# Patient Record
Sex: MALE | Race: WHITE | NOT HISPANIC OR LATINO | Employment: OTHER | URBAN - METROPOLITAN AREA
[De-identification: names, ages, dates, MRNs, and addresses within clinical notes are randomized per-mention and may not be internally consistent; named-entity substitution may affect disease eponyms.]

---

## 2017-02-07 ENCOUNTER — ALLSCRIPTS OFFICE VISIT (OUTPATIENT)
Dept: OTHER | Facility: OTHER | Age: 62
End: 2017-02-07

## 2017-02-07 ENCOUNTER — GENERIC CONVERSION - ENCOUNTER (OUTPATIENT)
Dept: OTHER | Facility: OTHER | Age: 62
End: 2017-02-07

## 2017-02-07 DIAGNOSIS — F17.200 NICOTINE DEPENDENCE, UNCOMPLICATED: ICD-10-CM

## 2017-02-07 DIAGNOSIS — F41.8 OTHER SPECIFIED ANXIETY DISORDERS: ICD-10-CM

## 2017-02-07 DIAGNOSIS — Z12.5 ENCOUNTER FOR SCREENING FOR MALIGNANT NEOPLASM OF PROSTATE: ICD-10-CM

## 2017-02-07 DIAGNOSIS — I10 ESSENTIAL (PRIMARY) HYPERTENSION: ICD-10-CM

## 2017-02-07 DIAGNOSIS — M54.2 CERVICALGIA: ICD-10-CM

## 2017-02-28 ENCOUNTER — LAB CONVERSION - ENCOUNTER (OUTPATIENT)
Dept: OTHER | Facility: OTHER | Age: 62
End: 2017-02-28

## 2017-02-28 LAB
25(OH)D3 SERPL-MCNC: 11 NG/ML (ref 30–100)
A/G RATIO (HISTORICAL): 1.5 (CALC) (ref 1–2.5)
ALBUMIN SERPL BCP-MCNC: 4.3 G/DL (ref 3.6–5.1)
ALP SERPL-CCNC: 74 U/L (ref 40–115)
ALT SERPL W P-5'-P-CCNC: 30 U/L (ref 9–46)
AST SERPL W P-5'-P-CCNC: 28 U/L (ref 10–35)
BASOPHILS # BLD AUTO: 0.2 %
BASOPHILS # BLD AUTO: 18 CELLS/UL (ref 0–200)
BILIRUB SERPL-MCNC: 0.4 MG/DL (ref 0.2–1.2)
BUN SERPL-MCNC: 14 MG/DL (ref 7–25)
BUN/CREA RATIO (HISTORICAL): ABNORMAL (CALC) (ref 6–22)
CALCIUM SERPL-MCNC: 9.3 MG/DL (ref 8.6–10.3)
CHLORIDE SERPL-SCNC: 100 MMOL/L (ref 98–110)
CHOLEST SERPL-MCNC: 307 MG/DL (ref 125–200)
CHOLEST/HDLC SERPL: 11 (CALC)
CO2 SERPL-SCNC: 27 MMOL/L (ref 20–31)
CREAT SERPL-MCNC: 1.01 MG/DL (ref 0.7–1.25)
DEPRECATED RDW RBC AUTO: 12.5 % (ref 11–15)
EGFR AFRICAN AMERICAN (HISTORICAL): 93 ML/MIN/1.73M2
EGFR-AMERICAN CALC (HISTORICAL): 80 ML/MIN/1.73M2
EOSINOPHIL # BLD AUTO: 1.2 %
EOSINOPHIL # BLD AUTO: 109 CELLS/UL (ref 15–500)
GAMMA GLOBULIN (HISTORICAL): 2.8 G/DL (CALC) (ref 1.9–3.7)
GLUCOSE (HISTORICAL): 155 MG/DL (ref 65–99)
HBA1C MFR BLD HPLC: 8.6 % OF TOTAL HGB
HCT VFR BLD AUTO: 44.8 % (ref 38.5–50)
HDLC SERPL-MCNC: 28 MG/DL
HGB BLD-MCNC: 15.1 G/DL (ref 13.2–17.1)
LDL CHOLESTEROL (HISTORICAL): ABNORMAL MG/DL (CALC)
LYMPHOCYTES # BLD AUTO: 2366 CELLS/UL (ref 850–3900)
LYMPHOCYTES # BLD AUTO: 26 %
MCH RBC QN AUTO: 32.4 PG (ref 27–33)
MCHC RBC AUTO-ENTMCNC: 33.8 G/DL (ref 32–36)
MCV RBC AUTO: 95.8 FL (ref 80–100)
MONOCYTES # BLD AUTO: 546 CELLS/UL (ref 200–950)
MONOCYTES (HISTORICAL): 6 %
NEUTROPHILS # BLD AUTO: 6061 CELLS/UL (ref 1500–7800)
NEUTROPHILS # BLD AUTO: 66.6 %
NON-HDL-CHOL (CHOL-HDL) (HISTORICAL): 279 MG/DL (CALC)
PLATELET # BLD AUTO: 291 THOUSAND/UL (ref 140–400)
PMV BLD AUTO: 8.6 FL (ref 7.5–12.5)
POTASSIUM SERPL-SCNC: 4.5 MMOL/L (ref 3.5–5.3)
PROSTATE SPECIFIC ANTIGEN TOTAL (HISTORICAL): 0.4 NG/ML
RBC # BLD AUTO: 4.68 MILLION/UL (ref 4.2–5.8)
SODIUM SERPL-SCNC: 137 MMOL/L (ref 135–146)
T4 FREE SERPL-MCNC: 0.9 NG/DL (ref 0.8–1.8)
TOTAL PROTEIN (HISTORICAL): 7.1 G/DL (ref 6.1–8.1)
TRIGL SERPL-MCNC: 564 MG/DL
TSH SERPL DL<=0.05 MIU/L-ACNC: 1.57 MIU/L (ref 0.4–4.5)
WBC # BLD AUTO: 9.1 THOUSAND/UL (ref 3.8–10.8)

## 2017-03-01 ENCOUNTER — GENERIC CONVERSION - ENCOUNTER (OUTPATIENT)
Dept: OTHER | Facility: OTHER | Age: 62
End: 2017-03-01

## 2017-03-07 ENCOUNTER — ALLSCRIPTS OFFICE VISIT (OUTPATIENT)
Dept: OTHER | Facility: OTHER | Age: 62
End: 2017-03-07

## 2017-05-31 DIAGNOSIS — I10 ESSENTIAL (PRIMARY) HYPERTENSION: ICD-10-CM

## 2017-05-31 DIAGNOSIS — E11.9 TYPE 2 DIABETES MELLITUS WITHOUT COMPLICATIONS (HCC): ICD-10-CM

## 2017-05-31 DIAGNOSIS — E78.5 HYPERLIPIDEMIA: ICD-10-CM

## 2017-06-01 ENCOUNTER — LAB CONVERSION - ENCOUNTER (OUTPATIENT)
Dept: OTHER | Facility: OTHER | Age: 62
End: 2017-06-01

## 2017-06-01 LAB
CREATININE, RANDOM URINE (HISTORICAL): 54 MG/DL (ref 20–370)
HBA1C MFR BLD HPLC: 7.9 % OF TOTAL HGB
MAGNESIUM, UR (HISTORICAL): 0.2 MG/DL
MICROALBUMIN/CREATININE RATIO (HISTORICAL): 4 MCG/MG CREAT

## 2017-06-13 ENCOUNTER — ALLSCRIPTS OFFICE VISIT (OUTPATIENT)
Dept: OTHER | Facility: OTHER | Age: 62
End: 2017-06-13

## 2017-08-04 ENCOUNTER — GENERIC CONVERSION - ENCOUNTER (OUTPATIENT)
Dept: OTHER | Facility: OTHER | Age: 62
End: 2017-08-04

## 2017-08-31 ENCOUNTER — ALLSCRIPTS OFFICE VISIT (OUTPATIENT)
Dept: OTHER | Facility: OTHER | Age: 62
End: 2017-08-31

## 2017-08-31 DIAGNOSIS — I73.9 PERIPHERAL VASCULAR DISEASE (HCC): ICD-10-CM

## 2017-08-31 DIAGNOSIS — F17.200 NICOTINE DEPENDENCE, UNCOMPLICATED: ICD-10-CM

## 2017-09-01 ENCOUNTER — HOSPITAL ENCOUNTER (OUTPATIENT)
Dept: NON INVASIVE DIAGNOSTICS | Facility: HOSPITAL | Age: 62
Discharge: HOME | End: 2017-09-01
Payer: COMMERCIAL

## 2017-09-01 DIAGNOSIS — I73.9 PERIPHERAL VASCULAR DISEASE (HCC): ICD-10-CM

## 2017-09-01 PROCEDURE — 93923 UPR/LXTR ART STDY 3+ LVLS: CPT

## 2017-09-01 PROCEDURE — 93925 LOWER EXTREMITY STUDY: CPT

## 2017-09-05 ENCOUNTER — GENERIC CONVERSION - ENCOUNTER (OUTPATIENT)
Dept: OTHER | Facility: OTHER | Age: 62
End: 2017-09-05

## 2017-09-25 ENCOUNTER — ALLSCRIPTS OFFICE VISIT (OUTPATIENT)
Dept: OTHER | Facility: OTHER | Age: 62
End: 2017-09-25

## 2017-10-02 DIAGNOSIS — I70.219 ATHEROSCLEROSIS OF NATIVE ARTERIES OF EXTREMITY WITH INTERMITTENT CLAUDICATION (HCC): ICD-10-CM

## 2017-10-09 ENCOUNTER — APPOINTMENT (OUTPATIENT)
Dept: LAB | Facility: HOSPITAL | Age: 62
End: 2017-10-09
Attending: SURGERY
Payer: COMMERCIAL

## 2017-10-09 DIAGNOSIS — I70.219 ATHEROSCLEROSIS OF NATIVE ARTERIES OF EXTREMITY WITH INTERMITTENT CLAUDICATION (HCC): ICD-10-CM

## 2017-10-09 DIAGNOSIS — F17.200 NICOTINE DEPENDENCE, UNCOMPLICATED: ICD-10-CM

## 2017-10-09 LAB
ANION GAP SERPL CALCULATED.3IONS-SCNC: 6 MMOL/L (ref 4–13)
BUN SERPL-MCNC: 16 MG/DL (ref 5–25)
CALCIUM SERPL-MCNC: 9.7 MG/DL (ref 8.3–10.1)
CHLORIDE SERPL-SCNC: 103 MMOL/L (ref 100–108)
CO2 SERPL-SCNC: 30 MMOL/L (ref 21–32)
CREAT SERPL-MCNC: 0.92 MG/DL (ref 0.6–1.3)
ERYTHROCYTE [DISTWIDTH] IN BLOOD BY AUTOMATED COUNT: 12.7 % (ref 11.6–15.1)
GFR SERPL CREATININE-BSD FRML MDRD: 89 ML/MIN/1.73SQ M
GLUCOSE SERPL-MCNC: 134 MG/DL (ref 65–140)
HCT VFR BLD AUTO: 42.8 % (ref 36.5–49.3)
HGB BLD-MCNC: 14.2 G/DL (ref 12–17)
INR PPP: 0.79 (ref 0.86–1.16)
MCH RBC QN AUTO: 32.2 PG (ref 26.8–34.3)
MCHC RBC AUTO-ENTMCNC: 33.2 G/DL (ref 31.4–37.4)
MCV RBC AUTO: 97 FL (ref 82–98)
PLATELET # BLD AUTO: 290 THOUSANDS/UL (ref 149–390)
PMV BLD AUTO: 10.3 FL (ref 8.9–12.7)
POTASSIUM SERPL-SCNC: 5.2 MMOL/L (ref 3.5–5.3)
PROTHROMBIN TIME: 11 SECONDS (ref 12.1–14.4)
RBC # BLD AUTO: 4.41 MILLION/UL (ref 3.88–5.62)
SODIUM SERPL-SCNC: 139 MMOL/L (ref 136–145)
WBC # BLD AUTO: 7.68 THOUSAND/UL (ref 4.31–10.16)

## 2017-10-09 PROCEDURE — 36415 COLL VENOUS BLD VENIPUNCTURE: CPT

## 2017-10-09 PROCEDURE — 80048 BASIC METABOLIC PNL TOTAL CA: CPT

## 2017-10-09 PROCEDURE — 85027 COMPLETE CBC AUTOMATED: CPT

## 2017-10-09 PROCEDURE — 85610 PROTHROMBIN TIME: CPT

## 2017-10-13 ENCOUNTER — TELEPHONE (OUTPATIENT)
Dept: RADIOLOGY | Facility: HOSPITAL | Age: 62
End: 2017-10-13

## 2017-10-13 RX ORDER — SODIUM CHLORIDE 9 MG/ML
75 INJECTION, SOLUTION INTRAVENOUS CONTINUOUS
Status: CANCELLED | OUTPATIENT
Start: 2017-10-13

## 2017-10-16 ENCOUNTER — TELEPHONE (OUTPATIENT)
Dept: INPATIENT UNIT | Facility: HOSPITAL | Age: 62
End: 2017-10-16

## 2017-10-17 ENCOUNTER — HOSPITAL ENCOUNTER (OUTPATIENT)
Dept: RADIOLOGY | Facility: HOSPITAL | Age: 62
Discharge: HOME | End: 2017-10-17
Attending: SURGERY | Admitting: SURGERY
Payer: COMMERCIAL

## 2017-10-17 VITALS
TEMPERATURE: 97.7 F | SYSTOLIC BLOOD PRESSURE: 117 MMHG | HEART RATE: 60 BPM | OXYGEN SATURATION: 99 % | HEIGHT: 69 IN | RESPIRATION RATE: 18 BRPM | BODY MASS INDEX: 27.99 KG/M2 | DIASTOLIC BLOOD PRESSURE: 62 MMHG | WEIGHT: 189 LBS

## 2017-10-17 DIAGNOSIS — I70.219 ATHEROSCLEROSIS OF LOWER EXTREMITY WITH CLAUDICATION (HCC): ICD-10-CM

## 2017-10-17 PROCEDURE — C1725 CATH, TRANSLUMIN NON-LASER: HCPCS

## 2017-10-17 PROCEDURE — C1769 GUIDE WIRE: HCPCS

## 2017-10-17 PROCEDURE — C2623 CATH, TRANSLUMIN, DRUG-COAT: HCPCS

## 2017-10-17 PROCEDURE — 75710 ARTERY X-RAYS ARM/LEG: CPT

## 2017-10-17 PROCEDURE — C1894 INTRO/SHEATH, NON-LASER: HCPCS

## 2017-10-17 PROCEDURE — 75625 CONTRAST EXAM ABDOMINL AORTA: CPT

## 2017-10-17 PROCEDURE — 37224 HB FEM/POPL REVAS W/TLA: CPT

## 2017-10-17 RX ORDER — DIAZEPAM 2 MG/1
TABLET ORAL CODE/TRAUMA/SEDATION MEDICATION
Status: COMPLETED | OUTPATIENT
Start: 2017-10-17 | End: 2017-10-17

## 2017-10-17 RX ORDER — CLOPIDOGREL BISULFATE 75 MG/1
75 TABLET ORAL ONCE
Status: COMPLETED | OUTPATIENT
Start: 2017-10-17 | End: 2017-10-17

## 2017-10-17 RX ORDER — MIRTAZAPINE 30 MG/1
30 TABLET, FILM COATED ORAL
COMMUNITY

## 2017-10-17 RX ORDER — DIAZEPAM 5 MG/ML
INJECTION, SOLUTION INTRAMUSCULAR; INTRAVENOUS CODE/TRAUMA/SEDATION MEDICATION
Status: COMPLETED | OUTPATIENT
Start: 2017-10-17 | End: 2017-10-17

## 2017-10-17 RX ORDER — MIDAZOLAM HYDROCHLORIDE 1 MG/ML
INJECTION INTRAMUSCULAR; INTRAVENOUS CODE/TRAUMA/SEDATION MEDICATION
Status: COMPLETED | OUTPATIENT
Start: 2017-10-17 | End: 2017-10-17

## 2017-10-17 RX ORDER — FENTANYL CITRATE 50 UG/ML
INJECTION, SOLUTION INTRAMUSCULAR; INTRAVENOUS CODE/TRAUMA/SEDATION MEDICATION
Status: COMPLETED | OUTPATIENT
Start: 2017-10-17 | End: 2017-10-17

## 2017-10-17 RX ORDER — PAROXETINE HYDROCHLORIDE 40 MG/1
40 TABLET, FILM COATED ORAL EVERY MORNING
COMMUNITY

## 2017-10-17 RX ORDER — HEPARIN SODIUM 1000 [USP'U]/ML
INJECTION, SOLUTION INTRAVENOUS; SUBCUTANEOUS CODE/TRAUMA/SEDATION MEDICATION
Status: COMPLETED | OUTPATIENT
Start: 2017-10-17 | End: 2017-10-17

## 2017-10-17 RX ORDER — DIPHENHYDRAMINE HYDROCHLORIDE 50 MG/ML
INJECTION INTRAMUSCULAR; INTRAVENOUS CODE/TRAUMA/SEDATION MEDICATION
Status: COMPLETED | OUTPATIENT
Start: 2017-10-17 | End: 2017-10-17

## 2017-10-17 RX ORDER — SODIUM CHLORIDE 9 MG/ML
75 INJECTION, SOLUTION INTRAVENOUS CONTINUOUS
Status: DISCONTINUED | OUTPATIENT
Start: 2017-10-17 | End: 2017-10-17 | Stop reason: HOSPADM

## 2017-10-17 RX ORDER — ALPRAZOLAM 0.5 MG/1
TABLET ORAL
COMMUNITY
End: 2018-04-11 | Stop reason: CLARIF

## 2017-10-17 RX ORDER — ATORVASTATIN CALCIUM 40 MG/1
40 TABLET, FILM COATED ORAL DAILY
COMMUNITY
End: 2018-03-23 | Stop reason: SDUPTHER

## 2017-10-17 RX ADMIN — SODIUM CHLORIDE 75 ML/HR: 0.9 INJECTION, SOLUTION INTRAVENOUS at 07:25

## 2017-10-17 RX ADMIN — CLOPIDOGREL BISULFATE 75 MG: 75 TABLET ORAL at 11:33

## 2017-10-17 RX ADMIN — FENTANYL CITRATE 50 MCG: 50 INJECTION, SOLUTION INTRAMUSCULAR; INTRAVENOUS at 08:22

## 2017-10-17 RX ADMIN — MIDAZOLAM 1 MG: 1 INJECTION INTRAMUSCULAR; INTRAVENOUS at 08:27

## 2017-10-17 RX ADMIN — MIDAZOLAM 1 MG: 1 INJECTION INTRAMUSCULAR; INTRAVENOUS at 08:21

## 2017-10-17 RX ADMIN — DIAZEPAM 5 MG: 5 INJECTION, SOLUTION INTRAMUSCULAR; INTRAVENOUS at 08:31

## 2017-10-17 RX ADMIN — MIDAZOLAM 1 MG: 1 INJECTION INTRAMUSCULAR; INTRAVENOUS at 08:17

## 2017-10-17 RX ADMIN — IODIXANOL 120 ML: 320 INJECTION, SOLUTION INTRAVASCULAR at 09:54

## 2017-10-17 RX ADMIN — DIPHENHYDRAMINE HYDROCHLORIDE 25 MG: 50 INJECTION, SOLUTION INTRAMUSCULAR; INTRAVENOUS at 08:48

## 2017-10-17 RX ADMIN — FENTANYL CITRATE 50 MCG: 50 INJECTION, SOLUTION INTRAMUSCULAR; INTRAVENOUS at 08:17

## 2017-10-17 RX ADMIN — FENTANYL CITRATE 50 MCG: 50 INJECTION, SOLUTION INTRAMUSCULAR; INTRAVENOUS at 09:09

## 2017-10-17 RX ADMIN — MIDAZOLAM 1 MG: 1 INJECTION INTRAMUSCULAR; INTRAVENOUS at 08:51

## 2017-10-17 RX ADMIN — MIDAZOLAM 1 MG: 1 INJECTION INTRAMUSCULAR; INTRAVENOUS at 09:00

## 2017-10-17 RX ADMIN — MIDAZOLAM 1 MG: 1 INJECTION INTRAMUSCULAR; INTRAVENOUS at 08:41

## 2017-10-17 RX ADMIN — HEPARIN SODIUM 5000 UNITS: 1000 INJECTION INTRAVENOUS; SUBCUTANEOUS at 08:41

## 2017-10-17 NOTE — DISCHARGE INSTRUCTIONS
ARTERIOGRAM    WHAT YOU SHOULD KNOW:   An angiogram is a procedure to look at arteries in your body  Arteries are the blood vessels that carry blood from your heart to your body  AFTER YOU LEAVE:     Self-care:   · Limit activity: Rest for the remainder of the day of your procedure  Have some one with you until the next morning  Keep your arm or leg straight as much as possible  Rest as much as possible, sitting lying or reclining  Walk only to go to the bathroom, to bed or to eat  If the angiogram catheter was put in your leg, use the stairs as little as possible  No driving  · Keep your wound clean and dry  You may shower 24 hours after your procedure  The bandage you have on should fall off in 2-3 days  If there is any drainage from the puncture site, you should put on a clean bandage  · Watch for bleeding and bruising: It is normal to have a bruise and soreness where the angiogram catheter went in  · Diet:   · You may resume your regular diet  · Drink more liquids than usual for the next 24 hours      · IMMEDIATELY Contact Interventional Radiology at [phone] [place] PATIENTS: Contact Interventional Radiology at [phone]) [place] PATIENTS: Contact Interventional Radiology at [phone]) if any of the following occur:  · If your bruise gets larger or if you notice any active bleeding  APPLY DIRECT PRESSURE TO THE BLEEDING SITE  · If you notice increased swelling or have increased pain at the puncture site   · If you have any numbness or pain in the extremity of the puncture site   · If that extremity seems cold or pale      · You have fever greater than 101    Follow up with your primary healthcare provider  as directed: Write down your questions so you remember to ask them during your visits

## 2017-10-17 NOTE — BRIEF OP NOTE (RAD/CATH)
IR ABDOMINAL ANGIOGRAPHY / INTERVENTION  Procedure Note    PATIENT NAME: Benjamin Caro  : 1955  MRN: [id number]     Pre-op Diagnosis:   1  Atherosclerosis of lower extremity with claudication (HCC)      Post-op Diagnosis:   1   Atherosclerosis of lower extremity with claudication ([address] )      Procedure: Abdominal aortogram with RLE runoff  R SFA drug coated balloon angioplasty  Surgeon:   Darline Leone DO  Assistants: none    Sedation Time: 60min  Contrast: 120 cc visipaque 320  Fluoro time: 12 7min        Estimated Blood Loss: <20ml  Findings: high grade R SFA stenosis successfully treated with balloon angioplasty    Specimens: N/A    Complications:  None immediately apparent  Anesthesia: Conscious sedation and Local    Mahendra Wheatley DO     Date: 10/17/2017  Time: 9:31 AM

## 2017-11-02 ENCOUNTER — ALLSCRIPTS OFFICE VISIT (OUTPATIENT)
Dept: OTHER | Facility: OTHER | Age: 62
End: 2017-11-02

## 2017-11-03 NOTE — PROGRESS NOTES
Assessment  1  Atherosclerotic PVD with intermittent claudication (440 21) (I70 219)   2  Nicotine dependence (305 1) (F17 200)    Plan  Atherosclerotic PVD with intermittent claudication    · VAS LOWER LIMB ARTERIAL DUPLEX, COMPLETE BILATERAL/GRAFTS; SIDE:Bilateral;  Status:Hold For - Scheduling; Requested for:32Ubm1430; Perform:[facility]; IFW:28TNV0712;GEEFPOV; For:Atherosclerotic PVD with intermittent claudication; Ordered By:Mahendra Cuenca; Discussion/Summary  Discussion Summary:   Anais Young is status post abdominal aortogram with right lower extremity runoff, SFA drug coated balloon angioplasty for focal high-grade/occluded segment  He reports dramatic improvement with regards to his lower extremity calf symptoms  He continues to smoke however is attempting to quit  Will enter into our surveillance lower extremity arterial duplex program with 3 month LEAD  Continue aspirin and atorvastatin as well as Plavix  Chief Complaint  Chief Complaint Free Text Note Form: I am here for follow-up procedure  History of Present Illness  HPI: Patient is s/p A-gram on the right LE on 10/17/17  He feels since the procedure his symptoms have resolved  He denies calf cramping  He is able to walk without pain  Patient takes Plavix and ASA daily  Incision is dry and intact  Patient admits he continues to try and d/c tobacco use  Review of Systems  Complete Male - Vasc:   Constitutional: No fever or chills, feels well, no tiredness, no recent weight gain or weight loss  Eyes: dryness of the eyes,-- does not wears glasses,-- no sudden vision loss,-- no blurred vision-- and-- no double vision, but-- no eye pain,-- no eyesight problems,-- eyes not red,-- no purulent discharge from the eyes-- and-- no itching of the eyes  ENT: no loss of hearing, no nosebleeds, no hoarseness  Cardiovascular: no chest pain, regular heart rate     Respiratory: No sob, no wheezing, no cough, no sob with exertion, no orthopnea  Gastrointestinal: No nausea, No vomiting, no diarrhea, no blood in stool  Genitourinary: no dysuria, no hematuria, No urinary incontinence, no erectile dysfunction  Musculoskeletal: no limb pain, no limb swelling  Integumentary: no rash, no lesions, no wounds, no ulcer  Neurological: no dementia, no headache, no numbness, no limb weakness, no dizziness, no difficulty walking  Psychiatric: no depression, no mood disorders, no anxiety  Hematologic/Lymphatic: no bleeding disorder, no easy bruising  ROS Reviewed:   ROS reviewed  Active Problems  1  Atherosclerotic PVD with intermittent claudication (440 21) (I70 219)   2  Depression with anxiety (300 4) (F41 8)   3  Diabetes mellitus type 2, noninsulin dependent (250 00) (E11 9)   4  Hyperlipidemia (272 4) (E78 5)   5  Hypertension (401 9) (I10)   6  Neck pain (723 1) (M54 2)   7  Need for prophylactic vaccination and inoculation against influenza (V04 81) (Z23)   8  Nicotine dependence (305 1) (F17 200)   9  Peripheral vascular disease (443 9) (I73 9)   10  Prostate cancer screening (V76 44) (Z12 5)   11  Rhinophyma (695 3) (L71 1)   12  Rosacea (695 3) (L71 9)   13  Screening for colon cancer (V76 51) (Z12 11)    Past Medical History  1  History of colonic polyps (V12 72) (Z86 010)  Active Problems And Past Medical History Reviewed: The active problems and past medical history were reviewed and updated today  Surgical History  Surgical History Reviewed: The surgical history was reviewed and updated today  Family History  Mother    1  Family history of congestive heart failure (V17 49) (Z82 49)  Sibling    2  Family history of hypertension (V17 49) (Z82 49)  Family History Reviewed: The family history was reviewed and updated today  Social History   · Alcohol use (V49 89) (Z78 9)   · Current every day smoker (305 1) (F17 200)  Social History Reviewed: The social history was reviewed and updated today  Current Meds   1  ALPRAZolam 0 25 MG Oral Tablet; TAKE 1 TABLET 3 TIMES DAILY AS NEEDED; Therapy: (Recorded:11Yad5857) to Recorded   2  Aspirin 81 MG Oral Tablet Delayed Release; take 1 tablet by mouth every day; Therapy: 50GZS4517 to (Evaluate:12Mar2018)  Requested for: 31FMX9077; Last   Rx:13Oct2017 Ordered   3  Atorvastatin Calcium 40 MG Oral Tablet; TAKE 1 TABLET DAILY; Therapy: 66LPM6960 to (Janet Napier)  Requested for: 57QAF0402; Last   Rx:04Dgq8760 Ordered   4  Blood Glucose Monitor System w/Device Kit; use to check blood glucose 1-2 times a day; Therapy: 59API3803 to (Last Rx:07Mar2017)  Requested for: 81CJK1913 Ordered   5  Blood Glucose Test In Vitro Strip; TEST TWICE DAILY; Therapy: 04UGS4483 to (Evaluate:15Jun2017)  Requested for: 93KAA2477; Last   Rx:07Mar2017 Ordered   6  Glucerna Hunger Smart Shake Oral Liquid; 1 bottle daily; Therapy: 22NSA3014 to (Last Rx:13Jun2017) Ordered   7  Lisinopril 20 MG Oral Tablet; TAKE 1 TABLET BY MOUTH ONCE DAILY  Requested for:   13Jun2017; Last Rx:13Jun2017 Ordered   8  MetFORMIN HCl - 1000 MG Oral Tablet; TAKE 1 TABLET TWICE DAILY; Therapy: 57POG5270 to (Evaluate:10Dec2017)  Requested for: 73UNM4275; Last   Rx:13Jun2017 Ordered   9  MetroNIDAZOLE 1 % External Gel; APPLY SPARINGLY TO AFFECTED AREA(S) ONCE   DAILY; Therapy: 50QKB4900 to (Last Rx:90Nta8211)  Requested for: 71Wxz5150 Ordered   10  Nicotine Step 1 21 MG/24HR Transdermal Patch 24 Hour; APPLY 1 PATCH DAILY AS    DIRECTED; Therapy: 57Hkz4714 to (Evaluate:09Sep2017)  Requested for: 42Woe3505; Last    Rx:59Xfh6562 Ordered   11  Nicotine Step 2 14 MG/24HR Transdermal Patch 24 Hour; APPLY 1 PATCH DAILY for 6    weeks after finishing 2 weeks of 21 mg;    Therapy: 29Yoi5787 to (Last Rx:12Mmk5872)  Requested for: 66Uia7643 Ordered   12  Paxil 40 MG Oral Tablet; Take 1 tablet daily as directed; Therapy: (Recorded:45Twq0530) to Recorded   13  Plavix TABS;     Therapy: (Recorded:02Nov2017) to Recorded   14  Remeron 30 MG Oral Tablet; TAKE 1 TABLET AT BEDTIME; Therapy: (Recorded:41Nmo0640) to Recorded  Medication List Reviewed: The medication list was reviewed and updated today  Allergies  1  No Known Drug Allergies    Vitals  Vital Signs    Recorded: 24ERR1206 01:41PM   Temperature 97 4 F, Tympanic   Heart Rate 108, L Radial   Pulse Quality Normal, L Radial   Respiration Quality Normal   Respiration 20   Systolic 694, LUE, Sitting   Diastolic 70, LUE, Sitting   Height 5 ft 9 in   Weight 184 lb    BMI Calculated 27 17   BSA Calculated 1 99     Physical Exam    Posterior tibialis: right 2+-- and-- left 1+  Dorsalis pedis: left 2+  Distal Pulse Exam: This patient had dopplerable pulses in these locations: right Monophasic a T Doppler signal, multiphasic right peroneal Doppler signal  Normal Capillary Refill  Extremities: No upper or lower extremity edema  Results/Data  Diagnostic Studies Reviewed Vasc: I personally reviewed the films/images/results in the office today  My interpretation follows  Vascular Study Review abdominal aortogram with right lower extremity runoff, right SFA intervention -images reviewed with patient today in the office        Signatures   Electronically signed by : Regis Martinez DO; Nov 2 2017  2:38PM EST                       (Author)

## 2017-12-04 ENCOUNTER — GENERIC CONVERSION - ENCOUNTER (OUTPATIENT)
Dept: OTHER | Facility: OTHER | Age: 62
End: 2017-12-04

## 2018-01-05 ENCOUNTER — ALLSCRIPTS OFFICE VISIT (OUTPATIENT)
Dept: OTHER | Facility: OTHER | Age: 63
End: 2018-01-05

## 2018-01-05 LAB — HBA1C MFR BLD HPLC: 6 %

## 2018-01-10 NOTE — RESULT NOTES
Message   Recorded as Task   Date: 09/05/2017 08:29 AM, Created By: Yoon Belle   Task Name: Follow Up   Assigned To: Pb Ndiaye   Regarding Patient: Mary Spivey, Status: In Progress   Comment:    Omayra Reyna - 05 Sep 2017 8:29 AM     TASK CREATED  Arterial duplex showed significant vascular disease on both sides  On the right there was also a stenosis which probably could explain why he gets pain whenever he walks  I can put in a vascular consult for Pedro Gutiérrez, if he desires to see someone else (a friend of his bother), I can do that physicians  Obviously it may be easier to have everything in the same network since there is better communication and access to records    Let me know which physician he chooses to see   Villatoro Nephew - 05 Sep 2017 2:32 PM     TASK EDITED  I left a message for Anais Amaya to call back  Villatoro Nephew - 05 Sep 2017 2:32 PM     TASK IN PROGRESS   Villatoro Nephew - 05 Sep 2017 2:59 PM     TASK EDITED  I spoke with Anais Amaya and he is aware of his results  He said it would be ok if you can set him up for a Vascular consult with [address]  Verified Results  VAS LOWER LIMB ARTERIAL DUPLEX, COMPLETE BILATERAL/GRAFTS 01Sep2017 08:30AM Yoon Belle   TW Order Number: [id number]    - Patient Instructions: To schedule this appointment, please contact Central Scheduling at [phone]  Test Name Result Flag Reference   VAS LOWER LIMB ARTERIAL DUPLEX, COMPLETE BILATERAL/GRAFTS (Report)     [facility] REPORT   CLINICAL:   Indications: PVD, Unspecified [I73 9]  Patient presents with right calf claudication of approximately 30 yards  Risk Factors: The patient has history of Hypertension, Hyperlipidemia, current   smoking, and Diabetes  Right Brachial Pressure: 154/ mm Hg, Left Brachial Pressure: 153/ mm Hg           FINDINGS:      Segment        Right         Left                        Impression PSV EDV PSV EDV    Common Femoral Artery       122  0 133  15 Prox Profunda           125  7 125  6    Prox SFA              93  0 149  16    Mid SFA               83  8 162  12    Dist SFA        >75%    457  13 116  0    Proximal Pop            44  7 156  16    Distal Pop             64  12  86  21    Dist Post Tibial          51  9  66  0    Dist  Ant  Tibial          22  0  74  0             CONCLUSION:   Impression:   Right Lower Limb   Ankle Pressure: 97 mm Hg , GINI: 0 63, severe claudication range      Right Metatarsal Pressure: 100 mm Hg  Right Great Toe Pressure 66 mm Hg , above healing threshold for a diabetic  Conclusion: A greater than 75% stenosis is noted in the distal superficial   femoral artery with diffuse atherosclerotic disease noted throughout the   remaining portions of the femoropopliteal arteries  Left Lower Limb   Ankle Pressure 150 mm Hg , GINI: 0 97, normal range  Left Metatarsal Pressure: 143 mm Hg  Left Great Toe Pressure 107 mm Hg , above healing threshold for a diabetic  Conclusion: Diffuse atherosclerotic disease noted throughout the   femoropopliteal arteries        SIGNATURE:   Electronically Signed by: Jenifer Ruelas MD on 2017-09-01 04:46:55 PM

## 2018-01-10 NOTE — RESULT NOTES
Message   Recorded as Task   Date: 02/28/2017 08:38 AM, Created By: Aruna Gray   Task Name: Follow Up   Assigned To: Yvette Kimble   Regarding Patient: aJson Sparks, Status: Active   CommentDelona Duel - 28 Feb 2017 8:38 AM     TASK CREATED  Blood work showed that he is diabetic  I will discuss with him more when I see him and start medications  PS: let me know if he doesn't come of reschedules  Nohemy Field - 01 Mar 2017 1:45 PM     TASK EDITED  Spoke to the patient gave him the results, told him that the provider will discuss more and starting medication in his next OV

## 2018-01-12 VITALS
TEMPERATURE: 97.6 F | SYSTOLIC BLOOD PRESSURE: 125 MMHG | BODY MASS INDEX: 28.01 KG/M2 | WEIGHT: 189.13 LBS | HEART RATE: 106 BPM | DIASTOLIC BLOOD PRESSURE: 76 MMHG | HEIGHT: 69 IN | RESPIRATION RATE: 16 BRPM

## 2018-01-12 VITALS
BODY MASS INDEX: 28.81 KG/M2 | RESPIRATION RATE: 14 BRPM | TEMPERATURE: 96.2 F | WEIGHT: 194.5 LBS | SYSTOLIC BLOOD PRESSURE: 140 MMHG | HEIGHT: 69 IN | HEART RATE: 76 BPM | DIASTOLIC BLOOD PRESSURE: 70 MMHG

## 2018-01-13 VITALS
HEART RATE: 108 BPM | RESPIRATION RATE: 20 BRPM | WEIGHT: 184 LBS | SYSTOLIC BLOOD PRESSURE: 138 MMHG | DIASTOLIC BLOOD PRESSURE: 70 MMHG | BODY MASS INDEX: 27.25 KG/M2 | HEIGHT: 69 IN | TEMPERATURE: 97.4 F

## 2018-01-13 VITALS
RESPIRATION RATE: 21 BRPM | BODY MASS INDEX: 27.55 KG/M2 | HEIGHT: 69 IN | DIASTOLIC BLOOD PRESSURE: 80 MMHG | HEART RATE: 108 BPM | SYSTOLIC BLOOD PRESSURE: 140 MMHG | WEIGHT: 186 LBS

## 2018-01-13 VITALS
DIASTOLIC BLOOD PRESSURE: 76 MMHG | SYSTOLIC BLOOD PRESSURE: 140 MMHG | HEART RATE: 80 BPM | BODY MASS INDEX: 29.36 KG/M2 | TEMPERATURE: 97.1 F | RESPIRATION RATE: 16 BRPM | WEIGHT: 198.25 LBS | HEIGHT: 69 IN

## 2018-01-14 VITALS
RESPIRATION RATE: 16 BRPM | TEMPERATURE: 97.4 F | BODY MASS INDEX: 29.07 KG/M2 | WEIGHT: 196.25 LBS | HEIGHT: 69 IN | SYSTOLIC BLOOD PRESSURE: 142 MMHG | DIASTOLIC BLOOD PRESSURE: 78 MMHG | HEART RATE: 84 BPM

## 2018-01-17 NOTE — MISCELLANEOUS
Message   Date: 04 Aug 2017 11:42 AM EST, Recorded By: Randi Phillips  Calling For: Elvicésar Mark: Tequila Carty, Self  Phone: [phone] (Home)  Reason: Other  patient request refill on Metronidazole gel 1% for rosacea on face, originally prescribed by Dr Styles Spar surgeon  Dr Kate Price agreed to refill and send to pharmacy  Patient was made aware and will  at pharmacy later today  Active Problems    1  Depression with anxiety (300 4) (F41 8)   2  Diabetes mellitus type 2, noninsulin dependent (250 00) (E11 9)   3  Hyperlipidemia (272 4) (E78 5)   4  Hypertension (401 9) (I10)   5  Neck pain (723 1) (M54 2)   6  Need for prophylactic vaccination and inoculation against influenza (V04 81) (Z23)   7  Nicotine dependence (305 1) (F17 200)   8  Prostate cancer screening (V76 44) (Z12 5)   9  Rhinophyma (695 3) (L71 1)   10  Screening for colon cancer (V76 51) (Z12 11)    Current Meds   1  ALPRAZolam 0 25 MG Oral Tablet; TAKE 1 TABLET 3 TIMES DAILY AS NEEDED; Therapy: (Recorded:75Lnm2435) to Recorded   2  Aspirin 81 MG Oral Tablet Delayed Release; take 1 tablet by mouth every day; Therapy: 41KFW4736 to ([place])  Requested for: 11ONW1909; Last   Rx:99Xoa5640 Ordered   3  Atorvastatin Calcium 40 MG Oral Tablet; TAKE 1 TABLET DAILY; Therapy: 54OVA9977 to ([place])  Requested for: 22SPC7908; Last   Rx:00Set9045 Ordered   4  Blood Glucose Monitor System w/Device Kit; use to check blood glucose 1-2 times a day; Therapy: 98CRT3162 to (Last Rx:07Mar2017)  Requested for: 86TQV2148 Ordered   5  Blood Glucose Test In Vitro Strip; TEST TWICE DAILY; Therapy: 69BAG4162 to (Evaluate:15Jun2017)  Requested for: 55YXH9145; Last   Rx:07Mar2017 Ordered   6  Glucerna Hunger Smart Shake Oral Liquid; 1 bottle daily; Therapy: 91DNL4192 to (Last Rx:13Jun2017) Ordered   7   Lisinopril 20 MG Oral Tablet; TAKE 1 TABLET BY MOUTH ONCE DAILY  Requested for:   13Jun2017; Last

## 2018-01-23 VITALS
WEIGHT: 189 LBS | SYSTOLIC BLOOD PRESSURE: 128 MMHG | DIASTOLIC BLOOD PRESSURE: 68 MMHG | TEMPERATURE: 96.5 F | RESPIRATION RATE: 15 BRPM | HEIGHT: 69 IN | BODY MASS INDEX: 27.99 KG/M2 | HEART RATE: 80 BPM

## 2018-01-23 NOTE — RESULT NOTES
Message   Recorded as Task   Date: 12/04/2017 09:42 AM, Created By: Roney Aguilar   Task Name: Follow Up   Assigned To: Rosario Grant   Regarding Patient: Dylan Ulloa, Status: Active   CommentLavertnicole Beck - 04 Dec 2017 9:42 AM     TASK CREATED  his last appt in sept was cancelled  he should come in for follow up to monitor his bp, Daniella Álvarez - 04 Dec 2017 10:53 AM     TASK EDITED  I spoke with Ajay Álvarez, he cancelled due to insurance issues  He is starting new insurance in January  I was able to schedule him for 1/5/2018          Plan  Diabetes mellitus type 2, noninsulin dependent    · MetFORMIN HCl - 1000 MG Oral Tablet; TAKE 1 TABLET TWICE DAILY

## 2018-02-02 DIAGNOSIS — I70.219 ATHEROSCLEROSIS OF NATIVE ARTERIES OF EXTREMITY WITH INTERMITTENT CLAUDICATION (HCC): ICD-10-CM

## 2018-02-09 LAB
LEFT EYE DIABETIC RETINOPATHY: NORMAL
RIGHT EYE DIABETIC RETINOPATHY: NORMAL

## 2018-03-05 ENCOUNTER — OFFICE VISIT (OUTPATIENT)
Dept: GASTROENTEROLOGY | Facility: MEDICAL CENTER | Age: 63
End: 2018-03-05
Payer: COMMERCIAL

## 2018-03-05 VITALS
WEIGHT: 188.93 LBS | BODY MASS INDEX: 27.98 KG/M2 | TEMPERATURE: 98.3 F | SYSTOLIC BLOOD PRESSURE: 134 MMHG | HEIGHT: 69 IN | DIASTOLIC BLOOD PRESSURE: 66 MMHG | HEART RATE: 97 BPM

## 2018-03-05 DIAGNOSIS — I10 ESSENTIAL HYPERTENSION: Primary | ICD-10-CM

## 2018-03-05 DIAGNOSIS — K63.5 POLYP OF COLON, UNSPECIFIED PART OF COLON, UNSPECIFIED TYPE: Primary | ICD-10-CM

## 2018-03-05 DIAGNOSIS — Z11.59 NEED FOR HEPATITIS C SCREENING TEST: ICD-10-CM

## 2018-03-05 PROCEDURE — 99202 OFFICE O/P NEW SF 15 MIN: CPT | Performed by: INTERNAL MEDICINE

## 2018-03-05 RX ORDER — PEG-3350, SODIUM SULFATE, SODIUM CHLORIDE, POTASSIUM CHLORIDE, SODIUM ASCORBATE AND ASCORBIC ACID 7.5-2.691G
4000 KIT ORAL ONCE
Qty: 1 EACH | Refills: 0 | Status: SHIPPED | OUTPATIENT
Start: 2018-03-05 | End: 2018-03-08

## 2018-03-05 RX ORDER — LISINOPRIL 20 MG/1
20 TABLET ORAL DAILY
Qty: 90 TABLET | Refills: 1 | Status: SHIPPED | OUTPATIENT
Start: 2018-03-05 | End: 2018-08-30 | Stop reason: SDUPTHER

## 2018-03-05 NOTE — LETTER
March 5, 2018     Ruperto Taylor, [address] [facility]    Patient: Emiliano Jimenez   YOB: 1955   Date of Visit: 3/5/2018       Dear Dr Urrutia Median:    Thank you for referring Emiliano Jimenez to me for evaluation  Below are my notes for this consultation  If you have questions, please do not hesitate to call me  I look forward to following your patient along with you           Sincerely,        Anshu Irby,         CC: No Recipients

## 2018-03-05 NOTE — PROGRESS NOTES
[facility] - Outpatient Consultation  Liana Degroot 58 y o  male MRN: [id number]  Encounter: [id number]          ASSESSMENT AND PLAN:      1  Colon polyps  -will repeat colonoscopy now as it has been over 5 years since his last colonoscopy  - will plan in am given his DM    2  Need for Hep C screening based on age  - will check Hep C ab    Follow up as needed   ______________________________________________________________    HPI:  58year old male referred by his PCP for colon cancer screening  Says he has had polyps in the past   Last colonoscopy was over 5 years ago  He reports a prior colon resection about 15 years ago for what sounds like a large polyp that couldn't be removed endoscopically  Denies any GI symptoms  Denies weight loss  He is on metformin for DM  His dad is a retried physician  His nephew is in anesthesia residency at [employer]  REVIEW OF SYSTEMS:    CONSTITUTIONAL: Denies any fever, chills, rigors, and weight loss  HEENT: No earache or tinnitus  Denies hearing loss or visual disturbances  CARDIOVASCULAR: No chest pain or palpitations  RESPIRATORY: Denies any cough, hemoptysis, shortness of breath or dyspnea on exertion  GASTROINTESTINAL: As noted in the History of Present Illness  GENITOURINARY: No problems with urination  Denies any hematuria or dysuria  NEUROLOGIC: No dizziness or vertigo, denies headaches  MUSCULOSKELETAL: Denies any muscle or joint pain  SKIN: Denies skin rashes or itching  ENDOCRINE: Denies excessive thirst  Denies intolerance to heat or cold  PSYCHOSOCIAL: Denies depression or anxiety  Denies any recent memory loss         Historical Information   Past Medical History:   Diagnosis Date    Colon polyp      Past Surgical History:   Procedure Laterality Date    COLON SURGERY      found growth, bengin    COLONOSCOPY       Social History   History   Alcohol Use    Yes     Comment: now and then     History   Drug Use No     History   Smoking Status    Former Smoker   Smokeless Tobacco    Former User     History reviewed  No pertinent family history  Meds/Allergies       Current Outpatient Prescriptions:     ALPRAZolam (XANAX) 0 5 mg tablet    aspirin 81 MG tablet    atorvastatin (LIPITOR) 40 mg tablet    LISINOPRIL PO    metFORMIN (GLUCOPHAGE) 1000 MG tablet    mirtazapine (REMERON) 30 mg tablet    PARoxetine (PAXIL) 40 MG tablet    No Known Allergies        Objective     Blood pressure 134/66, pulse 97, temperature 98 3 °F (36 8 °C), temperature source Oral, height 5' 9 02" (1 753 m), weight 85 7 kg (188 lb 15 oz)  PHYSICAL EXAM:      General Appearance:   Alert, cooperative, no distress   HEENT:   Normocephalic, atraumatic, anicteric      Neck:  Supple, symmetrical, trachea midline   Lungs:   Clear to auscultation bilaterally; no rales, rhonchi or wheezing; respirations unlabored    Heart[de-identified]   Regular rate and rhythm; no murmur, rub, or gallop  Abdomen:   Soft, non-tender, non-distended; normal bowel sounds; no masses, no organomegaly, midline surgical scar well healed   Genitalia:   Deferred    Rectal:   Deferred    Extremities:  No cyanosis, clubbing or edema    Pulses:  2+ and symmetric    Skin:  No jaundice, rashes, or lesions    Lymph nodes:  No palpable cervical lymphadenopathy        Lab Results:   No visits with results within 1 Day(s) from this visit  Latest known visit with results is:   Debbie Office Visit on 01/05/2018   Component Date Value    Hemoglobin A1C 01/05/2018 6 0          Radiology Results:   No results found

## 2018-03-08 ENCOUNTER — TELEPHONE (OUTPATIENT)
Dept: GASTROENTEROLOGY | Facility: MEDICAL CENTER | Age: 63
End: 2018-03-08

## 2018-03-08 DIAGNOSIS — Z12.11 COLON CANCER SCREENING: Primary | ICD-10-CM

## 2018-03-12 ENCOUNTER — ANESTHESIA EVENT (OUTPATIENT)
Dept: GASTROENTEROLOGY | Facility: MEDICAL CENTER | Age: 63
End: 2018-03-12
Payer: COMMERCIAL

## 2018-03-13 ENCOUNTER — ANESTHESIA (OUTPATIENT)
Dept: GASTROENTEROLOGY | Facility: MEDICAL CENTER | Age: 63
End: 2018-03-13
Payer: COMMERCIAL

## 2018-03-13 ENCOUNTER — HOSPITAL ENCOUNTER (OUTPATIENT)
Facility: MEDICAL CENTER | Age: 63
Setting detail: OUTPATIENT SURGERY
Discharge: HOME | End: 2018-03-13
Attending: INTERNAL MEDICINE | Admitting: INTERNAL MEDICINE
Payer: COMMERCIAL

## 2018-03-13 VITALS
HEART RATE: 77 BPM | RESPIRATION RATE: 14 BRPM | BODY MASS INDEX: 28.73 KG/M2 | TEMPERATURE: 97.9 F | OXYGEN SATURATION: 99 % | DIASTOLIC BLOOD PRESSURE: 58 MMHG | SYSTOLIC BLOOD PRESSURE: 118 MMHG | HEIGHT: 69 IN | WEIGHT: 194 LBS

## 2018-03-13 PROCEDURE — G0121 COLON CA SCRN NOT HI RSK IND: HCPCS | Performed by: INTERNAL MEDICINE

## 2018-03-13 PROCEDURE — 45378 DIAGNOSTIC COLONOSCOPY: CPT | Performed by: INTERNAL MEDICINE

## 2018-03-13 RX ORDER — SODIUM CHLORIDE 9 MG/ML
125 INJECTION, SOLUTION INTRAVENOUS CONTINUOUS
Status: DISCONTINUED | OUTPATIENT
Start: 2018-03-13 | End: 2018-03-13 | Stop reason: HOSPADM

## 2018-03-13 RX ORDER — PROPOFOL 10 MG/ML
INJECTION, EMULSION INTRAVENOUS AS NEEDED
Status: DISCONTINUED | OUTPATIENT
Start: 2018-03-13 | End: 2018-03-13 | Stop reason: SURG

## 2018-03-13 RX ADMIN — SODIUM CHLORIDE 125 ML/HR: 0.9 INJECTION, SOLUTION INTRAVENOUS at 07:33

## 2018-03-13 RX ADMIN — PROPOFOL 50 MG: 10 INJECTION, EMULSION INTRAVENOUS at 08:08

## 2018-03-13 RX ADMIN — SODIUM CHLORIDE: 0.9 INJECTION, SOLUTION INTRAVENOUS at 08:04

## 2018-03-13 RX ADMIN — PROPOFOL 50 MG: 10 INJECTION, EMULSION INTRAVENOUS at 08:16

## 2018-03-13 RX ADMIN — PROPOFOL 100 MG: 10 INJECTION, EMULSION INTRAVENOUS at 08:07

## 2018-03-13 RX ADMIN — PROPOFOL 50 MG: 10 INJECTION, EMULSION INTRAVENOUS at 08:13

## 2018-03-13 NOTE — OP NOTE
**** GI/ENDOSCOPY REPORT ****     PATIENT NAME: Lanny Rivers ------ VISIT ID:  Patient ID: [id number]   YOB: 1955     INTRODUCTION: Colonoscopy - A 58 male patient presents for an outpatient   Colonoscopy at [address] [place] [address]  PREVIOUS COLONOSCOPY: Patient's last colonoscopy was 5 years ago  INDICATIONS: Colon polyps  CONSENT:  The benefits, risks, and alternatives to the procedure were   discussed and informed consent was obtained from the patient  PREPARATION: EKG, pulse, pulse oximetry and blood pressure were monitored   throughout the procedure  The patient was identified by myself both   verbally and by visual inspection of ID band  Airway Assessment   Classification: Airway class 2 - Visualization of the soft palate, fauces   and uvula  ASA Classification: See anesthesia record  MEDICATIONS: Anesthesia-check records     PROCEDURE:  The endoscope was passed without difficulty through the anus   under direct visualization and advanced to the cecum, confirmed by   appendiceal orifice and ileocecal valve  The scope was withdrawn and the   mucosa was carefully examined  The quality of the preparation was good  Cecal Intubation Time: 5 minutes(s) Scope Withdrawal Time: 7 minutes(s)     RECTAL EXAM: Normal rectal exam      FINDINGS:  There was evidence of diverticulosis in the cecum and   descending colon  The terminal ileum appeared to be normal      COMPLICATIONS: There were no complications  IMPRESSIONS: Diverticulosis found in the cecum and descending colon  Normal terminal ileum  RECOMMENDATIONS: Repeat colonoscopy in 5 years due to prior polyps  High   fiber diet       ESTIMATED BLOOD LOSS:     PATHOLOGY SPECIMENS:     PROCEDURE CODES:     ICD-9 Codes: 562 10 Diverticulosis of colon (without mention of hemorrhage)     ICD-10 Codes: K57 Diverticular disease of intestine     PERFORMED BY: KILEY Goodwin  on 03/13/2018  Version 1, electronically signed by KILEY Sevilla  on   03/13/2018 at 08:24

## 2018-03-13 NOTE — H&P (VIEW-ONLY)
[facility] - Outpatient Consultation  Beverli Primrose 58 y o  male MRN: [id number]  Encounter: [id number]          ASSESSMENT AND PLAN:      1  Colon polyps  -will repeat colonoscopy now as it has been over 5 years since his last colonoscopy  - will plan in am given his DM    2  Need for Hep C screening based on age  - will check Hep C ab    Follow up as needed   ______________________________________________________________    HPI:  58year old male referred by his PCP for colon cancer screening  Says he has had polyps in the past   Last colonoscopy was over 5 years ago  He reports a prior colon resection about 15 years ago for what sounds like a large polyp that couldn't be removed endoscopically  Denies any GI symptoms  Denies weight loss  He is on metformin for DM  His dad is a retried physician  His nephew is in anesthesia residency at [employer]  REVIEW OF SYSTEMS:    CONSTITUTIONAL: Denies any fever, chills, rigors, and weight loss  HEENT: No earache or tinnitus  Denies hearing loss or visual disturbances  CARDIOVASCULAR: No chest pain or palpitations  RESPIRATORY: Denies any cough, hemoptysis, shortness of breath or dyspnea on exertion  GASTROINTESTINAL: As noted in the History of Present Illness  GENITOURINARY: No problems with urination  Denies any hematuria or dysuria  NEUROLOGIC: No dizziness or vertigo, denies headaches  MUSCULOSKELETAL: Denies any muscle or joint pain  SKIN: Denies skin rashes or itching  ENDOCRINE: Denies excessive thirst  Denies intolerance to heat or cold  PSYCHOSOCIAL: Denies depression or anxiety  Denies any recent memory loss         Historical Information   Past Medical History:   Diagnosis Date    Colon polyp      Past Surgical History:   Procedure Laterality Date    COLON SURGERY      found growth, bengin    COLONOSCOPY       Social History   History   Alcohol Use    Yes     Comment: now and then     History   Drug Use No     History   Smoking Status    Former Smoker   Smokeless Tobacco    Former User     History reviewed  No pertinent family history  Meds/Allergies       Current Outpatient Prescriptions:     ALPRAZolam (XANAX) 0 5 mg tablet    aspirin 81 MG tablet    atorvastatin (LIPITOR) 40 mg tablet    LISINOPRIL PO    metFORMIN (GLUCOPHAGE) 1000 MG tablet    mirtazapine (REMERON) 30 mg tablet    PARoxetine (PAXIL) 40 MG tablet    No Known Allergies        Objective     Blood pressure 134/66, pulse 97, temperature 98 3 °F (36 8 °C), temperature source Oral, height 5' 9 02" (1 753 m), weight 85 7 kg (188 lb 15 oz)  PHYSICAL EXAM:      General Appearance:   Alert, cooperative, no distress   HEENT:   Normocephalic, atraumatic, anicteric      Neck:  Supple, symmetrical, trachea midline   Lungs:   Clear to auscultation bilaterally; no rales, rhonchi or wheezing; respirations unlabored    Heart[de-identified]   Regular rate and rhythm; no murmur, rub, or gallop  Abdomen:   Soft, non-tender, non-distended; normal bowel sounds; no masses, no organomegaly, midline surgical scar well healed   Genitalia:   Deferred    Rectal:   Deferred    Extremities:  No cyanosis, clubbing or edema    Pulses:  2+ and symmetric    Skin:  No jaundice, rashes, or lesions    Lymph nodes:  No palpable cervical lymphadenopathy        Lab Results:   No visits with results within 1 Day(s) from this visit  Latest known visit with results is:   [facility] Office Visit on 01/05/2018   Component Date Value    Hemoglobin A1C 01/05/2018 6 0          Radiology Results:   No results found

## 2018-03-13 NOTE — OP NOTE
**** GI/ENDOSCOPY REPORT ****     PATIENT NAME: Robbin Mccann ------ VISIT ID:  Patient ID: [id number]   YOB: 1955     INTRODUCTION: Colonoscopy - A 58 male patient presents for an outpatient   Colonoscopy at [address] [place] [address]  PREVIOUS COLONOSCOPY: Patient's last colonoscopy was 5 years ago  INDICATIONS: Colon polyps  CONSENT:  The benefits, risks, and alternatives to the procedure were   discussed and informed consent was obtained from the patient  PREPARATION: EKG, pulse, pulse oximetry and blood pressure were monitored   throughout the procedure  The patient was identified by myself both   verbally and by visual inspection of ID band  Airway Assessment   Classification: Airway class 2 - Visualization of the soft palate, fauces   and uvula  ASA Classification: See anesthesia record  MEDICATIONS: Anesthesia-check records     PROCEDURE:  The endoscope was passed without difficulty through the anus   under direct visualization and advanced to the cecum, confirmed by   appendiceal orifice and ileocecal valve  The scope was withdrawn and the   mucosa was carefully examined  The quality of the preparation was good  Cecal Intubation Time: 5 minutes(s) Scope Withdrawal Time: 7 minutes(s)     RECTAL EXAM: Normal rectal exam      FINDINGS:  There was evidence of diverticulosis in the cecum and   descending colon  The terminal ileum appeared to be normal      COMPLICATIONS: There were no complications  IMPRESSIONS: Diverticulosis found in the cecum and descending colon  Normal terminal ileum  RECOMMENDATIONS: Repeat colonoscopy in 5 years due to prior polyps  High   fiber diet       ESTIMATED BLOOD LOSS:     PATHOLOGY SPECIMENS:     PROCEDURE CODES:     ICD-9 Codes: 562 10 Diverticulosis of colon (without mention of hemorrhage)     ICD-10 Codes: K57 Diverticular disease of intestine     PERFORMED BY: KILYE Melgar  on 03/13/2018  Version 1, electronically signed by KILEY Liao  on   03/13/2018 at 08:24

## 2018-03-13 NOTE — ANESTHESIA PREPROCEDURE EVALUATION
Review of Systems/Medical History          Cardiovascular  Hyperlipidemia, Hypertension ,    Pulmonary  Smoker ex-smoker  ,        GI/Hepatic  Negative GI/hepatic ROS          Negative  ROS        Endo/Other  Diabetes ,      GYN       Hematology  Negative hematology ROS      Musculoskeletal  Negative musculoskeletal ROS        Neurology  Negative neurology ROS      Psychology   Negative psychology ROS              Physical Exam    Airway    Mallampati score: II  TM Distance: >3 FB  Neck ROM: full     Dental   No notable dental hx     Cardiovascular  Rhythm: regular, Rate: normal,     Pulmonary  Breath sounds clear to auscultation,     Other Findings        Anesthesia Plan  ASA Score- 2     Anesthesia Type- IV sedation with anesthesia with ASA Monitors  Additional Monitors:   Airway Plan:         Plan Factors-    Induction- intravenous      Postoperative Plan-     Informed Consent-

## 2018-03-23 DIAGNOSIS — E78.49 OTHER HYPERLIPIDEMIA: Primary | ICD-10-CM

## 2018-03-23 RX ORDER — ATORVASTATIN CALCIUM 40 MG/1
40 TABLET, FILM COATED ORAL DAILY
Qty: 90 TABLET | Refills: 0 | Status: SHIPPED | OUTPATIENT
Start: 2018-03-23 | End: 2018-05-09 | Stop reason: SDUPTHER

## 2018-03-30 DIAGNOSIS — L71.9 ROSACEA: ICD-10-CM

## 2018-03-30 DIAGNOSIS — F41.8 OTHER SPECIFIED ANXIETY DISORDERS: ICD-10-CM

## 2018-03-30 DIAGNOSIS — I73.9 PERIPHERAL VASCULAR DISEASE (HCC): ICD-10-CM

## 2018-03-30 DIAGNOSIS — I10 ESSENTIAL (PRIMARY) HYPERTENSION: ICD-10-CM

## 2018-03-30 DIAGNOSIS — Z12.5 ENCOUNTER FOR SCREENING FOR MALIGNANT NEOPLASM OF PROSTATE: ICD-10-CM

## 2018-03-30 DIAGNOSIS — E78.5 HYPERLIPIDEMIA: ICD-10-CM

## 2018-03-30 DIAGNOSIS — E11.9 TYPE 2 DIABETES MELLITUS WITHOUT COMPLICATIONS (HCC): ICD-10-CM

## 2018-04-06 LAB
ABSOL LYMPHOCYTES (HISTORICAL): 1.8 K/UL (ref 0.5–4)
ALBUMIN SERPL BCP-MCNC: 4.6 G/DL (ref 3–5.2)
ALP SERPL-CCNC: 73 U/L (ref 43–122)
ALT SERPL W P-5'-P-CCNC: 52 U/L (ref 9–52)
ANION GAP SERPL CALCULATED.3IONS-SCNC: 10 MMOL/L (ref 5–14)
AST SERPL W P-5'-P-CCNC: 48 U/L (ref 17–59)
BASOPHILS # BLD AUTO: 0.1 K/UL (ref 0–0.1)
BASOPHILS # BLD AUTO: 1 % (ref 0–1)
BILIRUB SERPL-MCNC: 0.3 MG/DL
BUN SERPL-MCNC: 14 MG/DL (ref 5–25)
CALCIUM SERPL-MCNC: 9.6 MG/DL (ref 8.4–10.2)
CHLORIDE SERPL-SCNC: 103 MEQ/L (ref 97–108)
CHOLEST SERPL-MCNC: 167 MG/DL
CHOLEST/HDLC SERPL: 3.9 {RATIO}
CO2 SERPL-SCNC: 28 MMOL/L (ref 22–30)
CREATINE, SERUM (HISTORICAL): 0.78 MG/DL (ref 0.7–1.5)
CREATININE, RANDOM URINE (HISTORICAL): 18.6 MG/DL (ref 50–200)
DEPRECATED RDW RBC AUTO: 13.5 %
EGFR (HISTORICAL): >60 ML/MIN/1.73 M2
EOSINOPHIL # BLD AUTO: 0.1 K/UL (ref 0–0.4)
EOSINOPHIL NFR BLD AUTO: 2 % (ref 0–6)
EST. AVERAGE GLUCOSE BLD GHB EST-MCNC: 134 MG/DL
GLUCOSE FASTING (HISTORICAL): 116 MG/DL (ref 70–99)
HBA1C MFR BLD HPLC: 6.3 %
HCT VFR BLD AUTO: 40.3 % (ref 41–53)
HDLC SERPL-MCNC: 43 MG/DL
HEPATITIS C ANTIBODY (HISTORICAL): NORMAL
HGB BLD-MCNC: 13.4 G/DL (ref 13.5–17.5)
LDL/HDL RATIO (HISTORICAL): 1.5
LDLC SERPL CALC-MCNC: 65 MG/DL
LYMPHOCYTES NFR BLD AUTO: 29 % (ref 25–45)
MCH RBC QN AUTO: 31.7 PG (ref 26–34)
MCHC RBC AUTO-ENTMCNC: 33.2 % (ref 31–36)
MCV RBC AUTO: 96 FL (ref 80–100)
MICROALBUM.,U,RANDOM (HISTORICAL): <0.6 MG/DL
MICROALBUMIN/CREATININE RATIO (HISTORICAL): ABNORMAL
MONOCYTES # BLD AUTO: 0.6 K/UL (ref 0.2–0.9)
MONOCYTES NFR BLD AUTO: 10 % (ref 1–10)
NEUTROPHILS ABS COUNT (HISTORICAL): 3.7 K/UL (ref 1.8–7.8)
NEUTS SEG NFR BLD AUTO: 58 % (ref 45–65)
PLATELET # BLD AUTO: 331 K/MCL (ref 150–450)
POTASSIUM SERPL-SCNC: 4.9 MEQ/L (ref 3.6–5)
PSA (HISTORICAL): 0.64 NG/ML
RBC # BLD AUTO: 4.23 M/MCL (ref 4.5–5.9)
SODIUM SERPL-SCNC: 140 MEQ/L (ref 137–147)
TOTAL PROTEIN (HISTORICAL): 7.2 G/DL (ref 5.9–8.4)
TRIGL SERPL-MCNC: 295 MG/DL
TSH SERPL DL<=0.05 MIU/L-ACNC: 1.65 UIU/ML (ref 0.47–4.68)
VLDLC SERPL CALC-MCNC: 59 MG/DL (ref 0–40)
WBC # BLD AUTO: 6.3 K/MCL (ref 4.5–11)

## 2018-04-11 ENCOUNTER — OFFICE VISIT (OUTPATIENT)
Dept: INTERNAL MEDICINE CLINIC | Facility: CLINIC | Age: 63
End: 2018-04-11
Payer: COMMERCIAL

## 2018-04-11 VITALS
RESPIRATION RATE: 14 BRPM | HEIGHT: 69 IN | WEIGHT: 189.6 LBS | DIASTOLIC BLOOD PRESSURE: 70 MMHG | TEMPERATURE: 96.5 F | SYSTOLIC BLOOD PRESSURE: 138 MMHG | HEART RATE: 105 BPM | BODY MASS INDEX: 28.08 KG/M2

## 2018-04-11 DIAGNOSIS — I10 ESSENTIAL HYPERTENSION: ICD-10-CM

## 2018-04-11 DIAGNOSIS — E78.5 HYPERLIPIDEMIA, UNSPECIFIED HYPERLIPIDEMIA TYPE: ICD-10-CM

## 2018-04-11 DIAGNOSIS — I70.219 ATHEROSCLEROTIC PVD WITH INTERMITTENT CLAUDICATION (HCC): ICD-10-CM

## 2018-04-11 DIAGNOSIS — E11.9 DIABETES MELLITUS TYPE 2, NONINSULIN DEPENDENT (HCC): Primary | ICD-10-CM

## 2018-04-11 DIAGNOSIS — F41.8 DEPRESSION WITH ANXIETY: ICD-10-CM

## 2018-04-11 PROCEDURE — 3725F SCREEN DEPRESSION PERFORMED: CPT | Performed by: INTERNAL MEDICINE

## 2018-04-11 PROCEDURE — 99214 OFFICE O/P EST MOD 30 MIN: CPT | Performed by: INTERNAL MEDICINE

## 2018-04-11 RX ORDER — ALPRAZOLAM 0.25 MG/1
1 TABLET ORAL 2 TIMES DAILY
COMMUNITY
Start: 2018-03-23

## 2018-04-11 RX ORDER — POLYETHYLENE GLYCOL-3350, SODIUM CHLORIDE, POTASSIUM CHLORIDE AND SODIUM BICARBONATE 420; 11.2; 5.72; 1.48 G/438.4G; G/438.4G; G/438.4G; G/438.4G
POWDER, FOR SOLUTION ORAL
COMMUNITY
Start: 2018-03-09 | End: 2018-09-06 | Stop reason: ALTCHOICE

## 2018-04-11 NOTE — ASSESSMENT & PLAN NOTE
Stable with A1c of 6 3  Foot exam was done today  Notes that had an ophthalmology check recently  Continue current dose of metformin  No significant microalbumin

## 2018-04-11 NOTE — PROGRESS NOTES
Assessment/Plan:    Diabetes mellitus type 2, noninsulin dependent (HCC)  Stable with A1c of 6 3  Foot exam was done today  Notes that had an ophthalmology check recently  Continue current dose of metformin  No significant microalbumin  Hypertension  Well controlled, continue current regimen  Atherosclerotic PVD with intermittent claudication (HCC)  Continue atorvastatin  LDL of 65  Continue aspirin  No current symptoms suggestive of recurrent disease    Depression with anxiety  Continue Remeron and Paxil  Xanax as needed    Up-to-date on Pneumovax, went for colonoscopy 3/18 showed diverticulosis but no significant polyps  PSA within normal limits 4/18       Diagnoses and all orders for this visit:    Diabetes mellitus type 2, noninsulin dependent ([facility] )    Essential hypertension    Hyperlipidemia, unspecified hyperlipidemia type    Depression with anxiety    Atherosclerotic PVD with intermittent claudication ([facility] )    Other orders  -     ALPRAZolam (XANAX) 0 25 mg tablet; Take 1 tablet by mouth 2 (two) times a day  -     GAVILYTE-N WITH FLAVOR PACK 420 g solution;           Subjective:   Chief Complaint   Patient presents with    Follow-up     3 months        Patient ID: Wisam Goff is a 58 y o  male  He comes in for follow-up of hypertension, diabetes, hyperlipidemia, peripheral vascular disease, depression anxiety  Blood sugars and blood pressures have been stable  No recurrent calf pain  Occasional tingling and numbness on the right foot but not very bothersome  He has been increasingly stressed recently due to his father's illness  Sleeping okay, follows up with a psychiatrist     He has been using waiting instead of smoking a cigarette  Trying to get off of that as well  He has not tried the nicotine patch again yet          The following portions of the patient's history were reviewed and updated as appropriate: current medications, past medical history, past social history and past surgical history  PHQ-9 Depression Screening    PHQ-9:    Frequency of the following problems over the past two weeks:       Little interest or pleasure in doing things:  0 - not at all  Feeling down, depressed, or hopeless:  0 - not at all  PHQ-2 Score:  0           Current Outpatient Prescriptions:     ALPRAZolam (XANAX) 0 25 mg tablet, Take 1 tablet by mouth 2 (two) times a day, Disp: , Rfl:     aspirin 81 MG tablet, Take 81 mg by mouth daily, Disp: , Rfl:     atorvastatin (LIPITOR) 40 mg tablet, Take 1 tablet (40 mg total) by mouth daily, Disp: 90 tablet, Rfl: 0    GAVILYTE-N WITH FLAVOR PACK 420 g solution, , Disp: , Rfl:     lisinopril (ZESTRIL) 20 mg tablet, Take 1 tablet (20 mg total) by mouth daily, Disp: 90 tablet, Rfl: 1    metFORMIN (GLUCOPHAGE) 1000 MG tablet, Take 1,000 mg by mouth 2 (two) times a day with meals, Disp: , Rfl:     mirtazapine (REMERON) 30 mg tablet, Take 30 mg by mouth daily at bedtime, Disp: , Rfl:     PARoxetine (PAXIL) 40 MG tablet, Take 40 mg by mouth every morning, Disp: , Rfl:     polyethylene glycol (GOLYTELY) 4000 mL solution, Take 4,000 mL by mouth once for 1 dose, Disp: 4000 mL, Rfl: 0    Review of Systems   Constitutional: Negative for fatigue, fever and unexpected weight change  HENT: Negative for ear pain, hearing loss and sore throat  Eyes: Negative for pain and discharge  Respiratory: Negative for cough, chest tightness and shortness of breath  Cardiovascular: Negative for chest pain and palpitations  Gastrointestinal: Negative for abdominal pain, blood in stool, constipation, diarrhea and nausea  Genitourinary: Negative for dysuria, frequency and hematuria  Musculoskeletal: Negative for arthralgias and joint swelling  Skin: Negative for rash  Allergic/Immunologic: Negative for immunocompromised state  Neurological: Negative for dizziness and headaches  Hematological: Negative for adenopathy     Psychiatric/Behavioral: Negative for confusion and sleep disturbance  The patient is nervous/anxious  Objective:  /70 (BP Location: Left arm, Patient Position: Sitting, Cuff Size: Standard)   Pulse 105   Temp (!) 96 5 °F (35 8 °C)   Resp 14   Ht 5' 9" (1 753 m)   Wt 86 kg (189 lb 9 6 oz)   BMI 28 00 kg/m²      Physical Exam   Constitutional: He appears well-developed and well-nourished  HENT:   Head: Normocephalic and atraumatic  Nose: Nose normal    Mouth/Throat: Oropharynx is clear and moist    Eyes: Conjunctivae are normal  Pupils are equal, round, and reactive to light  Right eye exhibits no discharge  Neck: Normal range of motion  Neck supple  No thyromegaly present  Cardiovascular: Normal rate, regular rhythm, S1 normal, S2 normal and normal pulses  PMI is not displaced  Pulses are no weak pulses  No murmur heard  Pulses:       Dorsalis pedis pulses are 2+ on the right side, and 2+ on the left side  Posterior tibial pulses are 2+ on the right side, and 2+ on the left side  Pulmonary/Chest: Breath sounds normal  No accessory muscle usage  No apnea  No respiratory distress  He has no rhonchi  He has no rales  Abdominal: Soft  Normal appearance and bowel sounds are normal  He exhibits no shifting dullness  There is no hepatosplenomegaly  There is no tenderness  There is no rebound and no CVA tenderness  Musculoskeletal: Normal range of motion  He exhibits no edema or tenderness  Feet:   Right Foot:   Skin Integrity: Negative for ulcer, skin breakdown, erythema, warmth, callus or dry skin  Left Foot:   Skin Integrity: Negative for ulcer, skin breakdown, erythema, warmth, callus or dry skin  Lymphadenopathy:     He has no cervical adenopathy  Neurological: He is alert  Skin: Skin is warm and intact  No rash noted  Psychiatric: He has a normal mood and affect  His speech is normal    Nursing note and vitals reviewed  Patient's shoes and socks removed  Right Foot/Ankle   Right Foot Inspection  Skin Exam: skin normal and skin intact no dry skin, no warmth, no callus, no erythema, no maceration, no abnormal color, no pre-ulcer, no ulcer and no callus                          Toe Exam: ROM and strength within normal limitsno swelling, erythema and  no right toe deformity  Sensory       Monofilament testing: intact  Vascular    The right DP pulse is 2+  The right PT pulse is 2+  Left Foot/Ankle  Left Foot Inspection  Skin Exam: skin normal and skin intactno dry skin, no warmth, no erythema, no maceration, normal color, no pre-ulcer, no ulcer and no callus                         Toe Exam: ROM and strength within normal limitsno swelling, no erythema and no left toe deformity                   Sensory       Monofilament: intact  Vascular    The left DP pulse is 2+  The left PT pulse is 2+  Assign Risk Category:  No deformity present; No loss of protective sensation;  No weak pulses       Risk: 0

## 2018-04-11 NOTE — ASSESSMENT & PLAN NOTE
Continue atorvastatin  LDL of 65  Continue aspirin    No current symptoms suggestive of recurrent disease

## 2018-04-18 DIAGNOSIS — I73.9 PERIPHERAL VASCULAR DISEASE (HCC): Primary | ICD-10-CM

## 2018-04-18 RX ORDER — CLOPIDOGREL BISULFATE 75 MG/1
TABLET ORAL
Qty: 100 TABLET | Refills: 0 | Status: SHIPPED | OUTPATIENT
Start: 2018-04-18 | End: 2018-07-30 | Stop reason: SDUPTHER

## 2018-05-09 DIAGNOSIS — E78.49 OTHER HYPERLIPIDEMIA: ICD-10-CM

## 2018-05-09 RX ORDER — ATORVASTATIN CALCIUM 40 MG/1
40 TABLET, FILM COATED ORAL DAILY
Qty: 90 TABLET | Refills: 0 | Status: SHIPPED | OUTPATIENT
Start: 2018-05-09 | End: 2018-09-25 | Stop reason: SDUPTHER

## 2018-07-17 DIAGNOSIS — E11.51 TYPE 2 DIABETES MELLITUS WITH DIABETIC PERIPHERAL ANGIOPATHY WITHOUT GANGRENE, WITHOUT LONG-TERM CURRENT USE OF INSULIN (HCC): Primary | ICD-10-CM

## 2018-07-30 DIAGNOSIS — I73.9 PERIPHERAL VASCULAR DISEASE (HCC): ICD-10-CM

## 2018-07-31 RX ORDER — CLOPIDOGREL BISULFATE 75 MG/1
75 TABLET ORAL DAILY
Qty: 100 TABLET | Refills: 0 | Status: SHIPPED | OUTPATIENT
Start: 2018-07-31 | End: 2018-11-05 | Stop reason: SDUPTHER

## 2018-08-09 DIAGNOSIS — I10 ESSENTIAL HYPERTENSION: Primary | ICD-10-CM

## 2018-08-30 DIAGNOSIS — I10 ESSENTIAL HYPERTENSION: ICD-10-CM

## 2018-08-30 PROCEDURE — 4010F ACE/ARB THERAPY RXD/TAKEN: CPT | Performed by: INTERNAL MEDICINE

## 2018-08-30 RX ORDER — LISINOPRIL 20 MG/1
20 TABLET ORAL DAILY
Qty: 90 TABLET | Refills: 1 | Status: SHIPPED | OUTPATIENT
Start: 2018-08-30 | End: 2019-02-07 | Stop reason: SINTOL

## 2018-09-06 ENCOUNTER — OFFICE VISIT (OUTPATIENT)
Dept: INTERNAL MEDICINE CLINIC | Facility: CLINIC | Age: 63
End: 2018-09-06
Payer: COMMERCIAL

## 2018-09-06 VITALS
SYSTOLIC BLOOD PRESSURE: 130 MMHG | HEART RATE: 104 BPM | RESPIRATION RATE: 15 BRPM | TEMPERATURE: 96.2 F | BODY MASS INDEX: 27.55 KG/M2 | WEIGHT: 186 LBS | DIASTOLIC BLOOD PRESSURE: 72 MMHG | HEIGHT: 69 IN

## 2018-09-06 DIAGNOSIS — E11.9 DIABETES MELLITUS TYPE 2, NONINSULIN DEPENDENT (HCC): ICD-10-CM

## 2018-09-06 DIAGNOSIS — F17.200 CURRENT SMOKER: Primary | ICD-10-CM

## 2018-09-06 DIAGNOSIS — I10 ESSENTIAL HYPERTENSION: ICD-10-CM

## 2018-09-06 DIAGNOSIS — I70.219 ATHEROSCLEROTIC PVD WITH INTERMITTENT CLAUDICATION (HCC): ICD-10-CM

## 2018-09-06 PROCEDURE — 3008F BODY MASS INDEX DOCD: CPT | Performed by: INTERNAL MEDICINE

## 2018-09-06 PROCEDURE — 99214 OFFICE O/P EST MOD 30 MIN: CPT | Performed by: INTERNAL MEDICINE

## 2018-09-06 RX ORDER — BUPROPION HYDROCHLORIDE 75 MG/1
75 TABLET ORAL
Refills: 2 | COMMUNITY
Start: 2018-08-15 | End: 2019-09-17

## 2018-09-06 NOTE — ASSESSMENT & PLAN NOTE
Lab Results   Component Value Date    HGBA1C 6 3 (H) 04/06/2018       No results for input(s): POCGLU in the last 72 hours  Blood Sugar Average: Last 72 hrs:   continue current regimen

## 2018-09-06 NOTE — PROGRESS NOTES
Assessment/Plan:    Diabetes mellitus type 2, noninsulin dependent ([facility] [address] )  Lab Results   Component Value Date    HGBA1C 6 3 (H) 04/06/2018       No results for input(s): POCGLU in the last 72 hours  Blood Sugar Average: Last 72 hrs:   continue current regimen  Atherosclerotic PVD with intermittent claudication (HCC)  Asymptomatic at this time continue aspirin, Plavix, statin  Continue to work on smoking cessation  Hypertension  Well controlled  We had a lengthy discussion for about 30 minutes, understanding the struggles, his triggers of smoking in the her does that he has encounter during his smoking cessation  Emotional support was provided, I ensured that he will I will continue to work with him as he seems quite motivated  At this time he will restart the Wellbutrin, he was started on 75 mg 3 times a day by his psychiatrist, may increase the dose to 150 mg twice a day if well tolerated  He wishes to switch to e cigarettes, select the quit date for the E cigarettes once the Wellbutrin becomes effective  I will see him back in a month to continue to motivate and help him get through this  Total time of the visit today was 45 minutes with greater than 50% in to face-to-face counseling    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 [address] )    Atherosclerotic PVD with intermittent claudication ([facility] [address] )    Essential hypertension    Other orders  -     buPROPion (WELLBUTRIN) 75 mg tablet; Take 75 mg by mouth 3 (three) times a day with meals          Subjective:   Chief Complaint   Patient presents with    Follow-up     5 months, continues to smoke, Dr Stephanie Martinez prescribed wellbutrin to help stop smoking    scratchy throat, nasal congestion, cough     will receive flu vaccine at work        Patient ID: Dianne Sibley is a 58 y o  male      His blood pressure and diabetes are well controlled  Has not had any pain in his legs  No chest pain or shortness of breath  He tells me that his father is admitted in the hospital due to complication softener cold with a pre-existing lung condition  He has had scratchy throat and a dry cough for couple of days but no shortness of breath or chest pain or fevers  He was seen by his psychiatrist and started on Wellbutrin to help his mood as well as smoking cessation  He took it for 3 weeks without any significant benefit and stopped it  He is frustrated with the fact that he could still continues to smoke  He does note that he does continue to have daily struggles with the help of his father and thinks that is 1 of the triggers for which he smokes  He was at his brother's house over the weekend and did not have the urge to smoke too much  He was almost ashamed of himself and hence did not come in for his follow-up appointment        The following portions of the patient's history were reviewed and updated as appropriate: current medications, past medical history, past social history and past surgical history      PHQ-9 Depression Screening    PHQ-9:    Frequency of the following problems over the past two weeks:                Current Outpatient Prescriptions:     ALPRAZolam (XANAX) 0 25 mg tablet, Take 1 tablet by mouth 2 (two) times a day, Disp: , Rfl:     aspirin 81 MG tablet, Take 1 tablet (81 mg total) by mouth daily, Disp: 90 tablet, Rfl: 1    atorvastatin (LIPITOR) 40 mg tablet, Take 1 tablet (40 mg total) by mouth daily, Disp: 90 tablet, Rfl: 0    clopidogrel (PLAVIX) 75 mg tablet, Take 1 tablet (75 mg total) by mouth daily, Disp: 100 tablet, Rfl: 0    lisinopril (ZESTRIL) 20 mg tablet, Take 1 tablet (20 mg total) by mouth daily, Disp: 90 tablet, Rfl: 1    metFORMIN (GLUCOPHAGE) 1000 MG tablet, Take 1 tablet (1,000 mg total) by mouth 2 (two) times a day with meals, Disp: 180 tablet, Rfl: 1    mirtazapine (REMERON) 30 mg tablet, Take 30 mg by mouth daily at bedtime, Disp: , Rfl:     PARoxetine (PAXIL) 40 MG tablet, Take 40 mg by mouth every morning, Disp: , Rfl:     buPROPion (WELLBUTRIN) 75 mg tablet, Take 75 mg by mouth 3 (three) times a day with meals, Disp: , Rfl: 2    Review of Systems   Constitutional: Negative for fatigue, fever and unexpected weight change  HENT: Positive for postnasal drip  Negative for ear pain, hearing loss and sore throat  Eyes: Negative for pain and discharge  Respiratory: Positive for cough  Negative for chest tightness and shortness of breath  Cardiovascular: Negative for chest pain and palpitations  Gastrointestinal: Negative for abdominal pain, blood in stool, constipation, diarrhea and nausea  Genitourinary: Negative for dysuria, frequency and hematuria  Musculoskeletal: Negative for arthralgias and joint swelling  Skin: Negative for rash  Allergic/Immunologic: Negative for immunocompromised state  Neurological: Negative for dizziness and headaches  Hematological: Negative for adenopathy  Psychiatric/Behavioral: Positive for dysphoric mood  Negative for confusion and sleep disturbance  Objective:  /72 (BP Location: Left arm, Patient Position: Sitting, Cuff Size: Standard)   Pulse 104   Temp (!) 96 2 °F (35 7 °C) (Tympanic)   Resp 15   Ht 5' 9" (1 753 m)   Wt 84 4 kg (186 lb)   BMI 27 47 kg/m²      Physical Exam   Constitutional: He appears well-developed and well-nourished  HENT:   Head: Normocephalic and atraumatic  Right Ear: Tympanic membrane normal    Left Ear: Tympanic membrane normal    Nose: Nose normal    Mouth/Throat: Oropharynx is clear and moist  No posterior oropharyngeal edema or posterior oropharyngeal erythema  Eyes: Conjunctivae are normal  Pupils are equal, round, and reactive to light  Right eye exhibits no discharge  Neck: Normal range of motion  Neck supple  No thyromegaly present     Cardiovascular: Normal rate, regular rhythm, S1 normal, S2 normal and normal heart sounds  PMI is not displaced  No murmur heard  Pulmonary/Chest: Effort normal and breath sounds normal  No accessory muscle usage  No apnea  No respiratory distress  He has no rhonchi  He has no rales  Abdominal: Soft  Normal appearance and bowel sounds are normal  He exhibits no shifting dullness  There is no hepatosplenomegaly  There is no tenderness  There is no rebound and no CVA tenderness  Musculoskeletal: Normal range of motion  He exhibits no edema or tenderness  Lymphadenopathy:     He has no cervical adenopathy  Neurological: He is alert  Skin: Skin is warm and intact  No rash noted  Psychiatric: He has a normal mood and affect  His speech is normal    Nursing note and vitals reviewed  No results found for this or any previous visit (from the past 1008 hour(s))  ]    No results found

## 2018-09-25 DIAGNOSIS — E78.49 OTHER HYPERLIPIDEMIA: ICD-10-CM

## 2018-09-25 RX ORDER — ATORVASTATIN CALCIUM 40 MG/1
40 TABLET, FILM COATED ORAL DAILY
Qty: 90 TABLET | Refills: 1 | Status: SHIPPED | OUTPATIENT
Start: 2018-09-25 | End: 2019-03-14 | Stop reason: SDUPTHER

## 2018-10-04 ENCOUNTER — OFFICE VISIT (OUTPATIENT)
Dept: INTERNAL MEDICINE CLINIC | Facility: CLINIC | Age: 63
End: 2018-10-04
Payer: COMMERCIAL

## 2018-10-04 VITALS
WEIGHT: 189.4 LBS | HEART RATE: 60 BPM | DIASTOLIC BLOOD PRESSURE: 70 MMHG | SYSTOLIC BLOOD PRESSURE: 124 MMHG | TEMPERATURE: 98 F | BODY MASS INDEX: 28.05 KG/M2 | RESPIRATION RATE: 15 BRPM | HEIGHT: 69 IN

## 2018-10-04 DIAGNOSIS — E11.59 TYPE 2 DIABETES MELLITUS WITH OTHER CIRCULATORY COMPLICATION, WITH LONG-TERM CURRENT USE OF INSULIN (HCC): ICD-10-CM

## 2018-10-04 DIAGNOSIS — I10 ESSENTIAL HYPERTENSION: ICD-10-CM

## 2018-10-04 DIAGNOSIS — Z79.4 TYPE 2 DIABETES MELLITUS WITH OTHER CIRCULATORY COMPLICATION, WITH LONG-TERM CURRENT USE OF INSULIN (HCC): ICD-10-CM

## 2018-10-04 DIAGNOSIS — F17.219 CIGARETTE NICOTINE DEPENDENCE WITH NICOTINE-INDUCED DISORDER: Primary | ICD-10-CM

## 2018-10-04 DIAGNOSIS — F41.8 DEPRESSION WITH ANXIETY: ICD-10-CM

## 2018-10-04 DIAGNOSIS — Z23 NEED FOR TDAP VACCINATION: ICD-10-CM

## 2018-10-04 PROCEDURE — 90715 TDAP VACCINE 7 YRS/> IM: CPT | Performed by: INTERNAL MEDICINE

## 2018-10-04 PROCEDURE — 99213 OFFICE O/P EST LOW 20 MIN: CPT | Performed by: INTERNAL MEDICINE

## 2018-10-04 PROCEDURE — 1036F TOBACCO NON-USER: CPT | Performed by: INTERNAL MEDICINE

## 2018-10-04 PROCEDURE — 90471 IMMUNIZATION ADMIN: CPT | Performed by: INTERNAL MEDICINE

## 2018-10-04 PROCEDURE — 3074F SYST BP LT 130 MM HG: CPT | Performed by: INTERNAL MEDICINE

## 2018-10-04 PROCEDURE — 3078F DIAST BP <80 MM HG: CPT | Performed by: INTERNAL MEDICINE

## 2018-10-04 NOTE — PROGRESS NOTES
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  Advised him to get flu block if possible   Diagnoses and all orders for this visit:    Cigarette nicotine dependence with nicotine-induced disorder  -     CBC and differential; Future  -     Comprehensive metabolic panel; Future    Depression with anxiety  -     CBC and differential; Future  -     Comprehensive metabolic panel; Future  -     Lipid Panel with Direct LDL reflex; Future    Essential hypertension  -     CBC and differential; Future  -     Comprehensive metabolic panel; Future  -     Lipid Panel with Direct LDL reflex; Future    Need for Tdap vaccination  -     TDAP VACCINE GREATER THAN OR EQUAL TO 8YO IM    Type 2 diabetes mellitus with other circulatory complication, with long-term current use of insulin (HCC)  -     CBC and differential; Future  -     Comprehensive metabolic panel; Future  -     Hemoglobin A1C; Future  -     Lipid Panel with Direct LDL reflex; Future          Subjective:   Chief Complaint   Patient presents with    Follow-up     1 month        Patient ID: Lottie Mcallister is a 58 y o  male  He comes in for follow-up of nicotine dependence, diabetes, hypertension  He notes that today' is day 11 of not having any cigarettes  He does use some E cigarettes which have some nicotine content  He plans to completely stop using any nicotine  He is taking Wellbutrin 75 milligrams 3 times a day  Overall feels confident that he will be able to completely abstain from nicotine and any kind of cigarettes  Mood has been stable  Still struggles with helping out his father with his medical issues    Blood pressure and diabetes are well controlled        The following portions of the patient's history were reviewed and updated as appropriate: current medications, past medical history, past social history and past surgical history  PHQ-9 Depression Screening    PHQ-9:    Frequency of the following problems over the past two weeks:                Current Outpatient Prescriptions:     ALPRAZolam (XANAX) 0 25 mg tablet, Take 1 tablet by mouth 2 (two) times a day, Disp: , Rfl:     aspirin 81 MG tablet, Take 1 tablet (81 mg total) by mouth daily, Disp: 90 tablet, Rfl: 1    atorvastatin (LIPITOR) 40 mg tablet, Take 1 tablet (40 mg total) by mouth daily, Disp: 90 tablet, Rfl: 1    buPROPion (WELLBUTRIN) 75 mg tablet, Take 75 mg by mouth 3 (three) times a day with meals, Disp: , Rfl: 2    clopidogrel (PLAVIX) 75 mg tablet, Take 1 tablet (75 mg total) by mouth daily, Disp: 100 tablet, Rfl: 0    lisinopril (ZESTRIL) 20 mg tablet, Take 1 tablet (20 mg total) by mouth daily, Disp: 90 tablet, Rfl: 1    metFORMIN (GLUCOPHAGE) 1000 MG tablet, Take 1 tablet (1,000 mg total) by mouth 2 (two) times a day with meals, Disp: 180 tablet, Rfl: 1    mirtazapine (REMERON) 30 mg tablet, Take 30 mg by mouth daily at bedtime, Disp: , Rfl:     PARoxetine (PAXIL) 40 MG tablet, Take 40 mg by mouth every morning, Disp: , Rfl:     Review of Systems   Constitutional: Negative for fatigue, fever and unexpected weight change  HENT: Negative for ear pain, hearing loss and sore throat  Eyes: Negative for pain and discharge  Respiratory: Negative for cough, chest tightness and shortness of breath  Cardiovascular: Negative for chest pain and palpitations  Gastrointestinal: Negative for abdominal pain, blood in stool, constipation, diarrhea and nausea  Genitourinary: Negative for dysuria, frequency and hematuria  Musculoskeletal: Negative for arthralgias and joint swelling  Skin: Negative for rash  Allergic/Immunologic: Negative for immunocompromised state  Neurological: Negative for dizziness and headaches  Hematological: Negative for adenopathy  Psychiatric/Behavioral: Negative for confusion and sleep disturbance  Objective:  /70 (BP Location: Left arm, Patient Position: Sitting, Cuff Size: Standard)   Pulse 60   Temp 98 °F (36 7 °C)   Resp 15   Ht 5' 9" (1 753 m)   Wt 85 9 kg (189 lb 6 4 oz)   BMI 27 97 kg/m²      Physical Exam   Constitutional: He appears well-developed and well-nourished  HENT:   Head: Normocephalic and atraumatic  Right Ear: Tympanic membrane normal    Left Ear: Tympanic membrane normal    Nose: Nose normal    Mouth/Throat: Oropharynx is clear and moist  No posterior oropharyngeal edema or posterior oropharyngeal erythema  Eyes: Pupils are equal, round, and reactive to light  Conjunctivae are normal  Right eye exhibits no discharge  Neck: Normal range of motion  Neck supple  No thyromegaly present  Cardiovascular: Normal rate, regular rhythm, S1 normal, S2 normal and normal heart sounds  PMI is not displaced  No murmur heard  Pulmonary/Chest: Effort normal and breath sounds normal  No accessory muscle usage  No apnea  No respiratory distress  He has no rhonchi  He has no rales  Abdominal: Soft  Normal appearance and bowel sounds are normal  He exhibits no shifting dullness  There is no hepatosplenomegaly  There is no tenderness  There is no rebound and no CVA tenderness  Musculoskeletal: Normal range of motion  He exhibits no edema or tenderness  Lymphadenopathy:     He has no cervical adenopathy  Neurological: He is alert  Skin: Skin is warm and intact  No rash noted  Psychiatric: He has a normal mood and affect  His speech is normal    Nursing note and vitals reviewed  No results found for this or any previous visit (from the past 1008 hour(s))  ]    No results found

## 2018-10-04 NOTE — ASSESSMENT & PLAN NOTE
Mood has been stable with current dose of Paxil, Remeron and Wellbutrin  We discussed about increasing dose of Wellbutrin if he feels the urge of smoking again

## 2018-11-05 DIAGNOSIS — I73.9 PERIPHERAL VASCULAR DISEASE (HCC): ICD-10-CM

## 2018-11-05 RX ORDER — CLOPIDOGREL BISULFATE 75 MG/1
75 TABLET ORAL DAILY
Qty: 100 TABLET | Refills: 1 | Status: SHIPPED | OUTPATIENT
Start: 2018-11-05 | End: 2019-09-17

## 2019-01-10 ENCOUNTER — OFFICE VISIT (OUTPATIENT)
Dept: INTERNAL MEDICINE CLINIC | Facility: CLINIC | Age: 64
End: 2019-01-10
Payer: COMMERCIAL

## 2019-01-10 VITALS
HEIGHT: 69 IN | BODY MASS INDEX: 27.31 KG/M2 | SYSTOLIC BLOOD PRESSURE: 142 MMHG | DIASTOLIC BLOOD PRESSURE: 70 MMHG | OXYGEN SATURATION: 99 % | HEART RATE: 98 BPM | WEIGHT: 184.4 LBS | TEMPERATURE: 97.9 F

## 2019-01-10 DIAGNOSIS — I70.219 ATHEROSCLEROTIC PVD WITH INTERMITTENT CLAUDICATION (HCC): ICD-10-CM

## 2019-01-10 DIAGNOSIS — E11.9 DIABETES MELLITUS TYPE 2, NONINSULIN DEPENDENT (HCC): Primary | ICD-10-CM

## 2019-01-10 DIAGNOSIS — F41.8 DEPRESSION WITH ANXIETY: ICD-10-CM

## 2019-01-10 DIAGNOSIS — F43.21 GRIEF: ICD-10-CM

## 2019-01-10 DIAGNOSIS — I10 ESSENTIAL HYPERTENSION: ICD-10-CM

## 2019-01-10 PROCEDURE — 99214 OFFICE O/P EST MOD 30 MIN: CPT | Performed by: INTERNAL MEDICINE

## 2019-01-10 RX ORDER — AMMONIUM LACTATE 12 G/100G
CREAM TOPICAL AS NEEDED
Qty: 385 G | Refills: 0 | Status: SHIPPED | OUTPATIENT
Start: 2019-01-10 | End: 2019-09-17

## 2019-01-11 NOTE — ASSESSMENT & PLAN NOTE
Acute worsening due to death of father  Monitored by Dr Annalise Choi who happens to be a friend of late father as well  No change in meds yet  Emotional support was provided today  Will monitor at our office too

## 2019-01-11 NOTE — PROGRESS NOTES
Assessment/Plan:    Depression with anxiety  Acute worsening due to death of father  Monitored by Dr Dmitri Dentno who happens to be a friend of late father as well  No change in meds yet  Emotional support was provided today  Will monitor at our office too  Atherosclerotic PVD with intermittent claudication (HCC)  No current symptoms  Diabetes mellitus type 2, noninsulin dependent ([facility] [address] )  Lab Results   Component Value Date    HGBA1C 6 3 (H) 04/06/2018       No results for input(s): POCGLU in the last 72 hours  Blood Sugar Average: Last 72 hrs:  Check A1c, doesn't monitor  Hypertension  Well controlled    amlactin for toes, no signs of infection  Monitor     Diagnoses and all orders for this visit:    Diabetes mellitus type 2, noninsulin dependent ([facility] )  -     CBC and differential  -     Comprehensive metabolic panel  -     Hemoglobin A1C  -     Lipid Panel with Direct LDL reflex  -     Microalbumin / creatinine urine ratio  -     TSH, 3rd generation with Free T4 reflex  -     ammonium lactate (LAC-HYDRIN) 12 % cream; Apply topically as needed for dry skin    Atherosclerotic PVD with intermittent claudication (HCC)  -     CBC and differential  -     Comprehensive metabolic panel  -     Hemoglobin A1C  -     Lipid Panel with Direct LDL reflex  -     TSH, 3rd generation with Free T4 reflex    Essential hypertension  -     CBC and differential  -     Comprehensive metabolic panel  -     Hemoglobin A1C  -     Microalbumin / creatinine urine ratio    Depression with anxiety    Grief          Subjective:   Chief Complaint   Patient presents with    Medicare Wellness Visit        Patient ID: Rg Carlton is a 61 y o  male  He comes in for follow up of HTN, DM,  PVD, depression    Unfortunately his father passed away before Christmas  He has been quite upset  Struggles with his loss, also the fact that he ended up requesting for resuscitation despite the fact that his father didn't want cardiac resuscitation   He panicked in an emergency and was scared of losing him  He has started smoking again due to eth stress  Has felt soreness on right great toe, no injuries        The following portions of the patient's history were reviewed and updated as appropriate: current medications, past medical history, past social history and past surgical history  PHQ-9 Depression Screening    PHQ-9:    Frequency of the following problems over the past two weeks:                Current Outpatient Prescriptions:     ALPRAZolam (XANAX) 0 25 mg tablet, Take 1 tablet by mouth 2 (two) times a day, Disp: , Rfl:     aspirin 81 MG tablet, Take 1 tablet (81 mg total) by mouth daily, Disp: 90 tablet, Rfl: 1    atorvastatin (LIPITOR) 40 mg tablet, Take 1 tablet (40 mg total) by mouth daily, Disp: 90 tablet, Rfl: 1    buPROPion (WELLBUTRIN) 75 mg tablet, Take 75 mg by mouth 3 (three) times a day with meals, Disp: , Rfl: 2    clopidogrel (PLAVIX) 75 mg tablet, Take 1 tablet (75 mg total) by mouth daily, Disp: 100 tablet, Rfl: 1    lisinopril (ZESTRIL) 20 mg tablet, Take 1 tablet (20 mg total) by mouth daily, Disp: 90 tablet, Rfl: 1    metFORMIN (GLUCOPHAGE) 1000 MG tablet, Take 1 tablet (1,000 mg total) by mouth 2 (two) times a day with meals, Disp: 180 tablet, Rfl: 1    mirtazapine (REMERON) 30 mg tablet, Take 30 mg by mouth daily at bedtime, Disp: , Rfl:     PARoxetine (PAXIL) 40 MG tablet, Take 40 mg by mouth every morning, Disp: , Rfl:     ammonium lactate (LAC-HYDRIN) 12 % cream, Apply topically as needed for dry skin, Disp: 385 g, Rfl: 0    Review of Systems   Constitutional: Negative for fatigue, fever and unexpected weight change  HENT: Negative for ear pain, hearing loss and sore throat  Eyes: Negative for pain and discharge  Respiratory: Negative for cough, chest tightness and shortness of breath  Cardiovascular: Negative for chest pain and palpitations     Gastrointestinal: Negative for abdominal pain, blood in stool, constipation, diarrhea and nausea  Genitourinary: Negative for dysuria, frequency and hematuria  Musculoskeletal: Positive for arthralgias  Negative for joint swelling  Skin: Negative for rash  Allergic/Immunologic: Negative for immunocompromised state  Neurological: Negative for dizziness and headaches  Hematological: Negative for adenopathy  Psychiatric/Behavioral: Positive for dysphoric mood and sleep disturbance  Negative for confusion  Objective:  /70 (BP Location: Left arm, Patient Position: Sitting, Cuff Size: Standard)   Pulse 98   Temp 97 9 °F (36 6 °C) (Tympanic)   Ht 5' 9" (1 753 m)   Wt 83 6 kg (184 lb 6 4 oz)   SpO2 99%   BMI 27 23 kg/m²      Physical Exam   Constitutional: He appears well-developed and well-nourished  HENT:   Head: Normocephalic and atraumatic  Right Ear: Tympanic membrane normal    Left Ear: Tympanic membrane normal    Nose: Nose normal    Mouth/Throat: Oropharynx is clear and moist  No posterior oropharyngeal edema or posterior oropharyngeal erythema  Eyes: Pupils are equal, round, and reactive to light  Conjunctivae are normal  Right eye exhibits no discharge  Neck: Normal range of motion  Neck supple  No thyromegaly present  Cardiovascular: Normal rate, regular rhythm, S1 normal, S2 normal and normal heart sounds  PMI is not displaced  Pulses are no weak pulses  No murmur heard  Pulses:       Dorsalis pedis pulses are 2+ on the right side, and 2+ on the left side  Posterior tibial pulses are 2+ on the right side, and 2+ on the left side  Pulmonary/Chest: Effort normal and breath sounds normal  No accessory muscle usage  No apnea  No respiratory distress  He has no rhonchi  He has no rales  Abdominal: Soft  Normal appearance and bowel sounds are normal  He exhibits no shifting dullness  There is no hepatosplenomegaly  There is no tenderness  There is no rebound and no CVA tenderness     Musculoskeletal: Normal range of motion  He exhibits no edema or tenderness  Feet:    Feet:   Right Foot:   Skin Integrity: Negative for ulcer, skin breakdown, erythema, warmth, callus or dry skin  Left Foot:   Skin Integrity: Negative for ulcer, skin breakdown, erythema, warmth, callus or dry skin  Lymphadenopathy:     He has no cervical adenopathy  Neurological: He is alert  Skin: Skin is warm and intact  No rash noted  Psychiatric: He has a normal mood and affect  His speech is normal    Nursing note and vitals reviewed  Patient's shoes and socks removed  Right Foot/Ankle   Right Foot Inspection  Skin Exam: skin normal no dry skin, no warmth, no callus, no erythema, no maceration, no abnormal color, no pre-ulcer, no ulcer and no callus                          Toe Exam: ROM and strength within normal limitsno swelling, erythema and  no right toe deformity  Sensory       Monofilament testing: intact  Vascular    The right DP pulse is 2+  The right PT pulse is 2+  Left Foot/Ankle  Left Foot Inspection  Skin Exam: skin normal and skin intactno dry skin, no warmth, no erythema, no maceration, normal color, no pre-ulcer, no ulcer and no callus                         Toe Exam: ROM and strength within normal limitsno swelling, no erythema and no left toe deformity                   Sensory       Monofilament: intact  Vascular    The left DP pulse is 2+  The left PT pulse is 2+  Assign Risk Category:  No deformity present; No loss of protective sensation; No weak pulses       Risk: 0      No results found for this or any previous visit (from the past 1008 hour(s))  ]    No results found

## 2019-01-11 NOTE — ASSESSMENT & PLAN NOTE
Lab Results   Component Value Date    HGBA1C 6 3 (H) 04/06/2018       No results for input(s): POCGLU in the last 72 hours  Blood Sugar Average: Last 72 hrs:  Check A1c, doesn't monitor

## 2019-01-15 DIAGNOSIS — E11.51 TYPE 2 DIABETES MELLITUS WITH DIABETIC PERIPHERAL ANGIOPATHY WITHOUT GANGRENE, WITHOUT LONG-TERM CURRENT USE OF INSULIN (HCC): ICD-10-CM

## 2019-01-29 ENCOUNTER — TRANSITIONAL CARE MANAGEMENT (OUTPATIENT)
Dept: INTERNAL MEDICINE CLINIC | Facility: CLINIC | Age: 64
End: 2019-01-29

## 2019-02-07 ENCOUNTER — OFFICE VISIT (OUTPATIENT)
Dept: INTERNAL MEDICINE CLINIC | Facility: CLINIC | Age: 64
End: 2019-02-07
Payer: COMMERCIAL

## 2019-02-07 VITALS
BODY MASS INDEX: 27.11 KG/M2 | SYSTOLIC BLOOD PRESSURE: 144 MMHG | WEIGHT: 183 LBS | RESPIRATION RATE: 15 BRPM | DIASTOLIC BLOOD PRESSURE: 70 MMHG | TEMPERATURE: 98.8 F | HEART RATE: 96 BPM | HEIGHT: 69 IN

## 2019-02-07 DIAGNOSIS — I10 ESSENTIAL HYPERTENSION: ICD-10-CM

## 2019-02-07 DIAGNOSIS — E11.9 DIABETES MELLITUS TYPE 2, NONINSULIN DEPENDENT (HCC): ICD-10-CM

## 2019-02-07 DIAGNOSIS — T78.3XXD ANGIOEDEMA, SUBSEQUENT ENCOUNTER: Primary | ICD-10-CM

## 2019-02-07 PROCEDURE — 99496 TRANSJ CARE MGMT HIGH F2F 7D: CPT | Performed by: INTERNAL MEDICINE

## 2019-02-07 RX ORDER — LORATADINE 10 MG/1
10 TABLET ORAL DAILY
Qty: 10 TABLET | Refills: 0 | Status: SHIPPED | OUTPATIENT
Start: 2019-02-07 | End: 2019-02-15 | Stop reason: SDUPTHER

## 2019-02-07 RX ORDER — PREDNISONE 20 MG/1
40 TABLET ORAL DAILY
Qty: 10 TABLET | Refills: 0 | Status: SHIPPED | OUTPATIENT
Start: 2019-02-07 | End: 2019-02-15 | Stop reason: SDUPTHER

## 2019-02-07 RX ORDER — AMLODIPINE BESYLATE 5 MG/1
1 TABLET ORAL DAILY
COMMUNITY
Start: 2019-01-29 | End: 2019-03-14 | Stop reason: SDUPTHER

## 2019-02-07 RX ORDER — RANITIDINE 150 MG/1
150 CAPSULE ORAL 2 TIMES DAILY
Qty: 20 CAPSULE | Refills: 0 | Status: SHIPPED | OUTPATIENT
Start: 2019-02-07 | End: 2019-09-17

## 2019-02-08 NOTE — PROGRESS NOTES
Assessment/Plan:  Possible recurrence of angioedema  Will treat with steroid, H2, H1 blocker, Close monitoring, advised to not apply any topical agents  If swelling returns, stop aspirin and call the office  Advised to go to the ER if symptoms worsen over the weekend  Blood pressure slightly elevated but did not take the Norvasc yet, given acute symptoms would not increase the dose at this point  Advised to monitor blood sugars closely while on the prednisone  Diagnoses and all orders for this visit:    Angioedema, subsequent encounter  -     predniSONE 20 mg tablet; Take 2 tablets (40 mg total) by mouth daily  -     ranitidine (ZANTAC) 150 MG capsule; Take 1 capsule (150 mg total) by mouth 2 (two) times a day  -     loratadine (CLARITIN) 10 mg tablet; Take 1 tablet (10 mg total) by mouth daily    Essential hypertension    Diabetes mellitus type 2, noninsulin dependent ([facility] [address] )    Other orders  -     amLODIPine (NORVASC) 5 mg tablet; Take 1 tablet by mouth daily          Subjective:   Chief Complaint   Patient presents with    Transition of Care Management     d/c [facility] 1/28, angioedema fro Lisinopril   today angioedema reoccured, took 40mg prednisone and did not take BP meds today         Patient ID: Liana Degroot is a 61 y o  male  TCM Call (since 1/8/2019)     Date and time call was made  1/29/2019 10:44 AM    Hospital care reviewed  Records reviewed    Patient was hospitialized at  [facility]    Date of Admission  01/28/19    Date of discharge  01/29/19    Diagnosis  Adverse effect of lisinopril, initial encounter     Disposition  Home    Were the patients medications reviewed and updated  Yes    Current Symptoms  None      TCM Call (since 1/8/2019)     Should patient be enrolled in anticoag monitoring? No    Scheduled for follow up?   Yes    Did you obtain your prescribed medications  Yes    Do you need help managing your prescriptions or medications  No    Is transportation to your appointment needed  No    I have advised the patient to call PCP with any new or worsening symptoms  Lisa Hui MA          He comes in for follow-up after hospitalization  He was admitted for angioedema likely secondary to an ACE-inhibitor  He was treated with steroids and discharged in a stable state  He never developed any respiratory symptoms  He notes that he was doing well since discharge in completed all the prescribed medications  Since yesterday he noticed some mild redness around the face  He put a topical moisturizing agent and this morning he woke up with more swelling and redness  He denies any sore throat or feeling of congestion in the throat  No respiratory symptoms  No headaches or dizziness  He does not remember taking any new over-the-counter medications except for the topical moisturizer  The following portions of the patient's history were reviewed and updated as appropriate: current medications, past medical history, past social history and past surgical history      PHQ-9 Depression Screening    PHQ-9:    Frequency of the following problems over the past two weeks:                Current Outpatient Prescriptions:     ALPRAZolam (XANAX) 0 25 mg tablet, Take 1 tablet by mouth 2 (two) times a day, Disp: , Rfl:     amLODIPine (NORVASC) 5 mg tablet, Take 1 tablet by mouth daily, Disp: , Rfl:     aspirin 81 MG tablet, Take 1 tablet (81 mg total) by mouth daily, Disp: 90 tablet, Rfl: 1    atorvastatin (LIPITOR) 40 mg tablet, Take 1 tablet (40 mg total) by mouth daily, Disp: 90 tablet, Rfl: 1    clopidogrel (PLAVIX) 75 mg tablet, Take 1 tablet (75 mg total) by mouth daily, Disp: 100 tablet, Rfl: 1    metFORMIN (GLUCOPHAGE) 1000 MG tablet, Take 1 tablet (1,000 mg total) by mouth 2 (two) times a day with meals, Disp: 180 tablet, Rfl: 1    mirtazapine (REMERON) 30 mg tablet, Take 30 mg by mouth daily at bedtime, Disp: , Rfl:     PARoxetine (PAXIL) 40 MG tablet, Take 40 mg by mouth every morning, Disp: , Rfl:     ammonium lactate (LAC-HYDRIN) 12 % cream, Apply topically as needed for dry skin (Patient not taking: Reported on 2/7/2019 ), Disp: 385 g, Rfl: 0    buPROPion (WELLBUTRIN) 75 mg tablet, Take 75 mg by mouth 3 (three) times a day with meals, Disp: , Rfl: 2    loratadine (CLARITIN) 10 mg tablet, Take 1 tablet (10 mg total) by mouth daily, Disp: 10 tablet, Rfl: 0    predniSONE 20 mg tablet, Take 2 tablets (40 mg total) by mouth daily, Disp: 10 tablet, Rfl: 0    ranitidine (ZANTAC) 150 MG capsule, Take 1 capsule (150 mg total) by mouth 2 (two) times a day, Disp: 20 capsule, Rfl: 0    Review of Systems   Constitutional: Negative for fatigue, fever and unexpected weight change  HENT: Negative for ear pain, hearing loss and sore throat  Eyes: Negative for pain and discharge  Respiratory: Negative for cough, chest tightness and shortness of breath  Cardiovascular: Negative for chest pain and palpitations  Gastrointestinal: Negative for abdominal pain, blood in stool, constipation, diarrhea and nausea  Genitourinary: Negative for dysuria, frequency and hematuria  Musculoskeletal: Negative for arthralgias and joint swelling  Skin: Positive for rash  Allergic/Immunologic: Negative for immunocompromised state  Neurological: Negative for dizziness and headaches  Hematological: Negative for adenopathy  Psychiatric/Behavioral: Negative for confusion and sleep disturbance  The patient is nervous/anxious  Objective:  /70 (BP Location: Left arm, Patient Position: Sitting, Cuff Size: Standard)   Pulse 96   Temp 98 8 °F (37 1 °C) (Tympanic)   Resp 15   Ht 5' 9" (1 753 m)   Wt 83 kg (183 lb)   BMI 27 02 kg/m²      Physical Exam   Constitutional: He appears well-developed and well-nourished  HENT:   Head: Normocephalic and atraumatic     Right Ear: Tympanic membrane normal    Left Ear: Tympanic membrane normal    Nose: Nose normal    Mouth/Throat: Oropharynx is clear and moist  No posterior oropharyngeal edema or posterior oropharyngeal erythema  Eyes: Pupils are equal, round, and reactive to light  Conjunctivae are normal  Right eye exhibits no discharge  Neck: Normal range of motion  Neck supple  No thyromegaly present  Cardiovascular: Normal rate, regular rhythm, S1 normal, S2 normal and normal heart sounds  PMI is not displaced  No murmur heard  Pulmonary/Chest: Effort normal and breath sounds normal  No accessory muscle usage  No apnea  No respiratory distress  He has no rhonchi  He has no rales  Abdominal: Soft  Normal appearance and bowel sounds are normal  He exhibits no shifting dullness  There is no hepatosplenomegaly  There is no tenderness  There is no rebound and no CVA tenderness  Musculoskeletal: Normal range of motion  He exhibits no edema or tenderness  Lymphadenopathy:     He has no cervical adenopathy  Neurological: He is alert  Skin: Skin is warm and intact  Rash noted  Psychiatric: He has a normal mood and affect  His speech is normal    Nursing note and vitals reviewed  No results found for this or any previous visit (from the past 1008 hour(s))  ]    No results found

## 2019-02-15 ENCOUNTER — TELEPHONE (OUTPATIENT)
Dept: INTERNAL MEDICINE CLINIC | Facility: CLINIC | Age: 64
End: 2019-02-15

## 2019-02-15 DIAGNOSIS — T78.3XXD ANGIOEDEMA, SUBSEQUENT ENCOUNTER: ICD-10-CM

## 2019-02-15 NOTE — TELEPHONE ENCOUNTER
Phone call from Colletta Morgans:  He woke up today to red, puffy eyes  He was at [facility] with a reaction to Lisinopril on 1 28 19  He was seen by Dr Tita Ponce 2 7 19 and was given amlodipine, claritin and prednisone  He took the last dose of prednisone and claritin today  He has no other complaints except for the red eyes  He was instructed to go to the ER if the problem persists

## 2019-02-19 RX ORDER — PREDNISONE 20 MG/1
40 TABLET ORAL DAILY
Qty: 10 TABLET | Refills: 0 | OUTPATIENT
Start: 2019-02-19 | End: 2019-09-17

## 2019-02-19 RX ORDER — LORATADINE 10 MG/1
10 TABLET ORAL DAILY
Qty: 10 TABLET | Refills: 0 | OUTPATIENT
Start: 2019-02-19 | End: 2019-09-17

## 2019-03-14 DIAGNOSIS — E11.51 TYPE 2 DIABETES MELLITUS WITH DIABETIC PERIPHERAL ANGIOPATHY WITHOUT GANGRENE, WITHOUT LONG-TERM CURRENT USE OF INSULIN (HCC): ICD-10-CM

## 2019-03-14 DIAGNOSIS — E78.49 OTHER HYPERLIPIDEMIA: ICD-10-CM

## 2019-03-14 DIAGNOSIS — I10 ESSENTIAL HYPERTENSION: Primary | ICD-10-CM

## 2019-03-14 RX ORDER — AMLODIPINE BESYLATE 5 MG/1
5 TABLET ORAL DAILY
Qty: 90 TABLET | Refills: 0 | Status: SHIPPED | OUTPATIENT
Start: 2019-03-14 | End: 2019-05-15 | Stop reason: SDUPTHER

## 2019-03-14 RX ORDER — ATORVASTATIN CALCIUM 40 MG/1
40 TABLET, FILM COATED ORAL DAILY
Qty: 90 TABLET | Refills: 0 | Status: SHIPPED | OUTPATIENT
Start: 2019-03-14 | End: 2019-04-05

## 2019-04-03 DIAGNOSIS — E78.49 OTHER HYPERLIPIDEMIA: ICD-10-CM

## 2019-04-05 RX ORDER — ATORVASTATIN CALCIUM 40 MG/1
TABLET, FILM COATED ORAL
Qty: 90 TABLET | Refills: 1 | Status: SHIPPED | OUTPATIENT
Start: 2019-04-05 | End: 2019-05-15 | Stop reason: SDUPTHER

## 2019-05-15 DIAGNOSIS — I10 ESSENTIAL HYPERTENSION: ICD-10-CM

## 2019-05-15 DIAGNOSIS — E78.49 OTHER HYPERLIPIDEMIA: ICD-10-CM

## 2019-05-16 RX ORDER — AMLODIPINE BESYLATE 5 MG/1
5 TABLET ORAL DAILY
Qty: 90 TABLET | Refills: 0 | Status: SHIPPED | OUTPATIENT
Start: 2019-05-16 | End: 2019-09-20 | Stop reason: SDUPTHER

## 2019-05-16 RX ORDER — ATORVASTATIN CALCIUM 40 MG/1
TABLET, FILM COATED ORAL
Qty: 90 TABLET | Refills: 0 | Status: SHIPPED | OUTPATIENT
Start: 2019-05-16 | End: 2019-08-20 | Stop reason: SDUPTHER

## 2019-06-17 DIAGNOSIS — E11.51 TYPE 2 DIABETES MELLITUS WITH DIABETIC PERIPHERAL ANGIOPATHY WITHOUT GANGRENE, WITHOUT LONG-TERM CURRENT USE OF INSULIN (HCC): ICD-10-CM

## 2019-08-20 DIAGNOSIS — E78.49 OTHER HYPERLIPIDEMIA: ICD-10-CM

## 2019-08-20 RX ORDER — ATORVASTATIN CALCIUM 40 MG/1
TABLET, FILM COATED ORAL
Qty: 30 TABLET | Refills: 0 | Status: SHIPPED | OUTPATIENT
Start: 2019-08-20 | End: 2019-09-13 | Stop reason: SDUPTHER

## 2019-08-21 NOTE — TELEPHONE ENCOUNTER
I spoke with PT to schedule a f/u appointment because he has not been here since 2/2019  He stated that he will be calling back to schedule in late September or October

## 2019-08-26 DIAGNOSIS — E78.49 OTHER HYPERLIPIDEMIA: ICD-10-CM

## 2019-08-26 RX ORDER — ATORVASTATIN CALCIUM 40 MG/1
TABLET, FILM COATED ORAL
Qty: 90 TABLET | Refills: 0 | OUTPATIENT
Start: 2019-08-26

## 2019-09-13 DIAGNOSIS — E78.49 OTHER HYPERLIPIDEMIA: ICD-10-CM

## 2019-09-16 RX ORDER — ATORVASTATIN CALCIUM 40 MG/1
TABLET, FILM COATED ORAL
Qty: 30 TABLET | Refills: 0 | Status: SHIPPED | OUTPATIENT
Start: 2019-09-16 | End: 2019-10-10 | Stop reason: SDUPTHER

## 2019-09-17 ENCOUNTER — OFFICE VISIT (OUTPATIENT)
Dept: INTERNAL MEDICINE CLINIC | Facility: CLINIC | Age: 64
End: 2019-09-17
Payer: COMMERCIAL

## 2019-09-17 VITALS
BODY MASS INDEX: 28.97 KG/M2 | RESPIRATION RATE: 14 BRPM | DIASTOLIC BLOOD PRESSURE: 70 MMHG | TEMPERATURE: 98.2 F | SYSTOLIC BLOOD PRESSURE: 126 MMHG | HEIGHT: 69 IN | HEART RATE: 88 BPM | WEIGHT: 195.6 LBS

## 2019-09-17 DIAGNOSIS — F41.8 DEPRESSION WITH ANXIETY: ICD-10-CM

## 2019-09-17 DIAGNOSIS — Z23 ENCOUNTER FOR IMMUNIZATION: ICD-10-CM

## 2019-09-17 DIAGNOSIS — E78.5 HYPERLIPIDEMIA, UNSPECIFIED HYPERLIPIDEMIA TYPE: ICD-10-CM

## 2019-09-17 DIAGNOSIS — Z12.5 SCREENING FOR PROSTATE CANCER: ICD-10-CM

## 2019-09-17 DIAGNOSIS — E11.9 DIABETES MELLITUS TYPE 2, NONINSULIN DEPENDENT (HCC): Primary | ICD-10-CM

## 2019-09-17 DIAGNOSIS — I10 ESSENTIAL HYPERTENSION: ICD-10-CM

## 2019-09-17 PROCEDURE — G0438 PPPS, INITIAL VISIT: HCPCS | Performed by: INTERNAL MEDICINE

## 2019-09-17 PROCEDURE — 3008F BODY MASS INDEX DOCD: CPT | Performed by: INTERNAL MEDICINE

## 2019-09-17 PROCEDURE — 90471 IMMUNIZATION ADMIN: CPT | Performed by: INTERNAL MEDICINE

## 2019-09-17 PROCEDURE — 90682 RIV4 VACC RECOMBINANT DNA IM: CPT | Performed by: INTERNAL MEDICINE

## 2019-09-17 PROCEDURE — 99214 OFFICE O/P EST MOD 30 MIN: CPT | Performed by: INTERNAL MEDICINE

## 2019-09-17 PROCEDURE — 3078F DIAST BP <80 MM HG: CPT | Performed by: INTERNAL MEDICINE

## 2019-09-17 PROCEDURE — 3074F SYST BP LT 130 MM HG: CPT | Performed by: INTERNAL MEDICINE

## 2019-09-17 NOTE — PROGRESS NOTES
Assessment/Plan:  Blood pressure improved on recheck, check A1c  He had a nurse come to his house in June and his A1c was less than 6 5  I he advised to resume his aspirin  Since he has a history of peripheral vascular disease  Continue current dose of metformin and statin  Check blood work including PSA for prostate cancer screening  He is up-to-date on colon cancer screening  Influenza vaccination was given today  He would like to find out about the coverage of shingrix and think about it a little bit more before accepting that vaccination  He will try to get back on his biking and walking to lose the weight he gained while he was increasingly depressed  Diagnoses and all orders for this visit:    Diabetes mellitus type 2, noninsulin dependent ([facility] [address] )  -     CBC and differential  -     Comprehensive metabolic panel  -     Hemoglobin A1C  -     TSH, 3rd generation with Free T4 reflex    Essential hypertension  -     aspirin 81 MG tablet; Take 1 tablet (81 mg total) by mouth daily  -     CBC and differential  -     Comprehensive metabolic panel  -     Microalbumin / creatinine urine ratio  -     TSH, 3rd generation with Free T4 reflex    Hyperlipidemia, unspecified hyperlipidemia type  -     Lipid Panel with Direct LDL reflex  -     TSH, 3rd generation with Free T4 reflex    Depression with anxiety    Screening for prostate cancer  -     PSA, Total Screen; Future    Encounter for immunization  -     influenza vaccine, [id number], quadrivalent, recombinant, PF, 0 5 mL, for patients 18 yr+ ([facility])          Subjective:   Chief Complaint   Patient presents with    Medicare Wellness Visit        Patient ID: Greyson Perez is a 61 y o  male  He comes in for follow up of HTN, DM,  PVD, depression    He was last seen in the office in February after angioedema  Lisinopril was discontinued and started on amlodipine  After that he had an ER visit because of driving under the influence    He notes that he has not had an alcoholic drink in the last few months now  Emotional he is feeling a little better  He had " a rough patch" after the death of his father last winter but with the help of family support his symptoms are a bit better  He has been following up with Dr Rory Griffiths regularly  He has been smoking again  He does not remember why he stopped his aspirin but has not taken it for several months  No leg pain or chest pain or shortness of breath  He has been taking his blood pressure medications and cholesterol and diabetes medicine  The following portions of the patient's history were reviewed and updated as appropriate: current medications, past medical history, past social history and past surgical history  PHQ-9 Depression Screening    PHQ-9:    Frequency of the following problems over the past two weeks:       Little interest or pleasure in doing things:  1 - several days  Feeling down, depressed, or hopeless:  1 - several days  PHQ-2 Score:  2           Current Outpatient Medications:     ALPRAZolam (XANAX) 0 25 mg tablet, Take 1 tablet by mouth 2 (two) times a day, Disp: , Rfl:     amLODIPine (NORVASC) 5 mg tablet, TAKE 1 TABLET (5 MG TOTAL) BY MOUTH DAILY, Disp: 90 tablet, Rfl: 0    atorvastatin (LIPITOR) 40 mg tablet, TAKE 1 TABLET EVERY DAY, Disp: 30 tablet, Rfl: 0    metFORMIN (GLUCOPHAGE) 1000 MG tablet, TAKE 1 TABLET (1,000 MG TOTAL) BY MOUTH 2 (TWO) TIMES A DAY WITH MEALS, Disp: 180 tablet, Rfl: 0    mirtazapine (REMERON) 30 mg tablet, Take 30 mg by mouth daily at bedtime, Disp: , Rfl:     PARoxetine (PAXIL) 40 MG tablet, Take 40 mg by mouth every morning, Disp: , Rfl:     aspirin 81 MG tablet, Take 1 tablet (81 mg total) by mouth daily, Disp: 90 tablet, Rfl: 1    Review of Systems   Constitutional: Negative for fatigue, fever and unexpected weight change  HENT: Negative for ear pain, hearing loss and sore throat  Eyes: Negative for pain and discharge     Respiratory: Negative for cough, chest tightness and shortness of breath  Cardiovascular: Negative for chest pain and palpitations  Gastrointestinal: Negative for abdominal pain, blood in stool, constipation, diarrhea and nausea  Genitourinary: Negative for dysuria, frequency and hematuria  Musculoskeletal: Negative for arthralgias and joint swelling  Skin: Negative for rash  Allergic/Immunologic: Negative for immunocompromised state  Neurological: Negative for dizziness and headaches  Hematological: Negative for adenopathy  Psychiatric/Behavioral: Positive for dysphoric mood  Negative for confusion and sleep disturbance  Objective:  /70   Pulse 88   Temp 98 2 °F (36 8 °C)   Resp 14   Ht 5' 9" (1 753 m)   Wt 88 7 kg (195 lb 9 6 oz)   BMI 28 89 kg/m²      Physical Exam   Constitutional: He appears well-developed and well-nourished  HENT:   Head: Normocephalic and atraumatic  Right Ear: Tympanic membrane normal    Left Ear: Tympanic membrane normal    Nose: Nose normal    Mouth/Throat: Oropharynx is clear and moist  No posterior oropharyngeal edema or posterior oropharyngeal erythema  Eyes: Pupils are equal, round, and reactive to light  Conjunctivae are normal  Right eye exhibits no discharge  Left eye exhibits no discharge  Neck: Normal range of motion  Neck supple  No thyromegaly present  Cardiovascular: Normal rate, regular rhythm, S1 normal, S2 normal and normal heart sounds  PMI is not displaced  No murmur heard  Pulmonary/Chest: Effort normal and breath sounds normal  No accessory muscle usage  No apnea  No respiratory distress  He has no rhonchi  He has no rales  Abdominal: Soft  Normal appearance and bowel sounds are normal  He exhibits no shifting dullness  There is no hepatosplenomegaly  There is no tenderness  There is no rebound and no CVA tenderness  Musculoskeletal: Normal range of motion  He exhibits no edema or tenderness     Lymphadenopathy:     He has no cervical adenopathy  Neurological: He is alert  Skin: Skin is warm and intact  No rash noted  Psychiatric: He has a normal mood and affect  His speech is normal    Nursing note and vitals reviewed  No results found for this or any previous visit (from the past 1008 hour(s))  ]    No results found  BMI Counseling: Body mass index is 28 89 kg/m²  The BMI is above normal  Exercise recommendations include exercising 3-5 times per week

## 2019-09-17 NOTE — PROGRESS NOTES
Assessment and Plan:     Problem List Items Addressed This Visit     None        BMI Counseling: Body mass index is 28 89 kg/m²  The BMI is above normal  Nutrition recommendations include encouraging healthy choices of fruits and vegetables  Exercise recommendations include moderate physical activity 150 minutes/week  No pharmacotherapy was ordered  Depression Screening and Follow-up Plan: Patient's depression screening was positive with a PHQ-2 score of 2  Continue regular follow-up with their mental health provider who is managing their mental health condition(s)  Preventive health issues were discussed with patient, and age appropriate screening tests were ordered as noted in patient's After Visit Summary  Personalized health advice and appropriate referrals for health education or preventive services given if needed, as noted in patient's After Visit Summary  History of Present Illness:     Patient presents for Medicare Annual Wellness visit    Patient Care Team:  Roni Mcallister MD as PCP - General  Roni Mcallister MD  [address] [place] [address]  Carmen Scott DO as Endoscopist     Problem List:     Patient Active Problem List   Diagnosis    Polyp of colon    Atherosclerotic PVD with intermittent claudication ([facility] [address] )    Depression with anxiety    Diabetes mellitus type 2, noninsulin dependent ([facility] )    Hypertension    Hyperlipidemia      Past Medical and Surgical History:     Past Medical History:   Diagnosis Date    Colon polyp     [facility]     Hyperlipidemia     Hypertension      Past Surgical History:   Procedure Laterality Date    COLON SURGERY      found growth, bengin    COLONOSCOPY      COLONOSCOPY N/A 3/13/2018    Procedure: COLONOSCOPY;  Surgeon: Carmen Scott DO;  Location: [facility] GI [facility];   Service: Gastroenterology      Family History:     Family History   Problem Relation Age of Onset    Heart failure Mother     Hypertension Other Social History:     Social History     Socioeconomic History    Marital status: Single     Spouse name: Not on file    Number of children: Not on file    Years of education: Not on file    Highest education level: Not on file   Occupational History    Not on file   Social Needs    Financial resource strain: Not on file    Food insecurity:     Worry: Not on file     Inability: Not on file    Transportation needs:     Medical: Not on file     Non-medical: Not on file   Tobacco Use    Smoking status: Former Smoker    Smokeless tobacco: Former User    Tobacco comment: uses vape/current every day smoker (as per [employer])   Substance and Sexual Activity    Alcohol use: Yes     Comment: now and then   [employer] Drug use: No    Sexual activity: Not on file   Lifestyle    Physical activity:     Days per week: Not on file     Minutes per session: Not on file    Stress: Not on file   Relationships    Social connections:     Talks on phone: Not on file     Gets together: Not on file     Attends Denominational service: Not on file     Active member of club or organization: Not on file     Attends meetings of clubs or organizations: Not on file     Relationship status: Not on file    Intimate partner violence:     Fear of current or ex partner: Not on file     Emotionally abused: Not on file     Physically abused: Not on file     Forced sexual activity: Not on file   Other Topics Concern    Not on file   Social History Narrative    Not on file       Medications and Allergies:     Current Outpatient Medications   Medication Sig Dispense Refill    ALPRAZolam (XANAX) 0 25 mg tablet Take 1 tablet by mouth 2 (two) times a day      amLODIPine (NORVASC) 5 mg tablet TAKE 1 TABLET (5 MG TOTAL) BY MOUTH DAILY 90 tablet 0    ammonium lactate (LAC-HYDRIN) 12 % cream Apply topically as needed for dry skin (Patient not taking: Reported on 2/7/2019 ) 385 g 0    aspirin 81 MG tablet Take 1 tablet (81 mg total) by mouth daily 90 tablet 1    atorvastatin (LIPITOR) 40 mg tablet TAKE 1 TABLET EVERY DAY 30 tablet 0    buPROPion (WELLBUTRIN) 75 mg tablet Take 75 mg by mouth 3 (three) times a day with meals  2    clopidogrel (PLAVIX) 75 mg tablet Take 1 tablet (75 mg total) by mouth daily 100 tablet 1    loratadine (CLARITIN) 10 mg tablet Take 1 tablet (10 mg total) by mouth daily 10 tablet 0    metFORMIN (GLUCOPHAGE) 1000 MG tablet TAKE 1 TABLET (1,000 MG TOTAL) BY MOUTH 2 (TWO) TIMES A DAY WITH MEALS 180 tablet 0    mirtazapine (REMERON) 30 mg tablet Take 30 mg by mouth daily at bedtime      PARoxetine (PAXIL) 40 MG tablet Take 40 mg by mouth every morning      predniSONE 20 mg tablet Take 2 tablets (40 mg total) by mouth daily 10 tablet 0    ranitidine (ZANTAC) 150 MG capsule Take 1 capsule (150 mg total) by mouth 2 (two) times a day 20 capsule 0     No current facility-administered medications for this visit  Allergies   Allergen Reactions    Lisinopril Angioedema      Immunizations:     Immunization History   Administered Date(s) Administered    INFLUENZA 11/11/2003, 02/10/2011    Influenza TIV (IM) 10/06/2016, 09/10/2017    Influenza, high dose seasonal 0 5 mL 10/10/2018    Pneumococcal Polysaccharide PPV23 03/07/2017    Tdap 10/04/2018      Health Maintenance:         Topic Date Due    CRC Screening: Colonoscopy  03/13/2028    Hepatitis C Screening  Completed         Topic Date Due    HEPATITIS B VACCINES (1 of 3 - Risk 3-dose series) 12/02/1974    INFLUENZA VACCINE  07/01/2019      Medicare Health Risk Assessment:     There were no vitals taken for this visit  Anais Amaya is here for his Initial Wellness visit  Health Risk Assessment:   Patient rates overall health as good  Patient feels that their physical health rating is same  Eyesight was rated as same  Hearing was rated as same  Patient feels that their emotional and mental health rating is slightly worse  Pain experienced in the last 7 days has been none  Depression Screening:   PHQ-2 Score: 2      Fall Risk Screening: In the past year, patient has experienced: no history of falling in past year      Home Safety:  Patient does not have trouble with stairs inside or outside of their home  Patient has working smoke alarms and has no working carbon monoxide detector  Home safety hazards include: none  Nutrition:   Current diet is Regular, Diabetic, No Added Salt and Limited junk food  Medications:   Patient is currently taking over-the-counter supplements  OTC medications include: see medication list  Patient is able to manage medications  Activities of Daily Living (ADLs)/Instrumental Activities of Daily Living (IADLs):   Walk and transfer into and out of bed and chair?: Yes  Dress and groom yourself?: Yes    Bathe or shower yourself?: Yes    Feed yourself?  Yes  Do your laundry/housekeeping?: Yes  Manage your money, pay your bills and track your expenses?: Yes  Make your own meals?: Yes    Do your own shopping?: Yes    Previous Hospitalizations:   Any hospitalizations or ED visits within the last 12 months?: No      Advance Care Planning:   Living will: No    Durable POA for healthcare: No    Advanced directive: No    Advanced directive counseling given: No    Five wishes given: No    End of Life Decisions reviewed with patient: No    Provider agrees with end of life decisions: No      PREVENTIVE SCREENINGS      Cardiovascular Screening:    General: Screening Not Indicated and History Lipid Disorder      Diabetes Screening:     General: Screening Not Indicated and History Diabetes      Colorectal Cancer Screening:     General: Screening Current      Prostate Cancer Screening:    General: Risks and Benefits Discussed    Due for: PSA      Osteoporosis Screening:    General: Screening Not Indicated      Abdominal Aortic Aneurysm (AAA) Screening:    Risk factors include: tobacco use        Lung Cancer Screening:     General: Screening Not Indicated Hepatitis C Screening:    General: Screening Current      Prasanna Lama MD

## 2019-09-17 NOTE — PATIENT INSTRUCTIONS

## 2019-09-20 DIAGNOSIS — I10 ESSENTIAL HYPERTENSION: ICD-10-CM

## 2019-09-20 RX ORDER — AMLODIPINE BESYLATE 5 MG/1
5 TABLET ORAL DAILY
Qty: 90 TABLET | Refills: 0 | Status: SHIPPED | OUTPATIENT
Start: 2019-09-20 | End: 2019-11-25 | Stop reason: SDUPTHER

## 2019-09-23 DIAGNOSIS — E11.51 TYPE 2 DIABETES MELLITUS WITH DIABETIC PERIPHERAL ANGIOPATHY WITHOUT GANGRENE, WITHOUT LONG-TERM CURRENT USE OF INSULIN (HCC): ICD-10-CM

## 2019-10-03 LAB — EXT MICROALBUMIN URINE RANDOM: 0.5

## 2019-10-10 DIAGNOSIS — E78.49 OTHER HYPERLIPIDEMIA: ICD-10-CM

## 2019-10-11 RX ORDER — ATORVASTATIN CALCIUM 40 MG/1
TABLET, FILM COATED ORAL
Qty: 90 TABLET | Refills: 0 | Status: SHIPPED | OUTPATIENT
Start: 2019-10-11 | End: 2019-12-17 | Stop reason: SDUPTHER

## 2019-10-28 DIAGNOSIS — L71.9 ROSACEA: Primary | ICD-10-CM

## 2019-10-28 RX ORDER — METRONIDAZOLE 10 MG/G
GEL TOPICAL DAILY
Qty: 60 G | Refills: 2 | Status: SHIPPED | OUTPATIENT
Start: 2019-10-28 | End: 2020-01-10 | Stop reason: SDUPTHER

## 2019-11-25 DIAGNOSIS — I10 ESSENTIAL HYPERTENSION: ICD-10-CM

## 2019-11-26 RX ORDER — AMLODIPINE BESYLATE 5 MG/1
TABLET ORAL
Qty: 90 TABLET | Refills: 0 | Status: SHIPPED | OUTPATIENT
Start: 2019-11-26 | End: 2020-01-28 | Stop reason: SDUPTHER

## 2019-11-27 DIAGNOSIS — E11.51 TYPE 2 DIABETES MELLITUS WITH DIABETIC PERIPHERAL ANGIOPATHY WITHOUT GANGRENE, WITHOUT LONG-TERM CURRENT USE OF INSULIN (HCC): ICD-10-CM

## 2019-12-11 ENCOUNTER — APPOINTMENT (OUTPATIENT)
Dept: LAB | Facility: CLINIC | Age: 64
End: 2019-12-11
Payer: COMMERCIAL

## 2019-12-11 DIAGNOSIS — Z12.5 SCREENING FOR PROSTATE CANCER: ICD-10-CM

## 2019-12-11 LAB
ALBUMIN SERPL BCP-MCNC: 4.4 G/DL (ref 3.5–5)
ALP SERPL-CCNC: 114 U/L (ref 46–116)
ALT SERPL W P-5'-P-CCNC: 43 U/L (ref 12–78)
ANION GAP SERPL CALCULATED.3IONS-SCNC: 4 MMOL/L (ref 4–13)
AST SERPL W P-5'-P-CCNC: 26 U/L (ref 5–45)
BASOPHILS # BLD AUTO: 0.11 THOUSANDS/ΜL (ref 0–0.1)
BASOPHILS NFR BLD AUTO: 1 % (ref 0–1)
BILIRUB SERPL-MCNC: 0.45 MG/DL (ref 0.2–1)
BUN SERPL-MCNC: 16 MG/DL (ref 5–25)
CALCIUM SERPL-MCNC: 9.1 MG/DL (ref 8.3–10.1)
CHLORIDE SERPL-SCNC: 102 MMOL/L (ref 100–108)
CHOLEST SERPL-MCNC: 216 MG/DL (ref 50–200)
CO2 SERPL-SCNC: 30 MMOL/L (ref 21–32)
CREAT SERPL-MCNC: 0.93 MG/DL (ref 0.6–1.3)
CREAT UR-MCNC: 221 MG/DL
EOSINOPHIL # BLD AUTO: 0.22 THOUSAND/ΜL (ref 0–0.61)
EOSINOPHIL NFR BLD AUTO: 3 % (ref 0–6)
ERYTHROCYTE [DISTWIDTH] IN BLOOD BY AUTOMATED COUNT: 11.8 % (ref 11.6–15.1)
EST. AVERAGE GLUCOSE BLD GHB EST-MCNC: 217 MG/DL
GFR SERPL CREATININE-BSD FRML MDRD: 86 ML/MIN/1.73SQ M
GLUCOSE P FAST SERPL-MCNC: 249 MG/DL (ref 65–99)
HBA1C MFR BLD: 9.2 % (ref 4.2–6.3)
HCT VFR BLD AUTO: 43.6 % (ref 36.5–49.3)
HDLC SERPL-MCNC: 31 MG/DL
HGB BLD-MCNC: 14.2 G/DL (ref 12–17)
IMM GRANULOCYTES # BLD AUTO: 0.03 THOUSAND/UL (ref 0–0.2)
IMM GRANULOCYTES NFR BLD AUTO: 0 % (ref 0–2)
LDLC SERPL DIRECT ASSAY-MCNC: 85 MG/DL (ref 0–100)
LYMPHOCYTES # BLD AUTO: 2.34 THOUSANDS/ΜL (ref 0.6–4.47)
LYMPHOCYTES NFR BLD AUTO: 27 % (ref 14–44)
MCH RBC QN AUTO: 31.3 PG (ref 26.8–34.3)
MCHC RBC AUTO-ENTMCNC: 32.6 G/DL (ref 31.4–37.4)
MCV RBC AUTO: 96 FL (ref 82–98)
MICROALBUMIN UR-MCNC: 36.7 MG/L (ref 0–20)
MICROALBUMIN/CREAT 24H UR: 17 MG/G CREATININE (ref 0–30)
MONOCYTES # BLD AUTO: 0.69 THOUSAND/ΜL (ref 0.17–1.22)
MONOCYTES NFR BLD AUTO: 8 % (ref 4–12)
NEUTROPHILS # BLD AUTO: 5.3 THOUSANDS/ΜL (ref 1.85–7.62)
NEUTS SEG NFR BLD AUTO: 61 % (ref 43–75)
NRBC BLD AUTO-RTO: 0 /100 WBCS
PLATELET # BLD AUTO: 266 THOUSANDS/UL (ref 149–390)
PMV BLD AUTO: 9.6 FL (ref 8.9–12.7)
POTASSIUM SERPL-SCNC: 4.6 MMOL/L (ref 3.5–5.3)
PROT SERPL-MCNC: 7.4 G/DL (ref 6.4–8.2)
PSA SERPL-MCNC: 0.3 NG/ML (ref 0–4)
RBC # BLD AUTO: 4.53 MILLION/UL (ref 3.88–5.62)
SODIUM SERPL-SCNC: 136 MMOL/L (ref 136–145)
TRIGL SERPL-MCNC: 670 MG/DL
TSH SERPL DL<=0.05 MIU/L-ACNC: 2.09 UIU/ML (ref 0.36–3.74)
WBC # BLD AUTO: 8.69 THOUSAND/UL (ref 4.31–10.16)

## 2019-12-11 PROCEDURE — 80061 LIPID PANEL: CPT | Performed by: INTERNAL MEDICINE

## 2019-12-11 PROCEDURE — G0103 PSA SCREENING: HCPCS

## 2019-12-11 PROCEDURE — 83036 HEMOGLOBIN GLYCOSYLATED A1C: CPT | Performed by: INTERNAL MEDICINE

## 2019-12-11 PROCEDURE — 84443 ASSAY THYROID STIM HORMONE: CPT | Performed by: INTERNAL MEDICINE

## 2019-12-11 PROCEDURE — 36415 COLL VENOUS BLD VENIPUNCTURE: CPT

## 2019-12-11 PROCEDURE — 82570 ASSAY OF URINE CREATININE: CPT | Performed by: INTERNAL MEDICINE

## 2019-12-11 PROCEDURE — 85025 COMPLETE CBC W/AUTO DIFF WBC: CPT | Performed by: INTERNAL MEDICINE

## 2019-12-11 PROCEDURE — 82043 UR ALBUMIN QUANTITATIVE: CPT | Performed by: INTERNAL MEDICINE

## 2019-12-11 PROCEDURE — 3046F HEMOGLOBIN A1C LEVEL >9.0%: CPT | Performed by: INTERNAL MEDICINE

## 2019-12-11 PROCEDURE — 3061F NEG MICROALBUMINURIA REV: CPT | Performed by: INTERNAL MEDICINE

## 2019-12-11 PROCEDURE — 80053 COMPREHEN METABOLIC PANEL: CPT | Performed by: INTERNAL MEDICINE

## 2019-12-11 PROCEDURE — 83721 ASSAY OF BLOOD LIPOPROTEIN: CPT | Performed by: INTERNAL MEDICINE

## 2019-12-17 DIAGNOSIS — E78.49 OTHER HYPERLIPIDEMIA: ICD-10-CM

## 2019-12-17 RX ORDER — ATORVASTATIN CALCIUM 40 MG/1
TABLET, FILM COATED ORAL
Qty: 90 TABLET | Refills: 0 | Status: SHIPPED | OUTPATIENT
Start: 2019-12-17 | End: 2020-02-19

## 2019-12-18 ENCOUNTER — OFFICE VISIT (OUTPATIENT)
Dept: INTERNAL MEDICINE CLINIC | Facility: CLINIC | Age: 64
End: 2019-12-18
Payer: COMMERCIAL

## 2019-12-18 VITALS
TEMPERATURE: 98.7 F | RESPIRATION RATE: 16 BRPM | DIASTOLIC BLOOD PRESSURE: 80 MMHG | WEIGHT: 200.6 LBS | SYSTOLIC BLOOD PRESSURE: 146 MMHG | HEART RATE: 91 BPM | HEIGHT: 69 IN | BODY MASS INDEX: 29.71 KG/M2

## 2019-12-18 DIAGNOSIS — E78.5 HYPERLIPIDEMIA, UNSPECIFIED HYPERLIPIDEMIA TYPE: ICD-10-CM

## 2019-12-18 DIAGNOSIS — I70.219 ATHEROSCLEROTIC PVD WITH INTERMITTENT CLAUDICATION (HCC): ICD-10-CM

## 2019-12-18 DIAGNOSIS — I10 ESSENTIAL HYPERTENSION: ICD-10-CM

## 2019-12-18 DIAGNOSIS — E11.65 UNCONTROLLED TYPE 2 DIABETES MELLITUS WITH HYPERGLYCEMIA (HCC): Primary | ICD-10-CM

## 2019-12-18 PROCEDURE — 1036F TOBACCO NON-USER: CPT | Performed by: INTERNAL MEDICINE

## 2019-12-18 PROCEDURE — 99214 OFFICE O/P EST MOD 30 MIN: CPT | Performed by: INTERNAL MEDICINE

## 2019-12-18 PROCEDURE — 3008F BODY MASS INDEX DOCD: CPT | Performed by: INTERNAL MEDICINE

## 2019-12-18 NOTE — ASSESSMENT & PLAN NOTE
Lab Results   Component Value Date    HGBA1C 9 2 (H) 12/11/2019    Went up significantly  He admits to eating sweets, physically inactive  He got quite upset looking at his blood work results  Advised him to continue the metformin, he needs to dramatically reduce intake of sweets, avoid carbonated drinks without facial sweeteners or fruit juices, okay to eat an apple or 2 every day, needs to start regular exercise  I advised him to start monitoring his blood sugars as well  Asked him to call the office again after he goes home to tell me the name of the glucometer since he does not remember that  If he cannot find his glucometer a new 1 will be sent to his pharmacy  At this point will try to improve this with just diet modifications and current dose of metformin, follow-up back in a month, if hyperglycemia continues will add a 2nd agent

## 2019-12-18 NOTE — ASSESSMENT & PLAN NOTE
Significantly high triglycerides with a low HDL, again uncontrolled blood sugars are correlating, cut back intake of alcohol

## 2019-12-18 NOTE — ASSESSMENT & PLAN NOTE
Slightly elevated, quite upset about his weight gain and his blood work results  Monitor while on the current dose of amlodipine, if still high at next visit will increase amlodipine dose    Advised to monitor salt intake as well

## 2019-12-18 NOTE — PROGRESS NOTES
Assessment/Plan:  1  Uncontrolled type 2 diabetes mellitus with hyperglycemia [facility])  Assessment & Plan:    Lab Results   Component Value Date    HGBA1C 9 2 (H) 12/11/2019    Went up significantly  He admits to eating sweets, physically inactive  He got quite upset looking at his blood work results  Advised him to continue the metformin, he needs to dramatically reduce intake of sweets, avoid carbonated drinks without facial sweeteners or fruit juices, okay to eat an apple or 2 every day, needs to start regular exercise  I advised him to start monitoring his blood sugars as well  Asked him to call the office again after he goes home to tell me the name of the glucometer since he does not remember that  If he cannot find his glucometer a new 1 will be sent to his pharmacy  At this point will try to improve this with just diet modifications and current dose of metformin, follow-up back in a month, if hyperglycemia continues will add a 2nd agent  2  Essential hypertension  Assessment & Plan:  Slightly elevated, quite upset about his weight gain and his blood work results  Monitor while on the current dose of amlodipine, if still high at next visit will increase amlodipine dose  Advised to monitor salt intake as well      3  Hyperlipidemia, unspecified hyperlipidemia type  Assessment & Plan:  Significantly high triglycerides with a low HDL, again uncontrolled blood sugars are correlating, cut back intake of alcohol  4  Atherosclerotic PVD with intermittent claudication ([facility] [address] )  Assessment & Plan:  Continue statin, aspirin, smoking cessation          Subjective:   Chief Complaint   Patient presents with    Follow-up     3 month        Patient ID: Jean-Claude Saldana is a 59 y o  male      Comes in for follow-up of diabetes, hypertension, hyperlipidemia, peripheral vascular disease    He got a DUI several months ago and has not been driving, he has not been going for his bike rides since the weather has changed, as does eat quite a bit mostly at nighttime who put a movie and eats cookies and cakes an ice cream   Mood has been okay  The following portions of the patient's history were reviewed and updated as appropriate: current medications, past medical history, past social history and past surgical history  PHQ-9 Depression Screening    PHQ-9:    Frequency of the following problems over the past two weeks:                Current Outpatient Medications:     ALPRAZolam (XANAX) 0 25 mg tablet, Take 1 tablet by mouth 2 (two) times a day, Disp: , Rfl:     amLODIPine (NORVASC) 5 mg tablet, TAKE 1 TABLET EVERY DAY, Disp: 90 tablet, Rfl: 0    aspirin 81 MG tablet, Take 1 tablet (81 mg total) by mouth daily, Disp: 90 tablet, Rfl: 1    atorvastatin (LIPITOR) 40 mg tablet, TAKE 1 TABLET EVERY DAY, Disp: 90 tablet, Rfl: 0    metFORMIN (GLUCOPHAGE) 1000 MG tablet, TAKE 1 TABLET TWICE DAILY WITH MEALS, Disp: 180 tablet, Rfl: 0    metroNIDAZOLE (METROGEL) 1 % gel, Apply topically daily, Disp: 60 g, Rfl: 2    mirtazapine (REMERON) 30 mg tablet, Take 30 mg by mouth daily at bedtime, Disp: , Rfl:     PARoxetine (PAXIL) 40 MG tablet, Take 40 mg by mouth every morning, Disp: , Rfl:     Review of Systems   Constitutional: Negative for fatigue, fever and unexpected weight change  HENT: Negative for ear pain, hearing loss and sore throat  Eyes: Negative for pain and discharge  Respiratory: Negative for cough, chest tightness and shortness of breath  Cardiovascular: Negative for chest pain and palpitations  Gastrointestinal: Negative for abdominal pain, blood in stool, constipation, diarrhea and nausea  Genitourinary: Negative for dysuria, frequency and hematuria  Musculoskeletal: Negative for arthralgias and joint swelling  Skin: Negative for rash  Allergic/Immunologic: Negative for immunocompromised state  Neurological: Negative for dizziness and headaches  Hematological: Negative for adenopathy  Psychiatric/Behavioral: Positive for dysphoric mood  Negative for confusion and sleep disturbance  Objective:  /80   Pulse 91   Temp 98 7 °F (37 1 °C)   Resp 16   Ht 5' 9" (1 753 m)   Wt 91 kg (200 lb 9 6 oz)   BMI 29 62 kg/m²      Physical Exam   Constitutional: He appears well-developed and well-nourished  HENT:   Head: Normocephalic and atraumatic  Right Ear: Tympanic membrane normal    Left Ear: Tympanic membrane normal    Nose: Nose normal    Mouth/Throat: Oropharynx is clear and moist  No posterior oropharyngeal edema or posterior oropharyngeal erythema  Eyes: Pupils are equal, round, and reactive to light  Conjunctivae are normal  Right eye exhibits no discharge  Left eye exhibits no discharge  Neck: Normal range of motion  Neck supple  No thyromegaly present  Cardiovascular: Normal rate, regular rhythm, S1 normal, S2 normal and normal heart sounds  PMI is not displaced  No murmur heard  Pulmonary/Chest: Effort normal and breath sounds normal  No accessory muscle usage  No apnea  No respiratory distress  He has no rhonchi  He has no rales  Abdominal: Soft  Normal appearance and bowel sounds are normal  He exhibits no shifting dullness  There is no hepatosplenomegaly  There is no tenderness  There is no rebound and no CVA tenderness  Musculoskeletal: Normal range of motion  He exhibits no edema or tenderness  Lymphadenopathy:     He has no cervical adenopathy  Neurological: He is alert  Skin: Skin is warm and intact  No rash noted  Psychiatric: His speech is normal  He exhibits a depressed mood  Nursing note and vitals reviewed          Recent Results (from the past 1008 hour(s))   CBC and differential    Collection Time: 12/11/19  9:36 AM   Result Value Ref Range    WBC 8 69 4 31 - 10 16 Thousand/uL    RBC 4 53 3 88 - 5 62 Million/uL    Hemoglobin 14 2 12 0 - 17 0 g/dL    Hematocrit 43 6 36 5 - 49 3 %    MCV 96 82 - 98 fL    MCH 31 3 26 8 - 34 3 pg    MCHC 32 6 31 4 - 37 4 g/dL    RDW 11 8 11 6 - 15 1 %    MPV 9 6 8 9 - 12 7 fL    Platelets 103 104 - 603 Thousands/uL    nRBC 0 /100 WBCs    Neutrophils Relative 61 43 - 75 %    Immat GRANS % 0 0 - 2 %    Lymphocytes Relative 27 14 - 44 %    Monocytes Relative 8 4 - 12 %    Eosinophils Relative 3 0 - 6 %    Basophils Relative 1 0 - 1 %    Neutrophils Absolute 5 30 1 85 - 7 62 Thousands/µL    Immature Grans Absolute 0 03 0 00 - 0 20 Thousand/uL    Lymphocytes Absolute 2 34 0 60 - 4 47 Thousands/µL    Monocytes Absolute 0 69 0 17 - 1 22 Thousand/µL    Eosinophils Absolute 0 22 0 00 - 0 61 Thousand/µL    Basophils Absolute 0 11 (H) 0 00 - 0 10 Thousands/µL   Comprehensive metabolic panel    Collection Time: 12/11/19  9:36 AM   Result Value Ref Range    Sodium 136 136 - 145 mmol/L    Potassium 4 6 3 5 - 5 3 mmol/L    Chloride 102 100 - 108 mmol/L    CO2 30 21 - 32 mmol/L    ANION GAP 4 4 - 13 mmol/L    BUN 16 5 - 25 mg/dL    Creatinine 0 93 0 60 - 1 30 mg/dL    Glucose, Fasting 249 (H) 65 - 99 mg/dL    Calcium 9 1 8 3 - 10 1 mg/dL    AST 26 5 - 45 U/L    ALT 43 12 - 78 U/L    Alkaline Phosphatase 114 46 - 116 U/L    Total Protein 7 4 6 4 - 8 2 g/dL    Albumin 4 4 3 5 - 5 0 g/dL    Total Bilirubin 0 45 0 20 - 1 00 mg/dL    eGFR 86 ml/min/1 73sq m   Hemoglobin A1C    Collection Time: 12/11/19  9:36 AM   Result Value Ref Range    Hemoglobin A1C 9 2 (H) 4 2 - 6 3 %     mg/dl   Lipid Panel with Direct LDL reflex    Collection Time: 12/11/19  9:36 AM   Result Value Ref Range    Cholesterol 216 (H) 50 - 200 mg/dL    Triglycerides 670 (H) <=150 mg/dL    HDL, Direct 31 (L) >=40 mg/dL    LDL Calculated     Microalbumin / creatinine urine ratio    Collection Time: 12/11/19  9:36 AM   Result Value Ref Range    Creatinine, Ur 221 0 mg/dL    Microalbum  ,U,Random 36 7 (H) 0 0 - 20 0 mg/L    Microalb Creat Ratio 17 0 - 30 mg/g creatinine   TSH, 3rd generation with Free T4 reflex    Collection Time: 12/11/19  9:36 AM   Result Value Ref Range    TSH 3RD GENERATON 2 090 0 358 - 3 740 uIU/mL   PSA, Total Screen    Collection Time: 12/11/19  9:36 AM   Result Value Ref Range    PSA 0 3 0 0 - 4 0 ng/mL   LDL cholesterol, direct    Collection Time: 12/11/19  9:36 AM   Result Value Ref Range    LDL Direct 85 0 - 100 mg/dl   ]    No results found

## 2020-01-06 ENCOUNTER — OFFICE VISIT (OUTPATIENT)
Dept: INTERNAL MEDICINE CLINIC | Facility: CLINIC | Age: 65
End: 2020-01-06
Payer: COMMERCIAL

## 2020-01-06 VITALS
BODY MASS INDEX: 29.18 KG/M2 | HEIGHT: 69 IN | WEIGHT: 197 LBS | DIASTOLIC BLOOD PRESSURE: 80 MMHG | RESPIRATION RATE: 15 BRPM | HEART RATE: 98 BPM | TEMPERATURE: 97 F | SYSTOLIC BLOOD PRESSURE: 142 MMHG

## 2020-01-06 DIAGNOSIS — L71.1 RHINOPHYMA: Primary | ICD-10-CM

## 2020-01-06 DIAGNOSIS — I10 ESSENTIAL HYPERTENSION: ICD-10-CM

## 2020-01-06 DIAGNOSIS — E11.9 DIABETES MELLITUS TYPE 2, NONINSULIN DEPENDENT (HCC): ICD-10-CM

## 2020-01-06 DIAGNOSIS — E11.65 UNCONTROLLED TYPE 2 DIABETES MELLITUS WITH HYPERGLYCEMIA (HCC): ICD-10-CM

## 2020-01-06 PROCEDURE — 99214 OFFICE O/P EST MOD 30 MIN: CPT | Performed by: INTERNAL MEDICINE

## 2020-01-07 NOTE — PROGRESS NOTES
Assessment/Plan:  1  Rhinophyma  Comments:  May be related to rosacea, refer to see ENT if he needs any surgical treatment  Orders:  -     Ambulatory Referral to Otolaryngology; Future    2  Diabetes mellitus type 2, noninsulin dependent ([facility] [address] )    3  Uncontrolled type 2 diabetes mellitus with hyperglycemia [facility])  Assessment & Plan:    Lab Results   Component Value Date    HGBA1C 9 2 (H) 12/11/2019   Has a started monitoring his blood sugars he ate, improved lifestyle  Advised him to get a glucometer and start monitoring  Follow-up in a couple weeks  4  Essential hypertension  Assessment & Plan:  Borderline high  Continue amlodipine for now          Subjective:   Chief Complaint   Patient presents with    Sore     Nose        Patient ID: Hieu Liu is a 59 y o  male  He comes in for an acute appointment  For the last 2 months he has been feeling sore on his nose  He gets swollen with thick skin and it hurts, he has been using warm water for several hours  Going for walks recently regularly  Did not get the glucometer yet  He notes that he was told that if he causes insurance, they will send him 1 for free  The following portions of the patient's history were reviewed and updated as appropriate: current medications, past medical history, past social history and past surgical history      PHQ-9 Depression Screening    PHQ-9:    Frequency of the following problems over the past two weeks:                Current Outpatient Medications:     ALPRAZolam (XANAX) 0 25 mg tablet, Take 1 tablet by mouth 2 (two) times a day, Disp: , Rfl:     amLODIPine (NORVASC) 5 mg tablet, TAKE 1 TABLET EVERY DAY, Disp: 90 tablet, Rfl: 0    aspirin 81 MG tablet, Take 1 tablet (81 mg total) by mouth daily, Disp: 90 tablet, Rfl: 1    atorvastatin (LIPITOR) 40 mg tablet, TAKE 1 TABLET EVERY DAY, Disp: 90 tablet, Rfl: 0    metFORMIN (GLUCOPHAGE) 1000 MG tablet, TAKE 1 TABLET TWICE DAILY WITH MEALS, Disp: 180 tablet, Rfl: 0    metroNIDAZOLE (METROGEL) 1 % gel, Apply topically daily, Disp: 60 g, Rfl: 2    mirtazapine (REMERON) 30 mg tablet, Take 30 mg by mouth daily at bedtime, Disp: , Rfl:     PARoxetine (PAXIL) 40 MG tablet, Take 40 mg by mouth every morning, Disp: , Rfl:     Review of Systems   Constitutional: Negative for fatigue, fever and unexpected weight change  HENT: Negative for ear pain, hearing loss and sore throat  Eyes: Negative for pain and discharge  Respiratory: Negative for cough, chest tightness and shortness of breath  Cardiovascular: Negative for chest pain and palpitations  Gastrointestinal: Negative for abdominal pain, blood in stool, constipation, diarrhea and nausea  Genitourinary: Negative for dysuria, frequency and hematuria  Musculoskeletal: Negative for arthralgias and joint swelling  Skin: Negative for rash  Allergic/Immunologic: Negative for immunocompromised state  Neurological: Negative for dizziness and headaches  Hematological: Negative for adenopathy  Psychiatric/Behavioral: Negative for confusion and sleep disturbance  The patient is nervous/anxious  Objective:  /80   Pulse 98   Temp (!) 97 °F (36 1 °C)   Resp 15   Ht 5' 9" (1 753 m)   Wt 89 4 kg (197 lb)   BMI 29 09 kg/m²      Physical Exam   Constitutional: He appears well-developed and well-nourished  HENT:   Head: Normocephalic and atraumatic  Right Ear: Tympanic membrane normal    Left Ear: Tympanic membrane normal    Nose: Nose normal        Mouth/Throat: Oropharynx is clear and moist  No posterior oropharyngeal edema or posterior oropharyngeal erythema  Eyes: Pupils are equal, round, and reactive to light  Conjunctivae are normal  Right eye exhibits no discharge  Left eye exhibits no discharge  Neck: Normal range of motion  Neck supple  No thyromegaly present  Cardiovascular: Normal rate, regular rhythm, S1 normal, S2 normal and normal heart sounds  PMI is not displaced     No murmur heard  Pulmonary/Chest: Effort normal and breath sounds normal  No accessory muscle usage  No apnea  No respiratory distress  He has no rhonchi  He has no rales  Abdominal: Soft  Normal appearance and bowel sounds are normal  He exhibits no shifting dullness  There is no hepatosplenomegaly  There is no tenderness  There is no rebound and no CVA tenderness  Musculoskeletal: Normal range of motion  He exhibits no edema or tenderness  Lymphadenopathy:     He has no cervical adenopathy  Neurological: He is alert  Skin: Skin is warm and intact  No rash noted  Psychiatric: He has a normal mood and affect  His speech is normal    Nursing note and vitals reviewed          Recent Results (from the past 1008 hour(s))   CBC and differential    Collection Time: 12/11/19  9:36 AM   Result Value Ref Range    WBC 8 69 4 31 - 10 16 Thousand/uL    RBC 4 53 3 88 - 5 62 Million/uL    Hemoglobin 14 2 12 0 - 17 0 g/dL    Hematocrit 43 6 36 5 - 49 3 %    MCV 96 82 - 98 fL    MCH 31 3 26 8 - 34 3 pg    MCHC 32 6 31 4 - 37 4 g/dL    RDW 11 8 11 6 - 15 1 %    MPV 9 6 8 9 - 12 7 fL    Platelets 206 954 - 946 Thousands/uL    nRBC 0 /100 WBCs    Neutrophils Relative 61 43 - 75 %    Immat GRANS % 0 0 - 2 %    Lymphocytes Relative 27 14 - 44 %    Monocytes Relative 8 4 - 12 %    Eosinophils Relative 3 0 - 6 %    Basophils Relative 1 0 - 1 %    Neutrophils Absolute 5 30 1 85 - 7 62 Thousands/µL    Immature Grans Absolute 0 03 0 00 - 0 20 Thousand/uL    Lymphocytes Absolute 2 34 0 60 - 4 47 Thousands/µL    Monocytes Absolute 0 69 0 17 - 1 22 Thousand/µL    Eosinophils Absolute 0 22 0 00 - 0 61 Thousand/µL    Basophils Absolute 0 11 (H) 0 00 - 0 10 Thousands/µL   Comprehensive metabolic panel    Collection Time: 12/11/19  9:36 AM   Result Value Ref Range    Sodium 136 136 - 145 mmol/L    Potassium 4 6 3 5 - 5 3 mmol/L    Chloride 102 100 - 108 mmol/L    CO2 30 21 - 32 mmol/L    ANION GAP 4 4 - 13 mmol/L    BUN 16 5 - 25 mg/dL Creatinine 0 93 0 60 - 1 30 mg/dL    Glucose, Fasting 249 (H) 65 - 99 mg/dL    Calcium 9 1 8 3 - 10 1 mg/dL    AST 26 5 - 45 U/L    ALT 43 12 - 78 U/L    Alkaline Phosphatase 114 46 - 116 U/L    Total Protein 7 4 6 4 - 8 2 g/dL    Albumin 4 4 3 5 - 5 0 g/dL    Total Bilirubin 0 45 0 20 - 1 00 mg/dL    eGFR 86 ml/min/1 73sq m   Hemoglobin A1C    Collection Time: 12/11/19  9:36 AM   Result Value Ref Range    Hemoglobin A1C 9 2 (H) 4 2 - 6 3 %     mg/dl   Lipid Panel with Direct LDL reflex    Collection Time: 12/11/19  9:36 AM   Result Value Ref Range    Cholesterol 216 (H) 50 - 200 mg/dL    Triglycerides 670 (H) <=150 mg/dL    HDL, Direct 31 (L) >=40 mg/dL    LDL Calculated     Microalbumin / creatinine urine ratio    Collection Time: 12/11/19  9:36 AM   Result Value Ref Range    Creatinine, Ur 221 0 mg/dL    Microalbum  ,U,Random 36 7 (H) 0 0 - 20 0 mg/L    Microalb Creat Ratio 17 0 - 30 mg/g creatinine   TSH, 3rd generation with Free T4 reflex    Collection Time: 12/11/19  9:36 AM   Result Value Ref Range    TSH 3RD GENERATON 2 090 0 358 - 3 740 uIU/mL   PSA, Total Screen    Collection Time: 12/11/19  9:36 AM   Result Value Ref Range    PSA 0 3 0 0 - 4 0 ng/mL   LDL cholesterol, direct    Collection Time: 12/11/19  9:36 AM   Result Value Ref Range    LDL Direct 85 0 - 100 mg/dl   ]    No results found

## 2020-01-07 NOTE — ASSESSMENT & PLAN NOTE
Lab Results   Component Value Date    HGBA1C 9 2 (H) 12/11/2019   Has a started monitoring his blood sugars he ate, improved lifestyle  Advised him to get a glucometer and start monitoring  Follow-up in a couple weeks

## 2020-01-28 ENCOUNTER — OFFICE VISIT (OUTPATIENT)
Dept: INTERNAL MEDICINE CLINIC | Facility: CLINIC | Age: 65
End: 2020-01-28
Payer: COMMERCIAL

## 2020-01-28 VITALS
WEIGHT: 192.8 LBS | HEART RATE: 93 BPM | HEIGHT: 69 IN | DIASTOLIC BLOOD PRESSURE: 91 MMHG | SYSTOLIC BLOOD PRESSURE: 154 MMHG | TEMPERATURE: 98.5 F | RESPIRATION RATE: 15 BRPM | BODY MASS INDEX: 28.56 KG/M2

## 2020-01-28 DIAGNOSIS — F10.920 ALCOHOLIC INTOXICATION WITHOUT COMPLICATION (HCC): ICD-10-CM

## 2020-01-28 DIAGNOSIS — F17.200 CURRENT EVERY DAY SMOKER: ICD-10-CM

## 2020-01-28 DIAGNOSIS — I70.219 ATHEROSCLEROTIC PVD WITH INTERMITTENT CLAUDICATION (HCC): ICD-10-CM

## 2020-01-28 DIAGNOSIS — F41.8 DEPRESSION WITH ANXIETY: ICD-10-CM

## 2020-01-28 DIAGNOSIS — E11.9 DIABETES MELLITUS TYPE 2, NONINSULIN DEPENDENT (HCC): ICD-10-CM

## 2020-01-28 DIAGNOSIS — I10 ESSENTIAL HYPERTENSION: Primary | ICD-10-CM

## 2020-01-28 DIAGNOSIS — Z11.4 SCREENING FOR HIV (HUMAN IMMUNODEFICIENCY VIRUS): ICD-10-CM

## 2020-01-28 PROCEDURE — 3008F BODY MASS INDEX DOCD: CPT | Performed by: INTERNAL MEDICINE

## 2020-01-28 PROCEDURE — 99214 OFFICE O/P EST MOD 30 MIN: CPT | Performed by: INTERNAL MEDICINE

## 2020-01-28 PROCEDURE — 3077F SYST BP >= 140 MM HG: CPT | Performed by: INTERNAL MEDICINE

## 2020-01-28 PROCEDURE — 3080F DIAST BP >= 90 MM HG: CPT | Performed by: INTERNAL MEDICINE

## 2020-01-28 RX ORDER — AMLODIPINE BESYLATE 10 MG/1
10 TABLET ORAL DAILY
Qty: 90 TABLET | Refills: 1 | Status: SHIPPED | OUTPATIENT
Start: 2020-01-28 | End: 2020-07-17

## 2020-01-28 NOTE — ASSESSMENT & PLAN NOTE
Lab Results   Component Value Date    HGBA1C 9 2 (H) 12/11/2019    Advised to start monitoring, did lose some weight and has improved lifestyle  Recheck A1c in a couple of months

## 2020-01-28 NOTE — PROGRESS NOTES
Assessment/Plan:  1  Essential hypertension  Assessment & Plan:  Not well control, increase amlodipine dose    Orders:  -     amLODIPine (NORVASC) 10 mg tablet; Take 1 tablet (10 mg total) by mouth daily  -     Comprehensive metabolic panel    2  Diabetes mellitus type 2, noninsulin dependent ([facility] [address] )  Assessment & Plan:    Lab Results   Component Value Date    HGBA1C 9 2 (H) 12/11/2019    Advised to start monitoring, did lose some weight and has improved lifestyle  Recheck A1c in a couple of months  Orders:  -     Comprehensive metabolic panel  -     Hemoglobin A1C    3  Depression with anxiety  Assessment & Plan:  Stable      4  Current every day smoker  Assessment & Plan:  Not ready to quit yet  Smoking about 15 cigarettes every day      5  Alcoholic intoxication without complication [facility])  Assessment & Plan:  Advised to refrain completely from alcohol intake      6  Atherosclerotic PVD with intermittent claudication (HCC)  -     Lipid Panel with Direct LDL reflex    7  Screening for HIV (human immunodeficiency virus)  -     [facility] HIV 1/2 AG-AB combo; Future        Subjective:   Chief Complaint   Patient presents with    Follow-up     1 month        Patient ID: Jose Ferreira is a 59 y o  male  Comes in for follow-up of diabetes, hypertension, hyperlipidemia, peripheral vascular disease    Trying to eat better, taking all medications  No chest pain or shortness of breath, no lower leg pain when he is walking  Saw the ENT surgeon in started the gel  The following portions of the patient's history were reviewed and updated as appropriate: current medications, past medical history, past social history and past surgical history      PHQ-9 Depression Screening    PHQ-9:    Frequency of the following problems over the past two weeks:                Current Outpatient Medications:     ALPRAZolam (XANAX) 0 25 mg tablet, Take 1 tablet by mouth 2 (two) times a day, Disp: , Rfl:     amLODIPine (NORVASC) 10 mg tablet, Take 1 tablet (10 mg total) by mouth daily, Disp: 90 tablet, Rfl: 1    aspirin 81 MG tablet, Take 1 tablet (81 mg total) by mouth daily, Disp: 90 tablet, Rfl: 1    atorvastatin (LIPITOR) 40 mg tablet, TAKE 1 TABLET EVERY DAY, Disp: 90 tablet, Rfl: 0    metFORMIN (GLUCOPHAGE) 1000 MG tablet, TAKE 1 TABLET TWICE DAILY WITH MEALS, Disp: 180 tablet, Rfl: 0    metroNIDAZOLE (METROGEL) 1 % gel, Apply topically 2 (two) times a day, Disp: 60 g, Rfl: 2    mirtazapine (REMERON) 30 mg tablet, Take 30 mg by mouth daily at bedtime, Disp: , Rfl:     PARoxetine (PAXIL) 40 MG tablet, Take 40 mg by mouth every morning, Disp: , Rfl:     Review of Systems   Constitutional: Negative for fatigue, fever and unexpected weight change  HENT: Negative for ear pain, hearing loss and sore throat  Eyes: Negative for pain and discharge  Respiratory: Negative for cough, chest tightness and shortness of breath  Cardiovascular: Negative for chest pain and palpitations  Gastrointestinal: Negative for abdominal pain, blood in stool, constipation, diarrhea and nausea  Genitourinary: Negative for dysuria, frequency and hematuria  Musculoskeletal: Negative for arthralgias and joint swelling  Skin: Negative for rash  Allergic/Immunologic: Negative for immunocompromised state  Neurological: Negative for dizziness and headaches  Hematological: Negative for adenopathy  Psychiatric/Behavioral: Negative for confusion and sleep disturbance  Objective:  /91 (BP Location: Left arm, Patient Position: Sitting, Cuff Size: Standard)   Pulse 93   Temp 98 5 °F (36 9 °C)   Resp 15   Ht 5' 9" (1 753 m)   Wt 87 5 kg (192 lb 12 8 oz)   BMI 28 47 kg/m²      Physical Exam   Constitutional: He appears well-developed and well-nourished  HENT:   Head: Normocephalic and atraumatic     Right Ear: Tympanic membrane normal    Left Ear: Tympanic membrane normal    Nose: Nose normal    Mouth/Throat: Oropharynx is clear and moist  No posterior oropharyngeal edema or posterior oropharyngeal erythema  Eyes: Pupils are equal, round, and reactive to light  Conjunctivae are normal  Right eye exhibits no discharge  Left eye exhibits no discharge  Neck: Normal range of motion  Neck supple  No thyromegaly present  Cardiovascular: Normal rate, regular rhythm, S1 normal, S2 normal and normal heart sounds  PMI is not displaced  Pulses are no weak pulses  No murmur heard  Pulses:       Dorsalis pedis pulses are 1+ on the right side, and 2+ on the left side  Posterior tibial pulses are 1+ on the right side, and 2+ on the left side  Pulmonary/Chest: Effort normal and breath sounds normal  No accessory muscle usage  No apnea  No respiratory distress  He has no rhonchi  He has no rales  Abdominal: Soft  Normal appearance and bowel sounds are normal  He exhibits no shifting dullness  There is no hepatosplenomegaly  There is no tenderness  There is no rebound and no CVA tenderness  Musculoskeletal: Normal range of motion  He exhibits no edema or tenderness  Feet:   Right Foot:   Skin Integrity: Negative for ulcer, skin breakdown, erythema, warmth, callus or dry skin  Left Foot:   Skin Integrity: Negative for ulcer, skin breakdown, erythema, warmth, callus or dry skin  Lymphadenopathy:     He has no cervical adenopathy  Neurological: He is alert  Skin: Skin is warm and intact  No rash noted  Psychiatric: He has a normal mood and affect  His speech is normal    Nursing note and vitals reviewed  Patient's shoes and socks removed  Right Foot/Ankle   Right Foot Inspection  Skin Exam: skin normal and skin intact no dry skin, no warmth, no callus, no erythema, no maceration, no abnormal color, no pre-ulcer, no ulcer and no callus                          Toe Exam: ROM and strength within normal limitsno swelling, erythema and  no right toe deformity  Sensory       Monofilament testing: intact  Vascular    The right DP pulse is 1+  The right PT pulse is 1+  Left Foot/Ankle  Left Foot Inspection  Skin Exam: skin normal and skin intactno dry skin, no warmth, no erythema, no maceration, normal color, no pre-ulcer, no ulcer and no callus                         Toe Exam: ROM and strength within normal limitsno swelling, no erythema and no left toe deformity                   Sensory       Monofilament: intact  Vascular    The left DP pulse is 2+  The left PT pulse is 2+  Assign Risk Category:  No deformity present; No loss of protective sensation; No weak pulses       Risk: 0      No results found for this or any previous visit (from the past 1008 hour(s))  ]    No results found

## 2020-02-05 RX ORDER — MIRTAZAPINE 30 MG/1
TABLET, FILM COATED ORAL
Qty: 90 TABLET | Refills: 0 | OUTPATIENT
Start: 2020-02-05

## 2020-02-18 RX ORDER — PAROXETINE HYDROCHLORIDE 40 MG/1
TABLET, FILM COATED ORAL
Qty: 90 TABLET | OUTPATIENT
Start: 2020-02-18

## 2020-02-19 DIAGNOSIS — E78.49 OTHER HYPERLIPIDEMIA: ICD-10-CM

## 2020-02-19 RX ORDER — ATORVASTATIN CALCIUM 40 MG/1
TABLET, FILM COATED ORAL
Qty: 90 TABLET | Refills: 0 | Status: SHIPPED | OUTPATIENT
Start: 2020-02-19 | End: 2020-10-08

## 2020-03-09 RX ORDER — MIRTAZAPINE 30 MG/1
TABLET, FILM COATED ORAL
Qty: 90 TABLET | OUTPATIENT
Start: 2020-03-09

## 2020-04-14 DIAGNOSIS — E11.51 TYPE 2 DIABETES MELLITUS WITH DIABETIC PERIPHERAL ANGIOPATHY WITHOUT GANGRENE, WITHOUT LONG-TERM CURRENT USE OF INSULIN (HCC): ICD-10-CM

## 2020-04-28 ENCOUNTER — TELEMEDICINE (OUTPATIENT)
Dept: INTERNAL MEDICINE CLINIC | Facility: CLINIC | Age: 65
End: 2020-04-28
Payer: COMMERCIAL

## 2020-04-28 DIAGNOSIS — F41.8 DEPRESSION WITH ANXIETY: ICD-10-CM

## 2020-04-28 DIAGNOSIS — I10 ESSENTIAL HYPERTENSION: ICD-10-CM

## 2020-04-28 DIAGNOSIS — E11.9 DIABETES MELLITUS TYPE 2, NONINSULIN DEPENDENT (HCC): Primary | ICD-10-CM

## 2020-04-28 DIAGNOSIS — E78.5 HYPERLIPIDEMIA, UNSPECIFIED HYPERLIPIDEMIA TYPE: ICD-10-CM

## 2020-04-28 DIAGNOSIS — F17.200 CURRENT EVERY DAY SMOKER: ICD-10-CM

## 2020-04-28 PROCEDURE — G2012 BRIEF CHECK IN BY MD/QHP: HCPCS | Performed by: INTERNAL MEDICINE

## 2020-06-02 PROBLEM — L71.9 ROSACEA: Status: ACTIVE | Noted: 2020-06-02

## 2020-06-02 PROBLEM — L71.1 RHINOPHYMA: Status: ACTIVE | Noted: 2020-06-02

## 2020-06-06 DIAGNOSIS — E11.51 TYPE 2 DIABETES MELLITUS WITH DIABETIC PERIPHERAL ANGIOPATHY WITHOUT GANGRENE, WITHOUT LONG-TERM CURRENT USE OF INSULIN (HCC): ICD-10-CM

## 2020-06-09 RX ORDER — MIRTAZAPINE 30 MG/1
TABLET, FILM COATED ORAL
Qty: 90 TABLET | OUTPATIENT
Start: 2020-06-09

## 2020-07-16 DIAGNOSIS — I10 ESSENTIAL HYPERTENSION: ICD-10-CM

## 2020-07-17 RX ORDER — AMLODIPINE BESYLATE 10 MG/1
TABLET ORAL
Qty: 90 TABLET | Refills: 1 | Status: SHIPPED | OUTPATIENT
Start: 2020-07-17 | End: 2020-11-25

## 2020-07-26 ENCOUNTER — TELEPHONE (OUTPATIENT)
Dept: OTHER | Facility: OTHER | Age: 65
End: 2020-07-26

## 2020-10-08 DIAGNOSIS — E78.49 OTHER HYPERLIPIDEMIA: ICD-10-CM

## 2020-10-08 RX ORDER — ATORVASTATIN CALCIUM 40 MG/1
TABLET, FILM COATED ORAL
Qty: 30 TABLET | Refills: 0 | Status: SHIPPED | OUTPATIENT
Start: 2020-10-08 | End: 2020-11-02

## 2020-10-11 ENCOUNTER — TELEPHONE (OUTPATIENT)
Dept: OTHER | Facility: OTHER | Age: 65
End: 2020-10-11

## 2020-10-16 DIAGNOSIS — E11.51 TYPE 2 DIABETES MELLITUS WITH DIABETIC PERIPHERAL ANGIOPATHY WITHOUT GANGRENE, WITHOUT LONG-TERM CURRENT USE OF INSULIN (HCC): ICD-10-CM

## 2020-11-01 DIAGNOSIS — E78.49 OTHER HYPERLIPIDEMIA: ICD-10-CM

## 2020-11-02 RX ORDER — ATORVASTATIN CALCIUM 40 MG/1
TABLET, FILM COATED ORAL
Qty: 30 TABLET | Refills: 0 | Status: SHIPPED | OUTPATIENT
Start: 2020-11-02 | End: 2020-11-06 | Stop reason: SDUPTHER

## 2020-11-05 RX ORDER — MIRTAZAPINE 30 MG/1
TABLET, FILM COATED ORAL
Qty: 90 TABLET | OUTPATIENT
Start: 2020-11-05

## 2020-11-06 ENCOUNTER — OFFICE VISIT (OUTPATIENT)
Dept: INTERNAL MEDICINE CLINIC | Facility: CLINIC | Age: 65
End: 2020-11-06
Payer: COMMERCIAL

## 2020-11-06 VITALS
DIASTOLIC BLOOD PRESSURE: 70 MMHG | WEIGHT: 166.6 LBS | RESPIRATION RATE: 12 BRPM | BODY MASS INDEX: 24.68 KG/M2 | HEIGHT: 69 IN | TEMPERATURE: 97.8 F | SYSTOLIC BLOOD PRESSURE: 140 MMHG | HEART RATE: 91 BPM

## 2020-11-06 DIAGNOSIS — F17.200 CURRENT EVERY DAY SMOKER: ICD-10-CM

## 2020-11-06 DIAGNOSIS — E11.9 DIABETES MELLITUS TYPE 2, NONINSULIN DEPENDENT (HCC): Primary | ICD-10-CM

## 2020-11-06 DIAGNOSIS — E78.5 HYPERLIPIDEMIA, UNSPECIFIED HYPERLIPIDEMIA TYPE: ICD-10-CM

## 2020-11-06 DIAGNOSIS — E78.49 OTHER HYPERLIPIDEMIA: ICD-10-CM

## 2020-11-06 DIAGNOSIS — Z72.89 ALCOHOL USE: ICD-10-CM

## 2020-11-06 DIAGNOSIS — I10 ESSENTIAL HYPERTENSION: ICD-10-CM

## 2020-11-06 DIAGNOSIS — Z11.4 SCREENING FOR HIV (HUMAN IMMUNODEFICIENCY VIRUS): ICD-10-CM

## 2020-11-06 PROBLEM — F10.920 ALCOHOLIC INTOXICATION WITHOUT COMPLICATION (HCC): Status: RESOLVED | Noted: 2020-01-28 | Resolved: 2020-11-06

## 2020-11-06 PROBLEM — Z78.9 ALCOHOL USE: Status: ACTIVE | Noted: 2020-11-06

## 2020-11-06 PROBLEM — F10.90 ALCOHOL USE: Status: ACTIVE | Noted: 2020-11-06

## 2020-11-06 LAB — SL AMB POCT HEMOGLOBIN AIC: 5.5 (ref ?–6.5)

## 2020-11-06 PROCEDURE — 99214 OFFICE O/P EST MOD 30 MIN: CPT | Performed by: INTERNAL MEDICINE

## 2020-11-06 PROCEDURE — 3078F DIAST BP <80 MM HG: CPT | Performed by: INTERNAL MEDICINE

## 2020-11-06 PROCEDURE — 83036 HEMOGLOBIN GLYCOSYLATED A1C: CPT | Performed by: INTERNAL MEDICINE

## 2020-11-06 PROCEDURE — 3077F SYST BP >= 140 MM HG: CPT | Performed by: INTERNAL MEDICINE

## 2020-11-06 PROCEDURE — 3008F BODY MASS INDEX DOCD: CPT | Performed by: INTERNAL MEDICINE

## 2020-11-06 PROCEDURE — 3044F HG A1C LEVEL LT 7.0%: CPT | Performed by: INTERNAL MEDICINE

## 2020-11-06 RX ORDER — ATORVASTATIN CALCIUM 40 MG/1
40 TABLET, FILM COATED ORAL DAILY
Qty: 90 TABLET | Refills: 1 | Status: SHIPPED | OUTPATIENT
Start: 2020-11-06 | End: 2020-11-09 | Stop reason: SDUPTHER

## 2020-11-09 DIAGNOSIS — E78.49 OTHER HYPERLIPIDEMIA: ICD-10-CM

## 2020-11-09 RX ORDER — ATORVASTATIN CALCIUM 40 MG/1
40 TABLET, FILM COATED ORAL DAILY
Qty: 90 TABLET | Refills: 1 | Status: SHIPPED | OUTPATIENT
Start: 2020-11-09 | End: 2021-04-15 | Stop reason: SDUPTHER

## 2020-11-10 ENCOUNTER — VBI (OUTPATIENT)
Dept: ADMINISTRATIVE | Facility: OTHER | Age: 65
End: 2020-11-10

## 2020-11-11 LAB
LEFT EYE DIABETIC RETINOPATHY: NORMAL
RIGHT EYE DIABETIC RETINOPATHY: NORMAL

## 2020-11-20 DIAGNOSIS — I10 ESSENTIAL HYPERTENSION: ICD-10-CM

## 2020-11-25 RX ORDER — AMLODIPINE BESYLATE 10 MG/1
TABLET ORAL
Qty: 90 TABLET | Refills: 1 | Status: SHIPPED | OUTPATIENT
Start: 2020-11-25 | End: 2021-04-26 | Stop reason: SDUPTHER

## 2020-12-14 DIAGNOSIS — E11.51 TYPE 2 DIABETES MELLITUS WITH DIABETIC PERIPHERAL ANGIOPATHY WITHOUT GANGRENE, WITHOUT LONG-TERM CURRENT USE OF INSULIN (HCC): ICD-10-CM

## 2021-04-13 ENCOUNTER — APPOINTMENT (EMERGENCY)
Dept: RADIOLOGY | Facility: HOSPITAL | Age: 66
End: 2021-04-13
Payer: COMMERCIAL

## 2021-04-13 ENCOUNTER — HOSPITAL ENCOUNTER (EMERGENCY)
Facility: HOSPITAL | Age: 66
Discharge: HOME | End: 2021-04-13
Attending: EMERGENCY MEDICINE | Admitting: EMERGENCY MEDICINE
Payer: COMMERCIAL

## 2021-04-13 VITALS
TEMPERATURE: 98.2 F | SYSTOLIC BLOOD PRESSURE: 143 MMHG | OXYGEN SATURATION: 100 % | RESPIRATION RATE: 18 BRPM | HEIGHT: 69 IN | WEIGHT: 170 LBS | HEART RATE: 90 BPM | DIASTOLIC BLOOD PRESSURE: 81 MMHG | BODY MASS INDEX: 25.18 KG/M2
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
## 2021-04-13 DIAGNOSIS — F10.929 ALCOHOL INTOXICATION (HCC): ICD-10-CM

## 2021-04-13 DIAGNOSIS — S09.90XA INJURY OF HEAD, INITIAL ENCOUNTER: ICD-10-CM

## 2021-04-13 LAB — GLUCOSE SERPL-MCNC: 76 MG/DL (ref 65–140)

## 2021-04-13 PROCEDURE — G1004 CDSM NDSC: HCPCS

## 2021-04-13 PROCEDURE — 99284 EMERGENCY DEPT VISIT MOD MDM: CPT

## 2021-04-13 PROCEDURE — 72125 CT NECK SPINE W/O DYE: CPT

## 2021-04-13 PROCEDURE — 93005 ELECTROCARDIOGRAM TRACING: CPT

## 2021-04-13 PROCEDURE — 82948 REAGENT STRIP/BLOOD GLUCOSE: CPT

## 2021-04-13 PROCEDURE — 99284 EMERGENCY DEPT VISIT MOD MDM: CPT | Performed by: EMERGENCY MEDICINE

## 2021-04-13 PROCEDURE — 70450 CT HEAD/BRAIN W/O DYE: CPT

## 2021-04-13 NOTE — ED PROVIDER NOTES
History  Chief Complaint   Patient presents with    Fall     Patient reports that he drank a bunch of beers today and fell while outside; positive head strike; takes ASA     71-year-old male presenting as level C trauma due to intoxicated fall on aspirin  Denies any other blood thinning medication  Patient states he drank multiple beverages and was intoxicated today and slipped when he was outside in hit the back side of his head  Denies any loss of consciousness, headache, neck pain, chest pain nausea or vomiting, weakness numbness or tingling extremities  Prior to Admission Medications   Prescriptions Last Dose Informant Patient Reported? Taking? ALPRAZolam (XANAX) 0 25 mg tablet  Self Yes Yes   Sig: Take 1 tablet by mouth 2 (two) times a day   PARoxetine (PAXIL) 40 MG tablet  Self Yes Yes   Sig: Take 40 mg by mouth every morning   amLODIPine (NORVASC) 10 mg tablet   No Yes   Sig: TAKE 1 TABLET EVERY DAY   aspirin 81 MG tablet   No Yes   Sig: Take 1 tablet (81 mg total) by mouth daily   atorvastatin (LIPITOR) 40 mg tablet   No Yes   Sig: Take 1 tablet (40 mg total) by mouth daily   metFORMIN (GLUCOPHAGE) 1000 MG tablet   No Yes   Sig: TAKE 1 TABLET TWICE DAILY WITH A MEAL   metroNIDAZOLE (METROGEL) 1 % gel   No Yes   Sig: Apply topically 2 (two) times a day   mirtazapine (REMERON) 30 mg tablet  Self Yes Yes   Sig: Take 30 mg by mouth daily at bedtime      Facility-Administered Medications: None       Past Medical History:   Diagnosis Date    Colon polyp     Diabetes mellitus (HCC)     Hyperlipidemia     Hypertension        Past Surgical History:   Procedure Laterality Date    COLON SURGERY      found growth, bengin    COLONOSCOPY      COLONOSCOPY N/A 3/13/2018    Procedure: COLONOSCOPY;  Surgeon: Richard Lewis DO;  Location: [facility] GI LAB;   Service: Gastroenterology       Family History   Problem Relation Age of Onset    Heart failure Mother     Hypertension Other      I have reviewed and agree with the history as documented  E-Cigarette/Vaping    E-Cigarette Use Never User      E-Cigarette/Vaping Substances     Social History     Tobacco Use    Smoking status: Current Every Day Smoker    Smokeless tobacco: Former User    Tobacco comment: uses vape/current every day smoker (as per [employer])   Substance Use Topics    Alcohol use: Yes     Comment: mostly on weekends    Drug use: No        Review of Systems   Constitutional: Negative for chills and fever  HENT: Negative for ear pain and sore throat  Eyes: Negative for visual disturbance  Respiratory: Negative for cough and shortness of breath  Cardiovascular: Negative for chest pain and palpitations  Gastrointestinal: Negative for abdominal pain and vomiting  Genitourinary: Negative for dysuria and flank pain  Musculoskeletal: Negative for arthralgias and back pain  Skin: Positive for wound  Negative for color change and rash  Neurological: Negative for syncope  All other systems reviewed and are negative  Physical Exam  ED Triage Vitals   Temperature Pulse Respirations Blood Pressure SpO2   04/13/21 1836 04/13/21 1830 04/13/21 1830 04/13/21 1830 04/13/21 1830   98 2 °F (36 8 °C) 96 18 152/71 100 %      Temp Source Heart Rate Source Patient Position - Orthostatic VS BP Location FiO2 (%)   04/13/21 1836 04/13/21 1830 04/13/21 1830 -- --   Oral Monitor Lying        Pain Score       04/13/21 1836       No Pain             Orthostatic Vital Signs  Vitals:    04/13/21 1830 04/13/21 1845 04/13/21 1900 04/13/21 1915   BP: 152/71 150/70 146/66 143/81   Pulse: 96 86 96 90   Patient Position - Orthostatic VS: Lying Lying  Lying       Physical Exam  Vitals signs and nursing note reviewed  Constitutional:       Appearance: He is well-developed  HENT:      Head: Normocephalic  Comments: Abrasion to posterior occiput  Eyes:      Conjunctiva/sclera: Conjunctivae normal    Neck:      Musculoskeletal: Neck supple  Cardiovascular:      Rate and Rhythm: Normal rate and regular rhythm  Heart sounds: No murmur  Pulmonary:      Effort: Pulmonary effort is normal  No respiratory distress  Breath sounds: Normal breath sounds  Abdominal:      Palpations: Abdomen is soft  Tenderness: There is no abdominal tenderness  Musculoskeletal: Normal range of motion  Skin:     General: Skin is warm and dry  Neurological:      Mental Status: He is alert and oriented to person, place, and time  Psychiatric:         Mood and Affect: Mood normal          Thought Content: Thought content normal          ED Medications  Medications - No data to display    Diagnostic Studies  Results Reviewed     Procedure Component Value Units Date/Time    Fingerstick Glucose (POCT) [[id number]]  (Normal) Collected: 04/13/21 1849    Lab Status: Final result Updated: 04/13/21 1850     POC Glucose 76 mg/dl                  CT head without contrast   Final Result by Yeni De La Cruz MD (04/13 1901)      No acute intracranial abnormality  The study was marked in [facility] for immediate notification  Workstation performed: [id number]         CT spine cervical without contrast   Final Result by Yeni De La Cruz MD (04/13 1904)      No cervical spine fracture or traumatic malalignment  The study was marked in [facility] for immediate notification  Workstation performed: [id number]               Procedures  Procedures      ED Course               Identification of Seniors at Risk      Most Recent Value   (ISAR) Identification of Seniors at Risk   Before the illness or injury that brought you to the Emergency, did you need someone to help you on a regular basis? 0 Filed at: 04/13/2021 1837   In the last 24 hours, have you needed more help than usual?  0 Filed at: 04/13/2021 1837   Have you been hospitalized for one or more nights during the past 6 months?   0 Filed at: 04/13/2021 1837   In general, do you see well?  0 Filed at: 04/13/2021 1837   In general, do you have serious problems with your memory? 0 Filed at: 04/13/2021 1837   Do you take more than three different medications every day? 1 Filed at: 04/13/2021 1837   ISAR Score  1 Filed at: 04/13/2021 1837                    SBIRT 22yo+      Most Recent Value   SBIRT (23 yo +)   In order to provide better care to our patients, we are screening all of our patients for alcohol and drug use  Would it be okay to ask you these screening questions? No Filed at: 04/13/2021 1846                MDM  Number of Diagnoses or Management Options  Alcohol intoxication ([facility] [address] ):   Fall, initial encounter:   Injury of head, initial encounter:   Diagnosis management comments: Head and neck CT ordered due to unwitnessed fall while intoxicated on aspirin  Will scans negative  Patient re-evaluated with no focal deficits and transportation will be provided for patient to return home  Return precautions advised      Disposition  Final diagnoses:   Fall, initial encounter   Injury of head, initial encounter   Alcohol intoxication ([facility] [address] )     Time reflects when diagnosis was documented in both MDM as applicable and the Disposition within this note     Time User Action Codes Description Comment    4/13/2021  7:19 PM Berta Course Add Clímaco Gambles  KMUJ] Fall, initial encounter     4/13/2021  7:19 PM Berta Course Add [S09 90XA] Injury of head, initial encounter     4/13/2021  7:20 PM Paster, [address] [facility] [F10 929] Alcohol intoxication [facility])       ED Disposition     ED Disposition Condition Date/Time Comment    Discharge Stable Tue Apr 13, 2021  7:20 PM Andra Santos discharge to home/self care              Follow-up Information     Follow up With Specialties Details Why Contact Info    Judy Baron MD Internal Medicine   [address]  [phone]            Discharge Medication List as of 4/13/2021  7:20 PM      CONTINUE these medications which have NOT CHANGED    Details ALPRAZolam (XANAX) 0 25 mg tablet Take 1 tablet by mouth 2 (two) times a day, Starting Fri 3/23/2018, Historical Med      amLODIPine (NORVASC) 10 mg tablet TAKE 1 TABLET EVERY DAY, Normal      aspirin 81 MG tablet Take 1 tablet (81 mg total) by mouth daily, Starting Tue 9/17/2019, Normal      atorvastatin (LIPITOR) 40 mg tablet Take 1 tablet (40 mg total) by mouth daily, Starting Mon 11/9/2020, Normal      metFORMIN (GLUCOPHAGE) 1000 MG tablet TAKE 1 TABLET TWICE DAILY WITH A MEAL, Normal      metroNIDAZOLE (METROGEL) 1 % gel Apply topically 2 (two) times a day, Starting Tue 3/16/2021, Until Thu 4/15/2021, Normal      mirtazapine (REMERON) 30 mg tablet Take 30 mg by mouth daily at bedtime, Historical Med      PARoxetine (PAXIL) 40 MG tablet Take 40 mg by mouth every morning, Historical Med           No discharge procedures on file  PDMP Review       Value Time User    PDMP Reviewed  Yes 4/28/2020 10:08 AM Alex Prince MD           ED Provider  Attending physically available and evaluated Rober Monk I managed the patient along with the ED Attending      Electronically Signed by         Aron Venegas DO  04/14/21 7107

## 2021-04-13 NOTE — ED ATTENDING ATTESTATION
4/13/2021  IAnne-Marie MD, saw and evaluated the patient  I have discussed the patient with the resident/non-physician practitioner and agree with the resident's/non-physician practitioner's findings, Plan of Care, and MDM as documented in the resident's/non-physician practitioner's note, except where noted  All available labs and Radiology studies were reviewed  I was present for key portions of any procedure(s) performed by the resident/non-physician practitioner and I was immediately available to provide assistance  At this point I agree with the current assessment done in the Emergency Department  I have conducted an independent evaluation of this patient a history and physical is as follows:    ED Course     42-year-old male, takes aspirin, presenting to the emergency department for evaluation after fall on aspirin  Patient states that he was cutting his toenails on his porch, admits to drinking too much alcohol today, lost his balance, fell striking his head as well as his knees  Patient denies loss of consciousness  Patient denies pain anywhere  Patient denies neck pain, back pain, chest pain, abdominal pain  Ten systems reviewed negative except as noted in the history of present illness  On examination the patient has an abrasion to the top of his head  Pupils are 3 millimeters bilaterally reactive to light  Extraocular movements are intact  Patient was placed in a cervical collar  Neck was nontender  Chest is clear to auscultation bilaterally with no wheezes rales or rhonchi  Chest wall is nontender to palpation  Heart is regular rate rhythm with no murmurs rubs or gallops  Abdomen is nondistended, soft and nontender  Patient has abrasions on his right knee  Full painless range of motion  Nontender to palpation  Left knee and hip were ranged without pain with range of motion  Evaluation for trauma      CT head without contrast   Final Result      No acute intracranial abnormality  The study was marked in [facility] for immediate notification  Workstation performed: [id number]         CT spine cervical without contrast   Final Result      No cervical spine fracture or traumatic malalignment  The study was marked in [facility] for immediate notification               Workstation performed: [id number]             Labs Reviewed   POCT GLUCOSE - Normal       Result Value Ref Range Status    POC Glucose 76  65 - 140 mg/dl Final           Critical Care Time  Procedures

## 2021-04-14 LAB
ATRIAL RATE: 91 BPM
P AXIS: 70 DEGREES
PR INTERVAL: 164 MS
QRS AXIS: 79 DEGREES
QRSD INTERVAL: 116 MS
QT INTERVAL: 364 MS
QTC INTERVAL: 447 MS
T WAVE AXIS: 10 DEGREES
VENTRICULAR RATE: 91 BPM

## 2021-04-14 PROCEDURE — 93010 ELECTROCARDIOGRAM REPORT: CPT | Performed by: INTERNAL MEDICINE

## 2021-04-15 ENCOUNTER — TRANSITIONAL CARE MANAGEMENT (OUTPATIENT)
Dept: INTERNAL MEDICINE CLINIC | Facility: CLINIC | Age: 66
End: 2021-04-15

## 2021-04-15 DIAGNOSIS — E78.49 OTHER HYPERLIPIDEMIA: ICD-10-CM

## 2021-04-15 RX ORDER — ATORVASTATIN CALCIUM 40 MG/1
40 TABLET, FILM COATED ORAL DAILY
Qty: 30 TABLET | Refills: 0 | Status: SHIPPED | OUTPATIENT
Start: 2021-04-15 | End: 2021-04-26 | Stop reason: SDUPTHER

## 2021-04-19 ENCOUNTER — RA CDI HCC (OUTPATIENT)
Dept: OTHER | Facility: HOSPITAL | Age: 66
End: 2021-04-19

## 2021-04-19 NOTE — PROGRESS NOTES
Based on clinical documentation indicated in your record, it appears that the patient may have the following conditions:    I70 [id number] Atherosclerotic PVD with intermittent claudication      If this is correct, please document and assess at your next visit April 26th  [facility] [address]  coding opportunities             Chart reviewed, (number of) suggestions sent to provider: 1           Patients insurance company: [employer] (Medicare Advantage and Commercial)           Used   [employer]  coding opportunities             Chart reviewed, (number of) suggestions sent to provider: 1           Patients insurance company: [employer] (Medicare Advantage and [employer])        Provider never responded to [employer]  coding request

## 2021-04-22 ENCOUNTER — APPOINTMENT (OUTPATIENT)
Dept: LAB | Facility: CLINIC | Age: 66
End: 2021-04-22
Payer: COMMERCIAL

## 2021-04-22 DIAGNOSIS — Z11.4 SCREENING FOR HIV (HUMAN IMMUNODEFICIENCY VIRUS): ICD-10-CM

## 2021-04-22 LAB
ALBUMIN SERPL BCP-MCNC: 4 G/DL (ref 3.5–5)
ALP SERPL-CCNC: 69 U/L (ref 46–116)
ALT SERPL W P-5'-P-CCNC: 27 U/L (ref 12–78)
ANION GAP SERPL CALCULATED.3IONS-SCNC: 6 MMOL/L (ref 4–13)
AST SERPL W P-5'-P-CCNC: 25 U/L (ref 5–45)
BASOPHILS # BLD AUTO: 0.08 THOUSANDS/ΜL (ref 0–0.1)
BASOPHILS NFR BLD AUTO: 1 % (ref 0–1)
BILIRUB SERPL-MCNC: 0.33 MG/DL (ref 0.2–1)
BUN SERPL-MCNC: 13 MG/DL (ref 5–25)
CALCIUM SERPL-MCNC: 9.5 MG/DL (ref 8.3–10.1)
CHLORIDE SERPL-SCNC: 107 MMOL/L (ref 100–108)
CHOLEST SERPL-MCNC: 163 MG/DL (ref 50–200)
CO2 SERPL-SCNC: 26 MMOL/L (ref 21–32)
CREAT SERPL-MCNC: 0.7 MG/DL (ref 0.6–1.3)
CREAT UR-MCNC: <13 MG/DL
EOSINOPHIL # BLD AUTO: 0.11 THOUSAND/ΜL (ref 0–0.61)
EOSINOPHIL NFR BLD AUTO: 2 % (ref 0–6)
ERYTHROCYTE [DISTWIDTH] IN BLOOD BY AUTOMATED COUNT: 12.6 % (ref 11.6–15.1)
EST. AVERAGE GLUCOSE BLD GHB EST-MCNC: 114 MG/DL
GFR SERPL CREATININE-BSD FRML MDRD: 99 ML/MIN/1.73SQ M
GLUCOSE P FAST SERPL-MCNC: 106 MG/DL (ref 65–99)
HBA1C MFR BLD: 5.6 %
HCT VFR BLD AUTO: 44.6 % (ref 36.5–49.3)
HDLC SERPL-MCNC: 47 MG/DL
HGB BLD-MCNC: 14.5 G/DL (ref 12–17)
IMM GRANULOCYTES # BLD AUTO: 0.03 THOUSAND/UL (ref 0–0.2)
IMM GRANULOCYTES NFR BLD AUTO: 0 % (ref 0–2)
LDLC SERPL CALC-MCNC: 92 MG/DL (ref 0–100)
LYMPHOCYTES # BLD AUTO: 2.12 THOUSANDS/ΜL (ref 0.6–4.47)
LYMPHOCYTES NFR BLD AUTO: 30 % (ref 14–44)
MCH RBC QN AUTO: 33.4 PG (ref 26.8–34.3)
MCHC RBC AUTO-ENTMCNC: 32.5 G/DL (ref 31.4–37.4)
MCV RBC AUTO: 103 FL (ref 82–98)
MICROALBUMIN UR-MCNC: <5 MG/L (ref 0–20)
MONOCYTES # BLD AUTO: 0.65 THOUSAND/ΜL (ref 0.17–1.22)
MONOCYTES NFR BLD AUTO: 9 % (ref 4–12)
NEUTROPHILS # BLD AUTO: 4.14 THOUSANDS/ΜL (ref 1.85–7.62)
NEUTS SEG NFR BLD AUTO: 58 % (ref 43–75)
NRBC BLD AUTO-RTO: 0 /100 WBCS
PLATELET # BLD AUTO: 339 THOUSANDS/UL (ref 149–390)
PMV BLD AUTO: 9.1 FL (ref 8.9–12.7)
POTASSIUM SERPL-SCNC: 4.2 MMOL/L (ref 3.5–5.3)
PROT SERPL-MCNC: 7.5 G/DL (ref 6.4–8.2)
RBC # BLD AUTO: 4.34 MILLION/UL (ref 3.88–5.62)
SODIUM SERPL-SCNC: 139 MMOL/L (ref 136–145)
TRIGL SERPL-MCNC: 118 MG/DL
WBC # BLD AUTO: 7.13 THOUSAND/UL (ref 4.31–10.16)

## 2021-04-22 PROCEDURE — 83036 HEMOGLOBIN GLYCOSYLATED A1C: CPT | Performed by: INTERNAL MEDICINE

## 2021-04-22 PROCEDURE — 82043 UR ALBUMIN QUANTITATIVE: CPT | Performed by: INTERNAL MEDICINE

## 2021-04-22 PROCEDURE — 80053 COMPREHEN METABOLIC PANEL: CPT | Performed by: INTERNAL MEDICINE

## 2021-04-22 PROCEDURE — 80061 LIPID PANEL: CPT | Performed by: INTERNAL MEDICINE

## 2021-04-22 PROCEDURE — 87389 HIV-1 AG W/HIV-1&-2 AB AG IA: CPT

## 2021-04-22 PROCEDURE — 36415 COLL VENOUS BLD VENIPUNCTURE: CPT | Performed by: INTERNAL MEDICINE

## 2021-04-22 PROCEDURE — 85025 COMPLETE CBC W/AUTO DIFF WBC: CPT | Performed by: INTERNAL MEDICINE

## 2021-04-22 PROCEDURE — 82570 ASSAY OF URINE CREATININE: CPT | Performed by: INTERNAL MEDICINE

## 2021-04-22 PROCEDURE — 3044F HG A1C LEVEL LT 7.0%: CPT | Performed by: INTERNAL MEDICINE

## 2021-04-23 LAB — HIV 1+2 AB+HIV1 P24 AG SERPL QL IA: NORMAL

## 2021-04-26 ENCOUNTER — OFFICE VISIT (OUTPATIENT)
Dept: INTERNAL MEDICINE CLINIC | Facility: CLINIC | Age: 66
End: 2021-04-26
Payer: COMMERCIAL

## 2021-04-26 VITALS
DIASTOLIC BLOOD PRESSURE: 76 MMHG | SYSTOLIC BLOOD PRESSURE: 150 MMHG | RESPIRATION RATE: 18 BRPM | TEMPERATURE: 97.3 F | HEIGHT: 69 IN | OXYGEN SATURATION: 99 % | BODY MASS INDEX: 24.73 KG/M2 | HEART RATE: 96 BPM | WEIGHT: 167 LBS

## 2021-04-26 DIAGNOSIS — F17.200 CURRENT EVERY DAY SMOKER: ICD-10-CM

## 2021-04-26 DIAGNOSIS — E78.49 OTHER HYPERLIPIDEMIA: ICD-10-CM

## 2021-04-26 DIAGNOSIS — I70.219 ATHEROSCLEROTIC PVD WITH INTERMITTENT CLAUDICATION (HCC): ICD-10-CM

## 2021-04-26 DIAGNOSIS — E78.5 HYPERLIPIDEMIA, UNSPECIFIED HYPERLIPIDEMIA TYPE: ICD-10-CM

## 2021-04-26 DIAGNOSIS — I10 ESSENTIAL HYPERTENSION: ICD-10-CM

## 2021-04-26 DIAGNOSIS — E11.51 TYPE 2 DIABETES MELLITUS WITH DIABETIC PERIPHERAL ANGIOPATHY WITHOUT GANGRENE, WITHOUT LONG-TERM CURRENT USE OF INSULIN (HCC): Primary | ICD-10-CM

## 2021-04-26 PROCEDURE — 3078F DIAST BP <80 MM HG: CPT | Performed by: INTERNAL MEDICINE

## 2021-04-26 PROCEDURE — 1160F RVW MEDS BY RX/DR IN RCRD: CPT | Performed by: INTERNAL MEDICINE

## 2021-04-26 PROCEDURE — 1101F PT FALLS ASSESS-DOCD LE1/YR: CPT | Performed by: INTERNAL MEDICINE

## 2021-04-26 PROCEDURE — 99214 OFFICE O/P EST MOD 30 MIN: CPT | Performed by: INTERNAL MEDICINE

## 2021-04-26 PROCEDURE — 3288F FALL RISK ASSESSMENT DOCD: CPT | Performed by: INTERNAL MEDICINE

## 2021-04-26 PROCEDURE — 3077F SYST BP >= 140 MM HG: CPT | Performed by: INTERNAL MEDICINE

## 2021-04-26 PROCEDURE — 3008F BODY MASS INDEX DOCD: CPT | Performed by: INTERNAL MEDICINE

## 2021-04-26 RX ORDER — AMLODIPINE BESYLATE 10 MG/1
10 TABLET ORAL DAILY
Qty: 90 TABLET | Refills: 1 | Status: SHIPPED | OUTPATIENT
Start: 2021-04-26 | End: 2021-10-01

## 2021-04-26 RX ORDER — ATORVASTATIN CALCIUM 40 MG/1
40 TABLET, FILM COATED ORAL DAILY
Qty: 90 TABLET | Refills: 1 | Status: SHIPPED | OUTPATIENT
Start: 2021-04-26 | End: 2021-10-01

## 2021-04-26 RX ORDER — HYDROCHLOROTHIAZIDE 12.5 MG/1
12.5 TABLET ORAL DAILY
Qty: 90 TABLET | Refills: 1 | Status: SHIPPED | OUTPATIENT
Start: 2021-04-26 | End: 2021-10-01

## 2021-04-26 NOTE — PROGRESS NOTES
Assessment/Plan:  1  Type 2 diabetes mellitus with diabetic peripheral angiopathy without gangrene, without long-term current use of insulin (HCC)  -     hydrochlorothiazide (HYDRODIURIL) 12 5 mg tablet; Take 1 tablet (12 5 mg total) by mouth daily  -     metFORMIN (GLUCOPHAGE) 1000 MG tablet; Take 1 tablet (1,000 mg total) by mouth 2 (two) times a day with meals    2  Essential hypertension  -     hydrochlorothiazide (HYDRODIURIL) 12 5 mg tablet; Take 1 tablet (12 5 mg total) by mouth daily  -     amLODIPine (NORVASC) 10 mg tablet; Take 1 tablet (10 mg total) by mouth daily    3  Current every day smoker  -     hydrochlorothiazide (HYDRODIURIL) 12 5 mg tablet; Take 1 tablet (12 5 mg total) by mouth daily    4  Hyperlipidemia, unspecified hyperlipidemia type    5  Other hyperlipidemia  -     atorvastatin (LIPITOR) 40 mg tablet; Take 1 tablet (40 mg total) by mouth daily    6  Atherosclerotic PVD with intermittent claudication (HCC)  -     VAS carotid complete study; Future; Expected date: 04/26/2021      Hospital visit for fall during alcohol intoxication  He has been attending [place] [id number] meetings and has been abstinent since that visit  Plans to discuss with his psychiatrist today on the appointment as well  Blood pressure much completely well control, will add hydrochlorothiazide  Angioedema to ACE inhibitor in the past   Continue current dose of amlodipine     LDL is at goal   A1c is well controlled as well  Discussed the importance of smoking cessation  Not ready yet  CT of the cervical spine showed some atherosclerosis  Will arrange for carotid duplex  Will arrange for CT lung cancer screening at next office visit  Subjective:   Chief Complaint   Patient presents with    Transition of Care Management        Patient ID: Martita Munoz is a 72 y o  male      TCM Call (since 3/26/2021)     Date and time call was made  4/15/2021  4:38 PM    Patient was hospitialized at  [address]        Date of Admission  04/13/21    Date of discharge  04/14/21    Diagnosis  Fall, initial encounter +2 more    Disposition  Home    Were the patients medications reviewed and updated  No    Current Symptoms  None      TCM Call (since 3/26/2021)     Post hospital issues  None    Should patient be enrolled in anticoag monitoring? No    Scheduled for follow up? Yes    Did you obtain your prescribed medications  Yes    Do you need help managing your prescriptions or medications  No    Is transportation to your appointment needed  No    I have advised the patient to call PCP with any new or worsening symptoms  DEBBY BROWN(MA)          Comes in for follow-up of diabetes, hypertension, hyperlipidemia, peripheral vascular disease, alcohol abuse    Denies any acute concerns  Tells me his depression is stable  He has been compliant with his medications      The following portions of the patient's history were reviewed and updated as appropriate: current medications, past medical history, past social history and past surgical history      PHQ-9 Depression Screening    PHQ-9:   Frequency of the following problems over the past two weeks:               Current Outpatient Medications:     ALPRAZolam (XANAX) 0 25 mg tablet, Take 1 tablet by mouth 2 (two) times a day, Disp: , Rfl:     amLODIPine (NORVASC) 10 mg tablet, Take 1 tablet (10 mg total) by mouth daily, Disp: 90 tablet, Rfl: 1    aspirin 81 MG tablet, Take 1 tablet (81 mg total) by mouth daily, Disp: 90 tablet, Rfl: 1    atorvastatin (LIPITOR) 40 mg tablet, Take 1 tablet (40 mg total) by mouth daily, Disp: 90 tablet, Rfl: 1    metFORMIN (GLUCOPHAGE) 1000 MG tablet, Take 1 tablet (1,000 mg total) by mouth 2 (two) times a day with meals, Disp: 180 tablet, Rfl: 1    mirtazapine (REMERON) 30 mg tablet, Take 30 mg by mouth daily at bedtime, Disp: , Rfl:     PARoxetine (PAXIL) 40 MG tablet, Take 40 mg by mouth every morning, Disp: , Rfl:     hydrochlorothiazide (HYDRODIURIL) 12 5 mg tablet, Take 1 tablet (12 5 mg total) by mouth daily, Disp: 90 tablet, Rfl: 1    metroNIDAZOLE (METROGEL) 1 % gel, Apply topically 2 (two) times a day, Disp: 60 g, Rfl: 2    Review of Systems   Constitutional: Negative for fatigue, fever and unexpected weight change  HENT: Negative for ear pain, hearing loss and sore throat  Eyes: Negative for pain and discharge  Respiratory: Negative for cough, chest tightness and shortness of breath  Cardiovascular: Negative for chest pain and palpitations  Gastrointestinal: Negative for abdominal pain, blood in stool, constipation, diarrhea and nausea  Genitourinary: Negative for dysuria, frequency and hematuria  Musculoskeletal: Negative for arthralgias and joint swelling  Skin: Negative for rash  Allergic/Immunologic: Negative for immunocompromised state  Neurological: Negative for dizziness and headaches  Hematological: Negative for adenopathy  Psychiatric/Behavioral: Negative for confusion and sleep disturbance  Objective:  /76 (BP Location: Right arm, Patient Position: Sitting, Cuff Size: Standard)   Pulse 96   Temp (!) 97 3 °F (36 3 °C)   Resp 18   Ht 5' 9" (1 753 m)   Wt 75 8 kg (167 lb)   SpO2 99%   BMI 24 66 kg/m²      Physical Exam  Vitals signs and nursing note reviewed  Constitutional:       Appearance: Normal appearance  He is well-developed  HENT:      Head: Normocephalic and atraumatic  Right Ear: Tympanic membrane normal       Left Ear: Tympanic membrane normal       Nose: Nose normal    Eyes:      General:         Right eye: No discharge  Left eye: No discharge  Conjunctiva/sclera: Conjunctivae normal       Pupils: Pupils are equal, round, and reactive to light  Neck:      Musculoskeletal: Normal range of motion and neck supple  Thyroid: No thyromegaly  Cardiovascular:      Rate and Rhythm: Normal rate and regular rhythm  Chest Wall: PMI is not displaced  Heart sounds: Normal heart sounds, S1 normal and S2 normal  No murmur  Pulmonary:      Effort: Pulmonary effort is normal  No accessory muscle usage or respiratory distress  Breath sounds: Normal breath sounds  No rhonchi or rales  Abdominal:      General: Bowel sounds are normal       Palpations: Abdomen is soft  There is no shifting dullness  Tenderness: There is no abdominal tenderness  There is no rebound  Musculoskeletal: Normal range of motion  General: No tenderness  Lymphadenopathy:      Cervical: No cervical adenopathy  Skin:     General: Skin is warm  Findings: No rash  Neurological:      Mental Status: He is alert     Psychiatric:         Speech: Speech normal            Recent Results (from the past 1008 hour(s))   ECG 12 lead    Collection Time: 04/13/21  6:40 PM   Result Value Ref Range    Ventricular Rate 91 BPM    Atrial Rate 91 BPM    NY Interval 164 ms    QRSD Interval 116 ms    QT Interval 364 ms    QTC Interval 447 ms    P Axis 70 degrees    QRS Axis 79 degrees    T Wave Axis 10 degrees   Fingerstick Glucose (POCT)    Collection Time: 04/13/21  6:49 PM   Result Value Ref Range    POC Glucose 76 65 - 140 mg/dl   CBC and differential    Collection Time: 04/22/21 10:23 AM   Result Value Ref Range    WBC 7 13 4 31 - 10 16 Thousand/uL    RBC 4 34 3 88 - 5 62 Million/uL    Hemoglobin 14 5 12 0 - 17 0 g/dL    Hematocrit 44 6 36 5 - 49 3 %     (H) 82 - 98 fL    MCH 33 4 26 8 - 34 3 pg    MCHC 32 5 31 4 - 37 4 g/dL    RDW 12 6 11 6 - 15 1 %    MPV 9 1 8 9 - 12 7 fL    Platelets 449 220 - 296 Thousands/uL    nRBC 0 /100 WBCs    Neutrophils Relative 58 43 - 75 %    Immat GRANS % 0 0 - 2 %    Lymphocytes Relative 30 14 - 44 %    Monocytes Relative 9 4 - 12 %    Eosinophils Relative 2 0 - 6 %    Basophils Relative 1 0 - 1 %    Neutrophils Absolute 4 14 1 85 - 7 62 Thousands/µL    Immature Grans Absolute 0 03 0 00 - 0 20 Thousand/uL    Lymphocytes Absolute 2 12 0 60 - 4 47 Thousands/µL    Monocytes Absolute 0 65 0 17 - 1 22 Thousand/µL    Eosinophils Absolute 0 11 0 00 - 0 61 Thousand/µL    Basophils Absolute 0 08 0 00 - 0 10 Thousands/µL   Comprehensive metabolic panel    Collection Time: 04/22/21 10:23 AM   Result Value Ref Range    Sodium 139 136 - 145 mmol/L    Potassium 4 2 3 5 - 5 3 mmol/L    Chloride 107 100 - 108 mmol/L    CO2 26 21 - 32 mmol/L    ANION GAP 6 4 - 13 mmol/L    BUN 13 5 - 25 mg/dL    Creatinine 0 70 0 60 - 1 30 mg/dL    Glucose, Fasting 106 (H) 65 - 99 mg/dL    Calcium 9 5 8 3 - 10 1 mg/dL    AST 25 5 - 45 U/L    ALT 27 12 - 78 U/L    Alkaline Phosphatase 69 46 - 116 U/L    Total Protein 7 5 6 4 - 8 2 g/dL    Albumin 4 0 3 5 - 5 0 g/dL    Total Bilirubin 0 33 0 20 - 1 00 mg/dL    eGFR 99 ml/min/1 73sq m   Hemoglobin A1C    Collection Time: 04/22/21 10:23 AM   Result Value Ref Range    Hemoglobin A1C 5 6 Normal 3 8-5 6%; PreDiabetic 5 7-6 4%; Diabetic >=6 5%; Glycemic control for adults with diabetes <7 0% %     mg/dl   Lipid Panel with Direct LDL reflex    Collection Time: 04/22/21 10:23 AM   Result Value Ref Range    Cholesterol 163 50 - 200 mg/dL    Triglycerides 118 <=150 mg/dL    HDL, Direct 47 >=40 mg/dL    LDL Calculated 92 0 - 100 mg/dL   Microalbumin / creatinine urine ratio    Collection Time: 04/22/21 10:23 AM   Result Value Ref Range    Creatinine, Ur <13 0 mg/dL    Microalbum  ,U,Random <5 0 0 0 - 20 0 mg/L    Microalb Creat Ratio     HIV 1/2 Antigen/Antibody (4th Generation) w Reflex [facility]    Collection Time: 04/22/21 10:23 AM   Result Value Ref Range    HIV-1/HIV-2 Ab Non-Reactive Non-Reactive   ]    Procedure: Ct Head Without Contrast    Result Date: 4/13/2021  Narrative: CT BRAIN - WITHOUT CONTRAST INDICATION:   Head trauma, minor (Age => 65y) headstrike on aspirin  "58-year-old male, takes aspirin, presenting to the emergency department for evaluation after fall on aspirin  ""On examination the patient has an abrasion to the top of his head   " COMPARISON:  None  TECHNIQUE:  CT examination of the brain was performed  In addition to axial images, sagittal and coronal 2D reformatted images were created and submitted for interpretation  Radiation dose length product (DLP) for this visit:  974 52 mGy-cm   This examination, like all CT scans performed in the [facility], was performed utilizing techniques to minimize radiation dose exposure, including the use of iterative  reconstruction and automated exposure control  IMAGE QUALITY:  Diagnostic  FINDINGS: PARENCHYMA:  No intracranial mass, mass effect or midline shift  No CT signs of acute infarction  No acute parenchymal hemorrhage  VENTRICLES AND EXTRA-AXIAL SPACES:  Normal for the patient's age  VISUALIZED ORBITS AND PARANASAL SINUSES:  Unremarkable  CALVARIUM AND EXTRACRANIAL SOFT TISSUES:  Moderate left parietal and mild right upper parietal scalp swelling  Impression: No acute intracranial abnormality  The study was marked in [facility] for immediate notification  Workstation performed: [id number]     Procedure: Ct Spine Cervical Without Contrast    Result Date: 4/13/2021  Narrative: CT CERVICAL SPINE - WITHOUT CONTRAST INDICATION:   Neck trauma (Age => 65y) fall, headstrike on aspirin  COMPARISON:  None  TECHNIQUE:  CT examination of the cervical spine was performed without intravenous contrast   Contiguous axial images were obtained  Sagittal and coronal reconstructions were performed  Radiation dose length product (DLP) for this visit:  367 56 mGy-cm   This examination, like all CT scans performed in the [facility], was performed utilizing techniques to minimize radiation dose exposure, including the use of iterative  reconstruction and automated exposure control  IMAGE QUALITY:  Diagnostic  FINDINGS: ALIGNMENT:  Normal alignment of the cervical spine  No subluxation  VERTEBRAL BODIES:  No fracture   DEGENERATIVE CHANGES:  Mild multilevel cervical degenerative changes are noted without critical central canal stenosis  PREVERTEBRAL AND PARASPINAL SOFT TISSUES:  More advanced atherosclerotic vascular calcification than would be expected for the patient's age is noted at the carotid bifurcations bilaterally  THORACIC INLET:  Normal      Impression: No cervical spine fracture or traumatic malalignment  The study was marked in [facility] for immediate notification    Workstation performed: [id number]

## 2021-04-28 ENCOUNTER — HOSPITAL ENCOUNTER (OUTPATIENT)
Dept: NON INVASIVE DIAGNOSTICS | Facility: CLINIC | Age: 66
Discharge: HOME | End: 2021-04-28
Payer: COMMERCIAL

## 2021-04-28 DIAGNOSIS — I70.219 ATHEROSCLEROTIC PVD WITH INTERMITTENT CLAUDICATION (HCC): ICD-10-CM

## 2021-04-28 PROCEDURE — 93880 EXTRACRANIAL BILAT STUDY: CPT

## 2021-04-28 PROCEDURE — 93880 EXTRACRANIAL BILAT STUDY: CPT | Performed by: SURGERY

## 2021-04-29 ENCOUNTER — TELEPHONE (OUTPATIENT)
Dept: INTERNAL MEDICINE CLINIC | Facility: CLINIC | Age: 66
End: 2021-04-29

## 2021-04-29 NOTE — TELEPHONE ENCOUNTER
----- Message from Anali Wong MD sent at 4/29/2021 11:02 AM EDT -----  Please call the patient regarding his abnormal result  There is some plaque and stenosis on both side, worse on the right  Continue current regimen, as we discussed BP needs better control  He will need a f/up US in 6 mo to follow up on progression of plaque build up   He should get the follow up through Dr Adrienne Ontiveros once he gets established

## 2021-07-14 ENCOUNTER — HOSPITAL ENCOUNTER (EMERGENCY)
Facility: HOSPITAL | Age: 66
Discharge: HOME | End: 2021-07-14
Attending: EMERGENCY MEDICINE | Admitting: EMERGENCY MEDICINE
Payer: COMMERCIAL

## 2021-07-14 ENCOUNTER — APPOINTMENT (EMERGENCY)
Dept: RADIOLOGY | Facility: HOSPITAL | Age: 66
End: 2021-07-14
Payer: COMMERCIAL

## 2021-07-14 VITALS
OXYGEN SATURATION: 99 % | RESPIRATION RATE: 20 BRPM | HEART RATE: 98 BPM | TEMPERATURE: 98.2 F | SYSTOLIC BLOOD PRESSURE: 141 MMHG | DIASTOLIC BLOOD PRESSURE: 64 MMHG

## 2021-07-14 DIAGNOSIS — S00.01XA ABRASION OF SCALP, INITIAL ENCOUNTER: Primary | ICD-10-CM

## 2021-07-14 DIAGNOSIS — F10.929 ALCOHOL INTOXICATION (HCC): ICD-10-CM

## 2021-07-14 LAB — GLUCOSE SERPL-MCNC: 84 MG/DL (ref 65–140)

## 2021-07-14 PROCEDURE — 72125 CT NECK SPINE W/O DYE: CPT

## 2021-07-14 PROCEDURE — 99285 EMERGENCY DEPT VISIT HI MDM: CPT

## 2021-07-14 PROCEDURE — 99282 EMERGENCY DEPT VISIT SF MDM: CPT | Performed by: EMERGENCY MEDICINE

## 2021-07-14 PROCEDURE — 82948 REAGENT STRIP/BLOOD GLUCOSE: CPT

## 2021-07-14 PROCEDURE — 90715 TDAP VACCINE 7 YRS/> IM: CPT | Performed by: EMERGENCY MEDICINE

## 2021-07-14 PROCEDURE — 70450 CT HEAD/BRAIN W/O DYE: CPT

## 2021-07-14 PROCEDURE — 93005 ELECTROCARDIOGRAM TRACING: CPT

## 2021-07-14 PROCEDURE — 90471 IMMUNIZATION ADMIN: CPT

## 2021-07-14 RX ADMIN — TETANUS TOXOID, REDUCED DIPHTHERIA TOXOID AND ACELLULAR PERTUSSIS VACCINE, ADSORBED 0.5 ML: 5; 2.5; 8; 8; 2.5 SUSPENSION INTRAMUSCULAR at 20:14

## 2021-07-14 NOTE — ED PROVIDER NOTES
Emergency Department Trauma Note  Sarkis Sweeney 72 y o  male MRN: [id number]  Unit/Bed#: CRB/CRB Encounter: [id number]      Trauma Alert: Trauma Acuity: C  Model of Arrival: Mode of Arrival: ALS via    Trauma Team: Current Providers  Attending Provider: Kenrick Leggett MD  Registered Nurse: Sylvia Donohue RN  ED Technician: Brandi Morales Ent  Resident: Danay Lloyd MD  Resident: Radha Evans MD  Registered Nurse: Saran Mills RN  Registered Nurse: Leander Fay  ED Technician: Shirley Arias  Consultants: None      History of Present Illness     Chief Complaint:   Chief Complaint   Patient presents with   Macel Kj Fall     pt fell three times, striking the back of his head  on aspirin     HPI:    Mechanism: fall    49-year-old male presented to the emergency department as a level see trauma after a fall on aspirin  Per EMS the patient was intoxicated at home and fell striking the back of his head  He was noted to have an abrasion to the back of his head by EMS  He was placed in a C-collar and transported to the emergency department  On arrival the patient was awake and alert but appeared intoxicated  Patient had no complaints  He denied neck pain/stiffness, chest pain, back pain and abdominal pain  Review of Systems   Constitutional: Negative for chills and fever  HENT: Negative for ear pain and sore throat  Eyes: Negative for pain and visual disturbance  Respiratory: Negative for cough and shortness of breath  Cardiovascular: Negative for chest pain and palpitations  Gastrointestinal: Negative for abdominal pain and vomiting  Genitourinary: Negative for dysuria and hematuria  Musculoskeletal: Negative for arthralgias and back pain  Skin: Positive for wound  Negative for color change and rash  Neurological: Negative for seizures and syncope  All other systems reviewed and are negative        Historical Information     Immunizations:   Immunization History   Administered Date(s) Administered    INFLUENZA 11/11/2003, 02/10/2011, 10/10/2018, 10/22/2020    Influenza, high dose seasonal 0 7 mL 10/10/2018    Influenza, recombinant, quadrivalent,injectable, preservative free 09/17/2019, 10/22/2020    Influenza, seasonal, injectable 10/06/2016, 09/10/2017    Pneumococcal Polysaccharide PPV23 03/07/2017    Tdap 10/04/2018, 07/14/2021       Past Medical History:   Diagnosis Date    Colon polyp     Diabetes mellitus ([address] )     Hyperlipidemia     Hypertension        Family History   Problem Relation Age of Onset    Heart failure Mother     Hypertension Other      Past Surgical History:   Procedure Laterality Date    COLON SURGERY      found growth, bengin    COLONOSCOPY      COLONOSCOPY N/A 3/13/2018    Procedure: COLONOSCOPY;  Surgeon: Lena Martines DO;  Location: [facility] GI LAB; Service: Gastroenterology     Social History     Tobacco Use    Smoking status: Current Every Day Smoker    Smokeless tobacco: Former User    Tobacco comment: uses vape/current every day smoker (as per [employer])   Vaping Use    Vaping Use: Never used   Substance Use Topics    Alcohol use: Yes     Comment: mostly on weekends    Drug use: No     E-Cigarette/Vaping    E-Cigarette Use Never User      E-Cigarette/Vaping Substances       Family History: non-contributory    Meds/Allergies   Prior to Admission Medications   Prescriptions Last Dose Informant Patient Reported? Taking?    ALPRAZolam (XANAX) 0 25 mg tablet  Self Yes No   Sig: Take 1 tablet by mouth 2 (two) times a day   PARoxetine (PAXIL) 40 MG tablet  Self Yes No   Sig: Take 40 mg by mouth every morning   amLODIPine (NORVASC) 10 mg tablet   No No   Sig: Take 1 tablet (10 mg total) by mouth daily   aspirin 81 MG tablet   No No   Sig: Take 1 tablet (81 mg total) by mouth daily   atorvastatin (LIPITOR) 40 mg tablet   No No   Sig: Take 1 tablet (40 mg total) by mouth daily   hydrochlorothiazide (HYDRODIURIL) 12 5 mg tablet   No No   Sig: Take 1 tablet (12 5 mg total) by mouth daily   metFORMIN (GLUCOPHAGE) 1000 MG tablet   No No   Sig: Take 1 tablet (1,000 mg total) by mouth 2 (two) times a day with meals   metroNIDAZOLE (METROGEL) 1 % gel   No No   Sig: Apply topically 2 (two) times a day   mirtazapine (REMERON) 30 mg tablet  Self Yes No   Sig: Take 30 mg by mouth daily at bedtime      Facility-Administered Medications: None       Allergies   Allergen Reactions    Lisinopril Angioedema       PHYSICAL EXAM    PE limited by: Intoxication    Objective   Vitals:   First set: Temperature: 98 2 °F (36 8 °C) (07/14/21 1730)  Pulse: (!) 118 (07/14/21 1730)  Respirations: 20 (07/14/21 1730)  Blood Pressure: 135/61 (07/14/21 1730)  SpO2: 91 % (07/14/21 1730)    Primary Survey:   (A) Airway: Intact  (B) Breathing: Bilateral breath sounds  (C) Circulation: Pulses:   normal  (D) Disabliity:  GCS Total:  15  (E) Expose:  Completed    Secondary Survey: (Click on Physical Exam tab above)  Physical Exam  Vitals and nursing note reviewed  Constitutional:       Appearance: He is well-developed  Interventions: Cervical collar in place  HENT:      Head: Normocephalic and atraumatic  No raccoon eyes or Wasserman's sign  Nose:      Right Nostril: No septal hematoma  Left Nostril: No septal hematoma  Eyes:      Conjunctiva/sclera: Conjunctivae normal    Cardiovascular:      Rate and Rhythm: Normal rate and regular rhythm  Heart sounds: No murmur heard  Pulmonary:      Effort: Pulmonary effort is normal  No respiratory distress  Breath sounds: Normal breath sounds  Abdominal:      Palpations: Abdomen is soft  Tenderness: There is no abdominal tenderness  Musculoskeletal:      Cervical back: Neck supple  Thoracic back: Normal       Lumbar back: Normal    Skin:     General: Skin is warm and dry  Neurological:      Mental Status: He is alert  Cervical spine cleared by clinical criteria?  No (imaging required) Invasive Devices     None                 Lab Results:   Results Reviewed     None                 Imaging Studies:   Direct to CT: No  CT head without contrast   Final Result by Karthik Santos MD (07/14 1835)      No acute intracranial abnormality  Workstation performed: [id number]         CT cervical spine without contrast   Final Result by Karthik Santos MD (07/14 1839)      No cervical spine fracture or traumatic malalignment  Workstation performed: [id number]               Procedures  Procedures         ED Course           MDM  Number of Diagnoses or Management Options  Abrasion of scalp, initial encounter  Alcohol intoxication [facility])  Diagnosis management comments: 27-year-old male presented to the emergency department for evaluation after a fall  On arrival the patient was intoxicated but was awake, alert and oriented  Initial vital signs were stable  On exam the patient was noted to have an abrasion to the back of his scalp  C-collar was in place  Imaging done in the emergency department with no acute traumatic injuries  Patient was observed in the emergency department until clinically sober and then was discharged  Return precautions were discussed  Patient agrees with the plan for discharge and feels comfortable to go home with proper f/u  Advised to return for worsening or additional problems  Diagnostic tests were reviewed and questions answered  Diagnosis, care plan and treatment options were discussed  The patient understands instructions and will follow up as directed              Disposition  [facility] Transfer: No  Final diagnoses:   Abrasion of scalp, initial encounter   Alcohol intoxication ([facility] [address] )     Time reflects when diagnosis was documented in both MDM as applicable and the Disposition within this note     Time User Action Codes Description Comment    7/14/2021  8:49 PM Sheri Neri Add [S00 01XA] Abrasion of scalp, initial encounter 7/14/2021  8:49 PM Cristino Nguyễnan Riley [F10 279] Alcohol intoxication [facility])       ED Disposition     ED Disposition Condition Date/Time Comment    Discharge Stable Wed Jul 14, 2021  8:49 PM Christos Burkitt discharge to home/self care              Follow-up Information     Follow up With Specialties Details Why Contact Info Additional Information    Manuel Zee MD Internal Medicine Schedule an appointment as soon as possible for a visit   [address] [place]  [address] [facility] Emergency Department Emergency Medicine Go to  If symptoms worsen [address]  [address] [facility] 64 [facility] Emergency Department, [address], [place] [address]        Discharge Medication List as of 7/14/2021  8:58 PM      CONTINUE these medications which have NOT CHANGED    Details   ALPRAZolam (XANAX) 0 25 mg tablet Take 1 tablet by mouth 2 (two) times a day, Starting Fri 3/23/2018, Historical Med      amLODIPine (NORVASC) 10 mg tablet Take 1 tablet (10 mg total) by mouth daily, Starting Mon 4/26/2021, Normal      aspirin 81 MG tablet Take 1 tablet (81 mg total) by mouth daily, Starting Tue 9/17/2019, Normal      atorvastatin (LIPITOR) 40 mg tablet Take 1 tablet (40 mg total) by mouth daily, Starting Mon 4/26/2021, Normal      hydrochlorothiazide (HYDRODIURIL) 12 5 mg tablet Take 1 tablet (12 5 mg total) by mouth daily, Starting Mon 4/26/2021, Normal      metFORMIN (GLUCOPHAGE) 1000 MG tablet Take 1 tablet (1,000 mg total) by mouth 2 (two) times a day with meals, Starting Mon 4/26/2021, Normal      metroNIDAZOLE (METROGEL) 1 % gel Apply topically 2 (two) times a day, Starting Tue 3/16/2021, Until Thu 4/15/2021, Normal      mirtazapine (REMERON) 30 mg tablet Take 30 mg by mouth daily at bedtime, Historical Med      PARoxetine (PAXIL) 40 MG tablet Take 40 mg by mouth every morning, Historical Med           No discharge procedures on file      PDMP Review       Value Time User    PDMP Reviewed  Yes 4/28/2020 10:08 AM José Miguel Madden MD          ED Provider  Electronically Signed by         Kelli Crowell MD  07/14/21 1496

## 2021-07-14 NOTE — ED ATTENDING ATTESTATION
Final Diagnosis:  1  Abrasion of scalp, initial encounter    2  Alcohol intoxication ([facility] [address] )           Key Barnett MD, saw and evaluated the patient  All available labs and X-rays were ordered by me or the resident and have been reviewed by myself  I discussed the patient with the resident / non-physician and agree with the resident's / non-physician practitioner's findings and plan as documented in the resident's / non-physician practicitioner's note, except where noted  At this point, I agree with the current assessment done in the ED  I was present during key portions of all procedures performed unless otherwise stated  Trauma Alert: Trauma Acuity: C  Model of Arrival: Mode of Arrival: ALS via    Trauma Team: Current Providers  Attending Provider: Lang Mortimer, MD  Registered Nurse: Naomi Mota RN  ED Technician: Salomón Carpenter Ent  Resident: Harrison Tamayo MD  Resident: Serafin Mosley MD  Registered Nurse: Suzanne Prince RN  Registered Nurse: Lisy Salazar  ED Technician: John Garcia  Code Status: No Order  Advance Directive and Living Will:      Power of :    POLST:    Mechanism:  Details of Incident: pt tripped and fell three times, striking head  - LOC, + ASA             Chief Complaint   Patient presents with    Fall     pt fell three times, striking the back of his head  on aspirin     This is a 72 y o  male presenting for evaluation of fall  +ETOH  He fell x3  +occipital trauma  +ASA81  No LOC  Remembers events  No f/ch/n/v/cp/sob  PMH:   has a past medical history of Colon polyp, Diabetes mellitus ([facility] [address] ), Hyperlipidemia, and Hypertension  PSH:   has a past surgical history that includes Colonoscopy; Colon surgery; and Colonoscopy (N/A, 3/13/2018)      Social:  Social History     Substance and Sexual Activity   Alcohol Use Yes    Comment: mostly on weekends     Social History     Tobacco Use   Smoking Status Current Every Day Smoker   Smokeless Tobacco Former User   Tobacco Comment    uses vape/current every day smoker (as per [employer])     Social History     Substance and Sexual Activity   Drug Use No     PE:  Temperature: 98 2 °F (36 8 °C) (07/14/21 1730)  Pulse: (!) 118 (07/14/21 1730)  Respirations: 20 (07/14/21 1730)  Blood Pressure: 135/61 (07/14/21 1730)  SpO2: 91 % (07/14/21 1730)    Primary Survey:   (A) Airway: none  (B) Breathing: 15, CTAB  (C) Circulation: Pulses:   normal  (D) Disabliity:  15  (E) Expose:  Completed    Secondary Survey:  General: VSS, NAD, awake, alert  Well-nourished, well-developed  Appears stated age  Speaking normally in full sentences  Head: Normocephalic, traumatic, nontender  Abrasion on occiput  Eyes: PERRL, EOM-I  No diplopia  No hyphema  No subconjunctival hemorrhages  Symmetrical lids  ENT: Atraumatic external nose and ears  MMM  No malocclusion  No stridor  Normal phonation  No drooling  Normal swallowing  Neck: Symmetric, trachea midline  No JVD  CV: RRR  +S1/S2  No murmurs or gallops  Peripheral pulses +2 throughout  No chest wall tenderness  Lungs:   Unlabored No retractions  CTAB, lungs sounds equal bilateral    No tachypnea  Abd: +BS, soft, NT/ND    MSK:   FROM   Back:   No CTL-spine tenderness  No rashes  Skin: Dry, intact  Neuro: AAOx3, GCS 15, CN II-XII grossly intact  Motor grossly intact  Psychiatric/Behavioral: Appropriate mood and affect   Exam: deferred    A:  - Fall  P:  - CTH: intoxicated w/ signs of trauma  - CTC: can't apply NEXUS  - Tetanus  - supportive measures    - 13 point ROS was performed and all are normal unless stated in the history above  - Nursing note reviewed  Vitals reviewed  - Orders placed by myself and/or advanced practitioner / resident     - Previous chart was reviewed  - No language barrier    - History obtained from patient  - There are no limitations to the history obtained  - Critical care time: Not applicable for this patient       Medications tetanus-diphtheria-acellular pertussis (BOOSTRIX) IM injection 0 5 mL (0 5 mL Intramuscular Given 7/14/21 2014)     CT head without contrast   Final Result      No acute intracranial abnormality  Workstation performed: [id number]         CT cervical spine without contrast   Final Result      No cervical spine fracture or traumatic malalignment  Workstation performed: [id number]           Orders Placed This Encounter   Procedures    CT head without contrast    CT cervical spine without contrast    EKG RESULTS    ECG 12 lead     Labs Reviewed - No data to display  Time reflects when diagnosis was documented in both MDM as applicable and the Disposition within this note       Time User Action Codes Description Comment    7/14/2021  8:49 PM Floria Winfred Add [S00 01XA] Abrasion of scalp, initial encounter     7/14/2021  8:49 PM Floria Bronson Add [F10 929] Alcohol intoxication [facility])           ED Disposition       ED Disposition Condition Date/Time Comment    Discharge Stable Wed Jul 14, 2021  8:49 PM Estefany Escort discharge to home/self care                  Follow-up Information       Follow up With Specialties Details Why Contact Info Additional Information    Suzanne Abebe MD Internal Medicine Schedule an appointment as soon as possible for a visit   [place] [address] Emergency Department Emergency Medicine Go to  If symptoms worsen [address]  958 [facility] 64 [facility] Emergency Department, [address]          Discharge Medication List as of 7/14/2021  8:58 PM        CONTINUE these medications which have NOT CHANGED    Details   ALPRAZolam (XANAX) 0 25 mg tablet Take 1 tablet by mouth 2 (two) times a day, Starting Fri 3/23/2018, Historical Med      amLODIPine (NORVASC) 10 mg tablet Take 1 tablet (10 mg total) by mouth daily, Starting Mon 4/26/2021, Normal      aspirin 81 MG tablet Take 1 tablet (81 mg total) by mouth daily, Starting Tue 9/17/2019, Normal      atorvastatin (LIPITOR) 40 mg tablet Take 1 tablet (40 mg total) by mouth daily, Starting Mon 4/26/2021, Normal      hydrochlorothiazide (HYDRODIURIL) 12 5 mg tablet Take 1 tablet (12 5 mg total) by mouth daily, Starting Mon 4/26/2021, Normal      metFORMIN (GLUCOPHAGE) 1000 MG tablet Take 1 tablet (1,000 mg total) by mouth 2 (two) times a day with meals, Starting Mon 4/26/2021, Normal      metroNIDAZOLE (METROGEL) 1 % gel Apply topically 2 (two) times a day, Starting Tue 3/16/2021, Until Thu 4/15/2021, Normal      mirtazapine (REMERON) 30 mg tablet Take 30 mg by mouth daily at bedtime, Historical Med      PARoxetine (PAXIL) 40 MG tablet Take 40 mg by mouth every morning, Historical Med           No discharge procedures on file  Prior to Admission Medications   Prescriptions Last Dose Informant Patient Reported? Taking?    ALPRAZolam (XANAX) 0 25 mg tablet  Self Yes No   Sig: Take 1 tablet by mouth 2 (two) times a day   PARoxetine (PAXIL) 40 MG tablet  Self Yes No   Sig: Take 40 mg by mouth every morning   amLODIPine (NORVASC) 10 mg tablet   No No   Sig: Take 1 tablet (10 mg total) by mouth daily   aspirin 81 MG tablet   No No   Sig: Take 1 tablet (81 mg total) by mouth daily   atorvastatin (LIPITOR) 40 mg tablet   No No   Sig: Take 1 tablet (40 mg total) by mouth daily   hydrochlorothiazide (HYDRODIURIL) 12 5 mg tablet   No No   Sig: Take 1 tablet (12 5 mg total) by mouth daily   metFORMIN (GLUCOPHAGE) 1000 MG tablet   No No   Sig: Take 1 tablet (1,000 mg total) by mouth 2 (two) times a day with meals   metroNIDAZOLE (METROGEL) 1 % gel   No No   Sig: Apply topically 2 (two) times a day   mirtazapine (REMERON) 30 mg tablet  Self Yes No   Sig: Take 30 mg by mouth daily at bedtime      Facility-Administered Medications: None       Portions of the record may have been created with voice recognition software  Occasional wrong word or "sound a like" substitutions may have occurred due to the inherent limitations of voice recognition software  Read the chart carefully and recognize, using context, where substitutions have occurred      Electronically signed by:  Lynnette Suggs

## 2021-07-15 LAB
ATRIAL RATE: 117 BPM
P AXIS: 79 DEGREES
PR INTERVAL: 154 MS
QRS AXIS: 85 DEGREES
QRSD INTERVAL: 102 MS
QT INTERVAL: 332 MS
QTC INTERVAL: 463 MS
T WAVE AXIS: -60 DEGREES
VENTRICULAR RATE: 117 BPM

## 2021-07-15 PROCEDURE — 93010 ELECTROCARDIOGRAM REPORT: CPT | Performed by: INTERNAL MEDICINE

## 2021-07-15 NOTE — TRAUMA DOCUMENTATION
Pt removed c-collar at this time, refusing to reapply  Ambulating in room and attempting to leave  Dr Stephan Cardenas aware

## 2021-09-17 ENCOUNTER — TELEPHONE (OUTPATIENT)
Dept: INTERNAL MEDICINE CLINIC | Facility: CLINIC | Age: 66
End: 2021-09-17

## 2021-09-30 DIAGNOSIS — E11.51 TYPE 2 DIABETES MELLITUS WITH DIABETIC PERIPHERAL ANGIOPATHY WITHOUT GANGRENE, WITHOUT LONG-TERM CURRENT USE OF INSULIN (HCC): ICD-10-CM

## 2021-09-30 DIAGNOSIS — I10 ESSENTIAL HYPERTENSION: ICD-10-CM

## 2021-09-30 DIAGNOSIS — E78.49 OTHER HYPERLIPIDEMIA: ICD-10-CM

## 2021-09-30 DIAGNOSIS — F17.200 CURRENT EVERY DAY SMOKER: ICD-10-CM

## 2021-10-01 RX ORDER — AMLODIPINE BESYLATE 10 MG/1
TABLET ORAL
Qty: 90 TABLET | Refills: 3 | Status: SHIPPED | OUTPATIENT
Start: 2021-10-01

## 2021-10-01 RX ORDER — HYDROCHLOROTHIAZIDE 12.5 MG/1
TABLET ORAL
Qty: 90 TABLET | Refills: 3 | Status: SHIPPED | OUTPATIENT
Start: 2021-10-01

## 2021-10-01 RX ORDER — ATORVASTATIN CALCIUM 40 MG/1
TABLET, FILM COATED ORAL
Qty: 90 TABLET | Refills: 3 | Status: SHIPPED | OUTPATIENT
Start: 2021-10-01

## 2021-10-14 ENCOUNTER — OFFICE VISIT (OUTPATIENT)
Dept: INTERNAL MEDICINE CLINIC | Facility: CLINIC | Age: 66
End: 2021-10-14
Payer: COMMERCIAL

## 2021-10-14 VITALS
SYSTOLIC BLOOD PRESSURE: 122 MMHG | DIASTOLIC BLOOD PRESSURE: 60 MMHG | OXYGEN SATURATION: 99 % | WEIGHT: 162 LBS | HEIGHT: 69 IN | HEART RATE: 110 BPM | BODY MASS INDEX: 23.99 KG/M2 | TEMPERATURE: 97.5 F

## 2021-10-14 DIAGNOSIS — F17.200 CURRENT EVERY DAY SMOKER: ICD-10-CM

## 2021-10-14 DIAGNOSIS — Z23 INFLUENZA VACCINE NEEDED: ICD-10-CM

## 2021-10-14 DIAGNOSIS — I70.219 ATHEROSCLEROTIC PVD WITH INTERMITTENT CLAUDICATION (HCC): Primary | ICD-10-CM

## 2021-10-14 DIAGNOSIS — I10 PRIMARY HYPERTENSION: ICD-10-CM

## 2021-10-14 DIAGNOSIS — F41.8 DEPRESSION WITH ANXIETY: ICD-10-CM

## 2021-10-14 DIAGNOSIS — I65.23 BILATERAL CAROTID ARTERY STENOSIS: ICD-10-CM

## 2021-10-14 DIAGNOSIS — Z12.5 SCREENING FOR PROSTATE CANCER: ICD-10-CM

## 2021-10-14 DIAGNOSIS — Z72.89 ALCOHOL USE: ICD-10-CM

## 2021-10-14 DIAGNOSIS — E11.51 TYPE 2 DIABETES MELLITUS WITH DIABETIC PERIPHERAL ANGIOPATHY WITHOUT GANGRENE, WITHOUT LONG-TERM CURRENT USE OF INSULIN (HCC): ICD-10-CM

## 2021-10-14 DIAGNOSIS — E78.5 HYPERLIPIDEMIA, UNSPECIFIED HYPERLIPIDEMIA TYPE: ICD-10-CM

## 2021-10-14 DIAGNOSIS — Z12.11 SCREENING FOR COLON CANCER: ICD-10-CM

## 2021-10-14 PROBLEM — E11.9 DIABETES MELLITUS TYPE 2, NONINSULIN DEPENDENT (HCC): Status: RESOLVED | Noted: 2017-03-07 | Resolved: 2021-10-14

## 2021-10-14 PROBLEM — K63.5 POLYP OF COLON: Status: RESOLVED | Noted: 2018-03-05 | Resolved: 2021-10-14

## 2021-10-14 PROCEDURE — 90662 IIV NO PRSV INCREASED AG IM: CPT | Performed by: INTERNAL MEDICINE

## 2021-10-14 PROCEDURE — 99214 OFFICE O/P EST MOD 30 MIN: CPT | Performed by: INTERNAL MEDICINE

## 2021-10-14 PROCEDURE — 3078F DIAST BP <80 MM HG: CPT | Performed by: INTERNAL MEDICINE

## 2021-10-14 PROCEDURE — 1160F RVW MEDS BY RX/DR IN RCRD: CPT | Performed by: INTERNAL MEDICINE

## 2021-10-14 PROCEDURE — G0008 ADMIN INFLUENZA VIRUS VAC: HCPCS | Performed by: INTERNAL MEDICINE

## 2021-10-14 PROCEDURE — 3074F SYST BP LT 130 MM HG: CPT | Performed by: INTERNAL MEDICINE

## 2021-10-14 PROCEDURE — 3008F BODY MASS INDEX DOCD: CPT | Performed by: INTERNAL MEDICINE

## 2021-10-14 RX ORDER — VARENICLINE TARTRATE 25 MG
KIT ORAL
Qty: 53 TABLET | Refills: 0 | Status: SHIPPED | OUTPATIENT
Start: 2021-10-14 | End: 2022-08-08

## 2021-10-25 ENCOUNTER — TELEPHONE (OUTPATIENT)
Dept: ADMINISTRATIVE | Facility: OTHER | Age: 66
End: 2021-10-25

## 2021-11-09 ENCOUNTER — APPOINTMENT (OUTPATIENT)
Dept: LAB | Facility: CLINIC | Age: 66
End: 2021-11-09
Payer: COMMERCIAL

## 2021-11-10 ENCOUNTER — RA CDI HCC (OUTPATIENT)
Dept: OTHER | Facility: HOSPITAL | Age: 66
End: 2021-11-10

## 2021-11-16 ENCOUNTER — VBI (OUTPATIENT)
Dept: ADMINISTRATIVE | Facility: OTHER | Age: 66
End: 2021-11-16

## 2021-11-17 ENCOUNTER — OFFICE VISIT (OUTPATIENT)
Dept: INTERNAL MEDICINE CLINIC | Facility: CLINIC | Age: 66
End: 2021-11-17
Payer: COMMERCIAL

## 2021-11-17 VITALS
RESPIRATION RATE: 16 BRPM | DIASTOLIC BLOOD PRESSURE: 70 MMHG | SYSTOLIC BLOOD PRESSURE: 140 MMHG | OXYGEN SATURATION: 99 % | WEIGHT: 165 LBS | TEMPERATURE: 97 F | BODY MASS INDEX: 24.44 KG/M2 | HEART RATE: 102 BPM | HEIGHT: 69 IN

## 2021-11-17 DIAGNOSIS — E11.51 TYPE 2 DIABETES MELLITUS WITH DIABETIC PERIPHERAL ANGIOPATHY WITHOUT GANGRENE, WITHOUT LONG-TERM CURRENT USE OF INSULIN (HCC): ICD-10-CM

## 2021-11-17 DIAGNOSIS — I10 PRIMARY HYPERTENSION: ICD-10-CM

## 2021-11-17 DIAGNOSIS — I65.23 BILATERAL CAROTID ARTERY STENOSIS: ICD-10-CM

## 2021-11-17 DIAGNOSIS — Z00.00 MEDICARE ANNUAL WELLNESS VISIT, INITIAL: Primary | ICD-10-CM

## 2021-11-17 DIAGNOSIS — F41.8 DEPRESSION WITH ANXIETY: ICD-10-CM

## 2021-11-17 DIAGNOSIS — E78.5 HYPERLIPIDEMIA, UNSPECIFIED HYPERLIPIDEMIA TYPE: ICD-10-CM

## 2021-11-17 DIAGNOSIS — Z72.89 ALCOHOL USE: ICD-10-CM

## 2021-11-17 DIAGNOSIS — F17.200 CURRENT EVERY DAY SMOKER: ICD-10-CM

## 2021-11-17 PROCEDURE — 99214 OFFICE O/P EST MOD 30 MIN: CPT | Performed by: INTERNAL MEDICINE

## 2021-11-17 PROCEDURE — 3725F SCREEN DEPRESSION PERFORMED: CPT | Performed by: INTERNAL MEDICINE

## 2021-11-17 PROCEDURE — 1170F FXNL STATUS ASSESSED: CPT | Performed by: INTERNAL MEDICINE

## 2021-11-17 PROCEDURE — 1160F RVW MEDS BY RX/DR IN RCRD: CPT | Performed by: INTERNAL MEDICINE

## 2021-11-17 PROCEDURE — 3077F SYST BP >= 140 MM HG: CPT | Performed by: INTERNAL MEDICINE

## 2021-11-17 PROCEDURE — 1125F AMNT PAIN NOTED PAIN PRSNT: CPT | Performed by: INTERNAL MEDICINE

## 2021-11-17 PROCEDURE — 3288F FALL RISK ASSESSMENT DOCD: CPT | Performed by: INTERNAL MEDICINE

## 2021-11-17 PROCEDURE — 3078F DIAST BP <80 MM HG: CPT | Performed by: INTERNAL MEDICINE

## 2021-11-17 PROCEDURE — 3008F BODY MASS INDEX DOCD: CPT | Performed by: INTERNAL MEDICINE

## 2021-11-17 PROCEDURE — G0438 PPPS, INITIAL VISIT: HCPCS | Performed by: INTERNAL MEDICINE

## 2021-12-21 PROBLEM — Z72.0 TOBACCO ABUSE: Status: ACTIVE | Noted: 2021-12-21

## 2022-01-19 ENCOUNTER — TELEPHONE (OUTPATIENT)
Dept: ADMINISTRATIVE | Facility: OTHER | Age: 67
End: 2022-01-19

## 2022-01-27 ENCOUNTER — TELEPHONE (OUTPATIENT)
Dept: ADMINISTRATIVE | Facility: OTHER | Age: 67
End: 2022-01-27

## 2022-02-07 ENCOUNTER — RA CDI HCC (OUTPATIENT)
Dept: OTHER | Facility: HOSPITAL | Age: 67
End: 2022-02-07

## 2022-02-07 NOTE — PROGRESS NOTES
[facility] 75  coding opportunities             Chart Reviewed * (Number of) Inbasket suggestions sent to Provider: 2                  Patients insurance company: [employer] (Medicare Advantage and Commercial)           F10 20, F35 9

## 2022-04-05 ENCOUNTER — RA CDI HCC (OUTPATIENT)
Dept: OTHER | Facility: HOSPITAL | Age: 67
End: 2022-04-05

## 2022-04-05 NOTE — PROGRESS NOTES
F33 9, F10 20  [facility] 75  coding opportunities          Chart Reviewed number of suggestions sent to Provider: 2     Patients Insurance     Medicare Insurance: [employer] Advantage

## 2022-04-20 ENCOUNTER — APPOINTMENT (OUTPATIENT)
Dept: LAB | Facility: CLINIC | Age: 67
End: 2022-04-20
Payer: COMMERCIAL

## 2022-04-20 LAB
ANION GAP SERPL CALCULATED.3IONS-SCNC: 3 MMOL/L (ref 4–13)
BASOPHILS # BLD AUTO: 0.09 THOUSANDS/ΜL (ref 0–0.1)
BASOPHILS NFR BLD AUTO: 1 % (ref 0–1)
BUN SERPL-MCNC: 11 MG/DL (ref 5–25)
CALCIUM SERPL-MCNC: 10.6 MG/DL (ref 8.3–10.1)
CHLORIDE SERPL-SCNC: 104 MMOL/L (ref 100–108)
CHOLEST SERPL-MCNC: 169 MG/DL
CO2 SERPL-SCNC: 30 MMOL/L (ref 21–32)
CREAT SERPL-MCNC: 0.76 MG/DL (ref 0.6–1.3)
EOSINOPHIL # BLD AUTO: 0.21 THOUSAND/ΜL (ref 0–0.61)
EOSINOPHIL NFR BLD AUTO: 3 % (ref 0–6)
ERYTHROCYTE [DISTWIDTH] IN BLOOD BY AUTOMATED COUNT: 12.7 % (ref 11.6–15.1)
EST. AVERAGE GLUCOSE BLD GHB EST-MCNC: 120 MG/DL
GFR SERPL CREATININE-BSD FRML MDRD: 95 ML/MIN/1.73SQ M
GLUCOSE P FAST SERPL-MCNC: 115 MG/DL (ref 65–99)
HBA1C MFR BLD: 5.8 %
HCT VFR BLD AUTO: 45 % (ref 36.5–49.3)
HDLC SERPL-MCNC: 59 MG/DL
HGB BLD-MCNC: 15.3 G/DL (ref 12–17)
IMM GRANULOCYTES # BLD AUTO: 0.01 THOUSAND/UL (ref 0–0.2)
IMM GRANULOCYTES NFR BLD AUTO: 0 % (ref 0–2)
LDLC SERPL CALC-MCNC: 76 MG/DL (ref 0–100)
LYMPHOCYTES # BLD AUTO: 2.3 THOUSANDS/ΜL (ref 0.6–4.47)
LYMPHOCYTES NFR BLD AUTO: 29 % (ref 14–44)
MCH RBC QN AUTO: 33 PG (ref 26.8–34.3)
MCHC RBC AUTO-ENTMCNC: 34 G/DL (ref 31.4–37.4)
MCV RBC AUTO: 97 FL (ref 82–98)
MONOCYTES # BLD AUTO: 0.58 THOUSAND/ΜL (ref 0.17–1.22)
MONOCYTES NFR BLD AUTO: 7 % (ref 4–12)
NEUTROPHILS # BLD AUTO: 4.66 THOUSANDS/ΜL (ref 1.85–7.62)
NEUTS SEG NFR BLD AUTO: 60 % (ref 43–75)
NONHDLC SERPL-MCNC: 110 MG/DL
NRBC BLD AUTO-RTO: 0 /100 WBCS
PLATELET # BLD AUTO: 330 THOUSANDS/UL (ref 149–390)
PMV BLD AUTO: 9.3 FL (ref 8.9–12.7)
POTASSIUM SERPL-SCNC: 4 MMOL/L (ref 3.5–5.3)
RBC # BLD AUTO: 4.63 MILLION/UL (ref 3.88–5.62)
SODIUM SERPL-SCNC: 137 MMOL/L (ref 136–145)
TRIGL SERPL-MCNC: 169 MG/DL
WBC # BLD AUTO: 7.85 THOUSAND/UL (ref 4.31–10.16)

## 2022-04-20 PROCEDURE — 83036 HEMOGLOBIN GLYCOSYLATED A1C: CPT | Performed by: INTERNAL MEDICINE

## 2022-04-20 PROCEDURE — 3044F HG A1C LEVEL LT 7.0%: CPT | Performed by: INTERNAL MEDICINE

## 2022-04-20 PROCEDURE — 36415 COLL VENOUS BLD VENIPUNCTURE: CPT | Performed by: INTERNAL MEDICINE

## 2022-04-20 PROCEDURE — 80048 BASIC METABOLIC PNL TOTAL CA: CPT | Performed by: INTERNAL MEDICINE

## 2022-04-20 PROCEDURE — 85025 COMPLETE CBC W/AUTO DIFF WBC: CPT | Performed by: INTERNAL MEDICINE

## 2022-04-20 PROCEDURE — 80061 LIPID PANEL: CPT | Performed by: INTERNAL MEDICINE

## 2022-04-26 ENCOUNTER — OFFICE VISIT (OUTPATIENT)
Dept: INTERNAL MEDICINE CLINIC | Facility: CLINIC | Age: 67
End: 2022-04-26
Payer: COMMERCIAL

## 2022-04-26 VITALS
WEIGHT: 167 LBS | BODY MASS INDEX: 24.73 KG/M2 | SYSTOLIC BLOOD PRESSURE: 138 MMHG | DIASTOLIC BLOOD PRESSURE: 80 MMHG | TEMPERATURE: 97.6 F | OXYGEN SATURATION: 98 % | HEIGHT: 69 IN | RESPIRATION RATE: 18 BRPM | HEART RATE: 102 BPM

## 2022-04-26 DIAGNOSIS — I70.219 ATHEROSCLEROTIC PVD WITH INTERMITTENT CLAUDICATION (HCC): ICD-10-CM

## 2022-04-26 DIAGNOSIS — I10 PRIMARY HYPERTENSION: ICD-10-CM

## 2022-04-26 DIAGNOSIS — J42 CHRONIC BRONCHITIS, UNSPECIFIED CHRONIC BRONCHITIS TYPE (HCC): ICD-10-CM

## 2022-04-26 DIAGNOSIS — I65.23 BILATERAL CAROTID ARTERY STENOSIS: ICD-10-CM

## 2022-04-26 DIAGNOSIS — E78.5 HYPERLIPIDEMIA, UNSPECIFIED HYPERLIPIDEMIA TYPE: ICD-10-CM

## 2022-04-26 DIAGNOSIS — E11.51 TYPE 2 DIABETES MELLITUS WITH DIABETIC PERIPHERAL ANGIOPATHY WITHOUT GANGRENE, WITHOUT LONG-TERM CURRENT USE OF INSULIN (HCC): Primary | ICD-10-CM

## 2022-04-26 PROBLEM — R06.2 WHEEZING: Status: ACTIVE | Noted: 2022-04-26

## 2022-04-26 PROCEDURE — 3008F BODY MASS INDEX DOCD: CPT | Performed by: INTERNAL MEDICINE

## 2022-04-26 PROCEDURE — 99214 OFFICE O/P EST MOD 30 MIN: CPT | Performed by: INTERNAL MEDICINE

## 2022-04-26 PROCEDURE — 3075F SYST BP GE 130 - 139MM HG: CPT | Performed by: INTERNAL MEDICINE

## 2022-04-26 PROCEDURE — 1160F RVW MEDS BY RX/DR IN RCRD: CPT | Performed by: INTERNAL MEDICINE

## 2022-04-26 PROCEDURE — 3079F DIAST BP 80-89 MM HG: CPT | Performed by: INTERNAL MEDICINE

## 2022-04-26 PROCEDURE — 4004F PT TOBACCO SCREEN RCVD TLK: CPT | Performed by: INTERNAL MEDICINE

## 2022-04-26 NOTE — ASSESSMENT & PLAN NOTE
Lab Results   Component Value Date    HGBA1C 5 8 (H) 04/20/2022    HGBA1C 5 4 11/09/2021   Patient has continues to have excellent HBA1c control despite decreasing his metformin to 1000mg once a day  Plan  Continue Once a day metformin  Advised diet rich in vegetables lower red meat

## 2022-04-26 NOTE — ASSESSMENT & PLAN NOTE
Patient is still smoking 1 pack of cigarettes a day    Unable to cut down for now  Advised Lung cancer CT screening

## 2022-04-26 NOTE — PROGRESS NOTES
Assessment/Plan:    1  Type 2 diabetes mellitus with diabetic peripheral angiopathy without gangrene, without long-term current use of insulin ([facility])  Hemoglobin A1C    Microalbumin / creatinine urine ratio    Basic metabolic panel    metFORMIN (GLUCOPHAGE) 1000 MG tablet   2  Primary hypertension  Basic metabolic panel   3  Hyperlipidemia, unspecified hyperlipidemia type  Lipid panel   4  Atherosclerotic PVD with intermittent claudication ([facility] [address] )     5  Bilateral carotid artery stenosis     6  Chronic bronchitis, unspecified chronic [facility] type ([facility]  Complete PFT with post bronchodilator        Type 2 diabetes mellitus with diabetic peripheral angiopathy without gangrene, without long-term current use of insulin ([facility])    Lab Results   Component Value Date    HGBA1C 5 8 (H) 04/20/2022    HGBA1C 5 4 11/09/2021   Patient has continues to have excellent HBA1c control despite decreasing his metformin to 1000mg once a day  Plan  Continue Once a day metformin  Advised diet rich in vegetables lower red meat  Hyperlipidemia  Most recent blood work was well controlled  Continue taking atorvastatin once a day    Current every day smoker  Patient is still smoking 1 pack of cigarettes a day  Unable to cut down for now  Advised Lung cancer CT screening    Atherosclerotic PVD with intermittent claudication [facility])  Patient has not been active lately  Smokes one pack a day  Will screen for peripheral artery disease with lower extremity vascular ultrasound, and AAA ultrasound screening    Alcohol use  Patient is still drinking the same amount of alcohol  Was advised to cut down alcohol intake    Hypertension  BP today 138/80  Continue amlodipine, HCTZ    Wheezing  Patient noted to have wheezing over bilateral lung fields  He does have a long history of smoking and is still smoking about 1 pack of cigarettes a day  Does not endorse any shortness of breath, or coughing       Plan  Pulmonary function testing  CT scan chest screening   Nutrition Assessment and Intervention:     Reviewed food recall journal      Physical Activity Assessment and Intervention:    Activity journal reviewed      Emotional and Mental Well-being, Sleep, Connectedness Assessment and Intervention:    Sleep/stress assessment performed      Tobacco and Toxic Substance Assessment and Intervention:     Tobacco use screening performed         Subjective:      Patient ID: Monster Avilez is a 77 y o  male  Coming in for followup  He is generally well and has no subjective complaints  He denies any chest pains, shortness of breath, coughing  He is actively seeing his psychiatrist  His diet seems well rounded  He notes to eat a lot of vegetables and fish  He does not eat much red meat  He has well controlled diabetes and hyperlipidemia based on his recent laboratories  As for exercise, the patient admits that he was not active during the winter months, however plans to start walking again now that its warmer  Unfortunately patient continues to smoke and drink and was unable to cut back on that  No other subjective complaints noted        Past Medical History:   Diagnosis Date    Colon polyp     Diabetes mellitus ([facility] [address] )     Hyperlipidemia     Hypertension         Family History   Problem Relation Age of Onset    Heart failure Mother     Hypertension Other         Social History     Socioeconomic History    Marital status: Single     Spouse name: Not on file    Number of children: Not on file    Years of education: Not on file    Highest education level: Not on file   Occupational History    Not on file   Tobacco Use    Smoking status: Current Every Day Smoker    Smokeless tobacco: Former User    Tobacco comment: uses vape/current every day smoker (as per [employer])   Vaping Use    Vaping Use: Never used   Substance and Sexual Activity    Alcohol use: Yes     Comment: mostly on weekends    Drug use: No    Sexual activity: Not on file   Other Topics Concern  Not on file   Social History Narrative    Not on file     Social Determinants of Health     Financial Resource Strain: Not on file   Food Insecurity: Not on file   Transportation Needs: Not on file   Physical Activity: Not on file   Stress: Not on file   Social Connections: Not on file   Intimate Partner Violence: Not on file   Housing Stability: Not on file        Allergies   Allergen Reactions    Lisinopril Angioedema        Current Outpatient Medications   Medication Sig Dispense Refill    ALPRAZolam (XANAX) 0 25 mg tablet Take 1 tablet by mouth 2 (two) times a day      amLODIPine (NORVASC) 10 mg tablet TAKE 1 TABLET DAILY 90 tablet 3    aspirin 81 MG tablet Take 1 tablet (81 mg total) by mouth daily 90 tablet 1    atorvastatin (LIPITOR) 40 mg tablet TAKE 1 TABLET DAILY 90 tablet 3    hydrochlorothiazide (HYDRODIURIL) 12 5 mg tablet TAKE 1 TABLET DAILY 90 tablet 3    metFORMIN (GLUCOPHAGE) 1000 MG tablet Take 1 tablet (1,000 mg total) by mouth daily with breakfast 180 tablet 3    metroNIDAZOLE (METROGEL) 1 % gel APPLY TOPICALLY TO AFFECTED AREA ONCE DAILY 60 g 0    mirtazapine (REMERON) 30 mg tablet Take 30 mg by mouth daily at bedtime      PARoxetine (PAXIL) 40 MG tablet Take 40 mg by mouth every morning      varenicline (CHANTIX CLAU) 0 5 MG X 11 & 1 MG X 42 tablet Take one 0 5 mg tablet by mouth once daily for 3 days, then one 0 5 mg tablet by mouth twice daily for 4 days, then one 1 mg tablet by mouth twice daily  53 tablet 0     No current facility-administered medications for this visit  Review of Systems   Constitutional: Negative for chills and fever  HENT: Negative for ear pain and sore throat  Eyes: Negative for pain and visual disturbance  Respiratory: Negative for cough and shortness of breath  Cardiovascular: Negative for chest pain and palpitations  Gastrointestinal: Negative for abdominal pain and vomiting  Genitourinary: Negative for dysuria and hematuria  Musculoskeletal: Negative for arthralgias and back pain  Skin: Negative for color change and rash  Neurological: Negative for seizures and syncope  All other systems reviewed and are negative  Objective:      /80 (BP Location: Left arm, Patient Position: Sitting, Cuff Size: Adult)   Pulse 102   Temp 97 6 °F (36 4 °C) (Tympanic)   Resp 18   Ht 5' 9" (1 753 m)   Wt 75 8 kg (167 lb)   SpO2 98%   BMI 24 66 kg/m²      Wt Readings from Last 3 Encounters:   04/26/22 75 8 kg (167 lb)   12/21/21 74 8 kg (165 lb)   11/17/21 74 8 kg (165 lb)     Temp Readings from Last 3 Encounters:   04/26/22 97 6 °F (36 4 °C) (Tympanic)   12/21/21 97 6 °F (36 4 °C) (Temporal)   11/17/21 (!) 97 °F (36 1 °C) (Skin)     BP Readings from Last 3 Encounters:   04/26/22 138/80   11/17/21 140/70   10/14/21 122/60     Pulse Readings from Last 3 Encounters:   04/26/22 102   11/17/21 102   10/14/21 (!) 110         Physical Exam  Vitals and nursing note reviewed  Constitutional:       Appearance: He is well-developed and normal weight  HENT:      Head: Normocephalic and atraumatic  Nose: Nose normal       Mouth/Throat:      Mouth: Mucous membranes are moist    Eyes:      Conjunctiva/sclera: Conjunctivae normal    Cardiovascular:      Rate and Rhythm: Normal rate and regular rhythm  Heart sounds: Normal heart sounds  No murmur heard  Pulmonary:      Effort: Pulmonary effort is normal  No respiratory distress  Breath sounds: Normal breath sounds  Abdominal:      General: Abdomen is flat  Palpations: Abdomen is soft  Tenderness: There is no abdominal tenderness  Musculoskeletal:         General: No swelling  Cervical back: Neck supple  Right lower leg: No edema  Left lower leg: No edema  Skin:     General: Skin is warm and dry  Capillary Refill: Capillary refill takes less than 2 seconds  Neurological:      General: No focal deficit present        Mental Status: He is alert and oriented to person, place, and time  Mental status is at baseline     Psychiatric:         Mood and Affect: Mood normal           I have reviewed pertinent labs:

## 2022-04-26 NOTE — ASSESSMENT & PLAN NOTE
Patient has not been active lately   Smokes one pack a day  Will screen for peripheral artery disease with lower extremity vascular ultrasound, and AAA ultrasound screening

## 2022-04-26 NOTE — ASSESSMENT & PLAN NOTE
Patient noted to have wheezing over bilateral lung fields  He does have a long history of smoking and is still smoking about 1 pack of cigarettes a day  Does not endorse any shortness of breath, or coughing       Plan  Pulmonary function testing  CT scan chest screening

## 2022-05-27 DIAGNOSIS — E11.51 TYPE 2 DIABETES MELLITUS WITH DIABETIC PERIPHERAL ANGIOPATHY WITHOUT GANGRENE, WITHOUT LONG-TERM CURRENT USE OF INSULIN (HCC): ICD-10-CM

## 2022-07-21 ENCOUNTER — APPOINTMENT (OUTPATIENT)
Dept: LAB | Facility: CLINIC | Age: 67
End: 2022-07-21
Payer: COMMERCIAL

## 2022-07-21 LAB
ANION GAP SERPL CALCULATED.3IONS-SCNC: 5 MMOL/L (ref 4–13)
BUN SERPL-MCNC: 10 MG/DL (ref 5–25)
CALCIUM SERPL-MCNC: 9.2 MG/DL (ref 8.3–10.1)
CHLORIDE SERPL-SCNC: 105 MMOL/L (ref 96–108)
CHOLEST SERPL-MCNC: 160 MG/DL
CO2 SERPL-SCNC: 28 MMOL/L (ref 21–32)
CREAT SERPL-MCNC: 0.78 MG/DL (ref 0.6–1.3)
CREAT UR-MCNC: 15.9 MG/DL
EST. AVERAGE GLUCOSE BLD GHB EST-MCNC: 117 MG/DL
GFR SERPL CREATININE-BSD FRML MDRD: 94 ML/MIN/1.73SQ M
GLUCOSE P FAST SERPL-MCNC: 123 MG/DL (ref 65–99)
HBA1C MFR BLD: 5.7 %
HDLC SERPL-MCNC: 62 MG/DL
LDLC SERPL CALC-MCNC: 77 MG/DL (ref 0–100)
MICROALBUMIN UR-MCNC: <5 MG/L (ref 0–20)
MICROALBUMIN/CREAT 24H UR: <31 MG/G CREATININE (ref 0–30)
NONHDLC SERPL-MCNC: 98 MG/DL
POTASSIUM SERPL-SCNC: 3.8 MMOL/L (ref 3.5–5.3)
SODIUM SERPL-SCNC: 138 MMOL/L (ref 135–147)
TRIGL SERPL-MCNC: 103 MG/DL

## 2022-07-21 PROCEDURE — 83036 HEMOGLOBIN GLYCOSYLATED A1C: CPT | Performed by: INTERNAL MEDICINE

## 2022-07-21 PROCEDURE — 82570 ASSAY OF URINE CREATININE: CPT | Performed by: INTERNAL MEDICINE

## 2022-07-21 PROCEDURE — 80061 LIPID PANEL: CPT | Performed by: INTERNAL MEDICINE

## 2022-07-21 PROCEDURE — 80048 BASIC METABOLIC PNL TOTAL CA: CPT | Performed by: INTERNAL MEDICINE

## 2022-07-21 PROCEDURE — 36415 COLL VENOUS BLD VENIPUNCTURE: CPT | Performed by: INTERNAL MEDICINE

## 2022-07-21 PROCEDURE — 82043 UR ALBUMIN QUANTITATIVE: CPT | Performed by: INTERNAL MEDICINE

## 2022-07-26 ENCOUNTER — RA CDI HCC (OUTPATIENT)
Dept: OTHER | Facility: HOSPITAL | Age: 67
End: 2022-07-26

## 2022-07-26 NOTE — PROGRESS NOTES
[facility]  coding opportunities       Chart reviewed, no opportunity found:   [address]        Patients Insurance     Medicare Insurance: [employer]

## 2022-08-08 ENCOUNTER — OFFICE VISIT (OUTPATIENT)
Dept: INTERNAL MEDICINE CLINIC | Facility: CLINIC | Age: 67
End: 2022-08-08
Payer: COMMERCIAL

## 2022-08-08 VITALS
DIASTOLIC BLOOD PRESSURE: 70 MMHG | BODY MASS INDEX: 24.88 KG/M2 | RESPIRATION RATE: 16 BRPM | SYSTOLIC BLOOD PRESSURE: 140 MMHG | HEIGHT: 69 IN | TEMPERATURE: 98 F | HEART RATE: 109 BPM | WEIGHT: 168 LBS | OXYGEN SATURATION: 99 %

## 2022-08-08 DIAGNOSIS — F17.200 SMOKING: ICD-10-CM

## 2022-08-08 DIAGNOSIS — F41.8 DEPRESSION WITH ANXIETY: ICD-10-CM

## 2022-08-08 DIAGNOSIS — E11.51 TYPE 2 DIABETES MELLITUS WITH DIABETIC PERIPHERAL ANGIOPATHY WITHOUT GANGRENE, WITHOUT LONG-TERM CURRENT USE OF INSULIN (HCC): Primary | ICD-10-CM

## 2022-08-08 DIAGNOSIS — Z12.5 SCREENING FOR PROSTATE CANCER: ICD-10-CM

## 2022-08-08 DIAGNOSIS — I10 PRIMARY HYPERTENSION: ICD-10-CM

## 2022-08-08 DIAGNOSIS — I65.23 BILATERAL CAROTID ARTERY STENOSIS: ICD-10-CM

## 2022-08-08 DIAGNOSIS — I70.219 ATHEROSCLEROTIC PVD WITH INTERMITTENT CLAUDICATION (HCC): ICD-10-CM

## 2022-08-08 DIAGNOSIS — J41.0 SIMPLE CHRONIC BRONCHITIS (HCC): ICD-10-CM

## 2022-08-08 DIAGNOSIS — E78.5 HYPERLIPIDEMIA, UNSPECIFIED HYPERLIPIDEMIA TYPE: ICD-10-CM

## 2022-08-08 PROBLEM — J42 CHRONIC BRONCHITIS (HCC): Status: ACTIVE | Noted: 2022-08-08

## 2022-08-08 PROBLEM — F10.90 ALCOHOL USE: Status: RESOLVED | Noted: 2020-11-06 | Resolved: 2022-08-08

## 2022-08-08 PROBLEM — Z72.89 ALCOHOL USE: Status: RESOLVED | Noted: 2020-11-06 | Resolved: 2022-08-08

## 2022-08-08 PROBLEM — IMO0001 SMOKING: Status: ACTIVE | Noted: 2020-01-28

## 2022-08-08 PROBLEM — R06.2 WHEEZING: Status: RESOLVED | Noted: 2022-04-26 | Resolved: 2022-08-08

## 2022-08-08 PROBLEM — Z72.0 TOBACCO ABUSE: Status: RESOLVED | Noted: 2021-12-21 | Resolved: 2022-08-08

## 2022-08-08 PROBLEM — Z78.9 ALCOHOL USE: Status: RESOLVED | Noted: 2020-11-06 | Resolved: 2022-08-08

## 2022-08-08 PROCEDURE — 3078F DIAST BP <80 MM HG: CPT | Performed by: INTERNAL MEDICINE

## 2022-08-08 PROCEDURE — 1160F RVW MEDS BY RX/DR IN RCRD: CPT | Performed by: INTERNAL MEDICINE

## 2022-08-08 PROCEDURE — 3077F SYST BP >= 140 MM HG: CPT | Performed by: INTERNAL MEDICINE

## 2022-08-08 PROCEDURE — 99214 OFFICE O/P EST MOD 30 MIN: CPT | Performed by: INTERNAL MEDICINE

## 2022-08-08 NOTE — PROGRESS NOTES
INTERNAL MEDICINE OFFICE VISIT       NAME: Beverli Primrose  AGE: 77 y o  SEX: male       : 1955        MRN: [id number]    DATE: 2022  TIME: 8:02 AM    Assessment and Plan   1  Type 2 diabetes mellitus with diabetic peripheral angiopathy without gangrene, without long-term current use of insulin (HCC)  -     Hemoglobin A1C    2  Primary hypertension  -     Comprehensive metabolic panel  -     Urinalysis with microscopic    3  Hyperlipidemia, unspecified hyperlipidemia type  -     Lipid panel; Future    4  Bilateral carotid artery stenosis  -     VAS carotid complete study; Future; Expected date: 2022    5  Atherosclerotic PVD with intermittent claudication ([facility] [address] )    6  Simple chronic bronchitis (HCC)  -     CBC and differential    7  Smoking    8  Depression with anxiety    9  Screening for prostate cancer  -     PSA, Total Screen         Follow-up 3 months  Labs ordered today are to be done 1 week prior  Lisy Reyes agrees to carotid, abdominal aortic and lower extremity arterial Doppler studies  He has trouble with tasks so he wants to wait for CT lung screen scheduling after the vascular studies, and then wants to wait until after this study is done before he has pulmonary function test       When he is notified about the results of his vascular studies, we will then schedule the CT scan  He was strongly encouraged to quit smoking and stop drinking  Help was offered but he declines intervention  There is significant short-term memory decline and we will need to address this next visit  This will involve diagnostic testing and specially follow-up so this will proceed slowly as per patient's limitations he is placing on scheduling  Most likely etiology ease of his memory decline are alcohol and vascular dementia  Chief Complaint   Follow up    History of Present Illness   Beverli Primrose is a 77y o -year-old male who is here today for a follow-up visit  Juanaudrey Amy was last seen   At that time, vascular ultrasound, lung CT and pulmonary function tests were ordered and were not done  We discussed the importance of these is agreeable to allowing us to schedule these 1 at a time  July 21st labs reviewed with hemoglobin A1c of 5 7, negative microalbumin, total cholesterol 160 and blood sugar 123 and GFR 94  Anatoly De Paz continues to drink a six-pack 5/7 days per week  I estimate he has a plan to drink last   He does not have a specific plan but is interested in volunteering at a local hospital and states that he drinks less when he volunteers  Anatoly De Paz continues to smoke pack cigarettes a day and does not wish to quit  Screening for depression is negative  Blood pressure is slightly above optimal however there seems to be a role with alcohol  Will consider additional medicine next visit depending on blood pressure  Reports no recent acute illnesses  Review of Systems   Review of Systems   HENT: Negative  He has poor dentition and does not wish to go to the dentist    Respiratory: Positive for cough  Negative for chest tightness, shortness of breath and wheezing  Cardiovascular: Negative  Also, no TIA symptoms, ischemic rest pain or claudication  Genitourinary: Negative  Musculoskeletal: Negative  Neurological: Negative  Psychiatric/Behavioral: Negative          Active Problem List     Patient Active Problem List   Diagnosis    Atherosclerotic PVD with intermittent claudication (HCC)    Depression with anxiety    Hypertension    Hyperlipidemia    Type 2 diabetes mellitus with diabetic peripheral angiopathy without gangrene, without long-term current use of insulin (HCC)    Smoking    Rosacea    Rhinophyma    Bilateral carotid artery stenosis    Chronic bronchitis ([facility] [address] )       The following portions of the patient's history were reviewed and updated as appropriate: allergies, current medications, past family history, past medical history, past social history, past surgical history, and problem list     Objective     Vitals:    08/08/22 0741   BP: 140/70   Pulse: (!) 109   Resp: 16   Temp: 98 °F (36 7 °C)   SpO2: 99%     Wt Readings from Last 3 Encounters:   08/08/22 76 2 kg (168 lb)   04/26/22 75 8 kg (167 lb)   12/21/21 74 8 kg (165 lb)       Physical Exam     Alert, oriented to person and place, oriented to time, with some trouble with short-term memory, preserved long-term memory and executive functions  Affect is normal   There is no ataxia of gait  There are no focal neurologic deficits  Skin warm and dry without pallor or icterus  Oral cavity and throat notable for somewhat poor dentition, possible gingivitis  Vision well preserved  No JVD  No carotid bruits in carotid pulses normal   Lungs:  Faint rhonchi, no wheezes or rales, good air exchange overall, expiratory phase is slightly prolonged  AP diameter of chest is slightly increased  Cardiac: Regular rate and rhythm, normal S1-S2, no murmur, no S4 or S3  Abdomen:  Normal bowel sounds, nondistended and nontender without mass or bruit  Extremities:  Upper extremity pulses are 2/2  Popliteal pulses are 1/2 as our posterior tibial and dorsalis pedis pulses  Skin integrity is well preserved  No phlebitic findings or calf tenderness      Pertinent Laboratory/Diagnostic Studies:        Orders Placed This Encounter   Procedures    CBC and differential    Comprehensive metabolic panel    Lipid panel    PSA, Total Screen    Urinalysis with microscopic    Hemoglobin A1C       ALLERGIES:  Allergies   Allergen Reactions    Lisinopril Angioedema       Current Medications     Current Outpatient Medications   Medication Sig Dispense Refill    ALPRAZolam (XANAX) 0 25 mg tablet Take 1 tablet by mouth 2 (two) times a day      amLODIPine (NORVASC) 10 mg tablet TAKE 1 TABLET DAILY 90 tablet 3    aspirin 81 MG tablet Take 1 tablet (81 mg total) by mouth daily 90 tablet 1    atorvastatin (LIPITOR) 40 mg tablet TAKE 1 TABLET DAILY 90 tablet 3    hydrochlorothiazide (HYDRODIURIL) 12 5 mg tablet TAKE 1 TABLET DAILY 90 tablet 3    metFORMIN (GLUCOPHAGE) 1000 MG tablet Take 1 tablet (1,000 mg total) by mouth daily with breakfast 180 tablet 3    metroNIDAZOLE (METROGEL) 1 % gel APPLY TOPICALLY TO AFFECTED AREA ONCE DAILY 60 g 0    mirtazapine (REMERON) 30 mg tablet Take 30 mg by mouth daily at bedtime      PARoxetine (PAXIL) 40 MG tablet Take 40 mg by mouth every morning      varenicline (CHANTIX CLAU) 0 5 MG X 11 & 1 MG X 42 tablet Take one 0 5 mg tablet by mouth once daily for 3 days, then one 0 5 mg tablet by mouth twice daily for 4 days, then one 1 mg tablet by mouth twice daily  53 tablet 0     No current facility-administered medications for this visit           Cherylene Rondo, MD

## 2022-08-30 ENCOUNTER — HOSPITAL ENCOUNTER (OUTPATIENT)
Dept: NON INVASIVE DIAGNOSTICS | Facility: CLINIC | Age: 67
Discharge: HOME | End: 2022-08-30
Payer: COMMERCIAL

## 2022-08-30 DIAGNOSIS — I65.23 BILATERAL CAROTID ARTERY STENOSIS: ICD-10-CM

## 2022-08-30 PROCEDURE — 93880 EXTRACRANIAL BILAT STUDY: CPT | Performed by: SURGERY

## 2022-08-30 PROCEDURE — 93880 EXTRACRANIAL BILAT STUDY: CPT

## 2022-08-30 NOTE — RESULT ENCOUNTER NOTE
Casey Moore,  Your carotid ultrasound study is stable, with moderate narrowing of the right internal carotid artery  The radiology recommendation is to repeat this test in 6 months  We'll schedule this at your next check up     Dr Lillie Roberson

## 2022-09-21 ENCOUNTER — APPOINTMENT (EMERGENCY)
Dept: RADIOLOGY | Facility: HOSPITAL | Age: 67
DRG: 897 | End: 2022-09-21
Payer: COMMERCIAL

## 2022-09-21 ENCOUNTER — HOSPITAL ENCOUNTER (INPATIENT)
Facility: HOSPITAL | Age: 67
LOS: 4 days | Discharge: HOME | DRG: 897 | End: 2022-09-25
Attending: EMERGENCY MEDICINE | Admitting: INTERNAL MEDICINE
Payer: COMMERCIAL

## 2022-09-21 ENCOUNTER — APPOINTMENT (OUTPATIENT)
Dept: RADIOLOGY | Facility: HOSPITAL | Age: 67
DRG: 897 | End: 2022-09-21
Payer: COMMERCIAL

## 2022-09-21 DIAGNOSIS — F10.939 ALCOHOL WITHDRAWAL SEIZURE (HCC): ICD-10-CM

## 2022-09-21 DIAGNOSIS — R56.9 ALCOHOL WITHDRAWAL SEIZURE (HCC): ICD-10-CM

## 2022-09-21 DIAGNOSIS — F10.939 ALCOHOL WITHDRAWAL (HCC): Primary | ICD-10-CM

## 2022-09-21 DIAGNOSIS — F10.931 ALCOHOL WITHDRAWAL SYNDROME, WITH DELIRIUM (HCC): ICD-10-CM

## 2022-09-21 PROBLEM — F10.231 ALCOHOL WITHDRAWAL SYNDROME, WITH DELIRIUM (HCC): Status: ACTIVE | Noted: 2022-09-21

## 2022-09-21 LAB
ALBUMIN SERPL BCP-MCNC: 3.6 G/DL (ref 3.5–5)
ALP SERPL-CCNC: 92 U/L (ref 46–116)
ALT SERPL W P-5'-P-CCNC: 119 U/L (ref 12–78)
ANION GAP SERPL CALCULATED.3IONS-SCNC: 30 MMOL/L (ref 4–13)
ANION GAP SERPL CALCULATED.3IONS-SCNC: 8 MMOL/L (ref 4–13)
APAP SERPL-MCNC: <2 UG/ML (ref 10–20)
AST SERPL W P-5'-P-CCNC: 197 U/L (ref 5–45)
BACTERIA UR QL AUTO: ABNORMAL /HPF
BASE EX.OXY STD BLDV CALC-SCNC: 95.3 % (ref 60–80)
BASE EX.OXY STD BLDV CALC-SCNC: 95.7 % (ref 60–80)
BASE EXCESS BLDV CALC-SCNC: -20.8 MMOL/L
BASE EXCESS BLDV CALC-SCNC: 1.4 MMOL/L
BILIRUB SERPL-MCNC: 0.37 MG/DL (ref 0.2–1)
BILIRUB UR QL STRIP: NEGATIVE
BUN SERPL-MCNC: 8 MG/DL (ref 5–25)
BUN SERPL-MCNC: 9 MG/DL (ref 5–25)
CALCIUM SERPL-MCNC: 8.4 MG/DL (ref 8.3–10.1)
CALCIUM SERPL-MCNC: 9.1 MG/DL (ref 8.3–10.1)
CHLORIDE SERPL-SCNC: 101 MMOL/L (ref 96–108)
CHLORIDE SERPL-SCNC: 103 MMOL/L (ref 96–108)
CLARITY UR: CLEAR
CO2 SERPL-SCNC: 26 MMOL/L (ref 21–32)
CO2 SERPL-SCNC: 9 MMOL/L (ref 21–32)
COLOR UR: ABNORMAL
CREAT SERPL-MCNC: 0.73 MG/DL (ref 0.6–1.3)
CREAT SERPL-MCNC: 1.18 MG/DL (ref 0.6–1.3)
ERYTHROCYTE [DISTWIDTH] IN BLOOD BY AUTOMATED COUNT: 14.3 % (ref 11.6–15.1)
ETHANOL SERPL-MCNC: 91 MG/DL (ref 0–3)
GFR SERPL CREATININE-BSD FRML MDRD: 63 ML/MIN/1.73SQ M
GFR SERPL CREATININE-BSD FRML MDRD: 96 ML/MIN/1.73SQ M
GLUCOSE SERPL-MCNC: 123 MG/DL (ref 65–140)
GLUCOSE SERPL-MCNC: 128 MG/DL (ref 65–140)
GLUCOSE SERPL-MCNC: 137 MG/DL (ref 65–140)
GLUCOSE UR STRIP-MCNC: NEGATIVE MG/DL
HCO3 BLDV-SCNC: 24.3 MMOL/L (ref 24–30)
HCO3 BLDV-SCNC: 8.6 MMOL/L (ref 24–30)
HCT VFR BLD AUTO: 42.7 % (ref 36.5–49.3)
HGB BLD-MCNC: 13.8 G/DL (ref 12–17)
HGB UR QL STRIP.AUTO: ABNORMAL
HYALINE CASTS #/AREA URNS LPF: ABNORMAL /LPF
KETONES UR STRIP-MCNC: ABNORMAL MG/DL
LACTATE SERPL-SCNC: 1.3 MMOL/L (ref 0.5–2)
LEUKOCYTE ESTERASE UR QL STRIP: NEGATIVE
MAGNESIUM SERPL-MCNC: 2.2 MG/DL (ref 1.6–2.6)
MCH RBC QN AUTO: 33.6 PG (ref 26.8–34.3)
MCHC RBC AUTO-ENTMCNC: 32.3 G/DL (ref 31.4–37.4)
MCV RBC AUTO: 104 FL (ref 82–98)
NITRITE UR QL STRIP: NEGATIVE
NON-SQ EPI CELLS URNS QL MICRO: ABNORMAL /HPF
O2 CT BLDV-SCNC: 18.1 ML/DL
O2 CT BLDV-SCNC: 20.1 ML/DL
PCO2 BLDV: 31.9 MM HG (ref 42–50)
PCO2 BLDV: 33.1 MM HG (ref 42–50)
PH BLDV: 7.05 [PH] (ref 7.3–7.4)
PH BLDV: 7.48 [PH] (ref 7.3–7.4)
PH UR STRIP.AUTO: 5.5 [PH]
PLATELET # BLD AUTO: 208 THOUSANDS/UL (ref 149–390)
PMV BLD AUTO: 9.5 FL (ref 8.9–12.7)
PO2 BLDV: 171.4 MM HG (ref 35–45)
PO2 BLDV: 91.3 MM HG (ref 35–45)
POTASSIUM SERPL-SCNC: 3.2 MMOL/L (ref 3.5–5.3)
POTASSIUM SERPL-SCNC: 3.4 MMOL/L (ref 3.5–5.3)
PROLACTIN SERPL-MCNC: 7.8 NG/ML (ref 2.5–17.4)
PROT SERPL-MCNC: 7.3 G/DL (ref 6.4–8.4)
PROT UR STRIP-MCNC: ABNORMAL MG/DL
RBC # BLD AUTO: 4.11 MILLION/UL (ref 3.88–5.62)
RBC #/AREA URNS AUTO: ABNORMAL /HPF
SALICYLATES SERPL-MCNC: 4 MG/DL (ref 3–20)
SODIUM SERPL-SCNC: 137 MMOL/L (ref 135–147)
SODIUM SERPL-SCNC: 140 MMOL/L (ref 135–147)
SP GR UR STRIP.AUTO: 1.01 (ref 1–1.03)
UROBILINOGEN UR STRIP-ACNC: <2 MG/DL
WBC # BLD AUTO: 13.18 THOUSAND/UL (ref 4.31–10.16)
WBC #/AREA URNS AUTO: ABNORMAL /HPF

## 2022-09-21 PROCEDURE — 96367 TX/PROPH/DG ADDL SEQ IV INF: CPT

## 2022-09-21 PROCEDURE — 82948 REAGENT STRIP/BLOOD GLUCOSE: CPT

## 2022-09-21 PROCEDURE — 99285 EMERGENCY DEPT VISIT HI MDM: CPT

## 2022-09-21 PROCEDURE — 70450 CT HEAD/BRAIN W/O DYE: CPT

## 2022-09-21 PROCEDURE — 96374 THER/PROPH/DIAG INJ IV PUSH: CPT

## 2022-09-21 PROCEDURE — G1004 CDSM NDSC: HCPCS

## 2022-09-21 PROCEDURE — 83605 ASSAY OF LACTIC ACID: CPT | Performed by: STUDENT IN AN ORGANIZED HEALTH CARE EDUCATION/TRAINING PROGRAM

## 2022-09-21 PROCEDURE — 96375 TX/PRO/DX INJ NEW DRUG ADDON: CPT

## 2022-09-21 PROCEDURE — 84146 ASSAY OF PROLACTIN: CPT | Performed by: STUDENT IN AN ORGANIZED HEALTH CARE EDUCATION/TRAINING PROGRAM

## 2022-09-21 PROCEDURE — 96365 THER/PROPH/DIAG IV INF INIT: CPT

## 2022-09-21 PROCEDURE — 71045 X-RAY EXAM CHEST 1 VIEW: CPT

## 2022-09-21 PROCEDURE — 81001 URINALYSIS AUTO W/SCOPE: CPT | Performed by: STUDENT IN AN ORGANIZED HEALTH CARE EDUCATION/TRAINING PROGRAM

## 2022-09-21 PROCEDURE — 93005 ELECTROCARDIOGRAM TRACING: CPT

## 2022-09-21 PROCEDURE — 80179 DRUG ASSAY SALICYLATE: CPT | Performed by: EMERGENCY MEDICINE

## 2022-09-21 PROCEDURE — 83735 ASSAY OF MAGNESIUM: CPT | Performed by: STUDENT IN AN ORGANIZED HEALTH CARE EDUCATION/TRAINING PROGRAM

## 2022-09-21 PROCEDURE — 80053 COMPREHEN METABOLIC PANEL: CPT | Performed by: EMERGENCY MEDICINE

## 2022-09-21 PROCEDURE — 99448 NTRPROF PH1/NTRNET/EHR 21-30: CPT | Performed by: EMERGENCY MEDICINE

## 2022-09-21 PROCEDURE — 36415 COLL VENOUS BLD VENIPUNCTURE: CPT | Performed by: EMERGENCY MEDICINE

## 2022-09-21 PROCEDURE — 82805 BLOOD GASES W/O2 SATURATION: CPT | Performed by: STUDENT IN AN ORGANIZED HEALTH CARE EDUCATION/TRAINING PROGRAM

## 2022-09-21 PROCEDURE — 99291 CRITICAL CARE FIRST HOUR: CPT | Performed by: INTERNAL MEDICINE

## 2022-09-21 PROCEDURE — 82805 BLOOD GASES W/O2 SATURATION: CPT | Performed by: EMERGENCY MEDICINE

## 2022-09-21 PROCEDURE — 80143 DRUG ASSAY ACETAMINOPHEN: CPT | Performed by: EMERGENCY MEDICINE

## 2022-09-21 PROCEDURE — 80048 BASIC METABOLIC PNL TOTAL CA: CPT | Performed by: STUDENT IN AN ORGANIZED HEALTH CARE EDUCATION/TRAINING PROGRAM

## 2022-09-21 PROCEDURE — 85025 COMPLETE CBC W/AUTO DIFF WBC: CPT | Performed by: EMERGENCY MEDICINE

## 2022-09-21 PROCEDURE — 82077 ASSAY SPEC XCP UR&BREATH IA: CPT | Performed by: EMERGENCY MEDICINE

## 2022-09-21 PROCEDURE — 99291 CRITICAL CARE FIRST HOUR: CPT | Performed by: EMERGENCY MEDICINE

## 2022-09-21 RX ORDER — PAROXETINE HYDROCHLORIDE 20 MG/1
40 TABLET, FILM COATED ORAL EVERY MORNING
Status: DISCONTINUED | OUTPATIENT
Start: 2022-09-22 | End: 2022-09-21

## 2022-09-21 RX ORDER — CHLORHEXIDINE GLUCONATE 0.12 MG/ML
15 RINSE ORAL EVERY 12 HOURS SCHEDULED
Status: DISCONTINUED | OUTPATIENT
Start: 2022-09-21 | End: 2022-09-25 | Stop reason: HOSPADM

## 2022-09-21 RX ORDER — LORAZEPAM 2 MG/ML
2 INJECTION INTRAMUSCULAR ONCE
Status: COMPLETED | OUTPATIENT
Start: 2022-09-21 | End: 2022-09-21

## 2022-09-21 RX ORDER — ATORVASTATIN CALCIUM 40 MG/1
40 TABLET, FILM COATED ORAL DAILY
Status: DISCONTINUED | OUTPATIENT
Start: 2022-09-22 | End: 2022-09-25 | Stop reason: HOSPADM

## 2022-09-21 RX ORDER — HEPARIN SODIUM 5000 [USP'U]/ML
5000 INJECTION, SOLUTION INTRAVENOUS; SUBCUTANEOUS EVERY 8 HOURS SCHEDULED
Status: DISCONTINUED | OUTPATIENT
Start: 2022-09-22 | End: 2022-09-23

## 2022-09-21 RX ORDER — POTASSIUM CHLORIDE 29.8 MG/ML
40 INJECTION INTRAVENOUS ONCE
Status: DISCONTINUED | OUTPATIENT
Start: 2022-09-21 | End: 2022-09-21 | Stop reason: SDUPTHER

## 2022-09-21 RX ORDER — LABETALOL HYDROCHLORIDE 5 MG/ML
10 INJECTION, SOLUTION INTRAVENOUS ONCE AS NEEDED
Status: COMPLETED | OUTPATIENT
Start: 2022-09-21 | End: 2022-09-21

## 2022-09-21 RX ORDER — ASPIRIN 81 MG/1
81 TABLET, CHEWABLE ORAL DAILY
Status: DISCONTINUED | OUTPATIENT
Start: 2022-09-22 | End: 2022-09-25 | Stop reason: HOSPADM

## 2022-09-21 RX ORDER — CLONIDINE HYDROCHLORIDE 0.2 MG/1
0.2 TABLET ORAL 4 TIMES DAILY PRN
Status: DISCONTINUED | OUTPATIENT
Start: 2022-09-21 | End: 2022-09-25 | Stop reason: HOSPADM

## 2022-09-21 RX ORDER — SODIUM CHLORIDE, SODIUM LACTATE, POTASSIUM CHLORIDE, CALCIUM CHLORIDE 600; 310; 30; 20 MG/100ML; MG/100ML; MG/100ML; MG/100ML
125 INJECTION, SOLUTION INTRAVENOUS CONTINUOUS
Status: DISCONTINUED | OUTPATIENT
Start: 2022-09-21 | End: 2022-09-24

## 2022-09-21 RX ORDER — METOPROLOL TARTRATE 5 MG/5ML
5 INJECTION INTRAVENOUS EVERY 6 HOURS PRN
Status: DISCONTINUED | OUTPATIENT
Start: 2022-09-21 | End: 2022-09-21

## 2022-09-21 RX ORDER — MIRTAZAPINE 30 MG/1
30 TABLET, FILM COATED ORAL
Status: DISCONTINUED | OUTPATIENT
Start: 2022-09-21 | End: 2022-09-25 | Stop reason: HOSPADM

## 2022-09-21 RX ORDER — PHENOBARBITAL SODIUM 65 MG/ML
130 INJECTION INTRAMUSCULAR ONCE
Status: DISCONTINUED | OUTPATIENT
Start: 2022-09-21 | End: 2022-09-21

## 2022-09-21 RX ORDER — POTASSIUM CHLORIDE 14.9 MG/ML
20 INJECTION INTRAVENOUS
Status: COMPLETED | OUTPATIENT
Start: 2022-09-21 | End: 2022-09-22

## 2022-09-21 RX ORDER — LABETALOL HYDROCHLORIDE 5 MG/ML
10 INJECTION, SOLUTION INTRAVENOUS ONCE
Status: DISCONTINUED | OUTPATIENT
Start: 2022-09-21 | End: 2022-09-21

## 2022-09-21 RX ORDER — PHENOBARBITAL SODIUM 65 MG/ML
130 INJECTION INTRAMUSCULAR ONCE
Status: COMPLETED | OUTPATIENT
Start: 2022-09-21 | End: 2022-09-21

## 2022-09-21 RX ORDER — DEXMEDETOMIDINE HYDROCHLORIDE 4 UG/ML
.1-.7 INJECTION, SOLUTION INTRAVENOUS
Status: DISCONTINUED | OUTPATIENT
Start: 2022-09-21 | End: 2022-09-21

## 2022-09-21 RX ORDER — LABETALOL HYDROCHLORIDE 5 MG/ML
10 INJECTION, SOLUTION INTRAVENOUS AS NEEDED
Status: DISCONTINUED | OUTPATIENT
Start: 2022-09-21 | End: 2022-09-21

## 2022-09-21 RX ORDER — SODIUM CHLORIDE, SODIUM GLUCONATE, SODIUM ACETATE, POTASSIUM CHLORIDE, MAGNESIUM CHLORIDE, SODIUM PHOSPHATE, DIBASIC, AND POTASSIUM PHOSPHATE .53; .5; .37; .037; .03; .012; .00082 G/100ML; G/100ML; G/100ML; G/100ML; G/100ML; G/100ML; G/100ML
1000 INJECTION, SOLUTION INTRAVENOUS ONCE
Status: COMPLETED | OUTPATIENT
Start: 2022-09-21 | End: 2022-09-21

## 2022-09-21 RX ORDER — HEPARIN SODIUM 5000 [USP'U]/ML
5000 INJECTION, SOLUTION INTRAVENOUS; SUBCUTANEOUS EVERY 8 HOURS SCHEDULED
Status: DISCONTINUED | OUTPATIENT
Start: 2022-09-21 | End: 2022-09-21

## 2022-09-21 RX ADMIN — LABETALOL HYDROCHLORIDE 10 MG: 5 INJECTION, SOLUTION INTRAVENOUS at 22:38

## 2022-09-21 RX ADMIN — LORAZEPAM 2 MG: 2 INJECTION INTRAMUSCULAR; INTRAVENOUS at 13:17

## 2022-09-21 RX ADMIN — THIAMINE HYDROCHLORIDE 500 MG: 100 INJECTION, SOLUTION INTRAMUSCULAR; INTRAVENOUS at 21:28

## 2022-09-21 RX ADMIN — PHENOBARBITAL SODIUM 130 MG: 65 INJECTION INTRAMUSCULAR; INTRAVENOUS at 23:22

## 2022-09-21 RX ADMIN — PHENOBARBITAL SODIUM 260 MG: 65 INJECTION INTRAMUSCULAR; INTRAVENOUS at 17:38

## 2022-09-21 RX ADMIN — PHENOBARBITAL SODIUM 650 MG: 65 INJECTION INTRAMUSCULAR; INTRAVENOUS at 15:05

## 2022-09-21 RX ADMIN — THIAMINE HYDROCHLORIDE 500 MG: 100 INJECTION, SOLUTION INTRAMUSCULAR; INTRAVENOUS at 15:55

## 2022-09-21 RX ADMIN — Medication 0.1 MG/KG/HR: at 22:06

## 2022-09-21 RX ADMIN — POTASSIUM CHLORIDE 20 MEQ: 14.9 INJECTION, SOLUTION INTRAVENOUS at 20:21

## 2022-09-21 RX ADMIN — SODIUM CHLORIDE, SODIUM GLUCONATE, SODIUM ACETATE, POTASSIUM CHLORIDE, MAGNESIUM CHLORIDE, SODIUM PHOSPHATE, DIBASIC, AND POTASSIUM PHOSPHATE 1000 ML: .53; .5; .37; .037; .03; .012; .00082 INJECTION, SOLUTION INTRAVENOUS at 16:10

## 2022-09-21 RX ADMIN — SODIUM CHLORIDE, SODIUM LACTATE, POTASSIUM CHLORIDE, AND CALCIUM CHLORIDE 125 ML/HR: .6; .31; .03; .02 INJECTION, SOLUTION INTRAVENOUS at 18:18

## 2022-09-21 RX ADMIN — PHENOBARBITAL SODIUM 650 MG: 65 INJECTION INTRAMUSCULAR; INTRAVENOUS at 20:39

## 2022-09-21 RX ADMIN — CHLORHEXIDINE GLUCONATE 15 ML: 1.2 SOLUTION ORAL at 20:58

## 2022-09-21 RX ADMIN — MAGNESIUM OXIDE TAB 400 MG (241.3 MG ELEMENTAL MG) 400 MG: 400 (241.3 MG) TAB at 23:22

## 2022-09-21 RX ADMIN — LORAZEPAM 2 MG: 2 INJECTION INTRAMUSCULAR; INTRAVENOUS at 16:10

## 2022-09-21 RX ADMIN — POTASSIUM CHLORIDE 20 MEQ: 14.9 INJECTION, SOLUTION INTRAVENOUS at 22:41

## 2022-09-21 RX ADMIN — HEPARIN SODIUM 5000 UNITS: 5000 INJECTION INTRAVENOUS; SUBCUTANEOUS at 20:21

## 2022-09-21 RX ADMIN — POTASSIUM CHLORIDE 20 MEQ: 14.9 INJECTION, SOLUTION INTRAVENOUS at 18:46

## 2022-09-21 RX ADMIN — DEXMEDETOMIDINE HYDROCHLORIDE 0.1 MCG/KG/HR: 400 INJECTION INTRAVENOUS at 18:38

## 2022-09-21 NOTE — ED PROCEDURE NOTE
PROCEDURE  CriticalCare Time  Performed by: Barbara Gill DO  Authorized by:  Barbara Gill DO     Critical care provider statement:     Critical care time (minutes):  33    Critical care time was exclusive of:  Separately billable procedures and treating other patients and teaching time    Critical care was necessary to treat or prevent imminent or life-threatening deterioration of the following conditions:  Toxidrome    Critical care was time spent personally by me on the following activities:  Blood draw for specimens, obtaining history from patient or surrogate, development of treatment plan with patient or surrogate, discussions with consultants, evaluation of patient's response to treatment, examination of patient, review of old charts, re-evaluation of patient's condition, ordering and review of radiographic studies, ordering and review of laboratory studies and ordering and performing treatments and interventions    I assumed direction of critical care for this patient from another provider in my specialty: leon Gill DO  09/21/22 8484
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
## 2022-09-21 NOTE — ED NOTES
Pt actively seizing in [facility] upon arrival to ED  Temo Witt, Dr Jesenia Espana, Dr Jamaal Reyez at bedside  Pt placed on [place] oxygen  IV access established  Pt given 2mg IV ativan per Dr Mika Marie, DAYANNA  09/21/22 6700

## 2022-09-21 NOTE — CONSULTS
INTERPROFESSIONAL (PHONE) [facility] 1980 Toxicology  Renetta Marking 77 y o  male MRN: [id number]  Unit/Bed#: ED 24 Encounter: [id number]      Reason for Consult / Principal Problem: Seizure  Inpatient consult to Toxicology  Consult performed by: Rafael Thomas DO  Consult ordered by: Raleigh Valverde DO        09/21/22      ASSESSMENT:  1  Generalized seizure  2  Alcohol Withdrawal syndrome  3  Alcohol Use disorder, severe dependence  4  Chronic benzodiazepine dependence  4  Metabolic acidosis secondary to seizure  RECOMMENDATIONS:  The patient is at high risk for severe complicated withdrawal given the fact that he has evidence of severe withdrawal with an ETOH level of 91 coupled with the fact that he also is prescribed benzodiazepines (alprazolam) chronically  I recommend 650mg of phenobarbital Iv over 30 mins now  Additional 130-260mg doses can be administered every 2-4 hours to a goal of RASS -2  If the patient is approaching 2 grams of phenobarbital and still exhibiting significant withdrawal, then adjunctive treatment can be started  Options include either ketamine at 0 1mg/kg/hr titrated to a max of 1mg/kg/hr or dexmedetomidine at 0 1mcg/kg/hr titrated to a max of 1mcg/kg/hr  Dexmedetomidine should not be initiated until the patient is approaching 2 grams og phenobarbital as pharmacologically it does not address the underlying pathophysiology of ETOH withdrawal  It's use is an an adjunct only in someone who has already received adequate doses of NELIA agonist / NMDA antagonist both of which phenobarbital provide  I recommend high dose thiamine 500mg IV every 8 hours for 3 days also  Due to current nursign staffing, the detox unit is unable to accept new admissions until after 7pm  I informed the ED provider of this situation and the plan is admission to ICU there for now  ICU may reach out for possible transfer if desired           For further questions, please contact the medical  on call via [employer] or throughl the [employer] or [employer]  Please see additional teaching note below:    Hx and PE limited by the dynamics of a phone consultation  I have not personally interviewed or evaluated the patient, but only advised based on the information provided to me  Primary provider is responsible for all clinical decisions  Pertinent history, physical exam and clinical findings and course discussed: Radha Dorantes is a 77y o  year old male who presents with seizure x2  The patient has a known h/o AUD  He reportedly significantly cut back his drinking 2 days ago  EMS was activated for alcohol withdrawal complaints  He had a witnessed generalized seizure in the ambulance and another one in the ED  He was given lorazepam initially for the withdrawal and seizure  Review of systems and physical exam not performed by me  Historical Information   Past Medical History:   Diagnosis Date    Colon polyp     Diabetes mellitus ([facility] [address] )     Hyperlipidemia     Hypertension      Past Surgical History:   Procedure Laterality Date    COLON SURGERY      found growth, bengin    COLONOSCOPY      COLONOSCOPY N/A 3/13/2018    Procedure: COLONOSCOPY;  Surgeon: Lazaro Wells DO;  Location: [facility]; Service: Gastroenterology     Social History   Social History     Substance and Sexual Activity   Alcohol Use Yes    Comment: mostly on weekends     Social History     Substance and Sexual Activity   Drug Use No     Social History     Tobacco Use   Smoking Status Current Every Day Smoker   Smokeless Tobacco Former User   Tobacco Comment    uses vape/current every day smoker (as per [employer])     Family History   Problem Relation Age of Onset    Heart failure Mother     Hypertension Other         Prior to Admission medications    Medication Sig Start Date End Date Taking?  Authorizing Provider   ALPRAZolam Nicolasa Starch) 0 25 mg tablet Take 1 tablet by mouth 2 (two) times a day 3/23/18   Historical Provider, MD   amLODIPine (NORVASC) 10 mg tablet TAKE 1 TABLET DAILY 10/1/21   Max Skip Willo, DO   aspirin 81 MG tablet Take 1 tablet (81 mg total) by mouth daily 9/17/19   Ronda Hudson MD   atorvastatin (LIPITOR) 40 mg tablet TAKE 1 TABLET DAILY 10/1/21   Max Skip Zoraida, DO   hydrochlorothiazide (HYDRODIURIL) 12 5 mg tablet TAKE 1 TABLET DAILY 10/1/21   Max Skip Zoraida, DO   metFORMIN (GLUCOPHAGE) 1000 MG tablet Take 1 tablet (1,000 mg total) by mouth daily with breakfast 5/27/22   Gaudencio Gandhi MD   metroNIDAZOLE (METROGEL) 1 % gel APPLY TOPICALLY TO AFFECTED AREA ONCE DAILY 7/13/22   Simi Daugherty PA-C   mirtazapine (REMERON) 30 mg tablet Take 30 mg by mouth daily at bedtime    Historical Provider, MD   PARoxetine (PAXIL) 40 MG tablet Take 40 mg by mouth every morning    Historical Provider, MD       Current Facility-Administered Medications   Medication Dose Route Frequency    PHENobarbital 650 mg in sodium chloride 0 9 % 100 mL IVPB  650 mg Intravenous Once       Allergies   Allergen Reactions    Lisinopril Angioedema       Objective     No intake or output data in the 24 hours ending 09/21/22 1438    Invasive Devices:   Peripheral IV 09/21/22 Right Antecubital (Active)   Site Assessment WDL;Dry;Clean; Intact 09/21/22 1332   Dressing Type Transparent 09/21/22 1332   Line Status Blood return noted 09/21/22 1332   Dressing Status Clean;Dry; Intact 09/21/22 1332       Peripheral IV 09/21/22 Left Antecubital (Active)   Site Assessment WDL; Clean;Dry; Intact 09/21/22 1332   Dressing Type Transparent 09/21/22 1332   Dressing Status Clean;Dry; Intact 09/21/22 1332       Vitals   Vitals:    09/21/22 1323 09/21/22 1400   BP: 143/65 114/57   TempSrc: Rectal    Pulse: (!) 123 (!) 128   Resp: 16 20   Patient Position - Orthostatic VS: Lying    Temp: 98 2 °F (36 8 °C)          EKG, Pathology, and/or Other Studies: I have personally reviewed pertinent reports          Lab Results: I have personally reviewed pertinent reports  Labs:    Results from last 7 days   Lab Units 09/21/22  1329   WBC Thousand/uL 13 18*   HEMOGLOBIN g/dL 13 8   HEMATOCRIT % 42 7   PLATELETS Thousands/uL 208      Results from last 7 days   Lab Units 09/21/22  1329   SODIUM mmol/L 140   POTASSIUM mmol/L 3 4*   CHLORIDE mmol/L 101   CO2 mmol/L 9*   BUN mg/dL 9   CREATININE mg/dL 1 18   CALCIUM mg/dL 9 1   ALK PHOS U/L 92   ALT U/L 119*   AST U/L 197*              No results found for: TROPONINI  Results from last 7 days   Lab Units 09/21/22  1329   PH PHONG  7 051*   PCO2 PHONG mm Hg 31 9*   PO2 PHONG mm Hg 171 4*   HCO3 PHONG mmol/L 8 6*   O2 CONTENT PHONG ml/dL 20 1   O2 HGB, VENOUS % 95 3*     Results from last 7 days   Lab Units 09/21/22  1329   ACETAMINOPHEN LVL ug/mL <2*   ETHANOL LVL mg/dL 91*   SALICYLATE LVL mg/dL 4     Invalid input(s): EXTPREGUR      Imaging Studies: I have personally reviewed pertinent reports  Counseling / Coordination of Care  Total time spent today 26 minutes  This was a phone consultation

## 2022-09-21 NOTE — H&P
H&P Exam - Critical Care   Fran Gaspar 77 y o  male MRN: [id number]  Unit/Bed#: ED 24 Encounter: [id number]      -------------------------------------------------------------------------------------------------------------  Chief Complaint: Alcohol withdrawal seizures     History of Present Illness   HX and PE limited by: Mental status   Fran Gaspar is a 77 y o  male who has a past medical history of HTN, HLD, alcohol use, and tobacco use presented with multiple witnessed seizures  Per chart, EMS was called for a health and welfare check and he had a witnessed tonic clonic seizure for about 30 seconds  He told police before his seizure that he had stopped drinking about 24 hours prior and has a history of heavy alcohol use  Upon arrival to the ED, the patient began seizing in the hallway for about 1 minute  He was given 2mg of IV ativan  His ETOH on arrival was 91  He is also prescribed alprazolam chronically  In the ED, he was given 650mg of phenobarbital IV over 30 minutes with 130-260 mg every 2-4 hours with a goal of RASS -2 per toxicology  History obtained from chart review    -------------------------------------------------------------------------------------------------------------  Assessment and Plan:    Neuro:    Diagnosis: Alcohol withdrawal seizures  o Plan:  o  Completed 650mg IV phenobarbital   o Add 130-260mg every 2-4 hours for a RASS goal of -2  o Adjunctive treatments: ketamine 0 1mg/kg/hr or 0 1-1mcg/kg/hr of precedex   - Only add precedex after 2g of phenobarbital has been given   o GCS 14  o Glucose 124   o STAT CTH for GCS change >2   o Neuro checks hourly  o CIWA protocol   o Begin Folate, multivitamin, thiamine   o Prolactin ordered    Diagnosis: Depression   o Hold paroxetine due to anticholinergic side effects which can contribute to his altered mental status   o C/w home mirtazipine    Delirium precautions   o Maintain sleep-wake cycle     CV:    Diagnosis: HTN  o Plan: Hold Norvasc and HCTZ in the setting of DT's   o Restart once withdrawal is complete    Diagnosis: HLD  o Plan: Start home Lipitor   o Lipid panel  - Cholesterol 160   - Triglycerides 103   - HDL 62  - LDL 77      Pulm:   Diagnosis: Tobacco use   o Plan: Nicoderm CQ 21mg   o Encourage smoking cessation         GI:   · No acute issues  · Stress ulcer ppx: Protonix IV 40mg once daily       :   · Diagnosis: Diagnosis: DARWIN   · Creatine 1 18   o Baseline 0 76-0 9   Continue with IVF    Avoid nephrotoxic agents    Monitor I's/O's       F/E/N:   · Plan:   · Fluids:  ml/hr   · Electrolytes: Monitor and replete as needed  K+ 3 4  Repleted  · Metabolic acidosis   · VBG 7/0 51/31 9/171 4/8 6  · Repeat Lactate ordered   · Repeat VBG ordered  · Nutrition: NPO    Heme/Onc:   o DVT ppx: Heparin q8h      Endo:   o Diagnosis: No acute issues       ID:   o Diagnosis: Leukocytosis  - WBC: 13 18   - Likely in the setting of seizures   - Will continue to monitor CBC   - If febrile, consider CXR for concern for aspiration pneumonia   - F/u UA       MSK/Skin:   o PT/OT when able   o Up and out of bed as able       Disposition: Admit to Critical Care   Code Status: No Order  --------------------------------------------------------------------------------------------------------------  Review of Systems   Unable to perform ROS: Mental status change       A 12-point, complete review of systems was reviewed and negative except as stated above     Physical Exam  Constitutional:       Appearance: He is toxic-appearing  HENT:      Head: Normocephalic and atraumatic  Eyes:      General: No scleral icterus  Extraocular Movements: Extraocular movements intact  Pupils: Pupils are equal, round, and reactive to light  Cardiovascular:      Rate and Rhythm: Regular rhythm  Tachycardia present  Pulmonary:      Effort: Pulmonary effort is normal  No respiratory distress  Abdominal:      Palpations: Abdomen is soft  Tenderness: There is no abdominal tenderness  Musculoskeletal:      Cervical back: Normal range of motion  Right lower leg: No edema  Left lower leg: No edema  Lymphadenopathy:      Cervical: No cervical adenopathy  Skin:     General: Skin is warm and dry  Capillary Refill: Capillary refill takes less than 2 seconds  Neurological:      Mental Status: He is alert  Comments: Oriented to person but not time or place  Follows commands  Shaking present in BUE  Strength 5/5 BUE and BLE         --------------------------------------------------------------------------------------------------------------  Vitals:   Vitals:    09/21/22 1323 09/21/22 1400 09/21/22 1500   BP: 143/65 114/57 157/74   BP Location: Right arm     Pulse: (!) 123 (!) 128 (!) 124   Resp: 16 20 22   Temp: 98 2 °F (36 8 °C)     TempSrc: Rectal     SpO2: 95% 97% 99%     Temp  Min: 98 2 °F (36 8 °C)  Max: 98 2 °F (36 8 °C)        There is no height or weight on file to calculate BMI  N/A    Laboratory and Diagnostics:  Results from last 7 days   Lab Units 09/21/22  1329   WBC Thousand/uL 13 18*   HEMOGLOBIN g/dL 13 8   HEMATOCRIT % 42 7   PLATELETS Thousands/uL 208     Results from last 7 days   Lab Units 09/21/22  1329   SODIUM mmol/L 140   POTASSIUM mmol/L 3 4*   CHLORIDE mmol/L 101   CO2 mmol/L 9*   ANION GAP mmol/L 30*   BUN mg/dL 9   CREATININE mg/dL 1 18   CALCIUM mg/dL 9 1   GLUCOSE RANDOM mg/dL 137   ALT U/L 119*   AST U/L 197*   ALK PHOS U/L 92   ALBUMIN g/dL 3 6   TOTAL BILIRUBIN mg/dL 0 37                       ABG:    VBG:  Results from last 7 days   Lab Units 09/21/22  1329   PH PHONG  7 051*   PCO2 PHONG mm Hg 31 9*   PO2 PHONG mm Hg 171 4*   HCO3 PHONG mmol/L 8 6*   BASE EXC PHONG mmol/L -20 8           Micro:          Imaging: I have personally reviewed pertinent reports        Historical Information   Past Medical History:   Diagnosis Date    Colon polyp     Diabetes mellitus ([facility] [address]     Hyperlipidemia     Hypertension Past Surgical History:   Procedure Laterality Date    COLON SURGERY      found growth, bengin    COLONOSCOPY      COLONOSCOPY N/A 3/13/2018    Procedure: COLONOSCOPY;  Surgeon: Alon Parra DO;  Location: [facility]; Service: Gastroenterology     Social History   Social History     Substance and Sexual Activity   Alcohol Use Yes    Comment: mostly on weekends     Social History     Substance and Sexual Activity   Drug Use No     Social History     Tobacco Use   Smoking Status Current Every Day Smoker   Smokeless Tobacco Former User   Tobacco Comment    uses vape/current every day smoker (as per [employer])       Family History:   Family History   Problem Relation Age of Onset    Heart failure Mother     Hypertension Other      Family history unknown and I have reviewed this patient's family history and commented on sigificant items within the HPI      Medications:  Current Facility-Administered Medications   Medication Dose Route Frequency    thiamine (VITAMIN B1) 500 mg in sodium chloride 0 9 % 50 mL IVPB  500 mg Intravenous TID     Home medications:  Prior to Admission Medications   Prescriptions Last Dose Informant Patient Reported? Taking?    ALPRAZolam (XANAX) 0 25 mg tablet  Self Yes No   Sig: Take 1 tablet by mouth 2 (two) times a day   PARoxetine (PAXIL) 40 MG tablet  Self Yes No   Sig: Take 40 mg by mouth every morning   amLODIPine (NORVASC) 10 mg tablet   No No   Sig: TAKE 1 TABLET DAILY   aspirin 81 MG tablet   No No   Sig: Take 1 tablet (81 mg total) by mouth daily   atorvastatin (LIPITOR) 40 mg tablet   No No   Sig: TAKE 1 TABLET DAILY   hydrochlorothiazide (HYDRODIURIL) 12 5 mg tablet   No No   Sig: TAKE 1 TABLET DAILY   metFORMIN (GLUCOPHAGE) 1000 MG tablet   No No   Sig: Take 1 tablet (1,000 mg total) by mouth daily with breakfast   metroNIDAZOLE (METROGEL) 1 % gel   No No   Sig: APPLY TOPICALLY TO AFFECTED AREA ONCE DAILY   mirtazapine (REMERON) 30 mg tablet  Self Yes No   Sig: Take 30 mg by mouth daily at bedtime      Facility-Administered Medications: None     Allergies: Allergies   Allergen Reactions    Lisinopril Angioedema     ------------------------------------------------------------------------------------------------------------  Advance Directive and Living Will:      Power of :    POLST:    ------------------------------------------------------------------------------------------------------------  Anticipated Length of Stay is > 2 midnights    Care Time Delivered:   Upon my evaluation, this patient had a high probability of imminent or life-threatening deterioration due to Alcohol withdrawal seizures, which required my direct attention, intervention, and personal management  I have personally provided 45 minutes (45 to 60) of critical care time, exclusive of procedures, teaching, family meetings, and any prior time recorded by providers other than myself  Savannah Pennington MD   Emergency Medicine, PGY-1        Portions of the record may have been created with voice recognition software  Occasional wrong word or "sound a like" substitutions may have occurred due to the inherent limitations of voice recognition software    Read the chart carefully and recognize, using context, where substitutions have occurred
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
## 2022-09-22 LAB
ALBUMIN SERPL BCP-MCNC: 3.2 G/DL (ref 3.5–5)
ALP SERPL-CCNC: 77 U/L (ref 46–116)
ALT SERPL W P-5'-P-CCNC: 92 U/L (ref 12–78)
ANION GAP SERPL CALCULATED.3IONS-SCNC: 6 MMOL/L (ref 4–13)
AST SERPL W P-5'-P-CCNC: 142 U/L (ref 5–45)
ATRIAL RATE: 576 BPM
BASE EX.OXY STD BLDV CALC-SCNC: 83.3 % (ref 60–80)
BASE EXCESS BLDV CALC-SCNC: 3.5 MMOL/L
BASOPHILS # BLD AUTO: 0.02 THOUSANDS/ΜL (ref 0–0.1)
BASOPHILS NFR BLD AUTO: 0 % (ref 0–1)
BILIRUB SERPL-MCNC: 0.75 MG/DL (ref 0.2–1)
BUN SERPL-MCNC: 8 MG/DL (ref 5–25)
CALCIUM ALBUM COR SERPL-MCNC: 8.5 MG/DL (ref 8.3–10.1)
CALCIUM SERPL-MCNC: 7.9 MG/DL (ref 8.3–10.1)
CHLORIDE SERPL-SCNC: 105 MMOL/L (ref 96–108)
CO2 SERPL-SCNC: 27 MMOL/L (ref 21–32)
CREAT SERPL-MCNC: 0.72 MG/DL (ref 0.6–1.3)
EOSINOPHIL # BLD AUTO: 0.01 THOUSAND/ΜL (ref 0–0.61)
EOSINOPHIL NFR BLD AUTO: 0 % (ref 0–6)
ERYTHROCYTE [DISTWIDTH] IN BLOOD BY AUTOMATED COUNT: 14.2 % (ref 11.6–15.1)
GFR SERPL CREATININE-BSD FRML MDRD: 97 ML/MIN/1.73SQ M
GLUCOSE SERPL-MCNC: 98 MG/DL (ref 65–140)
HCO3 BLDV-SCNC: 26.8 MMOL/L (ref 24–30)
HCT VFR BLD AUTO: 38.8 % (ref 36.5–49.3)
HGB BLD-MCNC: 13.1 G/DL (ref 12–17)
IMM GRANULOCYTES # BLD AUTO: 0.05 THOUSAND/UL (ref 0–0.2)
IMM GRANULOCYTES NFR BLD AUTO: 1 % (ref 0–2)
LACTATE SERPL-SCNC: 1.2 MMOL/L (ref 0.5–2)
LACTATE SERPL-SCNC: 5.3 MMOL/L (ref 0.5–2)
LYMPHOCYTES # BLD AUTO: 0.56 THOUSANDS/ΜL (ref 0.6–4.47)
LYMPHOCYTES NFR BLD AUTO: 5 % (ref 14–44)
MAGNESIUM SERPL-MCNC: 2 MG/DL (ref 1.6–2.6)
MCH RBC QN AUTO: 33.2 PG (ref 26.8–34.3)
MCHC RBC AUTO-ENTMCNC: 33.8 G/DL (ref 31.4–37.4)
MCV RBC AUTO: 98 FL (ref 82–98)
MONOCYTES # BLD AUTO: 0.84 THOUSAND/ΜL (ref 0.17–1.22)
MONOCYTES NFR BLD AUTO: 8 % (ref 4–12)
NEUTROPHILS # BLD AUTO: 9.4 THOUSANDS/ΜL (ref 1.85–7.62)
NEUTS SEG NFR BLD AUTO: 86 % (ref 43–75)
NRBC BLD AUTO-RTO: 0 /100 WBCS
O2 CT BLDV-SCNC: 16.4 ML/DL
PCO2 BLDV: 36.6 MM HG (ref 42–50)
PH BLDV: 7.48 [PH] (ref 7.3–7.4)
PHOSPHATE SERPL-MCNC: 2.5 MG/DL (ref 2.3–4.1)
PLATELET # BLD AUTO: 171 THOUSANDS/UL (ref 149–390)
PMV BLD AUTO: 9.7 FL (ref 8.9–12.7)
PO2 BLDV: 48.2 MM HG (ref 35–45)
POTASSIUM SERPL-SCNC: 3.2 MMOL/L (ref 3.5–5.3)
PROT SERPL-MCNC: 6.6 G/DL (ref 6.4–8.4)
QRS AXIS: 90 DEGREES
QRSD INTERVAL: 114 MS
QT INTERVAL: 284 MS
QTC INTERVAL: 406 MS
RBC # BLD AUTO: 3.95 MILLION/UL (ref 3.88–5.62)
SODIUM SERPL-SCNC: 138 MMOL/L (ref 135–147)
T WAVE AXIS: -62 DEGREES
VENTRICULAR RATE: 123 BPM
WBC # BLD AUTO: 10.88 THOUSAND/UL (ref 4.31–10.16)

## 2022-09-22 PROCEDURE — 84100 ASSAY OF PHOSPHORUS: CPT | Performed by: STUDENT IN AN ORGANIZED HEALTH CARE EDUCATION/TRAINING PROGRAM

## 2022-09-22 PROCEDURE — 80053 COMPREHEN METABOLIC PANEL: CPT | Performed by: STUDENT IN AN ORGANIZED HEALTH CARE EDUCATION/TRAINING PROGRAM

## 2022-09-22 PROCEDURE — 93010 ELECTROCARDIOGRAM REPORT: CPT | Performed by: INTERNAL MEDICINE

## 2022-09-22 PROCEDURE — 85025 COMPLETE CBC W/AUTO DIFF WBC: CPT | Performed by: STUDENT IN AN ORGANIZED HEALTH CARE EDUCATION/TRAINING PROGRAM

## 2022-09-22 PROCEDURE — 83605 ASSAY OF LACTIC ACID: CPT | Performed by: STUDENT IN AN ORGANIZED HEALTH CARE EDUCATION/TRAINING PROGRAM

## 2022-09-22 PROCEDURE — 82805 BLOOD GASES W/O2 SATURATION: CPT

## 2022-09-22 PROCEDURE — 99291 CRITICAL CARE FIRST HOUR: CPT | Performed by: INTERNAL MEDICINE

## 2022-09-22 PROCEDURE — 83735 ASSAY OF MAGNESIUM: CPT | Performed by: STUDENT IN AN ORGANIZED HEALTH CARE EDUCATION/TRAINING PROGRAM

## 2022-09-22 RX ORDER — LABETALOL HYDROCHLORIDE 5 MG/ML
10 INJECTION, SOLUTION INTRAVENOUS ONCE
Status: COMPLETED | OUTPATIENT
Start: 2022-09-22 | End: 2022-09-22

## 2022-09-22 RX ORDER — PHENOBARBITAL SODIUM 65 MG/ML
130 INJECTION INTRAMUSCULAR ONCE
Status: COMPLETED | OUTPATIENT
Start: 2022-09-22 | End: 2022-09-22

## 2022-09-22 RX ORDER — DIAZEPAM 5 MG/ML
5 INJECTION, SOLUTION INTRAMUSCULAR; INTRAVENOUS ONCE
Status: COMPLETED | OUTPATIENT
Start: 2022-09-22 | End: 2022-09-22

## 2022-09-22 RX ORDER — POTASSIUM CHLORIDE 14.9 MG/ML
20 INJECTION INTRAVENOUS
Status: COMPLETED | OUTPATIENT
Start: 2022-09-22 | End: 2022-09-22

## 2022-09-22 RX ORDER — LORAZEPAM 2 MG/ML
4 INJECTION INTRAMUSCULAR ONCE
Status: COMPLETED | OUTPATIENT
Start: 2022-09-22 | End: 2022-09-22

## 2022-09-22 RX ORDER — DEXMEDETOMIDINE HYDROCHLORIDE 4 UG/ML
.1-1.5 INJECTION, SOLUTION INTRAVENOUS
Status: DISCONTINUED | OUTPATIENT
Start: 2022-09-22 | End: 2022-09-24

## 2022-09-22 RX ADMIN — SODIUM CHLORIDE, SODIUM LACTATE, POTASSIUM CHLORIDE, AND CALCIUM CHLORIDE 125 ML/HR: .6; .31; .03; .02 INJECTION, SOLUTION INTRAVENOUS at 02:21

## 2022-09-22 RX ADMIN — MIRTAZAPINE 30 MG: 30 TABLET, FILM COATED ORAL at 23:01

## 2022-09-22 RX ADMIN — POTASSIUM CHLORIDE 20 MEQ: 14.9 INJECTION, SOLUTION INTRAVENOUS at 10:48

## 2022-09-22 RX ADMIN — LABETALOL HYDROCHLORIDE 10 MG: 5 INJECTION, SOLUTION INTRAVENOUS at 05:44

## 2022-09-22 RX ADMIN — Medication 0.4 MG/KG/HR: at 08:47

## 2022-09-22 RX ADMIN — HEPARIN SODIUM 5000 UNITS: 5000 INJECTION INTRAVENOUS; SUBCUTANEOUS at 13:55

## 2022-09-22 RX ADMIN — HEPARIN SODIUM 5000 UNITS: 5000 INJECTION INTRAVENOUS; SUBCUTANEOUS at 21:28

## 2022-09-22 RX ADMIN — PHENOBARBITAL SODIUM 130 MG: 65 INJECTION INTRAMUSCULAR; INTRAVENOUS at 02:21

## 2022-09-22 RX ADMIN — MAGNESIUM OXIDE TAB 400 MG (241.3 MG ELEMENTAL MG) 400 MG: 400 (241.3 MG) TAB at 18:08

## 2022-09-22 RX ADMIN — THIAMINE HYDROCHLORIDE 500 MG: 100 INJECTION, SOLUTION INTRAMUSCULAR; INTRAVENOUS at 20:37

## 2022-09-22 RX ADMIN — FOLIC ACID 1 MG: 5 INJECTION, SOLUTION INTRAMUSCULAR; INTRAVENOUS; SUBCUTANEOUS at 10:00

## 2022-09-22 RX ADMIN — LORAZEPAM 4 MG: 2 INJECTION INTRAMUSCULAR; INTRAVENOUS at 10:13

## 2022-09-22 RX ADMIN — MAGNESIUM OXIDE TAB 400 MG (241.3 MG ELEMENTAL MG) 400 MG: 400 (241.3 MG) TAB at 08:42

## 2022-09-22 RX ADMIN — ASPIRIN 81 MG CHEWABLE TABLET 81 MG: 81 TABLET CHEWABLE at 08:37

## 2022-09-22 RX ADMIN — POTASSIUM PHOSPHATE, MONOBASIC AND POTASSIUM PHOSPHATE, DIBASIC 21 MMOL: 224; 236 INJECTION, SOLUTION, CONCENTRATE INTRAVENOUS at 08:16

## 2022-09-22 RX ADMIN — DEXMEDETOMIDINE HYDROCHLORIDE 0.2 MCG/KG/HR: 400 INJECTION INTRAVENOUS at 11:17

## 2022-09-22 RX ADMIN — THIAMINE HYDROCHLORIDE 500 MG: 100 INJECTION, SOLUTION INTRAMUSCULAR; INTRAVENOUS at 09:28

## 2022-09-22 RX ADMIN — SODIUM CHLORIDE, SODIUM LACTATE, POTASSIUM CHLORIDE, AND CALCIUM CHLORIDE 125 ML/HR: .6; .31; .03; .02 INJECTION, SOLUTION INTRAVENOUS at 18:24

## 2022-09-22 RX ADMIN — CHLORHEXIDINE GLUCONATE 15 ML: 1.2 SOLUTION ORAL at 08:39

## 2022-09-22 RX ADMIN — DIAZEPAM 5 MG: 5 INJECTION, SOLUTION INTRAMUSCULAR; INTRAVENOUS at 22:56

## 2022-09-22 RX ADMIN — THIAMINE HYDROCHLORIDE 500 MG: 100 INJECTION, SOLUTION INTRAMUSCULAR; INTRAVENOUS at 17:17

## 2022-09-22 RX ADMIN — PHENOBARBITAL SODIUM 130 MG: 65 INJECTION INTRAMUSCULAR; INTRAVENOUS at 05:38

## 2022-09-22 RX ADMIN — SODIUM CHLORIDE, SODIUM LACTATE, POTASSIUM CHLORIDE, AND CALCIUM CHLORIDE 125 ML/HR: .6; .31; .03; .02 INJECTION, SOLUTION INTRAVENOUS at 10:21

## 2022-09-22 RX ADMIN — Medication 0.4 MG/KG/HR: at 18:05

## 2022-09-22 RX ADMIN — HEPARIN SODIUM 5000 UNITS: 5000 INJECTION INTRAVENOUS; SUBCUTANEOUS at 04:23

## 2022-09-22 RX ADMIN — POTASSIUM CHLORIDE 20 MEQ: 14.9 INJECTION, SOLUTION INTRAVENOUS at 07:18

## 2022-09-22 RX ADMIN — CHLORHEXIDINE GLUCONATE 15 ML: 1.2 SOLUTION ORAL at 20:38

## 2022-09-22 NOTE — PLAN OF CARE
Problem: MOBILITY - ADULT  Goal: Maintain or return to baseline ADL function  Description: INTERVENTIONS:  -  Assess patient's ability to carry out ADLs; assess patient's baseline for ADL function and identify physical deficits which impact ability to perform ADLs (bathing, care of mouth/teeth, toileting, grooming, dressing, etc )  - Assess/evaluate cause of self-care deficits   - Assess range of motion  - Assess patient's mobility; develop plan if impaired  - Assess patient's need for assistive devices and provide as appropriate  - Encourage maximum independence but intervene and supervise when necessary  - Involve family in performance of ADLs  - Assess for home care needs following discharge   - Consider OT consult to assist with ADL evaluation and planning for discharge  - Provide patient education as appropriate  Outcome: Progressing  Goal: Maintains/Returns to pre admission functional level  Description: INTERVENTIONS:  - Perform BMAT or MOVE assessment daily    - Set and communicate daily mobility goal to care team and patient/family/caregiver  - Collaborate with rehabilitation services on mobility goals if consulted  - Perform Range of Motion 3 times a day  - Reposition patient every 2 hours    - Record patient progress and toleration of activity level   Outcome: Progressing     Problem: Potential for Falls  Goal: Patient will remain free of falls  Description: INTERVENTIONS:  - Educate patient/family on patient safety including physical limitations  - Instruct patient to call for assistance with activity   - Consult OT/PT to assist with strengthening/mobility   - Keep Call bell within reach  - Keep bed low and locked with side rails adjusted as appropriate  - Keep care items and personal belongings within reach  - Initiate and maintain comfort rounds  - Make Fall Risk Sign visible to staff  - Offer Toileting every 2 Hours, in advance of need  - Initiate/Maintain bed alarm  - Obtain necessary fall risk management equipment:   - Apply yellow socks and bracelet for high fall risk patients  - Consider moving patient to room near nurses station  Outcome: Progressing     Problem: Prexisting or High Potential for Compromised Skin Integrity  Goal: Skin integrity is maintained or improved  Description: INTERVENTIONS:  - Identify patients at risk for skin breakdown  - Assess and monitor skin integrity  - Assess and monitor nutrition and hydration status  - Monitor labs   - Assess for incontinence   - Turn and reposition patient  - Assist with mobility/ambulation  - Relieve pressure over bony prominences  - Avoid friction and shearing  - Provide appropriate hygiene as needed including keeping skin clean and dry  - Evaluate need for skin moisturizer/barrier cream  - Collaborate with interdisciplinary team   - Patient/family teaching  - Consider wound care consult   Outcome: Progressing

## 2022-09-22 NOTE — PHYSICAL THERAPY NOTE
PT orders received  Chart reviewed  Per RN patient is not medically appropriate for therapy at this time  Will hold  Will continue to follow  Narendra Akhtar, PT, DPT     09/22/22 1071   PT Last Visit   PT Visit Date 09/22/22   Note Type   Note type Evaluation; Cancelled Session   Cancel Reasons Medical status

## 2022-09-22 NOTE — OCCUPATIONAL THERAPY NOTE
OT CANCEL NOTE    OT orders received  Chart reviewed  Spoke w/ RN who reported is going through withdrawal and hallucinating, not appropriate for skilled OT services at this time  Will hold initial OT evaluation  Will continue to follow pt on caseload and see pt when medically stable and as clinically appropriate  09/22/22 1131   OT Last Visit   OT Visit Date 09/22/22   Note Type   Note type Evaluation; Cancelled Session   Cancel Reasons Medical status           Maranda Gottron MS, OTR/L

## 2022-09-22 NOTE — CASE MANAGEMENT
Case Management Discharge Planning Note    Patient name Radha Dorantes  [facility] [id number]  : 1955 Date 2022       Current Admission Date: 2022  Current Admission Diagnosis:Alcohol withdrawal syndrome, with delirium [facility])   Patient Active Problem List    Diagnosis Date Noted    Alcohol withdrawal syndrome, with delirium ([facility] ) 2022    Chronic bronchitis ([place] [address] ) 2022    Bilateral carotid artery stenosis 10/14/2021    Rosacea 2020    Rhinophyma 2020    Smoking 2020    Type 2 diabetes mellitus with diabetic peripheral angiopathy without gangrene, without long-term current use of insulin ([place] [address] ) 2020    Atherosclerotic PVD with intermittent claudication ([place] [address] ) 2017    Hyperlipidemia 2017    Depression with anxiety 2017    Hypertension 2017      LOS (days): 1  Geometric Mean LOS (GMLOS) (days): 3 40  Days to GMLOS:2 5     OBJECTIVE:  Risk of Unplanned Readmission Score: 13 01         Current admission status: Inpatient   Preferred Pharmacy:   [address] [place] [address], [address] [place] [address]  Phone: [phone] Fax: Katie Mccloud 414, [address]  [address] [place] [address]  Phone: [phone] Fax: [phone]    Primary Care Provider: Leticia Rivers MD    Primary Insurance: [address] [place] [address]  Secondary Insurance:     DISCHARGE DETAILS:                  Additional Comments: Patient unable to provide information today, not oriented  Attempted to call contact information -- [phone] the person answering the phone became irate and stated that "there is no one here by that name"  Number for Jadyn Capellan -- [phone] -- for a [facility] [employer] [facility] establishment, no message left  CM will continue to follow and attempt to speak with patient as mental status improves

## 2022-09-22 NOTE — PROGRESS NOTES
Daily Progress Note - Critical Care   Disha Belle 77 y o  male MRN: [id number]  Unit/Bed#: MICU 05 Encounter: [id number]        ----------------------------------------------------------------------------------------  HPI/24hr events: Disha Belle is a 77 y o  male who has a past medical history of HTN, HLD, alcohol use, and tobacco use presented with multiple witnessed seizures  Per chart, EMS was called for a health and welfare check and he had a witnessed tonic clonic seizure for about 30 seconds  He told police before his seizure that he had stopped drinking about 24 hours prior and has a history of heavy alcohol use  Upon arrival to the ED, the patient began seizing in the hallway for about 1 minute and was given 2mg of IV Ativan with termination of the seizure  His ETOH on arrival was 91  He is also prescribed alprazolam chronically  In the ED, he was given 650 mg of phenobarbital IV followed by another dose of 260 mg and a total of 4mg of Ativan  24h events: The patient continued to shake so he was given 650mg of phenobarbital followed by another 130 mg dose  He was also started on a 0 4 ketamine drip  He was given Labetalol for high blood pressures  ---------------------------------------------------------------------------------------  SUBJECTIVE  Patient was awake and alert but confused and not oriented to time  He denies SOB but otherwise unable to answer questions due to confusion  Review of Systems   Unable to perform ROS: Mental status change     Review of systems was unable to be performed secondary to Mental status  ---------------------------------------------------------------------------------------  Assessment and Plan:    Neuro:   · Diagnosis: Alcohol withdrawal seizures   Plan:  ? Received a total of 1950 mg IV phenobarbital since admission    ? Add 130-260mg phenobarbital every 2-4 hours with a RASS goal of -2   ?  Adjunctive treatments: ketamine 0 1mg/kg/hr or 0 1-1mcg/kg/hr of precedex   § Only add precedex after 2g of phenobarbital has been given   · Appreciate toxicology recommendations  Alcohol level 91 on admission   ? GCS 14  ? Glucose 124   ? Prolactin 7 8   ? STAT CTH for GCS change >2   ? Neuro checks hourly  ? CIWA protocol   ? Begin Folate, multivitamin, thiamine    · Diagnosis: Depression   ? Plan: Hold paroxetine due to anticholinergic side effects which can contribute to his altered mental status   ? Continue with home Mirtazipine  · Diagnosis: Anxiety   · Hold Xanax in the setting of DTs   Delirium precautions  o Plan: Maintain sleep-wake cycle       CV:    Diagnosis: HTN  o Plan: Hold Norvasc and HCTZ in the setting of DTs    Diagnosis: HLD  o Plan: Start home Lipitor   o Lipid panel:   - Cholesterol 160  - Triglycerides 103   - HDL 62  - LDL 77      Pulm:  o Diagnosis: Tobacco use   o Plan: Nicoderm CQ 21mg as needed   o Encourage smoking cessation       GI:    Diagnosis: No acute issues    Stress ulcer ppx: Protonix IV 40mg once daily       :   · Diagnosis: DARWIN   ? Creatine 0 72 from 1 18    ?  Baseline 0 76-0 9  · Continue with IVF   · Avoid nephrotoxic agents   · Monitor I's/O's    Intake/Output Summary (Last 24 hours) at 2022 2724  Last data filed at 2022 0200  Gross per 24 hour   Intake 2779 25 ml   Output 1150 ml   Net 1629 25 ml   ·       F/E/N:   · Plan:  · Fluids:   · Electrolytes: Monitor and replete as needed to maintain K >4, Phos >3, Mg >2   · Metabolic acidosis:   · VBG on admission: 7 051/31 9/171 4/8 6  · Repeat /33 1/91 3/24 3  · Lactate: 1 2 from 5  · Nutrition: NPO     Heme/Onc:   o Diagnosis: Heparin q8h       Endo:   o Diagnosis: Type 2 Diabetes Mellitus   - Plan: Last HbA1C 5 7- 22  - Hold home metformin  - Monitor POC glucose        ID:   o Diagnosis:Leukocytosis   - WBC 13 18   - Likely in the setting of seizures   - Will continue to monitor CBC   - If febrile, consider CXR for concern of aspiration pneumonia  - UA:  Ketones 20+, Nitrites and leukocytes negative       MSK/Skin:   o PT/OT when able   o Up and out of bed as able       Disposition: Continue Critical Care   Code Status: Level 1 - Full Code  ---------------------------------------------------------------------------------------  ICU CORE MEASURES    Prophylaxis   VTE Pharmacologic Prophylaxis: Heparin  VTE Mechanical Prophylaxis: sequential compression device  Stress Ulcer Prophylaxis: Pantoprazole IV     ABCDE Protocol (if indicated)  Plan to perform spontaneous awakening trial today? Not applicable  Plan to perform spontaneous breathing trial today? Not applicable  Obvious barriers to extubation? Not applicable  CAM-ICU: Positive    Invasive Devices Review  Invasive Devices  Report    Peripheral Intravenous Line  Duration           Peripheral IV 09/21/22 Left Forearm <1 day    Peripheral IV 09/21/22 Left;Ventral (anterior) Hand <1 day    Peripheral IV 09/22/22 Dorsal (posterior); Right Forearm <1 day    Peripheral IV 09/22/22 Dorsal (posterior); Right Forearm <1 day          Drain  Duration           External Urinary Catheter Medium <1 day              Can any invasive devices be discontinued today? No   ---------------------------------------------------------------------------------------  OBJECTIVE    Physical Exam  Vitals and nursing note reviewed  Constitutional:       General: He is not in acute distress  Comments: Confused   HENT:      Head: Normocephalic and atraumatic  Eyes:      General: No scleral icterus  Extraocular Movements: Extraocular movements intact  Pupils: Pupils are equal, round, and reactive to light  Cardiovascular:      Rate and Rhythm: Regular rhythm  Tachycardia present  Pulmonary:      Effort: No respiratory distress  Breath sounds: No wheezing  Abdominal:      Palpations: Abdomen is soft  Tenderness: There is no abdominal tenderness  Musculoskeletal:      Cervical back: Normal range of motion  Right lower leg: No edema  Left lower leg: No edema  Skin:     General: Skin is warm and dry  Capillary Refill: Capillary refill takes less than 2 seconds  Neurological:      Mental Status: He is alert  Comments: Oriented to person and place but not time  Follows commands  Shaking of BUE while sleeping and with movement  Vitals   Vitals:    22 0100 22 0200 22 0300 22 0400   BP: 158/87 169/80 164/85 164/100   BP Location:       Pulse: (!) 106 (!) 110 (!) 110 (!) 114   Resp: (!) 27 (!) 39 (!) 39 (!) 40   Temp:    98 5 °F (36 9 °C)   TempSrc:    Oral   SpO2: 97% 96% 97% 97%     Temp (24hrs), Av 7 °F (37 1 °C), Min:98 2 °F (36 8 °C), Max:100 °F (37 8 °C)  Current: Temperature: 98 5 °F (36 9 °C)  Temp:  [98 2 °F (36 8 °C)-100 °F (37 8 °C)] 98 5 °F (36 9 °C)  HR:  [] 100  Resp:  [16-52] 28  BP: (114-192)/() 175/93    Respiratory:  SpO2: SpO2: 97 %       Invasive/non-invasive ventilation settings   Respiratory  Report   Lab Data (Last 4 hours)    None         O2/Vent Data (Last 4 hours)    None                Height and Weights         There is no height or weight on file to calculate BMI  Weight (last 2 days)     None          Intake and Output  I/O        0701   0700  0701   0700    I V   2079 3    IV Piggyback  700    Total Intake  2779 3    Urine  1150    Stool  0    Total Output  1150    Net  +1629 3          Unmeasured Stool Occurrence  1 x            Nutrition       Diet Orders   (From admission, onward)             Start     Ordered    22 1613  Diet NPO; Sips with meds  Diet effective now        References:    Nutrtion Support Algorithm Enteral vs  Parenteral   Question Answer Comment   Diet Type NPO    NPO Except: Sips with meds    RD to adjust diet per protocol?  Yes        22 1612                Laboratory and Diagnostics:  Results from last 7 days   Lab Units 22  0545 22  1329   WBC Thousand/uL 10 88* 13 18*   HEMOGLOBIN g/dL 13 1 13 8   HEMATOCRIT % 38 8 42 7   PLATELETS Thousands/uL 171 208   NEUTROS PCT % 86*  --    MONOS PCT % 8  --      Results from last 7 days   Lab Units 09/22/22  0449 09/21/22  1750 09/21/22  1329   SODIUM mmol/L 138 137 140   POTASSIUM mmol/L 3 2* 3 2* 3 4*   CHLORIDE mmol/L 105 103 101   CO2 mmol/L 27 26 9*   ANION GAP mmol/L 6 8 30*   BUN mg/dL 8 8 9   CREATININE mg/dL 0 72 0 73 1 18   CALCIUM mg/dL 7 9* 8 4 9 1   GLUCOSE RANDOM mg/dL 98 123 137   ALT U/L 92*  --  119*   AST U/L 142*  --  197*   ALK PHOS U/L 77  --  92   ALBUMIN g/dL 3 2*  --  3 6   TOTAL BILIRUBIN mg/dL 0 75  --  0 37     Results from last 7 days   Lab Units 09/22/22  0449 09/21/22  1916   MAGNESIUM mg/dL 2 0 2 2   PHOSPHORUS mg/dL 2 5  --                Results from last 7 days   Lab Units 09/22/22  0449 09/21/22  1750   LACTIC ACID mmol/L 5 3* 1 3     ABG:    VBG:  Results from last 7 days   Lab Units 09/21/22  1750   PH PHONG  7 483*   PCO2 PHONG mm Hg 33 1*   PO2 PHONG mm Hg 91 3*   HCO3 PHONG mmol/L 24 3   BASE EXC PHONG mmol/L 1 4           Micro          Imaging:  I have personally reviewed pertinent reports        Active Medications  Scheduled Meds:  Current Facility-Administered Medications   Medication Dose Route Frequency Provider Last Rate    aspirin  81 mg Oral Daily Russell Butter, DO      atorvastatin  40 mg Oral Daily Russell Butter, DO      chlorhexidine  15 mL Mouth/Throat Q12H [address] Russell Butter, DO      cloNIDine  0 2 mg Oral 4x Daily PRN Russell Butter, DO      folic acid IVPB  1 mg Intravenous Daily Russell Butter, DO      heparin (porcine)  5,000 Units Subcutaneous [facility] Bai Alejandro, DO      ketamine  0 4 mg/kg/hr Intravenous Continuous Bai Alejandro, DO 0 4 mg/kg/hr (09/22/22 0536)    lactated ringers  125 mL/hr Intravenous Continuous Russell Butter,  mL/hr (09/22/22 0221)    magnesium oxide  400 mg Oral BID Radha Doherty MD      mirtazapine  30 mg Oral HS DO Vimal Velarde multivitamin-minerals  1 tablet Oral Daily Ronit Mello MD      thiamine  500 mg Intravenous TID Hank Gallardo DO Stopped (09/21/22 2200)     Continuous Infusions:  ketamine, 0 4 mg/kg/hr, Last Rate: 0 4 mg/kg/hr (09/22/22 0536)  lactated ringers, 125 mL/hr, Last Rate: 125 mL/hr (09/22/22 0221)      PRN Meds:   cloNIDine, 0 2 mg, 4x Daily PRN        Allergies   Allergies   Allergen Reactions    Lisinopril Angioedema       Advance Directive and Living Will:      Power of :    POLST:      Counseling / Coordination of Care  Total Critical Care time spent 45 minutes excluding procedures, teaching and family updates  Ike Vivas MD      Portions of the record may have been created with voice recognition software  Occasional wrong word or "sound a like" substitutions may have occurred due to the inherent limitations of voice recognition software    Read the chart carefully and recognize, using context, where substitutions have occurred

## 2022-09-23 LAB
ALBUMIN SERPL BCP-MCNC: 2.9 G/DL (ref 3.5–5)
ALP SERPL-CCNC: 75 U/L (ref 46–116)
ALT SERPL W P-5'-P-CCNC: 73 U/L (ref 12–78)
ANION GAP SERPL CALCULATED.3IONS-SCNC: 10 MMOL/L (ref 4–13)
AST SERPL W P-5'-P-CCNC: 114 U/L (ref 5–45)
BASOPHILS # BLD AUTO: 0.05 THOUSANDS/ΜL (ref 0–0.1)
BASOPHILS NFR BLD AUTO: 1 % (ref 0–1)
BILIRUB SERPL-MCNC: 0.61 MG/DL (ref 0.2–1)
BUN SERPL-MCNC: 11 MG/DL (ref 5–25)
CA-I BLD-SCNC: 1.06 MMOL/L (ref 1.12–1.32)
CALCIUM ALBUM COR SERPL-MCNC: 9 MG/DL (ref 8.3–10.1)
CALCIUM SERPL-MCNC: 8.1 MG/DL (ref 8.3–10.1)
CHLORIDE SERPL-SCNC: 106 MMOL/L (ref 96–108)
CO2 SERPL-SCNC: 25 MMOL/L (ref 21–32)
CREAT SERPL-MCNC: 0.6 MG/DL (ref 0.6–1.3)
EOSINOPHIL # BLD AUTO: 0.01 THOUSAND/ΜL (ref 0–0.61)
EOSINOPHIL NFR BLD AUTO: 0 % (ref 0–6)
ERYTHROCYTE [DISTWIDTH] IN BLOOD BY AUTOMATED COUNT: 13.9 % (ref 11.6–15.1)
GFR SERPL CREATININE-BSD FRML MDRD: 104 ML/MIN/1.73SQ M
GLUCOSE SERPL-MCNC: 68 MG/DL (ref 65–140)
GLUCOSE SERPL-MCNC: 91 MG/DL (ref 65–140)
HCT VFR BLD AUTO: 39.6 % (ref 36.5–49.3)
HGB BLD-MCNC: 13 G/DL (ref 12–17)
IMM GRANULOCYTES # BLD AUTO: 0.04 THOUSAND/UL (ref 0–0.2)
IMM GRANULOCYTES NFR BLD AUTO: 0 % (ref 0–2)
INR PPP: 1.35 (ref 0.84–1.19)
LYMPHOCYTES # BLD AUTO: 1.23 THOUSANDS/ΜL (ref 0.6–4.47)
LYMPHOCYTES NFR BLD AUTO: 12 % (ref 14–44)
MAGNESIUM SERPL-MCNC: 2 MG/DL (ref 1.6–2.6)
MCH RBC QN AUTO: 33.2 PG (ref 26.8–34.3)
MCHC RBC AUTO-ENTMCNC: 32.8 G/DL (ref 31.4–37.4)
MCV RBC AUTO: 101 FL (ref 82–98)
MONOCYTES # BLD AUTO: 0.93 THOUSAND/ΜL (ref 0.17–1.22)
MONOCYTES NFR BLD AUTO: 9 % (ref 4–12)
NEUTROPHILS # BLD AUTO: 7.72 THOUSANDS/ΜL (ref 1.85–7.62)
NEUTS SEG NFR BLD AUTO: 78 % (ref 43–75)
NRBC BLD AUTO-RTO: 0 /100 WBCS
PHOSPHATE SERPL-MCNC: 3.1 MG/DL (ref 2.3–4.1)
PLATELET # BLD AUTO: 157 THOUSANDS/UL (ref 149–390)
PMV BLD AUTO: 10.6 FL (ref 8.9–12.7)
POTASSIUM SERPL-SCNC: 3.2 MMOL/L (ref 3.5–5.3)
PROT SERPL-MCNC: 6.3 G/DL (ref 6.4–8.4)
PROTHROMBIN TIME: 16.9 SECONDS (ref 11.6–14.5)
RBC # BLD AUTO: 3.92 MILLION/UL (ref 3.88–5.62)
SODIUM SERPL-SCNC: 141 MMOL/L (ref 135–147)
WBC # BLD AUTO: 9.98 THOUSAND/UL (ref 4.31–10.16)

## 2022-09-23 PROCEDURE — 85025 COMPLETE CBC W/AUTO DIFF WBC: CPT | Performed by: STUDENT IN AN ORGANIZED HEALTH CARE EDUCATION/TRAINING PROGRAM

## 2022-09-23 PROCEDURE — 82948 REAGENT STRIP/BLOOD GLUCOSE: CPT

## 2022-09-23 PROCEDURE — 85610 PROTHROMBIN TIME: CPT | Performed by: STUDENT IN AN ORGANIZED HEALTH CARE EDUCATION/TRAINING PROGRAM

## 2022-09-23 PROCEDURE — 80053 COMPREHEN METABOLIC PANEL: CPT | Performed by: STUDENT IN AN ORGANIZED HEALTH CARE EDUCATION/TRAINING PROGRAM

## 2022-09-23 PROCEDURE — 82330 ASSAY OF CALCIUM: CPT

## 2022-09-23 PROCEDURE — 84100 ASSAY OF PHOSPHORUS: CPT

## 2022-09-23 PROCEDURE — 83735 ASSAY OF MAGNESIUM: CPT

## 2022-09-23 PROCEDURE — 99291 CRITICAL CARE FIRST HOUR: CPT | Performed by: INTERNAL MEDICINE

## 2022-09-23 RX ORDER — CALCIUM GLUCONATE 20 MG/ML
2 INJECTION, SOLUTION INTRAVENOUS ONCE
Status: COMPLETED | OUTPATIENT
Start: 2022-09-23 | End: 2022-09-23

## 2022-09-23 RX ORDER — POTASSIUM CHLORIDE 14.9 MG/ML
20 INJECTION INTRAVENOUS
Status: COMPLETED | OUTPATIENT
Start: 2022-09-23 | End: 2022-09-23

## 2022-09-23 RX ORDER — ENOXAPARIN SODIUM 100 MG/ML
40 INJECTION SUBCUTANEOUS
Status: DISCONTINUED | OUTPATIENT
Start: 2022-09-23 | End: 2022-09-25 | Stop reason: HOSPADM

## 2022-09-23 RX ORDER — LABETALOL HYDROCHLORIDE 5 MG/ML
10 INJECTION, SOLUTION INTRAVENOUS EVERY 4 HOURS PRN
Status: DISCONTINUED | OUTPATIENT
Start: 2022-09-23 | End: 2022-09-25 | Stop reason: HOSPADM

## 2022-09-23 RX ORDER — POTASSIUM CHLORIDE 29.8 MG/ML
40 INJECTION INTRAVENOUS 2 TIMES DAILY
Status: DISCONTINUED | OUTPATIENT
Start: 2022-09-23 | End: 2022-09-23 | Stop reason: SDUPTHER

## 2022-09-23 RX ORDER — LANOLIN ALCOHOL/MO/W.PET/CERES
250 CREAM (GRAM) TOPICAL DAILY
Status: DISCONTINUED | OUTPATIENT
Start: 2022-09-25 | End: 2022-09-25 | Stop reason: HOSPADM

## 2022-09-23 RX ADMIN — THIAMINE HYDROCHLORIDE 500 MG: 100 INJECTION, SOLUTION INTRAMUSCULAR; INTRAVENOUS at 12:09

## 2022-09-23 RX ADMIN — THIAMINE HYDROCHLORIDE 500 MG: 100 INJECTION, SOLUTION INTRAMUSCULAR; INTRAVENOUS at 21:53

## 2022-09-23 RX ADMIN — LABETALOL HYDROCHLORIDE 10 MG: 5 INJECTION, SOLUTION INTRAVENOUS at 14:38

## 2022-09-23 RX ADMIN — MIRTAZAPINE 30 MG: 30 TABLET, FILM COATED ORAL at 21:53

## 2022-09-23 RX ADMIN — CHLORHEXIDINE GLUCONATE 15 ML: 1.2 SOLUTION ORAL at 21:53

## 2022-09-23 RX ADMIN — POTASSIUM CHLORIDE 20 MEQ: 14.9 INJECTION, SOLUTION INTRAVENOUS at 17:15

## 2022-09-23 RX ADMIN — ENOXAPARIN SODIUM 40 MG: 40 INJECTION SUBCUTANEOUS at 14:44

## 2022-09-23 RX ADMIN — LABETALOL HYDROCHLORIDE 10 MG: 5 INJECTION, SOLUTION INTRAVENOUS at 10:19

## 2022-09-23 RX ADMIN — DEXMEDETOMIDINE HYDROCHLORIDE 0.3 MCG/KG/HR: 400 INJECTION INTRAVENOUS at 03:00

## 2022-09-23 RX ADMIN — THIAMINE HYDROCHLORIDE 500 MG: 100 INJECTION, SOLUTION INTRAMUSCULAR; INTRAVENOUS at 16:14

## 2022-09-23 RX ADMIN — CALCIUM GLUCONATE 2 G: 20 INJECTION, SOLUTION INTRAVENOUS at 10:12

## 2022-09-23 RX ADMIN — CHLORHEXIDINE GLUCONATE 15 ML: 1.2 SOLUTION ORAL at 10:16

## 2022-09-23 RX ADMIN — POTASSIUM CHLORIDE 20 MEQ: 14.9 INJECTION, SOLUTION INTRAVENOUS at 19:34

## 2022-09-23 RX ADMIN — SODIUM CHLORIDE, SODIUM LACTATE, POTASSIUM CHLORIDE, AND CALCIUM CHLORIDE 125 ML/HR: .6; .31; .03; .02 INJECTION, SOLUTION INTRAVENOUS at 10:10

## 2022-09-23 RX ADMIN — POTASSIUM CHLORIDE 20 MEQ: 14.9 INJECTION, SOLUTION INTRAVENOUS at 12:31

## 2022-09-23 RX ADMIN — MAGNESIUM OXIDE TAB 400 MG (241.3 MG ELEMENTAL MG) 400 MG: 400 (241.3 MG) TAB at 17:15

## 2022-09-23 RX ADMIN — FOLIC ACID 1 MG: 5 INJECTION, SOLUTION INTRAMUSCULAR; INTRAVENOUS; SUBCUTANEOUS at 11:08

## 2022-09-23 RX ADMIN — SODIUM CHLORIDE, SODIUM LACTATE, POTASSIUM CHLORIDE, AND CALCIUM CHLORIDE 125 ML/HR: .6; .31; .03; .02 INJECTION, SOLUTION INTRAVENOUS at 19:08

## 2022-09-23 RX ADMIN — SODIUM CHLORIDE, SODIUM LACTATE, POTASSIUM CHLORIDE, AND CALCIUM CHLORIDE 125 ML/HR: .6; .31; .03; .02 INJECTION, SOLUTION INTRAVENOUS at 01:37

## 2022-09-23 RX ADMIN — DEXMEDETOMIDINE HYDROCHLORIDE 0.3 MCG/KG/HR: 400 INJECTION INTRAVENOUS at 17:27

## 2022-09-23 RX ADMIN — HEPARIN SODIUM 5000 UNITS: 5000 INJECTION INTRAVENOUS; SUBCUTANEOUS at 06:30

## 2022-09-23 RX ADMIN — POTASSIUM CHLORIDE 20 MEQ: 14.9 INJECTION, SOLUTION INTRAVENOUS at 10:12

## 2022-09-23 RX ADMIN — Medication 0.4 MG/KG/HR: at 02:53

## 2022-09-23 NOTE — PROGRESS NOTES
Daily Progress Note - Critical Care   Min Koch 77 y o  male MRN: [id number]  Unit/Bed#: MICU 05 Encounter: [id number]        ----------------------------------------------------------------------------------------  HPI/24hr events: Min Koch is a 77 y o  male who has a past medical history of HTN, HLD, alcohol use, and tobacco use presented with multiple witnessed seizures  Per chart, EMS was called for a health and welfare check and he had a witnessed tonic clonic seizure for about 30 seconds  He told police before his seizure that he had stopped drinking about 24 hours prior and has a history of heavy alcohol use  Upon arrival to the ED, the patient began seizing in the hallway for about 1 minute and was given 2mg of IV Ativan with termination of the seizure  His ETOH on arrival was 91  He is also prescribed alprazolam chronically  In the ED, he was given 650 mg of phenobarbital IV followed by another dose of 260 mg and a total of 4mg of Ativan  24h events: No acute events overnight      ---------------------------------------------------------------------------------------  SUBJECTIVE    Patient states that he slept well overnight and does not feel like he is shaking anymore  Review of Systems   Constitutional: Negative for chills and fever  HENT: Negative for congestion and sore throat  Eyes: Negative for photophobia and visual disturbance  Respiratory: Negative for cough and shortness of breath  Cardiovascular: Negative for chest pain and palpitations  Gastrointestinal: Negative for abdominal pain and nausea  Genitourinary: Negative for dysuria and hematuria  Musculoskeletal: Negative for back pain and myalgias  Neurological: Negative for dizziness, tremors and weakness       Review of systems was unable to be performed secondary to Mental status  ---------------------------------------------------------------------------------------  Assessment and Plan:    Neuro:   · Diagnosis: Alcohol withdrawal seizures   Plan:  ? Received a total of 1950 mg IV phenobarbital since admission    ? Add 130-260mg phenobarbital every 2-4 hours with a RASS goal of -2   ? Adjunctive treatments: ketamine 0 1mg/kg/hr or 0 1-1mcg/kg/hr of precedex   § Only add precedex after 2g of phenobarbital has been given   · Currently on 0 3 Precedex  Ketamine has been discontinued  ? CIWA protocol   · Has not required any benzodiazepines overnight    · Trial off of Precedex today  · July's Discriminant Function score 11 7  · Appreciate toxicology recommendations  · Alcohol level 91 on admission   ? GCS 14  ? Glucose 124 on admission    ? Prolactin 7 8 on admission   ? STAT CTH for GCS change >2   ? Neuro checks q4h  ? C/w Folate, multivitamin, thiamine   · Diagnosis: Depression   ? Plan: Hold paroxetine due to anticholinergic side effects which can contribute to his altered mental status   ? Continue with home Mirtazipine  · Diagnosis: Anxiety   · Hold home Xanax in the setting of DTs   Delirium precautions  o Plan: Maintain sleep-wake cycle       CV:    Diagnosis: HTN  o Restart on Norvasc   o Hold HCTZ in setting of alcohol withdrawal    Diagnosis: HLD  o Plan: Start home Lipitor   o Lipid panel:   - Cholesterol 160  - Triglycerides 103   - HDL 62  - LDL 77      Pulm:  o Diagnosis: Tobacco use   o Plan: Nicoderm CQ 21mg as needed   o Encourage smoking cessation       GI:    Diagnosis: No acute issues         :   · Diagnosis: DARWIN, resolved  ? Creatine 0 66 stable  ? Baseline 0 76-0 9  · Avoid nephrotoxic agents   · Monitor I's/O's    Intake/Output Summary (Last 24 hours) at 9/23/2022 1332  Last data filed at 9/23/2022 1231  Gross per 24 hour   Intake 4243 58 ml   Output 3225 ml   Net 1018 58 ml         F/E/N:   · Plan:  · Fluids:  ml/hr   · Electrolytes: Monitor and replete as needed to maintain K >4, Phos >3, Mg >2   · Hypokalemia: 3 4  Replaced with 60 mEq of potassium     · Metabolic acidosis:   · VBG on admission: 7 05/31 9/171 4/8 6  · Repeat /33 1/91 3/24 3  · Lactate: 1 2 from 5  · Nutrition: Cardiovascular diet     Heme/Onc:   o DVT ppx: Lovenox 40 mg daily       Endo:   o Diagnosis: Type 2 Diabetes Mellitus   - Plan: Last HbA1C 5 7- 22  - Hold home metformin  - Monitor POC glucose  Glucose today 91   - Start SSI when able to tolerate PO       ID:   o Diagnosis:Leukocytosis   - WBC 7 12   - Likely in the setting of seizures   - Will continue to monitor CBC   - If febrile, consider CXR for concern of aspiration pneumonia  - UA:    Ketones 20+, Nitrites and leukocytes negative       MSK/Skin:   o PT/OT when able   o Up and out of bed as able       Disposition: Continue Critical Care   Code Status: Level 1 - Full Code  ---------------------------------------------------------------------------------------  ICU CORE MEASURES    Prophylaxis   VTE Pharmacologic Prophylaxis: Heparin  VTE Mechanical Prophylaxis: sequential compression device  Stress Ulcer Prophylaxis: Pantoprazole IV     ABCDE Protocol (if indicated)  Plan to perform spontaneous awakening trial today? Not applicable  Plan to perform spontaneous breathing trial today? Not applicable  Obvious barriers to extubation? Not applicable  CAM-ICU: Positive    Invasive Devices Review  Invasive Devices  Report    Peripheral Intravenous Line  Duration           Peripheral IV 22 Left;Ventral (anterior) Hand 1 day    Peripheral IV 22 Dorsal (posterior); Right Forearm 1 day    Peripheral IV 22 Left;Proximal;Ventral (anterior) Forearm <1 day          Drain  Duration           External Urinary Catheter Medium 1 day              Can any invasive devices be discontinued today? No   ---------------------------------------------------------------------------------------  OBJECTIVE    Physical Exam  Constitutional:       General: He is not in acute distress  HENT:      Head: Normocephalic and atraumatic        Mouth/Throat:      Mouth: Mucous membranes are dry  Eyes:      Extraocular Movements: Extraocular movements intact  Pupils: Pupils are equal, round, and reactive to light  Cardiovascular:      Rate and Rhythm: Normal rate and regular rhythm  Pulmonary:      Effort: Pulmonary effort is normal  No respiratory distress  Abdominal:      Palpations: Abdomen is soft  Tenderness: There is no abdominal tenderness  Musculoskeletal:      Cervical back: No rigidity  Right lower leg: No edema  Left lower leg: No edema  Lymphadenopathy:      Cervical: No cervical adenopathy  Skin:     General: Skin is warm and dry  Capillary Refill: Capillary refill takes less than 2 seconds  Neurological:      Mental Status: He is alert and oriented to person, place, and time  Sensory: No sensory deficit  Motor: No weakness  Comments: Follows commands  Mild tremor of left hand with movement  Vitals   Vitals:    22 1145 22 1200 22 1215 22 1245   BP: 152/80  157/88 129/77   BP Location:       Pulse: 86 90 86 86   Resp:   15 15   Temp:       TempSrc:       SpO2: 97%  96% 97%     Temp (24hrs), Av 3 °F (36 8 °C), Min:97 8 °F (36 6 °C), Max:98 9 °F (37 2 °C)  Current: Temperature: 97 8 °F (36 6 °C)  Temp:  [97 8 °F (36 6 °C)-98 9 °F (37 2 °C)] 97 8 °F (36 6 °C)  HR:  [] 86  Resp:  [12-37] 15  BP: (109-185)/(71-93) 129/77    Respiratory:  SpO2: SpO2: 97 %       Invasive/non-invasive ventilation settings   Respiratory  Report   Lab Data (Last 4 hours)    None         O2/Vent Data (Last 4 hours)    None                Height and Weights         There is no height or weight on file to calculate BMI    Weight (last 2 days)     None          Intake and Output  I/O        0701   0700  07 0700    I V   2079 3    IV Piggyback  700    Total Intake  2779 3    Urine  1150    Stool  0    Total Output  1150    Net  +1629 3          Unmeasured Stool Occurrence  1 x Nutrition       Diet Orders   (From admission, onward)             Start     Ordered    09/21/22 1613  Diet NPO; Sips with meds  Diet effective now        References:    Nutrtion Support Algorithm Enteral vs  Parenteral   Question Answer Comment   Diet Type NPO    NPO Except: Sips with meds    RD to adjust diet per protocol? Yes        09/21/22 1612                Laboratory and Diagnostics:  Results from last 7 days   Lab Units 09/23/22  0608 09/22/22  0545 09/21/22  1329   WBC Thousand/uL 9 98 10 88* 13 18*   HEMOGLOBIN g/dL 13 0 13 1 13 8   HEMATOCRIT % 39 6 38 8 42 7   PLATELETS Thousands/uL 157 171 208   NEUTROS PCT % 78* 86*  --    MONOS PCT % 9 8  --      Results from last 7 days   Lab Units 09/23/22  0608 09/22/22  0449 09/21/22  1750 09/21/22  1329   SODIUM mmol/L 141 138 137 140   POTASSIUM mmol/L 3 2* 3 2* 3 2* 3 4*   CHLORIDE mmol/L 106 105 103 101   CO2 mmol/L 25 27 26 9*   ANION GAP mmol/L 10 6 8 30*   BUN mg/dL 11 8 8 9   CREATININE mg/dL 0 60 0 72 0 73 1 18   CALCIUM mg/dL 8 1* 7 9* 8 4 9 1   GLUCOSE RANDOM mg/dL 68 98 123 137   ALT U/L 73 92*  --  119*   AST U/L 114* 142*  --  197*   ALK PHOS U/L 75 77  --  92   ALBUMIN g/dL 2 9* 3 2*  --  3 6   TOTAL BILIRUBIN mg/dL 0 61 0 75  --  0 37     Results from last 7 days   Lab Units 09/23/22  0608 09/22/22  0449 09/21/22  1916   MAGNESIUM mg/dL 2 0 2 0 2 2   PHOSPHORUS mg/dL 3 1 2 5  --       Results from last 7 days   Lab Units 09/23/22  0906   INR  1 35*          Results from last 7 days   Lab Units 09/22/22  0723 09/22/22  0449 09/21/22  1750   LACTIC ACID mmol/L 1 2 5 3* 1 3     ABG:    VBG:  Results from last 7 days   Lab Units 09/22/22  1318   PH PHONG  7 483*   PCO2 PHONG mm Hg 36 6*   PO2 PHONG mm Hg 48 2*   HCO3 PHONG mmol/L 26 8   BASE EXC PHONG mmol/L 3 5           Micro          Imaging:  I have personally reviewed pertinent reports        Active Medications  Scheduled Meds:  Current Facility-Administered Medications   Medication Dose Route Frequency Provider Last Rate    aspirin  81 mg Oral Daily Terence Vides DO      atorvastatin  40 mg Oral Daily Terence Vides DO      chlorhexidine  15 mL Mouth/Throat Q12H [address] Terence Vides DO      cloNIDine  0 2 mg Oral 4x Daily PRN Terence Vides DO      dexmedetomidine  0 1-1 5 mcg/kg/hr Intravenous Titrated Kelton Wilson MD 0 3 mcg/kg/hr (09/23/22 0300)    enoxaparin  40 mg Subcutaneous Q24H [address] Marquis Sim DO      folic acid IVPB  1 mg Intravenous Daily Terence Vides DO Stopped (09/23/22 1209)    labetalol  10 mg Intravenous Q4H PRN Richardine Bronson, DO      lactated ringers  125 mL/hr Intravenous Continuous Terence Vides  mL/hr (09/23/22 1010)    magnesium oxide  400 mg Oral BID Muhammet Raleighnidia Cortez MD      mirtazapine  30 mg Oral HS Terence Vides DO      multivitamin-minerals  1 tablet Oral Daily Monster Matthews MD      potassium chloride  20 mEq Intravenous Q2H Kelton MD Katie 20 mEq (09/23/22 1231)    potassium chloride  20 mEq Intravenous Q2H Kelton Wilson MD      thiamine  500 mg Intravenous TID Terence Vides DO Stopped (09/23/22 1231)   Elizabeth Guillermo [START ON 9/25/2022] thiamine  250 mg Oral Daily Kelton Wilson MD       Continuous Infusions:  dexmedetomidine, 0 1-1 5 mcg/kg/hr, Last Rate: 0 3 mcg/kg/hr (09/23/22 0300)  lactated ringers, 125 mL/hr, Last Rate: 125 mL/hr (09/23/22 1010)      PRN Meds:   cloNIDine, 0 2 mg, 4x Daily PRN  labetalol, 10 mg, Q4H PRN        Allergies   Allergies   Allergen Reactions    Lisinopril Angioedema       Advance Directive and Living Will:      Power of :    POLST:      Counseling / Coordination of Care  Total Critical Care time spent 45 minutes excluding procedures, teaching and family updates  Kelton Wilson MD      Portions of the record may have been created with voice recognition software  Occasional wrong word or "sound a like" substitutions may have occurred due to the inherent limitations of voice recognition software    Read the chart carefully and recognize, using context, where substitutions have occurred

## 2022-09-23 NOTE — QUICK NOTE
Attempted to call phone numbers listed in contact information over the past two days, however the primary number listed was the incorrect number and the second number listed was a [employer] so no message was left  Received brothers phone number through case management and updated him on [place] status       Smita Zelaya MD   PGY-1

## 2022-09-23 NOTE — PHYSICAL THERAPY NOTE
Physical Therapy Cancellation Note    PT orders received chart review completed  PT spoke to nsg and pt and not appropriate to participate in skilled PT at this time  PT will follow and eval as medically appropriate      Yulisa Dunn, PT

## 2022-09-23 NOTE — OCCUPATIONAL THERAPY NOTE
Occupational Therapy         Patient Name: Luciana Barth  Today's Date: 9/23/2022 09/23/22 1032   OT Last Visit   OT Visit Date 09/23/22   Note Type   Note type Cancelled Session   Cancel Reasons Medical status     OT orders received  Chart reviewed  Spoke to RN and pt is not medically appropriate for therapy at this time  OT will cont to follow        ALFONZO Aleman/HÉCTOR

## 2022-09-23 NOTE — CASE MANAGEMENT
Case Management Assessment & Discharge Planning Note    Patient name Matthieu Isabel  Location MICU 05/MICU 05 MRN [id number]  : 1955 Date 2022       Current Admission Date: 2022  Current Admission Diagnosis:Alcohol withdrawal syndrome, with delirium [facility])   Patient Active Problem List    Diagnosis Date Noted    Alcohol withdrawal syndrome, with delirium ([place] [address] ) 2022    Chronic bronchitis ([place] [address] ) 2022    Bilateral carotid artery stenosis 10/14/2021    Rosacea 2020    Rhinophyma 2020    Smoking 2020    Type 2 diabetes mellitus with diabetic peripheral angiopathy without gangrene, without long-term current use of insulin ([place] [address] ) 2020    Atherosclerotic PVD with intermittent claudication ([place] [address] ) 2017    Hyperlipidemia 2017    Depression with anxiety 2017    Hypertension 2017      LOS (days): 2  Geometric Mean LOS (GMLOS) (days): 3 40  Days to GMLOS:1 5     OBJECTIVE:    Risk of Unplanned Readmission Score: 15 28         Current admission status: Inpatient       Preferred Pharmacy:   [address] [place] [address], [address] [place] [address]  Phone: [phone] Fax: Katie [address], [address]  [address] [place] [address]  Phone: [phone] Fax: [phone]    Primary Care Provider: Safia Fitzpatrick MD    Primary Insurance: [address] [place] [address]  Secondary Insurance:     ASSESSMENT:  Guer[address] 26 Proxies    There are no active Health Care Proxies on file                   Readmission Root Cause  30 Day Readmission: No    Patient Information  Admitted from[de-identified] Home  Mental Status: Confused  During Assessment patient was accompanied by: Not accompanied during assessment  Assessment information provided by[de-identified] Brother  Primary Caregiver: Self  Support Systems: Family members  [place] of Residence: [address] [place] [address] do you live in?: [place]  Type of Current Residence: Apartment  Living Arrangements: Lives Alone  Is patient a ?: No    Activities of Daily Living Prior to Admission  Functional Status: Independent  Completes ADLs independently?: Yes  Ambulates independently?: Yes  Does patient use assisted devices?: No  Does patient currently own DME?: No  Does patient have a history of Outpatient Therapy (PT/OT)?: No  Does the patient have a history of Short-Term Rehab?: No  Does patient have a history of HHC?: No  [facility]         Patient Information Continued  Income Source: SSI/SSD  Does patient receive dialysis treatments?: No  Does patient have a history of substance abuse?: Yes  Historical substance use preference: Alcohol/ETOH  Is patient currently in treatment for substance abuse?:  (unknown)  Does patient have a history of Mental Health Diagnosis?: Yes (has had suicide attempts in the past, last one 10 years ago)  Is patient receiving treatment for mental health?: Yes (Dr Buddy Tejeda, psychiatrist)  Has patient received inpatient treatment related to mental health in the last 2 years?: No         Means of Transportation  Means of Transport to Appts[de-identified] Drives Self  In the past 12 months, has lack of transportation kept you from medical appointments or from getting medications?: No  In the past 12 months, has lack of transportation kept you from meetings, work, or from getting things needed for daily living?: No  Was application for public transport provided?: N/A        DISCHARGE DETAILS:    Discharge planning discussed with[de-identified] patient's brother Hossein Loza via phone  Freedom of Choice: Yes     CM contacted family/caregiver?: Yes  Were Treatment Team discharge recommendations reviewed with patient/caregiver?: Yes  Did patient/caregiver verbalize understanding of patient care needs?: Yes  Were patient/caregiver advised of the risks associated with not following Treatment Team discharge recommendations?: Yes    Contacts  Patient Contacts: Antonio Thompson, brother  Relationship to Patient[de-identified] Family  Contact Method: Phone  Phone Number: [phone]  Reason/Outcome: Emergency Contact                        Treatment Team Recommendation: Other (TBD)  Discharge Destination Plan[de-identified] Other (TBD)  Transport at Discharge : Other (Comment) (TBD)             Patient/caregiver received discharge checklist   Content reviewed  Patient/caregiver encouraged to participate in discharge plan of care prior to discharge home  CM reviewed d/c planning process including the following: identifying help at home, patient preference for d/c planning needs, Discharge Lounge, [facility] Meds to Bed program, availability of treatment team to discuss questions or concerns patient and/or family may have regarding understanding medications and recognizing signs and symptoms once discharged  CM also encouraged patient to follow up with all recommended appointments after discharge  Patient advised of importance for patient and family to participate in managing patients medical well being  Additional Comments: Patient unable to participate in assessment due to mental status  Spoke to brother Olmsted Loretofeliciano over the phone  [address] [place] [address] is oldest of 8 siblings (Aline Gallardo, Gely Rodrigues), main  would be [place], [phone]  Patient has had a long struggle with mental health, has been on permanent disability for depression  Has had multiple suicidal attempts in the past, as per Leslie Negrete the last attempt was about 10 years ago  Leslie Negrete is unaware of any treatment for substance use/abuse  Patient lives alone in an apartment, is able to ambulate independently, drives  CM will continue to follow for discharge needs and development of discharge plan

## 2022-09-24 PROBLEM — N17.9 AKI (ACUTE KIDNEY INJURY) (HCC): Status: ACTIVE | Noted: 2022-09-24

## 2022-09-24 PROBLEM — D72.829 LEUKOCYTOSIS: Status: ACTIVE | Noted: 2022-09-24

## 2022-09-24 PROBLEM — F41.9 ANXIETY: Status: ACTIVE | Noted: 2022-09-24

## 2022-09-24 LAB
ALBUMIN SERPL BCP-MCNC: 2.9 G/DL (ref 3.5–5)
ALP SERPL-CCNC: 84 U/L (ref 46–116)
ALT SERPL W P-5'-P-CCNC: 64 U/L (ref 12–78)
ANION GAP SERPL CALCULATED.3IONS-SCNC: 4 MMOL/L (ref 4–13)
AST SERPL W P-5'-P-CCNC: 84 U/L (ref 5–45)
BASOPHILS # BLD AUTO: 0.02 THOUSANDS/ΜL (ref 0–0.1)
BASOPHILS NFR BLD AUTO: 0 % (ref 0–1)
BILIRUB SERPL-MCNC: 0.66 MG/DL (ref 0.2–1)
BUN SERPL-MCNC: 11 MG/DL (ref 5–25)
CA-I BLD-SCNC: 1.13 MMOL/L (ref 1.12–1.32)
CALCIUM ALBUM COR SERPL-MCNC: 9.4 MG/DL (ref 8.3–10.1)
CALCIUM SERPL-MCNC: 8.5 MG/DL (ref 8.3–10.1)
CHLORIDE SERPL-SCNC: 106 MMOL/L (ref 96–108)
CO2 SERPL-SCNC: 28 MMOL/L (ref 21–32)
CREAT SERPL-MCNC: 0.66 MG/DL (ref 0.6–1.3)
EOSINOPHIL # BLD AUTO: 0.07 THOUSAND/ΜL (ref 0–0.61)
EOSINOPHIL NFR BLD AUTO: 1 % (ref 0–6)
ERYTHROCYTE [DISTWIDTH] IN BLOOD BY AUTOMATED COUNT: 13.8 % (ref 11.6–15.1)
GFR SERPL CREATININE-BSD FRML MDRD: 100 ML/MIN/1.73SQ M
GLUCOSE SERPL-MCNC: 81 MG/DL (ref 65–140)
HCT VFR BLD AUTO: 39.6 % (ref 36.5–49.3)
HGB BLD-MCNC: 12.9 G/DL (ref 12–17)
IMM GRANULOCYTES # BLD AUTO: 0.02 THOUSAND/UL (ref 0–0.2)
IMM GRANULOCYTES NFR BLD AUTO: 0 % (ref 0–2)
LYMPHOCYTES # BLD AUTO: 1.18 THOUSANDS/ΜL (ref 0.6–4.47)
LYMPHOCYTES NFR BLD AUTO: 17 % (ref 14–44)
MCH RBC QN AUTO: 33.2 PG (ref 26.8–34.3)
MCHC RBC AUTO-ENTMCNC: 32.6 G/DL (ref 31.4–37.4)
MCV RBC AUTO: 102 FL (ref 82–98)
MONOCYTES # BLD AUTO: 0.61 THOUSAND/ΜL (ref 0.17–1.22)
MONOCYTES NFR BLD AUTO: 9 % (ref 4–12)
NEUTROPHILS # BLD AUTO: 5.22 THOUSANDS/ΜL (ref 1.85–7.62)
NEUTS SEG NFR BLD AUTO: 73 % (ref 43–75)
NRBC BLD AUTO-RTO: 0 /100 WBCS
PLATELET # BLD AUTO: 142 THOUSANDS/UL (ref 149–390)
PMV BLD AUTO: 9.7 FL (ref 8.9–12.7)
POTASSIUM SERPL-SCNC: 3.4 MMOL/L (ref 3.5–5.3)
PROT SERPL-MCNC: 6.3 G/DL (ref 6.4–8.4)
RBC # BLD AUTO: 3.89 MILLION/UL (ref 3.88–5.62)
SODIUM SERPL-SCNC: 138 MMOL/L (ref 135–147)
WBC # BLD AUTO: 7.12 THOUSAND/UL (ref 4.31–10.16)

## 2022-09-24 PROCEDURE — NC001 PR NO CHARGE: Performed by: INTERNAL MEDICINE

## 2022-09-24 PROCEDURE — 80053 COMPREHEN METABOLIC PANEL: CPT | Performed by: STUDENT IN AN ORGANIZED HEALTH CARE EDUCATION/TRAINING PROGRAM

## 2022-09-24 PROCEDURE — 82330 ASSAY OF CALCIUM: CPT | Performed by: STUDENT IN AN ORGANIZED HEALTH CARE EDUCATION/TRAINING PROGRAM

## 2022-09-24 PROCEDURE — 99233 SBSQ HOSP IP/OBS HIGH 50: CPT | Performed by: INTERNAL MEDICINE

## 2022-09-24 PROCEDURE — 85025 COMPLETE CBC W/AUTO DIFF WBC: CPT | Performed by: STUDENT IN AN ORGANIZED HEALTH CARE EDUCATION/TRAINING PROGRAM

## 2022-09-24 RX ORDER — HYDROCHLOROTHIAZIDE 12.5 MG/1
12.5 TABLET ORAL DAILY
Status: DISCONTINUED | OUTPATIENT
Start: 2022-09-24 | End: 2022-09-25 | Stop reason: HOSPADM

## 2022-09-24 RX ORDER — POTASSIUM CHLORIDE 20 MEQ/1
40 TABLET, EXTENDED RELEASE ORAL ONCE
Status: COMPLETED | OUTPATIENT
Start: 2022-09-24 | End: 2022-09-24

## 2022-09-24 RX ORDER — POTASSIUM CHLORIDE 20 MEQ/1
20 TABLET, EXTENDED RELEASE ORAL ONCE
Status: COMPLETED | OUTPATIENT
Start: 2022-09-24 | End: 2022-09-24

## 2022-09-24 RX ORDER — AMLODIPINE BESYLATE 10 MG/1
10 TABLET ORAL DAILY
Status: DISCONTINUED | OUTPATIENT
Start: 2022-09-24 | End: 2022-09-25 | Stop reason: HOSPADM

## 2022-09-24 RX ORDER — PAROXETINE HYDROCHLORIDE 20 MG/1
40 TABLET, FILM COATED ORAL EVERY MORNING
Status: DISCONTINUED | OUTPATIENT
Start: 2022-09-24 | End: 2022-09-25 | Stop reason: HOSPADM

## 2022-09-24 RX ORDER — METHION/INOS/CHOL BT/B COM/LIV 110MG-86MG
500 CAPSULE ORAL DAILY
Status: COMPLETED | OUTPATIENT
Start: 2022-09-24 | End: 2022-09-24

## 2022-09-24 RX ORDER — FOLIC ACID 1 MG/1
1 TABLET ORAL DAILY
Status: DISCONTINUED | OUTPATIENT
Start: 2022-09-24 | End: 2022-09-25 | Stop reason: HOSPADM

## 2022-09-24 RX ORDER — LORAZEPAM 2 MG/ML
4 INJECTION INTRAMUSCULAR ONCE
Status: COMPLETED | OUTPATIENT
Start: 2022-09-24 | End: 2022-09-24

## 2022-09-24 RX ADMIN — MAGNESIUM OXIDE TAB 400 MG (241.3 MG ELEMENTAL MG) 400 MG: 400 (241.3 MG) TAB at 10:00

## 2022-09-24 RX ADMIN — MAGNESIUM OXIDE TAB 400 MG (241.3 MG ELEMENTAL MG) 400 MG: 400 (241.3 MG) TAB at 17:37

## 2022-09-24 RX ADMIN — ASPIRIN 81 MG CHEWABLE TABLET 81 MG: 81 TABLET CHEWABLE at 10:00

## 2022-09-24 RX ADMIN — POTASSIUM CHLORIDE 20 MEQ: 1500 TABLET, EXTENDED RELEASE ORAL at 10:01

## 2022-09-24 RX ADMIN — PAROXETINE HYDROCHLORIDE 40 MG: 20 TABLET, FILM COATED ORAL at 12:46

## 2022-09-24 RX ADMIN — ATORVASTATIN CALCIUM 40 MG: 40 TABLET, FILM COATED ORAL at 17:37

## 2022-09-24 RX ADMIN — POTASSIUM CHLORIDE 40 MEQ: 1500 TABLET, EXTENDED RELEASE ORAL at 10:02

## 2022-09-24 RX ADMIN — FOLIC ACID 1 MG: 1 TABLET ORAL at 10:00

## 2022-09-24 RX ADMIN — MIRTAZAPINE 30 MG: 30 TABLET, FILM COATED ORAL at 21:25

## 2022-09-24 RX ADMIN — LORAZEPAM 4 MG: 2 INJECTION INTRAMUSCULAR; INTRAVENOUS at 17:48

## 2022-09-24 RX ADMIN — HYDROCHLOROTHIAZIDE 12.5 MG: 12.5 TABLET ORAL at 12:45

## 2022-09-24 RX ADMIN — AMLODIPINE BESYLATE 10 MG: 10 TABLET ORAL at 10:00

## 2022-09-24 RX ADMIN — MULTIPLE VITAMINS W/ MINERALS TAB 1 TABLET: TAB ORAL at 10:00

## 2022-09-24 RX ADMIN — THIAMINE HCL TAB 100 MG 500 MG: 100 TAB at 14:00

## 2022-09-24 RX ADMIN — SODIUM CHLORIDE, SODIUM LACTATE, POTASSIUM CHLORIDE, AND CALCIUM CHLORIDE 125 ML/HR: .6; .31; .03; .02 INJECTION, SOLUTION INTRAVENOUS at 03:32

## 2022-09-24 RX ADMIN — ENOXAPARIN SODIUM 40 MG: 40 INJECTION SUBCUTANEOUS at 10:00

## 2022-09-24 NOTE — ASSESSMENT & PLAN NOTE
? Hold paroxetine due to anticholinergic side effects which can contribute to his altered mental status   ?  Continue with home Mirtazipine

## 2022-09-24 NOTE — ASSESSMENT & PLAN NOTE
? Start home Lipitor   ?  Lipid panel:   § Cholesterol 160  § Triglycerides 103   § HDL 62  § LDL 77

## 2022-09-24 NOTE — ASSESSMENT & PLAN NOTE
§ WBC 7  12   § Likely in the setting of seizures   § Will continue to monitor CBC   § If febrile, consider CXR for concern of aspiration pneumonia  § UA:   § Ketones 20+, Nitrites and leukocytes negative

## 2022-09-24 NOTE — PROGRESS NOTES
[address] [facility]  Transfer Note - Neema Townsend 1955, 77 y o  male MRN: [id number]  [facility]: [id number]  Primary Care Provider: Ravin Cordero MD   Date and time admitted to hospital: 9/21/2022  1:07 PM    * Alcohol withdrawal syndrome, with delirium [facility])  Assessment & Plan  ? Received a total of 1950 mg IV phenobarbital since admission    ? Add 130-260mg phenobarbital every 2-4 hours with a RASS goal of -2   ? Adjunctive treatments: ketamine 0 1mg/kg/hr or 0 1-1mcg/kg/hr of precedex   § Only add precedex after 2g of phenobarbital has been given   · Currently on 0 3 Precedex  Ketamine has been discontinued  ? CIWA protocol   · Has not required any benzodiazepines overnight    · Trial off of Precedex today  · July's Discriminant Function score 11 7  · Appreciate toxicology recommendations  · Alcohol level 91 on admission   ? GCS 14  ? Glucose 124 on admission    ? Prolactin 7 8 on admission   ? STAT CTH for GCS change >2   ? Neuro checks q4h  ? C/w Folate, multivitamin, thiamine      Anxiety  Assessment & Plan    ? Hold home Xanax in the setting of DTs      Leukocytosis  Assessment & Plan  § WBC 7  12   § Likely in the setting of seizures   § Will continue to monitor CBC   § If febrile, consider CXR for concern of aspiration pneumonia  § UA:   § Ketones 20+, Nitrites and leukocytes negative        DARWIN (acute kidney injury) ([facility] [address] )  Assessment & Plan    ? Creatine 0 66 stable  ? Baseline 0 76-0 9  · Avoid nephrotoxic agents   · Monitor I's/O's  ·    · Intake/Output Summary (Last 24 hours) at 9/23/2022 1332  · Last data filed at 9/23/2022 1231      · Gross per 24 hour   · Intake · 4243 58 ml   · Output · 3225 ml   · Net · 1018 58 ml         Smoking  Assessment & Plan  ? Nicoderm CQ 21mg as needed   ?  Encourage smoking cessation       Type 2 diabetes mellitus with diabetic peripheral angiopathy without gangrene, without long-term current use of insulin [facility])  Assessment & Plan  Lab Results   Component Value Date    HGBA1C 5 7 (H) 07/21/2022       Recent Labs     09/21/22  1311 09/23/22  2155   POCGLU 128 91       Blood Sugar Average: Last 72 hrs:  (P) 109 5     § Last HbA1C 5 7- 7/21/22  § Hold home metformin  § Monitor POC glucose  Glucose today 91  § Start SSI when able to tolerate PO       Hyperlipidemia  Assessment & Plan  ? Start home Lipitor   ? Lipid panel:   § Cholesterol 160  § Triglycerides 103   § HDL 62  § LDL 77      Hypertension  Assessment & Plan  ? Restart on Norvasc   ? Hold HCTZ in setting of alcohol withdrawal       Depression with anxiety  Assessment & Plan  ? Hold paroxetine due to anticholinergic side effects which can contribute to his altered mental status   ? Continue with home Mirtazipine        Code Status: Level 1 - Full Code  POA:    POLST:      Reason for ICU admission:   Alcohol withdrawal seizures     Active problems:   Principal Problem:    Alcohol withdrawal syndrome, with delirium ([facility] [address] )  Active Problems:    Depression with anxiety    Hypertension    Hyperlipidemia    Type 2 diabetes mellitus with diabetic peripheral angiopathy without gangrene, without long-term current use of insulin (HCC)    Smoking    DARWIN (acute kidney injury) ([facility] [address] )    Leukocytosis    Anxiety  Resolved Problems:    * No resolved hospital problems  *      Consultants:   Toxicology   Case management     History of Present Illness:   Michael Dillard a 77 y  o  male who has a past medical history of HTN, HLD, alcohol use, and tobacco use presented with multiple witnessed seizures  Per chart, EMS was called for a health and welfare check and he had a witnessed tonic clonic seizure for about 30 seconds  He told police before his seizure that he had stopped drinking about 24 hours prior and has a history of heavy alcohol use   Upon arrival to the ED, the patient began seizing in the hallway for about 1 minute and was given 2mg of IV Ativan with termination of the seizure  His ETOH on arrival was 91  He is also prescribed alprazolam chronically  In the ED, he was given 650 mg of phenobarbital IV followed by another dose of 260 mg and a total of 4mg of Ativan  Summary of clinical course: The patient was brought to the ICU in stable condition  He required additional doses of phenobarbital as well as ketamine and precedex to control his shaking  He was placed on CIWA protocol and required additional doses of benzodiazepines to control his symptoms  He did not have any additional seizures  He was able to be weaned off of the precedex and ketamine without symptoms worsening  He denied any interest in going to an alcohol rehab facility following his discharge so case management was consulted for outpatient alcohol resources  Recent or scheduled procedures:   None    Outstanding/pending diagnostics:   None    Cultures:   None       Mobilization Plan:   OOB as tolerated     Nutrition Plan:   Regular diet     Invasive Devices Review  Invasive Devices  Report    Peripheral Intravenous Line  Duration           Peripheral IV 09/21/22 Left;Ventral (anterior) Hand 2 days    Peripheral IV 09/22/22 Dorsal (posterior); Right Forearm 2 days          Drain  Duration           External Urinary Catheter Medium 2 days                Rationale for remaining devices: Urinary catheter removed  VTE Pharmacologic Prophylaxis: Enoxaparin (Lovenox)  VTE Mechanical Prophylaxis: sequential compression device    Discharge Plan:   Patient should be ready for discharge to General Medicine on 9/24/22 after 12:00pm     Initial Physical Therapy Recommendations: Patient was not medically appropriate for therapy and will continue to follow  Initial Occupational Therapy Recommendations: Patient was not medically appropriate for therapy and will continue to follow  Initial /Plan: Will continue to follow for discharge needs and development of discharge plan       Home medications that are not reordered and reason why:   Xanax- currently on CIWA protocol   Metformin- sliding scale insulin     Spoke with Dr Tasha Talbot regarding transfer  Please contact critical care via [address] [place] [address] with any questions or concerns  Portions of the record may have been created with voice recognition software  Occasional wrong word or "sound a like" substitutions may have occurred due to the inherent limitations of voice recognition software    Read the chart carefully and recognize, using context, where substitutions have occurred    Estefania Hamlin MD   Emergency Medicine PGY-1  11:51 AM

## 2022-09-24 NOTE — ASSESSMENT & PLAN NOTE
? Received a total of 1950 mg IV phenobarbital since admission    ? Add 130-260mg phenobarbital every 2-4 hours with a RASS goal of -2   ? Adjunctive treatments: ketamine 0 1mg/kg/hr or 0 1-1mcg/kg/hr of precedex   § Only add precedex after 2g of phenobarbital has been given   · Currently on 0 3 Precedex  Ketamine has been discontinued  ? CIWA protocol   · Has not required any benzodiazepines overnight    · Trial off of Precedex today  · July's Discriminant Function score 11 7  · Appreciate toxicology recommendations  · Alcohol level 91 on admission   ? GCS 14  ? Glucose 124 on admission    ? Prolactin 7 8 on admission   ? STAT CTH for GCS change >2   ? Neuro checks q4h  ?  C/w Folate, multivitamin, thiamine

## 2022-09-24 NOTE — CASE MANAGEMENT
Case Management Discharge Planning Note    Patient name Rg Carlton  Location MICU 04/MICU 04 MRN [id number]  : 1955 Date 2022       Current Admission Date: 2022  Current Admission Diagnosis:Alcohol withdrawal syndrome, with delirium [facility])   Patient Active Problem List    Diagnosis Date Noted    DARWIN (acute kidney injury) ([place] [address] ) 2022    Leukocytosis 2022    Anxiety 2022    Alcohol withdrawal syndrome, with delirium ([place] [address] ) 2022    Chronic bronchitis ([place] [address] ) 2022    Bilateral carotid artery stenosis 10/14/2021    Rosacea 2020    Rhinophyma 2020    Smoking 2020    Type 2 diabetes mellitus with diabetic peripheral angiopathy without gangrene, without long-term current use of insulin ([place] [address] ) 2020    Atherosclerotic PVD with intermittent claudication ([place] [address] ) 2017    Hyperlipidemia 2017    Depression with anxiety 2017    Hypertension 2017      LOS (days): 3  Geometric Mean LOS (GMLOS) (days): 3 40  Days to GMLOS:0 4     OBJECTIVE:  Risk of Unplanned Readmission Score: 13 19         Current admission status: Inpatient   Preferred Pharmacy:   [address] [place] [address]  Phone: [phone] Fax: Katie Mccloud [address], [address]  [address] [place] [address]  Phone: [phone] Fax: [phone]    Primary Care Provider: Charlotte Gusman MD    Primary Insurance: [facility]  Secondary Insurance:     DISCHARGE DETAILS:    Discharge planning discussed with[de-identified] Patient  Freedom of Choice: Yes  Comments - Freedom of Choice: Choice offered in the interest of dc planning  CM contacted family/caregiver?: No- see comments                            Other Referral/Resources/Interventions Provided:  Interventions:  Other (Specify) (OP Substance Abuse resources) IMM Given (Date):: 09/24/22  IMM Given to[de-identified] Patient (placed in MR bin for filing)     Additional Comments: CM provided OP D&A resources to pt at bedside  Pt agreeable to receiving resources, encouraged to reach out to CM w any questions or needs for assistance while IP

## 2022-09-24 NOTE — ASSESSMENT & PLAN NOTE
? Creatine 0 66 stable  ?  Baseline 0 76-0 9  · Avoid nephrotoxic agents   · Monitor I's/O's  ·    · Intake/Output Summary (Last 24 hours) at 9/23/2022 1332  · Last data filed at 9/23/2022 1231      · Gross per 24 hour   · Intake · 4243 58 ml   · Output · 3225 ml   · Net · 1018 58 ml

## 2022-09-24 NOTE — ASSESSMENT & PLAN NOTE
Lab Results   Component Value Date    HGBA1C 5 7 (H) 07/21/2022       Recent Labs     09/21/22  1311 09/23/22  2155   POCGLU 128 91       Blood Sugar Average: Last 72 hrs:  (P) 109 5     § Last HbA1C 5 7- 7/21/22  § Hold home metformin  § Monitor POC glucose  Glucose today 91    § Start SSI when able to tolerate PO
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
## 2022-09-25 VITALS
WEIGHT: 168.65 LBS | HEIGHT: 69 IN | SYSTOLIC BLOOD PRESSURE: 158 MMHG | BODY MASS INDEX: 24.98 KG/M2 | OXYGEN SATURATION: 98 % | HEART RATE: 106 BPM | TEMPERATURE: 98.5 F | RESPIRATION RATE: 21 BRPM | DIASTOLIC BLOOD PRESSURE: 78 MMHG

## 2022-09-25 LAB
ANION GAP SERPL CALCULATED.3IONS-SCNC: 6 MMOL/L (ref 4–13)
BASOPHILS # BLD AUTO: 0.03 THOUSANDS/ΜL (ref 0–0.1)
BASOPHILS NFR BLD AUTO: 0 % (ref 0–1)
BUN SERPL-MCNC: 14 MG/DL (ref 5–25)
CALCIUM SERPL-MCNC: 8.7 MG/DL (ref 8.3–10.1)
CHLORIDE SERPL-SCNC: 101 MMOL/L (ref 96–108)
CO2 SERPL-SCNC: 26 MMOL/L (ref 21–32)
CREAT SERPL-MCNC: 0.72 MG/DL (ref 0.6–1.3)
EOSINOPHIL # BLD AUTO: 0.12 THOUSAND/ΜL (ref 0–0.61)
EOSINOPHIL NFR BLD AUTO: 1 % (ref 0–6)
ERYTHROCYTE [DISTWIDTH] IN BLOOD BY AUTOMATED COUNT: 13.4 % (ref 11.6–15.1)
GFR SERPL CREATININE-BSD FRML MDRD: 97 ML/MIN/1.73SQ M
GLUCOSE SERPL-MCNC: 101 MG/DL (ref 65–140)
HCT VFR BLD AUTO: 38.4 % (ref 36.5–49.3)
HGB BLD-MCNC: 13 G/DL (ref 12–17)
IMM GRANULOCYTES # BLD AUTO: 0.03 THOUSAND/UL (ref 0–0.2)
IMM GRANULOCYTES NFR BLD AUTO: 0 % (ref 0–2)
LYMPHOCYTES # BLD AUTO: 1.49 THOUSANDS/ΜL (ref 0.6–4.47)
LYMPHOCYTES NFR BLD AUTO: 18 % (ref 14–44)
MCH RBC QN AUTO: 33.5 PG (ref 26.8–34.3)
MCHC RBC AUTO-ENTMCNC: 33.9 G/DL (ref 31.4–37.4)
MCV RBC AUTO: 99 FL (ref 82–98)
MONOCYTES # BLD AUTO: 1.15 THOUSAND/ΜL (ref 0.17–1.22)
MONOCYTES NFR BLD AUTO: 14 % (ref 4–12)
NEUTROPHILS # BLD AUTO: 5.63 THOUSANDS/ΜL (ref 1.85–7.62)
NEUTS SEG NFR BLD AUTO: 67 % (ref 43–75)
NRBC BLD AUTO-RTO: 0 /100 WBCS
PLATELET # BLD AUTO: 164 THOUSANDS/UL (ref 149–390)
PMV BLD AUTO: 9.9 FL (ref 8.9–12.7)
POTASSIUM SERPL-SCNC: 4.2 MMOL/L (ref 3.5–5.3)
RBC # BLD AUTO: 3.88 MILLION/UL (ref 3.88–5.62)
SODIUM SERPL-SCNC: 133 MMOL/L (ref 135–147)
WBC # BLD AUTO: 8.45 THOUSAND/UL (ref 4.31–10.16)

## 2022-09-25 PROCEDURE — 99238 HOSP IP/OBS DSCHRG MGMT 30/<: CPT | Performed by: INTERNAL MEDICINE

## 2022-09-25 PROCEDURE — 80048 BASIC METABOLIC PNL TOTAL CA: CPT

## 2022-09-25 PROCEDURE — 85025 COMPLETE CBC W/AUTO DIFF WBC: CPT

## 2022-09-25 PROCEDURE — NC001 PR NO CHARGE: Performed by: INTERNAL MEDICINE

## 2022-09-25 RX ADMIN — HYDROCHLOROTHIAZIDE 12.5 MG: 12.5 TABLET ORAL at 08:21

## 2022-09-25 RX ADMIN — THIAMINE HCL TAB 100 MG 250 MG: 100 TAB at 08:09

## 2022-09-25 RX ADMIN — ASPIRIN 81 MG CHEWABLE TABLET 81 MG: 81 TABLET CHEWABLE at 08:08

## 2022-09-25 RX ADMIN — PAROXETINE HYDROCHLORIDE 40 MG: 20 TABLET, FILM COATED ORAL at 08:21

## 2022-09-25 RX ADMIN — FOLIC ACID 1 MG: 1 TABLET ORAL at 08:08

## 2022-09-25 RX ADMIN — CHLORHEXIDINE GLUCONATE 15 ML: 1.2 SOLUTION ORAL at 08:08

## 2022-09-25 RX ADMIN — MAGNESIUM OXIDE TAB 400 MG (241.3 MG ELEMENTAL MG) 400 MG: 400 (241.3 MG) TAB at 08:08

## 2022-09-25 RX ADMIN — ATORVASTATIN CALCIUM 40 MG: 40 TABLET, FILM COATED ORAL at 16:08

## 2022-09-25 RX ADMIN — AMLODIPINE BESYLATE 10 MG: 10 TABLET ORAL at 08:08

## 2022-09-25 RX ADMIN — MULTIPLE VITAMINS W/ MINERALS TAB 1 TABLET: TAB ORAL at 08:08

## 2022-09-25 RX ADMIN — ENOXAPARIN SODIUM 40 MG: 40 INJECTION SUBCUTANEOUS at 08:08

## 2022-09-25 NOTE — PROGRESS NOTES
Daily Progress Note - Critical Care   Martita Munoz 77 y o  male MRN: [id number]  Unit/Bed#: MICU 04 Encounter: [id number]        ----------------------------------------------------------------------------------------  HPI/24hr events: Martita Munoz is a 77 y o  male who has a past medical history of HTN, HLD, alcohol use, and tobacco use presented with multiple witnessed seizures  Per chart, EMS was called for a health and welfare check and he had a witnessed tonic clonic seizure for about 30 seconds  He told police before his seizure that he had stopped drinking about 24 hours prior and has a history of heavy alcohol use  Upon arrival to the ED, the patient began seizing in the hallway for about 1 minute and was given 2mg of IV Ativan with termination of the seizure  His ETOH on arrival was 91  He is also prescribed alprazolam chronically  In the ED, he was given 650 mg of phenobarbital IV followed by another dose of 260 mg and a total of 4mg of Ativan  24h events: No acute events overnight  Pt is signed out to SLIM    ---------------------------------------------------------------------------------------  SUBJECTIVE  Pt examined at bedside this AM  Sleeping comfortably  Denies any acute complaints or concerns at this time  Review of Systems   Constitutional: Negative for chills and fever  HENT: Negative for ear pain and sore throat  Eyes: Negative for pain and visual disturbance  Respiratory: Negative for cough and shortness of breath  Cardiovascular: Negative for chest pain and palpitations  Gastrointestinal: Negative for abdominal pain and vomiting  Genitourinary: Negative for dysuria and hematuria  Musculoskeletal: Negative for arthralgias and back pain  Skin: Negative for color change and rash  Neurological: Negative for seizures and syncope  All other systems reviewed and are negative          Review of systems was unable to be performed secondary to Mental status  ---------------------------------------------------------------------------------------  Assessment and Plan:    Neuro:   · Diagnosis: Alcohol withdrawal seizures   Plan:  ? Received a total of 1950 mg IV phenobarbital since admission    ? Add 130-260mg phenobarbital every 2-4 hours with a RASS goal of -2   ? Adjunctive treatments: ketamine 0 1mg/kg/hr or 0 1-1mcg/kg/hr of precedex   § Only add precedex after 2g of phenobarbital has been given   ? [facility] protocol   · Has not required any benzodiazepines overnight    · July's Discriminant Function score 11 7  · Appreciate toxicology recommendations  · Alcohol level 91 on admission   ? GCS 14  ? Glucose 124 on admission    ? Prolactin 7 8 on admission   ? STAT CTH for GCS change >2   ? Neuro checks q4h  ? C/w Folate, multivitamin, thiamine   · Diagnosis: Depression   ? Plan: Hold paroxetine due to anticholinergic side effects which can contribute to his altered mental status   ? Continue with home Mirtazipine  · Diagnosis: Anxiety   · Hold home Xanax in the setting of DTs   Delirium precautions  o Plan: Maintain sleep-wake cycle       CV:    Diagnosis: HTN  o Restart on Norvasc,HCTZ    Diagnosis: HLD  o Plan: Start home Lipitor   o Lipid panel:   - Cholesterol 160  - Triglycerides 103   - HDL 62  - LDL 77      Pulm:  o Diagnosis: Tobacco use   o Plan: Nicoderm CQ 21mg as needed   o Encourage smoking cessation       GI:    Diagnosis: No acute issues         :   · Diagnosis: DARWIN, resolved  ? Creatine 0 66 stable  ? Baseline 0 76-0 9  · Avoid nephrotoxic agents   · Monitor I's/O's    Intake/Output Summary (Last 24 hours) at 9/25/2022 7230  Last data filed at 9/24/2022 1815  Gross per 24 hour   Intake 477 25 ml   Output 3450 ml   Net -2972 75 ml         F/E/N:   · Plan:  · Fluids: N/A  · Electrolytes: Monitor and replete as needed to maintain K >4, Phos >3, Mg >2   · Hypokalemia: 3 4  Replaced with 60 mEq of potassium     · Metabolic acidosis:   · VBG on admission: 7 05/31 9/171  6  · Repeat /33 1/91 3/24 3  · Lactate: 1 2 from 5  · Nutrition: Cardiovascular diet     Heme/Onc:   o DVT ppx: Lovenox 40 mg daily       Endo:   o Diagnosis: Type 2 Diabetes Mellitus   - Plan: Last HbA1C 5 7- 22  - Hold home metformin  - Monitor POC glucose  Glucose today 91   - Start SSI when able to tolerate PO       ID:   o Diagnosis:Leukocytosis   - WBC 7 12   - Likely in the setting of seizures   - Will continue to monitor CBC   - If febrile, consider CXR for concern of aspiration pneumonia  - UA:    Ketones 20+, Nitrites and leukocytes negative       MSK/Skin:   o PT/OT when able   o Up and out of bed as able       Disposition: Continue Critical Care   Code Status: Level 1 - Full Code  ---------------------------------------------------------------------------------------  ICU CORE MEASURES    Prophylaxis   VTE Pharmacologic Prophylaxis: Heparin  VTE Mechanical Prophylaxis: sequential compression device  Stress Ulcer Prophylaxis: Pantoprazole IV     ABCDE Protocol (if indicated)  Plan to perform spontaneous awakening trial today? Not applicable  Plan to perform spontaneous breathing trial today? Not applicable  Obvious barriers to extubation? Not applicable  CAM-ICU: Positive    Invasive Devices Review  Invasive Devices  Report    Peripheral Intravenous Line  Duration           Peripheral IV 22 Left;Ventral (anterior) Hand 3 days    Peripheral IV 22 Dorsal (posterior); Right Forearm 3 days          Drain  Duration           External Urinary Catheter Medium 3 days              Can any invasive devices be discontinued today? No   ---------------------------------------------------------------------------------------  OBJECTIVE    Physical Exam  Constitutional:       General: He is not in acute distress  HENT:      Head: Normocephalic and atraumatic  Mouth/Throat:      Mouth: Mucous membranes are dry     Eyes:      Extraocular Movements: Extraocular movements intact  Pupils: Pupils are equal, round, and reactive to light  Cardiovascular:      Rate and Rhythm: Normal rate and regular rhythm  Pulmonary:      Effort: Pulmonary effort is normal  No respiratory distress  Abdominal:      Palpations: Abdomen is soft  Tenderness: There is no abdominal tenderness  Musculoskeletal:      Cervical back: No rigidity  Right lower leg: No edema  Left lower leg: No edema  Lymphadenopathy:      Cervical: No cervical adenopathy  Skin:     General: Skin is warm and dry  Capillary Refill: Capillary refill takes less than 2 seconds  Neurological:      Mental Status: He is alert and oriented to person, place, and time  Sensory: No sensory deficit  Motor: No weakness  Comments: Follows commands  Mild tremor of left hand with movement  Vitals   Vitals:    22 0600   BP: 164/83 151/78  139/70   BP Location: Left arm      Pulse: 96 (!) 106  86   Resp: 20      Temp: 98 4 °F (36 9 °C)  99 2 °F (37 3 °C)    TempSrc: Oral  Oral    SpO2: 98% 97%  97%   Weight:       Height:         Temp (24hrs), Av 8 °F (37 1 °C), Min:98 4 °F (36 9 °C), Max:99 2 °F (37 3 °C)  Current: Temperature: 99 2 °F (37 3 °C)  Temp:  [98 4 °F (36 9 °C)-99 2 °F (37 3 °C)] 99 2 °F (37 3 °C)  HR:  [] 86  Resp:  [20-22] 20  BP: (139-168)/(70-86) 139/70    Respiratory:  SpO2: SpO2: 97 %       Invasive/non-invasive ventilation settings   Respiratory  Report   Lab Data (Last 4 hours)    None         O2/Vent Data (Last 4 hours)    None                Height and Weights   Height: 5' 9" (175 3 cm)  IBW (Ideal Body Weight): 70 7 kg  Body mass index is 24 91 kg/m²    Weight (last 2 days)     Date/Time Weight    22 0510 76 5 (168 65)          Intake and Output  I/O        0701   0700  0701   0700    I V   2079 3    IV Piggyback  700    Total Intake  2779 3    Urine  1150    Stool  0 Total Output  1150    Net  +1629 3          Unmeasured Stool Occurrence  1 x            Nutrition       Diet Orders   (From admission, onward)             Start     Ordered    09/24/22 0622  Diet Cardiovascular; Cardiac  Diet effective now        References:    Nutrtion Support Algorithm Enteral vs  Parenteral   Question Answer Comment   Diet Type Cardiovascular    Cardiac Cardiac    RD to adjust diet per protocol?  Yes        09/24/22 0622                Laboratory and Diagnostics:  Results from last 7 days   Lab Units 09/25/22  0540 09/24/22  0459 09/23/22  0608 09/22/22  0545 09/21/22  1329   WBC Thousand/uL 8 45 7 12 9 98 10 88* 13 18*   HEMOGLOBIN g/dL 13 0 12 9 13 0 13 1 13 8   HEMATOCRIT % 38 4 39 6 39 6 38 8 42 7   PLATELETS Thousands/uL 164 142* 157 171 208   NEUTROS PCT % 67 73 78* 86*  --    MONOS PCT % 14* 9 9 8  --      Results from last 7 days   Lab Units 09/25/22  0540 09/24/22  0459 09/23/22  0608 09/22/22  0449 09/21/22  1750 09/21/22  1329   SODIUM mmol/L 133* 138 141 138 137 140   POTASSIUM mmol/L 4 2 3 4* 3 2* 3 2* 3 2* 3 4*   CHLORIDE mmol/L 101 106 106 105 103 101   CO2 mmol/L 26 28 25 27 26 9*   ANION GAP mmol/L 6 4 10 6 8 30*   BUN mg/dL 14 11 11 8 8 9   CREATININE mg/dL 0 72 0 66 0 60 0 72 0 73 1 18   CALCIUM mg/dL 8 7 8 5 8 1* 7 9* 8 4 9 1   GLUCOSE RANDOM mg/dL 101 81 68 98 123 137   ALT U/L  --  64 73 92*  --  119*   AST U/L  --  84* 114* 142*  --  197*   ALK PHOS U/L  --  84 75 77  --  92   ALBUMIN g/dL  --  2 9* 2 9* 3 2*  --  3 6   TOTAL BILIRUBIN mg/dL  --  0 66 0 61 0 75  --  0 37     Results from last 7 days   Lab Units 09/23/22  0608 09/22/22  0449 09/21/22  1916   MAGNESIUM mg/dL 2 0 2 0 2 2   PHOSPHORUS mg/dL 3 1 2 5  --       Results from last 7 days   Lab Units 09/23/22  0906   INR  1 35*          Results from last 7 days   Lab Units 09/22/22  0723 09/22/22  0449 09/21/22  1750   LACTIC ACID mmol/L 1 2 5 3* 1 3     ABG:    VBG:  Results from last 7 days   Lab Units 09/22/22  1318   PH PHONG  7 483*   PCO2 PHONG mm Hg 36 6*   PO2 PHONG mm Hg 48 2*   HCO3 PHONG mmol/L 26 8   BASE EXC PHONG mmol/L 3 5           Micro          Imaging:  I have personally reviewed pertinent reports  Active Medications  Scheduled Meds:  Current Facility-Administered Medications   Medication Dose Route Frequency Provider Last Rate    amLODIPine  10 mg Oral Daily Estefania Hamlin MD      aspirin  81 mg Oral Daily Estefania Hamlin MD      atorvastatin  40 mg Oral Daily Estefania Hamlin MD      chlorhexidine  15 mL Mouth/Throat Q12H [address] Estefania Hamlin MD      cloNIDine  0 2 mg Oral 4x Daily PRN Estefania Hamlin MD      enoxaparin  40 mg Subcutaneous Q24H [address] Estefania Hamlin MD      folic acid  1 mg Oral Daily Estefania Hamlin MD      hydrochlorothiazide  12 5 mg Oral Daily Estefania Hamlin MD      labetalol  10 mg Intravenous Q4H PRN Estefania Hamlin MD      magnesium oxide  400 mg Oral BID Estefania Hamlin MD      mirtazapine  30 mg Oral HS Estefania Hamlin MD      multivitamin-minerals  1 tablet Oral Daily Estefania Hamlin MD      PARoxetine  40 mg Oral QAM Estefania Hamlin MD      thiamine  250 mg Oral Daily Estefania Hamlin MD       Continuous Infusions:     PRN Meds:   cloNIDine, 0 2 mg, 4x Daily PRN  labetalol, 10 mg, Q4H PRN        Allergies   Allergies   Allergen Reactions    Lisinopril Angioedema       Advance Directive and Living Will:      Power of :    POLST:      Counseling / Coordination of Care  Total Critical Care time spent 45 minutes excluding procedures, teaching and family updates  Donovan Daley DO      Portions of the record may have been created with voice recognition software  Occasional wrong word or "sound a like" substitutions may have occurred due to the inherent limitations of voice recognition software    Read the chart carefully and recognize, using context, where substitutions have occurred

## 2022-09-25 NOTE — ASSESSMENT & PLAN NOTE
Lab Results   Component Value Date    HGBA1C 5 7 (H) 07/21/2022       Recent Labs     09/23/22  2155   POCGLU 91       Blood Sugar Average: Last 72 hrs:  (P) 91     § Last HbA1C 5 7- 7/21/22  § Hold home metformin  § Monitor POC glucose  Glucose today 91    § Start SSI when able to tolerate PO

## 2022-09-25 NOTE — DISCHARGE SUMMARY
[address] 1955, 77 y o  male  MRN: [id number]    Unit/Bed#: [facility] 04 Encounter: [id number]  Primary Care Provider: Gaudencio Gandhi MD      Admission Date: 09/21/22  Discharge Date: 09/25/22  Length of Stay: 4 days  Diagnosis:   Principal Problem:    Alcohol withdrawal syndrome, with delirium ([facility] )  Active Problems:    Depression with anxiety    Hypertension    Hyperlipidemia    Type 2 diabetes mellitus with diabetic peripheral angiopathy without gangrene, without long-term current use of insulin (HCC)    Smoking    DARWIN (acute kidney injury) ([facility] )    Leukocytosis    Anxiety        ASSESSMENTS & PLANS:   Plans discussed with [facility] team and finalization is pending attending physician attestation  No new Assessment & Plan notes have been filed under this hospital service since the last note was generated  Service: Critical Care/ICU      Patient Active Problem List   Diagnosis    Atherosclerotic PVD with intermittent claudication ([facility] )    Depression with anxiety    Hypertension    Hyperlipidemia    Type 2 diabetes mellitus with diabetic peripheral angiopathy without gangrene, without long-term current use of insulin (HCC)    Smoking    Rosacea    Rhinophyma    Bilateral carotid artery stenosis    Chronic bronchitis (HCC)    Alcohol withdrawal syndrome, with delirium ([facility] )    DARWIN (acute kidney injury) ([facility] )    Leukocytosis    Anxiety         HPI (per admission H&P note on 9/21/22)     HPI:   Per Smita Zelaya MD on 9/21/22:    Renetta Mccabe is a 77 y o  male who has a past medical history of HTN, HLD, alcohol use, and tobacco use presented with multiple witnessed seizures  Per chart, EMS was called for a health and welfare check and he had a witnessed tonic clonic seizure for about 30 seconds  He told police before his seizure that he had stopped drinking about 24 hours prior and has a history of heavy alcohol use   Upon arrival to the ED, the patient began seizing in the hallway for about 1 minute  He was given 2mg of IV ativan  His ETOH on arrival was 91  He is also prescribed alprazolam chronically  In the ED, he was given 650mg of phenobarbital IV over 30 minutes with 130-260 mg every 2-4 hours with a goal of RASS -2 per toxicology  HOSPITAL COURSE:     Hospital Course:   77 y o  male admitted to the MICU at [facility] on 9/21/2022 for multiple alcohol withdrawal seizures  Pt was medically managed under the CIWA protocol  He did require precedex and ketamine gtts for agitation  Patient remained stable without any seizures , and was able to be taken of the ketamine and precedex  Pt was seen by case management who spoke with both the patient, and his brother Sydney Yoo and provided resources about outpatient drug and alcohol rehabilitation  Pt declines discharge to inpatient rehab  He was deemed stable for discharge to home on 9/25/22, hospital day 4  COMPLICATIONS:     Complications: NONE       PROCEDURES:     Procedures Performed:   Orders Placed This Encounter   Procedures    Critical Care         SIGNIFICANT FINDINGS / ABNORMAL RESULTS:     Significant Findings/Abnormal Results with this admission:    XR chest 1 view portable    Result Date: 9/21/2022  Impression: No acute cardiopulmonary disease  Workstation performed: [id number]     CT head without contrast    Result Date: 9/21/2022  Impression: No acute intracranial abnormality   Workstation performed: [id number]         VITALS ON DISCHARGE DATE:     Vitals  Blood Pressure: 158/78 (09/25/22 0808)  Temperature: 98 5 °F (36 9 °C) (09/25/22 0800)  Temp Source: Oral (09/25/22 0800)  Pulse: 90 (09/25/22 1100)  Respirations: 21 (09/25/22 0800)  SpO2: 97 % (09/25/22 0600)  Height: 5' 9" (175 3 cm) (09/24/22 0510)  Weight - Scale: 76 5 kg (168 lb 10 4 oz) (09/24/22 0510)    Temp:  [98 4 °F (36 9 °C)-99 2 °F (37 3 °C)] 98 5 °F (36 9 °C)  HR:  [] 90  Resp:  [20-21] 21  BP: (139-164)/(70-83) 158/78    Weight (last 2 days)     Date/Time Weight    09/24/22 0510 76 5 (168 65)            Intake/Output Summary (Last 24 hours) at 9/25/2022 1323  Last data filed at 9/25/2022 1201  Gross per 24 hour   Intake 120 ml   Output 2950 ml   Net -2830 ml       Invasive Devices  Report    Peripheral Intravenous Line  Duration           Peripheral IV 09/21/22 Left;Ventral (anterior) Hand 3 days    Peripheral IV 09/22/22 Dorsal (posterior); Right Forearm 3 days          Drain  Duration           External Urinary Catheter Medium 3 days                  PHYSICAL EXAM ON DAY OF DISCHARGE:       Physical Exam:     General: Pt observed lying comfortably in bed, NAD  Not toxic/ill-appearing  No cachectic or diaphoresis  No obvious sign of trauma or bleeding  Psych: AAOx4, able to converse appropriately  Denies SH/SI  Neuro: no gross neurological deficits, CN 2-12 grossly intact  Head: atraumatic, normocephalic  Eyes: open spontaneously, EOM intact, PAO, conjunctiva non-injected, no scleral icterus, no discharge  Ear: normal external ear, no visible drainage at external auditory orifice  Nose: clear, no epistaxis, no rhinorrhea  Throat: clear, no hoarse voice, no cough  Neck: supple, normal ROM  Heart: RRR, no murmur/distant heart sound appreciated  Lungs: LCTABL, nml respiratory effort, no agonal/labored breathing, no accessory muscle use  Abdomen: soft, nontender, nondistended, normal bowel sound  Extremities: +4 strengths b/l  Strong radial/pedal pulses  No weakness/paresthesia/edema  CONDITION AT DISCHARGE:   On day of discharge patient is seen and evaluated at bedside  Patient is stable and without concern  Patient denies any pain or SOB  Patient able to tolerate PO food without N/V/D and had bowel movement and baseline urine output  Patient able to ambulate independently without assistance  Patient is aware of current health status and understand plan of treatment and outpatient follow-up   The patient understood and agreed with the plan  All pertinent lab results, imaging studies, procedures, and/or any incidental findings have been disclosed to the patient  All pertinent questions are answered to patient's satisfaction  On day of discharge, the patient was hemodynamically stable and appropriate for discharge home  Condition at Discharge: good       DISCHARGE MEDICATIONS:     Discharge Medications:  See after visit summary (AVS) for detailed reconciled discharge medications, which was provided to patient and family  Summary of medication changes made with this admission:    · START:  1  Over-the-counter multivitamins     · RESUME:  1  All other home medications       FOLLOW-UP APPOINTMENTS / INSTRUCTION :     Important Physician Related Follow Up:   · PCP    Appointment confirmed:  Future Appointments   Date Time Provider Olivia June   11/9/2022  8:00 AM Vida Rivera MD [address]       Discharge instructions/Information to patient and family:   See after visit summary (AVS) for information provided to patient and family  Provisions for Follow-Up Care:  See after visit summary for information related to follow-up care and any pertinent home health orders  DISPOSITION:     Disposition: Home    Discharge Statement   I spent 30  minutes discharging the patient  This time was spent on the day of discharge  I had direct contact with the patient on the day of discharge  Additional documentation is required if more than 30 minutes were spent on discharge  Planned Readmission: Merari Soto DO  PGY-2, Family Medicine  09/25/22  1:23 PM    Dear reader, please be aware that portions of my note contain dictated text  I have done my best to proof-read this note prior to signing  However, there may be occasional unnoticed errors pertaining to "sound-alike" words and/or grammar during my dictation process   If there is any words or information that is unclear or appears erroneous, please kindly let me know and I will clarify and/or addend my notes accordingly  Thank you for your understanding

## 2022-09-25 NOTE — DISCHARGE INSTR - AVS FIRST PAGE
Can begin taking over-the-counter multivitamins (I e  Centrum)  Follow up with PCP, Dr Sangeetha Dixon

## 2022-09-26 ENCOUNTER — TRANSITIONAL CARE MANAGEMENT (OUTPATIENT)
Dept: INTERNAL MEDICINE CLINIC | Facility: CLINIC | Age: 67
End: 2022-09-26

## 2022-09-26 DIAGNOSIS — F41.9 ANXIETY: ICD-10-CM

## 2022-09-26 DIAGNOSIS — F10.931 ALCOHOL WITHDRAWAL SYNDROME, WITH DELIRIUM (HCC): Primary | ICD-10-CM

## 2022-09-26 RX ORDER — ALPRAZOLAM 0.25 MG/1
0.25 TABLET ORAL 4 TIMES DAILY PRN
Qty: 30 TABLET | Refills: 0 | Status: SHIPPED | OUTPATIENT
Start: 2022-09-26 | End: 2022-10-18

## 2022-09-26 RX ORDER — MIRTAZAPINE 30 MG/1
30 TABLET, FILM COATED ORAL
Qty: 30 TABLET | Refills: 0 | Status: SHIPPED | OUTPATIENT
Start: 2022-09-26

## 2022-09-26 RX ORDER — PAROXETINE HYDROCHLORIDE 40 MG/1
40 TABLET, FILM COATED ORAL EVERY MORNING
Qty: 30 TABLET | Refills: 0 | Status: SHIPPED | OUTPATIENT
Start: 2022-09-26

## 2022-09-28 ENCOUNTER — TELEPHONE (OUTPATIENT)
Dept: INTERNAL MEDICINE CLINIC | Facility: CLINIC | Age: 67
End: 2022-09-28

## 2022-09-28 NOTE — UTILIZATION REVIEW
Notification of Discharge   This is a Notification of Discharge from our facility [address]  Please be advised that this patient has been discharge from our facility  Below you will find the admission and discharge date and time including the patients disposition  UTILIZATION REVIEW CONTACT:  Goyo Og  Utilization   Network Utilization Review Department  Phone: [phone] x carefully listen to the prompts  All voicemails are confidential   Email: [email or web address]     PHYSICIAN ADVISORY SERVICES:  FOR [id number] REVIEW - MEDICAL NECESSITY DENIAL  Phone: [phone]  Fax: [phone]  Email: [email or web address]     PRESENTATION DATE: 9/21/2022  1:07 PM  OBERVATION ADMISSION DATE:   INPATIENT ADMISSION DATE: 9/21/22  4:12 PM   DISCHARGE DATE: 9/25/2022  5:26 PM  DISPOSITION: Home/Self Care Home/Self Care      IMPORTANT INFORMATION:  Send all requests for admission clinical reviews, approved or denied determinations and any other requests to dedicated fax number below belonging to the campus where the patient is receiving treatment   List of dedicated fax numbers:  [address] DENIALS (Administrative/Medical Necessity) [phone]   [address] (Maternity/NICU/Pediatrics) [phone]   [facility] [phone]   [address] [facility] [phone]   [facility] [place]  [phone]   [address] [phone]   [facility]  [phone]   [address] [facility]  [address] [facility] [address] [facility] [phone]

## 2022-10-01 ENCOUNTER — HOSPITAL ENCOUNTER (EMERGENCY)
Facility: HOSPITAL | Age: 67
Discharge: HOME | End: 2022-10-01
Attending: EMERGENCY MEDICINE
Payer: COMMERCIAL

## 2022-10-01 ENCOUNTER — APPOINTMENT (OUTPATIENT)
Dept: RADIOLOGY | Facility: HOSPITAL | Age: 67
End: 2022-10-01
Payer: COMMERCIAL

## 2022-10-01 ENCOUNTER — NURSE TRIAGE (OUTPATIENT)
Dept: OTHER | Facility: OTHER | Age: 67
End: 2022-10-01

## 2022-10-01 VITALS
TEMPERATURE: 99 F | OXYGEN SATURATION: 100 % | DIASTOLIC BLOOD PRESSURE: 61 MMHG | SYSTOLIC BLOOD PRESSURE: 128 MMHG | RESPIRATION RATE: 20 BRPM | HEART RATE: 86 BPM

## 2022-10-01 DIAGNOSIS — R53.1 WEAKNESS: ICD-10-CM

## 2022-10-01 DIAGNOSIS — F10.931 ALCOHOL WITHDRAWAL SYNDROME, WITH DELIRIUM (HCC): Primary | ICD-10-CM

## 2022-10-01 DIAGNOSIS — S42.209A PROXIMAL HUMERUS FRACTURE: ICD-10-CM

## 2022-10-01 LAB
ALBUMIN SERPL BCP-MCNC: 2.7 G/DL (ref 3.5–5)
ALP SERPL-CCNC: 79 U/L (ref 46–116)
ALT SERPL W P-5'-P-CCNC: 53 U/L (ref 12–78)
AMPHETAMINES SERPL QL SCN: NEGATIVE
ANION GAP SERPL CALCULATED.3IONS-SCNC: 9 MMOL/L (ref 4–13)
AST SERPL W P-5'-P-CCNC: 118 U/L (ref 5–45)
BARBITURATES UR QL: POSITIVE
BASOPHILS # BLD AUTO: 0.03 THOUSANDS/ΜL (ref 0–0.1)
BASOPHILS NFR BLD AUTO: 0 % (ref 0–1)
BENZODIAZ UR QL: POSITIVE
BILIRUB SERPL-MCNC: 0.74 MG/DL (ref 0.2–1)
BUN SERPL-MCNC: 18 MG/DL (ref 5–25)
CALCIUM ALBUM COR SERPL-MCNC: 10.3 MG/DL (ref 8.3–10.1)
CALCIUM SERPL-MCNC: 9.3 MG/DL (ref 8.3–10.1)
CHLORIDE SERPL-SCNC: 94 MMOL/L (ref 96–108)
CO2 SERPL-SCNC: 28 MMOL/L (ref 21–32)
COCAINE UR QL: NEGATIVE
CREAT SERPL-MCNC: 0.88 MG/DL (ref 0.6–1.3)
EOSINOPHIL # BLD AUTO: 0.02 THOUSAND/ΜL (ref 0–0.61)
EOSINOPHIL NFR BLD AUTO: 0 % (ref 0–6)
ERYTHROCYTE [DISTWIDTH] IN BLOOD BY AUTOMATED COUNT: 12.9 % (ref 11.6–15.1)
ETHANOL SERPL-MCNC: <3 MG/DL (ref 0–3)
GFR SERPL CREATININE-BSD FRML MDRD: 89 ML/MIN/1.73SQ M
GLUCOSE SERPL-MCNC: 106 MG/DL (ref 65–140)
GLUCOSE SERPL-MCNC: 111 MG/DL (ref 65–140)
HCT VFR BLD AUTO: 33 % (ref 36.5–49.3)
HGB BLD-MCNC: 11.2 G/DL (ref 12–17)
IMM GRANULOCYTES # BLD AUTO: 0.11 THOUSAND/UL (ref 0–0.2)
IMM GRANULOCYTES NFR BLD AUTO: 1 % (ref 0–2)
LIPASE SERPL-CCNC: 193 U/L (ref 73–393)
LYMPHOCYTES # BLD AUTO: 1.21 THOUSANDS/ΜL (ref 0.6–4.47)
LYMPHOCYTES NFR BLD AUTO: 9 % (ref 14–44)
MAGNESIUM SERPL-MCNC: 1.8 MG/DL (ref 1.6–2.6)
MCH RBC QN AUTO: 33.8 PG (ref 26.8–34.3)
MCHC RBC AUTO-ENTMCNC: 33.9 G/DL (ref 31.4–37.4)
MCV RBC AUTO: 100 FL (ref 82–98)
METHADONE UR QL: NEGATIVE
MONOCYTES # BLD AUTO: 1.17 THOUSAND/ΜL (ref 0.17–1.22)
MONOCYTES NFR BLD AUTO: 9 % (ref 4–12)
NEUTROPHILS # BLD AUTO: 10.43 THOUSANDS/ΜL (ref 1.85–7.62)
NEUTS SEG NFR BLD AUTO: 81 % (ref 43–75)
NRBC BLD AUTO-RTO: 0 /100 WBCS
OPIATES UR QL SCN: NEGATIVE
OXYCODONE+OXYMORPHONE UR QL SCN: POSITIVE
PCP UR QL: NEGATIVE
PHOSPHATE SERPL-MCNC: 3.4 MG/DL (ref 2.3–4.1)
PLATELET # BLD AUTO: 502 THOUSANDS/UL (ref 149–390)
PMV BLD AUTO: 9.3 FL (ref 8.9–12.7)
POTASSIUM SERPL-SCNC: 3.3 MMOL/L (ref 3.5–5.3)
PROT SERPL-MCNC: 7.2 G/DL (ref 6.4–8.4)
RBC # BLD AUTO: 3.31 MILLION/UL (ref 3.88–5.62)
SODIUM SERPL-SCNC: 131 MMOL/L (ref 135–147)
THC UR QL: NEGATIVE
WBC # BLD AUTO: 12.97 THOUSAND/UL (ref 4.31–10.16)

## 2022-10-01 PROCEDURE — 85025 COMPLETE CBC W/AUTO DIFF WBC: CPT

## 2022-10-01 PROCEDURE — 96375 TX/PRO/DX INJ NEW DRUG ADDON: CPT

## 2022-10-01 PROCEDURE — 83735 ASSAY OF MAGNESIUM: CPT

## 2022-10-01 PROCEDURE — 80307 DRUG TEST PRSMV CHEM ANLYZR: CPT

## 2022-10-01 PROCEDURE — 82077 ASSAY SPEC XCP UR&BREATH IA: CPT

## 2022-10-01 PROCEDURE — 99285 EMERGENCY DEPT VISIT HI MDM: CPT

## 2022-10-01 PROCEDURE — 84100 ASSAY OF PHOSPHORUS: CPT

## 2022-10-01 PROCEDURE — 73030 X-RAY EXAM OF SHOULDER: CPT

## 2022-10-01 PROCEDURE — 80053 COMPREHEN METABOLIC PANEL: CPT

## 2022-10-01 PROCEDURE — 36415 COLL VENOUS BLD VENIPUNCTURE: CPT

## 2022-10-01 PROCEDURE — 73060 X-RAY EXAM OF HUMERUS: CPT

## 2022-10-01 PROCEDURE — NC001 PR NO CHARGE: Performed by: ORTHOPAEDIC SURGERY

## 2022-10-01 PROCEDURE — 96365 THER/PROPH/DIAG IV INF INIT: CPT

## 2022-10-01 PROCEDURE — 82948 REAGENT STRIP/BLOOD GLUCOSE: CPT

## 2022-10-01 PROCEDURE — 93005 ELECTROCARDIOGRAM TRACING: CPT

## 2022-10-01 PROCEDURE — 83690 ASSAY OF LIPASE: CPT

## 2022-10-01 PROCEDURE — 99284 EMERGENCY DEPT VISIT MOD MDM: CPT | Performed by: EMERGENCY MEDICINE

## 2022-10-01 RX ORDER — POTASSIUM CHLORIDE 20 MEQ/1
40 TABLET, EXTENDED RELEASE ORAL ONCE
Status: COMPLETED | OUTPATIENT
Start: 2022-10-01 | End: 2022-10-01

## 2022-10-01 RX ORDER — ALPRAZOLAM 0.5 MG/1
0.5 TABLET ORAL ONCE
Status: COMPLETED | OUTPATIENT
Start: 2022-10-01 | End: 2022-10-01

## 2022-10-01 RX ORDER — MIRTAZAPINE 30 MG/1
30 TABLET, FILM COATED ORAL
Status: DISCONTINUED | OUTPATIENT
Start: 2022-10-01 | End: 2022-10-01 | Stop reason: HOSPADM

## 2022-10-01 RX ORDER — DIAZEPAM 10 MG/1
5 TABLET ORAL EVERY 12 HOURS PRN
Qty: 8 TABLET | Refills: 0 | Status: SHIPPED | OUTPATIENT
Start: 2022-10-01 | End: 2022-10-18

## 2022-10-01 RX ORDER — SODIUM CHLORIDE, SODIUM GLUCONATE, SODIUM ACETATE, POTASSIUM CHLORIDE, MAGNESIUM CHLORIDE, SODIUM PHOSPHATE, DIBASIC, AND POTASSIUM PHOSPHATE .53; .5; .37; .037; .03; .012; .00082 G/100ML; G/100ML; G/100ML; G/100ML; G/100ML; G/100ML; G/100ML
500 INJECTION, SOLUTION INTRAVENOUS ONCE
Status: COMPLETED | OUTPATIENT
Start: 2022-10-01 | End: 2022-10-01

## 2022-10-01 RX ORDER — ONDANSETRON 2 MG/ML
4 INJECTION INTRAMUSCULAR; INTRAVENOUS ONCE
Status: COMPLETED | OUTPATIENT
Start: 2022-10-01 | End: 2022-10-01

## 2022-10-01 RX ADMIN — ALPRAZOLAM 0.5 MG: 0.5 TABLET ORAL at 19:05

## 2022-10-01 RX ADMIN — SODIUM CHLORIDE, SODIUM GLUCONATE, SODIUM ACETATE, POTASSIUM CHLORIDE, MAGNESIUM CHLORIDE, SODIUM PHOSPHATE, DIBASIC, AND POTASSIUM PHOSPHATE 500 ML: .53; .5; .37; .037; .03; .012; .00082 INJECTION, SOLUTION INTRAVENOUS at 16:44

## 2022-10-01 RX ADMIN — ONDANSETRON 4 MG: 2 INJECTION INTRAMUSCULAR; INTRAVENOUS at 16:44

## 2022-10-01 RX ADMIN — MIRTAZAPINE 30 MG: 30 TABLET, FILM COATED ORAL at 21:17

## 2022-10-01 RX ADMIN — POTASSIUM CHLORIDE 40 MEQ: 1500 TABLET, EXTENDED RELEASE ORAL at 17:49

## 2022-10-01 NOTE — TELEPHONE ENCOUNTER
Regarding: Alcohol symptoms  ----- Message from Kevin Whitaker sent at 10/1/2022 12:04 PM EDT -----  "My brother has an alcohol problem and was prescribed zanax and he is out of this medication  He lives alone and cannot be alone at home   He fell recently and was taken to the [facility] "

## 2022-10-01 NOTE — TELEPHONE ENCOUNTER
Reason for Disposition   [1] SEVERE pain AND [2] not improved 2 hours after pain medicine    Answer Assessment - Initial Assessment Questions  1  ONSET: "When did the pain start?"      unsure  2  LOCATION: "Where is the pain located?"      Left shoulder  3  PAIN: "How bad is the pain?" (Scale 1-10; or mild, moderate, severe)    - MILD (1-3): doesn't interfere with normal activities    - MODERATE (4-7): interferes with normal activities (e g , work or school) or awakens from sleep    - SEVERE (8-10): excruciating pain, unable to do any normal activities, unable to hold a cup of water      Severe  4  WORK OR EXERCISE: "Has there been any recent work or exercise that involved this part of the body?"      Dariusz Shen recently  5  CAUSE: "What do you think is causing the arm pain?"      Dariusz Shen recently  6   OTHER SYMPTOMS: "Do you have any other symptoms?" (e g , neck pain, swelling, rash, fever, numbness, weakness)      Weakness, stomach problems,    Protocols used: ARM PAIN-ADULT-

## 2022-10-01 NOTE — DISCHARGE INSTRUCTIONS
Discharge Instructions - Orthopedics  Aminata Alvarado 77 y o  male MRN: [id number]  Unit/Bed#: ED 20    Weight Bearing Status:                                           Non weight bearing to left upper extremity in sling    Pain:  Continue analgesics as directed    Dressing Instructions:   Please keep clean, dry and intact until follow up     Appt Instructions: If you do not have your appointment, please call the clinic at [phone] t  Otherwise followup as scheduled     Contact the office sooner if you experience any increased numbness/tingling in the extremities  Miscellaneous:  Please follow up with Dr Kaylynn Santacruz in 1 week

## 2022-10-01 NOTE — ED PROVIDER NOTES
History  Chief Complaint   Patient presents with    Weakness - Generalized     Pt c/o generalized weakness, nausea, and pain fall last week  Pt states he fell and fx his left arm and has been at home since this past Sunday  78-year-old male is presenting with a chief complaint of generalized weakness and fatigue as well as left arm pain  Patient states that on the 28th of September he fell fracturing his proximal humerus  He was admitted at [facility] and managed with a sling, and not weight-bearing instructions for his left upper extremity  Just a few days before that on September 20, 2022 he was admitted to this hospital for alcohol withdrawal seizures and spent several days in the ICU receiving phenobarb detox  The patient states that he has not had any alcohol since that date and has been treating at home with the Xanax  The patient's brother is accompanying the patient today and states that he has not seen his brother and several weeks and when he saw him today became very concerned due to his fatigue and weakness as well as the recent events surrounding his hospitalizations  Additionally he says that he is concerned that the patient is taking far too much medication specifically oxycodone and Xanax  He believes that patient recently got a 3 month supply of Xanax filled and now is out of that medication  The patient has not made any suicidal statements or intent to self-harm statements to any of his her relatives  The patient is also denying any depression or suicidal ideation to this examiner  He is denying any chest pain, shortness of breath, changes in bowel or bladder habits, fevers, chills, night sweats, cough, congestion, rhinorrhea  Prior to Admission Medications   Prescriptions Last Dose Informant Patient Reported? Taking?    ALPRAZolam (XANAX) 0 25 mg tablet   No No   Sig: Take 1 tablet (0 25 mg total) by mouth 4 (four) times a day as needed for anxiety   PARoxetine (PAXIL) 40 MG tablet   No No   Sig: Take 1 tablet (40 mg total) by mouth every morning   amLODIPine (NORVASC) 10 mg tablet   No No   Sig: TAKE 1 TABLET DAILY   aspirin 81 MG tablet   No No   Sig: Take 1 tablet (81 mg total) by mouth daily   atorvastatin (LIPITOR) 40 mg tablet   No No   Sig: TAKE 1 TABLET DAILY   hydrochlorothiazide (HYDRODIURIL) 12 5 mg tablet   No No   Sig: TAKE 1 TABLET DAILY   metFORMIN (GLUCOPHAGE) 1000 MG tablet   No No   Sig: Take 1 tablet (1,000 mg total) by mouth daily with breakfast   metroNIDAZOLE (METROGEL) 1 % gel   No No   Sig: APPLY TOPICALLY TO AFFECTED AREA ONCE DAILY   mirtazapine (REMERON) 30 mg tablet   No No   Sig: Take 1 tablet (30 mg total) by mouth daily at bedtime      Facility-Administered Medications: None       Past Medical History:   Diagnosis Date    Colon polyp     Diabetes mellitus (HCC)     Hyperlipidemia     Hypertension        Past Surgical History:   Procedure Laterality Date    COLON SURGERY      found growth, bengin    COLONOSCOPY      COLONOSCOPY N/A 3/13/2018    Procedure: COLONOSCOPY;  Surgeon: Gray Romero DO;  Location: [facility] GI LAB; Service: Gastroenterology       Family History   Problem Relation Age of Onset    Heart failure Mother     Hypertension Other      I have reviewed and agree with the history as documented  E-Cigarette/Vaping    E-Cigarette Use Never User      E-Cigarette/Vaping Substances     Social History     Tobacco Use    Smoking status: Current Every Day Smoker     Packs/day: 1 00     Types: Cigarettes    Smokeless tobacco: Former User    Tobacco comment: uses vape/current every day smoker (as per [employer])   Vaping Use    Vaping Use: Never used   Substance Use Topics    Alcohol use: Not Currently    Drug use: No        Review of Systems   Constitutional: Positive for fatigue  Negative for chills and fever  HENT: Negative for congestion and sore throat  Eyes: Negative for pain and visual disturbance  Respiratory: Negative for cough, chest tightness and shortness of breath  Cardiovascular: Negative for chest pain and palpitations  Gastrointestinal: Negative for abdominal pain, blood in stool, constipation, diarrhea, nausea and vomiting  Genitourinary: Negative for dysuria, flank pain and hematuria  Musculoskeletal: Positive for arthralgias  Negative for back pain and neck pain  Skin: Negative for color change and rash  Neurological: Positive for weakness  Negative for dizziness, syncope and light-headedness  Hematological: Negative for adenopathy  Does not bruise/bleed easily  All other systems reviewed and are negative  Physical Exam  ED Triage Vitals [10/01/22 1441]   Temperature Pulse Respirations Blood Pressure SpO2   99 °F (37 2 °C) (!) 117 18 159/76 100 %      Temp Source Heart Rate Source Patient Position - Orthostatic VS BP Location FiO2 (%)   Oral Monitor Sitting Right arm --      Pain Score       --             Orthostatic Vital Signs  Vitals:    10/01/22 1441 10/01/22 1915 10/01/22 2000 10/01/22 2100   BP: 159/76 144/68 128/60 128/61   Pulse: (!) 117 104 98 86   Patient Position - Orthostatic VS: Sitting Lying Lying Lying       Physical Exam  Vitals and nursing note reviewed  Constitutional:       General: He is not in acute distress  Appearance: He is well-developed and normal weight  He is not toxic-appearing or diaphoretic  HENT:      Head: Normocephalic and atraumatic  Right Ear: External ear normal       Left Ear: External ear normal       Nose: Nose normal  No congestion or rhinorrhea  Mouth/Throat:      Mouth: Mucous membranes are moist       Pharynx: No oropharyngeal exudate or posterior oropharyngeal erythema  Eyes:      General: No scleral icterus  Extraocular Movements: Extraocular movements intact  Conjunctiva/sclera: Conjunctivae normal       Pupils: Pupils are equal, round, and reactive to light     Cardiovascular:      Rate and Rhythm: Normal rate and regular rhythm  Pulses: Normal pulses  Heart sounds: No murmur heard  Pulmonary:      Effort: Pulmonary effort is normal  No respiratory distress  Breath sounds: Normal breath sounds  No wheezing or rales  Abdominal:      Palpations: Abdomen is soft  There is no mass  Tenderness: There is no abdominal tenderness  There is no right CVA tenderness, left CVA tenderness or guarding  Hernia: No hernia is present  Musculoskeletal:         General: Swelling, tenderness, deformity and signs of injury present  Normal range of motion  Cervical back: Normal range of motion and neck supple  Right lower leg: No edema  Left lower leg: No edema  Comments: Patient has previously known proximal humerus fracture on left shoulder  Swelling and ecchymosis evident with visible deformity  Patient also has ecchymoses over the left lateral ribcage extending from approximately the 4th rib through 12th rib on the left around axillary line  Smaller ecchymosis over right anterior chest wall approximately 5 cm in diameter  Skin:     General: Skin is warm and dry  Capillary Refill: Capillary refill takes less than 2 seconds  Neurological:      General: No focal deficit present  Mental Status: He is alert and oriented to person, place, and time  Motor: No weakness        Gait: Gait normal    Psychiatric:         Mood and Affect: Mood normal          Behavior: Behavior normal          ED Medications  Medications   multi-electrolyte (ISOLYTE-S PH 7 4) bolus 500 mL (0 mL Intravenous Stopped 10/1/22 1737)   ondansetron (ZOFRAN) injection 4 mg (4 mg Intravenous Given 10/1/22 1644)   potassium chloride (K-DUR,KLOR-CON) CR tablet 40 mEq (40 mEq Oral Given 10/1/22 1749)   ALPRAZolam (XANAX) tablet 0 5 mg (0 5 mg Oral Given 10/1/22 1905)         Diagnostic Studies  Results Reviewed     Procedure Component Value Units Date/Time    Rapid drug screen, urine [[id number]] (Abnormal) Resulted: 10/01/22 1749    Lab Status: Final result Specimen: Urine Updated: 10/01/22 1749     Amph/Meth UR Negative     Barbiturate Ur Positive     Benzodiazepine Urine Positive     Cocaine Urine Negative     Methadone Urine Negative     Opiate Urine Negative     PCP Ur Negative     THC Urine Negative     Oxycodone Urine Positive    Narrative:      Presumptive report  If requested, specimen will be sent to reference lab for confirmation  FOR MEDICAL PURPOSES ONLY  IF CONFIRMATION NEEDED PLEASE CONTACT THE LAB WITHIN 5 DAYS      Drug Screen Cutoff Levels:  AMPHETAMINE/METHAMPHETAMINES  1000 ng/mL  BARBITURATES     200 ng/mL  BENZODIAZEPINES     200 ng/mL  COCAINE      300 ng/mL  METHADONE      300 ng/mL  OPIATES      300 ng/mL  PHENCYCLIDINE     25 ng/mL  THC       50 ng/mL  OXYCODONE      100 ng/mL    Ethanol [[id number]]  (Normal) Collected: 10/01/22 1628    Lab Status: Final result Specimen: Blood from Arm, Right Updated: 10/01/22 1656     Ethanol Lvl <3 mg/dL     Comprehensive metabolic panel [[id number]]  (Abnormal) Collected: 10/01/22 1628    Lab Status: Final result Specimen: Blood from Arm, Right Updated: 10/01/22 1654     Sodium 131 mmol/L      Potassium 3 3 mmol/L      Chloride 94 mmol/L      CO2 28 mmol/L      ANION GAP 9 mmol/L      BUN 18 mg/dL      Creatinine 0 88 mg/dL      Glucose 106 mg/dL      Calcium 9 3 mg/dL      Corrected Calcium 10 3 mg/dL       U/L      ALT 53 U/L      Alkaline Phosphatase 79 U/L      Total Protein 7 2 g/dL      Albumin 2 7 g/dL      Total Bilirubin 0 74 mg/dL      eGFR 89 ml/min/1 73sq m     Narrative:      [place] guidelines for Chronic Kidney Disease (CKD):     Stage 1 with normal or high GFR (GFR > 90 mL/min/1 73 square meters)    Stage 2 Mild CKD (GFR = 60-89 mL/min/1 73 square meters)    Stage 3A Moderate CKD (GFR = 45-59 mL/min/1 73 square meters)    Stage 3B Moderate CKD (GFR = 30-44 mL/min/1 73 square meters)    Stage 4 Severe CKD (GFR = 15-29 mL/min/1 73 square meters)    Stage 5 End Stage CKD (GFR <15 mL/min/1 73 square meters)  Note: GFR calculation is accurate only with a steady state creatinine    Lipase [[id number]]  (Normal) Collected: 10/01/22 1628    Lab Status: Final result Specimen: Blood from Arm, Right Updated: 10/01/22 1654     Lipase 193 u/L     Magnesium [[id number]]  (Normal) Collected: 10/01/22 1628    Lab Status: Final result Specimen: Blood from Arm, Right Updated: 10/01/22 1654     Magnesium 1 8 mg/dL     Phosphorus [[id number]]  (Normal) Collected: 10/01/22 1628    Lab Status: Final result Specimen: Blood from Arm, Right Updated: 10/01/22 1654     Phosphorus 3 4 mg/dL     CBC and differential [[id number]]  (Abnormal) Collected: 10/01/22 1628    Lab Status: Final result Specimen: Blood from Arm, Right Updated: 10/01/22 1636     WBC 12 97 Thousand/uL      RBC 3 31 Million/uL      Hemoglobin 11 2 g/dL      Hematocrit 33 0 %       fL      MCH 33 8 pg      MCHC 33 9 g/dL      RDW 12 9 %      MPV 9 3 fL      Platelets 511 Thousands/uL      nRBC 0 /100 WBCs      Neutrophils Relative 81 %      Immat GRANS % 1 %      Lymphocytes Relative 9 %      Monocytes Relative 9 %      Eosinophils Relative 0 %      Basophils Relative 0 %      Neutrophils Absolute 10 43 Thousands/µL      Immature Grans Absolute 0 11 Thousand/uL      Lymphocytes Absolute 1 21 Thousands/µL      Monocytes Absolute 1 17 Thousand/µL      Eosinophils Absolute 0 02 Thousand/µL      Basophils Absolute 0 03 Thousands/µL     Fingerstick Glucose (POCT) [[id number]]  (Normal) Collected: 10/01/22 1626    Lab Status: Final result Updated: 10/01/22 1628     POC Glucose 111 mg/dl                  XR humerus LEFT   Final Result by Doe Nova MD (10/01 2026)      Proximal humeral fracture   superior migration of shaft is new               Workstation performed: [id number]         XR shoulder 2+ views LEFT   ED Interpretation by Alex Sena DO (10/01 1835)   Abnormal   Displaced oblique prox humerus fx      Final Result by Sonu Wallis MD (10/01 2026)      Proximal humeral shaft fracture with angulation  Workstation performed: [id number]               Procedures  Procedures      ED Course                             SBIRT 20yo+    Flowsheet Row Most Recent Value   SBIRT (23 yo +)    In order to provide better care to our patients, we are screening all of our patients for alcohol and drug use  Would it be okay to ask you these screening questions? No Filed at: 10/01/2022 1856                MDM  Number of Diagnoses or Management Options  Alcohol withdrawal syndrome, with delirium ([facility] [address] )  Proximal humerus fracture  Weakness  Diagnosis management comments: 59-year-old male presenting with concerns for weakness and general feeling of unwell since cessation of alcohol approximately 12 days ago  Patient endorses using Xanax up until approximately 4 days ago to cope with alcohol withdrawal symptoms  Patient's presenting symptoms today may be a component of both alcohol withdrawal and benzodiazepine withdrawal   However patient is AAO x4 at this time  Will evaluate for other causes of generalized weakness in the setting such as beer potomania, alcoholic ketoacidosis, metabolic disturbances, infection, and other with CBC, CMP, magnesium, phosphate, and evaluate shoulder pain with x-ray of left shoulder and left humerus  Reassessment:  Left shoulder x-ray demonstrates worse displacement of he proximal humeral head fracture than was described in previous report from [facility] in, orthopedic surgery contacted for evaluation  Consult placed  Reassessment/disposition:  Patient is desiring help for withdrawal of benzodiazepine, however after speaking with [facility], the programs that the patient desires to go to for detox are not accepted by his insurance  At this time patient would like to go home and attempt to continue to detox on his own    I did advise patient and his brother that he needs close supervision and if he develops any confusion or altered mental status he should be brought immediately back to the emergency department  Patient a brother agree and patient was discharged home with supervision by his brother  Disposition  Final diagnoses:   Proximal humerus fracture   Alcohol withdrawal syndrome, with delirium ([facility] [address] )   Weakness     Time reflects when diagnosis was documented in both MDM as applicable and the Disposition within this note     Time User Action Codes Description Comment    10/1/2022  5:33 PM America Wise Add [F64 145S] Proximal humerus fracture     10/1/2022  6:41 PM Abel Brumfield Add [F10 931] Alcohol withdrawal syndrome, with delirium ([facility] [address] )     10/1/2022  9:34 PM America Wise Add [R53 1] Weakness       ED Disposition     ED Disposition   Discharge    Condition   Stable    Date/Time   Sat Oct 1, 2022  9:34 PM    Comment   Angie Rivera discharge to home/self care                 Follow-up Information     Follow up With Specialties Details Why Contact Info    Dionne Land MD Orthopedic Surgery Schedule an appointment as soon as possible for a visit in 1 week(s)  [address] [place] [id number]  [phone]      Selina Mckeon MD Internal Medicine   [address]  [phone]            Discharge Medication List as of 10/1/2022  9:34 PM      START taking these medications    Details   diazepam (VALIUM) 10 mg tablet Take 0 5 tablets (5 mg total) by mouth every 12 (twelve) hours as needed for anxiety, Starting Sat 10/1/2022, Normal         CONTINUE these medications which have NOT CHANGED    Details   ALPRAZolam (XANAX) 0 25 mg tablet Take 1 tablet (0 25 mg total) by mouth 4 (four) times a day as needed for anxiety, Starting Mon 9/26/2022, Normal      amLODIPine (NORVASC) 10 mg tablet TAKE 1 TABLET DAILY, Normal      aspirin 81 MG tablet Take 1 tablet (81 mg total) by mouth daily, Starting Tue 9/17/2019, Normal      atorvastatin (LIPITOR) 40 mg tablet TAKE 1 TABLET DAILY, Normal      hydrochlorothiazide (HYDRODIURIL) 12 5 mg tablet TAKE 1 TABLET DAILY, Normal      metFORMIN (GLUCOPHAGE) 1000 MG tablet Take 1 tablet (1,000 mg total) by mouth daily with breakfast, Starting Fri 5/27/2022, Normal      metroNIDAZOLE (METROGEL) 1 % gel APPLY TOPICALLY TO AFFECTED AREA ONCE DAILY, Normal      mirtazapine (REMERON) 30 mg tablet Take 1 tablet (30 mg total) by mouth daily at bedtime, Starting Mon 9/26/2022, Normal      PARoxetine (PAXIL) 40 MG tablet Take 1 tablet (40 mg total) by mouth every morning, Starting Mon 9/26/2022, Normal           No discharge procedures on file  PDMP Review       Value Time User    PDMP Reviewed  Yes 10/1/2022  4:15 PM Erich Iglesias DO           ED Provider  Attending physically available and evaluated Nicky Patrick  RADHA managed the patient along with the ED Attending      Electronically Signed by         Jake Guzman MD  10/03/22 4890

## 2022-10-01 NOTE — CONSULTS
Orthopedics   Aminata Alvarado 77 y o  male MRN: [id number]  Unit/Bed#: ED 20      Chief Complaint:   left arm and shoulder pain    HPI:   77 y o  right hand dominant male status post fall complaining of left shoulder pain  Patient states that he fell on Wednesday, and was seen at [facility] at that time  The had placed him in a cuff and collar sling, which had been unpleasant to the patient  Patient presented today for continued left shoulder pain  Patient has past medical history of depression and diabetes mellitus  Patient does not take any blood thinners at home  Patient smokes 1 pack per day and drinks about 10 beers per day at baseline  Patient states that he has not had any alcohol for the last 10 days  Patient denies any numbness or tingling to the left upper extremity  Review Of Systems:   · Skin: per HPI and Physical Exam  · Neuro: See HPI  · Musculoskeletal: See HPI  · 14 point review of systems negative except as stated above     Past Medical History:   Past Medical History:   Diagnosis Date    Colon polyp     Diabetes mellitus ([facility] [address] )     Hyperlipidemia     Hypertension        Past Surgical History:   Past Surgical History:   Procedure Laterality Date    COLON SURGERY      found growth, bengin    COLONOSCOPY      COLONOSCOPY N/A 3/13/2018    Procedure: COLONOSCOPY;  Surgeon: Dionisio Varghese DO;  Location: [facility] GI LAB;   Service: Gastroenterology       Family History:  Family history reviewed and non-contributory  Family History   Problem Relation Age of Onset    Heart failure Mother     Hypertension Other        Social History:  Social History     Socioeconomic History    Marital status: Single     Spouse name: None    Number of children: None    Years of education: None    Highest education level: None   Occupational History    None   Tobacco Use    Smoking status: Current Every Day Smoker    Smokeless tobacco: Former User    Tobacco comment: uses vape/current every day smoker (as per [employer])   Vaping Use    Vaping Use: Never used   Substance and Sexual Activity    Alcohol use: Yes     Comment: mostly on weekends    Drug use: No    Sexual activity: None   Other Topics Concern    None   Social History Narrative    None     Social Determinants of Health     Financial Resource Strain: Not on file   Food Insecurity: Not on file   Transportation Needs: No Transportation Needs    Lack of Transportation (Medical): No    Lack of Transportation (Non-Medical): No   Physical Activity: Not on file   Stress: Not on file   Social Connections: Not on file   Intimate Partner Violence: Not on file   Housing Stability: Not on file       Allergies:    Allergies   Allergen Reactions    Lisinopril Angioedema           Labs:  0   Lab Value Date/Time    HCT 33 0 (L) 10/01/2022 1628    HCT 38 4 09/25/2022 0540    HCT 39 6 09/24/2022 0459    HCT 40 3 (L) 04/06/2018 0902    HCT 44 8 02/27/2017 1204    HGB 11 2 (L) 10/01/2022 1628    HGB 13 0 09/25/2022 0540    HGB 12 9 09/24/2022 0459    HGB 13 4 (L) 04/06/2018 0902    HGB 15 1 02/27/2017 1204    INR 1 35 (H) 09/23/2022 0906    WBC 12 97 (H) 10/01/2022 1628    WBC 8 45 09/25/2022 0540    WBC 7 12 09/24/2022 0459    WBC 6 3 04/06/2018 0902    WBC 9 1 02/27/2017 1204       Meds:    Current Facility-Administered Medications:     ALPRAZolam (XANAX) tablet 0 5 mg, 0 5 mg, Oral, Once, Daniel Burns MD    Current Outpatient Medications:     diazepam (VALIUM) 10 mg tablet, Take 0 5 tablets (5 mg total) by mouth every 12 (twelve) hours as needed for anxiety, Disp: 8 tablet, Rfl: 0    ALPRAZolam (XANAX) 0 25 mg tablet, Take 1 tablet (0 25 mg total) by mouth 4 (four) times a day as needed for anxiety, Disp: 30 tablet, Rfl: 0    amLODIPine (NORVASC) 10 mg tablet, TAKE 1 TABLET DAILY, Disp: 90 tablet, Rfl: 3    aspirin 81 MG tablet, Take 1 tablet (81 mg total) by mouth daily, Disp: 90 tablet, Rfl: 1    atorvastatin (LIPITOR) 40 mg tablet, TAKE 1 TABLET DAILY, Disp: 90 tablet, Rfl: 3    hydrochlorothiazide (HYDRODIURIL) 12 5 mg tablet, TAKE 1 TABLET DAILY, Disp: 90 tablet, Rfl: 3    metFORMIN (GLUCOPHAGE) 1000 MG tablet, Take 1 tablet (1,000 mg total) by mouth daily with breakfast, Disp: 180 tablet, Rfl: 3    metroNIDAZOLE (METROGEL) 1 % gel, APPLY TOPICALLY TO AFFECTED AREA ONCE DAILY, Disp: 60 g, Rfl: 0    mirtazapine (REMERON) 30 mg tablet, Take 1 tablet (30 mg total) by mouth daily at bedtime, Disp: 30 tablet, Rfl: 0    PARoxetine (PAXIL) 40 MG tablet, Take 1 tablet (40 mg total) by mouth every morning, Disp: 30 tablet, Rfl: 0    Blood Culture:   No results found for: BLOODCX    Wound Culture:   No results found for: WOUNDCULT    Ins and Outs:  No intake/output data recorded  Physical Exam:   /76 (BP Location: Right arm)   Pulse (!) 117   Temp 99 °F (37 2 °C) (Oral)   Resp 18   SpO2 100%   Gen: No acute distress, resting comfortably in bed  HEENT: Eyes clear, moist mucus membranes, hearing intact  Respiratory: No audible wheezing or stridor  Cardiovascular: Well Perfused peripherally, 2+ distal pulse  Abdomen: nondistended, no peritoneal signs  Musculoskeletal: left upper extremity  · Skin intact, ecchymosis and swelling noted over the shoulder  · Tender to palpation over shoulder and upper arm  · Sensation intact to radial, ulna, median, musculocutaneous, and axillary nerve distributions  · Motor intact to  radial, ulnar, median, musculocutaneous, and axillary nerve distributions  · 2+ radial and ulnar pulse  · Musculature is soft and compressible, no pain with passive stretch    Radiology:   I personally reviewed the films  X-rays AP/Lateral views left humerus shows displaced proximal humerus fracture  Assessment:  77 y o  male S/P fall with left proximal humerus fracture  Patient can be managed nonoperatively in a sling for now and follow up with Dr Jocelyn Corcoran in 1 week      Plan:   · Sling placed  · Analgesics for pain  · NPO at midnight  · NonOp management of left proximal humeral fracture  · Dispo: Ok for discharge from ortho perspective    Gabriel Retana MD

## 2022-10-01 NOTE — ED ATTENDING ATTESTATION
10/1/2022  Ld Brumfield DO, saw and evaluated the patient  I have discussed the patient with the resident/non-physician practitioner and agree with the resident's/non-physician practitioner's findings, Plan of Care, and MDM as documented in the resident's/non-physician practitioner's note, except where noted  All available labs and Radiology studies were reviewed  I was present for key portions of any procedure(s) performed by the resident/non-physician practitioner and I was immediately available to provide assistance  At this point I agree with the current assessment done in the Emergency Department  I have conducted an independent evaluation of this patient a history and physical is as follows:    66-year-old male presents for evaluation of generalized fatigue, left arm pain, anorexia, nausea without vomiting  He broke his arm in September 28 and was seen at [facility]  It was treated non operatively arrives today in a make shift clavicle strap  Marked swelling, ecchymosis over the left anterior shoulder/lateral pectoralis area with extension of ecchymosis over the left lateral ribcage  He is neurovascularly intact distal to the site of injury  He states he has been 4 days since his last dose of Xanax because he ran out  Impression:  Generalized weakness possible benzodiazepine withdrawal, left proximal humerus fracture already known plan:  Obtain x-ray of left weight fracture, check labs, will give IV fluids, reassess        ED Course         Critical Care Time  Procedures

## 2022-10-01 NOTE — CONSULTS
This patient was referred to [facility] at [facility]  The patient's clinical information was faxed over and a phone call for screening patient by host will occur soon  Patient and family had their questions and concerns addressed at this time        Haroon Castano MA

## 2022-10-02 ENCOUNTER — HOSPITAL ENCOUNTER (INPATIENT)
Facility: HOSPITAL | Age: 67
LOS: 4 days | DRG: 492 | End: 2022-10-07
Attending: EMERGENCY MEDICINE | Admitting: STUDENT IN AN ORGANIZED HEALTH CARE EDUCATION/TRAINING PROGRAM
Payer: COMMERCIAL

## 2022-10-02 ENCOUNTER — APPOINTMENT (EMERGENCY)
Dept: CT IMAGING | Facility: HOSPITAL | Age: 67
DRG: 492 | End: 2022-10-02
Payer: COMMERCIAL

## 2022-10-02 DIAGNOSIS — F41.8 DEPRESSION WITH ANXIETY: ICD-10-CM

## 2022-10-02 DIAGNOSIS — R41.0 CONFUSION: Primary | ICD-10-CM

## 2022-10-02 DIAGNOSIS — S42.222A 2-PART DISPLACED FRACTURE OF SURGICAL NECK OF LEFT HUMERUS, INITIAL ENCOUNTER FOR CLOSED FRACTURE: ICD-10-CM

## 2022-10-02 DIAGNOSIS — S42.92XS CLOSED FRACTURE OF LEFT SHOULDER, SEQUELA: ICD-10-CM

## 2022-10-02 PROBLEM — G92.8 TOXIC METABOLIC ENCEPHALOPATHY: Status: ACTIVE | Noted: 2022-10-02

## 2022-10-02 PROBLEM — E78.2 MIXED HYPERLIPIDEMIA: Status: ACTIVE | Noted: 2017-03-07

## 2022-10-02 PROBLEM — S42.92XA CLOSED FRACTURE OF LEFT SHOULDER: Status: ACTIVE | Noted: 2022-10-02

## 2022-10-02 LAB
AMPHETAMINES SERPL QL SCN: NEGATIVE
ANION GAP SERPL CALCULATED.3IONS-SCNC: 5 MMOL/L (ref 5–14)
ATRIAL RATE: 100 BPM
BACTERIA UR QL AUTO: ABNORMAL /HPF
BARBITURATES UR QL: POSITIVE
BASOPHILS # BLD AUTO: 0.03 THOUSANDS/ÂΜL (ref 0–0.1)
BASOPHILS NFR BLD AUTO: 0 % (ref 0–1)
BENZODIAZ UR QL: POSITIVE
BILIRUB UR QL STRIP: NEGATIVE
BUN SERPL-MCNC: 20 MG/DL (ref 5–25)
CALCIUM SERPL-MCNC: 8.9 MG/DL (ref 8.4–10.2)
CHLORIDE SERPL-SCNC: 93 MMOL/L (ref 96–108)
CLARITY UR: CLEAR
CO2 SERPL-SCNC: 33 MMOL/L (ref 21–32)
COCAINE UR QL: NEGATIVE
COLOR UR: ABNORMAL
CREAT SERPL-MCNC: 0.84 MG/DL (ref 0.7–1.5)
EOSINOPHIL # BLD AUTO: 0.03 THOUSAND/ÂΜL (ref 0–0.61)
EOSINOPHIL NFR BLD AUTO: 0 % (ref 0–6)
ERYTHROCYTE [DISTWIDTH] IN BLOOD BY AUTOMATED COUNT: 13 % (ref 11.6–15.1)
ETHANOL EXG-MCNC: 0 MG/DL
GFR SERPL CREATININE-BSD FRML MDRD: 91 ML/MIN/1.73SQ M
GLUCOSE SERPL-MCNC: 114 MG/DL (ref 70–99)
GLUCOSE SERPL-MCNC: 118 MG/DL (ref 65–140)
GLUCOSE UR STRIP-MCNC: NEGATIVE MG/DL
HCT VFR BLD AUTO: 32 % (ref 36.5–49.3)
HGB BLD-MCNC: 10.6 G/DL (ref 12–17)
HGB UR QL STRIP.AUTO: 25
HYALINE CASTS #/AREA URNS LPF: ABNORMAL /LPF
IMM GRANULOCYTES # BLD AUTO: 0.1 THOUSAND/UL (ref 0–0.2)
IMM GRANULOCYTES NFR BLD AUTO: 1 % (ref 0–2)
KETONES UR STRIP-MCNC: ABNORMAL MG/DL
LEUKOCYTE ESTERASE UR QL STRIP: NEGATIVE
LYMPHOCYTES # BLD AUTO: 0.91 THOUSANDS/ÂΜL (ref 0.6–4.47)
LYMPHOCYTES NFR BLD AUTO: 8 % (ref 14–44)
MAGNESIUM SERPL-MCNC: 1.5 MG/DL (ref 1.6–2.3)
MCH RBC QN AUTO: 32.9 PG (ref 26.8–34.3)
MCHC RBC AUTO-ENTMCNC: 33.1 G/DL (ref 31.4–37.4)
MCV RBC AUTO: 99 FL (ref 82–98)
METHADONE UR QL: NEGATIVE
MONOCYTES # BLD AUTO: 0.97 THOUSAND/ÂΜL (ref 0.17–1.22)
MONOCYTES NFR BLD AUTO: 8 % (ref 4–12)
MUCOUS THREADS UR QL AUTO: ABNORMAL
NEUTROPHILS # BLD AUTO: 9.58 THOUSANDS/ÂΜL (ref 1.85–7.62)
NEUTS SEG NFR BLD AUTO: 83 % (ref 43–75)
NITRITE UR QL STRIP: NEGATIVE
NON-SQ EPI CELLS URNS QL MICRO: ABNORMAL /HPF
NRBC BLD AUTO-RTO: 0 /100 WBCS
OPIATES UR QL SCN: NEGATIVE
OXYCODONE+OXYMORPHONE UR QL SCN: POSITIVE
P AXIS: 64 DEGREES
PCP UR QL: NEGATIVE
PH UR STRIP.AUTO: 7 [PH]
PLATELET # BLD AUTO: 590 THOUSANDS/UL (ref 149–390)
PMV BLD AUTO: 8.7 FL (ref 8.9–12.7)
POTASSIUM SERPL-SCNC: 3.4 MMOL/L (ref 3.5–5.3)
PR INTERVAL: 140 MS
PROT UR STRIP-MCNC: ABNORMAL MG/DL
QRS AXIS: 80 DEGREES
QRSD INTERVAL: 98 MS
QT INTERVAL: 342 MS
QTC INTERVAL: 441 MS
RBC # BLD AUTO: 3.22 MILLION/UL (ref 3.88–5.62)
RBC #/AREA URNS AUTO: ABNORMAL /HPF
SODIUM SERPL-SCNC: 131 MMOL/L (ref 135–147)
SP GR UR STRIP.AUTO: 1.01 (ref 1–1.04)
T WAVE AXIS: -38 DEGREES
THC UR QL: NEGATIVE
UROBILINOGEN UA: 4 MG/DL
VENTRICULAR RATE: 100 BPM
WBC # BLD AUTO: 11.62 THOUSAND/UL (ref 4.31–10.16)
WBC #/AREA URNS AUTO: ABNORMAL /HPF

## 2022-10-02 PROCEDURE — 70450 CT HEAD/BRAIN W/O DYE: CPT

## 2022-10-02 PROCEDURE — 80307 DRUG TEST PRSMV CHEM ANLYZR: CPT | Performed by: EMERGENCY MEDICINE

## 2022-10-02 PROCEDURE — 82075 ASSAY OF BREATH ETHANOL: CPT | Performed by: EMERGENCY MEDICINE

## 2022-10-02 PROCEDURE — 93005 ELECTROCARDIOGRAM TRACING: CPT

## 2022-10-02 PROCEDURE — 81003 URINALYSIS AUTO W/O SCOPE: CPT | Performed by: EMERGENCY MEDICINE

## 2022-10-02 PROCEDURE — 80048 BASIC METABOLIC PNL TOTAL CA: CPT | Performed by: EMERGENCY MEDICINE

## 2022-10-02 PROCEDURE — 82948 REAGENT STRIP/BLOOD GLUCOSE: CPT

## 2022-10-02 PROCEDURE — 96368 THER/DIAG CONCURRENT INF: CPT

## 2022-10-02 PROCEDURE — 36415 COLL VENOUS BLD VENIPUNCTURE: CPT | Performed by: EMERGENCY MEDICINE

## 2022-10-02 PROCEDURE — 83735 ASSAY OF MAGNESIUM: CPT | Performed by: EMERGENCY MEDICINE

## 2022-10-02 PROCEDURE — 99285 EMERGENCY DEPT VISIT HI MDM: CPT | Performed by: EMERGENCY MEDICINE

## 2022-10-02 PROCEDURE — 81001 URINALYSIS AUTO W/SCOPE: CPT | Performed by: EMERGENCY MEDICINE

## 2022-10-02 PROCEDURE — 85025 COMPLETE CBC W/AUTO DIFF WBC: CPT | Performed by: EMERGENCY MEDICINE

## 2022-10-02 PROCEDURE — 99220 PR INITIAL OBSERVATION CARE/DAY 70 MINUTES: CPT | Performed by: INTERNAL MEDICINE

## 2022-10-02 PROCEDURE — 96367 TX/PROPH/DG ADDL SEQ IV INF: CPT

## 2022-10-02 PROCEDURE — G1004 CDSM NDSC: HCPCS

## 2022-10-02 PROCEDURE — 99285 EMERGENCY DEPT VISIT HI MDM: CPT

## 2022-10-02 PROCEDURE — 93010 ELECTROCARDIOGRAM REPORT: CPT | Performed by: INTERNAL MEDICINE

## 2022-10-02 PROCEDURE — 96365 THER/PROPH/DIAG IV INF INIT: CPT

## 2022-10-02 RX ORDER — KETOROLAC TROMETHAMINE 30 MG/ML
15 INJECTION, SOLUTION INTRAMUSCULAR; INTRAVENOUS EVERY 6 HOURS PRN
Status: DISPENSED | OUTPATIENT
Start: 2022-10-02 | End: 2022-10-05

## 2022-10-02 RX ORDER — MAGNESIUM SULFATE HEPTAHYDRATE 40 MG/ML
2 INJECTION, SOLUTION INTRAVENOUS ONCE
Status: COMPLETED | OUTPATIENT
Start: 2022-10-02 | End: 2022-10-02

## 2022-10-02 RX ORDER — PAROXETINE HYDROCHLORIDE 20 MG/1
40 TABLET, FILM COATED ORAL EVERY MORNING
Status: DISCONTINUED | OUTPATIENT
Start: 2022-10-03 | End: 2022-10-07 | Stop reason: HOSPADM

## 2022-10-02 RX ORDER — ATORVASTATIN CALCIUM 40 MG/1
40 TABLET, FILM COATED ORAL DAILY
Status: DISCONTINUED | OUTPATIENT
Start: 2022-10-03 | End: 2022-10-07 | Stop reason: HOSPADM

## 2022-10-02 RX ORDER — HYDROCHLOROTHIAZIDE 12.5 MG/1
12.5 TABLET ORAL DAILY
Status: DISCONTINUED | OUTPATIENT
Start: 2022-10-03 | End: 2022-10-07 | Stop reason: HOSPADM

## 2022-10-02 RX ORDER — SODIUM CHLORIDE, SODIUM GLUCONATE, SODIUM ACETATE, POTASSIUM CHLORIDE, MAGNESIUM CHLORIDE, SODIUM PHOSPHATE, DIBASIC, AND POTASSIUM PHOSPHATE .53; .5; .37; .037; .03; .012; .00082 G/100ML; G/100ML; G/100ML; G/100ML; G/100ML; G/100ML; G/100ML
100 INJECTION, SOLUTION INTRAVENOUS CONTINUOUS
Status: DISCONTINUED | OUTPATIENT
Start: 2022-10-02 | End: 2022-10-07

## 2022-10-02 RX ORDER — MIRTAZAPINE 30 MG/1
30 TABLET, FILM COATED ORAL
Status: DISCONTINUED | OUTPATIENT
Start: 2022-10-02 | End: 2022-10-07 | Stop reason: HOSPADM

## 2022-10-02 RX ORDER — METRONIDAZOLE 7.5 MG/G
GEL TOPICAL 2 TIMES DAILY
Status: DISCONTINUED | OUTPATIENT
Start: 2022-10-03 | End: 2022-10-07 | Stop reason: HOSPADM

## 2022-10-02 RX ORDER — ACETAMINOPHEN 325 MG/1
650 TABLET ORAL EVERY 6 HOURS PRN
Status: DISCONTINUED | OUTPATIENT
Start: 2022-10-02 | End: 2022-10-07 | Stop reason: HOSPADM

## 2022-10-02 RX ORDER — FOLIC ACID 1 MG/1
1 TABLET ORAL ONCE
Status: COMPLETED | OUTPATIENT
Start: 2022-10-02 | End: 2022-10-02

## 2022-10-02 RX ORDER — ASPIRIN 81 MG/1
81 TABLET, CHEWABLE ORAL DAILY
Refills: 1 | Status: DISCONTINUED | OUTPATIENT
Start: 2022-10-03 | End: 2022-10-07 | Stop reason: HOSPADM

## 2022-10-02 RX ORDER — NICOTINE 21 MG/24HR
1 PATCH, TRANSDERMAL 24 HOURS TRANSDERMAL DAILY
Status: DISCONTINUED | OUTPATIENT
Start: 2022-10-03 | End: 2022-10-07 | Stop reason: HOSPADM

## 2022-10-02 RX ORDER — ONDANSETRON 2 MG/ML
4 INJECTION INTRAMUSCULAR; INTRAVENOUS EVERY 6 HOURS PRN
Status: DISCONTINUED | OUTPATIENT
Start: 2022-10-02 | End: 2022-10-07 | Stop reason: HOSPADM

## 2022-10-02 RX ORDER — METRONIDAZOLE 10 MG/G
1 GEL TOPICAL DAILY
Status: DISCONTINUED | OUTPATIENT
Start: 2022-10-03 | End: 2022-10-02

## 2022-10-02 RX ORDER — AMLODIPINE BESYLATE 10 MG/1
10 TABLET ORAL DAILY
Status: DISCONTINUED | OUTPATIENT
Start: 2022-10-03 | End: 2022-10-07 | Stop reason: HOSPADM

## 2022-10-02 RX ORDER — POLYETHYLENE GLYCOL 3350 17 G/17G
17 POWDER, FOR SOLUTION ORAL DAILY PRN
Status: DISCONTINUED | OUTPATIENT
Start: 2022-10-02 | End: 2022-10-07 | Stop reason: HOSPADM

## 2022-10-02 RX ORDER — ENOXAPARIN SODIUM 100 MG/ML
40 INJECTION SUBCUTANEOUS DAILY
Status: DISCONTINUED | OUTPATIENT
Start: 2022-10-03 | End: 2022-10-04

## 2022-10-02 RX ADMIN — SODIUM CHLORIDE, SODIUM LACTATE, POTASSIUM CHLORIDE, AND CALCIUM CHLORIDE 1000 ML: .6; .31; .03; .02 INJECTION, SOLUTION INTRAVENOUS at 14:50

## 2022-10-02 RX ADMIN — FOLIC ACID 1 MG: 1 TABLET ORAL at 13:59

## 2022-10-02 RX ADMIN — THIAMINE HYDROCHLORIDE 500 MG: 100 INJECTION, SOLUTION INTRAMUSCULAR; INTRAVENOUS at 16:10

## 2022-10-02 RX ADMIN — ACETAMINOPHEN 650 MG: 325 TABLET ORAL at 20:58

## 2022-10-02 RX ADMIN — MAGNESIUM SULFATE HEPTAHYDRATE 2 G: 2 INJECTION, SOLUTION INTRAVENOUS at 14:50

## 2022-10-02 RX ADMIN — MIRTAZAPINE 30 MG: 30 TABLET, FILM COATED ORAL at 21:00

## 2022-10-02 RX ADMIN — MULTIPLE VITAMINS W/ MINERALS TAB 1 TABLET: TAB ORAL at 13:59

## 2022-10-02 RX ADMIN — SODIUM CHLORIDE, SODIUM GLUCONATE, SODIUM ACETATE, POTASSIUM CHLORIDE, MAGNESIUM CHLORIDE, SODIUM PHOSPHATE, DIBASIC, AND POTASSIUM PHOSPHATE 100 ML/HR: .53; .5; .37; .037; .03; .012; .00082 INJECTION, SOLUTION INTRAVENOUS at 17:20

## 2022-10-02 NOTE — ASSESSMENT & PLAN NOTE
· History of chronic alcohol dependence complicated by DTs and required to be in ICU  · The last alcohol use was 10 days ago, currently has no withdrawal symptoms  · If patient shows alcohol withdrawal symptoms, consider toxicology consult  · Alcohol level in ED was negative  · Case management consult for disposition planning

## 2022-10-02 NOTE — PLAN OF CARE
Problem: Nutrition/Hydration-ADULT  Goal: Nutrient/Hydration intake appropriate for improving, restoring or maintaining nutritional needs  Description: Monitor and assess patient's nutrition/hydration status for malnutrition  Collaborate with interdisciplinary team and initiate plan and interventions as ordered  Monitor patient's weight and dietary intake as ordered or per policy  Utilize nutrition screening tool and intervene as necessary  Determine patient's food preferences and provide high-protein, high-caloric foods as appropriate       INTERVENTIONS:  - Monitor oral intake, urinary output, labs, and treatment plans  - Assess nutrition and hydration status and recommend course of action  - Evaluate amount of meals eaten  - Assist patient with eating if necessary   - Allow adequate time for meals  - Recommend/ encourage appropriate diets, oral nutritional supplements, and vitamin/mineral supplements  - Order, calculate, and assess calorie counts as needed  - Assess need for intravenous fluids  - Provide specific nutrition/hydration education as appropriate  - Include patient/family/caregiver in decisions related to nutrition  Outcome: Progressing     Problem: PAIN - ADULT  Goal: Verbalizes/displays adequate comfort level or baseline comfort level  Description: Interventions:  - Encourage patient to monitor pain and request assistance  - Assess pain using appropriate pain scale  - Administer analgesics based on type and severity of pain and evaluate response  - Implement non-pharmacological measures as appropriate and evaluate response  - Consider cultural and social influences on pain and pain management  - Notify physician/advanced practitioner if interventions unsuccessful or patient reports new pain  Outcome: Progressing     Problem: INFECTION - ADULT  Goal: Absence or prevention of progression during hospitalization  Description: INTERVENTIONS:  - Assess and monitor for signs and symptoms of infection  - Monitor lab/diagnostic results  - Monitor all insertion sites, i e  indwelling lines  - Monitor endotracheal if appropriate and nasal secretions for changes in amount and color  - Administer medications as ordered  - Instruct and encourage patient and family to use good hand hygiene technique  - Identify and instruct in appropriate isolation precautions for identified infection/condition  Outcome: Progressing     Problem: SAFETY ADULT  Goal: Patient will remain free of falls  Description: INTERVENTIONS:  - Educate patient/family on patient safety including physical limitations  - Instruct patient to call for assistance with activity   - Consult OT/PT to assist with strengthening/mobility   - Keep Call bell within reach  - Keep bed low and locked with side rails adjusted as appropriate  - Keep care items and personal belongings within reach  - Initiate and maintain comfort rounds  - Make Fall Risk Sign visible to staff  - Offer Toileting every 2 Hours, in advance of need  - Apply yellow socks and bracelet for high fall risk patients  - Consider moving patient to room near nurses station  Outcome: Progressing  Goal: Maintain or return to baseline ADL function  Description: INTERVENTIONS:  -  Assess patient's ability to carry out ADLs; assess patient's baseline for ADL function and identify physical deficits which impact ability to perform ADLs (bathing, care of mouth/teeth, toileting, grooming, dressing, etc )  - Assess/evaluate cause of self-care deficits   - Assess range of motion  - Assess patient's mobility; develop plan if impaired  - Assess patient's need for assistive devices and provide as appropriate  - Encourage maximum independence but intervene and supervise when necessary  - Involve family in performance of ADLs  - Assess for home care needs following discharge   - Consider OT consult to assist with ADL evaluation and planning for discharge  - Provide patient education as appropriate  Outcome: Progressing     Problem: DISCHARGE PLANNING  Goal: Discharge to home or other facility with appropriate resources  Description: INTERVENTIONS:  - Identify barriers to discharge w/patient and caregiver  - Arrange for needed discharge resources and transportation as appropriate  - Identify discharge learning needs (meds, wound care, etc )  - Refer to Case Management Department for coordinating discharge planning if the patient needs post-hospital services based on physician/advanced practitioner order or complex needs related to functional status, cognitive ability, or social support system  Outcome: Progressing     Problem: Knowledge Deficit  Goal: Patient/family/caregiver demonstrates understanding of disease process, treatment plan, medications, and discharge instructions  Description: Complete learning assessment and assess knowledge base    Interventions:  - Provide teaching at level of understanding  - Provide teaching via preferred learning methods  Outcome: Progressing     Problem: METABOLIC, FLUID AND ELECTROLYTES - ADULT  Goal: Electrolytes maintained within normal limits  Description: INTERVENTIONS:  - Monitor labs and assess patient for signs and symptoms of electrolyte imbalances  - Administer electrolyte replacement as ordered  - Monitor response to electrolyte replacements, including repeat lab results as appropriate  - Instruct patient on fluid and nutrition as appropriate  Outcome: Progressing     Problem: MUSCULOSKELETAL - ADULT  Goal: Maintain or return mobility to safest level of function  Description: INTERVENTIONS:  - Assess patient's ability to carry out ADLs; assess patient's baseline for ADL function and identify physical deficits which impact ability to perform ADLs (bathing, care of mouth/teeth, toileting, grooming, dressing, etc )  - Assess/evaluate cause of self-care deficits   - Assess range of motion  - Assess patient's mobility  - Assess patient's need for assistive devices and provide as appropriate  - Encourage maximum independence but intervene and supervise when necessary  - Involve family in performance of ADLs  - Assess for home care needs following discharge   - Consider OT consult to assist with ADL evaluation and planning for discharge  - Provide patient education as appropriate  Outcome: Progressing

## 2022-10-02 NOTE — ASSESSMENT & PLAN NOTE
Lab Results   Component Value Date    HGBA1C 5 7 (H) 07/21/2022       Recent Labs     10/01/22  1626 10/02/22  1336   POCGLU 111 118       Blood Sugar Average: Last 72 hrs:  (P) 118     · Blood sugar acceptable  · Hold home metformin  · Diabetic diet, sliding scale insulin and Accu-Cheks

## 2022-10-02 NOTE — ASSESSMENT & PLAN NOTE
· POA, patient is AAO x3 however sluggish in reaction, and he stated his brother brought him in ED however he is not sure why  · Patient is afebrile, leukocytosis noted  · Patient was in [facility] yesterday, plan to inpatient detox however plan fell through due to insurance issue  · ED physician discussed with  - does not need to be admitted to inpatient detox  · Patient has chronic alcohol use, last use was 10 days prior to ED arrival  · CT brain negative for acute intracranial abnormality  · Continue thiamine and folic for now in the setting of chronic alcohol dependence  · The brothers suspect that patient has been abusing his Xanax at home (which could be contributing to his current mental status)  · Limited narcotics and benzodiazepine  · Follow UA to rule out UTI  · PT/OT consult placed  ·  for disposition planning

## 2022-10-02 NOTE — ASSESSMENT & PLAN NOTE
· POA, patient is AAO x3 however sluggish in reaction, and he stated his brother brought him in ED however he is not sure why  · Patient is afebrile, leukocytosis noted  · Patient was in [facility] yesterday, plan to inpatient detox however plan fell through due to insurance issue  · ED physician discussed with  - does not need to be admitted to inpatient detox  · Patient has chronic alcohol use, last you 10 days ago  · CT brain negative for acute intracranial abnormality  · Will give high-dose IV thiamine and folic for now in the setting of chronic alcohol dependence  · The brothers suspect that patient has been abusing his Xanax at home (which could be contributing to his current mental status)  · Limited narcotics and benzodiazepine  · Follow UA to rule out UTI  · PT/OT consult placed  ·  for disposition planning

## 2022-10-02 NOTE — ASSESSMENT & PLAN NOTE
· The brother suspected patient has been abusing his Xanax at home  · Dr Sherrie Amor is the patient's psychiatrist  · Hold Xanax for now, psychiatry consult placed

## 2022-10-02 NOTE — ED NOTES
Patient was accepted to [facility] but then rejected due to insurance issue  Patient was discharged by physician

## 2022-10-02 NOTE — ED PROVIDER NOTES
History  Chief Complaint   Patient presents with    Detox Evaluation     Patient reports seen yesterday and spoke with HOST but couldn't get into the facility  Last drink 10 days ago  Also requesting pain medicine for his fx , took oxycodone  Patient does not know why he is here  HPI  Patient is a 70-year-old male with past medical history of DM, HTN, HLD, alcohol abuse presenting today with concern for confusion and for detox evaluation  Limited history from patient as he is somewhat confused  When asked why he is here he states I do not know talk to my brother  Patient does have a complicated recent past medical history  He was recently admitted to the MICU for complicated withdrawal with multiple seizures  He was discharged after 4 days after remaining stable  He then had a fall in which he broke his humerus and was evaluated at an outside emergency department and has follow-up with Orthopedics  He is currently in a sling  He denies any recent alcohol use for the past 10 days since being admitted for complicated withdrawal   He does report that he has been taking Xanax regularly as he has anxiety  He denies any illicit substance use  He denies any smoking history  Denies any recent illnesses including fevers chills nausea vomiting diarrhea constipation  He does report feeling unsteady on his feet and has had multiple falls  Patient is oriented x4 but often requires redirection and repeat questioning and appears somewhat confused  Per brother who is medical power of  patient has been becoming increasingly confused since discharge  He was seen at [facility] yesterday and the plan was for inpatient detoxification however he did not have insurance and plans fell through  He was then sent home with host referral   He reports that his brother is called him several times stating that he has fallen  No known hit to head or loss of consciousness    Reports that he appears more confused than normal   Brother does not think he has been drinking alcohol but does think he has been taking his Xanax  The patient lives alone, has family in the area but they were unable help take care of him  Prior to Admission Medications   Prescriptions Last Dose Informant Patient Reported? Taking? ALPRAZolam (XANAX) 0 25 mg tablet   No No   Sig: Take 1 tablet (0 25 mg total) by mouth 4 (four) times a day as needed for anxiety   PARoxetine (PAXIL) 40 MG tablet   No No   Sig: Take 1 tablet (40 mg total) by mouth every morning   amLODIPine (NORVASC) 10 mg tablet   No No   Sig: TAKE 1 TABLET DAILY   aspirin 81 MG tablet   No No   Sig: Take 1 tablet (81 mg total) by mouth daily   atorvastatin (LIPITOR) 40 mg tablet   No No   Sig: TAKE 1 TABLET DAILY   diazepam (VALIUM) 10 mg tablet   No No   Sig: Take 0 5 tablets (5 mg total) by mouth every 12 (twelve) hours as needed for anxiety   hydrochlorothiazide (HYDRODIURIL) 12 5 mg tablet   No No   Sig: TAKE 1 TABLET DAILY   metFORMIN (GLUCOPHAGE) 1000 MG tablet   No No   Sig: Take 1 tablet (1,000 mg total) by mouth daily with breakfast   metroNIDAZOLE (METROGEL) 1 % gel   No No   Sig: APPLY TOPICALLY TO AFFECTED AREA ONCE DAILY   mirtazapine (REMERON) 30 mg tablet   No No   Sig: Take 1 tablet (30 mg total) by mouth daily at bedtime      Facility-Administered Medications: None       Past Medical History:   Diagnosis Date    Colon polyp     Diabetes mellitus (HCC)     Hyperlipidemia     Hypertension        Past Surgical History:   Procedure Laterality Date    COLON SURGERY      found growth, bengin    COLONOSCOPY      COLONOSCOPY N/A 3/13/2018    Procedure: COLONOSCOPY;  Surgeon: Anmol Leggett DO;  Location: [facility] GI [facility]; Service: Gastroenterology       Family History   Problem Relation Age of Onset    Heart failure Mother     Hypertension Other      I have reviewed and agree with the history as documented      E-Cigarette/Vaping    E-Cigarette Use Never User E-Cigarette/Vaping Substances     Social History     Tobacco Use    Smoking status: Current Every Day Smoker     Packs/day: 1 00     Types: Cigarettes    Smokeless tobacco: Former User    Tobacco comment: uses vape/current every day smoker (as per [employer])   Vaping Use    Vaping Use: Never used   Substance Use Topics    Alcohol use: Not Currently    Drug use: No       Review of Systems   Constitutional: Negative for chills and fever  HENT: Negative for congestion, rhinorrhea and sore throat  Eyes: Negative for redness and visual disturbance  Respiratory: Negative for cough and shortness of breath  Cardiovascular: Negative for chest pain and palpitations  Gastrointestinal: Negative for constipation, diarrhea, nausea and vomiting  Genitourinary: Negative for dysuria and hematuria  Musculoskeletal: Negative for myalgias and neck pain  Skin: Negative for rash and wound  Allergic/Immunologic: Negative for immunocompromised state  Neurological: Negative for seizures and syncope  Recurrent falls     Psychiatric/Behavioral: Positive for confusion  Negative for suicidal ideas  Physical Exam  Physical Exam  Vitals and nursing note reviewed  Constitutional:       General: He is not in acute distress  Appearance: Normal appearance  He is well-developed  He is not ill-appearing  HENT:      Head: Normocephalic and atraumatic  No raccoon eyes  Right Ear: External ear normal       Left Ear: External ear normal       Nose: Nose normal  No congestion  Mouth/Throat:      Lips: Pink  Mouth: Mucous membranes are moist    Eyes:      General: Lids are normal  No scleral icterus  Extraocular Movements: Extraocular movements intact  Right eye: Nystagmus present  Left eye: Nystagmus present  Comments: Horizontal nystagmus   Cardiovascular:      Rate and Rhythm: Normal rate and regular rhythm  Heart sounds: No murmur heard  No friction rub  Pulmonary:      Effort: Pulmonary effort is normal  No respiratory distress  Breath sounds: No wheezing or rhonchi  Chest:      Chest wall: No mass or crepitus  Comments: Large subacute ecchymosis to left chest wall and shoulder    Abdominal:      General: Abdomen is flat  Palpations: Abdomen is soft  Tenderness: There is no abdominal tenderness  There is no guarding or rebound  Musculoskeletal:      Cervical back: Normal range of motion  No torticollis  Skin:     General: Skin is warm and dry  Coloration: Skin is not jaundiced  Findings: No rash  Neurological:      Mental Status: He is alert and oriented to person, place, and time  GCS: GCS eye subscore is 4  GCS verbal subscore is 5  GCS motor subscore is 6  Cranial Nerves: Cranial nerves are intact  No dysarthria or facial asymmetry  Sensory: Sensation is intact  No sensory deficit  Motor: Motor function is intact  No weakness  Coordination: Coordination is intact  Finger-Nose-Finger Test normal       Gait: Gait abnormal (minimally ataxic)  Comments: Negative Romberg   Psychiatric:         Behavior: Behavior normal  Behavior is cooperative           Vital Signs  ED Triage Vitals [10/02/22 1243]   Temperature Pulse Respirations Blood Pressure SpO2   98 4 °F (36 9 °C) (!) 107 16 148/80 100 %      Temp Source Heart Rate Source Patient Position - Orthostatic VS BP Location FiO2 (%)   Oral Monitor Sitting Right arm --      Pain Score       --           Vitals:    10/02/22 1243 10/02/22 1249   BP: 148/80 148/80   Pulse: (!) 107 (!) 110   Patient Position - Orthostatic VS: Sitting          Visual Acuity  Visual Acuity    Flowsheet Row Most Recent Value   L Pupil Size (mm) 4   R Pupil Size (mm) 4          ED Medications  Medications   thiamine (VITAMIN B1) 500 mg in sodium chloride 0 9 % 50 mL IVPB (has no administration in time range)   magnesium sulfate 2 g/50 mL IVPB (premix) 2 g (has no administration in time range)   lactated ringers bolus 1,000 mL (has no administration in time range)   folic acid (FOLVITE) tablet 1 mg (1 mg Oral Given 10/2/22 1359)   multivitamin-minerals (CENTRUM) tablet 1 tablet (1 tablet Oral Given 10/2/22 1359)       Diagnostic Studies  Results Reviewed     Procedure Component Value Units Date/Time    UA w Reflex to Microscopic w Reflex to Culture [[id number]]     Lab Status: No result Specimen: Urine     Fingerstick Glucose (POCT) [[id number]]  (Normal) Collected: 10/02/22 1336    Lab Status: Final result Updated: 10/02/22 1337     POC Glucose 118 mg/dl     Rapid drug screen, urine [[id number]]     Lab Status: No result Specimen: Urine     CBC and differential [[id number]]     Lab Status: No result Specimen: Blood     Basic metabolic panel [[id number]]     Lab Status: No result Specimen: Blood     Magnesium [[id number]]     Lab Status: No result Specimen: Blood     POCT alcohol breath test [[id number]]  (Normal) Resulted: 10/02/22 1312    Lab Status: Final result Updated: 10/02/22 1312     EXTBreath Alcohol 0 000                 CT head without contrast   Final Result by Anselmo Mosquera MD (10/02 1356)      No acute intracranial abnormality  Workstation performed: [id number]                    Procedures  Procedures         ED Course  ED Course as of 10/03/22 0709   Sun Oct 02, 2022   1347 POC Glucose: 118   1348 EXTBreath Alcohol: 0 000   1414    IMPRESSION:     No acute intracranial abnormality  1437 WBC(!): 11 62   1437 Hemoglobin(!): 10 6   1452 Magnesium(!): 1 5   1452 Sodium(!): 131   1452 Potassium(!): 3 4   1452 Chloride(!): 93   1452 Creatinine: 0 84   Labs with mild electrolyte abnormalities  Will admit for obs for possible toxic encephalopathy/Wernicke encephalopathy                              SBIRT 20yo+    Flowsheet Row Most Recent Value   SBIRT (23 yo +)    In order to provide better care to our patients, we are screening all of our patients for alcohol and drug use  Would it be okay to ask you these screening questions? Yes Filed at: 10/02/2022 1255   Initial Alcohol Screen: US AUDIT-C     1  How often do you have a drink containing alcohol? 6 Filed at: 10/02/2022 1255   2  How many drinks containing alcohol do you have on a typical day you are drinking? 4 Filed at: 10/02/2022 1255   3a  Male UNDER 65: How often do you have five or more drinks on one occasion? 4 Filed at: 10/02/2022 1255   3b  FEMALE Any Age, or MALE 65+: How often do you have 4 or more drinks on one occassion? 4 Filed at: 10/02/2022 1255   Audit-C Score 18 Filed at: 10/02/2022 1255   Full Alcohol Screen: US AUDIT    4  How often during the last year have you found that you were not able to stop drinking once you had started? 4 Filed at: 10/02/2022 1255   5  How often during past year have you failed to do what was normally expected of you because of drinking? 2 Filed at: 10/02/2022 1255   6  How often in past year have you needed a first drink in the morning to get yourself going after a heavy drinking session? 3 Filed at: 10/02/2022 1255   7  How often in past year have you had feeling of guilt or remorse after drinking? 2 Filed at: 10/02/2022 1255   8  How often in past year have you been unable to remember what happened night before because you had been drinking? 1 Filed at: 10/02/2022 1255   9  Have you or someone else been injured as a result of your drinking? 4 Filed at: 10/02/2022 1255   10  Has a relative, friend, doctor or other health worker been concerned about your drinking and suggested you cut down? 4 Filed at: 10/02/2022 1255   AUDIT Total Score 38 Filed at: 10/02/2022 1255   RUBI: How many times in the past year have you    Used an illegal drug or used a prescription medication for non-medical reasons?  Never Filed at: 10/02/2022 1255                    MDM  Patient is a 61-year-old male with past medical history of DM, HTN, HLD, alcohol abuse presenting today with concern for confusion and for detox evaluation  Patient on arrival is ambulatory to room is in no acute distress, vital signs stable, afebrile  On exam lungs clear auscultation, heart without murmurs rubs or gallops abdomen soft nontender  Slight ataxia, nystagmus and some confusion concern for possible Wernicke will give high dose thiamine and will obtain additional labs  Will obtain CT head given recurrent falls and worsening symptoms  Symptoms could be result of recent oxycodone administration for humerus fracture and ongoing Xanax use and possible Xanax abuse  Disposition  Final diagnoses:   None     ED Disposition     None      Follow-up Information    None         Patient's Medications   Discharge Prescriptions    No medications on file       No discharge procedures on file      PDMP Review       Value Time User    PDMP Reviewed  Yes 10/1/2022  4:15 PM Yue Khanna DO          ED Provider  Electronically Signed by           Gayle Carter MD  10/03/22 8664

## 2022-10-02 NOTE — H&P
[facility]  H&P- Mitesh Bello 1955, 77 y o  male MRN: [id number]  Unit/Bed#: ED 06 Encounter: [id number]  Primary Care Provider: Ruperto Escobar MD   Date and time admitted to hospital: 10/2/2022 12:38 PM    * Toxic metabolic encephalopathy  Assessment & Plan  · POA, patient is AAO x3 however sluggish in reaction, and he stated his brother brought him in ED however he is not sure why  · Patient is afebrile, leukocytosis noted  · Patient was in [facility] yesterday, plan to inpatient detox however plan fell through due to insurance issue  · ED physician discussed with  - does not need to be admitted to inpatient detox  · Patient has chronic alcohol use, last use was 10 days ago  · CT brain negative for acute intracranial abnormality  · Will give high-dose IV thiamine and folic for now in the setting of chronic alcohol dependence  · The brothers suspect that patient has been abusing his Xanax at home (which could be contributing to his current mental status)  · Limited narcotics and benzodiazepine  · Follow UA to rule out UTI  · PT/OT consult placed  ·  for disposition planning    Closed fracture of left shoulder  Assessment & Plan  · Recent history of fall and shoulder pressure 1 week ago  · Left shoulder x-ray showed Proximal humeral fracture   superior migration of shaft is new     · Will consult orthopedic for superior migration of shaft  · IV Toradol for pain control    Alcohol use  Assessment & Plan  · History of chronic alcohol dependence complicated by DTs and required to be in ICU  · The last alcohol use was 10 days ago, currently has no withdrawal symptoms  · If patient shows alcohol withdrawal symptoms, consider toxicology consult  · Alcohol level in ED was negative  · Case management consult for disposition planning    Type 2 diabetes mellitus with diabetic peripheral angiopathy without gangrene, without long-term current use of insulin (HCC)  Assessment & Plan  Lab Results   Component Value Date    HGBA1C 5 7 (H) 07/21/2022       Recent Labs     10/01/22  1626 10/02/22  1336   POCGLU 111 118       Blood Sugar Average: Last 72 hrs:  (P) 118     · Blood sugar acceptable  · Hold home metformin  · Diabetic diet, sliding scale insulin and Accu-Cheks    Primary hypertension  Assessment & Plan  · BP acceptable  · Continue home amlodipine, HCTZ    Depression with anxiety  Assessment & Plan  · The brother suspected patient has been abusing his Xanax at home  · Dr Enoc Denis is the patient's psychiatrist  · Hold Xanax for now, psychiatry consult placed      VTE Prophylaxis: Enoxaparin (Lovenox)  / sequential compression device   Code Status:  Full code  POLST: There is no POLST form on file for this patient (pre-hospital)  Discussion with family:  Discussed with patient    Anticipated Length of Stay:  Patient will be admitted on an Observation basis with an anticipated length of stay of  less than 2 midnights  Justification for Hospital Stay:  Toxic metabolic encephalopathy    Total Time for Visit, including Counseling / Coordination of Care: 45 minutes  Greater than 50% of this total time spent on direct patient counseling and coordination of care  Chief Complaint:   Toxic metabolic encephalopathy, fall risk    History of Present Illness:    Jae Floyd is a 77 y o  male with PMH of chronic alcohol dependence, history of recent shoulder fracture due to fall, possible history of Xanax overuse, HLD, HTN, NIDDM, depression and anxiety who was brought to ED by his brother  According to the brother in [place], the brothers suspected patient has been abusing Xanax at home instead of alcohol  Patient seems to be more confused lately  Patient has falls at home which was complicated by left shoulder fracture  Last alcohol drink was 10 days ago  During my encounter, patient stated he was not sure why he is in ED however he will follow his brother management      Review of Systems:    Review of Systems Patient was seen and examined at bedside  The patient denies any headache, blurry vision, chest pain, palpitation, shortness of breath, N/V, abd pain  Past Medical and Surgical History:     Past Medical History:   Diagnosis Date    Colon polyp     Diabetes mellitus ([address] )     Hyperlipidemia     Hypertension        Past Surgical History:   Procedure Laterality Date    COLON SURGERY      found growth, bengin    COLONOSCOPY      COLONOSCOPY N/A 3/13/2018    Procedure: COLONOSCOPY;  Surgeon: Anshu Irby DO;  Location: [facility]; Service: Gastroenterology       Meds/Allergies:    Prior to Admission medications    Medication Sig Start Date End Date Taking? Authorizing Provider   ALPRAZolam Rose Glance) 0 25 mg tablet Take 1 tablet (0 25 mg total) by mouth 4 (four) times a day as needed for anxiety 9/26/22   Jaren Dixon MD   amLODIPine (NORVASC) 10 mg tablet TAKE 1 TABLET DAILY 10/1/21   Max Rand Copas, DO   aspirin 81 MG tablet Take 1 tablet (81 mg total) by mouth daily 9/17/19   Ruperto Taylor MD   atorvastatin (LIPITOR) 40 mg tablet TAKE 1 TABLET DAILY 10/1/21   Max Rand Copas, DO   diazepam (VALIUM) 10 mg tablet Take 0 5 tablets (5 mg total) by mouth every 12 (twelve) hours as needed for anxiety 10/1/22   Roselyn Brumfield DO   hydrochlorothiazide (HYDRODIURIL) 12 5 mg tablet TAKE 1 TABLET DAILY 10/1/21   Max Rand Copas, DO   metFORMIN (GLUCOPHAGE) 1000 MG tablet Take 1 tablet (1,000 mg total) by mouth daily with breakfast 5/27/22   Jaren Dixon MD   metroNIDAZOLE (METROGEL) 1 % gel APPLY TOPICALLY TO AFFECTED AREA ONCE DAILY 7/13/22   Kavitha Shepard PA-C   mirtazapine (REMERON) 30 mg tablet Take 1 tablet (30 mg total) by mouth daily at bedtime 9/26/22   Jaren Dixon MD   PARoxetine (PAXIL) 40 MG tablet Take 1 tablet (40 mg total) by mouth every morning 9/26/22   Jaren Dixon MD     I have reviewed home medications with patient personally      Allergies: Allergies   Allergen Reactions    Lisinopril Angioedema       Social History:     Marital Status: Single   Occupation:  Retired  Patient Pre-hospital Living Situation:  Lives alone at home  Patient Pre-hospital Level of Mobility:  Independent  Patient Pre-hospital Diet Restrictions:  No restriction  Substance Use History:   Social History     Substance and Sexual Activity   Alcohol Use Not Currently     Social History     Tobacco Use   Smoking Status Current Every Day Smoker    Packs/day: 1 00    Types: Cigarettes   Smokeless Tobacco Former User   Tobacco Comment    uses vape/current every day smoker (as per [employer])     Social History     Substance and Sexual Activity   Drug Use No       Family History:    Family History   Problem Relation Age of Onset    Heart failure Mother     Hypertension Other        Physical Exam:     Vitals:   Blood Pressure: 148/80 (10/02/22 1504)  Pulse: 95 (10/02/22 1504)  Temperature: 98 4 °F (36 9 °C) (10/02/22 1243)  Temp Source: Oral (10/02/22 1243)  Respirations: (!) 24 (10/02/22 1504)  Weight - Scale: 70 9 kg (156 lb 4 9 oz) (10/02/22 1243)  SpO2: 99 % (10/02/22 1504)    Physical Exam    General: breathing well on room air, no acute distress  HEENT: NC/AT, PERRL, EOM - normal, horizontal nystagmus noted  Neck: Supple  Pulm/Chest: Normal chest wall expansion, clear breath sounds on both side, no wheezing/rhonchi or crackles appreciated  CVS: RRR, normal S1&S2, no murmur appreciated, capillary refill <2s  Abd: soft, non tender, non distended, bowel sounds +  MSK: move all 4 extremities spontaneously  Skin: warm  CNS: no acute focal neuro deficit, steady gait during ambulation      Additional Data:     Lab Results: I have personally reviewed pertinent reports        Results from last 7 days   Lab Units 10/02/22  1420   WBC Thousand/uL 11 62*   HEMOGLOBIN g/dL 10 6*   HEMATOCRIT % 32 0*   PLATELETS Thousands/uL 590*   NEUTROS PCT % 83*   LYMPHS PCT % 8*   MONOS PCT % 8   EOS PCT % 0     Results from last 7 days   Lab Units 10/02/22  1420 10/01/22  1628   SODIUM mmol/L 131* 131*   POTASSIUM mmol/L 3 4* 3 3*   CHLORIDE mmol/L 93* 94*   CO2 mmol/L 33* 28   BUN mg/dL 20 18   CREATININE mg/dL 0 84 0 88   ANION GAP mmol/L 5 9   CALCIUM mg/dL 8 9 9 3   ALBUMIN g/dL  --  2 7*   TOTAL BILIRUBIN mg/dL  --  0 74   ALK PHOS U/L  --  79   ALT U/L  --  53   AST U/L  --  118*   GLUCOSE RANDOM mg/dL 114* 106         Results from last 7 days   Lab Units 10/02/22  1336 10/01/22  1626   POC GLUCOSE mg/dl 118 111               Imaging: I have personally reviewed pertinent reports  CT head without contrast   Final Result by Fatmata Osborn MD (10/02 1356)      No acute intracranial abnormality  Workstation performed: [id number]             EKG, Pathology, and Other Studies Reviewed on Admission:   · EKG:  NSR    Allscripts / Epic Records Reviewed: Yes     ** Please Note: This note has been constructed using a voice recognition system   **

## 2022-10-02 NOTE — ASSESSMENT & PLAN NOTE
· The brother suspected patient has been abusing his Xanax at home  · Dr Selena Santana is the patient's psychiatrist  · Hold Xanax for now, psychiatry consult placed

## 2022-10-02 NOTE — ED NOTES
Bethany from [facility]  called to speak with the patient  Per doctor to inform Cayetano Johnson the patient was discharged home        Patience Sutherland  10/01/22 7644

## 2022-10-02 NOTE — ASSESSMENT & PLAN NOTE
· Recent history of fall and shoulder pressure 1 week ago  · Left shoulder x-ray showed Proximal humeral fracture   superior migration of shaft is new     · Will consult orthopedic for superior migration of shaft  · IV Toradol for pain control

## 2022-10-03 LAB
ANION GAP SERPL CALCULATED.3IONS-SCNC: 5 MMOL/L (ref 5–14)
ATRIAL RATE: 101 BPM
ATRIAL RATE: 102 BPM
BUN SERPL-MCNC: 15 MG/DL (ref 5–25)
CALCIUM SERPL-MCNC: 8.2 MG/DL (ref 8.4–10.2)
CHLORIDE SERPL-SCNC: 97 MMOL/L (ref 96–108)
CO2 SERPL-SCNC: 32 MMOL/L (ref 21–32)
CREAT SERPL-MCNC: 0.63 MG/DL (ref 0.7–1.5)
ERYTHROCYTE [DISTWIDTH] IN BLOOD BY AUTOMATED COUNT: 13 % (ref 11.6–15.1)
GFR SERPL CREATININE-BSD FRML MDRD: 102 ML/MIN/1.73SQ M
GLUCOSE P FAST SERPL-MCNC: 102 MG/DL (ref 70–99)
GLUCOSE SERPL-MCNC: 102 MG/DL (ref 70–99)
HCT VFR BLD AUTO: 32.2 % (ref 36.5–49.3)
HGB BLD-MCNC: 10.4 G/DL (ref 12–17)
MAGNESIUM SERPL-MCNC: 1.8 MG/DL (ref 1.6–2.3)
MCH RBC QN AUTO: 32.9 PG (ref 26.8–34.3)
MCHC RBC AUTO-ENTMCNC: 32.3 G/DL (ref 31.4–37.4)
MCV RBC AUTO: 102 FL (ref 82–98)
P AXIS: 30 DEGREES
P AXIS: 58 DEGREES
PLATELET # BLD AUTO: 567 THOUSANDS/UL (ref 149–390)
PMV BLD AUTO: 9 FL (ref 8.9–12.7)
POTASSIUM SERPL-SCNC: 3.2 MMOL/L (ref 3.5–5.3)
PR INTERVAL: 114 MS
PR INTERVAL: 136 MS
QRS AXIS: 71 DEGREES
QRS AXIS: 74 DEGREES
QRSD INTERVAL: 92 MS
QRSD INTERVAL: 94 MS
QT INTERVAL: 350 MS
QT INTERVAL: 352 MS
QTC INTERVAL: 456 MS
QTC INTERVAL: 456 MS
RBC # BLD AUTO: 3.16 MILLION/UL (ref 3.88–5.62)
SODIUM SERPL-SCNC: 134 MMOL/L (ref 135–147)
T WAVE AXIS: -18 DEGREES
T WAVE AXIS: -30 DEGREES
VENTRICULAR RATE: 101 BPM
VENTRICULAR RATE: 102 BPM
WBC # BLD AUTO: 11.01 THOUSAND/UL (ref 4.31–10.16)

## 2022-10-03 PROCEDURE — 80048 BASIC METABOLIC PNL TOTAL CA: CPT | Performed by: INTERNAL MEDICINE

## 2022-10-03 PROCEDURE — G0008 ADMIN INFLUENZA VIRUS VAC: HCPCS | Performed by: INTERNAL MEDICINE

## 2022-10-03 PROCEDURE — 90662 IIV NO PRSV INCREASED AG IM: CPT | Performed by: INTERNAL MEDICINE

## 2022-10-03 PROCEDURE — 93010 ELECTROCARDIOGRAM REPORT: CPT | Performed by: INTERNAL MEDICINE

## 2022-10-03 PROCEDURE — 99232 SBSQ HOSP IP/OBS MODERATE 35: CPT | Performed by: STUDENT IN AN ORGANIZED HEALTH CARE EDUCATION/TRAINING PROGRAM

## 2022-10-03 PROCEDURE — 85027 COMPLETE CBC AUTOMATED: CPT | Performed by: INTERNAL MEDICINE

## 2022-10-03 PROCEDURE — 99222 1ST HOSP IP/OBS MODERATE 55: CPT | Performed by: PHYSICIAN ASSISTANT

## 2022-10-03 PROCEDURE — 83735 ASSAY OF MAGNESIUM: CPT | Performed by: STUDENT IN AN ORGANIZED HEALTH CARE EDUCATION/TRAINING PROGRAM

## 2022-10-03 RX ORDER — POTASSIUM CHLORIDE 20 MEQ/1
40 TABLET, EXTENDED RELEASE ORAL ONCE
Status: COMPLETED | OUTPATIENT
Start: 2022-10-03 | End: 2022-10-03

## 2022-10-03 RX ORDER — MAGNESIUM SULFATE HEPTAHYDRATE 40 MG/ML
4 INJECTION, SOLUTION INTRAVENOUS ONCE
Status: DISCONTINUED | OUTPATIENT
Start: 2022-10-03 | End: 2022-10-03

## 2022-10-03 RX ORDER — ALPRAZOLAM 0.5 MG/1
0.25 TABLET ORAL
Status: DISCONTINUED | OUTPATIENT
Start: 2022-10-03 | End: 2022-10-07 | Stop reason: HOSPADM

## 2022-10-03 RX ADMIN — METRONIDAZOLE: 7.5 GEL TOPICAL at 17:14

## 2022-10-03 RX ADMIN — SODIUM CHLORIDE, SODIUM GLUCONATE, SODIUM ACETATE, POTASSIUM CHLORIDE, MAGNESIUM CHLORIDE, SODIUM PHOSPHATE, DIBASIC, AND POTASSIUM PHOSPHATE 100 ML/HR: .53; .5; .37; .037; .03; .012; .00082 INJECTION, SOLUTION INTRAVENOUS at 23:38

## 2022-10-03 RX ADMIN — HYDROCHLOROTHIAZIDE 12.5 MG: 12.5 TABLET ORAL at 08:26

## 2022-10-03 RX ADMIN — KETOROLAC TROMETHAMINE 15 MG: 30 INJECTION, SOLUTION INTRAMUSCULAR; INTRAVENOUS at 02:18

## 2022-10-03 RX ADMIN — NICOTINE 1 PATCH: 14 PATCH, EXTENDED RELEASE TRANSDERMAL at 08:27

## 2022-10-03 RX ADMIN — ATORVASTATIN CALCIUM 40 MG: 80 TABLET, FILM COATED ORAL at 08:27

## 2022-10-03 RX ADMIN — PAROXETINE 40 MG: 20 TABLET, FILM COATED ORAL at 08:28

## 2022-10-03 RX ADMIN — ALPRAZOLAM 0.25 MG: 0.5 TABLET ORAL at 13:44

## 2022-10-03 RX ADMIN — ASPIRIN 81 MG CHEWABLE TABLET 81 MG: 81 TABLET CHEWABLE at 08:26

## 2022-10-03 RX ADMIN — ACETAMINOPHEN 650 MG: 325 TABLET ORAL at 08:31

## 2022-10-03 RX ADMIN — FOLIC ACID: 5 INJECTION, SOLUTION INTRAMUSCULAR; INTRAVENOUS; SUBCUTANEOUS at 08:36

## 2022-10-03 RX ADMIN — AMLODIPINE BESYLATE 10 MG: 10 TABLET ORAL at 08:26

## 2022-10-03 RX ADMIN — ENOXAPARIN SODIUM 40 MG: 100 INJECTION SUBCUTANEOUS at 08:27

## 2022-10-03 RX ADMIN — MIRTAZAPINE 30 MG: 30 TABLET, FILM COATED ORAL at 21:01

## 2022-10-03 RX ADMIN — POTASSIUM CHLORIDE 40 MEQ: 1500 TABLET, EXTENDED RELEASE ORAL at 08:27

## 2022-10-03 RX ADMIN — SODIUM CHLORIDE, SODIUM GLUCONATE, SODIUM ACETATE, POTASSIUM CHLORIDE, MAGNESIUM CHLORIDE, SODIUM PHOSPHATE, DIBASIC, AND POTASSIUM PHOSPHATE 100 ML/HR: .53; .5; .37; .037; .03; .012; .00082 INJECTION, SOLUTION INTRAVENOUS at 13:45

## 2022-10-03 RX ADMIN — ALPRAZOLAM 0.25 MG: 0.5 TABLET ORAL at 08:31

## 2022-10-03 RX ADMIN — SODIUM CHLORIDE, SODIUM GLUCONATE, SODIUM ACETATE, POTASSIUM CHLORIDE, MAGNESIUM CHLORIDE, SODIUM PHOSPHATE, DIBASIC, AND POTASSIUM PHOSPHATE 100 ML/HR: .53; .5; .37; .037; .03; .012; .00082 INJECTION, SOLUTION INTRAVENOUS at 03:29

## 2022-10-03 RX ADMIN — KETOROLAC TROMETHAMINE 15 MG: 30 INJECTION, SOLUTION INTRAMUSCULAR; INTRAVENOUS at 21:01

## 2022-10-03 RX ADMIN — METRONIDAZOLE: 7.5 GEL TOPICAL at 08:28

## 2022-10-03 RX ADMIN — INFLUENZA A VIRUS A/VICTORIA/2570/2019 IVR-215 (H1N1) ANTIGEN (FORMALDEHYDE INACTIVATED), INFLUENZA A VIRUS A/DARWIN/9/2021 SAN-010 (H3N2) ANTIGEN (FORMALDEHYDE INACTIVATED), INFLUENZA B VIRUS B/PHUKET/3073/2013 ANTIGEN (FORMALDEHYDE INACTIVATED), AND INFLUENZA B VIRUS B/MICHIGAN/01/2021 ANTIGEN (FORMALDEHYDE INACTIVATED) 0.7 ML: 60; 60; 60; 60 INJECTION, SUSPENSION INTRAMUSCULAR at 13:38

## 2022-10-03 NOTE — CONSULTS
Psychiatric Consultation    Reason for consultation:  Psychiatric evaluation      History:     Yee Burkett is an 77 y o , male, consulted for confusion secondary to probable toxic encephalopathy  He is suspected of abusing Xanax which she has been a substitute for alcohol which he said he stopped over 10 days ago but prior to that he was drinking alcohol heavily and actually went the through a period of the DTs and was admitted to [facility]U for alcohol withdrawal seizures  Psychiatrically he has been treated for chronic and acute depression at [facility] by myself and had been taking Paxil, Remeron, and Xanax  He has been compliant with his medication and outpatient treatment  But never discussed his alcohol abuse or possible Xanax overuse  She is the been disabled psychiatrically  Has a history of several inpatient psychiatric admissions and has had a suicide attempt in the past     He currently lives alone in assisted living facility  The has the support of his family and especially in older brother who has been attentive and supportive  Medically he is a recovering from fall at home and has sustained a left shoulder fracture and is currently in a sling  He also has a history of hypertension diabetes and hypercholesterolemia    Past Medical History:   Diagnosis Date    Colon polyp     Diabetes mellitus [address] [address] )     Hyperlipidemia     Hypertension        Past surgical history:  Past Surgical History:   Procedure Laterality Date    COLON SURGERY      found growth, bengin    COLONOSCOPY      COLONOSCOPY N/A 3/13/2018    Procedure: COLONOSCOPY;  Surgeon: Una Sommers DO;  Location: [facility] GI LAB;   Service: Gastroenterology       Family history:  Family History   Problem Relation Age of Onset    Heart failure Mother     Hypertension Other        Current medications:    Current Facility-Administered Medications:     acetaminophen (TYLENOL) tablet 650 mg, 650 mg, Oral, Q6H PRN, Lien Anthony MD, 650 mg at 10/02/22 2058    amLODIPine (NORVASC) tablet 10 mg, 10 mg, Oral, Daily, Mazin Hutchins MD    aspirin chewable tablet 81 mg, 81 mg, Oral, Daily, Mazin Hutchins MD    atorvastatin (LIPITOR) tablet 40 mg, 40 mg, Oral, Daily, Mazin Hutchins MD    enoxaparin (LOVENOX) subcutaneous injection 40 mg, 40 mg, Subcutaneous, Daily, Mazin Hutchins MD    folic acid 1 mg, thiamine (VITAMIN B1) 100 mg in sodium chloride 0 9 % 100 mL IV piggyback, , Intravenous, Daily, Mazin Hutchins MD    hydrochlorothiazide (HYDRODIURIL) tablet 12 5 mg, 12 5 mg, Oral, Daily, Mazin Huthcins MD    influenza vaccine, high-dose (FLUZONE HIGH-DOSE) IM injection DARIEL 0 7 mL, 0 7 mL, Intramuscular, Once, Mazin Hutchins MD    ketorolac (TORADOL) injection 15 mg, 15 mg, Intravenous, Q6H PRN, Mazin Hutchins MD, 15 mg at 10/03/22 0218    metroNIDAZOLE (METROGEL) 0 75 % gel, , Topical, BID, Mazin Hutchins MD    mirtazapine (REMERON) tablet 30 mg, 30 mg, Oral, HS, Mazin Hutchins MD, 30 mg at 10/02/22 2100    multi-electrolyte (PLASMALYTE-A/ISOLYTE-S PH 7 4) IV solution, 100 mL/hr, Intravenous, Continuous, Mazin Hutchins MD, Last Rate: 100 mL/hr at 10/03/22 0329, 100 mL/hr at 10/03/22 0329    nicotine (NICODERM CQ) 14 mg/24hr TD 24 hr patch 1 patch, 1 patch, Transdermal, Daily, Mazin Hutchins MD    ondansetron [facility]) injection 4 mg, 4 mg, Intravenous, Q6H PRN, Mazin Hutchins MD    PARoxetine (PAXIL) tablet 40 mg, 40 mg, Oral, QAM, Mazin Hutchins MD    polyethylene glycol (MIRALAX) packet 17 g, 17 g, Oral, Daily PRN, Mazin Hutchins MD    potassium chloride (K-DUR,KLOR-CON) CR tablet 40 mEq, 40 mEq, Oral, Once, Maybelle Demond, DO      Allergies:   Allergies   Allergen Reactions    Lisinopril Angioedema       Social History:  Social History     Socioeconomic History    Marital status: Single     Spouse name: Not on file    Number of children: Not on file    Years of education: Not on file    Highest education level: Not on file   Occupational History    Not on file   Tobacco Use    Smoking status: Current Every Day Smoker     Packs/day: 1 00     Types: Cigarettes    Smokeless tobacco: Former User    Tobacco comment: uses vape/current every day smoker (as per [employer])   Vaping Use    Vaping Use: Never used   Substance and Sexual Activity    Alcohol use: Not Currently    Drug use: No    Sexual activity: Not on file   Other Topics Concern    Not on file   Social History Narrative    Not on file     Social Determinants of Health     Financial Resource Strain: Not on file   Food Insecurity: Not on file   Transportation Needs: No Transportation Needs    Lack of Transportation (Medical): No    Lack of Transportation (Non-Medical):  No   Physical Activity: Not on file   Stress: Not on file   Social Connections: Not on file   Intimate Partner Violence: Not on file   Housing Stability: Not on file         Physical Examination:     Vital Signs:  Vitals:    10/02/22 1901 10/02/22 2256 10/03/22 0330 10/03/22 0706   BP: 142/68 140/67 152/67 (!) 176/81   BP Location: Right arm Right arm Right arm Right arm   Pulse: 96 80 81 101   Resp: 18 18 18 20   Temp: 98 °F (36 7 °C) (!) 97 4 °F (36 3 °C) 98 °F (36 7 °C) (!) 97 2 °F (36 2 °C)   TempSrc: Temporal Temporal Temporal Temporal   SpO2: 99% 99% 97% 98%   Weight:       Height:             Appearance:  age appropriate and casually dressed   Behavior:  guarded and psychomotor retardation withdrawn   Speech:  normal volume   Mood:  depressed   Affect:  constricted   Thought Process:  blocked and perserverative   Thought Content:  normal   Perceptual Disturbances: None   Risk Potential: none   Sensorium:  person, place, situation and time   Cognition:  recent and remote memory grossly intact, intact   Consciousness:  alert and awake    Attention: attention span and concentration were age appropriate   Intellect: average   Insight:  fair   Judgment: fair      Motor Activity: no abnormal movements           Diagnostic Studies:     Recent Labs:  Results Reviewed     Procedure Component Value Units Date/Time    Basic metabolic panel [[id number]]  (Abnormal) Collected: 10/03/22 0511    Lab Status: Final result Specimen: Blood from Hand, Right Updated: 10/03/22 0616     Sodium 134 mmol/L      Potassium 3 2 mmol/L      Chloride 97 mmol/L      CO2 32 mmol/L      ANION GAP 5 mmol/L      BUN 15 mg/dL      Creatinine 0 63 mg/dL      Glucose 102 mg/dL      Glucose, Fasting 102 mg/dL      Calcium 8 2 mg/dL      eGFR 102 ml/min/1 73sq m     Narrative:      [place] guidelines for Chronic Kidney Disease (CKD):     Stage 1 with normal or high GFR (GFR > 90 mL/min/1 73 square meters)    Stage 2 Mild CKD (GFR = 60-89 mL/min/1 73 square meters)    Stage 3A Moderate CKD (GFR = 45-59 mL/min/1 73 square meters)    Stage 3B Moderate CKD (GFR = 30-44 mL/min/1 73 square meters)    Stage 4 Severe CKD (GFR = 15-29 mL/min/1 73 square meters)    Stage 5 End Stage CKD (GFR <15 mL/min/1 73 square meters)  Note: GFR calculation is accurate only with a steady state creatinine    CBC (With Platelets) [[id number]]  (Abnormal) Collected: 10/03/22 0511    Lab Status: Final result Specimen: Blood from Hand, Right Updated: 10/03/22 0543     WBC 11 01 Thousand/uL      RBC 3 16 Million/uL      Hemoglobin 10 4 g/dL      Hematocrit 32 2 %       fL      MCH 32 9 pg      MCHC 32 3 g/dL      RDW 13 0 %      Platelets 311 Thousands/uL      MPV 9 0 fL     Rapid drug screen, urine [[id number]]  (Abnormal) Collected: 10/02/22 1653    Lab Status: Final result Specimen: Urine, Clean Catch Updated: 10/02/22 1719     Amph/Meth UR Negative     Barbiturate Ur Positive     Benzodiazepine Urine Positive     Cocaine Urine Negative     Methadone Urine Negative     Opiate Urine Negative     PCP Ur Negative     THC Urine Negative     Oxycodone Urine Positive    Narrative:      Presumptive report  If requested, specimen will be sent to reference lab for confirmation    FOR MEDICAL PURPOSES ONLY    IF CONFIRMATION NEEDED PLEASE CONTACT THE LAB WITHIN 5 DAYS      Drug Screen Cutoff Levels:  AMPHETAMINE/METHAMPHETAMINES  1000 ng/mL  BARBITURATES     200 ng/mL  BENZODIAZEPINES     200 ng/mL  COCAINE      300 ng/mL  METHADONE      300 ng/mL  OPIATES      300 ng/mL  PHENCYCLIDINE     25 ng/mL  THC       50 ng/mL  OXYCODONE      100 ng/mL    Urine Microscopic [[id number]]  (Abnormal) Collected: 10/02/22 1653    Lab Status: Final result Specimen: Urine, Clean Catch Updated: 10/02/22 1710     RBC, UA 1-2 /hpf      WBC, UA 1-2 /hpf      Epithelial Cells Occasional /hpf      Bacteria, UA Occasional /hpf      Hyaline Casts, UA 0-1 /lpf      MUCUS THREADS Occasional    UA w Reflex to Microscopic w Reflex to Culture [[id number]]  (Abnormal) Collected: 10/02/22 1653    Lab Status: Final result Specimen: Urine, Clean Catch Updated: 10/02/22 1702     Color, UA Jeanine     Clarity, UA Clear     Specific Gravity, UA 1 015     pH, UA 7 0     Leukocytes, UA Negative     Nitrite, UA Negative     Protein, UA 15 (Trace) mg/dl      Glucose, UA Negative mg/dl      Ketones, UA 50 (2+) mg/dl      Bilirubin, UA Negative     Occult Blood, UA 25 0     UROBILINOGEN UA 4 0 mg/dL     Platelet count [[id number]]     Lab Status: No result Specimen: Blood     Basic metabolic panel [[id number]]  (Abnormal) Collected: 10/02/22 1420    Lab Status: Final result Specimen: Blood from Arm, Right Updated: 10/02/22 1444     Sodium 131 mmol/L      Potassium 3 4 mmol/L      Chloride 93 mmol/L      CO2 33 mmol/L      ANION GAP 5 mmol/L      BUN 20 mg/dL      Creatinine 0 84 mg/dL      Glucose 114 mg/dL      Calcium 8 9 mg/dL      eGFR 91 ml/min/1 73sq m     Narrative:      [place] guidelines for Chronic Kidney Disease (CKD):     Stage 1 with normal or high GFR (GFR > 90 mL/min/1 73 square meters)    Stage 2 Mild CKD (GFR = 60-89 mL/min/1 73 square meters)    Stage 3A Moderate CKD (GFR = 45-59 mL/min/1 73 square meters)   Stage 3B Moderate CKD (GFR = 30-44 mL/min/1 73 square meters)    Stage 4 Severe CKD (GFR = 15-29 mL/min/1 73 square meters)    Stage 5 End Stage CKD (GFR <15 mL/min/1 73 square meters)  Note: GFR calculation is accurate only with a steady state creatinine    Magnesium [[id number]]  (Abnormal) Collected: 10/02/22 1420    Lab Status: Final result Specimen: Blood from Arm, Right Updated: 10/02/22 1444     Magnesium 1 5 mg/dL     CBC and differential [[id number]]  (Abnormal) Collected: 10/02/22 1420    Lab Status: Final result Specimen: Blood from Arm, Right Updated: 10/02/22 1429     WBC 11 62 Thousand/uL      RBC 3 22 Million/uL      Hemoglobin 10 6 g/dL      Hematocrit 32 0 %      MCV 99 fL      MCH 32 9 pg      MCHC 33 1 g/dL      RDW 13 0 %      MPV 8 7 fL      Platelets 408 Thousands/uL      nRBC 0 /100 WBCs      Neutrophils Relative 83 %      Immat GRANS % 1 %      Lymphocytes Relative 8 %      Monocytes Relative 8 %      Eosinophils Relative 0 %      Basophils Relative 0 %      Neutrophils Absolute 9 58 Thousands/µL      Immature Grans Absolute 0 10 Thousand/uL      Lymphocytes Absolute 0 91 Thousands/µL      Monocytes Absolute 0 97 Thousand/µL      Eosinophils Absolute 0 03 Thousand/µL      Basophils Absolute 0 03 Thousands/µL     Fingerstick Glucose (POCT) [[id number]]  (Normal) Collected: 10/02/22 1336    Lab Status: Final result Updated: 10/02/22 1337     POC Glucose 118 mg/dl     POCT alcohol breath test [[id number]]  (Normal) Resulted: 10/02/22 1312    Lab Status: Final result Updated: 10/02/22 1312     EXTBreath Alcohol 0 000          I/O Past 24 hours:  I/O last 3 completed shifts: In: 56 [P O :560; IV Piggyback:50]  Out: 450 [Urine:450]  I/O this shift:  In: -   Out: 300 [Urine:300]        Impression / Plan:     Dx: Toxic encephalopathy  History of alcohol use disorder  History of probable Xanax abuse  Major depression and anxiety with acute exacerbation      Recommended Treatment: Medications  1) continue with Remeron and Paxil, start weaning off benzodiazepine  2] recommend inpatient psychiatric admission to wean off the benzodiazepine,  treat his toxic encephalopathy and his major depressive disorder  Patient is agreeable      Total floor / unit time spent today 50 minutes  Greater than 50% of total time was spent with the patient and / or family counseling and / or coordination of care  A description of the counseling / coordination of care  Patient's Rights, confidentiality and exceptions to confidentiality, use of automated medical record, [facility] staff access to medical record, and consent to treatment reviewed        Darnell Pool

## 2022-10-03 NOTE — UTILIZATION REVIEW
Initial Clinical Review    Pt initially admitted as Observation on 10/2  Changed to Inpatient on 10/3  Pt requiring continued stay d/t toxic metabolic encephalopathy and need for inpatient psychiatric admission  Admission: Date/Time/Statement:   Admission Orders (From admission, onward)     Ordered        10/03/22 1612  Inpatient Admission  Once            10/02/22 1635  Place in Observation  Once                      Orders Placed This Encounter   Procedures    Inpatient Admission     Standing Status:   Standing     Number of Occurrences:   1     Order Specific Question:   Level of Care     Answer:   Med Surg [16]     Order Specific Question:   Estimated length of stay     Answer:   More than 2 Midnights     Order Specific Question:   Certification     Answer:   I certify that inpatient services are medically necessary for this patient for a duration of greater than two midnights  See H&P and MD Progress Notes for additional information about the patient's course of treatment  ED Arrival Information     Expected   -    Arrival   10/2/2022 12:30    Acuity   Urgent            Means of arrival   Walk-In    Escorted by   Family Member    Service   Hospitalist    Admission type   Emergency            Arrival complaint   Alcohol Detox           Chief Complaint   Patient presents with    Detox Evaluation     Patient reports seen yesterday and spoke with HOST but couldn't get into the facility  Last drink 10 days ago  Also requesting pain medicine for his fx , took oxycodone  Patient does not know why he is here  Initial Presentation: 77 y o  male who presented self from home to [facility] [facility] Heart ED  Admitted in observation status for evaluation and treatment of toxic metabolic encephalopathy  PMHx: AUD, T2DM, HLD, HTN, Psychiatric disorder  Presented w/ increased confusion, family suspects pt abusing Xanax prescription  On exam, alert & oriented, horizontal nystagmus, slow to respond  Plt 590  Imaging unremarkable  UDS positive for barbituates, benzodiazepines, and oxycodone  Plan: high-dose IV thiamine, folic acid supplement, limit benzos and narcotics, SSI w/ accuchecks ACHS, continue PTA meds  Ortho, Behavioral Health consulted  10/3/22 Changed to Inpatient Status  Orthopedics Consult: Pt w/ non acute L humerus fracture  NWB LUE  Continue sling  Analgesics  Behavioral Health Consult: Continue PTA remeron and paxil  Begin to wean benzodiazepine  Recommend inpatient behavioral health admission  Pt agreeable  201 signed  Date: 10/04/22   Day 2: Pt resting comfortably with no acute concerns  On exam, flat affect  Plan: SSI w/ accuchecks ACHS, continue PTA meds, pending placement for behavioral health treatment       ED Triage Vitals   Temperature Pulse Respirations Blood Pressure SpO2   10/02/22 1243 10/02/22 1243 10/02/22 1243 10/02/22 1243 10/02/22 1243   98 4 °F (36 9 °C) (!) 107 16 148/80 100 %      Temp Source Heart Rate Source Patient Position - Orthostatic VS BP Location FiO2 (%)   10/02/22 1243 10/02/22 1243 10/02/22 1243 10/02/22 1243 --   Oral Monitor Sitting Right arm       Pain Score       10/02/22 1720       5          Wt Readings from Last 1 Encounters:   10/02/22 72 4 kg (159 lb 9 8 oz)     Additional Vital Signs:   Date/Time Temp Pulse Resp BP MAP (mmHg) SpO2 O2 Device   10/04/22 0710 97 4 °F (36 3 °C) Abnormal  100 18 187/83 Abnormal  -- 97 % None (Room air)   10/04/22 0200 -- 82 -- -- -- -- --   10/03/22 2100 97 3 °F (36 3 °C) Abnormal  78 18 164/83 116 100 % None (Room air)   10/03/22 1502 98 1 °F (36 7 °C) 84 18 166/76 107 97 % None (Room air)   10/03/22 0706 97 2 °F (36 2 °C) Abnormal  101 20 176/81 Abnormal  -- 98 % None (Room air)   10/03/22 0330 98 °F (36 7 °C) 81 18 152/67 -- 97 % None (Room air)   10/02/22 2256 97 4 °F (36 3 °C) Abnormal  80 18 140/67 -- 99 % None (Room air)   10/02/22 1901 98 °F (36 7 °C) 96 18 142/68 105 99 % None (Room air)   10/02/22 1722 97 8 °F (36 6 °C) 94 18 187/80 Abnormal  104 100 % None (Room air)   10/02/22 1504 -- 95 24 Abnormal  148/80 -- 99 % None (Room air)   10/02/22 1252 -- -- -- -- -- -- None (Room air)   10/02/22 1249 -- 110 Abnormal  -- 148/80 -- -- --       Pertinent Labs/Diagnostic Test Results:   CT head without contrast   Final Result by Ajay Chávez MD (10/02 1356)      No acute intracranial abnormality                    Workstation performed: [id number]               Results from last 7 days   Lab Units 10/03/22  0511 10/02/22  1420 10/01/22  1628   WBC Thousand/uL 11 01* 11 62* 12 97*   HEMOGLOBIN g/dL 10 4* 10 6* 11 2*   HEMATOCRIT % 32 2* 32 0* 33 0*   PLATELETS Thousands/uL 567* 590* 502*   NEUTROS ABS Thousands/µL  --  9 58* 10 43*         Results from last 7 days   Lab Units 10/03/22  0511 10/02/22  1420 10/01/22  1628   SODIUM mmol/L 134* 131* 131*   POTASSIUM mmol/L 3 2* 3 4* 3 3*   CHLORIDE mmol/L 97 93* 94*   CO2 mmol/L 32 33* 28   ANION GAP mmol/L 5 5 9   BUN mg/dL 15 20 18   CREATININE mg/dL 0 63* 0 84 0 88   EGFR ml/min/1 73sq m 102 91 89   CALCIUM mg/dL 8 2* 8 9 9 3   MAGNESIUM mg/dL 1 8 1 5* 1 8   PHOSPHORUS mg/dL  --   --  3 4     Results from last 7 days   Lab Units 10/01/22  1628   AST U/L 118*   ALT U/L 53   ALK PHOS U/L 79   TOTAL PROTEIN g/dL 7 2   ALBUMIN g/dL 2 7*   TOTAL BILIRUBIN mg/dL 0 74     Results from last 7 days   Lab Units 10/02/22  1336 10/01/22  1626   POC GLUCOSE mg/dl 118 111     Results from last 7 days   Lab Units 10/03/22  0511 10/02/22  1420 10/01/22  1628   GLUCOSE RANDOM mg/dL 102* 114* 106     Results from last 7 days   Lab Units 10/01/22  1628   LIPASE u/L 193     Results from last 7 days   Lab Units 10/02/22  1653   CLARITY UA  Clear   COLOR UA  Jeanine*   SPEC GRAV UA  1 015   PH UA  7 0   GLUCOSE UA mg/dl Negative   KETONES UA mg/dl 50 (2+)*   BLOOD UA  25 0*   PROTEIN UA mg/dl 15 (Trace)*   NITRITE UA  Negative   BILIRUBIN UA  Negative   UROBILINOGEN UA mg/dL 4 0*   LEUKOCYTES UA  Negative   WBC UA /hpf 1-2   RBC UA /hpf 1-2   BACTERIA UA /hpf Occasional   EPITHELIAL CELLS WET PREP /hpf Occasional   MUCUS THREADS  Occasional*             Results from last 7 days   Lab Units 10/02/22  1653   AMPH/METH  Negative   BARBITURATE UR  Positive*   BENZODIAZEPINE UR  Positive*   COCAINE UR  Negative   METHADONE URINE  Negative   OPIATE UR  Negative   PCP UR  Negative   THC UR  Negative     Results from last 7 days   Lab Units 10/01/22  1628   ETHANOL LVL mg/dL <3     ED Treatment:   Medication Administration from 10/02/2022 1230 to 10/02/2022 1717       Date/Time Order Dose Route Action Comments     10/02/2022 1610 thiamine (VITAMIN B1) 500 mg in sodium chloride 0 9 % 50 mL IVPB 500 mg Intravenous New Bag      04/23/5741 7281 folic acid (FOLVITE) tablet 1 mg 1 mg Oral Given      10/02/2022 1359 multivitamin-minerals (CENTRUM) tablet 1 tablet 1 tablet Oral Given      10/02/2022 1450 magnesium sulfate 2 g/50 mL IVPB (premix) 2 g 2 g Intravenous New Bag      10/02/2022 1450 lactated ringers bolus 1,000 mL 1,000 mL Intravenous New Bag         Past Medical History:   Diagnosis Date    Colon polyp     Diabetes mellitus ([facility] [address] )     Hyperlipidemia     Hypertension      Present on Admission:   Type 2 diabetes mellitus with diabetic peripheral angiopathy without gangrene, without long-term current use of insulin ([facility])   Mixed hyperlipidemia   Primary hypertension   Depression with anxiety      Admitting Diagnosis: Alcohol abuse [F10 10]  Confusion [R41 0]  Depression with anxiety [F41 8]  Closed fracture of left shoulder, sequela [S42 92XS]  Age/Sex: 77 y o  male  Admission Orders:  Consistent Carbohydrate Diet  Accu-checks ACHS  CIWA monitoring  SCDs      Scheduled Medications:  ALPRAZolam, 0 25 mg, Oral, BID (AM & Afternoon)  amLODIPine, 10 mg, Oral, Daily  aspirin, 81 mg, Oral, Daily  atorvastatin, 40 mg, Oral, Daily  enoxaparin, 40 mg, Subcutaneous, Daily  folic acid 1 mg, thiamine 100 mg in 0 9% sodium chloride 100 mL IVPB, , Intravenous, Daily  hydrochlorothiazide, 12 5 mg, Oral, Daily  metroNIDAZOLE, , Topical, BID  mirtazapine, 30 mg, Oral, HS  nicotine, 1 patch, Transdermal, Daily  PARoxetine, 40 mg, Oral, QAM    Continuous IV Infusions:  multi-electrolyte, 100 mL/hr, Intravenous, Continuous    PRN Meds:  acetaminophen, 650 mg, Oral, Q6H PRN; 10/4 x1  ketorolac, 15 mg, Intravenous, Q6H PRN; 10/3 x2  ondansetron, 4 mg, Intravenous, Q6H PRN  polyethylene glycol, 17 g, Oral, Daily PRN        IP CONSULT TO ED CRISIS WORKER  IP CONSULT TO ORTHOPEDIC SURGERY  IP CONSULT TO PSYCHIATRY    Network Utilization Review Department  ATTENTION: Please call with any questions or concerns to [phone] and carefully listen to the prompts so that you are directed to the right person  All voicemails are confidential   Mark Franz all requests for admission clinical reviews, approved or denied determinations and any other requests to dedicated fax number below belonging to the campus where the patient is receiving treatment   List of dedicated fax numbers for the Facilities:  [address] DENIALS (Administrative/Medical Necessity) [phone]   [address] (Maternity/NICU/Pediatrics) [phone] [address]  [address] [facility] [place] [address] [address] [place] [address] [address] [facility] [facility] [phone]

## 2022-10-03 NOTE — CONSULTS
Consultation - Orthopedics   Matthieu Isabel 77 y o  male MRN: [id number]  Unit/Bed#: 7T [facility] 703-01 Encounter: [id number]      Assessment/Plan     Assessment:  78 yo male with left displaced proximal humerus fracture, sustained on 9/28/22      Plan:  · Patient was previously evaluated on 10/1/22 and non operative treatment was recommended with follow up with Dr Milagros Guillory in 1 week for re eval  Given that patient may be admitted to [facility] for an unknown period of time, I would like to review case with Dr Milagros Guillory to ensure that operative treatment should not be pursued prior to [facility] admission  · Continue NWB to LUE  · Continue sling  · May remove sling for pendulums, elbow and wrist ROM  · Pain control prn  · Medical management per SLIM  · Ortho will follow  History of Present Illness   Physician Requesting Consult: Miquel Bruno MD  Reason for Consult / Principal Problem: left humerus fracture   HPI: Matthieu Isabel is a 77y o  year old right hand dominant male who presents with confusion and metabolic encephalopathy  Orthopedics was consulted due to left proximal humerus fracture  Patient had a fall on 9/28/22 and was evaluated at [facility] initially  He was told he has a humerus fracture, was placed in a sling, and told to follow up  Patient reports that he was compliant with the sling however he did bear weight on the arm and try to use the arm at times  As of today he reports mild achy pain in the left shoulder  Notes swelling  Patient denies distal numbness or tingling  He denies previous surgery or injections fo rthe shoulder  Inpatient consult to Orthopedic Surgery  Consult performed by: Pierre Burden PA-C  Consult ordered by: Miquel Bruno MD          ROS: see HPI, all other systems negative      Historical Information   Past Medical History:   Diagnosis Date    Colon polyp     Diabetes mellitus ([facility] [address] )     Hyperlipidemia     Hypertension      Past Surgical History:   Procedure Laterality Date    COLON SURGERY      found growth, bengin    COLONOSCOPY      COLONOSCOPY N/A 3/13/2018    Procedure: COLONOSCOPY;  Surgeon: Janell Honeycutt DO;  Location: [facility] GI LAB; Service: Gastroenterology     Social History   Social History     Substance and Sexual Activity   Alcohol Use Not Currently     Social History     Substance and Sexual Activity   Drug Use No     Social History     Tobacco Use   Smoking Status Current Every Day Smoker    Packs/day: 1 00    Types: Cigarettes   Smokeless Tobacco Former User   Tobacco Comment    uses vape/current every day smoker (as per [employer])     Family History:   Family History   Problem Relation Age of Onset    Heart failure Mother     Hypertension Other        Meds/Allergies   Medications Prior to Admission   Medication    ALPRAZolam (XANAX) 0 25 mg tablet    aspirin 81 MG tablet    metFORMIN (GLUCOPHAGE) 1000 MG tablet    mirtazapine (REMERON) 30 mg tablet    PARoxetine (PAXIL) 40 MG tablet    amLODIPine (NORVASC) 10 mg tablet    atorvastatin (LIPITOR) 40 mg tablet    diazepam (VALIUM) 10 mg tablet    hydrochlorothiazide (HYDRODIURIL) 12 5 mg tablet    metroNIDAZOLE (METROGEL) 1 % gel     Allergies   Allergen Reactions    Lisinopril Angioedema       Objective   Vitals: Blood pressure (!) 176/81, pulse 101, temperature (!) 97 2 °F (36 2 °C), temperature source Temporal, resp  rate 20, height 5' 9" (1 753 m), weight 72 4 kg (159 lb 9 8 oz), SpO2 98 %  ,Body mass index is 23 57 kg/m²  Intake/Output Summary (Last 24 hours) at 10/3/2022 0717  Last data filed at 10/2/2022 2301  Gross per 24 hour   Intake 610 ml   Output 450 ml   Net 160 ml     I/O last 24 hours:   In: 56 [P O :560; IV Piggyback:50]  Out: 450 [Urine:450]    Invasive Devices  Report    Peripheral Intravenous Line  Duration           Peripheral IV 10/02/22 Right Antecubital <1 day                PE:  Gen:  Awake and alert  HEENT:  Hearing intact  Heart:  Regular rate  Lungs: no audible wheezing  GI: no abdominal distension    Ortho Exam   Left Upper Extremity:  Inspection- ecchymosis about the left shoulder and axillary region  Moderate swelling  No erythema or abrasions  Palpation- +swelling  +effusion  +TTP over fracture site as expected  No TTP over the elbow, forearm or wrist    ROM- shoulder deferred  No pain with mid elbow ROM or wrist ROM  Neurovascular- Sensation intact axillary, median, radial, and ulnar nerve distributions  Palpable radial pulse  AIN/ PIN intact       Lab Results:   CBC:   Lab Results   Component Value Date    WBC 11 01 (H) 10/03/2022    HGB 10 4 (L) 10/03/2022    HCT 32 2 (L) 10/03/2022     (H) 10/03/2022     (H) 10/03/2022    MCH 32 9 10/03/2022    MCHC 32 3 10/03/2022    RDW 13 0 10/03/2022    MPV 9 0 10/03/2022    NRBC 0 10/02/2022     CMP:   Lab Results   Component Value Date    SODIUM 134 (L) 10/03/2022    CL 97 10/03/2022    CO2 32 10/03/2022    BUN 15 10/03/2022    CREATININE 0 63 (L) 10/03/2022    CALCIUM 8 2 (L) 10/03/2022    EGFR 102 10/03/2022       Imaging Studies: I have personally reviewed pertinent films in PACS   X-rays left shoulder- displaced, shortened proximal humerus fracture   X-rays left humerus- displaced, shortened proximal humerus fracture     Code Status: Level 1 - Full Code  Advance Directive and Living Will:      Power of :    POLST:

## 2022-10-03 NOTE — PROGRESS NOTES
[facility]  Progress Note - Min Koch 1955, 77 y o  male MRN: [id number]  Unit/Bed#: 7[facility] 703-01 Encounter: [id number]  Primary Care Provider: Babar Sargent MD   Date and time admitted to hospital: 10/2/2022 12:38 PM    Closed fracture of left shoulder  Assessment & Plan  · Recent history of fall and shoulder pressure 1 week ago  · Left shoulder x-ray showed Proximal humeral fracture   superior migration of shaft is new     · Will consult orthopedic for superior migration of shaft  · IV Toradol for pain control    Alcohol use  Assessment & Plan  · History of chronic alcohol dependence complicated by DTs and required to be in ICU  · The last alcohol use was 10 days ago, currently has no withdrawal symptoms  · If patient shows alcohol withdrawal symptoms, consider toxicology consult  · Alcohol level in ED was negative  · Case management consult for disposition planning    Type 2 diabetes mellitus with diabetic peripheral angiopathy without gangrene, without long-term current use of insulin (HCC)  Assessment & Plan  Lab Results   Component Value Date    HGBA1C 5 7 (H) 07/21/2022       Recent Labs     10/01/22  1626 10/02/22  1336   POCGLU 111 118       Blood Sugar Average: Last 72 hrs:  (P) 118     · Blood sugar acceptable  · Hold home metformin  · Diabetic diet, sliding scale insulin and Accu-Cheks    Mixed hyperlipidemia  Assessment & Plan  · Continue home Lipitor    Primary hypertension  Assessment & Plan  · BP acceptable  · Continue home amlodipine, HCTZ    Depression with anxiety  Assessment & Plan  · The brother suspected patient has been abusing his Xanax at home  · Dr Britton Araujo is the patient's psychiatrist  · Hold Xanax for now, psychiatry consult placed    * Toxic metabolic encephalopathy  Assessment & Plan  · POA, patient is AAO x3 however sluggish in reaction, and he stated his brother brought him in ED however he is not sure why  · Patient is afebrile, leukocytosis noted  · Patient was in SLB yesterday, plan to inpatient detox however plan fell through due to insurance issue  · ED physician discussed with  - does not need to be admitted to inpatient detox  · Patient has chronic alcohol use, last use was 10 days prior to ED arrival  · CT brain negative for acute intracranial abnormality  · Continue thiamine and folic for now in the setting of chronic alcohol dependence  · The brothers suspect that patient has been abusing his Xanax at home (which could be contributing to his current mental status)  · Limited narcotics and benzodiazepine  · Follow UA to rule out UTI  · PT/OT consult placed  ·  for disposition planning        VTE Pharmacologic Prophylaxis: VTE Score: 4 Moderate Risk (Score 3-4) - Pharmacological DVT Prophylaxis Ordered: enoxaparin (Lovenox)  Patient Centered Rounds: I performed bedside rounds with nursing staff today  Discussions with Specialists or Other Care Team Provider: none    Education and Discussions with Family / Patient:  Discussed with patient, all questions answered will attempt to call brother later in day    Time Spent for Care: 30 minutes  More than 50% of total time spent on counseling and coordination of care as described above  Current Length of Stay: 0 day(s)  Current Patient Status: Observation   Certification Statement:  Observation requiring further evaluation by Psychiatry and Orthopedic surgery  Discharge Plan:  Pending clinical course    Code Status: Level 1 - Full Code    Subjective:   Patient seen and examined bedside  Patient resting in bed no apparent distress  Patient with no acute events overnight  This morning, patient is alert oriented x3 appears to be answering questions appropriately however with flat affect      Objective:     Vitals:   Temp (24hrs), Av 8 °F (36 6 °C), Min:97 2 °F (36 2 °C), Max:98 4 °F (36 9 °C)    Temp:  [97 2 °F (36 2 °C)-98 4 °F (36 9 °C)] 97 2 °F (36 2 °C)  HR: [] 101  Resp:  [16-24] 20  BP: (140-187)/(67-81) 176/81  SpO2:  [97 %-100 %] 98 %  Body mass index is 23 57 kg/m²  Input and Output Summary (last 24 hours): Intake/Output Summary (Last 24 hours) at 10/3/2022 0802  Last data filed at 10/3/2022 0706  Gross per 24 hour   Intake 610 ml   Output 750 ml   Net -140 ml       Physical Exam:   Physical Exam  HENT:      Head: Normocephalic  Cardiovascular:      Rate and Rhythm: Normal rate and regular rhythm  Pulses: Normal pulses  Pulmonary:      Effort: Pulmonary effort is normal       Breath sounds: Normal breath sounds  Abdominal:      General: Abdomen is flat  Bowel sounds are normal       Palpations: Abdomen is soft  Neurological:      General: No focal deficit present  Mental Status: He is alert and oriented to person, place, and time  Psychiatric:      Comments: Flat affect          Additional Data:     Labs:  Results from last 7 days   Lab Units 10/03/22  0511 10/02/22  1420   WBC Thousand/uL 11 01* 11 62*   HEMOGLOBIN g/dL 10 4* 10 6*   HEMATOCRIT % 32 2* 32 0*   PLATELETS Thousands/uL 567* 590*   NEUTROS PCT %  --  83*   LYMPHS PCT %  --  8*   MONOS PCT %  --  8   EOS PCT %  --  0     Results from last 7 days   Lab Units 10/03/22  0511 10/02/22  1420 10/01/22  1628   SODIUM mmol/L 134*   < > 131*   POTASSIUM mmol/L 3 2*   < > 3 3*   CHLORIDE mmol/L 97   < > 94*   CO2 mmol/L 32   < > 28   BUN mg/dL 15   < > 18   CREATININE mg/dL 0 63*   < > 0 88   ANION GAP mmol/L 5   < > 9   CALCIUM mg/dL 8 2*   < > 9 3   ALBUMIN g/dL  --   --  2 7*   TOTAL BILIRUBIN mg/dL  --   --  0 74   ALK PHOS U/L  --   --  79   ALT U/L  --   --  53   AST U/L  --   --  118*   GLUCOSE RANDOM mg/dL 102*   < > 106    < > = values in this interval not displayed           Results from last 7 days   Lab Units 10/02/22  1336 10/01/22  1626   POC GLUCOSE mg/dl 118 111               Lines/Drains:  Invasive Devices  Report    Peripheral Intravenous Line  Duration Peripheral IV 10/02/22 Right Antecubital <1 day                      Imaging: No pertinent imaging reviewed  Recent Cultures (last 7 days):         Last 24 Hours Medication List:   Current Facility-Administered Medications   Medication Dose Route Frequency Provider Last Rate    acetaminophen  650 mg Oral Q6H PRN La Pereira MD      amLODIPine  10 mg Oral Daily La Pereira MD      aspirin  81 mg Oral Daily La Pereira MD      atorvastatin  40 mg Oral Daily La Pereira MD      enoxaparin  40 mg Subcutaneous Daily La Pereira MD      folic acid 1 mg, thiamine 100 mg in 0 9% sodium chloride 100 mL IVPB   Intravenous Daily La Pereira MD      hydrochlorothiazide  12 5 mg Oral Daily La Pereira MD      influenza vaccine  0 7 mL Intramuscular Once La Pereira MD      ketorolac  15 mg Intravenous Q6H PRN La Pereira MD      metroNIDAZOLE   Topical BID La Pereira MD      mirtazapine  30 mg Oral HS La Pereira MD      multi-electrolyte  100 mL/hr Intravenous Continuous La Pereira  mL/hr (10/03/22 0329)    nicotine  1 patch Transdermal Daily La Pereira MD      ondansetron  4 mg Intravenous Q6H PRN La Pereira MD      PARoxetine  40 mg Oral QAM La Pereira MD      polyethylene glycol  17 g Oral Daily PRN La Pereira MD      potassium chloride  40 mEq Oral Once Irma Carrillo DO          Today, Patient Was Seen By: Irma Carrillo    **Please Note: This note may have been constructed using a voice recognition system  **

## 2022-10-03 NOTE — PLAN OF CARE
Problem: PAIN - ADULT  Goal: Verbalizes/displays adequate comfort level or baseline comfort level  Description: Interventions:  - Encourage patient to monitor pain and request assistance  - Assess pain using appropriate pain scale  - Administer analgesics based on type and severity of pain and evaluate response  - Implement non-pharmacological measures as appropriate and evaluate response  - Consider cultural and social influences on pain and pain management  - Notify physician/advanced practitioner if interventions unsuccessful or patient reports new pain  Outcome: Progressing     Problem: SAFETY ADULT  Goal: Patient will remain free of falls  Description: INTERVENTIONS:  - Educate patient/family on patient safety including physical limitations  - Instruct patient to call for assistance with activity   - Consult OT/PT to assist with strengthening/mobility   - Keep Call bell within reach  - Keep bed low and locked with side rails adjusted as appropriate  - Keep care items and personal belongings within reach  - Initiate and maintain comfort rounds  - Make Fall Risk Sign visible to staff  - Offer Toileting every 2 Hours, in advance of need  - Apply yellow socks and bracelet for high fall risk patients  - Consider moving patient to room near nurses station  Outcome: Progressing     Problem: SAFETY ADULT  Goal: Maintain or return to baseline ADL function  Description: INTERVENTIONS:  -  Assess patient's ability to carry out ADLs; assess patient's baseline for ADL function and identify physical deficits which impact ability to perform ADLs (bathing, care of mouth/teeth, toileting, grooming, dressing, etc )  - Assess/evaluate cause of self-care deficits   - Assess range of motion  - Assess patient's mobility; develop plan if impaired  - Assess patient's need for assistive devices and provide as appropriate  - Encourage maximum independence but intervene and supervise when necessary  - Involve family in performance of ADLs  - Assess for home care needs following discharge   - Consider OT consult to assist with ADL evaluation and planning for discharge  - Provide patient education as appropriate  Outcome: Progressing     Problem: DISCHARGE PLANNING  Goal: Discharge to home or other facility with appropriate resources  Description: INTERVENTIONS:  - Identify barriers to discharge w/patient and caregiver  - Arrange for needed discharge resources and transportation as appropriate  - Identify discharge learning needs (meds, wound care, etc )  - Refer to Case Management Department for coordinating discharge planning if the patient needs post-hospital services based on physician/advanced practitioner order or complex needs related to functional status, cognitive ability, or social support system  Outcome: Progressing     Problem: Knowledge Deficit  Goal: Patient/family/caregiver demonstrates understanding of disease process, treatment plan, medications, and discharge instructions  Description: Complete learning assessment and assess knowledge base    Interventions:  - Provide teaching at level of understanding  - Provide teaching via preferred learning methods  Outcome: Progressing     Problem: METABOLIC, FLUID AND ELECTROLYTES - ADULT  Goal: Electrolytes maintained within normal limits  Description: INTERVENTIONS:  - Monitor labs and assess patient for signs and symptoms of electrolyte imbalances  - Administer electrolyte replacement as ordered  - Monitor response to electrolyte replacements, including repeat lab results as appropriate  - Instruct patient on fluid and nutrition as appropriate  Outcome: Progressing

## 2022-10-03 NOTE — CASE MANAGEMENT
Case Management Progress Note    Patient name Tahir Art  Location 7T /7T -01 MRN [id number]  : 1955 Date 10/3/2022       LOS (days): 0  Geometric Mean LOS (GMLOS) (days):   Days to GMLOS:        OBJECTIVE:        Current admission status: Observation  Preferred Pharmacy:    [facility] [id number] [place] [address] [address] [place] [address]  Phone: [phone] Fax: [phone]    Primary Care Provider: Nava Estrella MD    Primary Insurance: [facility]  Secondary Insurance:     PROGRESS NOTE: CM was notified at morning rounds that pt is medically cleared to be discharged today  CM was notified that pt will need a inpatient psych bed  CM met with pt at bedside and pt signed a 201  Attending Dr signed the 12  CM faxed the facesheet and 201 to [phone] crisis intake for a bed availability to fax num: [phone]   CM was notified by [phone] that they have the referral but there are no beds at the moment  They will inform CM once bed opens    CM department will continue to follow through pt's D/C

## 2022-10-04 ENCOUNTER — APPOINTMENT (INPATIENT)
Dept: CT IMAGING | Facility: HOSPITAL | Age: 67
DRG: 492 | End: 2022-10-04
Payer: COMMERCIAL

## 2022-10-04 PROCEDURE — 99232 SBSQ HOSP IP/OBS MODERATE 35: CPT | Performed by: STUDENT IN AN ORGANIZED HEALTH CARE EDUCATION/TRAINING PROGRAM

## 2022-10-04 PROCEDURE — G1004 CDSM NDSC: HCPCS

## 2022-10-04 PROCEDURE — 73200 CT UPPER EXTREMITY W/O DYE: CPT

## 2022-10-04 RX ADMIN — FOLIC ACID: 5 INJECTION, SOLUTION INTRAMUSCULAR; INTRAVENOUS; SUBCUTANEOUS at 09:41

## 2022-10-04 RX ADMIN — METRONIDAZOLE: 7.5 GEL TOPICAL at 17:27

## 2022-10-04 RX ADMIN — NICOTINE 1 PATCH: 14 PATCH, EXTENDED RELEASE TRANSDERMAL at 09:40

## 2022-10-04 RX ADMIN — HYDROCHLOROTHIAZIDE 12.5 MG: 12.5 TABLET ORAL at 09:39

## 2022-10-04 RX ADMIN — SODIUM CHLORIDE, SODIUM GLUCONATE, SODIUM ACETATE, POTASSIUM CHLORIDE, MAGNESIUM CHLORIDE, SODIUM PHOSPHATE, DIBASIC, AND POTASSIUM PHOSPHATE 100 ML/HR: .53; .5; .37; .037; .03; .012; .00082 INJECTION, SOLUTION INTRAVENOUS at 20:25

## 2022-10-04 RX ADMIN — PAROXETINE 40 MG: 20 TABLET, FILM COATED ORAL at 09:40

## 2022-10-04 RX ADMIN — KETOROLAC TROMETHAMINE 15 MG: 30 INJECTION, SOLUTION INTRAMUSCULAR; INTRAVENOUS at 20:24

## 2022-10-04 RX ADMIN — SODIUM CHLORIDE, SODIUM GLUCONATE, SODIUM ACETATE, POTASSIUM CHLORIDE, MAGNESIUM CHLORIDE, SODIUM PHOSPHATE, DIBASIC, AND POTASSIUM PHOSPHATE 100 ML/HR: .53; .5; .37; .037; .03; .012; .00082 INJECTION, SOLUTION INTRAVENOUS at 09:34

## 2022-10-04 RX ADMIN — ALPRAZOLAM 0.25 MG: 0.5 TABLET ORAL at 09:39

## 2022-10-04 RX ADMIN — ACETAMINOPHEN 650 MG: 325 TABLET ORAL at 00:49

## 2022-10-04 RX ADMIN — METRONIDAZOLE: 7.5 GEL TOPICAL at 09:00

## 2022-10-04 RX ADMIN — MIRTAZAPINE 30 MG: 30 TABLET, FILM COATED ORAL at 20:25

## 2022-10-04 RX ADMIN — ENOXAPARIN SODIUM 40 MG: 100 INJECTION SUBCUTANEOUS at 09:39

## 2022-10-04 RX ADMIN — ALPRAZOLAM 0.25 MG: 0.5 TABLET ORAL at 13:08

## 2022-10-04 RX ADMIN — AMLODIPINE BESYLATE 10 MG: 10 TABLET ORAL at 09:40

## 2022-10-04 RX ADMIN — ASPIRIN 81 MG CHEWABLE TABLET 81 MG: 81 TABLET CHEWABLE at 09:39

## 2022-10-04 RX ADMIN — ATORVASTATIN CALCIUM 40 MG: 80 TABLET, FILM COATED ORAL at 09:40

## 2022-10-04 RX ADMIN — KETOROLAC TROMETHAMINE 15 MG: 30 INJECTION, SOLUTION INTRAMUSCULAR; INTRAVENOUS at 13:08

## 2022-10-04 NOTE — QUICK NOTE
Case reviewed with trauma specialist, Dr Arnaud Eli, and [facility] attending, Dr Angelita Álvarez  Patient will be able to wear sling once transferred to behavioral health unit  Patient will require surgical fixation of left proximal humerus fracture  OR timing is still undecided, however patient will be prepared in case OR/ surgeon available tomorrow 10/5/22       Plan:   NPO midnight  Am lovenox held  Am labs ordered  CT scan ordered for surgical planning  NWB to LUE  Continue sling  Pain control prn  Ortho will follow

## 2022-10-04 NOTE — ASSESSMENT & PLAN NOTE
· Recent history of fall and shoulder pressure 1 week ago (was evaluated [facility])  · Left shoulder x-ray showed Proximal humeral fracture   superior migration of shaft is new     · IV Toradol for pain control  · Orthopedic surgery consulted, appreciate recommendations

## 2022-10-04 NOTE — PROGRESS NOTES
Progress Note - Behavioral Health   Ahmetry Watkins 77 y o  male MRN: [id number]  Unit/Bed#: 7T [facility] 703-01 Encounter: [id number]    Assessment/Plan   Principal Problem:    Toxic metabolic encephalopathy  Active Problems:    Depression with anxiety    Primary hypertension    Mixed hyperlipidemia    Type 2 diabetes mellitus with diabetic peripheral angiopathy without gangrene, without long-term current use of insulin (HCC)    Alcohol use    Closed fracture of left shoulder  Patient the remains depressed and anxious and says he feels the effect of the being on reduce Xanax does as 0 25 mg b i d  He understands the need to wean totally off of it and also to be alcohol absent  Discussed the methods to stop drinking and using tranquilizers after discharge  Suggested strongly to go into a program which he said he would look into  Patient is depressed and has a flat affect has some hesitancy in his answers but does not have any gross confusion  Some more and finding difficulty also noted today    Patient is agreeable to transfer to Behavioral Unit to a treat his depression and his says benzodiazepine dependency      Behavior over the last 24 hours:  unchanged  Sleep: normal  Appetite: normal  Medication side effects: No  ROS: no complaints    Mental Status Evaluation:  Appearance:  age appropriate   Behavior:  guarded   Speech:  normal pitch and normal volume   Mood:  anxious and depressed   Affect:  blunted and constricted   Thought Process:  tangential   Thought Content:  normal   Perceptual Disturbances: None   Risk Potential: Suicidal Ideations none  Homicidal Ideations none  Potential for Aggression No   Sensorium:  person, place, and situation   Memory:  recent and remote memory grossly intact   Consciousness:  alert and awake    Attention: attention span appeared shorter than expected for age   Insight:  fair   Judgment: fair   Gait/Station: normal gait/station   Motor Activity: no abnormal movements Progress Toward Goals:  No change    Recommended Treatment: Continue with group therapy, milieu therapy and occupational therapy  Risks, benefits and possible side effects of Medications:   Risks, benefits, and possible side effects of medications explained to patient and patient verbalizes understanding  Medications: all current active meds have been reviewed  Labs: I have personally reviewed all pertinent laboratory/tests results  Most Recent Labs:   Lab Results   Component Value Date    WBC 11 01 (H) 10/03/2022    RBC 3 16 (L) 10/03/2022    HGB 10 4 (L) 10/03/2022    HCT 32 2 (L) 10/03/2022     (H) 10/03/2022    RDW 13 0 10/03/2022    NEUTROABS 9 58 (H) 10/02/2022    SODIUM 134 (L) 10/03/2022    K 3 2 (L) 10/03/2022    CL 97 10/03/2022    CO2 32 10/03/2022    BUN 15 10/03/2022    CREATININE 0 63 (L) 10/03/2022    GLUC 102 (H) 10/03/2022    GLUF 102 (H) 10/03/2022    CALCIUM 8 2 (L) 10/03/2022     (H) 10/01/2022    ALT 53 10/01/2022    ALKPHOS 79 10/01/2022    TP 7 2 10/01/2022    ALB 2 7 (L) 10/01/2022    TBILI 0 74 10/01/2022    CHOLESTEROL 160 07/21/2022    HDL 62 07/21/2022    TRIG 103 07/21/2022    LDLCALC 77 07/21/2022    NONHDLC 98 07/21/2022    CGS2ZFPBCUMP 2 090 12/11/2019    FREET4 0 9 02/27/2017    HGBA1C 5 7 (H) 07/21/2022     07/21/2022       Counseling / Coordination of Care  Total floor / unit time spent today 25 minutes  Greater than 50% of total time was spent with the patient and / or family counseling and / or coordination of care   A description of the counseling / coordination of care:

## 2022-10-04 NOTE — ASSESSMENT & PLAN NOTE
· The brother suspected patient has been abusing his Xanax at home  · Dr Rianna Portillo is the patient's psychiatrist  · Psychiatry consulted, recommend admission to [facility] for Xanax weaning  · Continue Xanax 0 25 mg po b i d

## 2022-10-04 NOTE — ASSESSMENT & PLAN NOTE
· POA, patient is AAO x3 however sluggish in reaction, and he stated his brother brought him in ED however he is not sure why  · Patient is afebrile, leukocytosis noted  · Patient was in [facility] yesterday, plan to inpatient detox however plan fell through due to insurance issue  · ED physician discussed with  - does not need to be admitted to inpatient detox  · Patient has chronic alcohol use, last use was 10 days prior to ED arrival  · CT brain negative for acute intracranial abnormality  · Continue thiamine and folic for now in the setting of chronic alcohol dependence  · The brothers suspect that patient has been abusing his Xanax at home (which could be contributing to his current mental status)  · Limited narcotics and benzodiazepine  · UA not suggestive of UTI  · PT/OT consult placed  ·  for disposition planning

## 2022-10-04 NOTE — PROGRESS NOTES
[facility]  Progress Note - Rg Carlton 1955, 77 y o  male MRN: [id number]  Unit/Bed#: 7T [facility] 703-01 Encounter: [id number]  Primary Care Provider: Charlotte Gusman MD   Date and time admitted to hospital: 10/2/2022 12:38 PM    Closed fracture of left shoulder  Assessment & Plan  · Recent history of fall and shoulder pressure 1 week ago (was evaluated [facility])  · Left shoulder x-ray showed Proximal humeral fracture   superior migration of shaft is new     · IV Toradol for pain control  · Orthopedic surgery consulted, appreciate recommendations    Alcohol use  Assessment & Plan  · History of chronic alcohol dependence complicated by DTs and required to be in ICU  · The last alcohol use was 10 days ago, currently has no withdrawal symptoms  · If patient shows alcohol withdrawal symptoms, consider toxicology consult  · Alcohol level in ED was negative  · Case management consult for disposition planning    Type 2 diabetes mellitus with diabetic peripheral angiopathy without gangrene, without long-term current use of insulin [facility])  Assessment & Plan  Lab Results   Component Value Date    HGBA1C 5 7 (H) 07/21/2022       Recent Labs     10/01/22  1626 10/02/22  1336   POCGLU 111 118       Blood Sugar Average: Last 72 hrs:  (P) 118     · Blood sugar acceptable  · Hold home metformin  · Diabetic diet, sliding scale insulin and Accu-Cheks    Mixed hyperlipidemia  Assessment & Plan  · Continue home Lipitor    Primary hypertension  Assessment & Plan  · BP acceptable  · Continue home amlodipine, HCTZ    Depression with anxiety  Assessment & Plan  · The brother suspected patient has been abusing his Xanax at home  · Dr Dmitri Denton is the patient's psychiatrist  · Psychiatry consulted, recommend admission to [facility] for Xanax weaning  · Continue Xanax 0 25 mg po b i d       * Toxic metabolic encephalopathy  Assessment & Plan  · POA, patient is AAO x3 however sluggish in reaction, and he stated his brother brought him in ED however he is not sure why  · Patient is afebrile, leukocytosis noted  · Patient was in [facility] yesterday, plan to inpatient detox however plan fell through due to insurance issue  · ED physician discussed with  - does not need to be admitted to inpatient detox  · Patient has chronic alcohol use, last use was 10 days prior to ED arrival  · CT brain negative for acute intracranial abnormality  · Continue thiamine and folic for now in the setting of chronic alcohol dependence  · The brothers suspect that patient has been abusing his Xanax at home (which could be contributing to his current mental status)  · Limited narcotics and benzodiazepine  · UA not suggestive of UTI  · PT/OT consult placed  ·  for disposition planning          VTE Pharmacologic Prophylaxis: VTE Score: 4 Moderate Risk (Score 3-4) - Pharmacological DVT Prophylaxis Ordered: enoxaparin (Lovenox)  Patient Centered Rounds: I performed bedside rounds with nursing staff today  Discussions with Specialists or Other Care Team Provider:  Orthopedic surgery, Psychiatry    Education and Discussions with Family / Patient:  Discussed with patient, he is in agreement with plan  Attempted to contact patient's brother however unable to reach at this time  Will try again later in the day  Time Spent for Care: 30 minutes  More than 50% of total time spent on counseling and coordination of care as described above  Current Length of Stay: 1 day(s)  Current Patient Status: Inpatient   Certification Statement: The patient will continue to require additional inpatient hospital stay due to Pending possible orthopedic intervention, then will be discharged to [facility]  Discharge Plan: Pending clinical course    Code Status: Level 1 - Full Code    Subjective:   Patient seen and examined at the bedside  Patient resting comfortably bed no apparent distress    Patient with no acute events overnight  Denies any pain at this time  Objective:     Vitals:   Temp (24hrs), Av 6 °F (36 4 °C), Min:97 3 °F (36 3 °C), Max:98 1 °F (36 7 °C)    Temp:  [97 3 °F (36 3 °C)-98 1 °F (36 7 °C)] 97 4 °F (36 3 °C)  HR:  [] 100  Resp:  [18] 18  BP: (164-187)/(76-83) 187/83  SpO2:  [97 %-100 %] 97 %  Body mass index is 23 57 kg/m²  Input and Output Summary (last 24 hours): Intake/Output Summary (Last 24 hours) at 10/4/2022 0853  Last data filed at 10/4/2022 0530  Gross per 24 hour   Intake 3655 ml   Output 1870 ml   Net 1785 ml       Physical Exam:   Physical Exam  HENT:      Head: Normocephalic  Cardiovascular:      Rate and Rhythm: Normal rate and regular rhythm  Pulses: Normal pulses  Heart sounds: Normal heart sounds  Pulmonary:      Effort: Pulmonary effort is normal       Breath sounds: Normal breath sounds  Abdominal:      General: Abdomen is flat  Bowel sounds are normal       Palpations: Abdomen is soft  Musculoskeletal:         General: Normal range of motion  Skin:     General: Skin is warm  Neurological:      General: No focal deficit present  Mental Status: He is alert and oriented to person, place, and time  Mental status is at baseline     Psychiatric:      Comments: Flat affect          Additional Data:     Labs:  Results from last 7 days   Lab Units 10/03/22  0511 10/02/22  1420   WBC Thousand/uL 11 01* 11 62*   HEMOGLOBIN g/dL 10 4* 10 6*   HEMATOCRIT % 32 2* 32 0*   PLATELETS Thousands/uL 567* 590*   NEUTROS PCT %  --  83*   LYMPHS PCT %  --  8*   MONOS PCT %  --  8   EOS PCT %  --  0     Results from last 7 days   Lab Units 10/03/22  0511 10/02/22  1420 10/01/22  1628   SODIUM mmol/L 134*   < > 131*   POTASSIUM mmol/L 3 2*   < > 3 3*   CHLORIDE mmol/L 97   < > 94*   CO2 mmol/L 32   < > 28   BUN mg/dL 15   < > 18   CREATININE mg/dL 0 63*   < > 0 88   ANION GAP mmol/L 5   < > 9   CALCIUM mg/dL 8 2*   < > 9 3   ALBUMIN g/dL  --   -- 2 7*   TOTAL BILIRUBIN mg/dL  --   --  0 74   ALK PHOS U/L  --   --  79   ALT U/L  --   --  53   AST U/L  --   --  118*   GLUCOSE RANDOM mg/dL 102*   < > 106    < > = values in this interval not displayed  Results from last 7 days   Lab Units 10/02/22  1336 10/01/22  1626   POC GLUCOSE mg/dl 118 111               Lines/Drains:  Invasive Devices  Report    Peripheral Intravenous Line  Duration           Peripheral IV 10/02/22 Right Antecubital 1 day                      Imaging: No pertinent imaging reviewed  Recent Cultures (last 7 days):         Last 24 Hours Medication List:   Current Facility-Administered Medications   Medication Dose Route Frequency Provider Last Rate    acetaminophen  650 mg Oral Q6H PRN La Pereira MD      ALPRAZolam  0 25 mg Oral BID (AM & Afternoon) Reynaldo Paulino MD      amLODIPine  10 mg Oral Daily La Pereira MD      aspirin  81 mg Oral Daily La Pereira MD      atorvastatin  40 mg Oral Daily La Pereira MD      enoxaparin  40 mg Subcutaneous Daily La Pereira MD      folic acid 1 mg, thiamine 100 mg in 0 9% sodium chloride 100 mL IVPB   Intravenous Daily La Pereira MD Stopped (10/03/22 9204)    hydrochlorothiazide  12 5 mg Oral Daily La Pereira MD      ketorolac  15 mg Intravenous Q6H PRN La Pereira MD      metroNIDAZOLE   Topical BID La Pereira MD      mirtazapine  30 mg Oral HS La Pereira MD      multi-electrolyte  100 mL/hr Intravenous Continuous La Pereira  mL/hr (10/03/22 2338)    nicotine  1 patch Transdermal Daily La Pereira MD      ondansetron  4 mg Intravenous Q6H PRN La Pereira MD      PARoxetine  40 mg Oral QAM La Pereira MD      polyethylene glycol  17 g Oral Daily PRN La Pereira MD          Today, Patient Was Seen By: Irma Carrillo    **Please Note: This note may have been constructed using a voice recognition system  **

## 2022-10-05 ENCOUNTER — ANESTHESIA EVENT (INPATIENT)
Dept: PERIOP | Facility: HOSPITAL | Age: 67
DRG: 492 | End: 2022-10-05
Payer: COMMERCIAL

## 2022-10-05 PROBLEM — E87.6 HYPOKALEMIA: Status: ACTIVE | Noted: 2022-10-05

## 2022-10-05 LAB
ABO GROUP BLD: NORMAL
ANION GAP SERPL CALCULATED.3IONS-SCNC: 4 MMOL/L (ref 5–14)
APTT PPP: 29 SECONDS (ref 23–37)
BLD GP AB SCN SERPL QL: NEGATIVE
BUN SERPL-MCNC: 10 MG/DL (ref 5–25)
CALCIUM SERPL-MCNC: 8.1 MG/DL (ref 8.4–10.2)
CHLORIDE SERPL-SCNC: 102 MMOL/L (ref 96–108)
CO2 SERPL-SCNC: 29 MMOL/L (ref 21–32)
CREAT SERPL-MCNC: 0.51 MG/DL (ref 0.7–1.5)
ERYTHROCYTE [DISTWIDTH] IN BLOOD BY AUTOMATED COUNT: 13.5 % (ref 11.6–15.1)
GFR SERPL CREATININE-BSD FRML MDRD: 112 ML/MIN/1.73SQ M
GLUCOSE SERPL-MCNC: 106 MG/DL (ref 70–99)
HCT VFR BLD AUTO: 31.6 % (ref 36.5–49.3)
HGB BLD-MCNC: 10.2 G/DL (ref 12–17)
INR PPP: 0.82 (ref 0.84–1.19)
MCH RBC QN AUTO: 32.9 PG (ref 26.8–34.3)
MCHC RBC AUTO-ENTMCNC: 32.3 G/DL (ref 31.4–37.4)
MCV RBC AUTO: 102 FL (ref 82–98)
PLATELET # BLD AUTO: 630 THOUSANDS/UL (ref 149–390)
PMV BLD AUTO: 9.4 FL (ref 8.9–12.7)
POTASSIUM SERPL-SCNC: 3.3 MMOL/L (ref 3.5–5.3)
PROTHROMBIN TIME: 11.6 SECONDS (ref 11.6–14.5)
RBC # BLD AUTO: 3.1 MILLION/UL (ref 3.88–5.62)
RH BLD: POSITIVE
SODIUM SERPL-SCNC: 135 MMOL/L (ref 135–147)
SPECIMEN EXPIRATION DATE: NORMAL
WBC # BLD AUTO: 8.6 THOUSAND/UL (ref 4.31–10.16)

## 2022-10-05 PROCEDURE — 99232 SBSQ HOSP IP/OBS MODERATE 35: CPT | Performed by: STUDENT IN AN ORGANIZED HEALTH CARE EDUCATION/TRAINING PROGRAM

## 2022-10-05 PROCEDURE — 86900 BLOOD TYPING SEROLOGIC ABO: CPT | Performed by: PHYSICIAN ASSISTANT

## 2022-10-05 PROCEDURE — 85730 THROMBOPLASTIN TIME PARTIAL: CPT | Performed by: PHYSICIAN ASSISTANT

## 2022-10-05 PROCEDURE — 86901 BLOOD TYPING SEROLOGIC RH(D): CPT | Performed by: PHYSICIAN ASSISTANT

## 2022-10-05 PROCEDURE — NC001 PR NO CHARGE: Performed by: PHYSICIAN ASSISTANT

## 2022-10-05 PROCEDURE — 86850 RBC ANTIBODY SCREEN: CPT | Performed by: PHYSICIAN ASSISTANT

## 2022-10-05 PROCEDURE — 85027 COMPLETE CBC AUTOMATED: CPT | Performed by: PHYSICIAN ASSISTANT

## 2022-10-05 PROCEDURE — 85610 PROTHROMBIN TIME: CPT | Performed by: PHYSICIAN ASSISTANT

## 2022-10-05 PROCEDURE — 80048 BASIC METABOLIC PNL TOTAL CA: CPT | Performed by: PHYSICIAN ASSISTANT

## 2022-10-05 RX ORDER — ENOXAPARIN SODIUM 100 MG/ML
40 INJECTION SUBCUTANEOUS
Status: COMPLETED | OUTPATIENT
Start: 2022-10-05 | End: 2022-10-05

## 2022-10-05 RX ORDER — CEFAZOLIN SODIUM 2 G/50ML
2000 SOLUTION INTRAVENOUS EVERY 8 HOURS
Status: DISCONTINUED | OUTPATIENT
Start: 2022-10-06 | End: 2022-10-06 | Stop reason: HOSPADM

## 2022-10-05 RX ORDER — SODIUM CHLORIDE, SODIUM LACTATE, POTASSIUM CHLORIDE, CALCIUM CHLORIDE 600; 310; 30; 20 MG/100ML; MG/100ML; MG/100ML; MG/100ML
125 INJECTION, SOLUTION INTRAVENOUS CONTINUOUS
Status: DISCONTINUED | OUTPATIENT
Start: 2022-10-06 | End: 2022-10-07 | Stop reason: HOSPADM

## 2022-10-05 RX ADMIN — METRONIDAZOLE: 7.5 GEL TOPICAL at 08:57

## 2022-10-05 RX ADMIN — ATORVASTATIN CALCIUM 40 MG: 80 TABLET, FILM COATED ORAL at 08:44

## 2022-10-05 RX ADMIN — ALPRAZOLAM 0.25 MG: 0.5 TABLET ORAL at 14:48

## 2022-10-05 RX ADMIN — ALPRAZOLAM 0.25 MG: 0.5 TABLET ORAL at 08:44

## 2022-10-05 RX ADMIN — ASPIRIN 81 MG CHEWABLE TABLET 81 MG: 81 TABLET CHEWABLE at 08:45

## 2022-10-05 RX ADMIN — PAROXETINE 40 MG: 20 TABLET, FILM COATED ORAL at 08:45

## 2022-10-05 RX ADMIN — FOLIC ACID: 5 INJECTION, SOLUTION INTRAMUSCULAR; INTRAVENOUS; SUBCUTANEOUS at 08:44

## 2022-10-05 RX ADMIN — KETOROLAC TROMETHAMINE 15 MG: 30 INJECTION, SOLUTION INTRAMUSCULAR; INTRAVENOUS at 08:44

## 2022-10-05 RX ADMIN — MIRTAZAPINE 30 MG: 30 TABLET, FILM COATED ORAL at 21:39

## 2022-10-05 RX ADMIN — ENOXAPARIN SODIUM 40 MG: 100 INJECTION SUBCUTANEOUS at 08:44

## 2022-10-05 RX ADMIN — NICOTINE 1 PATCH: 14 PATCH, EXTENDED RELEASE TRANSDERMAL at 08:45

## 2022-10-05 RX ADMIN — HYDROCHLOROTHIAZIDE 12.5 MG: 12.5 TABLET ORAL at 08:44

## 2022-10-05 RX ADMIN — SODIUM CHLORIDE, SODIUM GLUCONATE, SODIUM ACETATE, POTASSIUM CHLORIDE, MAGNESIUM CHLORIDE, SODIUM PHOSPHATE, DIBASIC, AND POTASSIUM PHOSPHATE 100 ML/HR: .53; .5; .37; .037; .03; .012; .00082 INJECTION, SOLUTION INTRAVENOUS at 05:10

## 2022-10-05 RX ADMIN — ACETAMINOPHEN 650 MG: 325 TABLET ORAL at 05:10

## 2022-10-05 RX ADMIN — SODIUM CHLORIDE, SODIUM GLUCONATE, SODIUM ACETATE, POTASSIUM CHLORIDE, MAGNESIUM CHLORIDE, SODIUM PHOSPHATE, DIBASIC, AND POTASSIUM PHOSPHATE 100 ML/HR: .53; .5; .37; .037; .03; .012; .00082 INJECTION, SOLUTION INTRAVENOUS at 15:33

## 2022-10-05 RX ADMIN — METRONIDAZOLE: 7.5 GEL TOPICAL at 17:22

## 2022-10-05 RX ADMIN — AMLODIPINE BESYLATE 10 MG: 10 TABLET ORAL at 08:44

## 2022-10-05 NOTE — ASSESSMENT & PLAN NOTE
· The brother suspected patient has been abusing his Xanax at home  · Dr Buddy Tejeda is the patient's psychiatrist  · Psychiatry consulted, recommend admission to [facility] for Xanax weaning  · Continue Xanax 0 25 mg po b i d

## 2022-10-05 NOTE — ASSESSMENT & PLAN NOTE
· POA, patient is AAO x3 however sluggish in reaction, and he stated his brother brought him in ED however he is not sure why  · Patient is afebrile, leukocytosis noted  · Patient was in [facility] yesterday, plan to inpatient detox however plan fell through due to insurance issue  · ED physician discussed with  - does not need to be admitted to inpatient detox  · Patient has chronic alcohol use, last use was 10 days prior to ED arrival  · CT brain negative for acute intracranial abnormality  · Continue thiamine and folic for now in the setting of chronic alcohol dependence  · The brothers suspect that patient has been abusing his Xanax at home (which could be contributing to his current mental status)  · Limited narcotics and benzodiazepine  · UA not suggestive of UTI  · PT/OT consult placed  ·  for disposition planning  · Mentation at baseline

## 2022-10-05 NOTE — PLAN OF CARE
Problem: Nutrition/Hydration-ADULT  Goal: Nutrient/Hydration intake appropriate for improving, restoring or maintaining nutritional needs  Description: Monitor and assess patient's nutrition/hydration status for malnutrition  Collaborate with interdisciplinary team and initiate plan and interventions as ordered  Monitor patient's weight and dietary intake as ordered or per policy  Utilize nutrition screening tool and intervene as necessary  Determine patient's food preferences and provide high-protein, high-caloric foods as appropriate       INTERVENTIONS:  - Monitor oral intake, urinary output, labs, and treatment plans  - Assess nutrition and hydration status and recommend course of action  - Evaluate amount of meals eaten  - Assist patient with eating if necessary   - Allow adequate time for meals  - Recommend/ encourage appropriate diets, oral nutritional supplements, and vitamin/mineral supplements  - Order, calculate, and assess calorie counts as needed  - Assess need for intravenous fluids  - Provide specific nutrition/hydration education as appropriate  - Include patient/family/caregiver in decisions related to nutrition  Outcome: Progressing     Problem: PAIN - ADULT  Goal: Verbalizes/displays adequate comfort level or baseline comfort level  Description: Interventions:  - Encourage patient to monitor pain and request assistance  - Assess pain using appropriate pain scale  - Administer analgesics based on type and severity of pain and evaluate response  - Implement non-pharmacological measures as appropriate and evaluate response  - Consider cultural and social influences on pain and pain management  - Notify physician/advanced practitioner if interventions unsuccessful or patient reports new pain  Outcome: Progressing     Problem: INFECTION - ADULT  Goal: Absence or prevention of progression during hospitalization  Description: INTERVENTIONS:  - Assess and monitor for signs and symptoms of infection  - Monitor lab/diagnostic results  - Monitor all insertion sites, i e  indwelling lines  - Monitor endotracheal if appropriate and nasal secretions for changes in amount and color  - Administer medications as ordered  - Instruct and encourage patient and family to use good hand hygiene technique  - Identify and instruct in appropriate isolation precautions for identified infection/condition  Outcome: Progressing     Problem: SAFETY ADULT  Goal: Patient will remain free of falls  Description: INTERVENTIONS:  - Educate patient/family on patient safety including physical limitations  - Instruct patient to call for assistance with activity   - Consult OT/PT to assist with strengthening/mobility   - Keep Call bell within reach  - Keep bed low and locked with side rails adjusted as appropriate  - Keep care items and personal belongings within reach  - Initiate and maintain comfort rounds  - Make Fall Risk Sign visible to staff  - Offer Toileting every 2 Hours, in advance of need  - Apply yellow socks and bracelet for high fall risk patients  - Consider moving patient to room near nurses station  Outcome: Progressing  Goal: Maintain or return to baseline ADL function  Description: INTERVENTIONS:  -  Assess patient's ability to carry out ADLs; assess patient's baseline for ADL function and identify physical deficits which impact ability to perform ADLs (bathing, care of mouth/teeth, toileting, grooming, dressing, etc )  - Assess/evaluate cause of self-care deficits   - Assess range of motion  - Assess patient's mobility; develop plan if impaired  - Assess patient's need for assistive devices and provide as appropriate  - Encourage maximum independence but intervene and supervise when necessary  - Involve family in performance of ADLs  - Assess for home care needs following discharge   - Consider OT consult to assist with ADL evaluation and planning for discharge  - Provide patient education as appropriate  Outcome: Progressing     Problem: DISCHARGE PLANNING  Goal: Discharge to home or other facility with appropriate resources  Description: INTERVENTIONS:  - Identify barriers to discharge w/patient and caregiver  - Arrange for needed discharge resources and transportation as appropriate  - Identify discharge learning needs (meds, wound care, etc )  - Refer to Case Management Department for coordinating discharge planning if the patient needs post-hospital services based on physician/advanced practitioner order or complex needs related to functional status, cognitive ability, or social support system  Outcome: Progressing     Problem: METABOLIC, FLUID AND ELECTROLYTES - ADULT  Goal: Electrolytes maintained within normal limits  Description: INTERVENTIONS:  - Monitor labs and assess patient for signs and symptoms of electrolyte imbalances  - Administer electrolyte replacement as ordered  - Monitor response to electrolyte replacements, including repeat lab results as appropriate  - Instruct patient on fluid and nutrition as appropriate  Outcome: Progressing     Problem: Potential for Falls  Goal: Patient will remain free of falls  Description: INTERVENTIONS:  - Educate patient/family on patient safety including physical limitations  - Instruct patient to call for assistance with activity   - Consult OT/PT to assist with strengthening/mobility   - Keep Call bell within reach  - Keep bed low and locked with side rails adjusted as appropriate  - Keep care items and personal belongings within reach  - Initiate and maintain comfort rounds  - Make Fall Risk Sign visible to staff  - Offer Toileting every 2 Hours, in advance of need  - Apply yellow socks and bracelet for high fall risk patients  - Consider moving patient to room near nurses station  Outcome: Progressing

## 2022-10-05 NOTE — PROGRESS NOTES
Progress Note - Orthopedics   John Araiza 77 y o  male MRN: [id number]  Unit/Bed#: 7T [facility] 703-01 Encounter: [id number]    Assessment:  76 yo male with displaced left proximal humerus fracture     Plan:  Patient will require surgical fixation of proximal humerus fracture  We discussed treatment options as well as risks and benefits of treatment options  The risks of surgery include, but are not limited to infection, blood clot, wound healing problems, blood loss, damage to blood vessels and nerves, persistent pain and stiffness, fracture, need for additional surgery, failure of hardware, nonunion, malunion, heart attack, stroke, death  The patient understood and agreed to by oral and written consent  · Will pre op patient for tomorrow 10/6 pending surgeon availability  · NPO midnight  · Hold lovenox tomorrow  · NWB to LUE  · Continue sling  · Pain control prn   · Ortho will follow  Subjective: patient seen and examined  He states he has pain in the shoulder and a pressure sensation  It is improved with pain medication when he gets it  Patient is hoping to have surgery  Denies distal numbness or tingling  Vitals: Blood pressure (!) 173/87, pulse 80, temperature (!) 97 °F (36 1 °C), temperature source Temporal, resp  rate 18, height 5' 9" (1 753 m), weight 72 4 kg (159 lb 9 8 oz), SpO2 98 %  ,Body mass index is 23 57 kg/m²  Intake/Output Summary (Last 24 hours) at 10/5/2022 1019  Last data filed at 10/5/2022 0601  Gross per 24 hour   Intake 3645 ml   Output 1350 ml   Net 2295 ml       Invasive Devices  Report    Peripheral Intravenous Line  Duration           Peripheral IV 10/02/22 Right Antecubital 2 days                Ortho Exam:   Left upper extremity:  Inspection- ecchymosis about the shoulder and axilla, sling in place, moderate swelling  Palpation- +effusion +swelling +TTP as expected  Distal fracture fragment is palpable     ROM- deferred  Neurovascular- sensation intact axillary, median, radial, and ulnar nerve distributions  Palpable radial pulse  AIN/ PIN intact       Lab, Imaging and other studies:   CBC:   Lab Results   Component Value Date    WBC 8 60 10/05/2022    HGB 10 2 (L) 10/05/2022    HCT 31 6 (L) 10/05/2022     (H) 10/05/2022     (H) 10/05/2022    MCH 32 9 10/05/2022    MCHC 32 3 10/05/2022    RDW 13 5 10/05/2022    MPV 9 4 10/05/2022     CMP:   Lab Results   Component Value Date    SODIUM 135 10/05/2022     10/05/2022    CO2 29 10/05/2022    BUN 10 10/05/2022    CREATININE 0 51 (L) 10/05/2022    CALCIUM 8 1 (L) 10/05/2022    EGFR 112 10/05/2022

## 2022-10-05 NOTE — PROGRESS NOTES
Progress Note - Behavioral Health   Andra Santos 77 y o  male MRN: [id number]  Unit/Bed#: 7T [facility] 703-01 Encounter: [id number]    Assessment/Plan   Principal Problem:    Toxic metabolic encephalopathy  Active Problems:    Depression with anxiety    Primary hypertension    Mixed hyperlipidemia    Type 2 diabetes mellitus with diabetic peripheral angiopathy without gangrene, without long-term current use of insulin (HCC)    Alcohol use    Closed fracture of left shoulder    Patient the clinic is about the same he is anxious and reports feeling more anxious since the Xanax dose has been reduced  Affect is flat he has some hesitation in answering questions but does not seem to be grossly confused  He is oriented in all spheres  Affect is blunted  He is cooperative treatment  Next    He is going to the OR to have surgical fixation of his immerse and then hopefully can be transferred to behavioral health to continue with inpatient a treatment for his depression anxiety and benzodiazepine dependence    No new clinical issues otherwise per  Behavior over the last 24 hours:  unchanged  Sleep: normal  Appetite: normal  Medication side effects: No  ROS: no complaints    Mental Status Evaluation:  Appearance:  age appropriate   Behavior:  guarded   Speech:  normal pitch and normal volume   Mood:  anxious and depressed   Affect:  blunted and constricted   Thought Process:  tangential   Thought Content:  normal   Perceptual Disturbances: None   Risk Potential: Suicidal Ideations none  Homicidal Ideations none  Potential for Aggression No   Sensorium:  person, place, and situation   Memory:  recent and remote memory grossly intact   Consciousness:  alert and awake    Attention: attention span appeared shorter than expected for age   Insight:  fair   Judgment: fair   Gait/Station: normal gait/station   Motor Activity: no abnormal movements     Progress Toward Goals:  No change    Recommended Treatment: Continue with group therapy, milieu therapy and occupational therapy  Risks, benefits and possible side effects of Medications:   Risks, benefits, and possible side effects of medications explained to patient and patient verbalizes understanding  Medications: all current active meds have been reviewed  Labs: I have personally reviewed all pertinent laboratory/tests results  Most Recent Labs:   Lab Results   Component Value Date    WBC 11 01 (H) 10/03/2022    RBC 3 16 (L) 10/03/2022    HGB 10 4 (L) 10/03/2022    HCT 32 2 (L) 10/03/2022     (H) 10/03/2022    RDW 13 0 10/03/2022    NEUTROABS 9 58 (H) 10/02/2022    SODIUM 135 10/05/2022    K 3 3 (L) 10/05/2022     10/05/2022    CO2 29 10/05/2022    BUN 10 10/05/2022    CREATININE 0 51 (L) 10/05/2022    GLUC 106 (H) 10/05/2022    GLUF 102 (H) 10/03/2022    CALCIUM 8 1 (L) 10/05/2022     (H) 10/01/2022    ALT 53 10/01/2022    ALKPHOS 79 10/01/2022    TP 7 2 10/01/2022    ALB 2 7 (L) 10/01/2022    TBILI 0 74 10/01/2022    CHOLESTEROL 160 07/21/2022    HDL 62 07/21/2022    TRIG 103 07/21/2022    LDLCALC 77 07/21/2022    NONHDLC 98 07/21/2022    QGY9IFZLNFKL 2 090 12/11/2019    FREET4 0 9 02/27/2017    HGBA1C 5 7 (H) 07/21/2022     07/21/2022       Counseling / Coordination of Care  Total floor / unit time spent today 25 minutes  Greater than 50% of total time was spent with the patient and / or family counseling and / or coordination of care   A description of the counseling / coordination of care:

## 2022-10-05 NOTE — PLAN OF CARE
Problem: PAIN - ADULT  Goal: Verbalizes/displays adequate comfort level or baseline comfort level  Description: Interventions:  - Encourage patient to monitor pain and request assistance  - Assess pain using appropriate pain scale  - Administer analgesics based on type and severity of pain and evaluate response  - Implement non-pharmacological measures as appropriate and evaluate response  - Consider cultural and social influences on pain and pain management  - Notify physician/advanced practitioner if interventions unsuccessful or patient reports new pain  Outcome: Progressing     Problem: MUSCULOSKELETAL - ADULT  Goal: Maintain or return mobility to safest level of function  Description: INTERVENTIONS:  - Assess patient's ability to carry out ADLs; assess patient's baseline for ADL function and identify physical deficits which impact ability to perform ADLs (bathing, care of mouth/teeth, toileting, grooming, dressing, etc )  - Assess/evaluate cause of self-care deficits   - Assess range of motion  - Assess patient's mobility  - Assess patient's need for assistive devices and provide as appropriate  - Encourage maximum independence but intervene and supervise when necessary  - Involve family in performance of ADLs  - Assess for home care needs following discharge   - Consider OT consult to assist with ADL evaluation and planning for discharge  - Provide patient education as appropriate  Outcome: Progressing

## 2022-10-05 NOTE — CASE MANAGEMENT
Case Management Progress Note    Patient name Tamia Joaquin  Location 7T /7T -01 MRN [id number]  : 1955 Date 10/5/2022       LOS (days): 2  Geometric Mean LOS (GMLOS) (days): 2 90  Days to GMLOS:1 2        OBJECTIVE:        Current admission status: Inpatient  Preferred Pharmacy:   [facility] - [id number] [place] [address] Drive [address] [place] [address]  Phone: [phone] Fax: [phone]    Primary Care Provider: Kieran Ibarra MD    Primary Insurance: [facility]  Secondary Insurance:     PROGRESS NOTE: CM was notified at morning rounds that pt will continue to require additional inpatient hospital stay due to  surgical repair humerus prior to admission to [facility]  Pt's referral was sent to [facility] 219 intake, pending bed availability and acceptance after the surgery    CM department will continue to follow through pt's D/C

## 2022-10-05 NOTE — ASSESSMENT & PLAN NOTE
Lab Results   Component Value Date    HGBA1C 5 7 (H) 07/21/2022       Recent Labs     10/02/22  1336   POCGLU 118       Blood Sugar Average: Last 72 hrs:  (P) 118     · Blood sugar acceptable  · Hold home metformin  · Diabetic diet, sliding scale insulin and Accu-Cheks

## 2022-10-05 NOTE — ASSESSMENT & PLAN NOTE
· Recent history of fall and shoulder pressure 1 week ago (was evaluated [facility])  · Left shoulder x-ray showed Proximal humeral fracture   superior migration of shaft is new     · IV Toradol for pain control  · Orthopedic surgery consulted, appreciate recommendations  · Plan for surgical repair prior to placement into [facility], OR date pending

## 2022-10-05 NOTE — PROGRESS NOTES
[facility]  Progress Note - Emiliano Jimenez 1955, 77 y o  male MRN: [id number]  Unit/Bed#: 7T [facility] 703-01 Encounter: [id number]  Primary Care Provider: Jaren Dixon MD   Date and time admitted to hospital: 10/2/2022 12:38 PM    * Depression with anxiety  Assessment & Plan  · The brother suspected patient has been abusing his Xanax at home  · Dr Kerrie Martin is the patient's psychiatrist  · Psychiatry consulted, recommend admission to [facility] for Xanax weaning  · Continue Xanax 0 25 mg po b i d  Closed fracture of left shoulder  Assessment & Plan  · Recent history of fall and shoulder pressure 1 week ago (was evaluated [facility])  · Left shoulder x-ray showed Proximal humeral fracture   superior migration of shaft is new     · IV Toradol for pain control  · Orthopedic surgery consulted, appreciate recommendations  · Plan for surgical repair prior to placement into [facility], OR date pending    Hypokalemia  Assessment & Plan  -monitor and replete as necessary    Toxic metabolic encephalopathy  Assessment & Plan  · POA, patient is AAO x3 however sluggish in reaction, and he stated his brother brought him in ED however he is not sure why  · Patient is afebrile, leukocytosis noted  · Patient was in [facility] yesterday, plan to inpatient detox however plan fell through due to insurance issue  · ED physician discussed with  - does not need to be admitted to inpatient detox  · Patient has chronic alcohol use, last use was 10 days prior to ED arrival  · CT brain negative for acute intracranial abnormality  · Continue thiamine and folic for now in the setting of chronic alcohol dependence  · The brothers suspect that patient has been abusing his Xanax at home (which could be contributing to his current mental status)  · Limited narcotics and benzodiazepine  · UA not suggestive of UTI  · PT/OT consult placed  ·  for disposition planning  · Mentation at baseline    Alcohol use  Assessment & Plan  · History of chronic alcohol dependence complicated by DTs and required to be in ICU  · The last alcohol use was 10 days ago, currently has no withdrawal symptoms  · If patient shows alcohol withdrawal symptoms, consider toxicology consult  · Alcohol level in ED was negative  · Case management consult for disposition planning    Type 2 diabetes mellitus with diabetic peripheral angiopathy without gangrene, without long-term current use of insulin [facility])  Assessment & Plan  Lab Results   Component Value Date    HGBA1C 5 7 (H) 07/21/2022       Recent Labs     10/02/22  1336   POCGLU 118       Blood Sugar Average: Last 72 hrs:  (P) 118     · Blood sugar acceptable  · Hold home metformin  · Diabetic diet, sliding scale insulin and Accu-Cheks    Mixed hyperlipidemia  Assessment & Plan  · Continue home Lipitor    Primary hypertension  Assessment & Plan  · BP acceptable  · Continue home amlodipine, HCTZ          VTE Pharmacologic Prophylaxis: VTE Score: 4 Lovenox    Patient Centered Rounds: I performed bedside rounds with nursing staff today  Discussions with Specialists or Other Care Team Provider:  Orthopedic surgery    Education and Discussions with Family / Patient:  Discussed with patient, all questions answered  Time Spent for Care: 30 minutes  More than 50% of total time spent on counseling and coordination of care as described above  Current Length of Stay: 2 day(s)  Current Patient Status: Inpatient   Certification Statement: The patient will continue to require additional inpatient hospital stay due to Will require surgical repair humerus prior to admission to [facility]  Discharge Plan: Pending clinical course    Code Status: Level 1 - Full Code    Subjective:   Patient seen and examined the bedside  Patient resting in bed no apparent distress  Patient states he is ready for surgery, offers no complaints at this time      Objective:     Vitals: Temp (24hrs), Av 9 °F (36 6 °C), Min:97 °F (36 1 °C), Max:98 6 °F (37 °C)    Temp:  [97 °F (36 1 °C)-98 6 °F (37 °C)] 97 °F (36 1 °C)  HR:  [75-80] 80  Resp:  [18] 18  BP: (147-173)/(77-87) 173/87  SpO2:  [98 %-100 %] 98 %  Body mass index is 23 57 kg/m²  Input and Output Summary (last 24 hours): Intake/Output Summary (Last 24 hours) at 10/5/2022 0834  Last data filed at 10/5/2022 0601  Gross per 24 hour   Intake 4411 67 ml   Output 1550 ml   Net 2861 67 ml       Physical Exam:   Physical Exam  HENT:      Head: Normocephalic  Cardiovascular:      Rate and Rhythm: Normal rate and regular rhythm  Pulses: Normal pulses  Heart sounds: Normal heart sounds  Pulmonary:      Effort: Pulmonary effort is normal       Breath sounds: Normal breath sounds  Abdominal:      General: Abdomen is flat  Bowel sounds are normal       Palpations: Abdomen is soft  Musculoskeletal:      Cervical back: Normal range of motion  Comments: Left arm in sling   Neurological:      General: No focal deficit present  Mental Status: He is alert and oriented to person, place, and time  Mental status is at baseline  Psychiatric:         Mood and Affect: Mood normal          Thought Content: Thought content normal          Judgment: Judgment normal           Additional Data:     Labs:  Results from last 7 days   Lab Units 10/05/22  0434 10/03/22  0511 10/02/22  1420   WBC Thousand/uL 8 60   < > 11 62*   HEMOGLOBIN g/dL 10 2*   < > 10 6*   HEMATOCRIT % 31 6*   < > 32 0*   PLATELETS Thousands/uL 630*   < > 590*   NEUTROS PCT %  --   --  83*   LYMPHS PCT %  --   --  8*   MONOS PCT %  --   --  8   EOS PCT %  --   --  0    < > = values in this interval not displayed       Results from last 7 days   Lab Units 10/05/22  0434 10/02/22  1420 10/01/22  1628   SODIUM mmol/L 135   < > 131*   POTASSIUM mmol/L 3 3*   < > 3 3*   CHLORIDE mmol/L 102   < > 94*   CO2 mmol/L 29   < > 28   BUN mg/dL 10   < > 18   CREATININE mg/dL 0 51*   < > 0 88   ANION GAP mmol/L 4*   < > 9   CALCIUM mg/dL 8 1*   < > 9 3   ALBUMIN g/dL  --   --  2 7*   TOTAL BILIRUBIN mg/dL  --   --  0 74   ALK PHOS U/L  --   --  79   ALT U/L  --   --  53   AST U/L  --   --  118*   GLUCOSE RANDOM mg/dL 106*   < > 106    < > = values in this interval not displayed  Results from last 7 days   Lab Units 10/02/22  1336 10/01/22  1626   POC GLUCOSE mg/dl 118 111               Lines/Drains:  Invasive Devices  Report    Peripheral Intravenous Line  Duration           Peripheral IV 10/02/22 Right Antecubital 2 days                      Imaging: No pertinent imaging reviewed  Recent Cultures (last 7 days):         Last 24 Hours Medication List:   Current Facility-Administered Medications   Medication Dose Route Frequency Provider Last Rate    acetaminophen  650 mg Oral Q6H PRN Hadley Medina MD      ALPRAZolam  0 25 mg Oral BID (AM & Afternoon) Tiny Conley MD      amLODIPine  10 mg Oral Daily Hadley Medina MD      aspirin  81 mg Oral Daily Hadley Medina MD      atorvastatin  40 mg Oral Daily Hadley Medina MD      enoxaparin  40 mg Subcutaneous Q24H [address] Rashmi Lopez PA-C      folic acid 1 mg, thiamine 100 mg in 0 9% sodium chloride 100 mL IVPB   Intravenous Daily Hadley Medina MD 0 mL/hr at 10/03/22 0911    hydrochlorothiazide  12 5 mg Oral Daily Hadley Medina MD      ketorolac  15 mg Intravenous Q6H PRN Hadley Medina MD      metroNIDAZOLE   Topical BID Hadley Medina MD      mirtazapine  30 mg Oral HS Hadley Medina MD      multi-electrolyte  100 mL/hr Intravenous Continuous Hadley Medina  mL/hr (10/05/22 0510)    nicotine  1 patch Transdermal Daily Hadley Medina MD      ondansetron  4 mg Intravenous Q6H PRN Hadley Medina MD      PARoxetine  40 mg Oral QAM Hadley Medina MD      polyethylene glycol  17 g Oral Daily PRN Hadley Medina MD          Today, Patient Was Seen By: Dillon Rainey    **Please Note: This note may have been constructed using a voice recognition system  **

## 2022-10-06 ENCOUNTER — ANESTHESIA (INPATIENT)
Dept: PERIOP | Facility: HOSPITAL | Age: 67
DRG: 492 | End: 2022-10-06
Payer: COMMERCIAL

## 2022-10-06 ENCOUNTER — APPOINTMENT (OUTPATIENT)
Dept: RADIOLOGY | Facility: HOSPITAL | Age: 67
DRG: 492 | End: 2022-10-06
Payer: COMMERCIAL

## 2022-10-06 PROBLEM — S42.222A 2-PART DISPLACED FRACTURE OF SURGICAL NECK OF LEFT HUMERUS, INITIAL ENCOUNTER FOR CLOSED FRACTURE: Status: ACTIVE | Noted: 2022-10-02

## 2022-10-06 LAB
ABO GROUP BLD: NORMAL
ANION GAP SERPL CALCULATED.3IONS-SCNC: 3 MMOL/L (ref 5–14)
BUN SERPL-MCNC: 9 MG/DL (ref 5–25)
CALCIUM SERPL-MCNC: 8.4 MG/DL (ref 8.4–10.2)
CHLORIDE SERPL-SCNC: 105 MMOL/L (ref 96–108)
CO2 SERPL-SCNC: 28 MMOL/L (ref 21–32)
CREAT SERPL-MCNC: 0.52 MG/DL (ref 0.7–1.5)
ERYTHROCYTE [DISTWIDTH] IN BLOOD BY AUTOMATED COUNT: 13.5 % (ref 11.6–15.1)
GFR SERPL CREATININE-BSD FRML MDRD: 111 ML/MIN/1.73SQ M
GLUCOSE SERPL-MCNC: 109 MG/DL (ref 70–99)
HCT VFR BLD AUTO: 33.1 % (ref 36.5–49.3)
HGB BLD-MCNC: 10.4 G/DL (ref 12–17)
MAGNESIUM SERPL-MCNC: 1.6 MG/DL (ref 1.6–2.3)
MCH RBC QN AUTO: 32.6 PG (ref 26.8–34.3)
MCHC RBC AUTO-ENTMCNC: 31.4 G/DL (ref 31.4–37.4)
MCV RBC AUTO: 104 FL (ref 82–98)
PLATELET # BLD AUTO: 629 THOUSANDS/UL (ref 149–390)
PMV BLD AUTO: 9 FL (ref 8.9–12.7)
POTASSIUM SERPL-SCNC: 3.5 MMOL/L (ref 3.5–5.3)
RBC # BLD AUTO: 3.19 MILLION/UL (ref 3.88–5.62)
RH BLD: POSITIVE
SODIUM SERPL-SCNC: 136 MMOL/L (ref 135–147)
WBC # BLD AUTO: 8.6 THOUSAND/UL (ref 4.31–10.16)

## 2022-10-06 PROCEDURE — C1713 ANCHOR/SCREW BN/BN,TIS/BN: HCPCS | Performed by: ORTHOPAEDIC SURGERY

## 2022-10-06 PROCEDURE — 73030 X-RAY EXAM OF SHOULDER: CPT

## 2022-10-06 PROCEDURE — 99232 SBSQ HOSP IP/OBS MODERATE 35: CPT | Performed by: HOSPITALIST

## 2022-10-06 PROCEDURE — 80048 BASIC METABOLIC PNL TOTAL CA: CPT | Performed by: STUDENT IN AN ORGANIZED HEALTH CARE EDUCATION/TRAINING PROGRAM

## 2022-10-06 PROCEDURE — 23615 OPTX PROX HUMRL FX W/INT FIX: CPT | Performed by: ORTHOPAEDIC SURGERY

## 2022-10-06 PROCEDURE — NC001 PR NO CHARGE: Performed by: PHYSICIAN ASSISTANT

## 2022-10-06 PROCEDURE — 85027 COMPLETE CBC AUTOMATED: CPT | Performed by: STUDENT IN AN ORGANIZED HEALTH CARE EDUCATION/TRAINING PROGRAM

## 2022-10-06 PROCEDURE — C9290 INJ, BUPIVACAINE LIPOSOME: HCPCS

## 2022-10-06 PROCEDURE — 83735 ASSAY OF MAGNESIUM: CPT | Performed by: STUDENT IN AN ORGANIZED HEALTH CARE EDUCATION/TRAINING PROGRAM

## 2022-10-06 PROCEDURE — 0PSD04Z REPOSITION LEFT HUMERAL HEAD WITH INTERNAL FIXATION DEVICE, OPEN APPROACH: ICD-10-PCS | Performed by: ORTHOPAEDIC SURGERY

## 2022-10-06 DEVICE — GRAFT BONE CANCELLOUS CHIPS 30ML: Type: IMPLANTABLE DEVICE | Site: ARM | Status: FUNCTIONAL

## 2022-10-06 DEVICE — 3.5MM CORTEX SCREW SELF-TAPPING 32MM: Type: IMPLANTABLE DEVICE | Site: ARM | Status: FUNCTIONAL

## 2022-10-06 DEVICE — 3.5MM CORTEX SCREW SELF-TAPPING 34MM: Type: IMPLANTABLE DEVICE | Site: ARM | Status: FUNCTIONAL

## 2022-10-06 DEVICE — DBM T44105 5CC GRAFTON CRUNCH
Type: IMPLANTABLE DEVICE | Site: ARM | Status: FUNCTIONAL
Brand: GRAFTON®AND GRAFTON PLUS®DEMINERALIZED BONE MATRIX (DBM)

## 2022-10-06 DEVICE — 3.5MM LOCKING SCREW SLF-TPNG W/STARDRIVE(TM) RECESS 45MM: Type: IMPLANTABLE DEVICE | Site: ARM | Status: FUNCTIONAL

## 2022-10-06 DEVICE — 3.5MM LCP® PROXIMAL HUMERUS PLATE-LONG 5H SHAFT/142MM
Type: IMPLANTABLE DEVICE | Site: ARM | Status: FUNCTIONAL
Brand: LCP

## 2022-10-06 DEVICE — 3.5MM LOCKING SCREW SLF-TPNG W/STARDRIVE(TM) RECESS 50MM: Type: IMPLANTABLE DEVICE | Site: ARM | Status: FUNCTIONAL

## 2022-10-06 DEVICE — 3.5MM LOCKING SCREW SLF-TPNG W/STARDRIVE(TM) RECESS 40MM: Type: IMPLANTABLE DEVICE | Site: ARM | Status: FUNCTIONAL

## 2022-10-06 RX ORDER — FENTANYL CITRATE/PF 50 MCG/ML
25 SYRINGE (ML) INJECTION
Status: DISCONTINUED | OUTPATIENT
Start: 2022-10-06 | End: 2022-10-06 | Stop reason: HOSPADM

## 2022-10-06 RX ORDER — OXYCODONE HYDROCHLORIDE 5 MG/1
5 TABLET ORAL EVERY 4 HOURS PRN
Status: DISCONTINUED | OUTPATIENT
Start: 2022-10-06 | End: 2022-10-07 | Stop reason: HOSPADM

## 2022-10-06 RX ORDER — OXYCODONE HYDROCHLORIDE 5 MG/1
5 TABLET ORAL EVERY 6 HOURS PRN
Qty: 20 TABLET | Refills: 0 | Status: SHIPPED | OUTPATIENT
Start: 2022-10-06 | End: 2022-10-18

## 2022-10-06 RX ORDER — LABETALOL HYDROCHLORIDE 5 MG/ML
10 INJECTION, SOLUTION INTRAVENOUS ONCE
Status: COMPLETED | OUTPATIENT
Start: 2022-10-06 | End: 2022-10-06

## 2022-10-06 RX ORDER — GLYCOPYRROLATE 0.2 MG/ML
INJECTION INTRAMUSCULAR; INTRAVENOUS AS NEEDED
Status: DISCONTINUED | OUTPATIENT
Start: 2022-10-06 | End: 2022-10-06

## 2022-10-06 RX ORDER — CEFAZOLIN SODIUM 1 G/50ML
1000 SOLUTION INTRAVENOUS EVERY 8 HOURS
Status: COMPLETED | OUTPATIENT
Start: 2022-10-06 | End: 2022-10-07

## 2022-10-06 RX ORDER — LIDOCAINE HYDROCHLORIDE 10 MG/ML
INJECTION, SOLUTION EPIDURAL; INFILTRATION; INTRACAUDAL; PERINEURAL
Status: COMPLETED | OUTPATIENT
Start: 2022-10-06 | End: 2022-10-06

## 2022-10-06 RX ORDER — ROCURONIUM BROMIDE 10 MG/ML
INJECTION, SOLUTION INTRAVENOUS AS NEEDED
Status: DISCONTINUED | OUTPATIENT
Start: 2022-10-06 | End: 2022-10-06

## 2022-10-06 RX ORDER — EPHEDRINE SULFATE 50 MG/ML
INJECTION INTRAVENOUS AS NEEDED
Status: DISCONTINUED | OUTPATIENT
Start: 2022-10-06 | End: 2022-10-06

## 2022-10-06 RX ORDER — NEOSTIGMINE METHYLSULFATE 1 MG/ML
INJECTION INTRAVENOUS AS NEEDED
Status: DISCONTINUED | OUTPATIENT
Start: 2022-10-06 | End: 2022-10-06

## 2022-10-06 RX ORDER — FENTANYL CITRATE 50 UG/ML
INJECTION, SOLUTION INTRAMUSCULAR; INTRAVENOUS
Status: COMPLETED | OUTPATIENT
Start: 2022-10-06 | End: 2022-10-06

## 2022-10-06 RX ORDER — MIDAZOLAM HYDROCHLORIDE 2 MG/2ML
INJECTION, SOLUTION INTRAMUSCULAR; INTRAVENOUS
Status: COMPLETED | OUTPATIENT
Start: 2022-10-06 | End: 2022-10-06

## 2022-10-06 RX ORDER — SODIUM CHLORIDE, SODIUM LACTATE, POTASSIUM CHLORIDE, CALCIUM CHLORIDE 600; 310; 30; 20 MG/100ML; MG/100ML; MG/100ML; MG/100ML
125 INJECTION, SOLUTION INTRAVENOUS CONTINUOUS
Status: DISCONTINUED | OUTPATIENT
Start: 2022-10-06 | End: 2022-10-07

## 2022-10-06 RX ORDER — MAGNESIUM HYDROXIDE 1200 MG/15ML
LIQUID ORAL AS NEEDED
Status: DISCONTINUED | OUTPATIENT
Start: 2022-10-06 | End: 2022-10-06 | Stop reason: HOSPADM

## 2022-10-06 RX ORDER — ONDANSETRON 2 MG/ML
4 INJECTION INTRAMUSCULAR; INTRAVENOUS ONCE AS NEEDED
Status: DISCONTINUED | OUTPATIENT
Start: 2022-10-06 | End: 2022-10-06 | Stop reason: HOSPADM

## 2022-10-06 RX ORDER — METOCLOPRAMIDE HYDROCHLORIDE 5 MG/ML
INJECTION INTRAMUSCULAR; INTRAVENOUS AS NEEDED
Status: DISCONTINUED | OUTPATIENT
Start: 2022-10-06 | End: 2022-10-06

## 2022-10-06 RX ORDER — MAGNESIUM HYDROXIDE/ALUMINUM HYDROXICE/SIMETHICONE 120; 1200; 1200 MG/30ML; MG/30ML; MG/30ML
30 SUSPENSION ORAL EVERY 6 HOURS PRN
Status: DISCONTINUED | OUTPATIENT
Start: 2022-10-06 | End: 2022-10-07 | Stop reason: HOSPADM

## 2022-10-06 RX ORDER — PROPOFOL 10 MG/ML
INJECTION, EMULSION INTRAVENOUS AS NEEDED
Status: DISCONTINUED | OUTPATIENT
Start: 2022-10-06 | End: 2022-10-06

## 2022-10-06 RX ORDER — BUPIVACAINE HYDROCHLORIDE 5 MG/ML
INJECTION, SOLUTION PERINEURAL
Status: COMPLETED | OUTPATIENT
Start: 2022-10-06 | End: 2022-10-06

## 2022-10-06 RX ORDER — OXYCODONE HYDROCHLORIDE 10 MG/1
10 TABLET ORAL EVERY 4 HOURS PRN
Status: DISCONTINUED | OUTPATIENT
Start: 2022-10-06 | End: 2022-10-07 | Stop reason: HOSPADM

## 2022-10-06 RX ORDER — ONDANSETRON 2 MG/ML
INJECTION INTRAMUSCULAR; INTRAVENOUS AS NEEDED
Status: DISCONTINUED | OUTPATIENT
Start: 2022-10-06 | End: 2022-10-06

## 2022-10-06 RX ORDER — DEXAMETHASONE SODIUM PHOSPHATE 10 MG/ML
INJECTION, SOLUTION INTRAMUSCULAR; INTRAVENOUS AS NEEDED
Status: DISCONTINUED | OUTPATIENT
Start: 2022-10-06 | End: 2022-10-06

## 2022-10-06 RX ORDER — HYDROMORPHONE HCL/PF 1 MG/ML
0.5 SYRINGE (ML) INJECTION
Status: DISCONTINUED | OUTPATIENT
Start: 2022-10-06 | End: 2022-10-06 | Stop reason: HOSPADM

## 2022-10-06 RX ADMIN — LABETALOL HYDROCHLORIDE 10 MG: 5 INJECTION, SOLUTION INTRAVENOUS at 18:42

## 2022-10-06 RX ADMIN — BUPIVACAINE HYDROCHLORIDE 10 ML: 5 INJECTION, SOLUTION PERINEURAL at 15:57

## 2022-10-06 RX ADMIN — FOLIC ACID: 5 INJECTION, SOLUTION INTRAMUSCULAR; INTRAVENOUS; SUBCUTANEOUS at 08:43

## 2022-10-06 RX ADMIN — GLYCOPYRROLATE 0.1 MCG: 0.2 INJECTION, SOLUTION INTRAMUSCULAR; INTRAVENOUS at 17:48

## 2022-10-06 RX ADMIN — PAROXETINE 40 MG: 20 TABLET, FILM COATED ORAL at 08:47

## 2022-10-06 RX ADMIN — NICOTINE 1 PATCH: 14 PATCH, EXTENDED RELEASE TRANSDERMAL at 08:47

## 2022-10-06 RX ADMIN — PROPOFOL 200 MG: 10 INJECTION, EMULSION INTRAVENOUS at 16:27

## 2022-10-06 RX ADMIN — METOCLOPRAMIDE 10 MG: 5 INJECTION, SOLUTION INTRAMUSCULAR; INTRAVENOUS at 17:30

## 2022-10-06 RX ADMIN — MIRTAZAPINE 30 MG: 30 TABLET, FILM COATED ORAL at 21:03

## 2022-10-06 RX ADMIN — SODIUM CHLORIDE, SODIUM LACTATE, POTASSIUM CHLORIDE, AND CALCIUM CHLORIDE 125 ML/HR: .6; .31; .03; .02 INJECTION, SOLUTION INTRAVENOUS at 08:50

## 2022-10-06 RX ADMIN — CEFAZOLIN SODIUM 2000 MG: 2 SOLUTION INTRAVENOUS at 16:20

## 2022-10-06 RX ADMIN — LIDOCAINE HYDROCHLORIDE 50 ML: 10 INJECTION, SOLUTION EPIDURAL; INFILTRATION; INTRACAUDAL; PERINEURAL at 16:46

## 2022-10-06 RX ADMIN — FENTANYL CITRATE 50 MCG: 50 INJECTION, SOLUTION INTRAMUSCULAR; INTRAVENOUS at 16:47

## 2022-10-06 RX ADMIN — NEOSTIGMINE 4 MG: 1 INJECTION INTRAVENOUS at 17:41

## 2022-10-06 RX ADMIN — ATORVASTATIN CALCIUM 40 MG: 80 TABLET, FILM COATED ORAL at 08:47

## 2022-10-06 RX ADMIN — MIDAZOLAM 2 MG: 1 INJECTION INTRAMUSCULAR; INTRAVENOUS at 15:44

## 2022-10-06 RX ADMIN — DEXAMETHASONE SODIUM PHOSPHATE 10 MG: 10 INJECTION, SOLUTION INTRAMUSCULAR; INTRAVENOUS at 16:37

## 2022-10-06 RX ADMIN — ROCURONIUM BROMIDE 40 MG: 10 INJECTION INTRAVENOUS at 16:27

## 2022-10-06 RX ADMIN — EPHEDRINE SULFATE 5 MG: 50 INJECTION INTRAVENOUS at 17:15

## 2022-10-06 RX ADMIN — FENTANYL CITRATE 50 MCG: 50 INJECTION, SOLUTION INTRAMUSCULAR; INTRAVENOUS at 15:44

## 2022-10-06 RX ADMIN — SODIUM CHLORIDE, SODIUM LACTATE, POTASSIUM CHLORIDE, AND CALCIUM CHLORIDE 125 ML/HR: .6; .31; .03; .02 INJECTION, SOLUTION INTRAVENOUS at 00:11

## 2022-10-06 RX ADMIN — ALPRAZOLAM 0.25 MG: 0.5 TABLET ORAL at 08:47

## 2022-10-06 RX ADMIN — ONDANSETRON 4 MG: 2 INJECTION INTRAMUSCULAR; INTRAVENOUS at 17:04

## 2022-10-06 RX ADMIN — HYDROCHLOROTHIAZIDE 12.5 MG: 12.5 TABLET ORAL at 08:47

## 2022-10-06 RX ADMIN — EPHEDRINE SULFATE 10 MG: 50 INJECTION INTRAVENOUS at 17:09

## 2022-10-06 RX ADMIN — FENTANYL CITRATE 50 MCG: 50 INJECTION, SOLUTION INTRAMUSCULAR; INTRAVENOUS at 17:34

## 2022-10-06 RX ADMIN — GLYCOPYRROLATE 0.4 MCG: 0.2 INJECTION, SOLUTION INTRAMUSCULAR; INTRAVENOUS at 17:42

## 2022-10-06 RX ADMIN — LIDOCAINE HYDROCHLORIDE 5 ML: 10 INJECTION, SOLUTION EPIDURAL; INFILTRATION; INTRACAUDAL; PERINEURAL at 15:45

## 2022-10-06 RX ADMIN — FENTANYL CITRATE 50 MCG: 50 INJECTION, SOLUTION INTRAMUSCULAR; INTRAVENOUS at 17:47

## 2022-10-06 RX ADMIN — METRONIDAZOLE: 7.5 GEL TOPICAL at 08:52

## 2022-10-06 RX ADMIN — AMLODIPINE BESYLATE 10 MG: 10 TABLET ORAL at 08:47

## 2022-10-06 NOTE — ADDENDUM NOTE
Addendum  created 10/06/22 1832 by Alexa Perry MD    Order list changed, Pharmacy for encounter modified

## 2022-10-06 NOTE — PROGRESS NOTES
[facility]  Progress Note - Arturo Parks 1955, 77 y o  male MRN: [id number]  Unit/Bed#: 7T [facility] 703-01 Encounter: [id number]  Primary Care Provider: Lillie Roberson MD   Date and time admitted to hospital: 10/2/2022 12:38 PM    Hypokalemia  Assessment & Plan  -monitor and replete as necessary    Closed fracture of left shoulder  Assessment & Plan  · Recent history of fall and shoulder pressure 1 week ago (was evaluated [facility] and [facility])  · Left shoulder x-ray showed Proximal humeral fracture   superior migration of shaft is new  · IV Toradol for pain control  · Orthopedic surgery consulted, appreciate recommendations  · Plan for surgical repair prior to placement into U, OR date pending    Toxic metabolic encephalopathy  Assessment & Plan  · POA, patient is AAO x3 however sluggish in reaction, and he stated his brother brought him in ED however he is not sure why  · Patient is afebrile, leukocytosis noted  · Patient was in [facility] yesterday, plan to inpatient detox however plan fell through due to insurance issue  · ED physician discussed with  - does not need to be admitted to inpatient detox  · Patient has chronic alcohol use, last use was 10 days prior to ED arrival  · CT brain negative for acute intracranial abnormality  · Continue thiamine and folic for now in the setting of chronic alcohol dependence  · The brothers suspect that patient has been abusing his Xanax at home (which could be contributing to his current mental status)  · Limited narcotics and benzodiazepine  · UA not suggestive of UTI  · PT/OT consult placed  ·  for disposition planning  · Mentation at baseline  Resolved      Alcohol use  Assessment & Plan  · History of chronic alcohol dependence complicated by DTs and required to be in ICU  · The last alcohol use was 10 days ago, currently has no withdrawal symptoms  · If patient shows alcohol withdrawal symptoms, consider toxicology consult  · Alcohol level in ED was negative  · Case management consult for disposition planning    Type 2 diabetes mellitus with diabetic peripheral angiopathy without gangrene, without long-term current use of insulin (HCC)  Assessment & Plan  Lab Results   Component Value Date    HGBA1C 5 7 (H) 2022       No results for input(s): POCGLU in the last 72 hours  Blood Sugar Average: Last 72 hrs:       · Blood sugar acceptable  · Hold home metformin  · Diabetic diet, sliding scale insulin and Accu-Cheks    Mixed hyperlipidemia  Assessment & Plan  · Continue home Lipitor    Primary hypertension  Assessment & Plan  · BP acceptable  · Continue home amlodipine, HCTZ    * Depression with anxiety  Assessment & Plan  · The brother suspected patient has been abusing his Xanax at home  · Dr Ashley Oliveira is the patient's psychiatrist  · Psychiatry consulted, recommend admission to [facility] for Xanax weaning  · Continue Xanax 0 25 mg po b i d            VTE  Prophylaxis:   Pharmacologic: in place    Patient Centered Rounds: I have performed bedside rounds with nursing staff today  Discussions with Specialists or Other Care Team Provider: case management    Education and Discussions with Family / Patient: pt      Current Length of Stay: 3 day(s)    Current Patient Status: Inpatient        Code Status: Level 1 - Full Code      Subjective:   Pt seen  States pain well controlled  Awaiting or this afternoon  No cp or sob    Patient is seen and examined at bedside  All other ROS are negative  Objective:     Vitals:   Temp (24hrs), Av 7 °F (36 5 °C), Min:97 5 °F (36 4 °C), Max:97 9 °F (36 6 °C)    Temp:  [97 5 °F (36 4 °C)-97 9 °F (36 6 °C)] 97 6 °F (36 4 °C)  HR:  [75-92] 92  Resp:  [19-20] 19  BP: (158-168)/(77-84) 161/78  SpO2:  [97 %-99 %] 97 %  Body mass index is 23 57 kg/m²  Input and Output Summary (last 24 hours):        Intake/Output Summary (Last 24 hours) at 10/6/2022 1024  Last data filed at 10/6/2022 0527  Gross per 24 hour   Intake 2578 33 ml   Output 3130 ml   Net -551 67 ml       Physical Exam:       GEN: No acute distress, comfortable  HEEENT: No JVD, PERRLA, no scleral icterus  RESP: Lungs clear to auscultation bilaterally  CV: RRR, +s1/s2   ABD: SOFT NON TENDER, POSITIVE BOWEL SOUNDS, NO DISTENTION  PSYCH: CALM  NEURO: A X O X 3, NO FOCAL DEFICITS  SKIN: NO RASH  EXTREM: NO EDEMA; l arm in sling  Additional Data:     Labs:    Results from last 7 days   Lab Units 10/06/22  0526 10/03/22  0511 10/02/22  1420   WBC Thousand/uL 8 60   < > 11 62*   HEMOGLOBIN g/dL 10 4*   < > 10 6*   HEMATOCRIT % 33 1*   < > 32 0*   PLATELETS Thousands/uL 629*   < > 590*   NEUTROS PCT %  --   --  83*   LYMPHS PCT %  --   --  8*   MONOS PCT %  --   --  8   EOS PCT %  --   --  0    < > = values in this interval not displayed  Results from last 7 days   Lab Units 10/06/22  0526 10/02/22  1420 10/01/22  1628   SODIUM mmol/L 136   < > 131*   POTASSIUM mmol/L 3 5   < > 3 3*   CHLORIDE mmol/L 105   < > 94*   CO2 mmol/L 28   < > 28   BUN mg/dL 9   < > 18   CREATININE mg/dL 0 52*   < > 0 88   ANION GAP mmol/L 3*   < > 9   CALCIUM mg/dL 8 4   < > 9 3   ALBUMIN g/dL  --   --  2 7*   TOTAL BILIRUBIN mg/dL  --   --  0 74   ALK PHOS U/L  --   --  79   ALT U/L  --   --  53   AST U/L  --   --  118*   GLUCOSE RANDOM mg/dL 109*   < > 106    < > = values in this interval not displayed  Results from last 7 days   Lab Units 10/05/22  0434   INR  0 82*     Results from last 7 days   Lab Units 10/02/22  1336 10/01/22  1626   POC GLUCOSE mg/dl 118 111                   * I Have Reviewed All Lab Data Listed Above  Imaging:     Results for orders placed during the hospital encounter of 09/21/22    XR chest 1 view portable    Narrative  CHEST    INDICATION:   hypoxia      COMPARISON:  None    EXAM PERFORMED/VIEWS:  XR CHEST PORTABLE      FINDINGS:    Cardiomediastinal silhouette appears unremarkable  The lungs are clear  No pneumothorax or pleural effusion  Osseous structures appear within normal limits for patient age  Impression  No acute cardiopulmonary disease  Workstation performed: [id number]    No results found for this or any previous visit  *I have reviewed all imaging reports listed above      Recent Cultures (last 7 days):           Last 24 Hours Medication List:   Current Facility-Administered Medications   Medication Dose Route Frequency Provider Last Rate    acetaminophen  650 mg Oral Q6H PRN Sussy Castorena MD      ALPRAZolam  0 25 mg Oral BID (AM & Afternoon) Marbella Atkinson MD      amLODIPine  10 mg Oral Daily Sussy Castorena MD      aspirin  81 mg Oral Daily Sussy Castorena MD      atorvastatin  40 mg Oral Daily Sussy Castorena MD      cefazolin  2,000 mg Intravenous Q8H María Elena Guzman PA-C      folic acid 1 mg, thiamine 100 mg in 0 9% sodium chloride 100 mL IVPB   Intravenous Daily Sussy Castorena MD 0 mL/hr at 10/03/22 0911    hydrochlorothiazide  12 5 mg Oral Daily Sussy Castorena MD      lactated ringers  125 mL/hr Intravenous Continuous Castro Junior PA-C 125 mL/hr (10/06/22 0850)    metroNIDAZOLE   Topical BID Sussy Castorena MD      mirtazapine  30 mg Oral HS Sussy Castorena MD      multi-electrolyte  100 mL/hr Intravenous Continuous Sussy Castorena MD Stopped (10/06/22 0000)    nicotine  1 patch Transdermal Daily Sussy Castorena MD      ondansetron  4 mg Intravenous Q6H PRN Sussy Castorena MD      PARoxetine  40 mg Oral QAM Sussy Castorena MD      polyethylene glycol  17 g Oral Daily PRN Sussy Castorena MD          Today, Patient Was Seen By: Rip Clark    ** Please Note: Dictation voice to text software may have been used in the creation of this document   **

## 2022-10-06 NOTE — PROGRESS NOTES
Progress Note - Behavioral Health   Juaquin Grijalva 77 y o  male MRN: [id number]  Unit/Bed#: 7T [facility] 703-01 Encounter: [id number]    Assessment/Plan   Principal Problem:    Depression with anxiety  Active Problems:    Primary hypertension    Mixed hyperlipidemia    Type 2 diabetes mellitus with diabetic peripheral angiopathy without gangrene, without long-term current use of insulin (HCC)    Alcohol use    Toxic metabolic encephalopathy    Closed fracture of left shoulder    Hypokalemia  Patient is anxious about his upcoming surgery to his arm  Awaiting transfer to [address] [place] [address] to continue inpatient psychiatric treatment  Says his anxiety is high but he is able to manage it of all of the Xanax is being reduced  His mood is depressed his affect is flat but he is appropriate in conversation today  Frustrated about the weight for transfer but is cooperative with treatment  Behavior over the last 24 hours:  unchanged  Sleep: normal  Appetite: normal  Medication side effects: No  ROS: no complaints    Mental Status Evaluation:  Appearance:  age appropriate   Behavior:  guarded   Speech:  normal pitch and normal volume   Mood:  anxious and depressed   Affect:  blunted and constricted   Thought Process:  tangential   Thought Content:  normal   Perceptual Disturbances: None   Risk Potential: Suicidal Ideations none  Homicidal Ideations none  Potential for Aggression No   Sensorium:  person, place, and situation   Memory:  recent and remote memory grossly intact   Consciousness:  alert and awake    Attention: attention span appeared shorter than expected for age   Insight:  fair   Judgment: fair   Gait/Station: normal gait/station   Motor Activity: no abnormal movements     Progress Toward Goals:  Same    Recommended Treatment: Continue with group therapy, milieu therapy and occupational therapy        Risks, benefits and possible side effects of Medications:   Risks, benefits, and possible side effects of medications explained to patient and patient verbalizes understanding  Medications: all current active meds have been reviewed  Labs: I have personally reviewed all pertinent laboratory/tests results  Most Recent Labs:   Lab Results   Component Value Date    WBC 8 60 10/06/2022    RBC 3 19 (L) 10/06/2022    HGB 10 4 (L) 10/06/2022    HCT 33 1 (L) 10/06/2022     (H) 10/06/2022    RDW 13 5 10/06/2022    NEUTROABS 9 58 (H) 10/02/2022    SODIUM 136 10/06/2022    K 3 5 10/06/2022     10/06/2022    CO2 28 10/06/2022    BUN 9 10/06/2022    CREATININE 0 52 (L) 10/06/2022    GLUC 109 (H) 10/06/2022    GLUF 102 (H) 10/03/2022    CALCIUM 8 4 10/06/2022     (H) 10/01/2022    ALT 53 10/01/2022    ALKPHOS 79 10/01/2022    TP 7 2 10/01/2022    ALB 2 7 (L) 10/01/2022    TBILI 0 74 10/01/2022    CHOLESTEROL 160 07/21/2022    HDL 62 07/21/2022    TRIG 103 07/21/2022    LDLCALC 77 07/21/2022    NONHDLC 98 07/21/2022    XBG3XKHZVFIG 2 090 12/11/2019    FREET4 0 9 02/27/2017    HGBA1C 5 7 (H) 07/21/2022     07/21/2022       Counseling / Coordination of Care  Total floor / unit time spent today 25 minutes  Greater than 50% of total time was spent with the patient and / or family counseling and / or coordination of care   A description of the counseling / coordination of care:

## 2022-10-06 NOTE — ANESTHESIA PROCEDURE NOTES
Peripheral Block    Patient location during procedure: holding area  Start time: 10/6/2022 3:44 PM  Reason for block: at surgeon's request and post-op pain management  Staffing  Performed: Anesthesiologist   Anesthesiologist: John Vazquez MD  Preanesthetic Checklist  Completed: patient identified, IV checked, site marked, risks and benefits discussed, surgical consent, monitors and equipment checked, pre-op evaluation and timeout performed  Peripheral Block  Patient position: supine  Prep: ChloraPrep  Patient monitoring: heart rate, cardiac monitor, continuous pulse ox and frequent blood pressure checks  Block type: interscalene  Laterality: left  Injection technique: single-shot  Procedures: ultrasound guided, Ultrasound guidance required for the procedure to increase accuracy and safety of medication placement and decrease risk of complications    Ultrasound permanent image savedbupivacaine (MARCAINE) 0 5 % - Perineural   10 mL - 10/6/2022 3:57:00 PM  midazolam (VERSED) 2 mg/2 mL - Intravenous   2 mg - 10/6/2022 3:44:00 PM  fentaNYL 50 mcg/mL - Intravenous   50 mcg - 10/6/2022 3:44:00 PM  lidocaine (PF) (XYLOCAINE-MPF) 1 % - Infiltration   5 mL - 10/6/2022 3:45:00 PM  Needle  Needle type: Stimuplex   Needle length: 10 cm  Needle localization: ultrasound guidance  Needle insertion depth: 5 cm  Assessment  Injection assessment: incremental injection, local visualized surrounding nerve on ultrasound, negative aspiration for heme and transient paresthesias  Paresthesia pain: immediately resolved  Heart rate change: no  Slow fractionated injection: no  Post-procedure:  site cleaned  patient tolerated the procedure well with no immediate complications  Additional Notes  Exparel 10 ml mix

## 2022-10-06 NOTE — ASSESSMENT & PLAN NOTE
· The brother suspected patient has been abusing his Xanax at home  · Dr Ronda Dickens is the patient's psychiatrist  · Psychiatry consulted, recommend admission to [facility] for Xanax weaning  · Continue Xanax 0 25 mg po b i d

## 2022-10-06 NOTE — ASSESSMENT & PLAN NOTE
Lab Results   Component Value Date    HGBA1C 5 7 (H) 07/21/2022       No results for input(s): POCGLU in the last 72 hours      Blood Sugar Average: Last 72 hrs:       · Blood sugar acceptable  · Hold home metformin  · Diabetic diet, sliding scale insulin and Accu-Cheks

## 2022-10-06 NOTE — PROGRESS NOTES
Progress Note - Orthopedics   Kelly Ortiz 77 y o  male MRN: [id number]  Unit/Bed#: 7T [facility] 703-01 Encounter: [id number]    Assessment:  76 yo male with displaced left proximal humerus fracture     Plan:  · Plan for patient to be taken to the OR today for proximal humerus ORIF  · Remain NPO  · Continue to Hold lovenox   · NWB to LUE  · Continue sling  · Pain control prn   · Ortho will follow  Subjective: 77year old Male with displaced left proximal humerus fracture  Patient was seen and examined at bedside  He reports that he is doing well overall  He denies having any questions at this time  He is ready to go to the OR today  Vitals: Blood pressure 161/78, pulse 92, temperature 97 6 °F (36 4 °C), temperature source Temporal, resp  rate 19, height 5' 9" (1 753 m), weight 72 4 kg (159 lb 9 8 oz), SpO2 97 %  ,Body mass index is 23 57 kg/m²  Intake/Output Summary (Last 24 hours) at 10/6/2022 1029  Last data filed at 10/6/2022 9964  Gross per 24 hour   Intake 2578 33 ml   Output 3130 ml   Net -551 67 ml       Invasive Devices  Report    Peripheral Intravenous Line  Duration           Peripheral IV 10/02/22 Right Antecubital 3 days                Ortho Exam:   Left upper extremity:  Inspection- ecchymosis about the shoulder and axilla, sling in place, moderate swelling  Palpation- +effusion +swelling +TTP as expected  Distal fracture fragment is palpable  ROM- deferred  Neurovascular- sensation intact axillary, median, radial, and ulnar nerve distributions  Palpable radial pulse  AIN/ PIN intact       Lab, Imaging and other studies:   CBC:   Lab Results   Component Value Date    WBC 8 60 10/06/2022    HGB 10 4 (L) 10/06/2022    HCT 33 1 (L) 10/06/2022     (H) 10/06/2022     (H) 10/06/2022    MCH 32 6 10/06/2022    MCHC 31 4 10/06/2022    RDW 13 5 10/06/2022    MPV 9 0 10/06/2022     CMP:   Lab Results   Component Value Date    SODIUM 136 10/06/2022     10/06/2022    CO2 28 10/06/2022 BUN 9 10/06/2022    CREATININE 0 52 (L) 10/06/2022    CALCIUM 8 4 10/06/2022    EGFR 111 10/06/2022       [employer]

## 2022-10-06 NOTE — ASSESSMENT & PLAN NOTE
· POA, patient is AAO x3 however sluggish in reaction, and he stated his brother brought him in ED however he is not sure why  · Patient is afebrile, leukocytosis noted  · Patient was in [facility] yesterday, plan to inpatient detox however plan fell through due to insurance issue  · ED physician discussed with  - does not need to be admitted to inpatient detox  · Patient has chronic alcohol use, last use was 10 days prior to ED arrival  · CT brain negative for acute intracranial abnormality  · Continue thiamine and folic for now in the setting of chronic alcohol dependence  · The brothers suspect that patient has been abusing his Xanax at home (which could be contributing to his current mental status)  · Limited narcotics and benzodiazepine  · UA not suggestive of UTI  · PT/OT consult placed  ·  for disposition planning  · Mentation at baseline  Resolved

## 2022-10-06 NOTE — PLAN OF CARE
Problem: Nutrition/Hydration-ADULT  Goal: Nutrient/Hydration intake appropriate for improving, restoring or maintaining nutritional needs  Description: Monitor and assess patient's nutrition/hydration status for malnutrition  Collaborate with interdisciplinary team and initiate plan and interventions as ordered  Monitor patient's weight and dietary intake as ordered or per policy  Utilize nutrition screening tool and intervene as necessary  Determine patient's food preferences and provide high-protein, high-caloric foods as appropriate       INTERVENTIONS:  - Monitor oral intake, urinary output, labs, and treatment plans  - Assess nutrition and hydration status and recommend course of action  - Evaluate amount of meals eaten  - Assist patient with eating if necessary   - Allow adequate time for meals  - Recommend/ encourage appropriate diets, oral nutritional supplements, and vitamin/mineral supplements  - Order, calculate, and assess calorie counts as needed  - Assess need for intravenous fluids  - Provide specific nutrition/hydration education as appropriate  - Include patient/family/caregiver in decisions related to nutrition  Outcome: Progressing     Problem: PAIN - ADULT  Goal: Verbalizes/displays adequate comfort level or baseline comfort level  Description: Interventions:  - Encourage patient to monitor pain and request assistance  - Assess pain using appropriate pain scale  - Administer analgesics based on type and severity of pain and evaluate response  - Implement non-pharmacological measures as appropriate and evaluate response  - Consider cultural and social influences on pain and pain management  - Notify physician/advanced practitioner if interventions unsuccessful or patient reports new pain  Outcome: Progressing     Problem: INFECTION - ADULT  Goal: Absence or prevention of progression during hospitalization  Description: INTERVENTIONS:  - Assess and monitor for signs and symptoms of infection  - Monitor lab/diagnostic results  - Monitor all insertion sites, i e  indwelling lines  - Monitor endotracheal if appropriate and nasal secretions for changes in amount and color  - Administer medications as ordered  - Instruct and encourage patient and family to use good hand hygiene technique  - Identify and instruct in appropriate isolation precautions for identified infection/condition  Outcome: Progressing     Problem: SAFETY ADULT  Goal: Patient will remain free of falls  Description: INTERVENTIONS:  - Educate patient/family on patient safety including physical limitations  - Instruct patient to call for assistance with activity   - Consult OT/PT to assist with strengthening/mobility   - Keep Call bell within reach  - Keep bed low and locked with side rails adjusted as appropriate  - Keep care items and personal belongings within reach  - Initiate and maintain comfort rounds  - Make Fall Risk Sign visible to staff  - Offer Toileting every 2 Hours, in advance of need  - Apply yellow socks and bracelet for high fall risk patients  - Consider moving patient to room near nurses station  Outcome: Progressing  Goal: Maintain or return to baseline ADL function  Description: INTERVENTIONS:  -  Assess patient's ability to carry out ADLs; assess patient's baseline for ADL function and identify physical deficits which impact ability to perform ADLs (bathing, care of mouth/teeth, toileting, grooming, dressing, etc )  - Assess/evaluate cause of self-care deficits   - Assess range of motion  - Assess patient's mobility; develop plan if impaired  - Assess patient's need for assistive devices and provide as appropriate  - Encourage maximum independence but intervene and supervise when necessary  - Involve family in performance of ADLs  - Assess for home care needs following discharge   - Consider OT consult to assist with ADL evaluation and planning for discharge  - Provide patient education as appropriate  Outcome: Progressing     Problem: DISCHARGE PLANNING  Goal: Discharge to home or other facility with appropriate resources  Description: INTERVENTIONS:  - Identify barriers to discharge w/patient and caregiver  - Arrange for needed discharge resources and transportation as appropriate  - Identify discharge learning needs (meds, wound care, etc )  - Refer to Case Management Department for coordinating discharge planning if the patient needs post-hospital services based on physician/advanced practitioner order or complex needs related to functional status, cognitive ability, or social support system  Outcome: Progressing     Problem: Knowledge Deficit  Goal: Patient/family/caregiver demonstrates understanding of disease process, treatment plan, medications, and discharge instructions  Description: Complete learning assessment and assess knowledge base    Interventions:  - Provide teaching at level of understanding  - Provide teaching via preferred learning methods  Outcome: Progressing     Problem: MUSCULOSKELETAL - ADULT  Goal: Maintain or return mobility to safest level of function  Description: INTERVENTIONS:  - Assess patient's ability to carry out ADLs; assess patient's baseline for ADL function and identify physical deficits which impact ability to perform ADLs (bathing, care of mouth/teeth, toileting, grooming, dressing, etc )  - Assess/evaluate cause of self-care deficits   - Assess range of motion  - Assess patient's mobility  - Assess patient's need for assistive devices and provide as appropriate  - Encourage maximum independence but intervene and supervise when necessary  - Involve family in performance of ADLs  - Assess for home care needs following discharge   - Consider OT consult to assist with ADL evaluation and planning for discharge  - Provide patient education as appropriate  Outcome: Progressing     Problem: Potential for Falls  Goal: Patient will remain free of falls  Description: INTERVENTIONS:  - Educate patient/family on patient safety including physical limitations  - Instruct patient to call for assistance with activity   - Consult OT/PT to assist with strengthening/mobility   - Keep Call bell within reach  - Keep bed low and locked with side rails adjusted as appropriate  - Keep care items and personal belongings within reach  - Initiate and maintain comfort rounds  - Make Fall Risk Sign visible to staff  - Offer Toileting every 2 Hours, in advance of need  - Apply yellow socks and bracelet for high fall risk patients  - Consider moving patient to room near nurses station  Outcome: Progressing

## 2022-10-06 NOTE — ASSESSMENT & PLAN NOTE
· Recent history of fall and shoulder pressure 1 week ago (was evaluated [facility])  · Left shoulder x-ray showed Proximal humeral fracture   superior migration of shaft is new     · IV Toradol for pain control  · Orthopedic surgery consulted, appreciate recommendations  · Plan for surgical repair prior to placement into [facility], OR date pending

## 2022-10-06 NOTE — ANESTHESIA PREPROCEDURE EVALUATION
Procedure:  OPEN REDUCTION W/ INTERNAL FIXATION (ORIF) HUMERUS (SHOULDER) (Left Arm Upper)    Relevant Problems   CARDIO   (+) Atherosclerotic PVD with intermittent claudication ([facility])   (+) Bilateral carotid artery stenosis   (+) Mixed hyperlipidemia   (+) Primary hypertension   (+) Type 2 diabetes mellitus with diabetic peripheral angiopathy without gangrene, without long-term current use of insulin (HCC)      ENDO   (+) Type 2 diabetes mellitus with diabetic peripheral angiopathy without gangrene, without long-term current use of insulin (HCC)      /RENAL   (+) DARWIN (acute kidney injury) (HCC)      NEURO/PSYCH   (+) Anxiety   (+) Depression with anxiety      PULMONARY   (+) Chronic bronchitis ([facility])   (+) Smoking      Nervous and Auditory   (+) Alcohol withdrawal syndrome, with delirium ([facility])   (+) Toxic metabolic encephalopathy      Musculoskeletal and Integument   (+) Closed fracture of left shoulder   (+) Rosacea      Other   (+) Alcohol use   (+) Hypokalemia   (+) Leukocytosis   (+) Rhinophyma        Physical Exam    Airway    Mallampati score: II  TM Distance: >3 FB  Neck ROM: full     Dental   No notable dental hx     Cardiovascular  Cardiovascular exam normal    Pulmonary  Pulmonary exam normal     Other Findings        Anesthesia Plan  ASA Score- 3 Emergent    Anesthesia Type- regional with ASA Monitors  Additional Monitors:   Airway Plan: ETT  Plan Factors-Exercise tolerance (METS): >4 METS  Chart reviewed  EKG reviewed  Existing labs reviewed  Patient is a current smoker  Patient instructed to abstain from smoking on day of procedure  Patient did not smoke on day of surgery  Induction- intravenous  Postoperative Plan-     Informed Consent- Anesthetic plan and risks discussed with patient  I personally reviewed this patient with the CRNA  Discussed and agreed on the Anesthesia Plan with the CRNA  Singh Gray

## 2022-10-06 NOTE — ANESTHESIA POSTPROCEDURE EVALUATION
Post-Op Assessment Note    CV Status:  Stable  Pain Score: 0    Pain management: adequate     Mental Status:  Alert and awake   Hydration Status:  Stable   PONV Controlled:  None   Airway Patency:  Patent      Post Op Vitals Reviewed: Yes      Staff: Anesthesiologist, with CRNAs         No complications documented      BP (!) 190/84 (10/06/22 1810)    Temp 98 3 °F (36 8 °C) (10/06/22 1810)    Pulse 103 (10/06/22 1810)   Resp 13 (10/06/22 1810)    SpO2 94 % (10/06/22 1810)

## 2022-10-06 NOTE — OP NOTE
OPERATIVE REPORT  PATIENT NAME: Disha Belle    :  1955  MRN: [id number]  Pt Location:  OR ROOM 10    SURGERY DATE: 10/6/2022    Surgeon(s) and Role:     * Medardo Flores MD - Primary    Preop Diagnosis:  Closed fracture of left shoulder, sequela [S42 92XS]    Post-Op Diagnosis Codes:     * Closed fracture of left shoulder, sequela [S42 92XS]    Procedure(s) (LRB):  OPEN REDUCTION W/ INTERNAL FIXATION (ORIF) HUMERUS (SHOULDER) (Left)    Specimen(s):  * No specimens in log *    Estimated Blood Loss:   Minimal    Drains:  * No LDAs found *    Anesthesia Type:   General w/ Regional    Operative Indications:  Closed fracture of left shoulder, sequela [S42 92XS]  Displaced 2 part left proximal humeral fracture    Operative Findings:  Displaced 2 part left proximal humeral fracture with contractures and shredded left axillary nerve    Complications:   None    Procedure and Technique:  After informed surgical consent had been obtained patient was brought to the operating room and transferred to the operating table in the supine position  General anesthesia was obtained  Left upper extremity was prepped and draped normal sterile fashion  Using a standard deltoid-splitting approach dissection was taken down through the skin and subcutaneous tissues to the level of the axillary nerve  I can not identify the edges of the nerve at this point but this was completely shredded by the displaced fracture  Fracture did have some pretty marked displacement  Using careful open reduction techniques as a way to get the fracture reduced back to normal length rotation and alignment and normal position  The Synthes locking proximal humeral plate was then fit used stabilized fracture with 3 5 mm locking and 3 5 mm cortical screws  Final fluoroscopic views were obtained showing everything to be in good position      Wounds were closed with 1  Stratafix 2-0 Vicryl and staples after the defect area was packed with crushed cancellous bone and demineralized matrix  Sterile dressings were applied  Patient was then awakened from general anesthesia and transferred to recovery in stable condition having tolerated procedure well     I was present for the entire procedure, I was present for all critical portions of the procedure, A qualified resident physician was not available and A physician assistant was required during the procedure for retraction tissue handling,dissection and suturing    Patient Disposition:  PACU         SIGNATURE: Malorie Duenas MD  DATE: October 6, 2022  TIME: 5:39 PM

## 2022-10-06 NOTE — PLAN OF CARE
Problem: PAIN - ADULT  Goal: Verbalizes/displays adequate comfort level or baseline comfort level  Description: Interventions:  - Encourage patient to monitor pain and request assistance  - Assess pain using appropriate pain scale  - Administer analgesics based on type and severity of pain and evaluate response  - Implement non-pharmacological measures as appropriate and evaluate response  - Consider cultural and social influences on pain and pain management  - Notify physician/advanced practitioner if interventions unsuccessful or patient reports new pain  Outcome: Progressing     Problem: SAFETY ADULT  Goal: Patient will remain free of falls  Description: INTERVENTIONS:  - Educate patient/family on patient safety including physical limitations  - Instruct patient to call for assistance with activity   - Consult OT/PT to assist with strengthening/mobility   - Keep Call bell within reach  - Keep bed low and locked with side rails adjusted as appropriate  - Keep care items and personal belongings within reach  - Initiate and maintain comfort rounds  - Make Fall Risk Sign visible to staff  - Offer Toileting every 2 Hours, in advance of need  - Apply yellow socks and bracelet for high fall risk patients  - Consider moving patient to room near nurses station  Outcome: Progressing     Problem: SAFETY ADULT  Goal: Maintain or return to baseline ADL function  Description: INTERVENTIONS:  -  Assess patient's ability to carry out ADLs; assess patient's baseline for ADL function and identify physical deficits which impact ability to perform ADLs (bathing, care of mouth/teeth, toileting, grooming, dressing, etc )  - Assess/evaluate cause of self-care deficits   - Assess range of motion  - Assess patient's mobility; develop plan if impaired  - Assess patient's need for assistive devices and provide as appropriate  - Encourage maximum independence but intervene and supervise when necessary  - Involve family in performance of ADLs  - Assess for home care needs following discharge   - Consider OT consult to assist with ADL evaluation and planning for discharge  - Provide patient education as appropriate  Outcome: Progressing

## 2022-10-07 ENCOUNTER — HOSPITAL ENCOUNTER (INPATIENT)
Facility: HOSPITAL | Age: 67
LOS: 11 days | Discharge: HOME | DRG: 885 | End: 2022-10-18
Attending: PSYCHIATRY & NEUROLOGY | Admitting: PSYCHIATRY & NEUROLOGY
Payer: COMMERCIAL

## 2022-10-07 VITALS
WEIGHT: 159.61 LBS | SYSTOLIC BLOOD PRESSURE: 164 MMHG | DIASTOLIC BLOOD PRESSURE: 68 MMHG | HEART RATE: 99 BPM | OXYGEN SATURATION: 94 % | HEIGHT: 69 IN | TEMPERATURE: 98.1 F | RESPIRATION RATE: 18 BRPM | BODY MASS INDEX: 23.64 KG/M2

## 2022-10-07 DIAGNOSIS — S42.222A 2-PART DISPLACED FRACTURE OF SURGICAL NECK OF LEFT HUMERUS, INITIAL ENCOUNTER FOR CLOSED FRACTURE: Primary | ICD-10-CM

## 2022-10-07 DIAGNOSIS — F41.8 DEPRESSION WITH ANXIETY: ICD-10-CM

## 2022-10-07 LAB
ALBUMIN SERPL BCP-MCNC: 2.8 G/DL (ref 3.5–5)
ALP SERPL-CCNC: 58 U/L (ref 43–122)
ALT SERPL W P-5'-P-CCNC: 56 U/L
ANION GAP SERPL CALCULATED.3IONS-SCNC: 4 MMOL/L (ref 5–14)
AST SERPL W P-5'-P-CCNC: 74 U/L (ref 17–59)
BASOPHILS # BLD AUTO: 0.04 THOUSANDS/ÂΜL (ref 0–0.1)
BASOPHILS NFR BLD AUTO: 0 % (ref 0–1)
BILIRUB SERPL-MCNC: 0.32 MG/DL (ref 0.2–1)
BUN SERPL-MCNC: 10 MG/DL (ref 5–25)
CALCIUM ALBUM COR SERPL-MCNC: 9.4 MG/DL (ref 8.3–10.1)
CALCIUM SERPL-MCNC: 8.4 MG/DL (ref 8.4–10.2)
CHLORIDE SERPL-SCNC: 104 MMOL/L (ref 96–108)
CO2 SERPL-SCNC: 28 MMOL/L (ref 21–32)
CREAT SERPL-MCNC: 0.61 MG/DL (ref 0.7–1.5)
EOSINOPHIL # BLD AUTO: 0.1 THOUSAND/ÂΜL (ref 0–0.61)
EOSINOPHIL NFR BLD AUTO: 1 % (ref 0–6)
ERYTHROCYTE [DISTWIDTH] IN BLOOD BY AUTOMATED COUNT: 13.7 % (ref 11.6–15.1)
FLUAV RNA RESP QL NAA+PROBE: NEGATIVE
FLUBV RNA RESP QL NAA+PROBE: NEGATIVE
GFR SERPL CREATININE-BSD FRML MDRD: 104 ML/MIN/1.73SQ M
GLUCOSE SERPL-MCNC: 96 MG/DL (ref 70–99)
HCT VFR BLD AUTO: 29.9 % (ref 36.5–49.3)
HGB BLD-MCNC: 9.3 G/DL (ref 12–17)
IMM GRANULOCYTES # BLD AUTO: 0.06 THOUSAND/UL (ref 0–0.2)
IMM GRANULOCYTES NFR BLD AUTO: 1 % (ref 0–2)
LYMPHOCYTES # BLD AUTO: 1.87 THOUSANDS/ÂΜL (ref 0.6–4.47)
LYMPHOCYTES NFR BLD AUTO: 17 % (ref 14–44)
MCH RBC QN AUTO: 33.3 PG (ref 26.8–34.3)
MCHC RBC AUTO-ENTMCNC: 31.1 G/DL (ref 31.4–37.4)
MCV RBC AUTO: 107 FL (ref 82–98)
MONOCYTES # BLD AUTO: 1.34 THOUSAND/ÂΜL (ref 0.17–1.22)
MONOCYTES NFR BLD AUTO: 12 % (ref 4–12)
NEUTROPHILS # BLD AUTO: 7.59 THOUSANDS/ÂΜL (ref 1.85–7.62)
NEUTS SEG NFR BLD AUTO: 69 % (ref 43–75)
NRBC BLD AUTO-RTO: 0 /100 WBCS
PLATELET # BLD AUTO: 527 THOUSANDS/UL (ref 149–390)
PMV BLD AUTO: 9.3 FL (ref 8.9–12.7)
POTASSIUM SERPL-SCNC: 3.7 MMOL/L (ref 3.5–5.3)
PROT SERPL-MCNC: 5.4 G/DL (ref 6.4–8.4)
RBC # BLD AUTO: 2.79 MILLION/UL (ref 3.88–5.62)
RSV RNA RESP QL NAA+PROBE: POSITIVE
SARS-COV-2 RNA RESP QL NAA+PROBE: NEGATIVE
SODIUM SERPL-SCNC: 136 MMOL/L (ref 135–147)
WBC # BLD AUTO: 11 THOUSAND/UL (ref 4.31–10.16)

## 2022-10-07 PROCEDURE — 0241U HB NFCT DS VIR RESP RNA 4 TRGT: CPT | Performed by: HOSPITALIST

## 2022-10-07 PROCEDURE — 99024 POSTOP FOLLOW-UP VISIT: CPT | Performed by: PHYSICIAN ASSISTANT

## 2022-10-07 PROCEDURE — 97163 PT EVAL HIGH COMPLEX 45 MIN: CPT

## 2022-10-07 PROCEDURE — 85025 COMPLETE CBC W/AUTO DIFF WBC: CPT | Performed by: PHYSICIAN ASSISTANT

## 2022-10-07 PROCEDURE — 99239 HOSP IP/OBS DSCHRG MGMT >30: CPT | Performed by: HOSPITALIST

## 2022-10-07 PROCEDURE — 80053 COMPREHEN METABOLIC PANEL: CPT | Performed by: PHYSICIAN ASSISTANT

## 2022-10-07 RX ORDER — SODIUM CHLORIDE, SODIUM LACTATE, POTASSIUM CHLORIDE, CALCIUM CHLORIDE 600; 310; 30; 20 MG/100ML; MG/100ML; MG/100ML; MG/100ML
125 INJECTION, SOLUTION INTRAVENOUS CONTINUOUS
Status: DISCONTINUED | OUTPATIENT
Start: 2022-10-07 | End: 2022-10-08

## 2022-10-07 RX ORDER — ATORVASTATIN CALCIUM 40 MG/1
40 TABLET, FILM COATED ORAL DAILY
Status: DISCONTINUED | OUTPATIENT
Start: 2022-10-08 | End: 2022-10-18 | Stop reason: HOSPADM

## 2022-10-07 RX ORDER — OXYCODONE HYDROCHLORIDE 5 MG/1
5 TABLET ORAL EVERY 4 HOURS PRN
Status: DISCONTINUED | OUTPATIENT
Start: 2022-10-07 | End: 2022-10-18 | Stop reason: HOSPADM

## 2022-10-07 RX ORDER — MAGNESIUM HYDROXIDE/ALUMINUM HYDROXICE/SIMETHICONE 120; 1200; 1200 MG/30ML; MG/30ML; MG/30ML
30 SUSPENSION ORAL EVERY 6 HOURS PRN
Status: CANCELLED | OUTPATIENT
Start: 2022-10-07

## 2022-10-07 RX ORDER — ACETAMINOPHEN 325 MG/1
650 TABLET ORAL EVERY 6 HOURS PRN
Status: DISCONTINUED | OUTPATIENT
Start: 2022-10-07 | End: 2022-10-07 | Stop reason: SDUPTHER

## 2022-10-07 RX ORDER — METRONIDAZOLE 7.5 MG/G
GEL TOPICAL 2 TIMES DAILY
Status: CANCELLED | OUTPATIENT
Start: 2022-10-07

## 2022-10-07 RX ORDER — NICOTINE 21 MG/24HR
1 PATCH, TRANSDERMAL 24 HOURS TRANSDERMAL DAILY
Status: CANCELLED | OUTPATIENT
Start: 2022-10-08

## 2022-10-07 RX ORDER — OXYCODONE HYDROCHLORIDE 5 MG/1
5 TABLET ORAL EVERY 4 HOURS PRN
Status: CANCELLED | OUTPATIENT
Start: 2022-10-07

## 2022-10-07 RX ORDER — LANOLIN ALCOHOL/MO/W.PET/CERES
3 CREAM (GRAM) TOPICAL
Status: DISCONTINUED | OUTPATIENT
Start: 2022-10-07 | End: 2022-10-11

## 2022-10-07 RX ORDER — OLANZAPINE 2.5 MG/1
2.5 TABLET ORAL
Status: DISCONTINUED | OUTPATIENT
Start: 2022-10-07 | End: 2022-10-18 | Stop reason: HOSPADM

## 2022-10-07 RX ORDER — MAGNESIUM HYDROXIDE/ALUMINUM HYDROXICE/SIMETHICONE 120; 1200; 1200 MG/30ML; MG/30ML; MG/30ML
30 SUSPENSION ORAL EVERY 4 HOURS PRN
Status: DISCONTINUED | OUTPATIENT
Start: 2022-10-07 | End: 2022-10-18 | Stop reason: HOSPADM

## 2022-10-07 RX ORDER — MINERAL OIL AND PETROLATUM 150; 830 MG/G; MG/G
1 OINTMENT OPHTHALMIC
Status: DISCONTINUED | OUTPATIENT
Start: 2022-10-07 | End: 2022-10-18 | Stop reason: HOSPADM

## 2022-10-07 RX ORDER — ACETAMINOPHEN 325 MG/1
650 TABLET ORAL EVERY 4 HOURS PRN
Status: DISCONTINUED | OUTPATIENT
Start: 2022-10-07 | End: 2022-10-18 | Stop reason: HOSPADM

## 2022-10-07 RX ORDER — ATORVASTATIN CALCIUM 40 MG/1
40 TABLET, FILM COATED ORAL DAILY
Status: CANCELLED | OUTPATIENT
Start: 2022-10-08

## 2022-10-07 RX ORDER — PROPRANOLOL HYDROCHLORIDE 10 MG/1
5 TABLET ORAL EVERY 8 HOURS PRN
Status: DISCONTINUED | OUTPATIENT
Start: 2022-10-07 | End: 2022-10-18 | Stop reason: HOSPADM

## 2022-10-07 RX ORDER — ALPRAZOLAM 0.5 MG/1
0.25 TABLET ORAL
Status: CANCELLED | OUTPATIENT
Start: 2022-10-08

## 2022-10-07 RX ORDER — MIRTAZAPINE 30 MG/1
30 TABLET, FILM COATED ORAL
Status: DISCONTINUED | OUTPATIENT
Start: 2022-10-07 | End: 2022-10-15

## 2022-10-07 RX ORDER — PAROXETINE HYDROCHLORIDE 20 MG/1
40 TABLET, FILM COATED ORAL EVERY MORNING
Status: DISCONTINUED | OUTPATIENT
Start: 2022-10-08 | End: 2022-10-18 | Stop reason: HOSPADM

## 2022-10-07 RX ORDER — BENZTROPINE MESYLATE 0.5 MG/1
0.5 TABLET ORAL
Status: DISCONTINUED | OUTPATIENT
Start: 2022-10-07 | End: 2022-10-18 | Stop reason: HOSPADM

## 2022-10-07 RX ORDER — OLANZAPINE 5 MG/1
5 TABLET ORAL
Status: DISCONTINUED | OUTPATIENT
Start: 2022-10-07 | End: 2022-10-18 | Stop reason: HOSPADM

## 2022-10-07 RX ORDER — POLYETHYLENE GLYCOL 3350 17 G/17G
17 POWDER, FOR SOLUTION ORAL DAILY PRN
Status: DISCONTINUED | OUTPATIENT
Start: 2022-10-07 | End: 2022-10-07 | Stop reason: SDUPTHER

## 2022-10-07 RX ORDER — METRONIDAZOLE 7.5 MG/G
1 GEL TOPICAL 2 TIMES DAILY
Status: DISCONTINUED | OUTPATIENT
Start: 2022-10-07 | End: 2022-10-18 | Stop reason: HOSPADM

## 2022-10-07 RX ORDER — NICOTINE 21 MG/24HR
1 PATCH, TRANSDERMAL 24 HOURS TRANSDERMAL DAILY
Status: DISCONTINUED | OUTPATIENT
Start: 2022-10-08 | End: 2022-10-17

## 2022-10-07 RX ORDER — LORAZEPAM 2 MG/ML
1 INJECTION INTRAMUSCULAR
Status: DISCONTINUED | OUTPATIENT
Start: 2022-10-07 | End: 2022-10-18 | Stop reason: HOSPADM

## 2022-10-07 RX ORDER — PAROXETINE HYDROCHLORIDE 20 MG/1
40 TABLET, FILM COATED ORAL EVERY MORNING
Status: CANCELLED | OUTPATIENT
Start: 2022-10-08

## 2022-10-07 RX ORDER — AMOXICILLIN 250 MG
1 CAPSULE ORAL DAILY PRN
Status: DISCONTINUED | OUTPATIENT
Start: 2022-10-07 | End: 2022-10-18 | Stop reason: HOSPADM

## 2022-10-07 RX ORDER — HYDROCHLOROTHIAZIDE 12.5 MG/1
12.5 TABLET ORAL DAILY
Status: DISCONTINUED | OUTPATIENT
Start: 2022-10-08 | End: 2022-10-18 | Stop reason: HOSPADM

## 2022-10-07 RX ORDER — OXYCODONE HYDROCHLORIDE 10 MG/1
10 TABLET ORAL EVERY 4 HOURS PRN
Status: CANCELLED | OUTPATIENT
Start: 2022-10-07

## 2022-10-07 RX ORDER — ALPRAZOLAM 0.5 MG/1
0.25 TABLET ORAL
Status: DISCONTINUED | OUTPATIENT
Start: 2022-10-08 | End: 2022-10-10

## 2022-10-07 RX ORDER — OXYCODONE HYDROCHLORIDE 5 MG/1
10 TABLET ORAL EVERY 4 HOURS PRN
Status: DISCONTINUED | OUTPATIENT
Start: 2022-10-07 | End: 2022-10-07

## 2022-10-07 RX ORDER — MAGNESIUM HYDROXIDE/ALUMINUM HYDROXICE/SIMETHICONE 120; 1200; 1200 MG/30ML; MG/30ML; MG/30ML
30 SUSPENSION ORAL EVERY 6 HOURS PRN
Status: DISCONTINUED | OUTPATIENT
Start: 2022-10-07 | End: 2022-10-07 | Stop reason: SDUPTHER

## 2022-10-07 RX ORDER — HYDROXYZINE 50 MG/1
50 TABLET, FILM COATED ORAL
Status: DISCONTINUED | OUTPATIENT
Start: 2022-10-07 | End: 2022-10-18 | Stop reason: HOSPADM

## 2022-10-07 RX ORDER — ASPIRIN 81 MG/1
81 TABLET, CHEWABLE ORAL DAILY
Status: DISCONTINUED | OUTPATIENT
Start: 2022-10-08 | End: 2022-10-18 | Stop reason: HOSPADM

## 2022-10-07 RX ORDER — HYDROXYZINE HYDROCHLORIDE 25 MG/1
25 TABLET, FILM COATED ORAL
Status: DISCONTINUED | OUTPATIENT
Start: 2022-10-07 | End: 2022-10-18 | Stop reason: HOSPADM

## 2022-10-07 RX ORDER — AMLODIPINE BESYLATE 10 MG/1
10 TABLET ORAL DAILY
Status: DISCONTINUED | OUTPATIENT
Start: 2022-10-08 | End: 2022-10-18 | Stop reason: HOSPADM

## 2022-10-07 RX ORDER — HYDROCHLOROTHIAZIDE 12.5 MG/1
12.5 TABLET ORAL DAILY
Status: CANCELLED | OUTPATIENT
Start: 2022-10-08

## 2022-10-07 RX ORDER — OLANZAPINE 10 MG/1
5 INJECTION, POWDER, LYOPHILIZED, FOR SOLUTION INTRAMUSCULAR
Status: DISCONTINUED | OUTPATIENT
Start: 2022-10-07 | End: 2022-10-18 | Stop reason: HOSPADM

## 2022-10-07 RX ORDER — OXYCODONE HYDROCHLORIDE 5 MG/1
10 TABLET ORAL EVERY 4 HOURS PRN
Status: DISCONTINUED | OUTPATIENT
Start: 2022-10-07 | End: 2022-10-18 | Stop reason: HOSPADM

## 2022-10-07 RX ORDER — AMLODIPINE BESYLATE 10 MG/1
10 TABLET ORAL DAILY
Status: CANCELLED | OUTPATIENT
Start: 2022-10-08

## 2022-10-07 RX ORDER — ONDANSETRON 2 MG/ML
4 INJECTION INTRAMUSCULAR; INTRAVENOUS EVERY 6 HOURS PRN
Status: DISCONTINUED | OUTPATIENT
Start: 2022-10-07 | End: 2022-10-18 | Stop reason: HOSPADM

## 2022-10-07 RX ORDER — ASPIRIN 81 MG/1
81 TABLET, CHEWABLE ORAL DAILY
Status: CANCELLED | OUTPATIENT
Start: 2022-10-08

## 2022-10-07 RX ORDER — ACETAMINOPHEN 325 MG/1
975 TABLET ORAL EVERY 6 HOURS PRN
Status: DISCONTINUED | OUTPATIENT
Start: 2022-10-07 | End: 2022-10-18 | Stop reason: HOSPADM

## 2022-10-07 RX ORDER — ONDANSETRON 2 MG/ML
4 INJECTION INTRAMUSCULAR; INTRAVENOUS EVERY 6 HOURS PRN
Status: CANCELLED | OUTPATIENT
Start: 2022-10-07

## 2022-10-07 RX ORDER — MIRTAZAPINE 30 MG/1
30 TABLET, FILM COATED ORAL
Status: CANCELLED | OUTPATIENT
Start: 2022-10-07

## 2022-10-07 RX ORDER — SODIUM CHLORIDE, SODIUM LACTATE, POTASSIUM CHLORIDE, CALCIUM CHLORIDE 600; 310; 30; 20 MG/100ML; MG/100ML; MG/100ML; MG/100ML
125 INJECTION, SOLUTION INTRAVENOUS CONTINUOUS
Status: CANCELLED | OUTPATIENT
Start: 2022-10-07

## 2022-10-07 RX ORDER — ACETAMINOPHEN 325 MG/1
650 TABLET ORAL EVERY 6 HOURS PRN
Status: CANCELLED | OUTPATIENT
Start: 2022-10-07

## 2022-10-07 RX ORDER — OXYCODONE HYDROCHLORIDE 5 MG/1
5 TABLET ORAL EVERY 4 HOURS PRN
Status: DISCONTINUED | OUTPATIENT
Start: 2022-10-07 | End: 2022-10-07

## 2022-10-07 RX ORDER — POLYETHYLENE GLYCOL 3350 17 G/17G
17 POWDER, FOR SOLUTION ORAL DAILY PRN
Status: DISCONTINUED | OUTPATIENT
Start: 2022-10-07 | End: 2022-10-18 | Stop reason: HOSPADM

## 2022-10-07 RX ORDER — BENZTROPINE MESYLATE 1 MG/ML
1 INJECTION INTRAMUSCULAR; INTRAVENOUS
Status: DISCONTINUED | OUTPATIENT
Start: 2022-10-07 | End: 2022-10-18 | Stop reason: HOSPADM

## 2022-10-07 RX ORDER — POLYETHYLENE GLYCOL 3350 17 G/17G
17 POWDER, FOR SOLUTION ORAL DAILY PRN
Status: CANCELLED | OUTPATIENT
Start: 2022-10-07

## 2022-10-07 RX ADMIN — ATORVASTATIN CALCIUM 40 MG: 80 TABLET, FILM COATED ORAL at 09:22

## 2022-10-07 RX ADMIN — CEFAZOLIN SODIUM 1000 MG: 1 SOLUTION INTRAVENOUS at 00:21

## 2022-10-07 RX ADMIN — AMLODIPINE BESYLATE 10 MG: 10 TABLET ORAL at 09:22

## 2022-10-07 RX ADMIN — ASPIRIN 81 MG CHEWABLE TABLET 81 MG: 81 TABLET CHEWABLE at 09:22

## 2022-10-07 RX ADMIN — OXYCODONE HYDROCHLORIDE 10 MG: 5 TABLET ORAL at 17:23

## 2022-10-07 RX ADMIN — HYDROXYZINE HYDROCHLORIDE 50 MG: 50 TABLET, FILM COATED ORAL at 21:25

## 2022-10-07 RX ADMIN — HYDROCHLOROTHIAZIDE 12.5 MG: 12.5 TABLET ORAL at 09:21

## 2022-10-07 RX ADMIN — FOLIC ACID: 5 INJECTION, SOLUTION INTRAMUSCULAR; INTRAVENOUS; SUBCUTANEOUS at 09:26

## 2022-10-07 RX ADMIN — ALPRAZOLAM 0.25 MG: 0.5 TABLET ORAL at 09:22

## 2022-10-07 RX ADMIN — OXYCODONE HYDROCHLORIDE 10 MG: 10 TABLET ORAL at 09:21

## 2022-10-07 RX ADMIN — SODIUM CHLORIDE, SODIUM LACTATE, POTASSIUM CHLORIDE, AND CALCIUM CHLORIDE 125 ML/HR: .6; .31; .03; .02 INJECTION, SOLUTION INTRAVENOUS at 00:20

## 2022-10-07 RX ADMIN — ALPRAZOLAM 0.25 MG: 0.5 TABLET ORAL at 13:46

## 2022-10-07 RX ADMIN — MIRTAZAPINE 30 MG: 30 TABLET, FILM COATED ORAL at 21:25

## 2022-10-07 RX ADMIN — CEFAZOLIN SODIUM 1000 MG: 1 SOLUTION INTRAVENOUS at 09:19

## 2022-10-07 RX ADMIN — METRONIDAZOLE 1 APPLICATION: 7.5 GEL TOPICAL at 17:38

## 2022-10-07 RX ADMIN — NICOTINE 1 PATCH: 14 PATCH, EXTENDED RELEASE TRANSDERMAL at 09:23

## 2022-10-07 RX ADMIN — PAROXETINE 40 MG: 20 TABLET, FILM COATED ORAL at 09:22

## 2022-10-07 RX ADMIN — MELATONIN TAB 3 MG 3 MG: 3 TAB at 21:25

## 2022-10-07 RX ADMIN — SODIUM CHLORIDE, SODIUM LACTATE, POTASSIUM CHLORIDE, AND CALCIUM CHLORIDE 125 ML/HR: .6; .31; .03; .02 INJECTION, SOLUTION INTRAVENOUS at 09:20

## 2022-10-07 NOTE — PLAN OF CARE
Problem: Nutrition/Hydration-ADULT  Goal: Nutrient/Hydration intake appropriate for improving, restoring or maintaining nutritional needs  Description: Monitor and assess patient's nutrition/hydration status for malnutrition  Collaborate with interdisciplinary team and initiate plan and interventions as ordered  Monitor patient's weight and dietary intake as ordered or per policy  Utilize nutrition screening tool and intervene as necessary  Determine patient's food preferences and provide high-protein, high-caloric foods as appropriate       INTERVENTIONS:  - Monitor oral intake, urinary output, labs, and treatment plans  - Assess nutrition and hydration status and recommend course of action  - Evaluate amount of meals eaten  - Assist patient with eating if necessary   - Allow adequate time for meals  - Recommend/ encourage appropriate diets, oral nutritional supplements, and vitamin/mineral supplements  - Order, calculate, and assess calorie counts as needed  - Recommend, monitor, and adjust tube feedings and TPN/PPN based on assessed needs  - Assess need for intravenous fluids  - Provide specific nutrition/hydration education as appropriate  - Include patient/family/caregiver in decisions related to nutrition  Outcome: Not Progressing     Problem: SAFETY ADULT  Goal: Patient will remain free of falls  Description: INTERVENTIONS:  - Educate patient/family on patient safety including physical limitations  - Instruct patient to call for assistance with activity   - Consult OT/PT to assist with strengthening/mobility   - Keep Call bell within reach  - Keep bed low and locked with side rails adjusted as appropriate  - Keep care items and personal belongings within reach  - Initiate and maintain comfort rounds  - Make Fall Risk Sign visible to staff  - Offer Toileting every  Hours, in advance of need  - Initiate/Maintain alarm  - Obtain necessary fall risk management equipment:   - Apply yellow socks and bracelet for high fall risk patients  - Consider moving patient to room near nurses station  Outcome: Not Progressing  Goal: Maintain or return to baseline ADL function  Description: INTERVENTIONS:  -  Assess patient's ability to carry out ADLs; assess patient's baseline for ADL function and identify physical deficits which impact ability to perform ADLs (bathing, care of mouth/teeth, toileting, grooming, dressing, etc )  - Assess/evaluate cause of self-care deficits   - Assess range of motion  - Assess patient's mobility; develop plan if impaired  - Assess patient's need for assistive devices and provide as appropriate  - Encourage maximum independence but intervene and supervise when necessary  - Involve family in performance of ADLs  - Assess for home care needs following discharge   - Consider OT consult to assist with ADL evaluation and planning for discharge  - Provide patient education as appropriate  Outcome: Not Progressing  Goal: Maintains/Returns to pre admission functional level  Description: INTERVENTIONS:  - Perform BMAT or MOVE assessment daily    - Set and communicate daily mobility goal to care team and patient/family/caregiver  - Collaborate with rehabilitation services on mobility goals if consulted  - Perform Range of Motion  times a day  - Reposition patient every  hours    - Dangle patient times a day  - Stand patient  times a day  - Ambulate patient  times a day  - Out of bed to chair  times a day   - Out of bed for meal times a day  - Out of bed for toileting  - Record patient progress and toleration of activity level   Outcome: Not Progressing     Problem: DISCHARGE PLANNING  Goal: Discharge to home or other facility with appropriate resources  Description: INTERVENTIONS:  - Identify barriers to discharge w/patient and caregiver  - Arrange for needed discharge resources and transportation as appropriate  - Identify discharge learning needs (meds, wound care, etc )  - Arrange for interpretive services to assist at discharge as needed  - Refer to Case Management Department for coordinating discharge planning if the patient needs post-hospital services based on physician/advanced practitioner order or complex needs related to functional status, cognitive ability, or social support system  Outcome: Not Progressing     Problem: Knowledge Deficit  Goal: Patient/family/caregiver demonstrates understanding of disease process, treatment plan, medications, and discharge instructions  Description: Complete learning assessment and assess knowledge base  Interventions:  - Provide teaching at level of understanding  - Provide teaching via preferred learning methods  Outcome: Not Progressing     Problem: DEPRESSION  Goal: Will be euthymic at discharge  Description: INTERVENTIONS:  - Administer medication as ordered  - Provide emotional support via 1:1 interaction with staff  - Encourage involvement in milieu/groups/activities  - Monitor for social isolation  Outcome: Not Progressing     Problem: BEHAVIOR  Goal: Pt/Family maintain appropriate behavior and adhere to behavioral management agreement, if implemented  Description: INTERVENTIONS:  - Assess the family dynamic   - Encourage verbalization of thoughts and concerns in a socially appropriate manner  - Assess patient/family's coping skills and non-compliant behavior (including use of illegal substances)  - Utilize positive, consistent limit setting strategies supporting safety of patient, staff and others  - Initiate consult with Case Management, Spiritual Care or other ancillary services as appropriate  - If a patient's/visitor's behavior jeopardizes the safety of the patient, staff, or others, refer to organization procedure     - Notify Security of behavior or suspected illegal substances which indicate the need for search of the patient and/or belongings  - Encourage participation in the decision making process about a behavioral management agreement; implement if patient meets criteria  Outcome: Not Progressing     Problem: ANXIETY  Goal: Will report anxiety at manageable levels  Description: INTERVENTIONS:  - Administer medication as ordered  - Teach and encourage coping skills  - Provide emotional support  - Assess patient/family for anxiety and ability to cope  Outcome: Not Progressing  Goal: By discharge: Patient will verbalize 2 strategies to deal with anxiety  Description: Interventions:  - Identify any obvious source/trigger to anxiety  - Staff will assist patient in applying identified coping technique/skills  - Encourage attendance of scheduled groups and activities  Outcome: Not Progressing     Problem: SLEEP DISTURBANCE  Goal: Will exhibit normal sleeping pattern  Description: Interventions:  -  Assess the patients sleep pattern, noting recent changes  - Administer medication as ordered  - Decrease environmental stimuli, including noise, as appropriate during the night  - Encourage the patient to actively participate in unit groups and or exercise during the day to enhance ability to achieve adequate sleep at night  - Assess the patient, in the morning, encouraging a description of sleep experience  Outcome: Not Progressing

## 2022-10-07 NOTE — ASSESSMENT & PLAN NOTE
· The brother suspected patient has been abusing his Xanax at home  · Dr Norbert White is the patient's psychiatrist  · Psychiatry consulted, recommend admission to [facility] for Xanax weaning  · Continue Xanax 0 25 mg po b i d

## 2022-10-07 NOTE — PROGRESS NOTES
Progress Note - Behavioral Health   Tahir Art 77 y o  male MRN: [id number]  Unit/Bed#: 7T [facility] 703-01 Encounter: [id number]    Assessment/Plan   Principal Problem:    2-part displaced fracture of surgical neck of left humerus, initial encounter for closed fracture  Active Problems:    Depression with anxiety    Primary hypertension    Mixed hyperlipidemia    Type 2 diabetes mellitus with diabetic peripheral angiopathy without gangrene, without long-term current use of insulin (HCC)    Alcohol use    Toxic metabolic encephalopathy    Hypokalemia  Had surgical repair to his the older and is recovering uneventfully  Remains anxious and depressed and has some withdrawn with a blunted affect today but is alert oriented and no sign of confusion  He tolerated procedure well has some residual pain  Understands the need for psychiatric treatment and to come off his benzodiazepines also to be and alcohol and drug abstinence once he is home  Have recommended that he get into a a follow-up for his acute and chronic alcoholism      Behavior over the last 24 hours:  unchanged  Sleep: normal  Appetite: normal  Medication side effects: No  ROS: no complaints    Mental Status Evaluation:  Appearance:  age appropriate   Behavior:  guarded   Speech:  normal pitch and normal volume   Mood:  anxious and depressed   Affect:  blunted and constricted   Thought Process:  tangential   Thought Content:  normal   Perceptual Disturbances: None   Risk Potential: Suicidal Ideations none  Homicidal Ideations none  Potential for Aggression No   Sensorium:  person, place, and situation   Memory:  recent and remote memory grossly intact   Consciousness:  alert and awake    Attention: attention span appeared shorter than expected for age   Insight:  fair   Judgment: fair   Gait/Station: normal gait/station   Motor Activity: no abnormal movements     Progress Toward Goals:  No change    Recommended Treatment: Continue with group therapy, milieu therapy and occupational therapy  Risks, benefits and possible side effects of Medications:   Risks, benefits, and possible side effects of medications explained to patient and patient verbalizes understanding  Medications: all current active meds have been reviewed  Labs: I have personally reviewed all pertinent laboratory/tests results  Most Recent Labs:   Lab Results   Component Value Date    WBC 11 00 (H) 10/07/2022    RBC 2 79 (L) 10/07/2022    HGB 9 3 (L) 10/07/2022    HCT 29 9 (L) 10/07/2022     (H) 10/07/2022    RDW 13 7 10/07/2022    NEUTROABS 7 59 10/07/2022    SODIUM 136 10/07/2022    K 3 7 10/07/2022     10/07/2022    CO2 28 10/07/2022    BUN 10 10/07/2022    CREATININE 0 61 (L) 10/07/2022    GLUC 96 10/07/2022    GLUF 102 (H) 10/03/2022    CALCIUM 8 4 10/07/2022    AST 74 (H) 10/07/2022    ALT 56 (H) 10/07/2022    ALKPHOS 58 10/07/2022    TP 5 4 (L) 10/07/2022    ALB 2 8 (L) 10/07/2022    TBILI 0 32 10/07/2022    CHOLESTEROL 160 07/21/2022    HDL 62 07/21/2022    TRIG 103 07/21/2022    LDLCALC 77 07/21/2022    NONHDLC 98 07/21/2022    SQH2WBVOGIBN 2 090 12/11/2019    FREET4 0 9 02/27/2017    HGBA1C 5 7 (H) 07/21/2022     07/21/2022       Counseling / Coordination of Care  Total floor / unit time spent today 25 minutes  Greater than 50% of total time was spent with the patient and / or family counseling and / or coordination of care  A description of the counseling / coordination of care:      Will be transferred to [address] [place] [address] once the his surgeons were satisfied with his recovery from his recent  shoulder  surgery

## 2022-10-07 NOTE — ASSESSMENT & PLAN NOTE
· Recent history of fall and shoulder pressure 1 week ago (was evaluated [facility])  · Left shoulder x-ray showed Proximal humeral fracture   superior migration of shaft is new  · Cont pain control  · Orthopedic surgery consulted, appreciate recommendations  · S/p operative repair  Pt doing well

## 2022-10-07 NOTE — NURSING NOTE
Pt is a 201 BMAT 4 from [facility] 7T  Pt was brought into the ED with his brother Renae Sellers, who is his POA  Pt was experiencing increased confusion, which his brother feels is due to the abuse of benzodiazepines  Pt has a hx of alcohol abuse and had fallen after drinking heavily, which caused him to fracture his humerus  Pt's brother believes pt was mixing Xanax with the Oxycodone that he received for pain management  He was then admitted to [facility] where he had surgery done on his humerus on 10/6  Per pt, he says he is here because "Dr Efrain Reyna is going to help him wean off of Xanax " He reports feeling 5/10 depressed because of this pain from the surgery  2/4 anxiety  Cooperative with admission process  Scant and guarded  Skin assessment shows bruising on L knee, flank, and arm from the fall  Denies other psych symptoms  10mg of PRN oxycodone given for 10/10 arm pain @1723  Pt currently resting comfortably in bed

## 2022-10-07 NOTE — PROGRESS NOTES
Progress Note - Orthopedics   Jose Burnham 77 y o  male MRN: [id number]  Unit/Bed#: 7T [facility] 703-01 Encounter: [id number]    Assessment:  78 yo male POD1 s/p ORIF left proximal humerus    Plan:  · NWB to LUE  · Continue sling  · Pain control prn  · Apply ice  · PT/ OT    · Medical management per primary team    · Patient is ortho stable for discharge to [facility]  Patient will follow up with Dr Maxine Barclay in 2 weeks  Subjective: Patient seen and examined  Patient states he feels the block is wearing off and his pain is returning  He notes dulled sensation in his thumb and index finger  Denies fevers or chills  Vitals: Blood pressure 157/77, pulse 83, temperature 97 7 °F (36 5 °C), temperature source Temporal, resp  rate 18, height 5' 9" (1 753 m), weight 72 4 kg (159 lb 9 8 oz), SpO2 97 %  ,Body mass index is 23 57 kg/m²  Intake/Output Summary (Last 24 hours) at 10/7/2022 0815  Last data filed at 10/7/2022 0020  Gross per 24 hour   Intake 1754 17 ml   Output 1900 ml   Net -145 83 ml       Invasive Devices  Report    Peripheral Intravenous Line  Duration           Peripheral IV 10/02/22 Right Antecubital 4 days                Ortho Exam:   Left Upper Extremity:  Inspection- sling in place  mepilex dressing CDI over incision  Ecchymosis about the shoulder and axilla  Palpation- +swelling +effusion +TTP as expected  Upper and forearm soft and compressible  ROM- shoulder and elbow deferred  Able to flex and extend wrist and fingers  Neurovascular- sensation dulled thumb and index finger, intact axillary, median, radial, and ulnar nerve distributions otherwise  Palpable radial pulse  AIN/ PIN intact       Lab, Imaging and other studies:   CBC:   Lab Results   Component Value Date    WBC 11 00 (H) 10/07/2022    HGB 9 3 (L) 10/07/2022    HCT 29 9 (L) 10/07/2022     (H) 10/07/2022     (H) 10/07/2022    MCH 33 3 10/07/2022    MCHC 31 1 (L) 10/07/2022    RDW 13 7 10/07/2022    MPV 9 3 10/07/2022    NRBC 0 10/07/2022     CMP:   Lab Results   Component Value Date    SODIUM 136 10/07/2022     10/07/2022    CO2 28 10/07/2022    BUN 10 10/07/2022    CREATININE 0 61 (L) 10/07/2022    CALCIUM 8 4 10/07/2022    AST 74 (H) 10/07/2022    ALT 56 (H) 10/07/2022    ALKPHOS 58 10/07/2022    EGFR 104 10/07/2022

## 2022-10-07 NOTE — ASSESSMENT & PLAN NOTE
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 found to have subacute stroke that time and recommended dc to [facility] but patient was discharged home he had a fall again but went back to home. yesterday ~6: 30 PM he was noted to have worsening slurred speech, worse rt facial weakness and droop. he was taken to [facility] initially where code stroke activated NIH was 3, no tPA given, ct angio revealed High-grade short segment stenosis of the mid and distal M1 segment of the right MCA. patient was then transferred to [facility] for further management. as per the chart review Neurology attending Dr Rm, case was reviewed with Neuro IR and recommended MR brain for now and stroke monitoring.denies fall, head trauma, LOC seizure, chest pain, fever, cough, abdominal pain or nausea vomiting. (29 May 2022 00:30)  GI consulted for 2 episodes of BMs questionable BRBPR and melena yesterday afternoon. Patient seen and evaluated at bedside, resting comfortably in bed without complaints. As per nursing staff, patient had 2 brown nonbloody BMs this morning. FAITH revealed nonbloody brown stool. VSS. Hemoglobin remains stable at 10.7gm.       PAST MEDICAL & SURGICAL HISTORY:  Hypertension      Hyperlipidemia      GERD (gastroesophageal reflux disease)      Parkinson&#x27;s disease      Prostate cancer      Mitral valve prolapse      Malignant neoplasm of tongue, unspecified  s/p surgery and radiation discontinued 7/21      Throat cancer      History of vasectomy      Prostate cancer  s/p cyber knife      S/P partial glossectomy  4/21          Allergies    No Known Allergies    Intolerances        MEDICATIONS  (STANDING):  ampicillin  IVPB 2 Gram(s) IV Intermittent every 6 hours  ascorbic acid 500 milliGRAM(s) Oral daily  atorvastatin 80 milliGRAM(s) Oral at bedtime  carbidopa/levodopa  25/100 1 Tablet(s) Oral <User Schedule>  citalopram 20 milliGRAM(s) Oral daily  diltiazem    milliGRAM(s) Oral daily  enalapril 10 milliGRAM(s) Oral daily  mupirocin 2% Ointment 1 Application(s) Topical two times a day  pantoprazole  Injectable 40 milliGRAM(s) IV Push every 12 hours  primidone 50 milliGRAM(s) Oral daily  tamsulosin 0.4 milliGRAM(s) Oral at bedtime    MEDICATIONS  (PRN):  acetaminophen     Tablet .. 650 milliGRAM(s) Oral every 6 hours PRN Temp greater or equal to 38C (100.4F), Mild Pain (1 - 3)      Social History:    Marital Status:  (   )    (   ) Single    (   )    (  )   Occupation:   Lives with: (  ) alone  (  ) children   (  ) spouse   (  ) parents  (  ) other    Substance Use (street drugs): (  ) never used  (  ) other:  Tobacco Usage:  (   ) never smoked   (   ) former smoker   (   ) current smoker  (     ) pack year  (        ) last cigarette date  Alcohol Usage:  Sexual History:     Family History   IBD (  ) Yes   (  ) No  GI Malignancy (  )  Yes    (  ) No    Health Management     Last Colonoscopy -      (     ) Advanced Directives: (     ) None    (      ) DNR    (     ) DNI    (     ) Health Care Proxy:     Review of Systems:    Unable to obtain.       Vital Signs Last 24 Hrs  T(C): 36.7 (04 Jun 2022 09:36), Max: 36.9 (04 Jun 2022 05:31)  T(F): 98.1 (04 Jun 2022 09:36), Max: 98.4 (04 Jun 2022 05:31)  HR: 101 (04 Jun 2022 09:36) (84 - 101)  BP: 90/60 (04 Jun 2022 09:36) (90/60 - 126/74)  BP(mean): --  RR: 18 (04 Jun 2022 09:36) (18 - 18)  SpO2: 95% (04 Jun 2022 09:36) (94% - 98%)    PHYSICAL EXAM:    Constitutional: Elderly and cachectic male, NAD  Neck: No LAD, supple  Respiratory: CTA and P  Cardiovascular: S1 and S2, RRR, no M  Gastrointestinal: BS+, soft, NT/ND, neg HSM,  Extremities: No peripheral edema, neg clubbing, cyanosis  Vascular: 2+ peripheral pulses  Neurological: Calm, cooperative.   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 found to have subacute stroke that time and recommended dc to [facility] but patient was discharged home he had a fall again but went back to home. yesterday ~6: 30 PM he was noted to have worsening slurred speech, worse rt facial weakness and droop. he was taken to [facility] initially where code stroke activated NIH was 3, no tPA given, ct angio revealed High-grade short segment stenosis of the mid and distal M1 segment of the right MCA. patient was then transferred to [facility] for further management. as per the chart review Neurology attending Dr Rm, case was reviewed with Neuro IR and recommended MR brain for now and stroke monitoring.denies fall, head trauma, LOC seizure, chest pain, fever, cough, abdominal pain or nausea vomiting.   GI consulted for 2 episodes of BMs questionable BRBPR and melena yesterday afternoon. Patient seen and evaluated at bedside, resting comfortably in bed without complaints. As per nursing staff, patient had 2 brown nonbloody BMs this morning. FAITH revealed nonbloody brown stool. VSS. Hemoglobin remains stable at 10.7gm.       PAST MEDICAL & SURGICAL HISTORY:  Hypertension      Hyperlipidemia      GERD (gastroesophageal reflux disease)      Parkinson&#x27;s disease      Prostate cancer      Mitral valve prolapse      Malignant neoplasm of tongue, unspecified  s/p surgery and radiation discontinued 7/21      Throat cancer      History of vasectomy      Prostate cancer  s/p cyber knife      S/P partial glossectomy  4/21          Allergies    No Known Allergies    Intolerances        MEDICATIONS  (STANDING):  ampicillin  IVPB 2 Gram(s) IV Intermittent every 6 hours  ascorbic acid 500 milliGRAM(s) Oral daily  atorvastatin 80 milliGRAM(s) Oral at bedtime  carbidopa/levodopa  25/100 1 Tablet(s) Oral <User Schedule>  citalopram 20 milliGRAM(s) Oral daily  diltiazem    milliGRAM(s) Oral daily  enalapril 10 milliGRAM(s) Oral daily  mupirocin 2% Ointment 1 Application(s) Topical two times a day  pantoprazole  Injectable 40 milliGRAM(s) IV Push every 12 hours  primidone 50 milliGRAM(s) Oral daily  tamsulosin 0.4 milliGRAM(s) Oral at bedtime    MEDICATIONS  (PRN):  acetaminophen     Tablet .. 650 milliGRAM(s) Oral every 6 hours PRN Temp greater or equal to 38C (100.4F), Mild Pain (1 - 3)      Social History:    Marital Status:  (   )    (   ) Single    (   )    (  )   Occupation:   Lives with: (  ) alone  (  ) children   (  ) spouse   (  ) parents  (  ) other    Substance Use (street drugs): (  ) never used  (  ) other:  Tobacco Usage:  (   ) never smoked   (   ) former smoker   (   ) current smoker  (     ) pack year  (        ) last cigarette date  Alcohol Usage:  Sexual History:     Family History   IBD (  ) Yes   (  ) No  GI Malignancy (  )  Yes    (  ) No    Health Management     Last Colonoscopy -      (     ) Advanced Directives: (     ) None    (      ) DNR    (     ) DNI    (     ) Health Care Proxy:     Review of Systems:    Unable to obtain.       Vital Signs Last 24 Hrs  T(C): 36.7 (04 Jun 2022 09:36), Max: 36.9 (04 Jun 2022 05:31)  T(F): 98.1 (04 Jun 2022 09:36), Max: 98.4 (04 Jun 2022 05:31)  HR: 101 (04 Jun 2022 09:36) (84 - 101)  BP: 90/60 (04 Jun 2022 09:36) (90/60 - 126/74)  BP(mean): --  RR: 18 (04 Jun 2022 09:36) (18 - 18)  SpO2: 95% (04 Jun 2022 09:36) (94% - 98%)    PHYSICAL EXAM:    Constitutional: Elderly and cachectic male, NAD  Neck: No LAD, supple  Respiratory: CTA and P  Cardiovascular: S1 and S2, RRR, no M  Gastrointestinal: BS+, soft, NT/ND, neg HSM,  Extremities: No peripheral edema, neg clubbing, cyanosis  Vascular: 2+ peripheral pulses  Neurological: Calm, cooperative.   Psychiatric: Normal mood, normal affect  Skin: No rashes  FAITH: Brown stool       LABS:                        10.7   6.73  )-----------( 309      ( 04 Jun 2022 06:32 )             31.3                   RADIOLOGY & ADDITIONAL TESTS:

## 2022-10-07 NOTE — CASE MANAGEMENT
Case Management Discharge Planning Note    Patient name Emiliano Jimenez  Location 7T [facility] 703/7T [facility] 703-01 MRN [id number]  : 1955 Date 10/7/2022       Current Admission Date: 10/2/2022  Current Admission Diagnosis:2-part displaced fracture of surgical neck of left humerus, initial encounter for closed fracture   Patient Active Problem List    Diagnosis Date Noted    Hypokalemia 10/05/2022    Toxic metabolic encephalopathy     2-part displaced fracture of surgical neck of left humerus, initial encounter for closed fracture 10/02/2022    DARWIN (acute kidney injury) ([facility] ) 2022    Leukocytosis 2022    Anxiety 2022    Alcohol withdrawal syndrome, with delirium ([place] [address] ) 2022    Chronic bronchitis ([place] [address] ) 2022    Bilateral carotid artery stenosis 10/14/2021    Alcohol use 2020    Rosacea 2020    Rhinophyma 2020    Smoking 2020    Type 2 diabetes mellitus with diabetic peripheral angiopathy without gangrene, without long-term current use of insulin ([place] [address] ) 2020    Atherosclerotic PVD with intermittent claudication ([place] [address] ) 2017    Mixed hyperlipidemia 2017    Depression with anxiety 2017    Primary hypertension 2017      LOS (days): 4  Geometric Mean LOS (GMLOS) (days): 2 90  Days to GMLOS:-0 9     OBJECTIVE:  Risk of Unplanned Readmission Score: 17 78         Current admission status: Inpatient   Preferred Pharmacy:   [address] [place] - [id number]  [id number] [facility] [address]  Phone: [phone] Fax: [phone]    Primary Care Provider: Jaren Dixon MD    Primary Insurance: [address] [facility]  Secondary Insurance:     DISCHARGE DETAILS:    CM was notified at morning rounds that pt had surgical repair humerus   Pt has a Sling  Pt's referral was sent to [facility] 219 intake, pending bed availability and acceptance     CM department will continue to follow through pt's D/C

## 2022-10-07 NOTE — ASSESSMENT & PLAN NOTE
· Recent history of fall and shoulder pressure 1 week ago (was evaluated [facility])  · Left shoulder x-ray showed Proximal humeral fracture   superior migration of shaft is new  · Cont pain control  · Orthopedic surgery consulted, appreciate recommendations  · S/p operative repair  Pt doing well

## 2022-10-07 NOTE — DISCHARGE SUMMARY
[facility]  Discharge- Tamia Joaquin 1955, 77 y o  male MRN: [id number]  Unit/Bed#: 7T [facility] 703-01 Encounter: [id number]  Primary Care Provider: Kieran Ibarra MD   Date and time admitted to hospital: 10/2/2022 12:38 PM    Hypokalemia  Assessment & Plan  -monitor and replete as necessary    Toxic metabolic encephalopathy  Assessment & Plan  · POA, patient is AAO x3 however sluggish in reaction, and he stated his brother brought him in ED however he is not sure why  · Patient is afebrile, leukocytosis noted  · Patient was in [facility] yesterday, plan to inpatient detox however plan fell through due to insurance issue  · ED physician discussed with  - does not need to be admitted to inpatient detox  · Patient has chronic alcohol use, last use was 10 days prior to ED arrival  · CT brain negative for acute intracranial abnormality  · Continue thiamine and folic for now in the setting of chronic alcohol dependence  · The brothers suspect that patient has been abusing his Xanax at home (which could be contributing to his current mental status)  · Limited narcotics and benzodiazepine  · UA not suggestive of UTI  · PT/OT consult placed  ·  for disposition planning  · Mentation at baseline  Resolved  Alcohol use  Assessment & Plan  · History of chronic alcohol dependence complicated by DTs and required to be in ICU  · The last alcohol use was 10 days ago, currently has no withdrawal symptoms  · If patient shows alcohol withdrawal symptoms, consider toxicology consult  · Alcohol level in ED was negative  · Case management consult for disposition planning    Type 2 diabetes mellitus with diabetic peripheral angiopathy without gangrene, without long-term current use of insulin (HCC)  Assessment & Plan  Lab Results   Component Value Date    HGBA1C 5 7 (H) 07/21/2022       No results for input(s): POCGLU in the last 72 hours      Blood Sugar Average: Last 72 hrs:       · Blood sugar acceptable  · Hold home metformin  · Diabetic diet, sliding scale insulin and Accu-Cheks    Mixed hyperlipidemia  Assessment & Plan  · Continue home Lipitor    Primary hypertension  Assessment & Plan  · BP acceptable  · Continue home amlodipine, HCTZ    Depression with anxiety  Assessment & Plan  · The brother suspected patient has been abusing his Xanax at home  · Dr Raquel Butt is the patient's psychiatrist  · Psychiatry consulted, recommend admission to [facility] for Xanax weaning  · Continue Xanax 0 25 mg po b i d       * 2-part displaced fracture of surgical neck of left humerus, initial encounter for closed fracture  Assessment & Plan  · Recent history of fall and shoulder pressure 1 week ago (was evaluated [facility])  · Left shoulder x-ray showed Proximal humeral fracture   superior migration of shaft is new  · Cont pain control  · Orthopedic surgery consulted, appreciate recommendations  · S/p operative repair  Pt doing well  Hospital Course:     Tahir Casey is a 77 y o  male patient who originally presented to the hospital on   Admission Orders (From admission, onward)     Ordered        10/03/22 1612  Inpatient Admission  Once            10/02/22 1635  Place in Observation  Once                     due to  humerus fracture  Patient underwent orthopedic surgery successfully  He is maintained in a sling  He is doing quite well postoperatively  He has no significant pain  He is going to be admitted to [facility] for management of his benzodiazepine weaning in the setting of psychiatric disease    Please see above list of diagnoses and related plan for additional information  Physical Exam:    See progress note  Condition at Discharge:  good      Discharge instructions/Information to patient and family:   See after visit summary for information provided to patient and family        Provisions for Follow-Up Care:  See after visit summary for information related to follow-up care and any pertinent home health orders  Disposition:     Home       Discharge Statement:  I spent 33 minutes discharging the patient  This time was spent on the day of discharge  I had direct contact with the patient on the day of discharge  Greater than 50% of the total time was spent examining patient, answering all patient questions, arranging and discussing plan of care with patient as well as directly providing post-discharge instructions  Additional time then spent on discharge activities  Discharge Medications:  See after visit summary for reconciled discharge medications provided to patient and family        ** Please Note: This note has been constructed using a voice recognition system **

## 2022-10-07 NOTE — PLAN OF CARE
Problem: PAIN - ADULT  Goal: Verbalizes/displays adequate comfort level or baseline comfort level  Description: Interventions:  - Encourage patient to monitor pain and request assistance  - Assess pain using appropriate pain scale  - Administer analgesics based on type and severity of pain and evaluate response  - Implement non-pharmacological measures as appropriate and evaluate response  - Consider cultural and social influences on pain and pain management  - Notify physician/advanced practitioner if interventions unsuccessful or patient reports new pain  Outcome: Progressing     Problem: INFECTION - ADULT  Goal: Absence or prevention of progression during hospitalization  Description: INTERVENTIONS:  - Assess and monitor for signs and symptoms of infection  - Monitor lab/diagnostic results  - Monitor all insertion sites, i e  indwelling lines  - Monitor endotracheal if appropriate and nasal secretions for changes in amount and color  - Administer medications as ordered  - Instruct and encourage patient and family to use good hand hygiene technique  - Identify and instruct in appropriate isolation precautions for identified infection/condition  Outcome: Progressing     Problem: SKIN/TISSUE INTEGRITY - ADULT  Goal: Incision(s), wounds(s) or drain site(s) healing without S/S of infection  Description: INTERVENTIONS  - Assess and document dressing, incision, wound bed, drain sites and surrounding tissue  - Provide patient and family education  - Perform skin care/dressing changes every   Outcome: Progressing

## 2022-10-07 NOTE — PROGRESS NOTES
[facility]  Progress Note - Mitesh Bello 1955, 77 y o  male MRN: [id number]  Unit/Bed#: 7T [facility] 703-01 Encounter: [id number]  Primary Care Provider: Ruperto Escobar MD   Date and time admitted to hospital: 10/2/2022 12:38 PM  Patient is medically cleared for discharge to [address] [place] [address]  Hypokalemia  Assessment & Plan  -monitor and replete as necessary    Toxic metabolic encephalopathy  Assessment & Plan  · POA, patient is AAO x3 however sluggish in reaction, and he stated his brother brought him in ED however he is not sure why  · Patient is afebrile, leukocytosis noted  · Patient was in [facility] yesterday, plan to inpatient detox however plan fell through due to insurance issue  · ED physician discussed with  - does not need to be admitted to inpatient detox  · Patient has chronic alcohol use, last use was 10 days prior to ED arrival  · CT brain negative for acute intracranial abnormality  · Continue thiamine and folic for now in the setting of chronic alcohol dependence  · The brothers suspect that patient has been abusing his Xanax at home (which could be contributing to his current mental status)  · Limited narcotics and benzodiazepine  · UA not suggestive of UTI  · PT/OT consult placed  ·  for disposition planning  · Mentation at baseline  Resolved      Alcohol use  Assessment & Plan  · History of chronic alcohol dependence complicated by DTs and required to be in ICU  · The last alcohol use was 10 days ago, currently has no withdrawal symptoms  · If patient shows alcohol withdrawal symptoms, consider toxicology consult  · Alcohol level in ED was negative  · Case management consult for disposition planning    Type 2 diabetes mellitus with diabetic peripheral angiopathy without gangrene, without long-term current use of insulin [facility])  Assessment & Plan  Lab Results   Component Value Date    HGBA1C 5 7 (H) 07/21/2022       No results for input(s): POCGLU in the last 72 hours  Blood Sugar Average: Last 72 hrs:       · Blood sugar acceptable  · Hold home metformin  · Diabetic diet, sliding scale insulin and Accu-Cheks    Mixed hyperlipidemia  Assessment & Plan  · Continue home Lipitor    Primary hypertension  Assessment & Plan  · BP acceptable  · Continue home amlodipine, HCTZ    Depression with anxiety  Assessment & Plan  · The brother suspected patient has been abusing his Xanax at home  · Dr Charlotte Lucas is the patient's psychiatrist  · Psychiatry consulted, recommend admission to [facility] for Xanax weaning  · Continue Xanax 0 25 mg po b i d       * 2-part displaced fracture of surgical neck of left humerus, initial encounter for closed fracture  Assessment & Plan  · Recent history of fall and shoulder pressure 1 week ago (was evaluated [facility] [place] [id number])  · Left shoulder x-ray showed Proximal humeral fracture   superior migration of shaft is new  · Cont pain control  · Orthopedic surgery consulted, appreciate recommendations  · S/p operative repair  Pt doing well  VTE  Prophylaxis:   Pharmacologic: in place    Patient Centered Rounds: I have performed bedside rounds with nursing staff today  Discussions with Specialists or Other Care Team Provider: case management         Current Length of Stay: 4 day(s)    Current Patient Status: Inpatient        Code Status: Level 1 - Full Code      Subjective:   Pt states pain is well controlled post op    Patient is seen and examined at bedside  All other ROS are negative  Objective:     Vitals:   Temp (24hrs), Av 9 °F (36 6 °C), Min:97 4 °F (36 3 °C), Max:100 2 °F (37 9 °C)    Temp:  [97 4 °F (36 3 °C)-100 2 °F (37 9 °C)] 98 1 °F (36 7 °C)  HR:  [] 99  Resp:  [12-28] 18  BP: (125-194)/(57-86) 164/68  SpO2:  [93 %-100 %] 94 %  Body mass index is 23 57 kg/m²  Input and Output Summary (last 24 hours):        Intake/Output Summary (Last 24 hours) at 10/7/2022 [facility] filed at 10/7/2022 0020  Gross per 24 hour   Intake 1754 17 ml   Output 1500 ml   Net 254 17 ml       Physical Exam:       GEN: No acute distress, comfortable  HEEENT: No JVD, PERRLA, no scleral icterus  RESP: Lungs clear to auscultation bilaterally  CV: RRR, +s1/s2   ABD: SOFT NON TENDER, POSITIVE BOWEL SOUNDS, NO DISTENTION  PSYCH: CALM  NEURO:  NO FOCAL DEFICITS  SKIN: NO RASH  EXTREM: NO EDEMA; L shoulder in sling  Additional Data:     Labs:    Results from last 7 days   Lab Units 10/07/22  0527   WBC Thousand/uL 11 00*   HEMOGLOBIN g/dL 9 3*   HEMATOCRIT % 29 9*   PLATELETS Thousands/uL 527*   NEUTROS PCT % 69   LYMPHS PCT % 17   MONOS PCT % 12   EOS PCT % 1     Results from last 7 days   Lab Units 10/07/22  0527   SODIUM mmol/L 136   POTASSIUM mmol/L 3 7   CHLORIDE mmol/L 104   CO2 mmol/L 28   BUN mg/dL 10   CREATININE mg/dL 0 61*   ANION GAP mmol/L 4*   CALCIUM mg/dL 8 4   ALBUMIN g/dL 2 8*   TOTAL BILIRUBIN mg/dL 0 32   ALK PHOS U/L 58   ALT U/L 56*   AST U/L 74*   GLUCOSE RANDOM mg/dL 96     Results from last 7 days   Lab Units 10/05/22  0434   INR  0 82*     Results from last 7 days   Lab Units 10/02/22  1336 10/01/22  1626   POC GLUCOSE mg/dl 118 111                   * I Have Reviewed All Lab Data Listed Above  Imaging:     Results for orders placed during the hospital encounter of 09/21/22    XR chest 1 view portable    Narrative  CHEST    INDICATION:   hypoxia  COMPARISON:  None    EXAM PERFORMED/VIEWS:  XR CHEST PORTABLE      FINDINGS:    Cardiomediastinal silhouette appears unremarkable  The lungs are clear  No pneumothorax or pleural effusion  Osseous structures appear within normal limits for patient age  Impression  No acute cardiopulmonary disease  Workstation performed: [id number]    No results found for this or any previous visit        *I have reviewed all imaging reports listed above      Recent Cultures (last 7 days):           Last 24 Hours Medication List:   Current Facility-Administered Medications   Medication Dose Route Frequency Provider Last Rate    acetaminophen  650 mg Oral Q6H PRN Kaz Hopper PA-C      ALPRAZolam  0 25 mg Oral BID (AM & Afternoon) Kaz Hopper PA-C      aluminum-magnesium hydroxide-simethicone  30 mL Oral Q6H PRN Kaz Hopper PA-C      amLODIPine  10 mg Oral Daily Kaz Hopper PA-C      aspirin  81 mg Oral Daily Kaz Hopper PA-C      atorvastatin  40 mg Oral Daily Kaz Hopper PA-C      folic acid 1 mg, thiamine 100 mg in 0 9% sodium chloride 100 mL IVPB   Intravenous Daily Kaz Hopper PA-C 200 mL/hr at 10/07/22 4440    hydrochlorothiazide  12 5 mg Oral Daily Kaz Hopper PA-C      lactated ringers  1,000 mL Intravenous Once PRN Kaz Hopper PA-C      And    lactated ringers  1,000 mL Intravenous Once PRN Kaz Hopper PA-C      lactated ringers  125 mL/hr Intravenous Continuous Kaz Hopper PA-C 125 mL/hr (10/07/22 0920)    metroNIDAZOLE   Topical BID Kaz Hopper PA-C      mirtazapine  30 mg Oral HS Kaz Hopper PA-C      nicotine  1 patch Transdermal Daily Kaz Hopper PA-C      ondansetron  4 mg Intravenous Q6H PRN Kaz Hopper PA-C      oxyCODONE  10 mg Oral Q4H PRN Kaz Hopper PA-C      oxyCODONE  5 mg Oral Q4H PRN Kaz Hopper PA-C      PARoxetine  40 mg Oral QAM Kaz Hopper PA-C      polyethylene glycol  17 g Oral Daily PRN Kaz Hopper PA-C      sodium chloride  1,000 mL Intravenous Once PRN Kaz Hopper PA-C      And    sodium chloride  1,000 mL Intravenous Once PRN Kaz Hopper PA-C          Today, Patient Was Seen By: Steve Beard    ** Please Note: Dictation voice to text software may have been used in the creation of this document   **

## 2022-10-07 NOTE — NURSING NOTE
Patient discharged to 6B, IV removed  Report called to receiving RN on 6B  Discharge instructions sent with patient to receiving facility  Patient left unit in stable condition with belongings

## 2022-10-07 NOTE — ASSESSMENT & PLAN NOTE
· The brother suspected patient has been abusing his Xanax at home  · Dr Shannan Austin is the patient's psychiatrist  · Psychiatry consulted, recommend admission to [facility] for Xanax weaning  · Continue Xanax 0 25 mg po b i d

## 2022-10-07 NOTE — PHYSICAL THERAPY NOTE
Physical Therapy Evaluation    Patient's Name: Kelly Ortiz    Admitting Diagnosis  Alcohol abuse [F10 10]  Confusion [R41 0]  Depression with anxiety [F41 8]  Closed fracture of left shoulder, sequela [S42 92XS]    Problem List  Patient Active Problem List   Diagnosis    Atherosclerotic PVD with intermittent claudication ([facility] )    Depression with anxiety    Primary hypertension    Mixed hyperlipidemia    Type 2 diabetes mellitus with diabetic peripheral angiopathy without gangrene, without long-term current use of insulin (HCC)    Smoking    Rosacea    Rhinophyma    Alcohol use    Bilateral carotid artery stenosis    Chronic bronchitis (HCC)    Alcohol withdrawal syndrome, with delirium ([facility] )    DARWIN (acute kidney injury) ([facility] )    Leukocytosis    Anxiety    Toxic metabolic encephalopathy    2-part displaced fracture of surgical neck of left humerus, initial encounter for closed fracture    Hypokalemia       Past Medical History  Past Medical History:   Diagnosis Date    Colon polyp     Diabetes mellitus ([facility] )     Hyperlipidemia     Hypertension        Past Surgical History  Past Surgical History:   Procedure Laterality Date    COLON SURGERY      found growth, bengin    COLONOSCOPY      COLONOSCOPY N/A 3/13/2018    Procedure: COLONOSCOPY;  Surgeon: Neli Laura DO;  Location: [facility]; Service: Gastroenterology    ORIF HUMERUS FRACTURE Left 10/6/2022    Procedure: OPEN REDUCTION W/ INTERNAL FIXATION (ORIF) HUMERUS (SHOULDER); Surgeon: Chhaya Holliday MD;  Location: [address] [place] [address];  Service: Orthopedics       Recent Imaging  XR shoulder 2+ vw left   Final Result by Maddie Talley MD (10/07 8878)      Fluoroscopic guidance provided for procedure guidance  Please refer to the separate procedure notes for additional details  Workstation performed: [id number]         CT upper extremity wo contrast left   Final Result by Tirso Gordon MD (10/05 2752)      1    Humeral fracture with adjacent hemorrhage as described   2  Groundglass pulmonary opacities, partially visualized, may represent mild edema, correlate with chest x-ray         Workstation performed: [id number]         CT head without contrast   Final Result by Altagracia Danielle MD (10/02 1356)      No acute intracranial abnormality  Workstation performed: [id number]             Recent Vital Signs  Vitals:    10/07/22 0400 10/07/22 0731 10/07/22 0809 10/07/22 1202   BP: 144/76 (!) 174/75 157/77 164/68   BP Location: Right arm Right arm Right arm Right arm   Pulse: 87 93 83 99   Resp: 18 18  18   Temp: 97 5 °F (36 4 °C) 97 7 °F (36 5 °C)  98 1 °F (36 7 °C)   TempSrc: Temporal Temporal  Temporal   SpO2: 97% 97%  94%   Weight:       Height:            10/07/22 1340   PT Last Visit   PT Visit Date 10/07/22   Note Type   Note type Evaluation   Pain Assessment   Pain Assessment Tool 0-10   Pain Score 7   Pain Location/Orientation Orientation: Left; Location: Arm   Home Living   Type of Zaira Adams 8 to enter with rails; One level   Prior Function   Level of [place] Independent with ADLs and functional mobility   Lives With Alone   ADL Assistance Independent   IADLs Independent   Falls in the last 6 months 1 to 4   Comments fall while intoxicated   General   Family/Caregiver Present No   Cognition   Overall Cognitive Status WFL   LUE Assessment   LUE Assessment   (limited due to pain)   RLE Assessment   RLE Assessment   (4/5)   LLE Assessment   LLE Assessment   (4/5)   Coordination   Movements are Fluid and Coordinated 1   Sensation X   Light Touch   RLE Light Touch Impaired   RLE Light Touch Comments intermittent tingling   LLE Light Touch Impaired   LLE Light Touch Comments intermittent tingling   Bed Mobility   Supine to Sit 4  Minimal assistance   Additional items Increased time required   Sit to Supine 4  Minimal assistance   Additional items Increased time required   Additional Comments HHA to come to sitting EOB   Transfers   Sit to Stand 7  Independent   Additional items Increased time required   Stand to Sit 7  Independent   Additional items Increased time required   Ambulation/Elevation   Gait pattern Step through pattern;Decreased toe off;Decreased heel strike;Decreased foot clearance   Gait Assistance 5  Supervision   Additional items Verbal cues   Assistive Device None   Distance 100ft   Balance   Static Sitting Fair +   Dynamic Sitting Fair +   Static Standing Fair   Dynamic Standing Fair   Ambulatory Fair   Endurance Deficit   Endurance Deficit Yes   Endurance Deficit Description arm pain and mild deconditioning   Activity Tolerance   Activity Tolerance Patient tolerated treatment well;Patient limited by pain   Medical Staff Made Aware spoke to CM   Nurse Made Aware spoke to RN   Assessment   Prognosis Good   Problem List Decreased strength;Decreased range of motion;Decreased endurance; Impaired balance;Decreased mobility; Impaired sensation;Orthopedic restrictions;Pain   Barriers to Discharge Inaccessible home environment;Decreased caregiver support   Goals   Patient Goals to have less arm pain   Recommendation   PT Discharge Recommendation Home with outpatient rehabilitation   [address] [place] [address] Mobility Inpatient   Turning in Bed Without Bedrails 4   Lying on Back to Sitting on Edge of Flat Bed 3   Moving Bed to Chair 4   Standing Up From Chair 4   Walk in Room 4   Climb 3-5 Stairs 4   Basic Mobility Inpatient Raw Score 23   Basic Mobility Standardized Score 50 88   Highest Level Of Mobility   -[facility] Goal 7: Walk 25 feet or more   -HLM Achieved 7: Walk 25 feet or more   End of Consult   Patient Position at End of Consult Supine; All needs within reach         ASSESSMENT                                                                                                                     Kelly Ortiz is a 77 y o  male admitted to [address] [place] [address] on 10/2/2022 for 2-part displaced fracture of surgical neck of left humerus, initial encounter for closed fracture  Pt  has a past medical history of Colon polyp, Diabetes mellitus ([facility] ), Hyperlipidemia, and Hypertension    PT was consulted and pt was seen on 10/7/2022 for mobility assessment and d/c planning  Pt presents supine in bed alert and agreeable to therapy  Impairments limiting pt at this time include decreased ROM, impaired balance, decreased endurance, decreased ADLS, decreased IADLS, pain, decreased activity tolerance, decreased sensation, and decreased strength  Pt is currently functioning at a minimum assistance x1 level for bed mobility, independent level for transfers, supervision assistance x1 level for ambulation with no assistive device  The patient's AM-PAC Basic Mobility Inpatient Short Form Raw Score is 23  A Raw score of greater than 16 suggests the patient may benefit from discharge to home  Please also refer to the recommendation of the Physical Therapist for safe discharge planning      Recommendations                                                                                                                DME: None    Discharge Disposition:  Home with Outpatient Physical Therapy       Akash Louise PT, DPT

## 2022-10-08 PROBLEM — F33.9 MDD (MAJOR DEPRESSIVE DISORDER), RECURRENT EPISODE (HCC): Status: ACTIVE | Noted: 2022-10-08

## 2022-10-08 PROBLEM — J21.0 RSV (ACUTE BRONCHIOLITIS DUE TO RESPIRATORY SYNCYTIAL VIRUS): Status: ACTIVE | Noted: 2022-10-08

## 2022-10-08 PROBLEM — F13.10 BENZODIAZEPINE ABUSE (HCC): Status: ACTIVE | Noted: 2022-10-08

## 2022-10-08 PROBLEM — Z00.8 MEDICAL CLEARANCE FOR PSYCHIATRIC ADMISSION: Status: ACTIVE | Noted: 2022-10-08

## 2022-10-08 LAB
ANION GAP SERPL CALCULATED.3IONS-SCNC: 5 MMOL/L (ref 5–14)
BASOPHILS # BLD AUTO: 0.06 THOUSANDS/ΜL (ref 0–0.1)
BASOPHILS NFR BLD AUTO: 1 % (ref 0–1)
BUN SERPL-MCNC: 10 MG/DL (ref 5–25)
CALCIUM SERPL-MCNC: 8.9 MG/DL (ref 8.4–10.2)
CHLORIDE SERPL-SCNC: 105 MMOL/L (ref 96–108)
CO2 SERPL-SCNC: 27 MMOL/L (ref 21–32)
CREAT SERPL-MCNC: 0.67 MG/DL (ref 0.7–1.5)
EOSINOPHIL # BLD AUTO: 0.17 THOUSAND/ΜL (ref 0–0.61)
EOSINOPHIL NFR BLD AUTO: 2 % (ref 0–6)
ERYTHROCYTE [DISTWIDTH] IN BLOOD BY AUTOMATED COUNT: 13.8 % (ref 11.6–15.1)
GFR SERPL CREATININE-BSD FRML MDRD: 100 ML/MIN/1.73SQ M
GLUCOSE P FAST SERPL-MCNC: 140 MG/DL (ref 70–99)
GLUCOSE SERPL-MCNC: 140 MG/DL (ref 70–99)
HCT VFR BLD AUTO: 31.4 % (ref 36.5–49.3)
HGB BLD-MCNC: 10.1 G/DL (ref 12–17)
IMM GRANULOCYTES # BLD AUTO: 0.06 THOUSAND/UL (ref 0–0.2)
IMM GRANULOCYTES NFR BLD AUTO: 1 % (ref 0–2)
LYMPHOCYTES # BLD AUTO: 1.55 THOUSANDS/ΜL (ref 0.6–4.47)
LYMPHOCYTES NFR BLD AUTO: 15 % (ref 14–44)
MCH RBC QN AUTO: 33.2 PG (ref 26.8–34.3)
MCHC RBC AUTO-ENTMCNC: 32.2 G/DL (ref 31.4–37.4)
MCV RBC AUTO: 103 FL (ref 82–98)
MONOCYTES # BLD AUTO: 1.09 THOUSAND/ΜL (ref 0.17–1.22)
MONOCYTES NFR BLD AUTO: 10 % (ref 4–12)
NEUTROPHILS # BLD AUTO: 7.62 THOUSANDS/ΜL (ref 1.85–7.62)
NEUTS SEG NFR BLD AUTO: 71 % (ref 43–75)
NRBC BLD AUTO-RTO: 0 /100 WBCS
PLATELET # BLD AUTO: 532 THOUSANDS/UL (ref 149–390)
PMV BLD AUTO: 8.6 FL (ref 8.9–12.7)
POTASSIUM SERPL-SCNC: 4.1 MMOL/L (ref 3.5–5.3)
RBC # BLD AUTO: 3.04 MILLION/UL (ref 3.88–5.62)
SODIUM SERPL-SCNC: 137 MMOL/L (ref 135–147)
WBC # BLD AUTO: 10.55 THOUSAND/UL (ref 4.31–10.16)

## 2022-10-08 PROCEDURE — 99222 1ST HOSP IP/OBS MODERATE 55: CPT | Performed by: PHYSICIAN ASSISTANT

## 2022-10-08 PROCEDURE — 99024 POSTOP FOLLOW-UP VISIT: CPT | Performed by: PHYSICIAN ASSISTANT

## 2022-10-08 PROCEDURE — 80048 BASIC METABOLIC PNL TOTAL CA: CPT | Performed by: HOSPITALIST

## 2022-10-08 PROCEDURE — 99222 1ST HOSP IP/OBS MODERATE 55: CPT | Performed by: PSYCHIATRY & NEUROLOGY

## 2022-10-08 PROCEDURE — 85025 COMPLETE CBC W/AUTO DIFF WBC: CPT | Performed by: HOSPITALIST

## 2022-10-08 RX ORDER — FOLIC ACID 1 MG/1
1 TABLET ORAL DAILY
Status: DISCONTINUED | OUTPATIENT
Start: 2022-10-09 | End: 2022-10-17

## 2022-10-08 RX ORDER — LANOLIN ALCOHOL/MO/W.PET/CERES
100 CREAM (GRAM) TOPICAL DAILY
Status: DISCONTINUED | OUTPATIENT
Start: 2022-10-09 | End: 2022-10-12

## 2022-10-08 RX ADMIN — ASPIRIN 81 MG CHEWABLE TABLET 81 MG: 81 TABLET CHEWABLE at 08:43

## 2022-10-08 RX ADMIN — PAROXETINE HYDROCHLORIDE 40 MG: 20 TABLET, FILM COATED ORAL at 08:43

## 2022-10-08 RX ADMIN — ALPRAZOLAM 0.25 MG: 0.5 TABLET ORAL at 08:43

## 2022-10-08 RX ADMIN — METFORMIN HYDROCHLORIDE 1000 MG: 500 TABLET ORAL at 12:14

## 2022-10-08 RX ADMIN — MIRTAZAPINE 30 MG: 30 TABLET, FILM COATED ORAL at 21:05

## 2022-10-08 RX ADMIN — ATORVASTATIN CALCIUM 40 MG: 40 TABLET, FILM COATED ORAL at 08:43

## 2022-10-08 RX ADMIN — HYDROCHLOROTHIAZIDE 12.5 MG: 12.5 TABLET ORAL at 08:43

## 2022-10-08 RX ADMIN — ALPRAZOLAM 0.25 MG: 0.5 TABLET ORAL at 14:16

## 2022-10-08 RX ADMIN — NICOTINE 1 PATCH: 14 PATCH, EXTENDED RELEASE TRANSDERMAL at 08:42

## 2022-10-08 RX ADMIN — OXYCODONE HYDROCHLORIDE 5 MG: 5 TABLET ORAL at 14:16

## 2022-10-08 RX ADMIN — METRONIDAZOLE 1 APPLICATION: 7.5 GEL TOPICAL at 08:45

## 2022-10-08 RX ADMIN — AMLODIPINE BESYLATE 10 MG: 10 TABLET ORAL at 08:43

## 2022-10-08 RX ADMIN — MELATONIN TAB 3 MG 3 MG: 3 TAB at 21:05

## 2022-10-08 RX ADMIN — ACETAMINOPHEN 975 MG: 325 TABLET ORAL at 23:39

## 2022-10-08 RX ADMIN — METRONIDAZOLE 1 APPLICATION: 7.5 GEL TOPICAL at 17:08

## 2022-10-08 NOTE — PLAN OF CARE
Problem: Nutrition/Hydration-ADULT  Goal: Nutrient/Hydration intake appropriate for improving, restoring or maintaining nutritional needs  Description: Monitor and assess patient's nutrition/hydration status for malnutrition  Collaborate with interdisciplinary team and initiate plan and interventions as ordered  Monitor patient's weight and dietary intake as ordered or per policy  Utilize nutrition screening tool and intervene as necessary  Determine patient's food preferences and provide high-protein, high-caloric foods as appropriate       INTERVENTIONS:  - Monitor oral intake, urinary output, labs, and treatment plans  - Assess nutrition and hydration status and recommend course of action  - Evaluate amount of meals eaten  - Assist patient with eating if necessary   - Allow adequate time for meals  - Recommend/ encourage appropriate diets, oral nutritional supplements, and vitamin/mineral supplements  - Order, calculate, and assess calorie counts as needed  - Recommend, monitor, and adjust tube feedings and TPN/PPN based on assessed needs  - Assess need for intravenous fluids  - Provide specific nutrition/hydration education as appropriate  - Include patient/family/caregiver in decisions related to nutrition  Outcome: Progressing     Problem: SAFETY ADULT  Goal: Patient will remain free of falls  Description: INTERVENTIONS:  - Educate patient/family on patient safety including physical limitations  - Instruct patient to call for assistance with activity   - Consult OT/PT to assist with strengthening/mobility   - Keep Call bell within reach  - Keep bed low and locked with side rails adjusted as appropriate  - Keep care items and personal belongings within reach  - Initiate and maintain comfort rounds  - Make Fall Risk Sign visible to staff  - Apply yellow socks and bracelet for high fall risk patients  - Consider moving patient to room near nurses station  Outcome: Progressing  Goal: Maintain or return to baseline ADL function  Description: INTERVENTIONS:  -  Assess patient's ability to carry out ADLs; assess patient's baseline for ADL function and identify physical deficits which impact ability to perform ADLs (bathing, care of mouth/teeth, toileting, grooming, dressing, etc )  - Assess/evaluate cause of self-care deficits   - Assess range of motion  - Assess patient's mobility; develop plan if impaired  - Assess patient's need for assistive devices and provide as appropriate  - Encourage maximum independence but intervene and supervise when necessary  - Involve family in performance of ADLs  - Assess for home care needs following discharge   - Consider OT consult to assist with ADL evaluation and planning for discharge  - Provide patient education as appropriate  Outcome: Progressing  Goal: Maintains/Returns to pre admission functional level  Description: INTERVENTIONS:  - Perform BMAT or MOVE assessment daily    - Set and communicate daily mobility goal to care team and patient/family/caregiver  - Collaborate with rehabilitation services on mobility goals if consulted  - Out of bed for toileting  - Record patient progress and toleration of activity level   Outcome: Progressing     Problem: Knowledge Deficit  Goal: Patient/family/caregiver demonstrates understanding of disease process, treatment plan, medications, and discharge instructions  Description: Complete learning assessment and assess knowledge base    Interventions:  - Provide teaching at level of understanding  - Provide teaching via preferred learning methods  Outcome: Progressing     Problem: DEPRESSION  Goal: Will be euthymic at discharge  Description: INTERVENTIONS:  - Administer medication as ordered  - Provide emotional support via 1:1 interaction with staff  - Encourage involvement in milieu/groups/activities  - Monitor for social isolation  Outcome: Progressing     Problem: BEHAVIOR  Goal: Pt/Family maintain appropriate behavior and adhere to behavioral management agreement, if implemented  Description: INTERVENTIONS:  - Assess the family dynamic   - Encourage verbalization of thoughts and concerns in a socially appropriate manner  - Assess patient/family's coping skills and non-compliant behavior (including use of illegal substances)  - Utilize positive, consistent limit setting strategies supporting safety of patient, staff and others  - Initiate consult with Case Management, Spiritual Care or other ancillary services as appropriate  - If a patient's/visitor's behavior jeopardizes the safety of the patient, staff, or others, refer to organization procedure     - Notify Security of behavior or suspected illegal substances which indicate the need for search of the patient and/or belongings  - Encourage participation in the decision making process about a behavioral management agreement; implement if patient meets criteria  Outcome: Progressing     Problem: ANXIETY  Goal: Will report anxiety at manageable levels  Description: INTERVENTIONS:  - Administer medication as ordered  - Teach and encourage coping skills  - Provide emotional support  - Assess patient/family for anxiety and ability to cope  Outcome: Progressing  Goal: By discharge: Patient will verbalize 2 strategies to deal with anxiety  Description: Interventions:  - Identify any obvious source/trigger to anxiety  - Staff will assist patient in applying identified coping technique/skills  - Encourage attendance of scheduled groups and activities  Outcome: Progressing     Problem: SLEEP DISTURBANCE  Goal: Will exhibit normal sleeping pattern  Description: Interventions:  -  Assess the patients sleep pattern, noting recent changes  - Administer medication as ordered  - Decrease environmental stimuli, including noise, as appropriate during the night  - Encourage the patient to actively participate in unit groups and or exercise during the day to enhance ability to achieve adequate sleep at night  - Assess the patient, in the morning, encouraging a description of sleep experience  Outcome: Progressing

## 2022-10-08 NOTE — ASSESSMENT & PLAN NOTE
Patient is medically cleared for admission to the [facility] for treatment of the underlying psychiatric illness  Slim will sign off-please call with questions or concerns

## 2022-10-08 NOTE — NURSING NOTE
Patient calm and cooperative this shift  Denies all s/s at this time  States "I don't have depression or anxiety  I'm not crazy the doctor just wants to get me off these meds " Compliant with meals and medications  Brightens upon approach  Denies any needs at this time  Will continue to monitor

## 2022-10-08 NOTE — H&P
Psychiatric Evaluation - Behavioral Health     Identification Data:Oscar Powell 77 y o  male MRN: [id number]  Unit/Bed#: [facility] 084-12 Encounter: [id number]    Chief Complaint: Patient initially admitted to medical floor for confusion secondary to toxic encephalopathy  alcohol and benzodiazepine abuse before admission  History of depression and anxiety  Source of Information:  Patient himself who seems to be good historian  The patient was fully oriented during the meeting  No confusion noted  His medical records including his outpatient psychiatrist Dr Efrain Reyna notes were also reviewed  HPI:  This is 60-year-old male with a past medical history of depression, anxiety, diabetes mellitus, hypertension, hyperlipidemia, alcohol use disorder and recent Xanax abuse had initially presented to the ER with confusion  He also was recently admitted before this admission to MICU for alcohol withdrawal symptoms with multiple seizures and DT  The patient also recently had humerus fracture and currently on sling  The patient was consulted by his outpatient psychiatrist Dr Efrain Reyna while he was inpatient in medical floor who also subsequently followed with the patient on daily basis till yesterday before he was transferred to psychiatric inpatient unit here at [facility]     As per Dr Efrain Reyna initial consultation note the patient had been treated for depression and anxiety  He has been currently on Paxil, Remeron and Xanax  Patient also has history of several psychiatric in-patient admission and a suicide attempt in the past   He currently is living alone in assisted living facility though has a good family support specially his older brother  As per his brother he recently stop drinking alcohol but then started abusing Xanax as a substitute for alcohol    During admission to medical floor the patient started weaning off the benzodiazepine as per Dr Efrain Reyna recommendation with plan to transfer to psychiatric inpatient unit after being medically cleared  The patient was seen in the unit today by me  He reports he is having severe pain on his left arm and rates it at 8 on a scale of 0-10 with 10 being the worst   He though reports his mood has been better and currently rates her depression as 5 on a scale of 0-10 with 10 being the worst   Denies any suicidal thoughts  Reports his anxiety also has been better and rates it at 5 on a scale of 0-10 with 10 being the worst   Reports sleep has been better for last few nights  Reports appetite has been okay  Reports energy level has been improving  Reports concentration also has been better  Does not endorse anhedonia today  Denies any panic attack lately  Denies any auditory or visual hallucination  Did not endorse any paranoia or delusional ideation during the meeting  Denies any history of manic or hypomanic episode  The patient did not seem to be confused during the meeting and was fully oriented to time place and person  He denied any significant alcohol craving nowadays  Is agreeable with the plan of gradually being tapered off the benzodiazepine and currently is on Xanax 0 25 mg twice a day            Past Psychiatric History: In Mental Health Treatment Since:   Many years  Past Inpatient Psychiatric Treatment:   Multiple past inpatient psychiatric admissions  Past Outpatient Psychiatric Treatment:    Currently in outpatient psychiatric treatment with a psychiatrist Dr Oksana Swartz  Past Suicide Attempts: yes as per Dr Oksana Swartz notes  Past Violent Behavior: no  Past Psychiatric Medication Trials: multiple psychiatric medication trials    Traumatic History:     Abuse: none  Other Traumatic Events: none        Substance Abuse History:    I have assessed this patient for substance use within the past 12 months    Alcohol use: was drinking heavily in the past, last use was 10 days back before recent admission  Recreational drug use:   Cocaine:  denies use  Heroin:  denies use  Marijuana:  denies use  Other drugs: Benzodiazepines: uses daily   Longest clean time: unknown  History of Inpatient/Outpatient rehabilitation program: Unknown  Smoking history: 1 pack per day as per medical record  Use of caffeine: unable to obtain    Family Psychiatric History:     Psychiatric Illness:  patient denies  Substance Abuse:  patient denies  Suicide Attempts:  patient denies    Social History:  Born & Raised in :  [place]  Childhood Experiences:  Normal  Education: unable to obtain  Learning Disabilities: none  Marital History: single  Children: none  Living Arrangement: lives alone  Occupational History: on permanent disability  Functioning Relationships: good support system  Legal History: unknown   History: None      Past Medical History:    History of Seizures: yes  History of Head injury with loss of consciousness: Patient denies    Past Medical History:   Diagnosis Date   • Colon polyp    • Diabetes mellitus ([facility] [address] )    • Hyperlipidemia    • Hypertension      Past Surgical History:   Procedure Laterality Date   • COLON SURGERY      found growth, bengin   • COLONOSCOPY     • COLONOSCOPY N/A 3/13/2018    Procedure: COLONOSCOPY;  Surgeon: Sancho eVga DO;  Location: [facility] GI LAB; Service: Gastroenterology   • ORIF HUMERUS FRACTURE Left 10/6/2022    Procedure: OPEN REDUCTION W/ INTERNAL FIXATION (ORIF) HUMERUS (SHOULDER); Surgeon: Amparo Burnett MD;  Location: [address] [place] [address];  Service: Orthopedics       Medical Review Of Systems:    Review of systems not obtained due to patient factors  Allergies: Allergies   Allergen Reactions   • Lisinopril Angioedema       Medications: All current active medications have been reviewed      OBJECTIVE:    Vital signs in last 24 hours:    Temp:  [97 5 °F (36 4 °C)-99 1 °F (37 3 °C)] 97 7 °F (36 5 °C)  HR:  [93-98] 98  Resp:  [18] 18  BP: (144-149)/(65-66) 144/66      Intake/Output Summary (Last 24 hours) at 10/8/2022 55 [facility] filed at 10/8/2022 1217  Gross per 24 hour   Intake 900 ml   Output --   Net 900 ml        Mental Status Evaluation:    Appearance:  casually dressed   Behavior:  cooperative, calm   Speech:  normal rate, normal volume, normal pitch   Mood: Moderate anxious, Moderate depressed   Affect:  normal range and intensity, appropriate   Language: naming objects   Thought Process:  organized   Associations: intact associations   Thought Content:  normal   Perceptual Disturbances: denies auditory or visual hallucinations when asked   Risk Potential: Suicidal ideation - None  Homicidal ideation - None  Potential for aggression - No   Sensorium:  oriented to person, place and time/date   Memory:  recent and remote memory grossly intact   Consciousness:  alert and awake   Attention: attention span and concentration are age appropriate   Intellect: within normal limits   Fund of Knowledge: awareness of current events: yes   Insight:  fair   Judgment: fair   Muscle Strength Muscle Tone: Not assessed  Not assessed   Gait/Station: in bed   Motor Activity: no abnormal movements       Laboratory Results: I have personally reviewed all pertinent laboratory/tests results  Imaging Studies: XR chest 1 view portable    Result Date: 9/21/2022  Narrative: CHEST INDICATION:   hypoxia  COMPARISON:  None EXAM PERFORMED/VIEWS:  XR CHEST PORTABLE FINDINGS: Cardiomediastinal silhouette appears unremarkable  The lungs are clear  No pneumothorax or pleural effusion  Osseous structures appear within normal limits for patient age  Impression: No acute cardiopulmonary disease  Workstation performed: [id number]     XR shoulder 2+ vw left    Result Date: 10/7/2022  Narrative: C-ARM - left shoulder INDICATION: OPEN REDUCTION W/ INTERNAL FIXATION (ORIF) HUMERUS (SHOULDER) (Left Arm Upper)  Procedure guidance   COMPARISON:  Left shoulder radiographs 10/1/2022 TECHNIQUE: FLUOROSCOPY TIME:   57secs 3 FLUOROSCOPIC IMAGES FINDINGS: Fluoroscopic guidance provided for procedure guidance  Osseous and soft tissue detail limited by technique  Impression: Fluoroscopic guidance provided for procedure guidance  Please refer to the separate procedure notes for additional details  Workstation performed: [id number]     XR shoulder 2+ views LEFT    Result Date: 10/1/2022  Narrative: LEFT SHOULDER INDICATION:   pain with left humerus fracture, felt a "pop"  COMPARISON:  None VIEWS:  XR SHOULDER 2+ VW LEFT FINDINGS: Proximal humeral shaft fracture with angulation  Glenohumeral articular relation is maintained  No significant degenerative changes  No lytic or blastic osseous lesion  Soft tissues are unremarkable  Impression: Proximal humeral shaft fracture with angulation  Workstation performed: [id number]     XR humerus LEFT    Result Date: 10/1/2022  Narrative: LEFT HUMERUS INDICATION:   known proximal fracture, felt a worsening after rolling over  COMPARISON: Compared with earlier study   VIEWS:  XR HUMERUS LEFT FINDINGS: Proximal humeral fracture with superior migration of shaft  Glenohumeral articulation is maintained  No significant degenerative changes  No lytic or blastic osseous lesion  Soft tissues are unremarkable  Impression: Proximal humeral fracture   superior migration of shaft is new  Workstation performed: [id number]     CT head without contrast    Result Date: 10/2/2022  Narrative: CT BRAIN - WITHOUT CONTRAST INDICATION:   Head trauma, moderate-severe Fall, confusion  COMPARISON:  CT head without contrast September 21, 2022  TECHNIQUE:  CT examination of the brain was performed  In addition to axial images, sagittal and coronal 2D reformatted images were created and submitted for interpretation  Radiation dose length product (DLP) for this visit:  962 mGy-cm     This examination, like all CT scans performed in the [facility], was performed utilizing techniques to minimize radiation dose exposure, including the use of iterative reconstruction and automated exposure control  IMAGE QUALITY:  Diagnostic  FINDINGS: PARENCHYMA: Decreased attenuation is noted in periventricular and subcortical white matter demonstrating an appearance that is statistically most likely to represent mild microangiopathic change  Unchanged small oval-shaped hypodensities in bilateral inferior basal ganglia, likely dilated perivascular spaces  No CT signs of acute infarction  No intracranial mass, mass effect or midline shift  No acute parenchymal hemorrhage  VENTRICLES AND EXTRA-AXIAL SPACES:  Normal for the patient's age  VISUALIZED ORBITS AND PARANASAL SINUSES:  Unremarkable  CALVARIUM AND EXTRACRANIAL SOFT TISSUES:  Normal      Impression: No acute intracranial abnormality  Workstation performed: [id number]     CT head without contrast    Result Date: 9/21/2022  Narrative: CT BRAIN - WITHOUT CONTRAST INDICATION:   Seizure, new-onset, no history of trauma ams, seizure    " 61-year-old male, currently nonverbal, unable to contribute his residence review of systems  Per EMS, they were called because police were called for a welfare check, they found the patient in a residence, oriented to person and place only, he then had 2 episodes of tonic-clonic seizure activity, without return to baseline  Reportedly prior to his seizure activity, he told the police that he had history of significant alcohol use, drank alcohol heavily and last drink was about 24 hours ago" COMPARISON:  CT head 7/14/2021 TECHNIQUE:  CT examination of the brain was performed  In addition to axial images, sagittal and coronal 2D reformatted images were created and submitted for interpretation  Radiation dose length product (DLP) for this visit:  923 27 mGy-cm     This examination, like all CT scans performed in the [facility], was performed utilizing techniques to minimize radiation dose exposure, including the use of iterative  reconstruction and automated exposure control  IMAGE QUALITY:  Diagnostic  FINDINGS: PARENCHYMA: Decreased attenuation is noted in periventricular and subcortical white matter demonstrating an appearance that is statistically most likely to represent mild microangiopathic change; this appearance is similar when compared to most recent prior examination  No CT signs of acute infarction  No intracranial mass, mass effect or midline shift  No acute parenchymal hemorrhage  VENTRICLES AND EXTRA-AXIAL SPACES:  Normal for the patient's age  VISUALIZED ORBITS AND PARANASAL SINUSES:  Unremarkable  CALVARIUM AND EXTRACRANIAL SOFT TISSUES:  Normal      Impression: No acute intracranial abnormality  Workstation performed: [id number]     CT upper extremity wo contrast left    Result Date: 10/5/2022  Narrative: CT left shoulder without IV contrast INDICATION: Fracture, humerus left proximal humerus fracture, pre op planning  COMPARISON: None  TECHNIQUE: CT examination of the above was performed  This examination, like all CT scans performed in the [facility], was performed utilizing techniques to minimize radiation dose exposure, including the use of iterative reconstruction and automated exposure control software  Sagittal and coronal two dimensional reconstructed images were also submitted for interpretation  Rad dose  890 mGy-cm FINDINGS: OSSEOUS STRUCTURES:  Transverse fracture through the proximal humeral metaphysis with > one full shaft width anterior displacement, bayonet apposition, and apex anterior angulation  No evidence of osseous lesion at the fracture margins VISUALIZED MUSCULATURE:  Unremarkable   SOFT TISSUES:  Hemorrhage is noted in the left axilla extending into the anterior deltoid muscle, and between the pectoralis muscle bellies OTHER PERTINENT FINDINGS:  Limited evaluation of the lungs due to respiratory motion, however there appears to be scattered groundglass opacities throughout, predominantly centrally Impression: 1  Humeral fracture with adjacent hemorrhage as described 2  Groundglass pulmonary opacities, partially visualized, may represent mild edema, correlate with chest x-ray Workstation performed: [id number]       Code Status: Level 1 - Full Code  Advance Directive and Living Will: <no information>    Assessment/Plan    The patient seems to be doing better though still has moderate anxiety and depression  No SI or HI  Does not endorse any alcohol withdrawal symptoms or cravings  Tolerating benzodiazepine taper well  Fully oriented and no confusion noted today  Plan at this time is to continue with the current medication Paxil and Remeron with no changes  Plan is to gradually taper off the Xanax before discharge  Will continue to monitor the patient for his mood, behavior, safety, sleep and response to medication while he is here in the unit  Principal Problem:    Benzodiazepine abuse ([facility] )  Active Problems:    Hypertension    Mixed hyperlipidemia    Type 2 diabetes mellitus with diabetic peripheral angiopathy without gangrene, without long-term current use of insulin ([facility])    Alcohol use    Anxiety    2-part displaced fracture of surgical neck of left humerus, initial encounter for closed fracture    Medical clearance for psychiatric admission    RSV (acute bronchiolitis due to respiratory syncytial virus)    MDD (major depressive disorder), recurrent episode ([facility] )        Treatment Plan:     Planned Treatment and Medication Changes:     All current active medications have been reviewed  Encourage group therapy, milieu therapy and occupational therapy  Behavioral Health checks every 7 minutes  Continue current medications:  Current Facility-Administered Medications   Medication Dose Route Frequency Provider Last Rate   • acetaminophen  650 mg Oral Q4H PRN Rafa Potters, DO     • acetaminophen  650 mg Oral Q4H PRN Rafa Potters, DO     • acetaminophen  975 mg Oral Q6H PRN [facility] Chol Prayson, DO     • ALPRAZolam  0 25 mg Oral BID (AM & Afternoon) Carolyn Villanueva MD     • aluminum-magnesium hydroxide-simethicone  30 mL Oral Q4H PRN Ravin Manny, DO     • amLODIPine  10 mg Oral Daily Carolyn Villanueva MD     • artificial tear  1 application Both Eyes G2I PRN Ravin Manny, DO     • aspirin  81 mg Oral Daily Carolyn Villanueva MD     • atorvastatin  40 mg Oral Daily Carolyn Villanueva MD     • benztropine  1 mg Intramuscular Q4H PRN Max 6/day Ravin Manny, DO     • benztropine  0 5 mg Oral Q4H PRN Max 6/day Ravin Manny, DO     • [START ON 75/6/4912] folic acid  1 mg Oral Daily Muna Chávez PA-C     • hydrochlorothiazide  12 5 mg Oral Daily Carolyn Villanueva MD     • hydrOXYzine HCL  25 mg Oral Q6H PRN Max 4/day Ravin Manny, DO     • hydrOXYzine HCL  50 mg Oral Q6H PRN Max 4/day Ravin Manny, DO     • LORazepam  1 mg Intramuscular Q6H PRN Max 3/day Ravin Manny, DO     • melatonin  3 mg Oral HS Ravin Manny, DO     • metFORMIN  1,000 mg Oral Daily With Breakfast Muna Chávez PA-C     • metroNIDAZOLE  1 application Topical BID Carolyn Villanueva MD     • mirtazapine  30 mg Oral HS Carolyn Villanueva MD     • nicotine  1 patch Transdermal Daily Carolyn Villanueva MD     • OLANZapine  5 mg Intramuscular Q3H PRN Max 3/day Ravin Manny, DO     • OLANZapine  2 5 mg Oral Q4H PRN Max 6/day Ravin Manny, DO     • OLANZapine  5 mg Oral Q4H PRN Max 3/day Ravin Manny, DO     • OLANZapine  5 mg Oral Q3H PRN Max 3/day Ravin Manny, DO     • ondansetron  4 mg Intravenous Q6H PRN Carolyn Villanueva MD     • oxyCODONE  10 mg Oral Q4H PRN Carolyn Villanueva MD     • oxyCODONE  5 mg Oral Q4H PRN Carolyn Villanueva MD     • PARoxetine  40 mg Oral QAM Carolyn Villanueva MD     • polyethylene glycol  17 g Oral Daily PRN Ravin Manny, DO     • propranolol  5 mg Oral Q8H PRN Ravin Manny, DO     • senna-docusate sodium  1 tablet Oral Daily PRN Chan Setting, DO     • [START ON 10/9/2022] thiamine  100 mg Oral Daily Tereso Sweeney PA-C         Risks / Benefits of Treatment:    Risks, benefits, and possible side effects of medications explained to patient and patient verbalizes understanding and agreement for treatment  Counseling / Coordination of Care:    Patient's presentation on admission and proposed treatment plan discussed with treatment team   Diagnosis, medication changes and treatment plan reviewed with patient  Inpatient Psychiatric Certification:    Estimated length of stay: 7 midnights    Based upon physical, mental and social evaluations, I certify that inpatient psychiatric services are medically necessary for this patient for a duration of 7 midnights for the treatment of Benzodiazepine abuse ([facility] [address] )    Available alternative community resources do not meet the patient's mental health care needs      Alcira Colon MD 10/08/22

## 2022-10-08 NOTE — ASSESSMENT & PLAN NOTE
Patient is medically cleared for admission to the [facility] for treatment of the underlying psychiatric illness  Slim will sign off-please call with questions or concerns

## 2022-10-08 NOTE — ASSESSMENT & PLAN NOTE
· Recent history of fall on shoulder 1 week ago (see was evaluated at [facility], [facility] and [facility])  · CT upper extremity with out contrast on 10/04/2022 revealed:  · Humeral fracture with adjacent hemorrhage   · Status post ORIF on 10/06/2022  · Orthopedics recommends:  · Nonweightbearing to left upper extremity-continue with sling  · Pain control with oxycodone p r n   · Use ice  · PT/OT - will consult  · Patient is to follow-up with Dr Israel Oro in 2 weeks (10/21/2022)

## 2022-10-08 NOTE — ASSESSMENT & PLAN NOTE
· Patient tested positive on routine screening for BHU placement  · Vital signs are stable and he is asymptomatic  · Continue isolation precautions

## 2022-10-08 NOTE — ASSESSMENT & PLAN NOTE
· BP stable since arrival to the unit  · Pre-hospital antihypertensive regimen includes:  · Amlodipine 10 mg daily  · Hydrochlorothiazide 12 5 mg daily  · Monitor vital signs per unit protocol

## 2022-10-08 NOTE — PLAN OF CARE
Problem: Nutrition/Hydration-ADULT  Goal: Nutrient/Hydration intake appropriate for improving, restoring or maintaining nutritional needs  Description: Monitor and assess patient's nutrition/hydration status for malnutrition  Collaborate with interdisciplinary team and initiate plan and interventions as ordered  Monitor patient's weight and dietary intake as ordered or per policy  Utilize nutrition screening tool and intervene as necessary  Determine patient's food preferences and provide high-protein, high-caloric foods as appropriate       INTERVENTIONS:  - Monitor oral intake, urinary output, labs, and treatment plans  - Assess nutrition and hydration status and recommend course of action  - Evaluate amount of meals eaten  - Assist patient with eating if necessary   - Allow adequate time for meals  - Recommend/ encourage appropriate diets, oral nutritional supplements, and vitamin/mineral supplements  - Order, calculate, and assess calorie counts as needed  - Recommend, monitor, and adjust tube feedings and TPN/PPN based on assessed needs  - Assess need for intravenous fluids  - Provide specific nutrition/hydration education as appropriate  - Include patient/family/caregiver in decisions related to nutrition  Outcome: Progressing     Problem: SAFETY ADULT  Goal: Patient will remain free of falls  Description: INTERVENTIONS:  - Educate patient/family on patient safety including physical limitations  - Instruct patient to call for assistance with activity   - Consult OT/PT to assist with strengthening/mobility   - Keep Call bell within reach  - Keep bed low and locked with side rails adjusted as appropriate  - Keep care items and personal belongings within reach  - Initiate and maintain comfort rounds  - Make Fall Risk Sign visible to staff  - Offer Toileting every Hours, in advance of need  - Initiate/Maintain alarm  - Obtain necessary fall risk management equipment:   - Apply yellow socks and bracelet for high fall risk patients  - Consider moving patient to room near nurses station  Outcome: Progressing     Problem: DEPRESSION  Goal: Will be euthymic at discharge  Description: INTERVENTIONS:  - Administer medication as ordered  - Provide emotional support via 1:1 interaction with staff  - Encourage involvement in milieu/groups/activities  - Monitor for social isolation  Outcome: Progressing

## 2022-10-08 NOTE — PROGRESS NOTES
Orthopedics     Rober Monk 77 y o  male MRN: [id number]  Unit/Bed#: [facility] 641-01 Encounter: [id number]      Subjective:  Pt seen this am  He states his left shoulder pain is currently well controlled and much improved compared to yesterday  States he slept well last night  He denies any numbness/tingliung  No fever  chills       Labs:  0   Lab Value Date/Time    HCT 29 9 (L) 10/07/2022 0527    HCT 33 1 (L) 10/06/2022 0526    HCT 31 6 (L) 10/05/2022 0434    HCT 40 3 (L) 04/06/2018 0902    HCT 44 8 02/27/2017 1204    HGB 9 3 (L) 10/07/2022 0527    HGB 10 4 (L) 10/06/2022 0526    HGB 10 2 (L) 10/05/2022 0434    HGB 13 4 (L) 04/06/2018 0902    HGB 15 1 02/27/2017 1204    INR 0 82 (L) 10/05/2022 0434    WBC 11 00 (H) 10/07/2022 0527    WBC 8 60 10/06/2022 0526    WBC 8 60 10/05/2022 0434    WBC 6 3 04/06/2018 0902    WBC 9 1 02/27/2017 1204       Meds:    Current Facility-Administered Medications:   •  acetaminophen (TYLENOL) tablet 650 mg, 650 mg, Oral, Q4H PRN, Clemon Shivers, DO  •  acetaminophen (TYLENOL) tablet 650 mg, 650 mg, Oral, Q4H PRN, Clemon Shivers, DO  •  acetaminophen (TYLENOL) tablet 975 mg, 975 mg, Oral, Q6H PRN, Clemon Shivers, DO  •  ALPRAZolam (XANAX) tablet 0 25 mg, 0 25 mg, Oral, BID (AM & Afternoon), Roosevelt Gottlieb MD  •  aluminum-magnesium hydroxide-simethicone (MYLANTA) oral suspension 30 mL, 30 mL, Oral, Q4H PRN, Clemon Shivers, DO  •  amLODIPine (NORVASC) tablet 10 mg, 10 mg, Oral, Daily, Roosevelt Gottlieb MD  •  artificial tear (LUBRIFRESH P M ) ophthalmic ointment 1 application, 1 application, Both Eyes, Q3H PRN, Corry Moctezuma, DO  •  aspirin chewable tablet 81 mg, 81 mg, Oral, Daily, Roosevelt Gottlieb MD  •  atorvastatin (LIPITOR) tablet 40 mg, 40 mg, Oral, Daily, Roosevelt Gottlieb MD  •  benztropine (COGENTIN) injection 1 mg, 1 mg, Intramuscular, Q4H PRN Max 6/day, Donna Wilson DO  •  benztropine (COGENTIN) tablet 0 5 mg, 0 5 mg, Oral, Q4H PRN Max 6/day, Tabby Wu DO  •  folic acid 1 mg, thiamine (VITAMIN B1) 100 mg in sodium chloride 0 9 % 100 mL IV piggyback, , Intravenous, Daily, Darron Montes MD  •  hydrochlorothiazide (HYDRODIURIL) tablet 12 5 mg, 12 5 mg, Oral, Daily, Darron Montes MD  •  hydrOXYzine HCL (ATARAX) tablet 25 mg, 25 mg, Oral, Q6H PRN Max 4/day, Tabby Wu DO  •  hydrOXYzine HCL (ATARAX) tablet 50 mg, 50 mg, Oral, Q6H PRN Max 4/day, Tawanda Wilson DO, 50 mg at 10/07/22 2125  •  lactated ringers infusion, 125 mL/hr, Intravenous, Continuous, Darron Montes MD  •  LORazepam (ATIVAN) injection 1 mg, 1 mg, Intramuscular, Q6H PRN Max 3/day, Tabby Wu DO  •  melatonin tablet 3 mg, 3 mg, Oral, HS, Emil Wilson DO, 3 mg at 10/07/22 2125  •  metroNIDAZOLE (METROGEL) 6 89 % gel 1 application, 1 application, Topical, BID, Darron Montes MD, 1 application at 66/12/42 1738  •  mirtazapine (REMERON) tablet 30 mg, 30 mg, Oral, HS, Darron Montes MD, 30 mg at 10/07/22 2125  •  nicotine (NICODERM CQ) 14 mg/24hr TD 24 hr patch 1 patch, 1 patch, Transdermal, Daily, Darron Montes MD  •  OLANZapine (ZyPREXA) IM injection 5 mg, 5 mg, Intramuscular, Q3H PRN Max 3/day, Tabby Wu DO  •  OLANZapine (ZyPREXA) tablet 2 5 mg, 2 5 mg, Oral, Q4H PRN Max 6/day, Emil Wilson DO  •  OLANZapine (ZyPREXA) tablet 5 mg, 5 mg, Oral, Q4H PRN Max 3/day, Emil Wilson DO  •  OLANZapine (ZyPREXA) tablet 5 mg, 5 mg, Oral, Q3H PRN Max 3/day, Emil Wilson DO  •  ondansetron (ZOFRAN) injection 4 mg, 4 mg, Intravenous, Q6H PRN, Darron Montes MD  •  oxyCODONE (ROXICODONE) IR tablet 10 mg, 10 mg, Oral, Q4H PRN, Darron Montes MD  •  oxyCODONE (ROXICODONE) IR tablet 5 mg, 5 mg, Oral, Q4H PRN, Darron Montes MD  •  PARoxetine (PAXIL) tablet 40 mg, 40 mg, Oral, QAM, Darron Montes MD  •  polyethylene glycol (MIRALAX) packet 17 g, 17 g, Oral, Daily PRN, Tabby Wu DO  • propranolol (INDERAL) tablet 5 mg, 5 mg, Oral, Q8H PRN, Mima Hoskins, DO  •  senna-docusate sodium (SENOKOT S) 8 6-50 mg per tablet 1 tablet, 1 tablet, Oral, Daily PRN, Mima Hoskins, DO      Physical exam:  Vitals:    10/07/22 2021   BP: 149/65   Pulse: 93   Resp: 18   Temp: 97 5 °F (36 4 °C)   SpO2: 98%     Left Shoulder:  · Dressing C/D/I  · Soft tissue swelling and ecchymosis present about the shoulder  · Should ROM and strength testing deferred  · Elbow, wrist, and digital ROM intact  · Sensation intact axillary, median, ulnar, and radial nerves  · 2+ radial pulse    Assessment: 66 y o male POD 2 s/p ORIF Left Proximal Humerus  Plan:  · Pain Control  · NWB LUE in sling  · PT/OT  Shoulder Pendulums  Elbow, wrist and digital ROM is OK  · Continue to monitor clinically for signs of ABLA  · Orthopedics will continue to follow      Marc Denson PA-C

## 2022-10-08 NOTE — ASSESSMENT & PLAN NOTE
· Recent history of fall on shoulder 1 week ago (see was evaluated at [facility])  · CT upper extremity with out contrast on 10/04/2022 revealed:  · Humeral fracture with adjacent hemorrhage   · Status post ORIF on 10/06/2022  · Orthopedics recommends:  · Nonweightbearing to left upper extremity-continue with sling  · Pain control with oxycodone p r n   · Use ice  · PT/OT - will consult  · Patient is to follow-up with Dr Aram Belle in 2 weeks (10/21/2022)

## 2022-10-08 NOTE — ASSESSMENT & PLAN NOTE
Lab Results   Component Value Date    HGBA1C 5 7 (H) 07/21/2022       No results for input(s): POCGLU in the last 72 hours      Blood Sugar Average: Last 72 hrs:  ·  well controlled as evidenced by last A1c at 5 7 on 07/21/2022  · Continue pre-hospital metformin 1000 mg daily  · Diabetic diet

## 2022-10-08 NOTE — TREATMENT PLAN
TREATMENT PLAN REVIEW - [address] Dwayne Burkett 77 y o  1955 male MRN: [id number]    [address] [facility] [facility] 623-38 Encounter: [id number]          Admit Date/Time:  10/7/2022  3:01 PM    Treatment Team: Attending Provider: Angel Devine DO; Consulting Physician: Betsey Gonzales DO; Patient Care Assistant: Mariama Gates;  Patient Care Assistant: Chintan Dickens; Registered Nurse: Geovany Marquez RN    Diagnosis: Principal Problem:    Benzodiazepine abuse [facility]  Active Problems:    Hypertension    Mixed hyperlipidemia    Type 2 diabetes mellitus with diabetic peripheral angiopathy without gangrene, without long-term current use of insulin (HCC)    Alcohol use    Anxiety    2-part displaced fracture of surgical neck of left humerus, initial encounter for closed fracture    Medical clearance for psychiatric admission    RSV (acute bronchiolitis due to respiratory syncytial virus)    MDD (major depressive disorder), recurrent episode ([facility] )      Patient Strengths/Assets: average or above intelligence, cooperative, family ties, good past treatment response, good support system, negotiates basic needs, patient is on a voluntary commitment    Patient Barriers/Limitations: poor physical health, Alcohol and benzodiazepine abuse    Short Term Goals: decrease in depressive symptoms, decrease in anxiety symptoms, improvement in self care, mood stabilization, increase in group attendance, increase in socialization with peers on the unit    Long Term Goals: improvement in anxiety, resolution of depressive symptoms, adequate self care, adequate sleep, adequate appetite, adequate oral intake, stable living arrangements upon discharge, establishment of family support upon discharge    Progress Towards Goals: continue psychiatric medications as prescribed    Recommended Treatment: medication management, patient medication education, group therapy, milieu therapy, continued Behavioral Health psychiatric evaluation/assessment process    Treatment Frequency: daily medication monitoring, group and milieu therapy daily, monitoring through interdisciplinary rounds, monitoring through weekly patient care conferences    Expected Discharge Date:  10/15/2022    Discharge Plan: attend [place] [id number] meetings, referral for outpatient drug and alcohol counseling, referral for outpatient psychotherapy, return to previous living arrangement    Treatment Plan Created/Updated By: Shaye Herbert MD

## 2022-10-08 NOTE — CONSULTS
[address]  1955, 77 y o  male MRN: [id number]  Unit/Bed#: [facility] 484-20 Encounter: [id number]  Primary Care Provider: Bam Marc MD   Date and time admitted to hospital: 10/7/2022  3:01 PM    Inpatient consult for Medical Clearance for [address] patient  Consult performed by: Adina Coffey PA-C  Consult ordered by: Maryellen Estevez DO          RSV (acute bronchiolitis due to respiratory syncytial virus)  Assessment & Plan  · Patient tested positive on routine screening for BHU placement  · Vital signs are stable and he is asymptomatic  · Continue isolation precautions    Medical clearance for psychiatric admission  Assessment & Plan  Patient is medically cleared [facility] for treatment of the underlying psychiatric illness  Slim will sign off-please call with questions or concerns    2-part displaced fracture of surgical neck of left humerus, initial encounter for closed fracture  Assessment & Plan  · Recent history of fall on shoulder 1 week ago ([facility]  · CT upper extremity with out contrast on 10/04/2022 revealed:  · Humeral fracture with adjacent hemorrhage   · Status post ORIF on 10/06/2022  · Orthopedics recommends:  · Nonweightbearing to left upper extremity-continue with sling  · Pain control with oxycodone p r n   · Use ice  · PT/OT - will consult  · Patient is to follow-up with Dr Marsha Bueno in 2 weeks (10/21/2022)    Type 2 diabetes mellitus with diabetic peripheral angiopathy without gangrene, without long-term current use of insulin ([facility] )  Assessment & Plan  Lab Results   Component Value Date    HGBA1C 5 7 (H) 07/21/2022       No results for input(s): POCGLU in the last 72 hours      Blood Sugar Average: Last 72 hrs:  ·  well controlled as evidenced by last A1c at 5 7 on 07/21/2022  · Continue pre-hospital metformin 1000 mg daily  · Diabetic diet    Mixed hyperlipidemia  Assessment & Plan  · Continue pre-hospital atorvastatin 10 mg daily    Hypertension  Assessment & Plan  · BP stable since arrival to the unit  · Pre-hospital antihypertensive regimen includes:  · Amlodipine 10 mg daily  · Hydrochlorothiazide 12 5 mg daily  · Monitor vital signs per unit protocol    Counseling / Coordination of Care Time: 45 minutes  Greater than 50% of total time spent on patient counseling and coordination of care  Collaboration of Care: Were Recommendations Directly Discussed with Primary Treatment Team? - No     History of Present Illness:    Rober Monk is a 77 y o  male who is originally admitted to the psychiatry service due to confusion  We are consulted for medical clearance for admission to [facility] and treatment of underlying psychiatric illness  This patient has a past medical history significant for type 2 diabetes mellitus, hypertension, hyperlipidemia, alcohol abuse complicated by DTs and withdrawal seizures  Per chart review, he initially presented to the ED for confusion  He reported that he was recently admitted to the MICU for alcohol withdrawal complicated by multiple seizures  He was discharged after 4 days and remained stable  He then had a fall in which he broke his humerus and was evaluated by an outside emergency department  He successfully underwent orthopedic surgery on 10/06/2022 and was admitted to medical surgical floor for monitoring  He is maintained in a sling and is doing well  He is reporting a 8 5/10 pain today, however is not requesting pain medication  He was admitted to the [facility] Unit for management of his benzodiazepine weaning in the setting of psychiatric disease  He is here on a 201 voluntary commitment  On evaluation patient denies any physical complaints such as headache, dizziness, chest pain, shortness of breath, nausea/vomiting/diarrhea at this time      Review of Systems:    Review of Systems   Constitutional: Negative for chills, diaphoresis and fever  HENT: Negative for congestion, rhinorrhea, sinus pressure, sinus pain and sore throat  Eyes: Negative for pain and visual disturbance  Respiratory: Negative for cough, shortness of breath and wheezing  Cardiovascular: Negative for chest pain and palpitations  Gastrointestinal: Negative for abdominal distention, abdominal pain, constipation, diarrhea, nausea and vomiting  Genitourinary: Negative for difficulty urinating, dysuria, frequency, hematuria and urgency  Musculoskeletal: Negative for arthralgias, back pain and myalgias  Skin: Negative for rash  Neurological: Negative for dizziness, weakness, light-headedness and headaches  All other systems reviewed and are negative  Past Medical and Surgical History:     Past Medical History:   Diagnosis Date   • Colon polyp    • Diabetes mellitus ([facility] )    • Hyperlipidemia    • Hypertension        Past Surgical History:   Procedure Laterality Date   • COLON SURGERY      found growth, bengin   • COLONOSCOPY     • COLONOSCOPY N/A 3/13/2018    Procedure: COLONOSCOPY;  Surgeon: Cheng Long DO;  Location: [facility] GI LAB; Service: Gastroenterology   • ORIF HUMERUS FRACTURE Left 10/6/2022    Procedure: OPEN REDUCTION W/ INTERNAL FIXATION (ORIF) HUMERUS (SHOULDER); Surgeon: John Littlejohn MD;  Location: [address] [place] [address];  Service: Orthopedics       Meds/Allergies:    all medications and allergies reviewed    Allergies:    Allergies   Allergen Reactions   • Lisinopril Angioedema       Social History:     Marital Status: Single    Substance Use History:   Social History     Substance and Sexual Activity   Alcohol Use Not Currently     Social History     Tobacco Use   Smoking Status Current Every Day Smoker   • Packs/day: 1 00   • Types: Cigarettes   Smokeless Tobacco Former User   Tobacco Comment    uses vape/current every day smoker (as per [employer])     Social History     Substance and Sexual Activity   Drug Use No       Family History:    non-contributory    Physical Exam:   Patient seen and examined at the bedside  He is wearing sling and surgical wound is dressed  Vitals:   Blood Pressure: 144/66 (10/08/22 1552)  Pulse: 98 (10/08/22 1552)  Temperature: 97 7 °F (36 5 °C) (10/08/22 1552)  Temp Source: Temporal (10/08/22 1552)  Respirations: 18 (10/08/22 1552)  SpO2: 96 % (10/08/22 1552)    Physical Exam  Constitutional:       General: He is not in acute distress  Appearance: Normal appearance  He is not ill-appearing, toxic-appearing or diaphoretic  HENT:      Head: Normocephalic and atraumatic  Nose: Nose normal  No congestion or rhinorrhea  Mouth/Throat:      Mouth: Mucous membranes are moist    Eyes:      General: No scleral icterus  Extraocular Movements: Extraocular movements intact  Cardiovascular:      Rate and Rhythm: Normal rate and regular rhythm  Heart sounds: Normal heart sounds  No murmur heard  Pulmonary:      Effort: Pulmonary effort is normal  No respiratory distress  Breath sounds: Normal breath sounds  No wheezing  Abdominal:      General: Abdomen is flat  Bowel sounds are normal  There is no distension  Palpations: Abdomen is soft  Tenderness: There is no abdominal tenderness  Musculoskeletal:      Right lower leg: No edema  Left lower leg: No edema  Skin:     General: Skin is warm and dry  Coloration: Skin is not jaundiced  Neurological:      General: No focal deficit present  Mental Status: He is alert and oriented to person, place, and time  Psychiatric:         Mood and Affect: Mood normal          Behavior: Behavior normal          Thought Content: Thought content normal          Judgment: Judgment normal          Additional Data:     Lab Results: I have personally reviewed pertinent reports        Results from last 7 days   Lab Units 10/08/22  0742   WBC Thousand/uL 10 55*   HEMOGLOBIN g/dL 10 1* HEMATOCRIT % 31 4*   PLATELETS Thousands/uL 532*   NEUTROS PCT % 71   LYMPHS PCT % 15   MONOS PCT % 10   EOS PCT % 2     Results from last 7 days   Lab Units 10/08/22  0742 10/07/22  0527   SODIUM mmol/L 137 136   POTASSIUM mmol/L 4 1 3 7   CHLORIDE mmol/L 105 104   CO2 mmol/L 27 28   BUN mg/dL 10 10   CREATININE mg/dL 0 67* 0 61*   ANION GAP mmol/L 5 4*   CALCIUM mg/dL 8 9 8 4   ALBUMIN g/dL  --  2 8*   TOTAL BILIRUBIN mg/dL  --  0 32   ALK PHOS U/L  --  58   ALT U/L  --  56*   AST U/L  --  74*   GLUCOSE RANDOM mg/dL 140* 96     Results from last 7 days   Lab Units 10/05/22  0434   INR  0 82*         Lab Results   Component Value Date/Time    HGBA1C 5 7 (H) 07/21/2022 10:04 AM    HGBA1C 5 8 (H) 04/20/2022 11:35 AM    HGBA1C 5 4 11/09/2021 09:55 AM    HGBA1C 6 3 (H) 04/06/2018 09:02 AM    HGBA1C 6 0 01/05/2018 08:45 AM    HGBA1C 7 9 (H) 05/31/2017 09:46 AM     Results from last 7 days   Lab Units 10/02/22  1336 10/01/22  1626   POC GLUCOSE mg/dl 118 111       EKG, Pathology, and Other Studies Reviewed on Admission:   · EKG:  EKG on 10-22 significant for sinus tachycardia, T-wave abnormality, consider inferior ischemia  Heart rate 102,     ** Please Note: This note has been constructed using a voice recognition system   **

## 2022-10-09 LAB
ANION GAP SERPL CALCULATED.3IONS-SCNC: 2 MMOL/L (ref 5–14)
BUN SERPL-MCNC: 16 MG/DL (ref 5–25)
CALCIUM SERPL-MCNC: 8.8 MG/DL (ref 8.4–10.2)
CHLORIDE SERPL-SCNC: 103 MMOL/L (ref 96–108)
CO2 SERPL-SCNC: 30 MMOL/L (ref 21–32)
CREAT SERPL-MCNC: 0.64 MG/DL (ref 0.7–1.5)
GFR SERPL CREATININE-BSD FRML MDRD: 102 ML/MIN/1.73SQ M
GLUCOSE P FAST SERPL-MCNC: 134 MG/DL (ref 70–99)
GLUCOSE SERPL-MCNC: 134 MG/DL (ref 70–99)
POTASSIUM SERPL-SCNC: 4.7 MMOL/L (ref 3.5–5.3)
SODIUM SERPL-SCNC: 135 MMOL/L (ref 135–147)

## 2022-10-09 PROCEDURE — 99232 SBSQ HOSP IP/OBS MODERATE 35: CPT | Performed by: NURSE PRACTITIONER

## 2022-10-09 PROCEDURE — 80048 BASIC METABOLIC PNL TOTAL CA: CPT | Performed by: HOSPITALIST

## 2022-10-09 RX ADMIN — METFORMIN HYDROCHLORIDE 1000 MG: 500 TABLET ORAL at 07:59

## 2022-10-09 RX ADMIN — NICOTINE 1 PATCH: 14 PATCH, EXTENDED RELEASE TRANSDERMAL at 08:00

## 2022-10-09 RX ADMIN — FOLIC ACID 1 MG: 1 TABLET ORAL at 08:00

## 2022-10-09 RX ADMIN — ASPIRIN 81 MG CHEWABLE TABLET 81 MG: 81 TABLET CHEWABLE at 08:00

## 2022-10-09 RX ADMIN — METRONIDAZOLE 1 APPLICATION: 7.5 GEL TOPICAL at 08:00

## 2022-10-09 RX ADMIN — HYDROCHLOROTHIAZIDE 12.5 MG: 12.5 TABLET ORAL at 08:00

## 2022-10-09 RX ADMIN — ALPRAZOLAM 0.25 MG: 0.5 TABLET ORAL at 08:00

## 2022-10-09 RX ADMIN — METRONIDAZOLE 1 APPLICATION: 7.5 GEL TOPICAL at 17:06

## 2022-10-09 RX ADMIN — ALPRAZOLAM 0.25 MG: 0.5 TABLET ORAL at 14:40

## 2022-10-09 RX ADMIN — THIAMINE HCL TAB 100 MG 100 MG: 100 TAB at 08:00

## 2022-10-09 RX ADMIN — MELATONIN TAB 3 MG 3 MG: 3 TAB at 21:03

## 2022-10-09 RX ADMIN — PAROXETINE HYDROCHLORIDE 40 MG: 20 TABLET, FILM COATED ORAL at 08:00

## 2022-10-09 RX ADMIN — ATORVASTATIN CALCIUM 40 MG: 40 TABLET, FILM COATED ORAL at 08:00

## 2022-10-09 RX ADMIN — AMLODIPINE BESYLATE 10 MG: 10 TABLET ORAL at 08:00

## 2022-10-09 RX ADMIN — MIRTAZAPINE 30 MG: 30 TABLET, FILM COATED ORAL at 21:03

## 2022-10-09 NOTE — PROGRESS NOTES
Progress Note - Behavioral Health     Clive Wilkinson 77 y o  male MRN: [id number]   Unit/Bed#: [facility] 641-01 Encounter: [id number]    Behavior over the last 24 hours: unchanged  Lazaro Kam is seen in his room  Presents flat and preoccupied  Has been tolerating taper off Xanax with no noted symptoms of withdrawal and states his anxiety has been "manageable"  Staff report he has been calm and cooperative, but keeps to himself on unit  Blood pressure has been stable  Ongoing depression and rates 5/10  Limited participation in milieu  Sleep: slept off and on  Appetite: fair  Medication side effects: No   ROS: all other systems are negative    Mental Status Evaluation:    Appearance:  age appropriate   Behavior:  cooperative   Speech:  slow, soft   Mood:  depressed   Affect:  flat   Thought Process:  goal directed   Associations: intact associations   Thought Content:  no overt delusions   Perceptual Disturbances: none   Risk Potential: Suicidal ideation - None  Homicidal ideation - None  Potential for aggression - No   Sensorium:  oriented to person, place and time/date   Memory:  recent and remote memory grossly intact   Consciousness:  alert and awake   Attention: decreased concentration and decreased attention span   Insight:  limited   Judgment: limited   Gait/Station: normal gait/station, normal balance   Motor Activity: no abnormal movements     Vital signs in last 24 hours:    Temp:  [97 7 °F (36 5 °C)-98 8 °F (37 1 °C)] 97 7 °F (36 5 °C)  HR:  [92-98] 93  Resp:  [18] 18  BP: (134-144)/(62-66) 134/62    Laboratory results: I have personally reviewed all pertinent laboratory/tests results        Progress Toward Goals: continues to improve    Assessment/Plan   Principal Problem:    Benzodiazepine abuse (HCC)  Active Problems:    Hypertension    Mixed hyperlipidemia    Type 2 diabetes mellitus with diabetic peripheral angiopathy without gangrene, without long-term current use of insulin (HCC)    Alcohol use Anxiety    2-part displaced fracture of surgical neck of left humerus, initial encounter for closed fracture    Medical clearance for psychiatric admission    RSV (acute bronchiolitis due to respiratory syncytial virus)    MDD (major depressive disorder), recurrent episode ([facility] [address] )    Recommended Treatment:     Planned medication and treatment changes: All current active medications have been reviewed  Encourage group therapy, milieu therapy and occupational therapy  Behavioral Health checks every 7 minutes    Requires continued inpatient treatment due to chronic illness and high risk of decompensation if discharged before long term stability is achieved      Continue current medications:  Current Facility-Administered Medications   Medication Dose Route Frequency Provider Last Rate   • acetaminophen  650 mg Oral Q4H PRN Josemanuel Smolder, DO     • acetaminophen  650 mg Oral Q4H PRN Josemanuel Smolder, DO     • acetaminophen  975 mg Oral Q6H PRN Josemanuel Smolder, DO     • ALPRAZolam  0 25 mg Oral BID (AM & Afternoon) Angelina Hernández MD     • aluminum-magnesium hydroxide-simethicone  30 mL Oral Q4H PRN Josemanuel Smolder, DO     • amLODIPine  10 mg Oral Daily Angelina Hernández MD     • artificial tear  1 application Both Eyes V1M PRN Josemanuel Smolder, DO     • aspirin  81 mg Oral Daily Angelina Hernández MD     • atorvastatin  40 mg Oral Daily Angelina Hernández MD     • benztropine  1 mg Intramuscular Q4H PRN Max 6/day Josemanuel Smolder, DO     • benztropine  0 5 mg Oral Q4H PRN Max 6/day Josemanuel Smolder, DO     • folic acid  1 mg Oral Daily Ashwin Bentley PA-C     • hydrochlorothiazide  12 5 mg Oral Daily Aydee Hernandez MD     • hydrOXYzine HCL  25 mg Oral Q6H PRN Max 4/day Josemanuel Smolder, DO     • hydrOXYzine HCL  50 mg Oral Q6H PRN Max 4/day Josemanuel Smolder, DO     • LORazepam  1 mg Intramuscular Q6H PRN Max 3/day Josemanuel Smolder, DO     • melatonin  3 mg Oral HS Josemanuel Smolder, DO     • metFORMIN  1,000 mg Oral Daily With Breakfast Ashwin Bentley PA-C     • metroNIDAZOLE  1 application Topical BID Angelina Hernández MD     • mirtazapine  30 mg Oral HS Angelina Hernández MD     • nicotine  1 patch Transdermal Daily Angelina Hernández MD     • OLANZapine  5 mg Intramuscular Q3H PRN Max 3/day Josemanuel Austin, DO     • OLANZapine  2 5 mg Oral Q4H PRN Max 6/day Josemanuel Austin, DO     • OLANZapine  5 mg Oral Q4H PRN Max 3/day Josemanuel Austin, DO     • OLANZapine  5 mg Oral Q3H PRN Max 3/day Josemanuel Austin, DO     • ondansetron  4 mg Intravenous Q6H PRN Angelina Hernández MD     • oxyCODONE  10 mg Oral Q4H PRN Angelina Hernández MD     • oxyCODONE  5 mg Oral Q4H PRN Angelina Hernández MD     • PARoxetine  40 mg Oral QAM Angelina Hernández MD     • polyethylene glycol  17 g Oral Daily PRN Josemanuel Austin, DO     • propranolol  5 mg Oral Q8H PRN Josemanuel Austin, DO     • senna-docusate sodium  1 tablet Oral Daily PRN Josemanuel Austin, DO     • thiamine  100 mg Oral Daily Ashwin Bentley PA-C         Risks / Benefits of Treatment:    Risks, benefits, and possible side effects of medications explained to patient and patient verbalizes understanding and agreement for treatment  Counseling / Coordination of Care:    Patient's progress discussed with staff in treatment team meeting  Medications, treatment progress and treatment plan reviewed with patient      MARK Ladd 10/09/22

## 2022-10-09 NOTE — NURSING NOTE
Patient calm and cooperative this shift  Denies all s/s at this time  States he slept well  Compliant with meals and medications  Visible in dayroom throughout the day  Minimal interaction with peers  Denies any needs at this time  Will continue to monitor

## 2022-10-09 NOTE — NURSING NOTE
Patient calm and cooperative on approach denies s/s  Patient in and out of common areas with minimal interactions with peers  Patient compliant with medications in the shift

## 2022-10-10 ENCOUNTER — TELEPHONE (OUTPATIENT)
Dept: CASE MANAGEMENT | Facility: HOSPITAL | Age: 67
End: 2022-10-10

## 2022-10-10 LAB
ANION GAP SERPL CALCULATED.3IONS-SCNC: 8 MMOL/L (ref 5–14)
BUN SERPL-MCNC: 16 MG/DL (ref 5–25)
CALCIUM SERPL-MCNC: 8.7 MG/DL (ref 8.4–10.2)
CHLORIDE SERPL-SCNC: 102 MMOL/L (ref 96–108)
CO2 SERPL-SCNC: 24 MMOL/L (ref 21–32)
CREAT SERPL-MCNC: 0.66 MG/DL (ref 0.7–1.5)
GFR SERPL CREATININE-BSD FRML MDRD: 100 ML/MIN/1.73SQ M
GLUCOSE P FAST SERPL-MCNC: 111 MG/DL (ref 70–99)
GLUCOSE SERPL-MCNC: 111 MG/DL (ref 70–99)
POTASSIUM SERPL-SCNC: 4.2 MMOL/L (ref 3.5–5.3)
SODIUM SERPL-SCNC: 134 MMOL/L (ref 135–147)

## 2022-10-10 PROCEDURE — 80048 BASIC METABOLIC PNL TOTAL CA: CPT | Performed by: HOSPITALIST

## 2022-10-10 PROCEDURE — 99024 POSTOP FOLLOW-UP VISIT: CPT | Performed by: PHYSICIAN ASSISTANT

## 2022-10-10 RX ORDER — ALPRAZOLAM 0.5 MG/1
0.25 TABLET ORAL DAILY
Status: DISCONTINUED | OUTPATIENT
Start: 2022-10-10 | End: 2022-10-14

## 2022-10-10 RX ADMIN — AMLODIPINE BESYLATE 10 MG: 10 TABLET ORAL at 08:21

## 2022-10-10 RX ADMIN — ALPRAZOLAM 0.25 MG: 0.5 TABLET ORAL at 08:44

## 2022-10-10 RX ADMIN — MELATONIN TAB 3 MG 3 MG: 3 TAB at 21:00

## 2022-10-10 RX ADMIN — METFORMIN HYDROCHLORIDE 1000 MG: 500 TABLET ORAL at 08:21

## 2022-10-10 RX ADMIN — METRONIDAZOLE 1 APPLICATION: 7.5 GEL TOPICAL at 09:27

## 2022-10-10 RX ADMIN — MIRTAZAPINE 30 MG: 30 TABLET, FILM COATED ORAL at 21:00

## 2022-10-10 RX ADMIN — HYDROCHLOROTHIAZIDE 12.5 MG: 12.5 TABLET ORAL at 08:21

## 2022-10-10 RX ADMIN — FOLIC ACID 1 MG: 1 TABLET ORAL at 08:21

## 2022-10-10 RX ADMIN — PAROXETINE HYDROCHLORIDE 40 MG: 20 TABLET, FILM COATED ORAL at 08:21

## 2022-10-10 RX ADMIN — ATORVASTATIN CALCIUM 40 MG: 40 TABLET, FILM COATED ORAL at 08:21

## 2022-10-10 RX ADMIN — THIAMINE HCL TAB 100 MG 100 MG: 100 TAB at 09:28

## 2022-10-10 RX ADMIN — ASPIRIN 81 MG CHEWABLE TABLET 81 MG: 81 TABLET CHEWABLE at 08:21

## 2022-10-10 RX ADMIN — NICOTINE 1 PATCH: 14 PATCH, EXTENDED RELEASE TRANSDERMAL at 09:18

## 2022-10-10 NOTE — NURSING NOTE
Pt blunted but pleasant and med-compliant with no c/o pain  VSS  Good appetite and steady gait  Left arm sling in place  Withdrawn but out for meals with no agitation noted  Monitored for safety and support

## 2022-10-10 NOTE — PROGRESS NOTES
Progress Note - Orthopedics   Nicky Patrick 77 y o  male MRN: [id number]  Unit/Bed#: [facility] 145-91 Encounter: [id number]    Assessment:  76 yo male POD4 s/p ORIF left proximal humerus    Plan:  · NWB to LUE  · Continue sling  · Pain control prn  · Apply ice  · PT/ OT    · Medical management per primary team    · Patient will follow up with Dr Marsha Bueno 2 weeks post op  Subjective: Patient seen and examined  Patient states he is doing well  He states he did not have much pain in the shoulder overnight  Dulled sensation in the fingers has resolved  Denies fevers, chills, distal numbness  Vitals: Blood pressure 131/63, pulse 90, temperature 99 5 °F (37 5 °C), temperature source Temporal, resp  rate 17, SpO2 97 %  ,There is no height or weight on file to calculate BMI  Intake/Output Summary (Last 24 hours) at 10/10/2022 1005  Last data filed at 10/10/2022 0815  Gross per 24 hour   Intake 540 ml   Output --   Net 540 ml       Invasive Devices  Report    None                 Ortho Exam:   Left Upper Extremity:  Inspection- sling in place  mepilex dressing CDI over incision  Ecchymosis about the shoulder and axilla  Palpation- +swelling +effusion +minimal TTP as expected  Upper and forearm soft and compressible  ROM- shoulder and elbow deferred  Able to flex and extend wrist and fingers  Neurovascular- sensation intact axillary, median, radial, and ulnar nerve distributions  Palpable radial pulse  AIN/ PIN intact       Lab, Imaging and other studies:   CBC:   No results found for: WBC, HGB, HCT, MCV, PLT, ADJUSTEDWBC, MCH, MCHC, RDW, MPV, NRBC  CMP:   Lab Results   Component Value Date    SODIUM 134 (L) 10/10/2022     10/10/2022    CO2 24 10/10/2022    BUN 16 10/10/2022    CREATININE 0 66 (L) 10/10/2022    CALCIUM 8 7 10/10/2022    EGFR 100 10/10/2022

## 2022-10-10 NOTE — PLAN OF CARE
Problem: SAFETY ADULT  Goal: Patient will remain free of falls  Description: INTERVENTIONS:  - Educate patient/family on patient safety including physical limitations  - Instruct patient to call for assistance with activity   - Consult OT/PT to assist with strengthening/mobility   - Keep Call bell within reach  - Keep bed low and locked with side rails adjusted as appropriate  - Keep care items and personal belongings within reach  - Initiate and maintain comfort rounds  - Make Fall Risk Sign visible to staff  - Offer Toileting every Hours, in advance of need  - Initiate/Maintain alarm  - Obtain necessary fall risk management equipment:   - Apply yellow socks and bracelet for high fall risk patients  - Consider moving patient to room near nurses station  Outcome: Progressing     Problem: Knowledge Deficit  Goal: Patient/family/caregiver demonstrates understanding of disease process, treatment plan, medications, and discharge instructions  Description: Complete learning assessment and assess knowledge base  Interventions:  - Provide teaching at level of understanding  - Provide teaching via preferred learning methods  Outcome: Progressing     Problem: BEHAVIOR  Goal: Pt/Family maintain appropriate behavior and adhere to behavioral management agreement, if implemented  Description: INTERVENTIONS:  - Assess the family dynamic   - Encourage verbalization of thoughts and concerns in a socially appropriate manner  - Assess patient/family's coping skills and non-compliant behavior (including use of illegal substances)  - Utilize positive, consistent limit setting strategies supporting safety of patient, staff and others  - Initiate consult with Case Management, Spiritual Care or other ancillary services as appropriate  - If a patient's/visitor's behavior jeopardizes the safety of the patient, staff, or others, refer to organization procedure     - Notify Security of behavior or suspected illegal substances which indicate the need for search of the patient and/or belongings  - Encourage participation in the decision making process about a behavioral management agreement; implement if patient meets criteria  Outcome: Progressing     Problem: SLEEP DISTURBANCE  Goal: Will exhibit normal sleeping pattern  Description: Interventions:  -  Assess the patients sleep pattern, noting recent changes  - Administer medication as ordered  - Decrease environmental stimuli, including noise, as appropriate during the night  - Encourage the patient to actively participate in unit groups and or exercise during the day to enhance ability to achieve adequate sleep at night  - Assess the patient, in the morning, encouraging a description of sleep experience  Outcome: Progressing

## 2022-10-10 NOTE — TELEPHONE ENCOUNTER
HETAL called [facility] ([phone]) and spoke with Cindi Taylor to initiate psych pre-cert for [facility]  Pending reference # [id number]  CM was transferred to [facility] to complete pre-cert  She reported pt was approved for 3 days, last covered day 10/9  She informed that UR would need to fax clinicals today to F: [phone], [facility] HETAL aware of same  Precert # [id number]

## 2022-10-10 NOTE — PROGRESS NOTES
10/10/22 0917   Team Meeting   Meeting Type Daily Rounds   Initial Conference Date 10/10/22   Next Conference Date 10/11/22   Team Members Present   Team Members Present Physician;Nurse;;Occupational Therapist   Patient/Family Present   Patient Present No   Patient's Family Present No   TONYA Saravia T  [place],  S  [place]um calm, cooperative, denies s/s, med compliant, min interaction with peers

## 2022-10-10 NOTE — PROGRESS NOTES
Progress Note - Behavioral Health   Sharon Muse 77 y o  male MRN: [id number]  Unit/Bed#: [facility] 858-96 Encounter: [id number]    Assessment/Plan   Principal Problem:    Benzodiazepine abuse ([facility] )  Active Problems:    Hypertension    Mixed hyperlipidemia    Type 2 diabetes mellitus with diabetic peripheral angiopathy without gangrene, without long-term current use of insulin (HCC)    Alcohol use    Anxiety    2-part displaced fracture of surgical neck of left humerus, initial encounter for closed fracture    Medical clearance for psychiatric admission    RSV (acute bronchiolitis due to respiratory syncytial virus)    MDD (major depressive disorder), recurrent episode ([facility] )  Adjusting to his admission to older adult unit  Stays by himself for the most part but is cooperative with treatment affect is blunted he remains depressed and anxious and stance in needs to get off his benzodiazepines and to be alcohol free once he is discharged  He slept better last night appetite is reasonable    Has some issues related to recent fracture of his left humerus which was the operated on several days ago here in the hospital     Behavior over the last 24 hours:  unchanged  Sleep: normal  Appetite: normal  Medication side effects: No  ROS: no complaints    Mental Status Evaluation:  Appearance:  age appropriate   Behavior:  guarded   Speech:  normal pitch and normal volume   Mood:  anxious and depressed   Affect:  blunted and constricted   Thought Process:  tangential   Thought Content:  normal   Perceptual Disturbances: None   Risk Potential: Suicidal Ideations none  Homicidal Ideations none  Potential for Aggression No   Sensorium:  person, place, and situation   Memory:  recent and remote memory grossly intact   Consciousness:  alert and awake    Attention: attention span appeared shorter than expected for age   Insight:  fair   Judgment: fair   Gait/Station: normal gait/station   Motor Activity: no abnormal movements Progress Toward Goals: The same    Recommended Treatment: Continue with group therapy, milieu therapy and occupational therapy  Risks, benefits and possible side effects of Medications:   Risks, benefits, and possible side effects of medications explained to patient and patient verbalizes understanding  Medications: planned medication changes: Reduce Xanax 2 2 5 mg once a day  Labs: I have personally reviewed all pertinent laboratory/tests results  Most Recent Labs:   Lab Results   Component Value Date    WBC 10 55 (H) 10/08/2022    RBC 3 04 (L) 10/08/2022    HGB 10 1 (L) 10/08/2022    HCT 31 4 (L) 10/08/2022     (H) 10/08/2022    RDW 13 8 10/08/2022    NEUTROABS 7 62 10/08/2022    SODIUM 134 (L) 10/10/2022    K 4 2 10/10/2022     10/10/2022    CO2 24 10/10/2022    BUN 16 10/10/2022    CREATININE 0 66 (L) 10/10/2022    GLUC 111 (H) 10/10/2022    GLUF 111 (H) 10/10/2022    CALCIUM 8 7 10/10/2022    AST 74 (H) 10/07/2022    ALT 56 (H) 10/07/2022    ALKPHOS 58 10/07/2022    TP 5 4 (L) 10/07/2022    ALB 2 8 (L) 10/07/2022    TBILI 0 32 10/07/2022    CHOLESTEROL 160 07/21/2022    HDL 62 07/21/2022    TRIG 103 07/21/2022    LDLCALC 77 07/21/2022    NONHDLC 98 07/21/2022    HLK2HHJMGLRM 2 090 12/11/2019    FREET4 0 9 02/27/2017    HGBA1C 5 7 (H) 07/21/2022     07/21/2022       Counseling / Coordination of Care  Total floor / unit time spent today 25 minutes  Greater than 50% of total time was spent with the patient and / or family counseling and / or coordination of care   A description of the counseling / coordination of care:

## 2022-10-10 NOTE — PLAN OF CARE
Problem: Nutrition/Hydration-ADULT  Goal: Nutrient/Hydration intake appropriate for improving, restoring or maintaining nutritional needs  Description: Monitor and assess patient's nutrition/hydration status for malnutrition  Collaborate with interdisciplinary team and initiate plan and interventions as ordered  Monitor patient's weight and dietary intake as ordered or per policy  Utilize nutrition screening tool and intervene as necessary  Determine patient's food preferences and provide high-protein, high-caloric foods as appropriate       INTERVENTIONS:  - Monitor oral intake, urinary output, labs, and treatment plans  - Assess nutrition and hydration status and recommend course of action  - Evaluate amount of meals eaten  - Assist patient with eating if necessary   - Allow adequate time for meals  - Recommend/ encourage appropriate diets, oral nutritional supplements, and vitamin/mineral supplements  - Order, calculate, and assess calorie counts as needed  - Recommend, monitor, and adjust tube feedings and TPN/PPN based on assessed needs  - Assess need for intravenous fluids  - Provide specific nutrition/hydration education as appropriate  - Include patient/family/caregiver in decisions related to nutrition  Outcome: Progressing     Problem: SAFETY ADULT  Goal: Patient will remain free of falls  Description: INTERVENTIONS:  - Educate patient/family on patient safety including physical limitations  - Instruct patient to call for assistance with activity   - Consult OT/PT to assist with strengthening/mobility   - Keep Call bell within reach  - Keep bed low and locked with side rails adjusted as appropriate  - Keep care items and personal belongings within reach  - Initiate and maintain comfort rounds  - Make Fall Risk Sign visible to staff  - Offer Toileting every  Hours, in advance of need  - Initiate/Maintain alarm  - Obtain necessary fall risk management equipment:   - Apply yellow socks and bracelet for high fall risk patients  - Consider moving patient to room near nurses station  Outcome: Progressing  Goal: Maintain or return to baseline ADL function  Description: INTERVENTIONS:  -  Assess patient's ability to carry out ADLs; assess patient's baseline for ADL function and identify physical deficits which impact ability to perform ADLs (bathing, care of mouth/teeth, toileting, grooming, dressing, etc )  - Assess/evaluate cause of self-care deficits   - Assess range of motion  - Assess patient's mobility; develop plan if impaired  - Assess patient's need for assistive devices and provide as appropriate  - Encourage maximum independence but intervene and supervise when necessary  - Involve family in performance of ADLs  - Assess for home care needs following discharge   - Consider OT consult to assist with ADL evaluation and planning for discharge  - Provide patient education as appropriate  Outcome: Progressing  Goal: Maintains/Returns to pre admission functional level  Description: INTERVENTIONS:  - Perform BMAT or MOVE assessment daily    - Set and communicate daily mobility goal to care team and patient/family/caregiver  - Collaborate with rehabilitation services on mobility goals if consulted  - Perform Range of Motion  times a day  - Reposition patient every  hours    - Dangle patient  times a day  - Stand patient  times a day  - Ambulate patient  times a day  - Out of bed to chair  times a day   - Out of bed for meals times a day  - Out of bed for toileting  - Record patient progress and toleration of activity level   Outcome: Progressing     Problem: DISCHARGE PLANNING  Goal: Discharge to home or other facility with appropriate resources  Description: INTERVENTIONS:  - Identify barriers to discharge w/patient and caregiver  - Arrange for needed discharge resources and transportation as appropriate  - Identify discharge learning needs (meds, wound care, etc )  - Arrange for interpretive services to assist at discharge as needed  - Refer to Case Management Department for coordinating discharge planning if the patient needs post-hospital services based on physician/advanced practitioner order or complex needs related to functional status, cognitive ability, or social support system  Outcome: Progressing     Problem: Knowledge Deficit  Goal: Patient/family/caregiver demonstrates understanding of disease process, treatment plan, medications, and discharge instructions  Description: Complete learning assessment and assess knowledge base  Interventions:  - Provide teaching at level of understanding  - Provide teaching via preferred learning methods  Outcome: Progressing     Problem: DEPRESSION  Goal: Will be euthymic at discharge  Description: INTERVENTIONS:  - Administer medication as ordered  - Provide emotional support via 1:1 interaction with staff  - Encourage involvement in milieu/groups/activities  - Monitor for social isolation  Outcome: Progressing     Problem: BEHAVIOR  Goal: Pt/Family maintain appropriate behavior and adhere to behavioral management agreement, if implemented  Description: INTERVENTIONS:  - Assess the family dynamic   - Encourage verbalization of thoughts and concerns in a socially appropriate manner  - Assess patient/family's coping skills and non-compliant behavior (including use of illegal substances)  - Utilize positive, consistent limit setting strategies supporting safety of patient, staff and others  - Initiate consult with Case Management, Spiritual Care or other ancillary services as appropriate  - If a patient's/visitor's behavior jeopardizes the safety of the patient, staff, or others, refer to organization procedure     - Notify Security of behavior or suspected illegal substances which indicate the need for search of the patient and/or belongings  - Encourage participation in the decision making process about a behavioral management agreement; implement if patient meets criteria  Outcome: Progressing     Problem: ANXIETY  Goal: Will report anxiety at manageable levels  Description: INTERVENTIONS:  - Administer medication as ordered  - Teach and encourage coping skills  - Provide emotional support  - Assess patient/family for anxiety and ability to cope  Outcome: Progressing  Goal: By discharge: Patient will verbalize 2 strategies to deal with anxiety  Description: Interventions:  - Identify any obvious source/trigger to anxiety  - Staff will assist patient in applying identified coping technique/skills  - Encourage attendance of scheduled groups and activities  Outcome: Progressing     Problem: SLEEP DISTURBANCE  Goal: Will exhibit normal sleeping pattern  Description: Interventions:  -  Assess the patients sleep pattern, noting recent changes  - Administer medication as ordered  - Decrease environmental stimuli, including noise, as appropriate during the night  - Encourage the patient to actively participate in unit groups and or exercise during the day to enhance ability to achieve adequate sleep at night  - Assess the patient, in the morning, encouraging a description of sleep experience  Outcome: Progressing

## 2022-10-10 NOTE — NURSING NOTE
Pt is isolative to room this evening  He is c/o arm soreness, but declined any PRN's  Refused dinner  Refused scheduled Metrogel  Scant in conversation  Denies all psych symptoms  Encouraged to make needs known

## 2022-10-10 NOTE — NURSING NOTE
Patient cam and cooperative on approach denies s/s  Patient in and out of common areas with minimal interaction with peers  Patient compliant with medications in the shift

## 2022-10-11 PROCEDURE — 99024 POSTOP FOLLOW-UP VISIT: CPT | Performed by: PHYSICIAN ASSISTANT

## 2022-10-11 RX ORDER — GABAPENTIN 300 MG/1
600 CAPSULE ORAL
Status: DISCONTINUED | OUTPATIENT
Start: 2022-10-11 | End: 2022-10-12

## 2022-10-11 RX ADMIN — ASPIRIN 81 MG CHEWABLE TABLET 81 MG: 81 TABLET CHEWABLE at 08:26

## 2022-10-11 RX ADMIN — NICOTINE 1 PATCH: 14 PATCH, EXTENDED RELEASE TRANSDERMAL at 08:49

## 2022-10-11 RX ADMIN — METRONIDAZOLE 1 APPLICATION: 7.5 GEL TOPICAL at 08:49

## 2022-10-11 RX ADMIN — MIRTAZAPINE 30 MG: 30 TABLET, FILM COATED ORAL at 21:26

## 2022-10-11 RX ADMIN — ALUMINUM HYDROXIDE, MAGNESIUM HYDROXIDE, AND SIMETHICONE 30 ML: 200; 200; 20 SUSPENSION ORAL at 14:50

## 2022-10-11 RX ADMIN — GABAPENTIN 600 MG: 300 CAPSULE ORAL at 21:26

## 2022-10-11 RX ADMIN — ALPRAZOLAM 0.25 MG: 0.5 TABLET ORAL at 08:24

## 2022-10-11 RX ADMIN — FOLIC ACID 1 MG: 1 TABLET ORAL at 08:29

## 2022-10-11 RX ADMIN — PAROXETINE HYDROCHLORIDE 40 MG: 20 TABLET, FILM COATED ORAL at 08:28

## 2022-10-11 RX ADMIN — ATORVASTATIN CALCIUM 40 MG: 40 TABLET, FILM COATED ORAL at 08:27

## 2022-10-11 RX ADMIN — THIAMINE HCL TAB 100 MG 100 MG: 100 TAB at 08:28

## 2022-10-11 RX ADMIN — METFORMIN HYDROCHLORIDE 1000 MG: 500 TABLET ORAL at 08:26

## 2022-10-11 RX ADMIN — HYDROCHLOROTHIAZIDE 12.5 MG: 12.5 TABLET ORAL at 08:45

## 2022-10-11 RX ADMIN — AMLODIPINE BESYLATE 10 MG: 10 TABLET ORAL at 08:45

## 2022-10-11 NOTE — PROGRESS NOTES
Progress Note - Behavioral Health   Arturo Parks 77 y o  male MRN: [id number]  Unit/Bed#: [facility] 579-81 Encounter: [id number]    Assessment/Plan   Principal Problem:    Benzodiazepine abuse ([facility] )  Active Problems:    Hypertension    Mixed hyperlipidemia    Type 2 diabetes mellitus with diabetic peripheral angiopathy without gangrene, without long-term current use of insulin ([facility])    Alcohol use    Anxiety    2-part displaced fracture of surgical neck of left humerus, initial encounter for closed fracture    Medical clearance for psychiatric admission    RSV (acute bronchiolitis due to respiratory syncytial virus)    MDD (major depressive disorder), recurrent episode ([facility] )  Patient remains anxious but cooperative said he has not been sleeping well since in the hospital and he looks tired goes back to bed  Little participation in group activities, is withdrawn and stays to himself  Affect is blunted to depressed but denying suicidality  Cooperative treatment but isolates in his room easily and is requesting something to help him sleep      Behavior over the last 24 hours:  unchanged  Sleep: insomnia  Appetite: normal  Medication side effects: No  ROS: no complaints    Mental Status Evaluation:  Appearance:  age appropriate   Behavior:  guarded   Speech:  normal pitch and normal volume   Mood:  anxious and depressed   Affect:  blunted and constricted   Thought Process:  tangential   Thought Content:  normal   Perceptual Disturbances: None   Risk Potential: Suicidal Ideations none  Homicidal Ideations none  Potential for Aggression No   Sensorium:  person, place, and situation   Memory:  recent and remote memory grossly intact   Consciousness:  alert and awake    Attention: attention span appeared shorter than expected for age   Insight:  fair   Judgment: fair   Gait/Station: normal gait/station   Motor Activity: no abnormal movements     Progress Toward Goals:  No change    Recommended Treatment: Continue with group therapy, milieu therapy and occupational therapy  Risks, benefits and possible side effects of Medications:   Risks, benefits, and possible side effects of medications explained to patient and patient verbalizes understanding  Medications: planned medication changes: Neurontin 600 mg q h s     Labs: I have personally reviewed all pertinent laboratory/tests results  Most Recent Labs:   Lab Results   Component Value Date    WBC 10 55 (H) 10/08/2022    RBC 3 04 (L) 10/08/2022    HGB 10 1 (L) 10/08/2022    HCT 31 4 (L) 10/08/2022     (H) 10/08/2022    RDW 13 8 10/08/2022    NEUTROABS 7 62 10/08/2022    SODIUM 134 (L) 10/10/2022    K 4 2 10/10/2022     10/10/2022    CO2 24 10/10/2022    BUN 16 10/10/2022    CREATININE 0 66 (L) 10/10/2022    GLUC 111 (H) 10/10/2022    GLUF 111 (H) 10/10/2022    CALCIUM 8 7 10/10/2022    AST 74 (H) 10/07/2022    ALT 56 (H) 10/07/2022    ALKPHOS 58 10/07/2022    TP 5 4 (L) 10/07/2022    ALB 2 8 (L) 10/07/2022    TBILI 0 32 10/07/2022    CHOLESTEROL 160 07/21/2022    HDL 62 07/21/2022    TRIG 103 07/21/2022    LDLCALC 77 07/21/2022    NONHDLC 98 07/21/2022    [id number] 2 090 12/11/2019    FREET4 0 9 02/27/2017    HGBA1C 5 7 (H) 07/21/2022     07/21/2022       Counseling / Coordination of Care  Total floor / unit time spent today 25 minutes  Greater than 50% of total time was spent with the patient and / or family counseling and / or coordination of care   A description of the counseling / coordination of care:

## 2022-10-11 NOTE — PROGRESS NOTES
10/11/22 1527   Team Meeting   Meeting Type Tx Team Meeting   Initial Conference Date 10/11/22   Team Members Present   Team Members Present Physician;Nurse;   Physician Team Member Gross   Nursing Team Member [facility] Management Team Member Diamond   Patient/Family Present   Patient Present Yes   Patient's Family Present No   Tx plan was reviewed and discussed with Pt  Pt was encouraged to attend groups  Medication was discussed with Pt  Pt signed tx plan

## 2022-10-11 NOTE — PLAN OF CARE
Problem: Nutrition/Hydration-ADULT  Goal: Nutrient/Hydration intake appropriate for improving, restoring or maintaining nutritional needs  Description: Monitor and assess patient's nutrition/hydration status for malnutrition  Collaborate with interdisciplinary team and initiate plan and interventions as ordered  Monitor patient's weight and dietary intake as ordered or per policy  Utilize nutrition screening tool and intervene as necessary  Determine patient's food preferences and provide high-protein, high-caloric foods as appropriate  INTERVENTIONS:  - Monitor oral intake, urinary output, labs, and treatment plans  - Assess nutrition and hydration status and recommend course of action  - Evaluate amount of meals eaten  - Assist patient with eating if necessary   - Allow adequate time for meals  - Recommend/ encourage appropriate diets, oral nutritional supplements, and vitamin/mineral supplements  - Order, calculate, and assess calorie counts as needed  - Recommend, monitor, and adjust tube feedings and TPN/PPN based on assessed needs  - Assess need for intravenous fluids  - Provide specific nutrition/hydration education as appropriate  - Include patient/family/caregiver in decisions related to nutrition  Outcome: Progressing     Problem: Nutrition/Hydration-ADULT  Goal: Nutrient/Hydration intake appropriate for improving, restoring or maintaining nutritional needs  Description: Monitor and assess patient's nutrition/hydration status for malnutrition  Collaborate with interdisciplinary team and initiate plan and interventions as ordered  Monitor patient's weight and dietary intake as ordered or per policy  Utilize nutrition screening tool and intervene as necessary  Determine patient's food preferences and provide high-protein, high-caloric foods as appropriate       INTERVENTIONS:  - Monitor oral intake, urinary output, labs, and treatment plans  - Assess nutrition and hydration status and recommend course of action  - Evaluate amount of meals eaten  - Assist patient with eating if necessary   - Allow adequate time for meals  - Recommend/ encourage appropriate diets, oral nutritional supplements, and vitamin/mineral supplements  - Order, calculate, and assess calorie counts as needed  - Recommend, monitor, and adjust tube feedings and TPN/PPN based on assessed needs  - Assess need for intravenous fluids  - Provide specific nutrition/hydration education as appropriate  - Include patient/family/caregiver in decisions related to nutrition  Outcome: Progressing

## 2022-10-11 NOTE — NURSING NOTE
Pt is visible for meals but isolative to self  Pt is pleasant during assessment time  Pt has left arm in a sling and no complain of pain  Pt is medication compliant and intake is good at meals time  Pt only accepted Metronidazole gel morning time   Pt denies all sympotms at this time  Will maintain q 7 mins checks

## 2022-10-11 NOTE — PROGRESS NOTES
10/11/22 0927   Team Meeting   Meeting Type Daily Rounds   Initial Conference Date 10/11/22   Next Conference Date 10/12/22   Team Members Present   Team Members Present Physician;Nurse;   Patient/Family Present   Patient Present No   Patient's Family Present No   BRENDA Cadet S Maxene Ellis  Renfer  -  islative, c/o arm pain and refused medication, ate no dinner, med compliant, blunted affect, withdrawn

## 2022-10-11 NOTE — SOCIAL WORK
Patient Intake   Living Arrangement Apartment alone   Can patient return home Yes   Address to discharge to [address] [place] [address] Daniella Colvin, [address]   Patient's Telephone Number [phone]   Patient's e-mail Address States he doesn't have an email   Access to firearms Denies   Type of work Retired, [address] [employer] grade/year [address]   Marital Status/Children Single, no children   [address] self   [facility]   Admission Status    Status of admission [address] [place] [address]   Patient History   Stressor/Trigger "I'm here to detox off the alcohol and xanax"   Treatment History [address] - States it was a while ago and cannot remember   Current psychiatrist/therapist Dr Kane Lagos Attempts OD - cannot remember when   Family History of [address] Denies   ACT/ICM Denies, declined referral   Legal Issues None known   Substance Abuse UDS: + Barbiturates, Benzodiazepines, Oxycodone  Audit Score: 7  Nicotine/Tobacco: 1 ppd     Trauma/Losses Denies       Releases of [address]  Mark Deck - Brother

## 2022-10-11 NOTE — PROGRESS NOTES
Progress Note - Orthopedics   Aminata Alvarado 77 y o  male MRN: [id number]  Unit/Bed#: [facility] 870-71 Encounter: [id number]    Assessment:  78 yo male POD5 s/p ORIF left proximal humerus    Plan:  · NWB to LUE  · Continue sling  · Pain control prn  · Apply ice  · PT/ OT    · Medical management per primary team    · Patient will follow up with Dr Jessie Louis 2 weeks post op  · Orthopedics is signing off  Please re-consult or reach out if any questions or concerns arise  Subjective: Patient seen and examined at bedside  He reports that he is over all doing well  He states that his pain is well controlled at this time  He does state that he had some pain earlier in the day from rolling into the left side but this has resolved  He denies any numbness or tingling in the left upper extremity  Vitals: Blood pressure 108/55, pulse (!) 110, temperature 98 1 °F (36 7 °C), temperature source Temporal, resp  rate 16, SpO2 100 %  ,There is no height or weight on file to calculate BMI  Intake/Output Summary (Last 24 hours) at 10/11/2022 1749  Last data filed at 10/11/2022 1722  Gross per 24 hour   Intake 840 ml   Output 1 ml   Net 839 ml       Invasive Devices  Report    None                 Ortho Exam:   Left Upper Extremity:  Inspection- sling in place  mepilex dressing CDI over incision  Ecchymosis about the shoulder and axilla  Palpation- +swelling +effusion +minimal TTP as expected  Upper and forearm soft and compressible  ROM- shoulder and elbow deferred  Able to flex and extend wrist and fingers  Neurovascular- sensation intact axillary, median, radial, and ulnar nerve distributions  Palpable radial pulse  AIN/ PIN intact       Lab, Imaging and other studies:   CBC:   No results found for: WBC, HGB, HCT, MCV, PLT, ADJUSTEDWBC, MCH, MCHC, RDW, MPV, NRBC  CMP:   No results found for: SODIUM, CL, CO2, ANIONGAP, BUN, CREATININE, GLUCOSE, CALCIUM, AST, ALT, ALKPHOS, PROT, BILITOT, EGFR         [employer]

## 2022-10-11 NOTE — NURSING NOTE
Pt visible pacing hallways at times in the evening, otherwise laying in room  Pt overall pleasant and cooperative on approach, alert and oriented x4, coherent, calm  Pt denies all psych s/s, states he is "excited" for tonight, elaborated stating his provider placed an order for a scheduled sleep medication, is hopeful he will get some rest     Pt denies having any arm pain  Pt observed laying in bed with eyes closed at hs, respirations even and non labored  No signs of distress of any kind observed

## 2022-10-12 RX ORDER — LANOLIN ALCOHOL/MO/W.PET/CERES
3 CREAM (GRAM) TOPICAL
Status: DISCONTINUED | OUTPATIENT
Start: 2022-10-12 | End: 2022-10-17

## 2022-10-12 RX ORDER — GABAPENTIN 300 MG/1
900 CAPSULE ORAL
Status: DISCONTINUED | OUTPATIENT
Start: 2022-10-12 | End: 2022-10-14

## 2022-10-12 RX ADMIN — FOLIC ACID 1 MG: 1 TABLET ORAL at 08:52

## 2022-10-12 RX ADMIN — METRONIDAZOLE 1 APPLICATION: 7.5 GEL TOPICAL at 08:50

## 2022-10-12 RX ADMIN — ASPIRIN 81 MG CHEWABLE TABLET 81 MG: 81 TABLET CHEWABLE at 08:50

## 2022-10-12 RX ADMIN — GABAPENTIN 900 MG: 300 CAPSULE ORAL at 21:01

## 2022-10-12 RX ADMIN — METFORMIN HYDROCHLORIDE 1000 MG: 500 TABLET ORAL at 08:51

## 2022-10-12 RX ADMIN — ALPRAZOLAM 0.25 MG: 0.5 TABLET ORAL at 08:51

## 2022-10-12 RX ADMIN — HYDROCHLOROTHIAZIDE 12.5 MG: 12.5 TABLET ORAL at 08:50

## 2022-10-12 RX ADMIN — NICOTINE 1 PATCH: 14 PATCH, EXTENDED RELEASE TRANSDERMAL at 09:02

## 2022-10-12 RX ADMIN — ATORVASTATIN CALCIUM 40 MG: 40 TABLET, FILM COATED ORAL at 08:51

## 2022-10-12 RX ADMIN — MIRTAZAPINE 30 MG: 30 TABLET, FILM COATED ORAL at 21:01

## 2022-10-12 RX ADMIN — AMLODIPINE BESYLATE 10 MG: 10 TABLET ORAL at 08:51

## 2022-10-12 RX ADMIN — MELATONIN TAB 3 MG 3 MG: 3 TAB at 22:45

## 2022-10-12 RX ADMIN — PAROXETINE HYDROCHLORIDE 40 MG: 20 TABLET, FILM COATED ORAL at 08:51

## 2022-10-12 NOTE — NURSING NOTE
Pt is calm and cooperative with care  Pt is visible on the unit and pleasant on approach  Pt has arm in a sling and denies having any pain at this time  Pt is medication compliant and good intake at meals time  No any other symptoms reported at this time  Will maintain q 7 mins checks

## 2022-10-12 NOTE — PROGRESS NOTES
10/12/22 0952   Team Meeting   Meeting Type Daily Rounds   Initial Conference Date 10/12/22   Next Conference Date 10/13/22   Team Members Present   Team Members Present Physician;Nurse;;Occupational Therapist   Patient/Family Present   Patient Present No   Patient's Family Present No   BRENDA Abreu S Searle, K Gabe Chang - denies s/s, paces, med compliant, pleasant, plan to taper Xanax

## 2022-10-12 NOTE — PLAN OF CARE
Problem: Knowledge Deficit  Goal: Patient/family/caregiver demonstrates understanding of disease process, treatment plan, medications, and discharge instructions  Description: Complete learning assessment and assess knowledge base  Interventions:  - Provide teaching at level of understanding  - Provide teaching via preferred learning methods  10/12/2022 1032 by Katheren Soulier, LPN  Outcome: Progressing  10/12/2022 1032 by Katheren Soulier, LPN  Outcome: Progressing  10/12/2022 1031 by Katheren Soulier, LPN  Outcome: Progressing  10/12/2022 1031 by Katheren Soulier, LPN  Outcome: Progressing     Problem: Knowledge Deficit  Goal: Patient/family/caregiver demonstrates understanding of disease process, treatment plan, medications, and discharge instructions  Description: Complete learning assessment and assess knowledge base    Interventions:  - Provide teaching at level of understanding  - Provide teaching via preferred learning methods  10/12/2022 1032 by Katheren Soulier, LPN  Outcome: Progressing  10/12/2022 1032 by Katheren Soulier, LPN  Outcome: Progressing  10/12/2022 1031 by Katheren Soulier, LPN  Outcome: Progressing  10/12/2022 1031 by Katheren Soulier, LPN  Outcome: Progressing

## 2022-10-12 NOTE — PROGRESS NOTES
Progress Note - Behavioral Health   Nicky Patrick 77 y o  male MRN: [id number]  Unit/Bed#: [facility] 185-59 Encounter: [id number]    Assessment/Plan   Principal Problem:    Benzodiazepine abuse ([facility] )  Active Problems:    Hypertension    Mixed hyperlipidemia    Type 2 diabetes mellitus with diabetic peripheral angiopathy without gangrene, without long-term current use of insulin (HCC)    Alcohol use    Anxiety    2-part displaced fracture of surgical neck of left humerus, initial encounter for closed fracture    Medical clearance for psychiatric admission    RSV (acute bronchiolitis due to respiratory syncytial virus)    MDD (major depressive disorder), recurrent episode ([facility] 75 )  Patient said he slept poorly last night only few hours despite using gabapentin 600 mg  He is depressed withdrawn spends a lot time in his room with very little group or interactions with staff or patients  Cooperative with his medication and appetite is reasonable      Patient reports that anxiety is an issue but that he will cope with it despite reducing the doses of Xanax which will be stopped at the end    Behavior over the last 24 hours:  unchanged  Sleep: insomnia  Appetite: normal  Medication side effects: No  ROS: no complaints    Mental Status Evaluation:  Appearance:  age appropriate   Behavior:  guarded   Speech:  normal pitch and normal volume   Mood:  anxious and depressed   Affect:  blunted and constricted   Thought Process:  tangential   Thought Content:  normal   Perceptual Disturbances: None   Risk Potential: Suicidal Ideations none  Homicidal Ideations none  Potential for Aggression No   Sensorium:  person, place, and situation   Memory:  recent and remote memory grossly intact   Consciousness:  alert and awake    Attention: attention span appeared shorter than expected for age   Insight:  fair   Judgment: fair   Gait/Station: normal gait/station   Motor Activity: no abnormal movements     Progress Toward Goals:  No change    Recommended Treatment: Continue with group therapy, milieu therapy and occupational therapy  Risks, benefits and possible side effects of Medications:   Risks, benefits, and possible side effects of medications explained to patient and patient verbalizes understanding  Medications: planned medication changes: Increase gabapentin to 900 mg q h s     Labs: I have personally reviewed all pertinent laboratory/tests results  Most Recent Labs:   Lab Results   Component Value Date    WBC 10 55 (H) 10/08/2022    RBC 3 04 (L) 10/08/2022    HGB 10 1 (L) 10/08/2022    HCT 31 4 (L) 10/08/2022     (H) 10/08/2022    RDW 13 8 10/08/2022    NEUTROABS 7 62 10/08/2022    SODIUM 134 (L) 10/10/2022    K 4 2 10/10/2022     10/10/2022    CO2 24 10/10/2022    BUN 16 10/10/2022    CREATININE 0 66 (L) 10/10/2022    GLUC 111 (H) 10/10/2022    GLUF 111 (H) 10/10/2022    CALCIUM 8 7 10/10/2022    AST 74 (H) 10/07/2022    ALT 56 (H) 10/07/2022    ALKPHOS 58 10/07/2022    TP 5 4 (L) 10/07/2022    ALB 2 8 (L) 10/07/2022    TBILI 0 32 10/07/2022    CHOLESTEROL 160 07/21/2022    HDL 62 07/21/2022    TRIG 103 07/21/2022    LDLCALC 77 07/21/2022    NONHDLC 98 07/21/2022    [id number] 2 090 12/11/2019    FREET4 0 9 02/27/2017    HGBA1C 5 7 (H) 07/21/2022     07/21/2022       Counseling / Coordination of Care  Total floor / unit time spent today 25 minutes  Greater than 50% of total time was spent with the patient and / or family counseling and / or coordination of care   A description of the counseling / coordination of care:

## 2022-10-13 PROCEDURE — 97167 OT EVAL HIGH COMPLEX 60 MIN: CPT

## 2022-10-13 PROCEDURE — 97163 PT EVAL HIGH COMPLEX 45 MIN: CPT

## 2022-10-13 RX ADMIN — ATORVASTATIN CALCIUM 40 MG: 40 TABLET, FILM COATED ORAL at 08:18

## 2022-10-13 RX ADMIN — MELATONIN TAB 3 MG 3 MG: 3 TAB at 21:01

## 2022-10-13 RX ADMIN — METRONIDAZOLE 1 APPLICATION: 7.5 GEL TOPICAL at 08:19

## 2022-10-13 RX ADMIN — MIRTAZAPINE 30 MG: 30 TABLET, FILM COATED ORAL at 21:01

## 2022-10-13 RX ADMIN — ALPRAZOLAM 0.25 MG: 0.5 TABLET ORAL at 08:17

## 2022-10-13 RX ADMIN — AMLODIPINE BESYLATE 10 MG: 10 TABLET ORAL at 08:18

## 2022-10-13 RX ADMIN — GABAPENTIN 900 MG: 300 CAPSULE ORAL at 21:01

## 2022-10-13 RX ADMIN — ASPIRIN 81 MG CHEWABLE TABLET 81 MG: 81 TABLET CHEWABLE at 08:17

## 2022-10-13 RX ADMIN — FOLIC ACID 1 MG: 1 TABLET ORAL at 08:18

## 2022-10-13 RX ADMIN — NICOTINE 1 PATCH: 14 PATCH, EXTENDED RELEASE TRANSDERMAL at 08:25

## 2022-10-13 RX ADMIN — METFORMIN HYDROCHLORIDE 1000 MG: 500 TABLET ORAL at 08:17

## 2022-10-13 RX ADMIN — HYDROCHLOROTHIAZIDE 12.5 MG: 12.5 TABLET ORAL at 08:17

## 2022-10-13 RX ADMIN — PAROXETINE HYDROCHLORIDE 40 MG: 20 TABLET, FILM COATED ORAL at 08:17

## 2022-10-13 NOTE — NURSING NOTE
Pt in milieu for most of evening watching television, isolative to self  Pt overall pleasant and cooperative on approach, provided education on infection control and given reminders to keep mask on properly over nose when in milieu  Pt agreeable, Needs reinforcement  Pt denied all psych s/s  verbalzied c/o inability to sleep last night, hopeful for a more restful night tonight with ordered increase in scheduled gabapentin at hs  Pt strongly encouraged to approach this writer should he need prn sleep aid or any interventions to enhance comfort and promote sleep  Pt later observed laying in bed awake by mht and needed prompting to approach this writer to make needs known  Overnight Psych provider was consulted for sleep aid  3mg melatonin added, administered at 2245  Patient appeared to be laying in bed, eyes closed, respirations even and non labored  No signs of distress observed  Patient made no further complaints of insomnia throughout shift

## 2022-10-13 NOTE — PROGRESS NOTES
Progress Note - Behavioral Health   Walker Sanchez 77 y o  male MRN: [id number]  [facility] Encounter: [id number]    Assessment/Plan   Principal Problem:    Benzodiazepine abuse ([facility] 75 )  Active Problems:    Hypertension    Mixed hyperlipidemia    Type 2 diabetes mellitus with diabetic peripheral angiopathy without gangrene, without long-term current use of insulin (HCC)    Alcohol use    Anxiety    2-part displaced fracture of surgical neck of left humerus, initial encounter for closed fracture    Medical clearance for psychiatric admission    RSV (acute bronchiolitis due to respiratory syncytial virus)    MDD (major depressive disorder), recurrent episode ([facility] 75 )    Patient said he slept better with the extra gabapentin and melatonin  And he is pleased  Remains withdrawn isolative for the most part and little activity on the unit  Keeps himself  Affect is blunted  He is denying suicidal ideas      Patient worries about the being off his benzodiazepines which should be stopped at the end of this week entirely for  Behavior over the last 24 hours:  unchanged  Sleep: normal  Appetite: normal  Medication side effects: No  ROS: no complaints    Mental Status Evaluation:  Appearance:  age appropriate   Behavior:  guarded   Speech:  normal pitch and normal volume   Mood:  anxious and depressed   Affect:  blunted and constricted   Thought Process:  tangential   Thought Content:  normal   Perceptual Disturbances: None   Risk Potential: Suicidal Ideations none  Homicidal Ideations none  Potential for Aggression No   Sensorium:  person, place, and situation   Memory:  recent and remote memory grossly intact   Consciousness:  alert and awake    Attention: attention span appeared shorter than expected for age   Insight:  fair   Judgment: fair   Gait/Station: normal gait/station   Motor Activity: no abnormal movements     Progress Toward Goals:   no hange    Recommended Treatment: Continue with group therapy, milieu therapy and occupational therapy  Risks, benefits and possible side effects of Medications:   Risks, benefits, and possible side effects of medications explained to patient and patient verbalizes understanding  Medications: all current active meds have been reviewed  Labs: I have personally reviewed all pertinent laboratory/tests results  Most Recent Labs:   Lab Results   Component Value Date    WBC 10 55 (H) 10/08/2022    RBC 3 04 (L) 10/08/2022    HGB 10 1 (L) 10/08/2022    HCT 31 4 (L) 10/08/2022     (H) 10/08/2022    RDW 13 8 10/08/2022    NEUTROABS 7 62 10/08/2022    SODIUM 134 (L) 10/10/2022    K 4 2 10/10/2022     10/10/2022    CO2 24 10/10/2022    BUN 16 10/10/2022    CREATININE 0 66 (L) 10/10/2022    GLUC 111 (H) 10/10/2022    GLUF 111 (H) 10/10/2022    CALCIUM 8 7 10/10/2022    AST 74 (H) 10/07/2022    ALT 56 (H) 10/07/2022    ALKPHOS 58 10/07/2022    TP 5 4 (L) 10/07/2022    ALB 2 8 (L) 10/07/2022    TBILI 0 32 10/07/2022    CHOLESTEROL 160 07/21/2022    HDL 62 07/21/2022    TRIG 103 07/21/2022    LDLCALC 77 07/21/2022    NONHDLC 98 07/21/2022    [id number] 2 090 12/11/2019    FREET4 0 9 02/27/2017    HGBA1C 5 7 (H) 07/21/2022     07/21/2022       Counseling / Coordination of Care  Total floor / unit time spent today 25 minutes  Greater than 50% of total time was spent with the patient and / or family counseling and / or coordination of care   A description of the counseling / coordination of care:

## 2022-10-13 NOTE — NURSING NOTE
Pt is calm and cooperative with care  Pt is visible on the unit but isolative to self   Pt is medication complaint and good intake at meals time  Pt reported had good sleep last night  Pt denies SI including all other s/s at this time  Pt Mepilex dressing taken off and  dry gauze applied over incision as per doctors orders  Pt has a little redness and open wound on the side of his incision site  Will continue to maintain q 7 mins checks

## 2022-10-13 NOTE — PHYSICAL THERAPY NOTE
Physical Therapy Evaluation Note    Patient's Name: Andra Santos    Admitting Diagnosis  Major depressive disorder, single episode, unspecified [F32 9]  Mood disorder ([facility] ) [F39]    Problem List  Patient Active Problem List   Diagnosis   • Atherosclerotic PVD with intermittent claudication ([facility] )   • Depression with anxiety   • Hypertension   • Mixed hyperlipidemia   • Type 2 diabetes mellitus with diabetic peripheral angiopathy without gangrene, without long-term current use of insulin ([facility] )   • Smoking   • Rosacea   • Rhinophyma   • Alcohol use   • Bilateral carotid artery stenosis   • Chronic bronchitis (HCC)   • Alcohol withdrawal syndrome, with delirium ([facility] )   • DARWIN (acute kidney injury) ([facility] )   • Leukocytosis   • Anxiety   • Toxic metabolic encephalopathy   • 2-part displaced fracture of surgical neck of left humerus, initial encounter for closed fracture   • Hypokalemia   • Medical clearance for psychiatric admission   • RSV (acute bronchiolitis due to respiratory syncytial virus)   • MDD (major depressive disorder), recurrent episode ([facility] )   • Benzodiazepine abuse ([place] [address] )       Past Medical History  Past Medical History:   Diagnosis Date   • Colon polyp    • Diabetes mellitus ([facility] )    • Hyperlipidemia    • Hypertension        Past Surgical History  Past Surgical History:   Procedure Laterality Date   • COLON SURGERY      found growth, bengin   • COLONOSCOPY     • COLONOSCOPY N/A 3/13/2018    Procedure: COLONOSCOPY;  Surgeon: Pool Demarco DO;  Location: [facility] GI LAB; Service: Gastroenterology   • ORIF HUMERUS FRACTURE Left 10/6/2022    Procedure: OPEN REDUCTION W/ INTERNAL FIXATION (ORIF) HUMERUS (SHOULDER);   Surgeon: Christian Alexander MD;  Location: [facility] MAIN OR;  Service: Orthopedics       Recent Imaging  No orders to display       Recent Vital Signs  Vitals:    10/12/22 1453 10/12/22 1530 10/12/22 2039 10/13/22 0700   BP: 131/60 137/62 128/58 140/65   BP Location: Right arm Right arm Right arm Right arm Pulse: 55 86 91 85   Resp: 16 16 16 17   Temp: 97 6 °F (36 4 °C) 98 7 °F (37 1 °C) 97 7 °F (36 5 °C) (!) 97 3 °F (36 3 °C)   TempSrc: Temporal Temporal Skin Temporal   SpO2: 100% 96% 94% 94%        10/13/22 1050   PT Last Visit   PT Visit Date 10/13/22   Note Type   Note type Evaluation   Pain Assessment   Pain Assessment Tool 0-10   Pain Score No Pain   Home Living   Type of Home Apartment   Home Layout Stairs to enter with rails   Prior Function   Level of [place] Independent with ADLs; Independent with functional mobility; Independent with IADLS   Lives With Alone   Receives Help From Family; [facility] 64 in the last 6 months 1 to 4   Vocational Retired   ADL Assistance Independent   General   Family/Caregiver Present No   Cognition   Overall Cognitive Status WFL   RLE Assessment   RLE Assessment   (4/5)   LLE Assessment   LLE Assessment   (4/5)   Coordination   Movements are Fluid and Coordinated 1   Sensation X   Light Touch   RLE Light Touch Impaired   RLE Light Touch Comments intermittent tingling and numbness   LLE Light Touch Impaired   LLE Light Touch Comments intermittent tingling and numbness   Bed Mobility   Supine to Sit 7  Independent   Additional items Increased time required   Sit to Supine 7  Independent   Additional items Increased time required   Transfers   Sit to Stand 7  Independent   Additional items Increased time required   Stand to Sit 7  Independent   Additional items Increased time required   Additional Comments with no AD   Ambulation/Elevation   Gait pattern Step through pattern;Decreased heel strike;Decreased toe off;Decreased foot clearance; Improper Weight shift   Gait Assistance 5  Supervision   Additional items Verbal cues   Assistive Device None   Distance 200ft   Balance   Static Sitting Fair +   Dynamic Sitting Fair +   Static Standing Fair   Dynamic Standing Fair   Ambulatory Fair   Endurance Deficit   Endurance Deficit Yes   Endurance Deficit Description decreased from baseline   Activity Tolerance   Activity Tolerance Patient tolerated treatment well   Medical Staff Made Aware spoke to CM   Nurse Made Aware spoke to RN   Assessment   Prognosis Good   Problem List Decreased strength;Decreased range of motion;Decreased endurance; Impaired balance;Decreased mobility; Impaired sensation;Orthopedic restrictions;Pain   Barriers to Discharge Inaccessible home environment;Decreased caregiver support   Goals   Patient Goals to go home   Recommendation   PT Discharge Recommendation Home with outpatient rehabilitation   [address] Mobility Inpatient   Turning in Bed Without Bedrails 4   Lying on Back to Sitting on Edge of Flat Bed 4   Moving Bed to Chair 4   Standing Up From Chair 4   Walk in Room 4   Climb 3-5 Stairs 4   Basic Mobility Inpatient Raw Score 24   Basic Mobility Standardized Score 57 68   Highest Level Of Mobility   JH-HLM Goal 8: Walk 250 feet or more   JH-HLM Achieved 8: Walk 250 feet ot more   End of Consult   Patient Position at End of Consult Standing       Dale Denton PT, DPT

## 2022-10-13 NOTE — PROGRESS NOTES
10/13/22 0935   Team Meeting   Meeting Type Daily Rounds   Initial Conference Date 10/13/22   Next Conference Date 10/14/22   Team Members Present   Team Members Present Nurse;;Occupational Therapist;Other (Discipline and Name)   Patient/Family Present   Patient Present No   Patient's Family Present No   BRENDA Moreno B Thurnell Bang, S Searle, K Sierra Beth - denies s/s, visible, pleasant, cooperative, melatonin was effective, attends grps

## 2022-10-13 NOTE — OCCUPATIONAL THERAPY NOTE
Occupational Therapy Evaluation     Patient Name: Jae Floyd  Today's Date: 10/13/2022  Problem List  Principal Problem:    Benzodiazepine abuse ([facility] )  Active Problems:    Hypertension    Mixed hyperlipidemia    Type 2 diabetes mellitus with diabetic peripheral angiopathy without gangrene, without long-term current use of insulin (HCC)    Alcohol use    Anxiety    2-part displaced fracture of surgical neck of left humerus, initial encounter for closed fracture    Medical clearance for psychiatric admission    RSV (acute bronchiolitis due to respiratory syncytial virus)    MDD (major depressive disorder), recurrent episode ([facility] )    Past Medical History  Past Medical History:   Diagnosis Date   • Colon polyp    • Diabetes mellitus ([facility] )    • Hyperlipidemia    • Hypertension      Past Surgical History  Past Surgical History:   Procedure Laterality Date   • COLON SURGERY      found growth, bengin   • COLONOSCOPY     • COLONOSCOPY N/A 3/13/2018    Procedure: COLONOSCOPY;  Surgeon: Ramiro Ramírez DO;  Location: [facility] GI LAB; Service: Gastroenterology   • ORIF HUMERUS FRACTURE Left 10/6/2022    Procedure: OPEN REDUCTION W/ INTERNAL FIXATION (ORIF) HUMERUS (SHOULDER); Surgeon: Pham Marquez MD;  Location: [address] [place] [address];  Service: Orthopedics            10/13/22 0902   OT Last Visit   OT Visit Date 10/13/22   Note Type   Note type Evaluation   Pain Assessment   Pain Assessment Tool 0-10   Pain Score No Pain   Restrictions/Precautions   Weight Bearing Precautions Per Order Yes   LUE Weight Bearing Per Order NWB   Braces or Orthoses Sling   Home Living   Type of Home Apartment   Home Layout Stairs to enter with rails  (17 REBEL)   Bathroom Accessibility Accessible   Prior Function   Level of [place] Independent with ADLs; Independent with functional mobility; Independent with IADLS   Lives With [address] [facility] From Family;Friend(s)   Falls in the last 6 months 1 to 4   Vocational Retired   Comments Reports multiple family members to assist upon discharges   Lifestyle   Reciprocal Relationships supportive family   Subjective   Subjective "I havent slept well"   ADL   Eating Assistance 6  Modified independent   Grooming Assistance 6  Modified Independent   UB Bathing Assistance 5  Supervision/Setup   LB Bathing Assistance 6  Modified Independent   UB Dressing Assistance 5  Supervision/Setup   LB Dressing Assistance 6  Modified independent   Toileting Assistance  6  Modified independent   Additional Comments educated pt on proper don/doff sling and proper sling mgmt- pt able to complete with sueprvision   Bed Mobility   Supine to Sit 7  Independent   Sit to Supine 7  Independent   Transfers   Sit to Stand 7  Independent   Additional items Increased time required   Stand to Sit 7  Independent   Additional items Increased time required   Additional Comments no AD   Functional Mobility   Functional Mobility 7  Independent   Additional Comments household distances appropriate for d/c home   Balance   Static Sitting Fair +   Dynamic Sitting Fair +   Static Standing Fair   Dynamic Standing [address]   Activity Tolerance   Activity Tolerance Patient tolerated treatment well   Medical Staff Made Aware spoke to CM   Nurse Made Aware spoke to RN   RUE Assessment   RUE Assessment WFL   LUE Assessment   LUE Assessment X   LUE Overall AROM   L Shoulder Flexion DNT   L Shoulder ABduction DNT   L Shoulder ADduction DNT   L Elbow Flexion WFL   L Elbow Extension WFL   L Elbow Supination WFL   L Elbow Pronation WFL   L Wrist Flexion WFL   L Wrist Extension WFL   L Mass Grasp WFL   Hand Function   Gross Motor Coordination Functional   Fine Motor Coordination Functional   Sensation   Additional Comments denies impaired sensation   Perception   Inattention/Neglect Appears intact   Cognition   Overall Cognitive Status WFL   Arousal/Participation Alert; Cooperative   Attention Within functional limits   Orientation Level Oriented X4   Memory Within functional limits   Following Commands Follows one step commands without difficulty   Comments receptive to education   Assessment   Limitation Decreased UE ROM; Decreased UE strength  (Orthopedic restrictions)   Assessment Pt is a 77 y o  male seen for OT evaluation s/p admit to [facility] on 10/7/2022 w/ Benzodiazepine abuse ([facility] )  Recently underwent ORIF to L proximal humerus (10/6), NWB with sling ordered  OT completed an extensive review of pt's medical and social history  Pt  has a past medical history of Colon polyp, Diabetes mellitus ([facility] ), Hyperlipidemia, and Hypertension  As per pt report, pta living alone in 2nd floor apartment with [address]  Previously independent with Adls/IADls and mobility with no AD  Reports multiple family members to assist upon d/c  Upon evaluation, pt independent with transfers and mobility with no AD  R hand dominant - WFL, LUE elbow ROM- WFL, gross grasp WFL  Pt currently  requires S UB ADLs d/t orthopedic restrictions  Educated pt on compensatory strategies and proper don/doff for sling  Pt receptive, additionally reports family to assist at home  Nursing staff confirms pt has been engaging in ADLs without physical assist  Pt appears to be at functional baseline, limited by orthopedic restrictions, able to demonstrate the ability to modify tasks for safe completion  Based on OT evaluation, assessment(s), performance deficits listed, and current level of function, pt identified as a HIGH complexity evaluation  From OT standpoint, recommendation at time of d/c would be home with OP therapy per Ortho recommendations  Increased social support as needed      Goals   Patient Goals to go home   Plan   OT Frequency Eval only   Recommendation   OT Discharge Recommendation Home with outpatient rehabilitation   AM-PAC Daily Activity Inpatient   Lower Body Dressing 4   Bathing 4   Toileting 4   Upper Body Dressing 3   Grooming 4   Eating 4   Daily Activity Raw Score 23   Daily Activity Standardized Score (Calc for Raw Score >=11) 51 12   End of Consult   Education Provided Yes   Patient Position at End of Consult Seated edge of bed         [facility], MS, OTR/L

## 2022-10-14 RX ORDER — GABAPENTIN 400 MG/1
1200 CAPSULE ORAL
Status: DISCONTINUED | OUTPATIENT
Start: 2022-10-14 | End: 2022-10-18 | Stop reason: HOSPADM

## 2022-10-14 RX ADMIN — FOLIC ACID 1 MG: 1 TABLET ORAL at 09:04

## 2022-10-14 RX ADMIN — ATORVASTATIN CALCIUM 40 MG: 40 TABLET, FILM COATED ORAL at 09:04

## 2022-10-14 RX ADMIN — AMLODIPINE BESYLATE 10 MG: 10 TABLET ORAL at 09:04

## 2022-10-14 RX ADMIN — MELATONIN TAB 3 MG 3 MG: 3 TAB at 21:08

## 2022-10-14 RX ADMIN — PAROXETINE HYDROCHLORIDE 40 MG: 20 TABLET, FILM COATED ORAL at 09:04

## 2022-10-14 RX ADMIN — NICOTINE 1 PATCH: 14 PATCH, EXTENDED RELEASE TRANSDERMAL at 09:04

## 2022-10-14 RX ADMIN — HYDROCHLOROTHIAZIDE 12.5 MG: 12.5 TABLET ORAL at 09:04

## 2022-10-14 RX ADMIN — METRONIDAZOLE 1 APPLICATION: 7.5 GEL TOPICAL at 09:04

## 2022-10-14 RX ADMIN — ASPIRIN 81 MG CHEWABLE TABLET 81 MG: 81 TABLET CHEWABLE at 09:04

## 2022-10-14 RX ADMIN — GABAPENTIN 1200 MG: 400 CAPSULE ORAL at 21:08

## 2022-10-14 RX ADMIN — MIRTAZAPINE 30 MG: 30 TABLET, FILM COATED ORAL at 21:08

## 2022-10-14 RX ADMIN — METFORMIN HYDROCHLORIDE 1000 MG: 500 TABLET ORAL at 09:03

## 2022-10-14 NOTE — PROGRESS NOTES
Progress Note - Behavioral Health   Beverli Primrose 77 y o  male MRN: [id number]  Unit/Bed#: [facility] 071-26 Encounter: [id number]    Assessment/Plan   Principal Problem:    Benzodiazepine abuse ([facility] )  Active Problems:    Hypertension    Mixed hyperlipidemia    Type 2 diabetes mellitus with diabetic peripheral angiopathy without gangrene, without long-term current use of insulin (HCC)    Alcohol use    Anxiety    2-part displaced fracture of surgical neck of left humerus, initial encounter for closed fracture    Medical clearance for psychiatric admission    RSV (acute bronchiolitis due to respiratory syncytial virus)    MDD (major depressive disorder), recurrent episode ([facility] )  Patient had difficulty falling asleep last night took several hours  But then he slept if through the night uneventfully  Today's he will be off the Xanax altogether remove monitor in his anxiety level which has not been excessive to this point  His mood seems to be stable he keeps to himself with little conversations with others but is cooperative treatment  He does still tend isolate  Discussed his reliance on medication including the Xanax and also on alcohol and the his determination to avoid both once he is discharged  He said his family will be helping him in this regard      Behavior over the last 24 hours:  improved  Sleep: insomnia  Appetite: normal  Medication side effects: No  ROS: no complaints    Mental Status Evaluation:  Appearance:  age appropriate   Behavior:  guarded   Speech:  normal pitch and normal volume   Mood:  anxious and depressed   Affect:  blunted and constricted   Thought Process:  tangential   Thought Content:  normal   Perceptual Disturbances: None   Risk Potential: Suicidal Ideations none  Homicidal Ideations none  Potential for Aggression No   Sensorium:  person, place, and situation   Memory:  recent and remote memory grossly intact   Consciousness:  alert and awake    Attention: attention span appeared shorter than expected for age   Insight:  fair   Judgment: fair   Gait/Station: normal gait/station   Motor Activity: no abnormal movements     Progress Toward Goals:  Slowly improving    Recommended Treatment: Continue with group therapy, milieu therapy and occupational therapy  Risks, benefits and possible side effects of Medications:   Risks, benefits, and possible side effects of medications explained to patient and patient verbalizes understanding  Medications: planned medication changes: DC Xanax  Increase the gabapentin to 1200 mg q h s     Labs: I have personally reviewed all pertinent laboratory/tests results  Most Recent Labs:   Lab Results   Component Value Date    WBC 10 55 (H) 10/08/2022    RBC 3 04 (L) 10/08/2022    HGB 10 1 (L) 10/08/2022    HCT 31 4 (L) 10/08/2022     (H) 10/08/2022    RDW 13 8 10/08/2022    NEUTROABS 7 62 10/08/2022    SODIUM 134 (L) 10/10/2022    K 4 2 10/10/2022     10/10/2022    CO2 24 10/10/2022    BUN 16 10/10/2022    CREATININE 0 66 (L) 10/10/2022    GLUC 111 (H) 10/10/2022    GLUF 111 (H) 10/10/2022    CALCIUM 8 7 10/10/2022    AST 74 (H) 10/07/2022    ALT 56 (H) 10/07/2022    ALKPHOS 58 10/07/2022    TP 5 4 (L) 10/07/2022    ALB 2 8 (L) 10/07/2022    TBILI 0 32 10/07/2022    CHOLESTEROL 160 07/21/2022    HDL 62 07/21/2022    TRIG 103 07/21/2022    LDLCALC 77 07/21/2022    NONHDLC 98 07/21/2022    DTQ1VOOFMQDH 2 090 12/11/2019    FREET4 0 9 02/27/2017    HGBA1C 5 7 (H) 07/21/2022     07/21/2022       Counseling / Coordination of Care  Total floor / unit time spent today 25 minutes  Greater than 50% of total time was spent with the patient and / or family counseling and / or coordination of care   A description of the counseling / coordination of care:

## 2022-10-14 NOTE — NURSING NOTE
Pt visible in milieu throughout the evening, watching television, isolative to self  Pt pleasant and cooperative on approach, denies all psych s/s, constricted affect observed  Pt needed reminders to keep mask over nose when in milieu  Agreeable

## 2022-10-14 NOTE — PROGRESS NOTES
10/14/22 1327   Team Meeting   Meeting Type Daily Rounds   Initial Conference Date 10/14/22   Next Conference Date 10/15/22   Team Members Present   Team Members Present Physician;Nurse;;Occupational Therapist   Patient/Family Present   Patient Present No   Patient's Family Present No   TONYA Calzada K  Cano  - pleasant, eats well, denies s/s, took 3 hrs to fall asleep, now off Xanax

## 2022-10-14 NOTE — WOUND OSTOMY CARE
Consult Note - Wound   John Araiza 77 y o  male MRN: [id number]  Unit/Bed#: [facility] 009-28 Encounter: [id number]        History and Present Illness:  77year old male s/p ORIF left proximal humerus  POD 8  PMH Atherosclerosis PVD with Intermittent claudication type 2 Dm,DARWIN  Seen today for initial wound consult  Pt admitted to [facility]  Alert oriented, pleasant agreeable to assessment  Independent ambulation and repositioning  Assessment Findings:   1)Bilateral heels and buttocks intact  2)unstageable wound to left shoulder dry black scab intact no drainage  No signs of infection, cleansed and xeroform and  dsd applied  Skin care plans:  1-Hydraguard to bilateral sacrum, buttock and heels BID and PRN  2-Elevate heels to offload pressure  3-Ehob cushion in chair when out of bed  4-Moisturize skin daily with skin nourishing cream   5-Turn/reposition q2h or when medically stable for pressure re-distribution on skin  6-Cleanse left anterior shoulder wound with normal saline, apply xeroform gauze, then cover with DSD, change daily and prn soil or dislodgement  Wounds:      Wound 10/14/22 Lateral;Left;Upper (Active)   Wound Image   10/14/22 1501   Wound Description Dry; Intact;Fragile 10/14/22 1501   Melany-wound Assessment Other (Comment) 10/14/22 1501   Wound Length (cm) 1 cm 10/14/22 1501   Wound Width (cm) 1 2 cm 10/14/22 1501   Wound Surface Area (cm^2) 1 2 cm^2 10/14/22 1501   Drainage Amount None 10/14/22 1501   Treatments Cleansed;Site care 10/14/22 1501   Dressing Xeroform;Dry dressing 10/14/22 1501   Dressing Changed New 10/14/22 1501   Patient Tolerance Tolerated well 10/14/22 1501   Dressing Status Clean;Dry; Intact 10/14/22 1501     Call or tigertext with any questions  Wound Care will continue to follow    Negra ALONZO RN

## 2022-10-14 NOTE — DISCHARGE INSTR - OTHER ORDERS
Skin care plans:  1-Hydraguard to bilateral sacrum, buttock and heels BID and PRN  2-Elevate heels to offload pressure  3-Ehob cushion in chair when out of bed  4-Moisturize skin daily with skin nourishing cream   5-Turn/reposition q2h or when medically stable for pressure re-distribution on skin  6-Cleanse left anterior shoulder wound with normal saline, apply xeroform gauze, then cover with DSD, change daily and prn soil or dislodgement    You are being discharged to your residence: [address] [place] [address]    If you are unable to deal with your distressed mood alone please contact Dr Marsha Zaman  If that is not effective and you continue to have distressed mood, overwhelmed, in crisis please contact [facility] # [phone], dial 911 or go to the nearest emergency center

## 2022-10-14 NOTE — NURSING NOTE
Patient calm and cooperative this shift  Denies all s/s at this time  States "I'm doing good " Visible in dayroom; social upon approach  Denies any needs at this time  States "it took me a few hours to fall asleep last night but then I slept really good " Compliant with meals and medications  Will continue to monitor

## 2022-10-15 PROCEDURE — 99232 SBSQ HOSP IP/OBS MODERATE 35: CPT | Performed by: STUDENT IN AN ORGANIZED HEALTH CARE EDUCATION/TRAINING PROGRAM

## 2022-10-15 RX ADMIN — METRONIDAZOLE 1 APPLICATION: 7.5 GEL TOPICAL at 17:12

## 2022-10-15 RX ADMIN — ATORVASTATIN CALCIUM 40 MG: 40 TABLET, FILM COATED ORAL at 09:20

## 2022-10-15 RX ADMIN — METRONIDAZOLE 1 APPLICATION: 7.5 GEL TOPICAL at 09:21

## 2022-10-15 RX ADMIN — NICOTINE 1 PATCH: 14 PATCH, EXTENDED RELEASE TRANSDERMAL at 09:21

## 2022-10-15 RX ADMIN — HYDROCHLOROTHIAZIDE 12.5 MG: 12.5 TABLET ORAL at 09:20

## 2022-10-15 RX ADMIN — FOLIC ACID 1 MG: 1 TABLET ORAL at 09:19

## 2022-10-15 RX ADMIN — AMLODIPINE BESYLATE 10 MG: 10 TABLET ORAL at 09:19

## 2022-10-15 RX ADMIN — METFORMIN HYDROCHLORIDE 1000 MG: 500 TABLET ORAL at 09:20

## 2022-10-15 RX ADMIN — PAROXETINE HYDROCHLORIDE 40 MG: 20 TABLET, FILM COATED ORAL at 09:19

## 2022-10-15 RX ADMIN — MELATONIN TAB 3 MG 3 MG: 3 TAB at 21:13

## 2022-10-15 RX ADMIN — ASPIRIN 81 MG CHEWABLE TABLET 81 MG: 81 TABLET CHEWABLE at 09:19

## 2022-10-15 RX ADMIN — MIRTAZAPINE 45 MG: 30 TABLET, FILM COATED ORAL at 21:13

## 2022-10-15 RX ADMIN — HYDROXYZINE HYDROCHLORIDE 50 MG: 50 TABLET, FILM COATED ORAL at 23:20

## 2022-10-15 RX ADMIN — GABAPENTIN 1200 MG: 400 CAPSULE ORAL at 21:13

## 2022-10-15 NOTE — NURSING NOTE
Patient calm, cooperative, pleasant,  and visible on millieu,  has minimal interaction with peers, and reports sleeping well  He denies depression and  anxiety, SI/HI/AVH is medication compliant and able to make needs known  Wound care performed on left shoulder on both sites and patient tolerated well

## 2022-10-15 NOTE — PLAN OF CARE
Problem: Nutrition/Hydration-ADULT  Goal: Nutrient/Hydration intake appropriate for improving, restoring or maintaining nutritional needs  Description: Monitor and assess patient's nutrition/hydration status for malnutrition  Collaborate with interdisciplinary team and initiate plan and interventions as ordered  Monitor patient's weight and dietary intake as ordered or per policy  Utilize nutrition screening tool and intervene as necessary  Determine patient's food preferences and provide high-protein, high-caloric foods as appropriate       INTERVENTIONS:  - Monitor oral intake, urinary output, labs, and treatment plans  - Assess nutrition and hydration status and recommend course of action  - Evaluate amount of meals eaten  - Assist patient with eating if necessary   - Allow adequate time for meals  - Recommend/ encourage appropriate diets, oral nutritional supplements, and vitamin/mineral supplements  - Order, calculate, and assess calorie counts as needed  - Recommend, monitor, and adjust tube feedings and TPN/PPN based on assessed needs  - Assess need for intravenous fluids  - Provide specific nutrition/hydration education as appropriate  - Include patient/family/caregiver in decisions related to nutrition  Outcome: Progressing     Problem: SAFETY ADULT  Goal: Patient will remain free of falls  Description: INTERVENTIONS:  - Educate patient/family on patient safety including physical limitations  - Instruct patient to call for assistance with activity   - Consult OT/PT to assist with strengthening/mobility   - Keep Call bell within reach  - Keep bed low and locked with side rails adjusted as appropriate  - Keep care items and personal belongings within reach  - Initiate and maintain comfort rounds  - Make Fall Risk Sign visible to staff  - Apply yellow socks and bracelet for high fall risk patients  - Consider moving patient to room near nurses station  Outcome: Progressing  Goal: Maintain or return to baseline ADL function  Description: INTERVENTIONS:  -  Assess patient's ability to carry out ADLs; assess patient's baseline for ADL function and identify physical deficits which impact ability to perform ADLs (bathing, care of mouth/teeth, toileting, grooming, dressing, etc )  - Assess/evaluate cause of self-care deficits   - Assess range of motion  - Assess patient's mobility; develop plan if impaired  - Assess patient's need for assistive devices and provide as appropriate  - Encourage maximum independence but intervene and supervise when necessary  - Involve family in performance of ADLs  - Assess for home care needs following discharge   - Consider OT consult to assist with ADL evaluation and planning for discharge  - Provide patient education as appropriate  Outcome: Progressing  Goal: Maintains/Returns to pre admission functional level  Description: INTERVENTIONS:  - Perform BMAT or MOVE assessment daily    - Set and communicate daily mobility goal to care team and patient/family/caregiver     - Collaborate with rehabilitation services on mobility goals if consulted  -- Out of bed for toileting  - Record patient progress and toleration of activity level   Outcome: Progressing     Problem: DISCHARGE PLANNING  Goal: Discharge to home or other facility with appropriate resources  Description: INTERVENTIONS:  - Identify barriers to discharge w/patient and caregiver  - Arrange for needed discharge resources and transportation as appropriate  - Identify discharge learning needs (meds, wound care, etc )  - Arrange for interpretive services to assist at discharge as needed  - Refer to Case Management Department for coordinating discharge planning if the patient needs post-hospital services based on physician/advanced practitioner order or complex needs related to functional status, cognitive ability, or social support system  Outcome: Progressing     Problem: Knowledge Deficit  Goal: Patient/family/caregiver demonstrates understanding of disease process, treatment plan, medications, and discharge instructions  Description: Complete learning assessment and assess knowledge base  Interventions:  - Provide teaching at level of understanding  - Provide teaching via preferred learning methods  Outcome: Progressing     Problem: DEPRESSION  Goal: Will be euthymic at discharge  Description: INTERVENTIONS:  - Administer medication as ordered  - Provide emotional support via 1:1 interaction with staff  - Encourage involvement in milieu/groups/activities  - Monitor for social isolation  Outcome: Progressing     Problem: BEHAVIOR  Goal: Pt/Family maintain appropriate behavior and adhere to behavioral management agreement, if implemented  Description: INTERVENTIONS:  - Assess the family dynamic   - Encourage verbalization of thoughts and concerns in a socially appropriate manner  - Assess patient/family's coping skills and non-compliant behavior (including use of illegal substances)  - Utilize positive, consistent limit setting strategies supporting safety of patient, staff and others  - Initiate consult with Case Management, Spiritual Care or other ancillary services as appropriate  - If a patient's/visitor's behavior jeopardizes the safety of the patient, staff, or others, refer to organization procedure     - Notify Security of behavior or suspected illegal substances which indicate the need for search of the patient and/or belongings  - Encourage participation in the decision making process about a behavioral management agreement; implement if patient meets criteria  Outcome: Progressing     Problem: ANXIETY  Goal: Will report anxiety at manageable levels  Description: INTERVENTIONS:  - Administer medication as ordered  - Teach and encourage coping skills  - Provide emotional support  - Assess patient/family for anxiety and ability to cope  Outcome: Progressing  Goal: By discharge: Patient will verbalize 2 strategies to deal with anxiety  Description: Interventions:  - Identify any obvious source/trigger to anxiety  - Staff will assist patient in applying identified coping technique/skills  - Encourage attendance of scheduled groups and activities  Outcome: Progressing     Problem: SLEEP DISTURBANCE  Goal: Will exhibit normal sleeping pattern  Description: Interventions:  -  Assess the patients sleep pattern, noting recent changes  - Administer medication as ordered  - Decrease environmental stimuli, including noise, as appropriate during the night  - Encourage the patient to actively participate in unit groups and or exercise during the day to enhance ability to achieve adequate sleep at night  - Assess the patient, in the morning, encouraging a description of sleep experience  Outcome: Progressing

## 2022-10-15 NOTE — PROGRESS NOTES
Progress Note - Behavioral Health     Andra Santos 77 y o  male MRN: [id number]   Unit/Bed#: [facility]01 Encounter: [id number]    Behavior over the last 24 hours: slowly improving  Berna Johnson seen today, per staff report has been visible and cooperative on the unit  Has been compliant with medications, denying any worsening depression to staff  Noted to have a good appetite, and slept through the night, pleasant on approach, selectively social     During interview with the patient today, he states that he is struggling with difficulty initiating sleep  He denies any worsening anxiety during the day, reports that he is a little anxious about discharge  He states that his family has been supportive with him quitting alcohol, denies any cravings  He states that when he does finally fall asleep after a few hours, “I sleep really well”  He denies any worsening depression, denies suicidal or homicidal ideation, and states that he feels things are improving while he is here  He states that he is worried about not being able to fall asleep quickly  Denies any pain in his arm where he fell  Denies any withdrawal effects discontinuing the Xanax  Sleep: improved, but still having trouble falling asleep  Appetite: normal  Medication side effects: None        Mental Status Evaluation:    Appearance:  age appropriate, casually dressed, dressed in hospital attire, laying in bed   Behavior:  pleasant, cooperative   Speech:  normal rate, normal volume   Mood:  anxious   Affect:  constricted, does smile appropriately   Thought Process:  organized, logical, goal directed, linear, normal rate of thoughts   Associations: intact associations   Thought Content:  normal, no overt delusions   Perceptual Disturbances: no auditory hallucinations, no visual hallucinations, does not appear responding to internal stimuli   Risk Potential: Suicidal ideation - None at present  Homicidal ideation - None   Sensorium:  oriented to person, place and time/date   Memory:  recent and remote memory grossly intact   Consciousness:  alert and awake   Attention: attention span and concentration are age appropriate   Insight:  fair   Judgment: fair   Gait/Station: normal gait/station, normal balance   Motor Activity: no abnormal movements     Vital signs in last 24 hours:    Temp:  [96 2 °F (35 7 °C)-97 4 °F (36 3 °C)] 97 4 °F (36 3 °C)  HR:  [] 100  Resp:  [16-17] 17  BP: (110-142)/(56-65) 110/56    Laboratory results:   I have personally reviewed all pertinent laboratory/tests results    Most Recent Labs:   Lab Results   Component Value Date    WBC 10 55 (H) 10/08/2022    RBC 3 04 (L) 10/08/2022    HGB 10 1 (L) 10/08/2022    HCT 31 4 (L) 10/08/2022     (H) 10/08/2022    RDW 13 8 10/08/2022    NEUTROABS 7 62 10/08/2022    SODIUM 134 (L) 10/10/2022    K 4 2 10/10/2022     10/10/2022    CO2 24 10/10/2022    BUN 16 10/10/2022    CREATININE 0 66 (L) 10/10/2022    GLUC 111 (H) 10/10/2022    GLUF 111 (H) 10/10/2022    CALCIUM 8 7 10/10/2022    AST 74 (H) 10/07/2022    ALT 56 (H) 10/07/2022    ALKPHOS 58 10/07/2022    TP 5 4 (L) 10/07/2022    ALB 2 8 (L) 10/07/2022    TBILI 0 32 10/07/2022    CHOLESTEROL 160 07/21/2022    HDL 62 07/21/2022    TRIG 103 07/21/2022    LDLCALC 77 07/21/2022    [place] 98 07/21/2022    [id number] 2 090 12/11/2019    FREET4 0 9 02/27/2017    HGBA1C 5 7 (H) 07/21/2022     07/21/2022           Assessment/Plan   Principal Problem:    Benzodiazepine abuse ([address] )  Active Problems:    Hypertension    Mixed hyperlipidemia    Type 2 diabetes mellitus with diabetic peripheral angiopathy without gangrene, without long-term current use of insulin (HCC)    Alcohol use    Anxiety    2-part displaced fracture of surgical neck of left humerus, initial encounter for closed fracture    Medical clearance for psychiatric admission    RSV (acute bronchiolitis due to respiratory syncytial virus)    MDD (major depressive disorder), recurrent episode [facility])  Patient is a 78-year-old male who has a history of alcoholism  Appears to have improved mood further record, and good insight into need to discontinue drinking and abusing benzodiazepines  No cravings at this time  Still having some trouble sleeping, possibly due to discontinuation of benzodiazepines  However, no withdrawal effects besides  May want to maximize medication management his difficulty sleeping before considering using other medications  Of note, has not used Vistaril initiate sleep  Recommended Treatment:     Planned medication and treatment changes: Will increase mirtazapine to 45 mg daily at bedtime  Continue with gabapentin 1200 mg daily at bedtime and Paxil 40 mg daily  Encouraged to utilize hydroxyzine as needed for anxiety and insomnia      All current active medications have been reviewed  Encourage group therapy, milieu therapy and occupational therapy  Behavioral Health checks every 7 minutes  Observe over the weekend if continues to improve  Current Facility-Administered Medications   Medication Dose Route Frequency Provider Last Rate   • acetaminophen  650 mg Oral Q4H [facility]     • acetaminophen  650 mg Oral Q4H [facility]N [facility], [facility]     • acetaminophen  975 mg Oral Q6H PRN [facility], DO     • aluminum-magnesium hydroxide-simethicone  30 mL Oral Q4H PRN [facility], DO     • amLODIPine  10 mg Oral Daily Miller Hartman MD     • artificial tear  1 application Both Eyes H9J PRN orion [place], DO     • aspirin  81 mg Oral Daily Miller Hartman MD     • atorvastatin  40 mg Oral Daily Miller Hartman MD     • benztropine  1 mg Intramuscular Q4H PRN Max 6/day Viona [place], DO     • benztropine  0 5 mg Oral Q4H PRN Max 6/day [facility], [facility]     • folic acid  1 mg Oral Daily Ml Selby PA-C     • gabapentin  1,200 mg Oral HS Audry Nyhan, MD     • hydrochlorothiazide  12 5 mg Oral Daily Aydee Fransisco Escobar MD     • hydrOXYzine HCL  25 mg Oral Q6H PRN Max 4/day [place], DO     • hydrOXYzine HCL  50 mg Oral Q6H PRN Max 4/day [place], DO     • LORazepam  1 mg Intramuscular Q6H PRN Max 3/day [place], DO     • melatonin  3 mg Oral HS Chely Jung MD     • metFORMIN  1,000 mg Oral Daily With Breakfast Mimi Munoz PA-C     • metroNIDAZOLE  1 application Topical BID Prbradley Melo MD     • mirtazapine  45 mg Oral HS Luz Carrera, DO     • nicotine  1 patch Transdermal Daily Prbradley Melo MD     • OLANZapine  5 mg Intramuscular Q3H PRN Max 3/day [place], DO     • OLANZapine  2 5 mg Oral Q4H PRN Max 6/day [place], DO     • OLANZapine  5 mg Oral Q4H PRN Max 3/day [place], DO     • OLANZapine  5 mg Oral Q3H PRN Max 3/day [place], DO     • ondansetron  4 mg Intravenous Q6H PRN Ngoc Melo MD     • oxyCODONE  10 mg Oral Q4H PRN Prbradley Melo MD     • oxyCODONE  5 mg Oral Q4H PRN Prbradley Melo MD     • PARoxetine  40 mg Oral QAM Ngoc Melo MD     • polyethylene glycol  17 g Oral Daily PRN [place], DO     • propranolol  5 mg Oral Q8H PRN [place], DO     • senna-docusate sodium  1 tablet Oral Daily PRN [place], DO         Risks / Benefits of Treatment:    Risks, benefits, and possible side effects of medications explained to patient and patient verbalizes understanding and agreement for treatment  Counseling / Coordination of Care:    Patient's progress discussed with staff in treatment team meeting  Medications, treatment progress and treatment plan reviewed with patient  Medication changes discussed with patient  Medication education provided to patient  Educated on importance of medication and treatment compliance  Importance of follow up for substance abuse issues discussed with patient    Encouraged participation in milieu and group therapy on the unit     Mai Kellogg, DO 10/15/22

## 2022-10-16 PROCEDURE — 99232 SBSQ HOSP IP/OBS MODERATE 35: CPT | Performed by: STUDENT IN AN ORGANIZED HEALTH CARE EDUCATION/TRAINING PROGRAM

## 2022-10-16 RX ORDER — HYDROXYZINE 50 MG/1
50 TABLET, FILM COATED ORAL
Status: DISCONTINUED | OUTPATIENT
Start: 2022-10-16 | End: 2022-10-18 | Stop reason: HOSPADM

## 2022-10-16 RX ADMIN — HYDROCHLOROTHIAZIDE 12.5 MG: 12.5 TABLET ORAL at 08:31

## 2022-10-16 RX ADMIN — MELATONIN TAB 3 MG 3 MG: 3 TAB at 21:04

## 2022-10-16 RX ADMIN — MIRTAZAPINE 45 MG: 30 TABLET, FILM COATED ORAL at 21:04

## 2022-10-16 RX ADMIN — HYDROXYZINE HYDROCHLORIDE 50 MG: 50 TABLET, FILM COATED ORAL at 21:04

## 2022-10-16 RX ADMIN — ASPIRIN 81 MG CHEWABLE TABLET 81 MG: 81 TABLET CHEWABLE at 08:31

## 2022-10-16 RX ADMIN — FOLIC ACID 1 MG: 1 TABLET ORAL at 08:30

## 2022-10-16 RX ADMIN — PAROXETINE HYDROCHLORIDE 40 MG: 20 TABLET, FILM COATED ORAL at 08:30

## 2022-10-16 RX ADMIN — ATORVASTATIN CALCIUM 40 MG: 40 TABLET, FILM COATED ORAL at 08:30

## 2022-10-16 RX ADMIN — METRONIDAZOLE 1 APPLICATION: 7.5 GEL TOPICAL at 17:06

## 2022-10-16 RX ADMIN — METRONIDAZOLE 1 APPLICATION: 7.5 GEL TOPICAL at 08:31

## 2022-10-16 RX ADMIN — GABAPENTIN 1200 MG: 400 CAPSULE ORAL at 21:04

## 2022-10-16 RX ADMIN — NICOTINE 1 PATCH: 14 PATCH, EXTENDED RELEASE TRANSDERMAL at 08:34

## 2022-10-16 RX ADMIN — METFORMIN HYDROCHLORIDE 1000 MG: 500 TABLET ORAL at 08:30

## 2022-10-16 RX ADMIN — AMLODIPINE BESYLATE 10 MG: 10 TABLET ORAL at 08:30

## 2022-10-16 NOTE — NURSING NOTE
Atarax 50mg given prn for increased anxiety  Will frequently monitor through the night for effectiveness

## 2022-10-16 NOTE — PROGRESS NOTES
Progress Note - Behavioral Health     Beverli Primrose 77 y o  male MRN: [id number]   Unit/Bed#: [facility] 641-01 Encounter: [id number]    Behavior over the last 24 hours: slowly improving  Lisy Reyes seen today, per staff report has been visible and cooperative on the unit  Has been compliant with medications, denying any worsening depression to staff  Noted to have a good appetite, and slept through the night, pleasant on approach, selectively social     During interview with the patient today, he states that he is still frustrated with his inability to fall asleep  He states that he eventually did fall asleep when he took the hydroxyzine  He asks if he can have that regularly  Denies any pain in his arm where he fell  Denies any withdrawal effects discontinuing the Xanax  He denies any worsening depression, denies thoughts to hurt himself or anyone else  States that he is looking forward to going home, and admits he was here for detox  Sleep: improved, but still having trouble falling asleep  Appetite: normal  Medication side effects: None        Mental Status Evaluation:    Appearance:  age appropriate, casually dressed, dressed in hospital attire, laying in bed   Behavior:  pleasant, cooperative   Speech:  normal rate, normal volume   Mood:  anxious   Affect:  constricted, does smile appropriately   Thought Process:  organized, logical, goal directed, linear, normal rate of thoughts   Associations: intact associations   Thought Content:  normal, no overt delusions   Perceptual Disturbances: no auditory hallucinations, no visual hallucinations, does not appear responding to internal stimuli   Risk Potential: Suicidal ideation - None at present  Homicidal ideation - None   Sensorium:  oriented to person, place and time/date   Memory:  recent and remote memory grossly intact   Consciousness:  alert and awake   Attention: attention span and concentration are age appropriate   Insight:  fair   Judgment: fair Gait/Station: normal gait/station, normal balance   Motor Activity: no abnormal movements     Vital signs in last 24 hours:    Temp:  [97 9 °F (36 6 °C)-98 2 °F (36 8 °C)] 97 9 °F (36 6 °C)  HR:  [72-99] 72  Resp:  [16-17] 17  BP: (116-145)/(57-66) 145/66    Laboratory results:   I have personally reviewed all pertinent laboratory/tests results  Most Recent Labs:   Lab Results   Component Value Date    WBC 10 55 (H) 10/08/2022    RBC 3 04 (L) 10/08/2022    HGB 10 1 (L) 10/08/2022    HCT 31 4 (L) 10/08/2022     (H) 10/08/2022    RDW 13 8 10/08/2022    NEUTROABS 7 62 10/08/2022    SODIUM 134 (L) 10/10/2022    K 4 2 10/10/2022     10/10/2022    CO2 24 10/10/2022    BUN 16 10/10/2022    CREATININE 0 66 (L) 10/10/2022    GLUC 111 (H) 10/10/2022    GLUF 111 (H) 10/10/2022    CALCIUM 8 7 10/10/2022    AST 74 (H) 10/07/2022    ALT 56 (H) 10/07/2022    ALKPHOS 58 10/07/2022    TP 5 4 (L) 10/07/2022    ALB 2 8 (L) 10/07/2022    TBILI 0 32 10/07/2022    CHOLESTEROL 160 07/21/2022    HDL 62 07/21/2022    TRIG 103 07/21/2022    LDLCALC 77 07/21/2022    [place] 98 07/21/2022    [id number] 2 090 12/11/2019    FREET4 0 9 02/27/2017    HGBA1C 5 7 (H) 07/21/2022     07/21/2022           Assessment/Plan   Principal Problem:    Benzodiazepine abuse ([address] )  Active Problems:    Hypertension    Mixed hyperlipidemia    Type 2 diabetes mellitus with diabetic peripheral angiopathy without gangrene, without long-term current use of insulin (HCC)    Alcohol use    Anxiety    2-part displaced fracture of surgical neck of left humerus, initial encounter for closed fracture    Medical clearance for psychiatric admission    RSV (acute bronchiolitis due to respiratory syncytial virus)    MDD (major depressive disorder), recurrent episode ([facility] [address] )  Patient is a 68-year-old male who has a history of alcoholism    Appears to have improved mood further record, and good insight into need to discontinue drinking and abusing benzodiazepines  No cravings at this time  Still having some trouble sleeping, possibly due to discontinuation of benzodiazepines  However, no withdrawal effects besides  May want to maximize medication management his difficulty sleeping before considering using other medications  Of note, has not used Vistaril initiate sleep  Recommended Treatment:     Planned medication and treatment changes: Will continue mirtazepine 45mg qHS  Continue with gabapentin 1200 mg daily at bedtime and Paxil 40 mg daily  Will also order hydroxyzine 50mg daily at bedtime to help with initiation of sleep       All current active medications have been reviewed  Encourage group therapy, milieu therapy and occupational therapy  Behavioral Health checks every 7 minutes  Observe over the weekend if continues to improve  Current Facility-Administered Medications   Medication Dose Route Frequency Provider Last Rate   • acetaminophen  650 mg Oral Q4H PRN Estefany Gaspar DO     • acetaminophen  650 mg Oral Q4H PRN Estefany Gaspar DO     • acetaminophen  975 mg Oral Q6H PRN Estefany Gaspar, DO     • aluminum-magnesium hydroxide-simethicone  30 mL Oral Q4H PRN Estefany Gaspar, DO     • amLODIPine  10 mg Oral Daily Cherie De Jesus MD     • artificial tear  1 application Both Eyes I8A PRN Estefany Gaspar DO     • aspirin  81 mg Oral Daily Cherie De Jesus MD     • atorvastatin  40 mg Oral Daily Cherie De Jesus MD     • benztropine  1 mg Intramuscular Q4H PRN Max 6/day Estefany Gaspar DO     • benztropine  0 5 mg Oral Q4H PRN Max 6/day Estefany Gaspar DO     • folic acid  1 mg Oral Daily Eusebia Baez PA-C     • gabapentin  1,200 mg Oral HS Fredy Tesfaye MD     • hydrochlorothiazide  12 5 mg Oral Daily Cherie De Jesus MD     • hydrOXYzine HCL  25 mg Oral Q6H PRN Max 4/day Estefany Gaspar DO     • hydrOXYzine HCL  50 mg Oral Q6H PRN Max 4/day Emil Wilson DO     • hydrOXYzine HCL  50 mg Oral HS Mona Carrera, DO     • LORazepam  1 mg Intramuscular Q6H PRN Max 3/day Ariana Brinks, DO     • melatonin  3 mg Oral HS Pippa [place] MD     • metFORMIN  1,000 mg Oral Daily With Breakfast Karin Antoine PA-C     • metroNIDAZOLE  1 application Topical BID Maria Teresa Egan MD     • mirtazapine  45 mg Oral HS Mona Carrera, DO     • nicotine  1 patch Transdermal Daily Maria Teresa Egan MD     • OLANZapine  5 mg Intramuscular Q3H PRN Max 3/day Ariana Brinks, DO     • OLANZapine  2 5 mg Oral Q4H PRN Max 6/day Ariana Brinks, DO     • OLANZapine  5 mg Oral Q4H PRN Max 3/day Ariana Brinks, DO     • OLANZapine  5 mg Oral Q3H PRN Max 3/day Ariana Brinks, DO     • ondansetron  4 mg Intravenous Q6H PRN Maria Teresa Egan MD     • oxyCODONE  10 mg Oral Q4H PRN Maria Teresa Egan MD     • oxyCODONE  5 mg Oral Q4H PRN Maria Teresa Egan MD     • PARoxetine  40 mg Oral QAM Maria Teresa Egan MD     • polyethylene glycol  17 g Oral Daily PRN Ariana Brinks, DO     • propranolol  5 mg Oral Q8H PRN Ariana Santiagos, DO     • senna-docusate sodium  1 tablet Oral Daily PRN Ariana Brinks, DO         Risks / Benefits of Treatment:    Risks, benefits, and possible side effects of medications explained to patient and patient verbalizes understanding and agreement for treatment  Counseling / Coordination of Care:    Patient's progress discussed with staff in treatment team meeting  Medications, treatment progress and treatment plan reviewed with patient  Medication changes discussed with patient  Medication education provided to patient  Educated on importance of medication and treatment compliance  Importance of follow up for substance abuse issues discussed with patient  Encouraged participation in milieu and group therapy on the unit      Yola Bush DO 10/16/22

## 2022-10-16 NOTE — PLAN OF CARE
Problem: Nutrition/Hydration-ADULT  Goal: Nutrient/Hydration intake appropriate for improving, restoring or maintaining nutritional needs  Description: Monitor and assess patient's nutrition/hydration status for malnutrition  Collaborate with interdisciplinary team and initiate plan and interventions as ordered  Monitor patient's weight and dietary intake as ordered or per policy  Utilize nutrition screening tool and intervene as necessary  Determine patient's food preferences and provide high-protein, high-caloric foods as appropriate       INTERVENTIONS:  - Monitor oral intake, urinary output, labs, and treatment plans  - Assess nutrition and hydration status and recommend course of action  - Evaluate amount of meals eaten  - Assist patient with eating if necessary   - Allow adequate time for meals  - Recommend/ encourage appropriate diets, oral nutritional supplements, and vitamin/mineral supplements  - Order, calculate, and assess calorie counts as needed  - Recommend, monitor, and adjust tube feedings and TPN/PPN based on assessed needs  - Assess need for intravenous fluids  - Provide specific nutrition/hydration education as appropriate  - Include patient/family/caregiver in decisions related to nutrition  Outcome: Progressing     Problem: SAFETY ADULT  Goal: Patient will remain free of falls  Description: INTERVENTIONS:  - Educate patient/family on patient safety including physical limitations  - Instruct patient to call for assistance with activity   - Consult OT/PT to assist with strengthening/mobility   - Keep Call bell within reach  - Keep bed low and locked with side rails adjusted as appropriate  - Keep care items and personal belongings within reach  - Initiate and maintain comfort rounds  - Make Fall Risk Sign visible to staff  - Apply yellow socks and bracelet for high fall risk patients  - Consider moving patient to room near nurses station  Outcome: Progressing  Goal: Maintain or return to baseline ADL function  Description: INTERVENTIONS:  -  Assess patient's ability to carry out ADLs; assess patient's baseline for ADL function and identify physical deficits which impact ability to perform ADLs (bathing, care of mouth/teeth, toileting, grooming, dressing, etc )  - Assess/evaluate cause of self-care deficits   - Assess range of motion  - Assess patient's mobility; develop plan if impaired  - Assess patient's need for assistive devices and provide as appropriate  - Encourage maximum independence but intervene and supervise when necessary  - Involve family in performance of ADLs  - Assess for home care needs following discharge   - Consider OT consult to assist with ADL evaluation and planning for discharge  - Provide patient education as appropriate  Outcome: Progressing  Goal: Maintains/Returns to pre admission functional level  Description: INTERVENTIONS:  - Perform BMAT or MOVE assessment daily    - Set and communicate daily mobility goal to care team and patient/family/caregiver     - Collaborate with rehabilitation services on mobility goals if consulted  - Out of bed for toileting  - Record patient progress and toleration of activity level   Outcome: Progressing     Problem: DISCHARGE PLANNING  Goal: Discharge to home or other facility with appropriate resources  Description: INTERVENTIONS:  - Identify barriers to discharge w/patient and caregiver  - Arrange for needed discharge resources and transportation as appropriate  - Identify discharge learning needs (meds, wound care, etc )  - Arrange for interpretive services to assist at discharge as needed  - Refer to Case Management Department for coordinating discharge planning if the patient needs post-hospital services based on physician/advanced practitioner order or complex needs related to functional status, cognitive ability, or social support system  Outcome: Progressing     Problem: Knowledge Deficit  Goal: Patient/family/caregiver demonstrates understanding of disease process, treatment plan, medications, and discharge instructions  Description: Complete learning assessment and assess knowledge base  Interventions:  - Provide teaching at level of understanding  - Provide teaching via preferred learning methods  Outcome: Progressing     Problem: DEPRESSION  Goal: Will be euthymic at discharge  Description: INTERVENTIONS:  - Administer medication as ordered  - Provide emotional support via 1:1 interaction with staff  - Encourage involvement in milieu/groups/activities  - Monitor for social isolation  Outcome: Progressing     Problem: BEHAVIOR  Goal: Pt/Family maintain appropriate behavior and adhere to behavioral management agreement, if implemented  Description: INTERVENTIONS:  - Assess the family dynamic   - Encourage verbalization of thoughts and concerns in a socially appropriate manner  - Assess patient/family's coping skills and non-compliant behavior (including use of illegal substances)  - Utilize positive, consistent limit setting strategies supporting safety of patient, staff and others  - Initiate consult with Case Management, Spiritual Care or other ancillary services as appropriate  - If a patient's/visitor's behavior jeopardizes the safety of the patient, staff, or others, refer to organization procedure     - Notify Security of behavior or suspected illegal substances which indicate the need for search of the patient and/or belongings  - Encourage participation in the decision making process about a behavioral management agreement; implement if patient meets criteria  Outcome: Progressing     Problem: ANXIETY  Goal: Will report anxiety at manageable levels  Description: INTERVENTIONS:  - Administer medication as ordered  - Teach and encourage coping skills  - Provide emotional support  - Assess patient/family for anxiety and ability to cope  Outcome: Progressing  Goal: By discharge: Patient will verbalize 2 strategies to deal with anxiety  Description: Interventions:  - Identify any obvious source/trigger to anxiety  - Staff will assist patient in applying identified coping technique/skills  - Encourage attendance of scheduled groups and activities  Outcome: Progressing     Problem: SLEEP DISTURBANCE  Goal: Will exhibit normal sleeping pattern  Description: Interventions:  -  Assess the patients sleep pattern, noting recent changes  - Administer medication as ordered  - Decrease environmental stimuli, including noise, as appropriate during the night  - Encourage the patient to actively participate in unit groups and or exercise during the day to enhance ability to achieve adequate sleep at night  - Assess the patient, in the morning, encouraging a description of sleep experience  Outcome: Progressing

## 2022-10-16 NOTE — NURSING NOTE
Patient calm, cooperative, pleasant, brightens upon approach, and visible on millieu  He has minimal interaction with peers, reports sleeping well, denies depression, anxiety, SI/HI/AVH  Patient is able to make needs known and is medication compliant

## 2022-10-17 RX ORDER — MIRTAZAPINE 30 MG/1
30 TABLET, FILM COATED ORAL
Status: DISCONTINUED | OUTPATIENT
Start: 2022-10-17 | End: 2022-10-18 | Stop reason: HOSPADM

## 2022-10-17 RX ADMIN — HYDROXYZINE HYDROCHLORIDE 50 MG: 50 TABLET, FILM COATED ORAL at 21:14

## 2022-10-17 RX ADMIN — METFORMIN HYDROCHLORIDE 1000 MG: 500 TABLET ORAL at 08:37

## 2022-10-17 RX ADMIN — ATORVASTATIN CALCIUM 40 MG: 40 TABLET, FILM COATED ORAL at 08:37

## 2022-10-17 RX ADMIN — METRONIDAZOLE 1 APPLICATION: 7.5 GEL TOPICAL at 08:38

## 2022-10-17 RX ADMIN — MIRTAZAPINE 30 MG: 30 TABLET, FILM COATED ORAL at 21:14

## 2022-10-17 RX ADMIN — ASPIRIN 81 MG CHEWABLE TABLET 81 MG: 81 TABLET CHEWABLE at 08:38

## 2022-10-17 RX ADMIN — GABAPENTIN 1200 MG: 400 CAPSULE ORAL at 21:14

## 2022-10-17 RX ADMIN — AMLODIPINE BESYLATE 10 MG: 10 TABLET ORAL at 08:37

## 2022-10-17 RX ADMIN — HYDROCHLOROTHIAZIDE 12.5 MG: 12.5 TABLET ORAL at 08:37

## 2022-10-17 RX ADMIN — PAROXETINE HYDROCHLORIDE 40 MG: 20 TABLET, FILM COATED ORAL at 08:37

## 2022-10-17 RX ADMIN — METRONIDAZOLE 1 APPLICATION: 7.5 GEL TOPICAL at 17:20

## 2022-10-17 NOTE — PROGRESS NOTES
Progress Note - Behavioral Health   Lottie Mcallister 77 y o  male MRN: [id number]  Unit/Bed#: [facility] 332-57 Encounter: [id number]    Assessment/Plan   Principal Problem:    Benzodiazepine abuse ([facility] )  Active Problems:    Hypertension    Mixed hyperlipidemia    Type 2 diabetes mellitus with diabetic peripheral angiopathy without gangrene, without long-term current use of insulin ([facility])    Alcohol use    Anxiety    2-part displaced fracture of surgical neck of left humerus, initial encounter for closed fracture    Medical clearance for psychiatric admission    RSV (acute bronchiolitis due to respiratory syncytial virus)    MDD (major depressive disorder), recurrent episode ([facility] )  Sports he slept little better with the p r n  Atarax last night and hopes he gets at home  He says his mood has improved and it does not have a great deal of anxiety now that he is off the benzodiazepines completely for several days  He is a med commitment to himself    By using does once he is discharged apparently has a supportive his brother also has    Behavior over the last 24 hours:  improved  Sleep: insomnia  Appetite: normal  Medication side effects: No  ROS: no complaints    Mental Status Evaluation:  Appearance:  age appropriate   Behavior:  guarded   Speech:  normal pitch and normal volume   Mood:  anxious and depressed   Affect:  blunted and constricted   Thought Process:  tangential   Thought Content:  normal   Perceptual Disturbances: None   Risk Potential: Suicidal Ideations none  Homicidal Ideations none  Potential for Aggression No   Sensorium:  person, place, and situation   Memory:  recent and remote memory grossly intact   Consciousness:  alert and awake    Attention: attention span appeared shorter than expected for age   Insight:  fair   Judgment: fair   Gait/Station: normal gait/station   Motor Activity: no abnormal movements     Progress Toward Goals:  Improving    Recommended Treatment: Continue with group therapy, milieu therapy and occupational therapy  Risks, benefits and possible side effects of Medications:   Risks, benefits, and possible side effects of medications explained to patient and patient verbalizes understanding  Medications: planned medication changes: Remeron reduced to 30 mg HS  Labs: I have personally reviewed all pertinent laboratory/tests results  Most Recent Labs:   Lab Results   Component Value Date    WBC 10 55 (H) 10/08/2022    RBC 3 04 (L) 10/08/2022    HGB 10 1 (L) 10/08/2022    HCT 31 4 (L) 10/08/2022     (H) 10/08/2022    RDW 13 8 10/08/2022    NEUTROABS 7 62 10/08/2022    SODIUM 134 (L) 10/10/2022    K 4 2 10/10/2022     10/10/2022    CO2 24 10/10/2022    BUN 16 10/10/2022    CREATININE 0 66 (L) 10/10/2022    GLUC 111 (H) 10/10/2022    GLUF 111 (H) 10/10/2022    CALCIUM 8 7 10/10/2022    AST 74 (H) 10/07/2022    ALT 56 (H) 10/07/2022    ALKPHOS 58 10/07/2022    TP 5 4 (L) 10/07/2022    ALB 2 8 (L) 10/07/2022    TBILI 0 32 10/07/2022    CHOLESTEROL 160 07/21/2022    HDL 62 07/21/2022    TRIG 103 07/21/2022    LDLCALC 77 07/21/2022    NONHDLC 98 07/21/2022    EMK5VIMNMRNF 2 090 12/11/2019    FREET4 0 9 02/27/2017    HGBA1C 5 7 (H) 07/21/2022     07/21/2022       Counseling / Coordination of Care  Total floor / unit time spent today 25minutes  Greater than 50% of total time was spent with the patient and / or family counseling and / or coordination of care   A description of the counseling / coordination of care:

## 2022-10-17 NOTE — TREATMENT TEAM
10/17/22 0831   Team Meeting   Meeting Type Daily Rounds   Initial Conference Date 10/17/22   Team Members Present   Team Members Present ;Physician;Nurse;;Occupational Therapist   Physician Team Member Dr Joseph Hall Team Member [facility] [employer] Team Member Charmaine Storm   OT Team Member Earl GUTIERREZ   Patient/Family Present   Patient Present No   Patient's Family Present No     Denies all s/s, good nutritional intake, slept, plan d/c tomorrow

## 2022-10-17 NOTE — CMS CERTIFICATION NOTE
Certification: Based upon physical, mental and social evaluations, I certify that inpatient psychiatric services are medically necessary for this patient for a duration of 30 midnights for the treatment of depression  Available alternative community resources do not meet the patient's mental health care needs  I further attest that an established written individualized plan of care has been implemented and is outlined in the patient's medical records

## 2022-10-17 NOTE — NURSING NOTE
Pt intermittently visible on the unit  Calm, cooperative and reserved to self  Reported main concerns to be difficulty falling asleep at HS  Medication adjustment made today and pt is hoping it will help with falling asleep  Pt reassured, support  given  Denies depressive and anxiety symptoms during this encounter

## 2022-10-17 NOTE — CASE MANAGEMENT
Call made [facility], appt scheduled for Wed 10/26 at 115 via telehealth  Call made to [facility] program; pt to be scheduled for intake appt and appt to be placed in pt's EMR  In [employer] message sent to [address] [place] [address] at [facility] program regarding setting up pt with appt transport through [employer]

## 2022-10-17 NOTE — PLAN OF CARE
Pt  Refused to attend session in progress once in the group room    Problem: Ineffective Coping  Goal: Participates in unit activities  Description: Interventions:  - Provide therapeutic environment   - Provide required programming   - Redirect inappropriate behaviors   Outcome: Not Progressing

## 2022-10-17 NOTE — CASE MANAGEMENT
CM met with pt, reviewed d/c tomorrow and IMM notice  Pt in agreement with d/c and signed IMM  Pt reports having an outpatient appt with Dr Selena Santana in January  Pt in agreement with CM contacting [facility] to reschedule pt for earlier date  CM reviewed recommendation for pt to also follow up with outpatient substance abuse therapist  CM informed pt of [facility] program  Pt in agreement with referral for outpatient therapist but wants to remain with Dr Selena Santana for psychiatrist  Pt reports he will have to check with his brother regarding d/c transportation and transport to appts  CM informed [facility] of pt's need for access to cell phone to contact brother  Pt reports his brother Barb Means is pt's medical and financial POA  Pt also identified his brothers Gopi Oneil and Barb Means as his sobriety supports  Pt reports brother Gopi Oneil has been sober x one year  Pt requesting d/c meds sent to [place]; pt reports he will have to pay for meds if sent to community pharmacy for less then 90 days  Message sent to Dr Selena Santana to review  Call made to [facility] program, spoke with Keisha Benton, informed pt able to see psychotherapist and cont with Dr Selena Santana  In Basket message sent with appt request      Call made to Kathleen Patiño, tel# [phone]; informed they are able to provide transport to pt for appts at [facility]  CM informed that [address] will make arrangements for pt's transport

## 2022-10-17 NOTE — PROGRESS NOTES
10/17/22 1100   Activity/Group Checklist   Group Community meeting  (topic: self awareness )   Attendance Refused   Interactions Other (Comment)  (Pt  entered group room to attend lunch and when seeing the session in progress left immediately,stating," I don't need that ")   Affect/Mood Other (Comment)  (disinterest)

## 2022-10-17 NOTE — CASE MANAGEMENT
Pt scheduled for new patient intake appt with [facility] on 10/27 at 11 AM with Kary AMBROSE for substance abuse therapy services  CM met with pt, reviewed appts with Dr Ronda Dickens and [facility]  CM also informed pt of [facility] program  Pt in agreement with d/c plan  Pt to contact his brother Adrianna Babinski to schedule d/c transport  Pt also to discuss d/c meds with Dr Ronda Dickens

## 2022-10-17 NOTE — NURSING NOTE
Patient calm, cooperative, pleasant and visible on millieu and is sociable with select peers  He reports sleeping well, denies depression, anxiety, SI/HI/AVH and reports he is anticipating discharge soon  He is able to make his needs known and is medication compliant  Wound care performed as ordered on left shoulder and arm after shower and patient tolerated well

## 2022-10-17 NOTE — DISCHARGE INSTR - APPOINTMENTS
Beto Nayak or Kathy, our Trent and Mandi, will be calling you after your discharge, on the phone number that you provided  They will be available as an additional support, if needed  If you wish to speak with one of them, you may contact Desirae Livingston at [phone] or Lee Reyes at [phone]

## 2022-10-18 ENCOUNTER — TELEPHONE (OUTPATIENT)
Dept: OBGYN CLINIC | Facility: HOSPITAL | Age: 67
End: 2022-10-18

## 2022-10-18 VITALS
RESPIRATION RATE: 16 BRPM | BODY MASS INDEX: 22.5 KG/M2 | WEIGHT: 152.34 LBS | DIASTOLIC BLOOD PRESSURE: 74 MMHG | HEART RATE: 78 BPM | OXYGEN SATURATION: 91 % | TEMPERATURE: 97.9 F | SYSTOLIC BLOOD PRESSURE: 122 MMHG

## 2022-10-18 PROCEDURE — 99024 POSTOP FOLLOW-UP VISIT: CPT | Performed by: PHYSICIAN ASSISTANT

## 2022-10-18 RX ORDER — HYDROXYZINE 50 MG/1
50 TABLET, FILM COATED ORAL
Qty: 30 TABLET | Refills: 3 | Status: SHIPPED | OUTPATIENT
Start: 2022-10-18

## 2022-10-18 RX ORDER — GABAPENTIN 400 MG/1
1200 CAPSULE ORAL
Qty: 90 CAPSULE | Refills: 3 | Status: SHIPPED | OUTPATIENT
Start: 2022-10-18

## 2022-10-18 RX ADMIN — ASPIRIN 81 MG CHEWABLE TABLET 81 MG: 81 TABLET CHEWABLE at 08:17

## 2022-10-18 RX ADMIN — AMLODIPINE BESYLATE 10 MG: 10 TABLET ORAL at 08:18

## 2022-10-18 RX ADMIN — HYDROCHLOROTHIAZIDE 12.5 MG: 12.5 TABLET ORAL at 08:17

## 2022-10-18 RX ADMIN — ATORVASTATIN CALCIUM 40 MG: 40 TABLET, FILM COATED ORAL at 08:17

## 2022-10-18 RX ADMIN — PAROXETINE HYDROCHLORIDE 40 MG: 20 TABLET, FILM COATED ORAL at 08:17

## 2022-10-18 RX ADMIN — METFORMIN HYDROCHLORIDE 1000 MG: 500 TABLET ORAL at 08:18

## 2022-10-18 NOTE — DISCHARGE INSTRUCTIONS
Discharge Instructions - Orthopedics  Emiliano Jimenez 77 y o  male MRN: [id number]  Unit/Bed#: [facility] 641-01    Weight Bearing Status:                                           NON- weight bearing to the left upper extremity     Pain:  Continue analgesics as directed    Dressing Instructions:   Daily dry dressing changes  You may shower allowing warm soapy water to run over the incision, pat incision dry, do NOT scrub or soak incision  Don't apply any creams, lotions, or ointments at this time  Appt Instructions:    You have a follow-up appointment scheduled to see Dr Dubose in the office on 10/31/22    Contact the office sooner if you experience any increased numbness/tingling in the extremities,  [phone]

## 2022-10-18 NOTE — NURSING NOTE
Patient is present in the dayroom, out for meals  Patient is medication compliant and cooperative with care  Patient is preoccupied with discharge, patient counseled  Patient is currently denying all s/s at this time  States he is ready to go home  Patient is able to make needs known  Reviewed AVS with patient, dismissive  Per staff, patient yelling at staff when walking down to the lobby regarding his belongings  Patient informed where his valuables are located in his bag  Patient counseled

## 2022-10-18 NOTE — PROGRESS NOTES
Progress Note - Orthopedics   Kaden Varghese 77 y o  male MRN: [id number]  Unit/Bed#: [facility] 163-43 Encounter: [id number]    Assessment:  76 yo male POD12 s/p ORIF left proximal humerus    Plan:  · NWB to LUE  · Continue sling for comfort  May come out of the sling to focus on elbow, wrist, and hand ROM  · Pain control prn  · Apply ice  · PT/ OT    · Medical management per primary team    · Staples were removed at bedside today and dry dressing applied  Continue daily dry dressing changes  · Patient will follow up with Dr Yancy Pelayo 2 as an outpatient, he is currently scheduled for follow-up on 10/31          Subjective: Patient seen and examined at bedside  He states that he is overall doing well  He denies have any complaints about the left shoulder  He continues to utilize his sling as directed  He denies any fevers, chills, or sweats  Denies any numbness or tingling in the left upper extremity  Vitals: Blood pressure 122/74, pulse 78, temperature 97 9 °F (36 6 °C), temperature source Temporal, resp  rate 16, weight 69 1 kg (152 lb 5 4 oz), SpO2 91 %  ,Body mass index is 22 5 kg/m²  Intake/Output Summary (Last 24 hours) at 10/18/2022 1137  Last data filed at 10/18/2022 5830  Gross per 24 hour   Intake 480 ml   Output --   Net 480 ml       Invasive Devices  Report    None                 Ortho Exam:   Left Upper Extremity:  Inspection- sling in place  Gauze dressing removed, staples intact, no erythema or ecchymosis about the incision  There is ecchymosis about the anterior aspect of the shoulder  Palpation- +swelling +effusion +minimal TTP  Upper and forearm soft and compressible  ROM- shoulder and elbow deferred  Able to flex and extend wrist and fingers  Neurovascular- sensation intact axillary, median, radial, and ulnar nerve distributions  Palpable radial pulse  AIN/ PIN intact       Lab, Imaging and other studies:   CBC:   No results found for: WBC, HGB, HCT, MCV, PLT, ADJUSTEDWBC, MCH, MCHC, RDW, MPV, NRBC  CMP:   No results found for: SODIUM, CL, CO2, ANIONGAP, BUN, CREATININE, GLUCOSE, CALCIUM, AST, ALT, ALKPHOS, PROT, BILITOT, EGFR         Cammy Mead

## 2022-10-18 NOTE — PLAN OF CARE
Problem: Nutrition/Hydration-ADULT  Goal: Nutrient/Hydration intake appropriate for improving, restoring or maintaining nutritional needs  Description: Monitor and assess patient's nutrition/hydration status for malnutrition  Collaborate with interdisciplinary team and initiate plan and interventions as ordered  Monitor patient's weight and dietary intake as ordered or per policy  Utilize nutrition screening tool and intervene as necessary  Determine patient's food preferences and provide high-protein, high-caloric foods as appropriate       INTERVENTIONS:  - Monitor oral intake, urinary output, labs, and treatment plans  - Assess nutrition and hydration status and recommend course of action  - Evaluate amount of meals eaten  - Assist patient with eating if necessary   - Allow adequate time for meals  - Recommend/ encourage appropriate diets, oral nutritional supplements, and vitamin/mineral supplements  - Order, calculate, and assess calorie counts as needed  - Recommend, monitor, and adjust tube feedings and TPN/PPN based on assessed needs  - Assess need for intravenous fluids  - Provide specific nutrition/hydration education as appropriate  - Include patient/family/caregiver in decisions related to nutrition  Outcome: Adequate for Discharge     Problem: SAFETY ADULT  Goal: Patient will remain free of falls  Description: INTERVENTIONS:  - Educate patient/family on patient safety including physical limitations  - Instruct patient to call for assistance with activity   - Consult OT/PT to assist with strengthening/mobility   - Keep Call bell within reach  - Keep bed low and locked with side rails adjusted as appropriate  - Keep care items and personal belongings within reach  - Initiate and maintain comfort rounds  - Make Fall Risk Sign visible to staff  - Apply yellow socks and bracelet for high fall risk patients  - Consider moving patient to room near nurses station  Outcome: Adequate for Discharge  Goal: Maintain or return to baseline ADL function  Description: INTERVENTIONS:  -  Assess patient's ability to carry out ADLs; assess patient's baseline for ADL function and identify physical deficits which impact ability to perform ADLs (bathing, care of mouth/teeth, toileting, grooming, dressing, etc )  - Assess/evaluate cause of self-care deficits   - Assess range of motion  - Assess patient's mobility; develop plan if impaired  - Assess patient's need for assistive devices and provide as appropriate  - Encourage maximum independence but intervene and supervise when necessary  - Involve family in performance of ADLs  - Assess for home care needs following discharge   - Consider OT consult to assist with ADL evaluation and planning for discharge  - Provide patient education as appropriate  Outcome: Adequate for Discharge  Goal: Maintains/Returns to pre admission functional level  Description: INTERVENTIONS:  - Perform BMAT or MOVE assessment daily    - Set and communicate daily mobility goal to care team and patient/family/caregiver     - Collaborate with rehabilitation services on mobility goals if consulted  - Out of bed for toileting  - Record patient progress and toleration of activity level   Outcome: Adequate for Discharge     Problem: DISCHARGE PLANNING  Goal: Discharge to home or other facility with appropriate resources  Description: INTERVENTIONS:  - Identify barriers to discharge w/patient and caregiver  - Arrange for needed discharge resources and transportation as appropriate  - Identify discharge learning needs (meds, wound care, etc )  - Arrange for interpretive services to assist at discharge as needed  - Refer to Case Management Department for coordinating discharge planning if the patient needs post-hospital services based on physician/advanced practitioner order or complex needs related to functional status, cognitive ability, or social support system  Outcome: Adequate for Discharge     Problem: Knowledge Deficit  Goal: Patient/family/caregiver demonstrates understanding of disease process, treatment plan, medications, and discharge instructions  Description: Complete learning assessment and assess knowledge base  Interventions:  - Provide teaching at level of understanding  - Provide teaching via preferred learning methods  Outcome: Adequate for Discharge     Problem: DEPRESSION  Goal: Will be euthymic at discharge  Description: INTERVENTIONS:  - Administer medication as ordered  - Provide emotional support via 1:1 interaction with staff  - Encourage involvement in milieu/groups/activities  - Monitor for social isolation  Outcome: Adequate for Discharge     Problem: BEHAVIOR  Goal: Pt/Family maintain appropriate behavior and adhere to behavioral management agreement, if implemented  Description: INTERVENTIONS:  - Assess the family dynamic   - Encourage verbalization of thoughts and concerns in a socially appropriate manner  - Assess patient/family's coping skills and non-compliant behavior (including use of illegal substances)  - Utilize positive, consistent limit setting strategies supporting safety of patient, staff and others  - Initiate consult with Case Management, Spiritual Care or other ancillary services as appropriate  - If a patient's/visitor's behavior jeopardizes the safety of the patient, staff, or others, refer to organization procedure     - Notify Security of behavior or suspected illegal substances which indicate the need for search of the patient and/or belongings  - Encourage participation in the decision making process about a behavioral management agreement; implement if patient meets criteria  Outcome: Adequate for Discharge     Problem: ANXIETY  Goal: Will report anxiety at manageable levels  Description: INTERVENTIONS:  - Administer medication as ordered  - Teach and encourage coping skills  - Provide emotional support  - Assess patient/family for anxiety and ability to cope  Outcome: Adequate for Discharge  Goal: By discharge: Patient will verbalize 2 strategies to deal with anxiety  Description: Interventions:  - Identify any obvious source/trigger to anxiety  - Staff will assist patient in applying identified coping technique/skills  - Encourage attendance of scheduled groups and activities  Outcome: Adequate for Discharge     Problem: SLEEP DISTURBANCE  Goal: Will exhibit normal sleeping pattern  Description: Interventions:  -  Assess the patients sleep pattern, noting recent changes  - Administer medication as ordered  - Decrease environmental stimuli, including noise, as appropriate during the night  - Encourage the patient to actively participate in unit groups and or exercise during the day to enhance ability to achieve adequate sleep at night  - Assess the patient, in the morning, encouraging a description of sleep experience  Outcome: Adequate for Discharge     Problem: Ineffective Coping  Goal: Participates in unit activities  Description: Interventions:  - Provide therapeutic environment   - Provide required programming   - Redirect inappropriate behaviors   Outcome: Adequate for Discharge

## 2022-10-18 NOTE — NURSING NOTE
Reviewed AVS with patient  Sent with belongings and AVS  Patient walking off unit with [facility]  Patient irritable at time of discharge, no signs of distress noted

## 2022-10-18 NOTE — CASE MANAGEMENT
CM scheduled pt for ortho follow up appt on 10/31 with Dr Nini Gil for ORIF follow up and suture removal

## 2022-10-18 NOTE — BH TRANSITION RECORD
Contact Information: If you have any questions, concerns, pended studies, tests and/or procedures, or emergencies regarding your inpatient behavioral health visit  Please contact [facility] older adult behavioral health unit 6B [phone] and ask to speak to a , nurse or physician  A contact is available 24 hours/ 7 days a week at this number  Summary of Procedures Performed During your Stay:  Below is a list of major procedures performed during your hospital stay and a summary of results:  - No major procedures performed  Pending Studies (From admission, onward)    None        Please follow up on the above pending studies with your PCP and/or referring provider

## 2022-10-18 NOTE — TELEPHONE ENCOUNTER
Hello,  Please advise if the following patient can be forced onto the schedule:    Patient: Gaudencio Preciado     : 1955    MRN: [id number]    Call back : TODAY   [facility] (in patient)  [phone] AFTER D/C TODAY PATIENT [phone]    Insurance:[employer]     Reason for appointment: PO SUTURE REMOVAL / / : 2-part displaced fracture of surgical neck of left humerus 10/6   Given soonest available 10/31 in [place]   Requesting sooner due to sutures     Requested doctor/location: Obed //[facility]       Thank you

## 2022-10-19 NOTE — DISCHARGE SUMMARY
Psychiatric Discharge Summary    Medical Record Number: [id number]  Encounter: [id number]    Discharge diagnosis:  Benzodiazepine abuse ([place] [address] )    Secondary diagnoses:  Medical Problems             Problem List     * (Principal) Benzodiazepine abuse ([place] [address] )    Atherosclerotic PVD with intermittent claudication ([place] [address] )    Depression with anxiety    Overview Signed 4/11/2018  9:31 AM by Irma Snyder MD     Transitioned From: Depression         Hypertension    Mixed hyperlipidemia    Type 2 diabetes mellitus with diabetic peripheral angiopathy without gangrene, without long-term current use of insulin ([place] [address] )    Lab Results   Component Value Date    HGBA1C 5 7 (H) 07/21/2022       No results for input(s): POCGLU in the last 72 hours  Blood Sugar Average: Last 72 hrs:          Smoking    Rosacea    Rhinophyma    Alcohol use    Bilateral carotid artery stenosis    Chronic bronchitis (HCC)    Alcohol withdrawal syndrome, with delirium ([place] [address] )    DARIWN (acute kidney injury) ([place] [address] )    Leukocytosis    Anxiety    Toxic metabolic encephalopathy    2-part displaced fracture of surgical neck of left humerus, initial encounter for closed fracture    Hypokalemia    Medical clearance for psychiatric admission    RSV (acute bronchiolitis due to respiratory syncytial virus)    MDD (major depressive disorder), recurrent episode (HCC)                HPI:     This is a 59-year-old single man who was admitted voluntarily to the psychiatric unit for treatment of the benzodiazepine and alcohol abuse and dependency  He was initially admitted to a medical unit due to toxic encephalopathy when he seemed confused and disoriented and was clearly not able to function independently at home  If he previously had been abusing alcohol and had a brief admission for DTs and was reported to have the for seizures during this episode    In addition at home he fell while intoxicated or confused and fractured his humerus which needed surgical intervention  Patient had then stopped his drinking but then apparently was abusing Xanax at home which he had been taking regularly for number of years  On admission he was confused disoriented the had delayed speech  Additionally he had been treated also for a major depressive disorder and had been on Paxil and Remeron and compliant with his medications for his depression worsened over this past month due to his issues with the benzodiazepine and alcohol abuse and dependency  Brief Hospital Course:     After the patient was admitted to the psychiatric unit  the patient had several psychotropic medication adjustments made to help stabilize their mood  Following these medication changes, the patient started to stabilize  Finally on 10/19/2022, the patient was found to be stable for discharge  On the day of discharge the patient reported their symptoms as significantly improved  All psychiatric medications were being tolerated without side effect or adverse event  No suicidal or homicidal ideations were present  The patient expressed their readiness and willingness to be discharged and referral to outpatient treatment was made  The case was discussed with Dr Annalise Choi and he has deemed the patient stable for discharge  By the end of hospitalization patient was off all his Xanax and was replaced with HS gabapentin for sleep and also Atarax which was helpful  He was continued on his Remeron and Paxil without any changes to their doses and his mood did improve during this admission  At the end the hospitalization he was not reporting any significant symptoms of depression or anxiety and has completed his detox from Xanax uneventfully      He was offered outpatient substance abuse counseling and support following discharge in addition to his outpatient psychiatric treatment follow-up      Condition on discharge:     Improved    Medications Upon Discharge:     No current facility-administered medications for this encounter      Current Outpatient Medications:   •  gabapentin (NEURONTIN) 400 mg capsule, Take 3 capsules (1,200 mg total) by mouth daily at bedtime, Disp: 90 capsule, Rfl: 3  •  hydrOXYzine HCL (ATARAX) 50 mg tablet, Take 1 tablet (50 mg total) by mouth daily at bedtime, Disp: 30 tablet, Rfl: 3  •  amLODIPine (NORVASC) 10 mg tablet, TAKE 1 TABLET DAILY, Disp: 90 tablet, Rfl: 3  •  aspirin 81 MG tablet, Take 1 tablet (81 mg total) by mouth daily, Disp: 90 tablet, Rfl: 1  •  atorvastatin (LIPITOR) 40 mg tablet, TAKE 1 TABLET DAILY, Disp: 90 tablet, Rfl: 3  •  hydrochlorothiazide (HYDRODIURIL) 12 5 mg tablet, TAKE 1 TABLET DAILY, Disp: 90 tablet, Rfl: 3  •  metFORMIN (GLUCOPHAGE) 1000 MG tablet, Take 1 tablet (1,000 mg total) by mouth daily with breakfast, Disp: 180 tablet, Rfl: 3  •  metroNIDAZOLE (METROGEL) 1 % gel, APPLY TOPICALLY TO AFFECTED AREA ONCE DAILY, Disp: 60 g, Rfl: 0  •  mirtazapine (REMERON) 30 mg tablet, Take 1 tablet (30 mg total) by mouth daily at bedtime, Disp: 30 tablet, Rfl: 0  •  PARoxetine (PAXIL) 40 MG tablet, Take 1 tablet (40 mg total) by mouth every morning, Disp: 30 tablet, Rfl: 0    Consueloke Balzarine

## 2022-10-21 DIAGNOSIS — S42.222A 2-PART DISPLACED FRACTURE OF SURGICAL NECK OF LEFT HUMERUS, INITIAL ENCOUNTER FOR CLOSED FRACTURE: Primary | ICD-10-CM

## 2022-10-24 ENCOUNTER — TELEPHONE (OUTPATIENT)
Dept: PSYCHIATRY | Facility: CLINIC | Age: 67
End: 2022-10-24

## 2022-10-27 ENCOUNTER — TELEPHONE (OUTPATIENT)
Dept: OBGYN CLINIC | Facility: HOSPITAL | Age: 67
End: 2022-10-27

## 2022-10-27 NOTE — TELEPHONE ENCOUNTER
Caller: Patient    Doctor: Alissa Smart    Reason for call: Patient states that small wound near the surgical site is red for 2-3 days  He denies fever or pain  Post op scheduled 10/31      Call back#: [phone]

## 2022-10-27 NOTE — TELEPHONE ENCOUNTER
Spoke to the patient and he denies any drainage, openings, swelling, heat to touch, or fever at this time  Advised he should call if any of the above arises  He cannot send a picture at this time  He may find someone who can and if he does he will call office  In the meantime he will monitor and see us on 10/31  Please advise

## 2022-10-31 ENCOUNTER — OFFICE VISIT (OUTPATIENT)
Dept: OBGYN CLINIC | Facility: HOSPITAL | Age: 67
End: 2022-10-31

## 2022-10-31 ENCOUNTER — HOSPITAL ENCOUNTER (OUTPATIENT)
Dept: RADIOLOGY | Facility: HOSPITAL | Age: 67
Discharge: HOME | End: 2022-10-31
Attending: ORTHOPAEDIC SURGERY

## 2022-10-31 VITALS
HEIGHT: 69 IN | BODY MASS INDEX: 22.56 KG/M2 | HEART RATE: 105 BPM | SYSTOLIC BLOOD PRESSURE: 137 MMHG | DIASTOLIC BLOOD PRESSURE: 74 MMHG | WEIGHT: 152.34 LBS

## 2022-10-31 DIAGNOSIS — S42.222A 2-PART DISPLACED FRACTURE OF SURGICAL NECK OF LEFT HUMERUS, INITIAL ENCOUNTER FOR CLOSED FRACTURE: ICD-10-CM

## 2022-10-31 DIAGNOSIS — S42.222A 2-PART DISPLACED FRACTURE OF SURGICAL NECK OF LEFT HUMERUS, INITIAL ENCOUNTER FOR CLOSED FRACTURE: Primary | ICD-10-CM

## 2022-10-31 RX ORDER — DOCUSATE SODIUM, SENNOSIDES 50; 8.6 MG/1; MG/1
1 TABLET ORAL 2 TIMES DAILY
Status: ON HOLD | COMMUNITY
Start: 2022-09-30

## 2022-10-31 RX ORDER — OXYCODONE HYDROCHLORIDE 5 MG/1
5 TABLET ORAL EVERY 6 HOURS PRN
Status: ON HOLD | COMMUNITY
Start: 2022-09-30

## 2022-10-31 RX ORDER — LIDOCAINE 4 G/G
1 PATCH TOPICAL DAILY
Status: ON HOLD | COMMUNITY
Start: 2022-09-30

## 2022-10-31 RX ORDER — AMOXICILLIN 250 MG
1 CAPSULE ORAL 2 TIMES DAILY
Status: ON HOLD | COMMUNITY
Start: 2022-09-30 | End: 2023-09-30

## 2022-10-31 RX ORDER — LIDOCAINE 4 G/100G
1 PATCH TOPICAL DAILY
Status: ON HOLD | COMMUNITY
Start: 2022-09-30

## 2022-10-31 RX ORDER — SIMVASTATIN 10 MG
TABLET ORAL
Status: ON HOLD | COMMUNITY
Start: 2022-09-30

## 2022-10-31 RX ORDER — GABAPENTIN 600 MG/1
TABLET ORAL
Status: ON HOLD | COMMUNITY
Start: 2022-10-19

## 2022-10-31 RX ORDER — NICOTINE 21 MG/24HR
1 PATCH, TRANSDERMAL 24 HOURS TRANSDERMAL DAILY
Status: ON HOLD | COMMUNITY
Start: 2022-09-30

## 2022-10-31 RX ORDER — MIRTAZAPINE 15 MG/1
TABLET, FILM COATED ORAL
Status: ON HOLD | COMMUNITY
Start: 2022-09-30

## 2022-10-31 RX ORDER — ACETAMINOPHEN 500 MG/1
TABLET ORAL
Status: ON HOLD | COMMUNITY
Start: 2022-10-06

## 2022-10-31 NOTE — ASSESSMENT & PLAN NOTE
We are going to get him into physical therapy for active and passive range of motion of her shoulder  We will see him back with repeat films

## 2022-10-31 NOTE — PROGRESS NOTES
Assessment/Plan:    2-part displaced fracture of surgical neck of left humerus, initial encounter for closed fracture  We are going to get him into physical therapy for active and passive range of motion of her shoulder  We will see him back with repeat films  Diagnoses and all orders for this visit:    2-part displaced fracture of surgical neck of left humerus, initial encounter for closed fracture  -     Ambulatory Referral to Physical Therapy; Future    Other orders  -     QC Pain Relief Extra Strength 500 MG tablet; TAKE 1 TABLET (500 MG TOTAL) BY MOUTH EVERY 4 (FOUR) HOURS AS NEEDED FOR MILD PAIN (PAIN SCORE 1-3), HEADACHES OR FEVER   -     gabapentin (NEURONTIN) 600 MG tablet; TAKE 1-2 TABLETS BY MOUTH NIGHTLY AS NEEDED  -     Lidocaine 4 % PTCH; Place 1 patch on the skin daily  -     QC Lidocaine Pain Relief 4 % PTCH; Place 1 patch on the skin daily  -     mirtazapine (REMERON) 15 mg tablet; TAKE 1 TABLET (15 MG TOTAL) BY MOUTH NIGHTLY  -     nicotine (NICODERM CQ) 14 mg/24hr TD 24 hr patch; Place 1 patch on the skin daily  -     oxyCODONE (ROXICODONE) 5 immediate release tablet; Take 5 mg by mouth every 6 (six) hours as needed  -     senna-docusate sodium (SENOKOT S) 8 6-50 mg per tablet; Take 1 tablet by mouth 2 (two) times a day  -     QC Stool Softener Pls Laxative 8 6-50 MG per tablet; Take 1 tablet by mouth 2 (two) times a day  -     simvastatin (ZOCOR) 10 mg tablet; TAKE 1 TABLET (10 MG TOTAL) BY MOUTH NIGHTLY          Subjective:      Patient ID: Rada Klinefelter is a 77 y o  male  This is a 26-year-old fellow who is status post open reduction internal fixation of his left proximal humeral fracture on 6 October 2022  He now follows up  He is doing well  He is not having any complaints  He has discontinue the sling        The following portions of the patient's history were reviewed and updated as appropriate: allergies, current medications, past family history, past medical history, past social history, past surgical history, and problem list     Review of Systems      Objective:      /74   Pulse 105   Ht 5' 9" (1 753 m)   Wt 69 1 kg (152 lb 5 4 oz)   BMI 22 50 kg/m²          Physical Exam      On physical examination he has good active and passive range of motion of his shoulder  He is neurologically intact  His incisions nicely healed  There is no sign of infection  He does have some defect in the anterior row medial aspect of his deltoid from the displacement of the regional fracture  AP axillary views of the shoulder ordered obtained interpreted shows is significant fracture which is overall in good alignment

## 2022-11-01 NOTE — UTILIZATION REVIEW
NOTIFICATION OF ADMISSION DISCHARGE   This is a Notification of Discharge from [facility]  Please be advised that this patient has been discharge from our facility  Below you will find the admission and discharge date and time including the patient’s disposition  UTILIZATION REVIEW CONTACT:  Eusebia Arango MA  Utilization   Network Utilization Review Department  Phone: [phone] x carefully listen to the prompts  All voicemails are confidential   Email: [email or web address]     ADMISSION INFORMATION  PRESENTATION DATE: 10/2/2022 12:38 PM  OBERVATION ADMISSION DATE:   INPATIENT ADMISSION DATE: 10/3/22  4:12 PM   DISCHARGE DATE: 10/7/2022  2:59 PM  DISPOSITION: Home/Self Care Home/Self Care      IMPORTANT INFORMATION:  Send all requests for admission clinical reviews, approved or denied determinations and any other requests to dedicated fax number below belonging to the campus where the patient is receiving treatment   List of dedicated fax numbers:  [address] DENIALS (Administrative/Medical Necessity) [phone]   [address] (Maternity/NICU/Pediatrics) [phone]   [facility] [phone]   [place] [address] [phone]   [address] [phone]   [address] [facility] [phone] 90 [facility] [phone]   [address] [place] [address] [phone]   [facility]  [phone]   [facility] [phone]   [address] [place] [address] [phone]

## 2022-11-02 DIAGNOSIS — S42.222A 2-PART DISPLACED FRACTURE OF SURGICAL NECK OF LEFT HUMERUS, INITIAL ENCOUNTER FOR CLOSED FRACTURE: Primary | ICD-10-CM

## 2022-11-09 PROBLEM — J21.0 RSV (ACUTE BRONCHIOLITIS DUE TO RESPIRATORY SYNCYTIAL VIRUS): Status: RESOLVED | Noted: 2022-10-08 | Resolved: 2022-11-09

## 2022-11-09 PROBLEM — F10.10 ALCOHOL ABUSE: Status: ACTIVE | Noted: 2022-11-09

## 2022-11-09 PROBLEM — N17.9 AKI (ACUTE KIDNEY INJURY) (HCC): Status: RESOLVED | Noted: 2022-09-24 | Resolved: 2022-11-09

## 2022-11-09 PROBLEM — F10.931 ALCOHOL WITHDRAWAL SYNDROME, WITH DELIRIUM (HCC): Status: RESOLVED | Noted: 2022-09-21 | Resolved: 2022-11-09

## 2022-11-09 PROBLEM — E87.6 HYPOKALEMIA: Status: RESOLVED | Noted: 2022-10-05 | Resolved: 2022-11-09

## 2022-11-09 PROBLEM — D72.829 LEUKOCYTOSIS: Status: RESOLVED | Noted: 2022-09-24 | Resolved: 2022-11-09

## 2022-11-09 PROBLEM — F10.90 ALCOHOL USE: Status: RESOLVED | Noted: 2020-11-06 | Resolved: 2022-11-09

## 2022-11-09 PROBLEM — F41.9 ANXIETY: Status: RESOLVED | Noted: 2022-09-24 | Resolved: 2022-11-09

## 2022-11-09 PROBLEM — F41.8 DEPRESSION WITH ANXIETY: Status: RESOLVED | Noted: 2017-02-07 | Resolved: 2022-11-09

## 2022-11-09 PROBLEM — Z00.8 MEDICAL CLEARANCE FOR PSYCHIATRIC ADMISSION: Status: RESOLVED | Noted: 2022-10-08 | Resolved: 2022-11-09

## 2022-11-09 PROBLEM — G92.8 TOXIC METABOLIC ENCEPHALOPATHY: Status: RESOLVED | Noted: 2022-10-02 | Resolved: 2022-11-09

## 2022-11-09 PROBLEM — Z78.9 ALCOHOL USE: Status: RESOLVED | Noted: 2020-11-06 | Resolved: 2022-11-09

## 2022-11-09 PROBLEM — S42.222A 2-PART DISPLACED FRACTURE OF SURGICAL NECK OF LEFT HUMERUS, INITIAL ENCOUNTER FOR CLOSED FRACTURE: Status: RESOLVED | Noted: 2022-10-02 | Resolved: 2022-11-09

## 2022-11-09 PROBLEM — S42.202A CLOSED FRACTURE OF LEFT PROXIMAL HUMERUS: Status: ACTIVE | Noted: 2022-09-28

## 2022-11-11 ENCOUNTER — HOSPITAL ENCOUNTER (INPATIENT)
Facility: HOSPITAL | Age: 67
LOS: 4 days | Discharge: HOME | End: 2022-11-15
Attending: EMERGENCY MEDICINE | Admitting: EMERGENCY MEDICINE

## 2022-11-11 ENCOUNTER — APPOINTMENT (EMERGENCY)
Dept: RADIOLOGY | Facility: HOSPITAL | Age: 67
End: 2022-11-11

## 2022-11-11 ENCOUNTER — HOSPITAL ENCOUNTER (EMERGENCY)
Facility: HOSPITAL | Age: 67
End: 2022-11-11
Attending: EMERGENCY MEDICINE

## 2022-11-11 VITALS
WEIGHT: 165 LBS | TEMPERATURE: 99.2 F | HEART RATE: 119 BPM | SYSTOLIC BLOOD PRESSURE: 169 MMHG | RESPIRATION RATE: 18 BRPM | OXYGEN SATURATION: 97 % | DIASTOLIC BLOOD PRESSURE: 87 MMHG | BODY MASS INDEX: 24.37 KG/M2

## 2022-11-11 DIAGNOSIS — F10.939 ALCOHOL WITHDRAWAL (HCC): ICD-10-CM

## 2022-11-11 DIAGNOSIS — F13.939 BENZODIAZEPINE WITHDRAWAL WITH COMPLICATION (HCC): ICD-10-CM

## 2022-11-11 DIAGNOSIS — E11.51 TYPE 2 DIABETES MELLITUS WITH DIABETIC PERIPHERAL ANGIOPATHY WITHOUT GANGRENE, WITHOUT LONG-TERM CURRENT USE OF INSULIN (HCC): ICD-10-CM

## 2022-11-11 DIAGNOSIS — E78.49 OTHER HYPERLIPIDEMIA: ICD-10-CM

## 2022-11-11 DIAGNOSIS — E87.6 HYPOKALEMIA: Primary | ICD-10-CM

## 2022-11-11 DIAGNOSIS — R94.31 QT PROLONGATION: ICD-10-CM

## 2022-11-11 DIAGNOSIS — F17.200 CURRENT EVERY DAY SMOKER: ICD-10-CM

## 2022-11-11 DIAGNOSIS — T43.591A: ICD-10-CM

## 2022-11-11 DIAGNOSIS — F33.2 SEVERE EPISODE OF RECURRENT MAJOR DEPRESSIVE DISORDER, WITHOUT PSYCHOTIC FEATURES (HCC): ICD-10-CM

## 2022-11-11 DIAGNOSIS — F10.20 ALCOHOL USE DISORDER, MODERATE, DEPENDENCE (HCC): ICD-10-CM

## 2022-11-11 DIAGNOSIS — I10 ESSENTIAL HYPERTENSION: ICD-10-CM

## 2022-11-11 DIAGNOSIS — E83.42 HYPOMAGNESEMIA: ICD-10-CM

## 2022-11-11 DIAGNOSIS — F10.939 ALCOHOL WITHDRAWAL SYNDROME WITH COMPLICATION (HCC): ICD-10-CM

## 2022-11-11 DIAGNOSIS — F10.10 ALCOHOL ABUSE: Primary | ICD-10-CM

## 2022-11-11 PROBLEM — E11.9 TYPE 2 DIABETES MELLITUS, WITHOUT LONG-TERM CURRENT USE OF INSULIN (HCC): Status: ACTIVE | Noted: 2020-01-06

## 2022-11-11 LAB
ALBUMIN SERPL BCP-MCNC: 2.9 G/DL (ref 3.5–5)
ALP SERPL-CCNC: 156 U/L (ref 46–116)
ALT SERPL W P-5'-P-CCNC: 20 U/L (ref 12–78)
AMMONIA PLAS-SCNC: 35 UMOL/L (ref 11–35)
AMPHETAMINES SERPL QL SCN: NEGATIVE
ANION GAP SERPL CALCULATED.3IONS-SCNC: 9 MMOL/L (ref 4–13)
APAP SERPL-MCNC: <2 UG/ML (ref 10–20)
AST SERPL W P-5'-P-CCNC: 35 U/L (ref 5–45)
ATRIAL RATE: 119 BPM
ATRIAL RATE: 121 BPM
BACTERIA UR QL AUTO: ABNORMAL /HPF
BARBITURATES UR QL: NEGATIVE
BASE EX.OXY STD BLDV CALC-SCNC: 95.5 % (ref 60–80)
BASE EXCESS BLDV CALC-SCNC: 0.8 MMOL/L
BASOPHILS # BLD AUTO: 0.01 THOUSANDS/ÂΜL (ref 0–0.1)
BASOPHILS NFR BLD AUTO: 0 % (ref 0–1)
BENZODIAZ UR QL: NEGATIVE
BILIRUB SERPL-MCNC: 0.93 MG/DL (ref 0.2–1)
BILIRUB UR QL STRIP: NEGATIVE
BUN SERPL-MCNC: 17 MG/DL (ref 5–25)
CALCIUM ALBUM COR SERPL-MCNC: 9.5 MG/DL (ref 8.3–10.1)
CALCIUM SERPL-MCNC: 8.6 MG/DL (ref 8.3–10.1)
CARDIAC TROPONIN I PNL SERPL HS: 25 NG/L
CHLORIDE SERPL-SCNC: 105 MMOL/L (ref 96–108)
CLARITY UR: CLEAR
CO2 SERPL-SCNC: 28 MMOL/L (ref 21–32)
COCAINE UR QL: NEGATIVE
COLOR UR: YELLOW
CREAT SERPL-MCNC: 1.16 MG/DL (ref 0.6–1.3)
EOSINOPHIL # BLD AUTO: 0 THOUSAND/ÂΜL (ref 0–0.61)
EOSINOPHIL NFR BLD AUTO: 0 % (ref 0–6)
ERYTHROCYTE [DISTWIDTH] IN BLOOD BY AUTOMATED COUNT: 12.5 % (ref 11.6–15.1)
ETHANOL SERPL-MCNC: <3 MG/DL (ref 0–3)
GFR SERPL CREATININE-BSD FRML MDRD: 65 ML/MIN/1.73SQ M
GLUCOSE SERPL-MCNC: 129 MG/DL (ref 65–140)
GLUCOSE SERPL-MCNC: 148 MG/DL (ref 65–140)
GLUCOSE SERPL-MCNC: 153 MG/DL (ref 65–140)
GLUCOSE UR STRIP-MCNC: ABNORMAL MG/DL
HCO3 BLDV-SCNC: 22.5 MMOL/L (ref 24–30)
HCT VFR BLD AUTO: 37.6 % (ref 36.5–49.3)
HGB BLD-MCNC: 12.8 G/DL (ref 12–17)
HGB UR QL STRIP.AUTO: NEGATIVE
HYALINE CASTS #/AREA URNS LPF: ABNORMAL /LPF
IMM GRANULOCYTES # BLD AUTO: 0.02 THOUSAND/UL (ref 0–0.2)
IMM GRANULOCYTES NFR BLD AUTO: 0 % (ref 0–2)
KETONES UR STRIP-MCNC: ABNORMAL MG/DL
LEUKOCYTE ESTERASE UR QL STRIP: NEGATIVE
LYMPHOCYTES # BLD AUTO: 0.54 THOUSANDS/ÂΜL (ref 0.6–4.47)
LYMPHOCYTES NFR BLD AUTO: 7 % (ref 14–44)
MAGNESIUM SERPL-MCNC: 2.5 MG/DL (ref 1.6–2.6)
MCH RBC QN AUTO: 31.2 PG (ref 26.8–34.3)
MCHC RBC AUTO-ENTMCNC: 34 G/DL (ref 31.4–37.4)
MCV RBC AUTO: 92 FL (ref 82–98)
METHADONE UR QL: NEGATIVE
MONOCYTES # BLD AUTO: 0.52 THOUSAND/ÂΜL (ref 0.17–1.22)
MONOCYTES NFR BLD AUTO: 6 % (ref 4–12)
MUCOUS THREADS UR QL AUTO: ABNORMAL
NEUTROPHILS # BLD AUTO: 7.25 THOUSANDS/ÂΜL (ref 1.85–7.62)
NEUTS SEG NFR BLD AUTO: 87 % (ref 43–75)
NITRITE UR QL STRIP: NEGATIVE
NON-SQ EPI CELLS URNS QL MICRO: ABNORMAL /HPF
NRBC BLD AUTO-RTO: 0 /100 WBCS
O2 CT BLDV-SCNC: 18.6 ML/DL
OPIATES UR QL SCN: NEGATIVE
OXYCODONE+OXYMORPHONE UR QL SCN: NEGATIVE
P AXIS: 69 DEGREES
P AXIS: 70 DEGREES
PCO2 BLDV: 28.2 MM HG (ref 42–50)
PCP UR QL: NEGATIVE
PH BLDV: 7.52 [PH] (ref 7.3–7.4)
PH UR STRIP.AUTO: 6.5 [PH]
PHOSPHATE SERPL-MCNC: 2.5 MG/DL (ref 2.3–4.1)
PLATELET # BLD AUTO: 209 THOUSANDS/UL (ref 149–390)
PMV BLD AUTO: 9 FL (ref 8.9–12.7)
PO2 BLDV: 91.7 MM HG (ref 35–45)
POTASSIUM SERPL-SCNC: 3.3 MMOL/L (ref 3.5–5.3)
PR INTERVAL: 142 MS
PR INTERVAL: 146 MS
PROT SERPL-MCNC: 6.5 G/DL (ref 6.4–8.4)
PROT UR STRIP-MCNC: ABNORMAL MG/DL
QRS AXIS: 84 DEGREES
QRS AXIS: 85 DEGREES
QRSD INTERVAL: 92 MS
QRSD INTERVAL: 96 MS
QT INTERVAL: 432 MS
QT INTERVAL: 446 MS
QTC INTERVAL: 613 MS
QTC INTERVAL: 627 MS
RBC # BLD AUTO: 4.1 MILLION/UL (ref 3.88–5.62)
RBC #/AREA URNS AUTO: ABNORMAL /HPF
SALICYLATES SERPL-MCNC: 3 MG/DL (ref 3–20)
SODIUM SERPL-SCNC: 142 MMOL/L (ref 135–147)
SP GR UR STRIP.AUTO: 1.02 (ref 1–1.03)
T WAVE AXIS: 32 DEGREES
T WAVE AXIS: 46 DEGREES
THC UR QL: NEGATIVE
TSH SERPL DL<=0.05 MIU/L-ACNC: 0.67 UIU/ML (ref 0.45–4.5)
UROBILINOGEN UR STRIP-ACNC: <2 MG/DL
VENTRICULAR RATE: 119 BPM
VENTRICULAR RATE: 121 BPM
WBC # BLD AUTO: 8.34 THOUSAND/UL (ref 4.31–10.16)
WBC #/AREA URNS AUTO: ABNORMAL /HPF

## 2022-11-11 RX ORDER — ENOXAPARIN SODIUM 100 MG/ML
40 INJECTION SUBCUTANEOUS DAILY
Status: DISCONTINUED | OUTPATIENT
Start: 2022-11-12 | End: 2022-11-15 | Stop reason: HOSPADM

## 2022-11-11 RX ORDER — FOLIC ACID 1 MG/1
1 TABLET ORAL DAILY
Status: DISCONTINUED | OUTPATIENT
Start: 2022-11-12 | End: 2022-11-15

## 2022-11-11 RX ORDER — MAGNESIUM SULFATE HEPTAHYDRATE 40 MG/ML
2 INJECTION, SOLUTION INTRAVENOUS ONCE
Status: COMPLETED | OUTPATIENT
Start: 2022-11-11 | End: 2022-11-11

## 2022-11-11 RX ORDER — SODIUM CHLORIDE 9 MG/ML
100 INJECTION, SOLUTION INTRAVENOUS CONTINUOUS
Status: DISCONTINUED | OUTPATIENT
Start: 2022-11-11 | End: 2022-11-15

## 2022-11-11 RX ORDER — SODIUM CHLORIDE, SODIUM GLUCONATE, SODIUM ACETATE, POTASSIUM CHLORIDE, MAGNESIUM CHLORIDE, SODIUM PHOSPHATE, DIBASIC, AND POTASSIUM PHOSPHATE .53; .5; .37; .037; .03; .012; .00082 G/100ML; G/100ML; G/100ML; G/100ML; G/100ML; G/100ML; G/100ML
1000 INJECTION, SOLUTION INTRAVENOUS ONCE
Status: COMPLETED | OUTPATIENT
Start: 2022-11-11 | End: 2022-11-11

## 2022-11-11 RX ORDER — POTASSIUM CHLORIDE 20 MEQ/1
20 TABLET, EXTENDED RELEASE ORAL ONCE
Status: COMPLETED | OUTPATIENT
Start: 2022-11-11 | End: 2022-11-11

## 2022-11-11 RX ORDER — PHENOBARBITAL SODIUM 130 MG/ML
130 INJECTION INTRAMUSCULAR ONCE
Status: COMPLETED | OUTPATIENT
Start: 2022-11-11 | End: 2022-11-11

## 2022-11-11 RX ORDER — ATORVASTATIN CALCIUM 40 MG/1
40 TABLET, FILM COATED ORAL
Status: DISCONTINUED | OUTPATIENT
Start: 2022-11-12 | End: 2022-11-15 | Stop reason: HOSPADM

## 2022-11-11 RX ORDER — NICOTINE 21 MG/24HR
1 PATCH, TRANSDERMAL 24 HOURS TRANSDERMAL DAILY
Status: DISCONTINUED | OUTPATIENT
Start: 2022-11-12 | End: 2022-11-13

## 2022-11-11 RX ORDER — POTASSIUM CHLORIDE 14.9 MG/ML
20 INJECTION INTRAVENOUS ONCE
Status: COMPLETED | OUTPATIENT
Start: 2022-11-11 | End: 2022-11-12

## 2022-11-11 RX ORDER — MAGNESIUM SULFATE HEPTAHYDRATE 40 MG/ML
2 INJECTION, SOLUTION INTRAVENOUS ONCE
Status: COMPLETED | OUTPATIENT
Start: 2022-11-11 | End: 2022-11-12

## 2022-11-11 RX ORDER — DIAZEPAM 5 MG/ML
10 INJECTION, SOLUTION INTRAMUSCULAR; INTRAVENOUS ONCE
Status: COMPLETED | OUTPATIENT
Start: 2022-11-11 | End: 2022-11-11

## 2022-11-11 RX ADMIN — MAGNESIUM SULFATE HEPTAHYDRATE 2 G: 40 INJECTION, SOLUTION INTRAVENOUS at 16:01

## 2022-11-11 RX ADMIN — DIAZEPAM 10 MG: 10 INJECTION, SOLUTION INTRAMUSCULAR; INTRAVENOUS at 15:59

## 2022-11-11 RX ADMIN — POTASSIUM CHLORIDE 20 MEQ: 14.9 INJECTION, SOLUTION INTRAVENOUS at 22:14

## 2022-11-11 RX ADMIN — SODIUM CHLORIDE 650 MG: 900 INJECTION, SOLUTION INTRAVENOUS at 16:43

## 2022-11-11 RX ADMIN — SODIUM CHLORIDE, SODIUM GLUCONATE, SODIUM ACETATE, POTASSIUM CHLORIDE, MAGNESIUM CHLORIDE, SODIUM PHOSPHATE, DIBASIC, AND POTASSIUM PHOSPHATE 1000 ML: .53; .5; .37; .037; .03; .012; .00082 INJECTION, SOLUTION INTRAVENOUS at 16:11

## 2022-11-11 RX ADMIN — POTASSIUM CHLORIDE 20 MEQ: 1500 TABLET, EXTENDED RELEASE ORAL at 22:55

## 2022-11-11 RX ADMIN — SODIUM CHLORIDE 100 ML/HR: 0.9 INJECTION, SOLUTION INTRAVENOUS at 22:14

## 2022-11-11 RX ADMIN — MAGNESIUM SULFATE HEPTAHYDRATE 2 G: 40 INJECTION, SOLUTION INTRAVENOUS at 18:23

## 2022-11-11 RX ADMIN — SODIUM CHLORIDE 260 MG: 900 INJECTION, SOLUTION INTRAVENOUS at 20:02

## 2022-11-11 RX ADMIN — PHENOBARBITAL SODIUM 130 MG: 130 INJECTION INTRAMUSCULAR; INTRAVENOUS at 22:29

## 2022-11-11 NOTE — EMTALA/ACUTE CARE TRANSFER
[address] [facility] [address]  Dept: [phone]      EMTALA TRANSFER CONSENT    NAME Hieu YEN 1955                              MRN [id number]    I have been informed of my rights regarding examination, treatment, and transfer   by Dr Nadeem Crow MD    Benefits: Specialized equipment and/or services available at the receiving facility (Include comment)________________________ (Medical Toxicology)    Risks: Potential for delay in receiving treatment, Potential deterioration of medical condition, Loss of IV, Increased discomfort during transfer, Possible worsening of condition or death during transfer      Consent for Transfer:  I acknowledge that my medical condition has been evaluated and explained to me by the emergency department physician or other qualified medical person and/or my attending physician, who has recommended that I be transferred to the service of  Accepting Physician: Dr Ede Varela at [address] [facility], [address] : [place]  The above potential benefits of such transfer, the potential risks associated with such transfer, and the probable risks of not being transferred have been explained to me, and I fully understand them  The doctor has explained that, in my case, the benefits of transfer outweigh the risks  I agree to be transferred  I authorize the performance of emergency medical procedures and treatments upon me in both transit and upon arrival at the receiving facility  Additionally, I authorize the release of any and all medical records to the receiving facility and request they be transported with me, if possible  I understand that the safest mode of transportation during a medical emergency is an ambulance and that the Hospital advocates the use of this mode of transport   Risks of traveling to the receiving facility by car, including absence of medical control, life sustaining equipment, such as oxygen, and medical personnel has been explained to me and I fully understand them  (DEBRA CORRECT BOX BELOW)  [  ]  I consent to the stated transfer and to be transported by ambulance/helicopter  [  ]  I consent to the stated transfer, but refuse transportation by ambulance and accept full responsibility for my transportation by car  I understand the risks of non-ambulance transfers and I exonerate the Hospital and its staff from any deterioration in my condition that results from this refusal     X___________________________________________    DATE  22  TIME________  Signature of patient or legally responsible individual signing on patient behalf           RELATIONSHIP TO PATIENT_________________________          Provider Certification    NAME Sadie Merritt                                         1955                              MRN [id number]    A medical screening exam was performed on the above named patient  Based on the examination:    Condition Necessitating Transfer The encounter diagnosis was Alcohol abuse      Patient Condition: The patient has been stabilized such that within reasonable medical probability, no material deterioration of the patient condition or the condition of the unborn child(adelaide) is likely to result from the transfer    Reason for Transfer: Other (Include comment)____________________ (Medical Toxicology)    Transfer Requirements: [address]   · Space available and qualified personnel available for treatment as acknowledged by    · Agreed to accept transfer and to provide appropriate medical treatment as acknowledged by       Dr Vianey Campbell  · Appropriate medical records of the examination and treatment of the patient are provided at the time of transfer   [address] _______  · Transfer will be performed by qualified personnel from    and appropriate transfer equipment as required, including the use of necessary and appropriate life support measures  Provider Certification: I have examined the patient and explained the following risks and benefits of being transferred/refusing transfer to the patient/family:         Based on these reasonable risks and benefits to the patient and/or the unborn child(adelaide), and based upon the information available at the time of the patient’s examination, I certify that the medical benefits reasonably to be expected from the provision of appropriate medical treatments at another medical facility outweigh the increasing risks, if any, to the individual’s medical condition, and in the case of labor to the unborn child, from effecting the transfer      X____________________________________________ DATE 11/11/22        TIME_______      ORIGINAL - SEND TO MEDICAL RECORDS   COPY - SEND WITH PATIENT DURING TRANSFER

## 2022-11-11 NOTE — ED NOTES
DIO PICKUP: 2000  [address] [facility] DETOX 509  REPORT: [facility]58 [facility], RN  11/11/22 1816

## 2022-11-11 NOTE — ED ATTENDING ATTESTATION
11/11/2022  ICherry MD, saw and evaluated the patient  I have discussed the patient with the resident/non-physician practitioner and agree with the resident's/non-physician practitioner's findings, Plan of Care, and MDM as documented in the resident's/non-physician practitioner's note, except where noted  All available labs and Radiology studies were reviewed  I was present for key portions of any procedure(s) performed by the resident/non-physician practitioner and I was immediately available to provide assistance  At this point I agree with the current assessment done in the Emergency Department  I have conducted an independent evaluation of this patient a history and physical is as follows:    OA: 78 y/o m with history of EtOH, encephalopathy abuse and previous admissions for alcohol w/d, HTN, HLD and DM who presents to the ED with complaints of not feeling well after taking hydroxyzine and remeron Pt states he took his medications because he could not sleep and his doctor recently dc'd his xanax  He hasn't had a drink of alcohol in at least 3 days, normally daily drinking  Called EMs as he didn't feel well and  Developed diffuse tremors  Denies cp/sob/n/v/dizziness  Did not take medications to harm himself  Foster any falls or trauma  No fevers/chills  PE,  Unwell appearing, HTN, tachycardia, NC/AT, mildly diaphoretic, PERRL, sclera sclera/conjunctiva, neck supple/FROM, - JVD, tachycardia/-murmurs, lungs CTAB, -w/r, abd soft, NT/ND, +BS, -r/g, - edema, - calf ttp, intact distal pulses and capillary refill < 2 sec, AAO, tremulous, no definitive clonus, clear speech and symmetric face  A/p EtOH withdrawal and hydroxyzine intake, EKG, labs, CXR, IVF hydration, monitor on telemetry  Pt noted to have prolonged QTC, given hydroxyzine, intake will treat with magnesium and repeat EKG in 15-30 minutes  Monitor closely  Discuss with toxicology and also will require treatment for EtOH w/d       ED Course     Case discussed with toxicology, medication recommendations and transfer to detox unit  Pt tried to get out of bed and fell  CT head and neck       Critical Care Time  CriticalCare Time  Performed by: Mark Rincon MD  Authorized by: Mark Rincon MD     Critical care provider statement:     Critical care time (minutes):  35    Critical care time was exclusive of:  Separately billable procedures and treating other patients and teaching time    Critical care was necessary to treat or prevent imminent or life-threatening deterioration of the following conditions:  Toxidrome    Critical care was time spent personally by me on the following activities:  Blood draw for specimens, obtaining history from patient or surrogate, development of treatment plan with patient or surrogate, discussions with consultants, evaluation of patient's response to treatment, examination of patient, ordering and review of laboratory studies, ordering and performing treatments and interventions, ordering and review of radiographic studies, re-evaluation of patient's condition and review of old charts

## 2022-11-12 PROBLEM — R94.31 QT PROLONGATION: Status: ACTIVE | Noted: 2022-11-12

## 2022-11-12 PROBLEM — F13.20 BENZODIAZEPINE DEPENDENCE (HCC): Status: ACTIVE | Noted: 2022-10-08

## 2022-11-12 LAB
2HR DELTA HS TROPONIN: -1 NG/L
4HR DELTA HS TROPONIN: -2 NG/L
ALBUMIN SERPL BCP-MCNC: 3.3 G/DL (ref 3.5–5)
ALP SERPL-CCNC: 167 U/L (ref 43–122)
ALT SERPL W P-5'-P-CCNC: 20 U/L
ANION GAP SERPL CALCULATED.3IONS-SCNC: 5 MMOL/L (ref 5–14)
AST SERPL W P-5'-P-CCNC: 43 U/L (ref 17–59)
ATRIAL RATE: 104 BPM
ATRIAL RATE: 107 BPM
ATRIAL RATE: 111 BPM
BASE EX.OXY STD BLDV CALC-SCNC: 93.3 % (ref 60–80)
BASE EXCESS BLDV CALC-SCNC: 7.7 MMOL/L
BILIRUB SERPL-MCNC: 1.02 MG/DL (ref 0.2–1)
BUN SERPL-MCNC: 11 MG/DL (ref 5–25)
CALCIUM ALBUM COR SERPL-MCNC: 8.5 MG/DL (ref 8.3–10.1)
CALCIUM SERPL-MCNC: 7.9 MG/DL (ref 8.4–10.2)
CARDIAC TROPONIN I PNL SERPL HS: 23 NG/L
CARDIAC TROPONIN I PNL SERPL HS: 24 NG/L
CHLORIDE SERPL-SCNC: 101 MMOL/L (ref 96–108)
CO2 SERPL-SCNC: 30 MMOL/L (ref 21–32)
CREAT SERPL-MCNC: 0.69 MG/DL (ref 0.7–1.5)
ERYTHROCYTE [DISTWIDTH] IN BLOOD BY AUTOMATED COUNT: 12.3 % (ref 11.6–15.1)
GFR SERPL CREATININE-BSD FRML MDRD: 98 ML/MIN/1.73SQ M
GLUCOSE SERPL-MCNC: 127 MG/DL (ref 70–99)
GLUCOSE SERPL-MCNC: 160 MG/DL (ref 65–140)
GLUCOSE SERPL-MCNC: 168 MG/DL (ref 65–140)
HCO3 BLDV-SCNC: 32.9 MMOL/L (ref 24–30)
HCT VFR BLD AUTO: 39.1 % (ref 36.5–49.3)
HGB BLD-MCNC: 12.7 G/DL (ref 12–17)
MAGNESIUM SERPL-MCNC: 2.9 MG/DL (ref 1.6–2.3)
MCH RBC QN AUTO: 30.2 PG (ref 26.8–34.3)
MCHC RBC AUTO-ENTMCNC: 32.5 G/DL (ref 31.4–37.4)
MCV RBC AUTO: 93 FL (ref 82–98)
O2 CT BLDV-SCNC: 18.1 ML/DL
P AXIS: 61 DEGREES
P AXIS: 62 DEGREES
P AXIS: 63 DEGREES
PCO2 BLDV: 48.2 MM HG (ref 42–50)
PH BLDV: 7.45 [PH] (ref 7.3–7.4)
PLATELET # BLD AUTO: 176 THOUSANDS/UL (ref 149–390)
PMV BLD AUTO: 9.4 FL (ref 8.9–12.7)
PO2 BLDV: 77.1 MM HG (ref 35–45)
POTASSIUM SERPL-SCNC: 2.9 MMOL/L (ref 3.5–5.3)
PR INTERVAL: 140 MS
PR INTERVAL: 166 MS
PR INTERVAL: 168 MS
PROT SERPL-MCNC: 6.5 G/DL (ref 6.4–8.4)
QRS AXIS: 71 DEGREES
QRS AXIS: 72 DEGREES
QRS AXIS: 80 DEGREES
QRSD INTERVAL: 100 MS
QRSD INTERVAL: 94 MS
QRSD INTERVAL: 96 MS
QT INTERVAL: 378 MS
QT INTERVAL: 380 MS
QT INTERVAL: 392 MS
QTC INTERVAL: 504 MS
QTC INTERVAL: 515 MS
QTC INTERVAL: 516 MS
RBC # BLD AUTO: 4.2 MILLION/UL (ref 3.88–5.62)
SODIUM SERPL-SCNC: 136 MMOL/L (ref 135–147)
T WAVE AXIS: 31 DEGREES
T WAVE AXIS: 38 DEGREES
T WAVE AXIS: 5 DEGREES
VENTRICULAR RATE: 104 BPM
VENTRICULAR RATE: 107 BPM
VENTRICULAR RATE: 111 BPM
WBC # BLD AUTO: 12.12 THOUSAND/UL (ref 4.31–10.16)

## 2022-11-12 RX ORDER — MAGNESIUM SULFATE HEPTAHYDRATE 40 MG/ML
2 INJECTION, SOLUTION INTRAVENOUS ONCE
Status: COMPLETED | OUTPATIENT
Start: 2022-11-12 | End: 2022-11-12

## 2022-11-12 RX ORDER — HYDROCHLOROTHIAZIDE 12.5 MG/1
12.5 TABLET ORAL DAILY
Status: DISCONTINUED | OUTPATIENT
Start: 2022-11-12 | End: 2022-11-15 | Stop reason: HOSPADM

## 2022-11-12 RX ORDER — PHENOBARBITAL SODIUM 130 MG/ML
130 INJECTION INTRAMUSCULAR ONCE
Status: COMPLETED | OUTPATIENT
Start: 2022-11-12 | End: 2022-11-12

## 2022-11-12 RX ORDER — THIAMINE HYDROCHLORIDE 100 MG/ML
INJECTION, SOLUTION INTRAMUSCULAR; INTRAVENOUS
Status: COMPLETED
Start: 2022-11-12 | End: 2022-11-12

## 2022-11-12 RX ORDER — LOPERAMIDE HYDROCHLORIDE 2 MG/1
2 CAPSULE ORAL 4 TIMES DAILY PRN
Status: DISCONTINUED | OUTPATIENT
Start: 2022-11-12 | End: 2022-11-12

## 2022-11-12 RX ORDER — POTASSIUM CHLORIDE 14.9 MG/ML
20 INJECTION INTRAVENOUS
Status: COMPLETED | OUTPATIENT
Start: 2022-11-12 | End: 2022-11-12

## 2022-11-12 RX ORDER — AMLODIPINE BESYLATE 10 MG/1
10 TABLET ORAL DAILY
Status: DISCONTINUED | OUTPATIENT
Start: 2022-11-12 | End: 2022-11-15 | Stop reason: HOSPADM

## 2022-11-12 RX ORDER — PHENOBARBITAL SODIUM 130 MG/ML
260 INJECTION INTRAMUSCULAR ONCE
Status: COMPLETED | OUTPATIENT
Start: 2022-11-12 | End: 2022-11-12

## 2022-11-12 RX ADMIN — SODIUM CHLORIDE 100 ML/HR: 0.9 INJECTION, SOLUTION INTRAVENOUS at 19:16

## 2022-11-12 RX ADMIN — NICOTINE 1 PATCH: 14 PATCH, EXTENDED RELEASE TRANSDERMAL at 08:11

## 2022-11-12 RX ADMIN — THIAMINE HYDROCHLORIDE 500 MG: 100 INJECTION, SOLUTION INTRAMUSCULAR; INTRAVENOUS at 14:04

## 2022-11-12 RX ADMIN — POTASSIUM CHLORIDE 20 MEQ: 14.9 INJECTION, SOLUTION INTRAVENOUS at 10:22

## 2022-11-12 RX ADMIN — PHENOBARBITAL SODIUM 130 MG: 130 INJECTION INTRAMUSCULAR at 10:20

## 2022-11-12 RX ADMIN — POTASSIUM CHLORIDE 20 MEQ: 14.9 INJECTION, SOLUTION INTRAVENOUS at 12:21

## 2022-11-12 RX ADMIN — ENOXAPARIN SODIUM 40 MG: 100 INJECTION SUBCUTANEOUS at 08:10

## 2022-11-12 RX ADMIN — SODIUM CHLORIDE 100 ML/HR: 0.9 INJECTION, SOLUTION INTRAVENOUS at 08:14

## 2022-11-12 RX ADMIN — MAGNESIUM SULFATE IN WATER 2 G: 40 INJECTION, SOLUTION INTRAVENOUS at 00:12

## 2022-11-12 RX ADMIN — ATORVASTATIN CALCIUM 40 MG: 40 TABLET, FILM COATED ORAL at 15:35

## 2022-11-12 RX ADMIN — METFORMIN HYDROCHLORIDE 1000 MG: 500 TABLET ORAL at 08:09

## 2022-11-12 RX ADMIN — THIAMINE HYDROCHLORIDE 500 MG: 100 INJECTION, SOLUTION INTRAMUSCULAR; INTRAVENOUS at 08:10

## 2022-11-12 RX ADMIN — FOLIC ACID 1 MG: 1 TABLET ORAL at 08:09

## 2022-11-12 RX ADMIN — THIAMINE HYDROCHLORIDE 200 MG: 100 INJECTION, SOLUTION INTRAMUSCULAR; INTRAVENOUS at 01:48

## 2022-11-12 RX ADMIN — MULTIPLE VITAMINS W/ MINERALS TAB 1 TABLET: TAB ORAL at 08:09

## 2022-11-12 RX ADMIN — PHENOBARBITAL SODIUM 130 MG: 130 INJECTION INTRAMUSCULAR at 12:21

## 2022-11-12 RX ADMIN — MAGNESIUM SULFATE HEPTAHYDRATE 2 G: 2 INJECTION, SOLUTION INTRAVENOUS at 02:32

## 2022-11-12 RX ADMIN — THIAMINE HYDROCHLORIDE 500 MG: 100 INJECTION, SOLUTION INTRAMUSCULAR; INTRAVENOUS at 22:31

## 2022-11-12 RX ADMIN — PHENOBARBITAL SODIUM 130 MG: 130 INJECTION INTRAMUSCULAR at 14:35

## 2022-11-12 RX ADMIN — PHENOBARBITAL SODIUM 260 MG: 130 INJECTION INTRAMUSCULAR; INTRAVENOUS at 00:55

## 2022-11-12 RX ADMIN — PHENOBARBITAL SODIUM 130 MG: 130 INJECTION INTRAMUSCULAR at 08:10

## 2022-11-12 RX ADMIN — AMLODIPINE BESYLATE 10 MG: 10 TABLET ORAL at 08:09

## 2022-11-12 RX ADMIN — HYDROCHLOROTHIAZIDE 12.5 MG: 12.5 TABLET ORAL at 08:09

## 2022-11-12 NOTE — ASSESSMENT & PLAN NOTE
EKG in the ED revealed QTc 627  Repeat EKG upon arrival to unit 516-->504-->515-->508--> 509  Avoid QT prolonging agents, holding mirtazepine and escitalopram  Providing magnesium supplementation   Routine EKG monitoring

## 2022-11-12 NOTE — ED PROVIDER NOTES
History  Chief Complaint   Patient presents with   • Medical Problem     Pt took a bunch of his medications because his doctor took him off his xanax  Pt states his last drink was 2 days ago     Patient is a 70-year-old male, past medical history of alcohol abuse, diabetes, hyperlipidemia, and hypertension, who presents to the emergency department after taking "a bunch of his medications because i'm a donkey"  Patient states he was on Xanax previously  However, his doctor recently stopped it  He states he could not sleep last night so he took a bunch his prescribed hydroxyzine and Remeron  Per EMS, based on pill counts, patient most likely took less than 10 Remeron, and possibly up to 50 hydroxyzine  Patient is unsure how much he exactly took  Today, patient felt very tremulous and had difficulty staying upright, prompting the call to 911  On arrival to ED, patient noted to be tremulous and diaphoretic  He states his last drink was approximately 2 days ago  He currently denies any chest pain, shortness of breath, nausea, vomiting, diarrhea, or any other new or concerning symptoms  He denies any homicidal or suicidal ideation  Patient has had alcohol withdrawal seizures in the past       History provided by:  Patient and medical records   used: No    Medical Problem  Associated symptoms: no abdominal pain, no chest pain, no cough, no diarrhea, no fever, no nausea, no shortness of breath and no vomiting        Prior to Admission Medications   Prescriptions Last Dose Informant Patient Reported? Taking?    Lidocaine 4 % PTCH   Yes No   Sig: Place 1 patch on the skin daily   Patient not taking: Reported on 11/12/2022   PARoxetine (PAXIL) 40 MG tablet   No No   Sig: Take 1 tablet (40 mg total) by mouth every morning   QC Lidocaine Pain Relief 4 % PTCH   Yes No   Sig: Place 1 patch on the skin daily   Patient not taking: Reported on 11/12/2022   QC Pain Relief Extra Strength 500 MG tablet Yes No   Sig: TAKE 1 TABLET (500 MG TOTAL) BY MOUTH EVERY 4 (FOUR) HOURS AS NEEDED FOR MILD PAIN (PAIN SCORE 1-3), HEADACHES OR FEVER     QC Stool Softener Pls Laxative 8 6-50 MG per tablet   Yes No   Sig: Take 1 tablet by mouth 2 (two) times a day   amLODIPine (NORVASC) 10 mg tablet   No No   Sig: TAKE 1 TABLET DAILY   aspirin 81 MG tablet   No No   Sig: Take 1 tablet (81 mg total) by mouth daily   atorvastatin (LIPITOR) 40 mg tablet   No No   Sig: TAKE 1 TABLET DAILY   gabapentin (NEURONTIN) 400 mg capsule   No No   Sig: Take 3 capsules (1,200 mg total) by mouth daily at bedtime   gabapentin (NEURONTIN) 600 MG tablet   Yes No   Sig: TAKE 1-2 TABLETS BY MOUTH NIGHTLY AS NEEDED   hydrOXYzine HCL (ATARAX) 50 mg tablet   No No   Sig: Take 1 tablet (50 mg total) by mouth daily at bedtime   hydrochlorothiazide (HYDRODIURIL) 12 5 mg tablet   No No   Sig: TAKE 1 TABLET DAILY   metFORMIN (GLUCOPHAGE) 1000 MG tablet   No No   Sig: Take 1 tablet (1,000 mg total) by mouth daily with breakfast   metroNIDAZOLE (METROGEL) 1 % gel   No No   Sig: APPLY TOPICALLY TO AFFECTED AREA ONCE DAILY   Patient not taking: Reported on 11/12/2022   mirtazapine (REMERON) 15 mg tablet   Yes No   Sig: TAKE 1 TABLET (15 MG TOTAL) BY MOUTH NIGHTLY   mirtazapine (REMERON) 30 mg tablet   No No   Sig: Take 1 tablet (30 mg total) by mouth daily at bedtime   nicotine (NICODERM CQ) 14 mg/24hr TD 24 hr patch   Yes No   Sig: Place 1 patch on the skin daily   Patient not taking: Reported on 11/12/2022   oxyCODONE (ROXICODONE) 5 immediate release tablet   Yes No   Sig: Take 5 mg by mouth every 6 (six) hours as needed   senna-docusate sodium (SENOKOT S) 8 6-50 mg per tablet   Yes No   Sig: Take 1 tablet by mouth 2 (two) times a day   simvastatin (ZOCOR) 10 mg tablet   Yes No   Sig: TAKE 1 TABLET (10 MG TOTAL) BY MOUTH NIGHTLY      Facility-Administered Medications: None       Past Medical History:   Diagnosis Date   • Colon polyp    • [facility] ) • Hyperlipidemia    • Hypertension        Past Surgical History:   Procedure Laterality Date   • COLON SURGERY      found growth, bengin   • COLONOSCOPY     • COLONOSCOPY N/A 3/13/2018    Procedure: COLONOSCOPY;  Surgeon: Danielle Agrawal DO;  Location: [facility] GI LAB; Service: Gastroenterology   • ORIF HUMERUS FRACTURE Left 10/6/2022    Procedure: OPEN REDUCTION W/ INTERNAL FIXATION (ORIF) HUMERUS (SHOULDER); Surgeon: Lalito Mims MD;  Location: [address] [place] [address] MAIN OR;  Service: Orthopedics       Family History   Problem Relation Age of Onset   • Heart failure Mother    • Hypertension Other      I have reviewed and agree with the history as documented  E-Cigarette/Vaping   • E-Cigarette Use Never User      E-Cigarette/Vaping Substances     Social History     Tobacco Use   • Smoking status: Current Every Day Smoker     Packs/day: 1 00     Types: Cigarettes   • Smokeless tobacco: Former User   • Tobacco comment: uses vape/current every day smoker (as per [employer])   Vaping Use   • Vaping Use: Never used   Substance Use Topics   • Alcohol use: Yes     Comment: states last was 2 days ago   • Drug use: No        Review of Systems   Constitutional: Positive for diaphoresis  Negative for chills and fever  Respiratory: Negative for cough and shortness of breath  Cardiovascular: Negative for chest pain  Gastrointestinal: Negative for abdominal pain, diarrhea, nausea and vomiting  Neurological: Positive for tremors  All other systems reviewed and are negative        Physical Exam  ED Triage Vitals [11/11/22 1539]   Temperature Pulse Respirations Blood Pressure SpO2   99 2 °F (37 3 °C) (!) 127 20 (!) 191/95 95 %      Temp Source Heart Rate Source Patient Position - Orthostatic VS BP Location FiO2 (%)   Oral Monitor Sitting Left arm --      Pain Score       No Pain             Orthostatic Vital Signs  Vitals:    11/11/22 1845 11/11/22 1900 11/11/22 1915 11/11/22 1945   BP: (!) 183/85 (!) 185/83 (!) 176/81 169/87 Pulse: (!) 111 (!) 114 (!) 113 (!) 119   Patient Position - Orthostatic VS:   Sitting Sitting       Physical Exam  Vitals and nursing note reviewed  Constitutional:       General: He is in acute distress  Appearance: He is well-developed  He is ill-appearing and diaphoretic  HENT:      Head: Normocephalic and atraumatic  Comments: Pupils 3 and reactive bilaterally  No nystagmus  Extraocular movement intact  Tongue fasciculations are present  Right Ear: External ear normal       Left Ear: External ear normal       Nose: Nose normal    Eyes:      General: Lids are normal  No scleral icterus  Cardiovascular:      Rate and Rhythm: Regular rhythm  Tachycardia present  Heart sounds: Normal heart sounds  No murmur heard  No friction rub  No gallop  Pulmonary:      Effort: Pulmonary effort is normal  No respiratory distress  Breath sounds: Normal breath sounds  No wheezing or rales  Abdominal:      Palpations: Abdomen is soft  Tenderness: There is no abdominal tenderness  There is no guarding or rebound  Musculoskeletal:         General: No deformity  Normal range of motion  Cervical back: Normal range of motion and neck supple  Skin:     General: Skin is warm  Neurological:      General: No focal deficit present  Mental Status: He is alert and oriented to person, place, and time  GCS: GCS eye subscore is 4  GCS verbal subscore is 5  GCS motor subscore is 6  Cranial Nerves: No facial asymmetry  Comments: No clonus  Psychiatric:         Attention and Perception: Attention normal          Mood and Affect: Mood is anxious  Speech: Speech normal          Behavior: Behavior is not combative  Behavior is cooperative  Thought Content: Thought content does not include suicidal ideation  Thought content does not include suicidal plan           ED Medications  Medications   magnesium sulfate 2 g/50 mL IVPB (premix) 2 g (0 g Intravenous Stopped 11/11/22 1616)   diazepam (VALIUM) injection 10 mg (10 mg Intravenous Given 11/11/22 1559)   PHENobarbital 650 mg in sodium chloride 0 9 % 100 mL IVPB (0 mg Intravenous Stopped 11/11/22 1713)   multi-electrolyte (ISOLYTE-S PH 7 4) bolus 1,000 mL (0 mL Intravenous Stopped 11/11/22 1730)   magnesium sulfate 2 g/50 mL IVPB (premix) 2 g (0 g Intravenous Stopped 11/11/22 1853)   PHENobarbital 260 mg in sodium chloride 0 9 % 100 mL IVPB (260 mg Intravenous New Bag 11/11/22 2002)       Diagnostic Studies  Results Reviewed     Procedure Component Value Units Date/Time    Fingerstick Glucose (POCT) [[id number]]  (Abnormal) Collected: 11/11/22 1839    Lab Status: Final result Updated: 11/11/22 1842     POC Glucose 148 mg/dl     Comprehensive metabolic panel [[id number]]  (Abnormal) Collected: 11/11/22 1703    Lab Status: Final result Specimen: Blood from Arm, Right Updated: 11/11/22 1808     Sodium 142 mmol/L      Potassium 3 3 mmol/L      Chloride 105 mmol/L      CO2 28 mmol/L      ANION GAP 9 mmol/L      BUN 17 mg/dL      Creatinine 1 16 mg/dL      Glucose 153 mg/dL      Calcium 8 6 mg/dL      Corrected Calcium 9 5 mg/dL      AST 35 U/L      ALT 20 U/L      Alkaline Phosphatase 156 U/L      Total Protein 6 5 g/dL      Albumin 2 9 g/dL      Total Bilirubin 0 93 mg/dL      eGFR 65 ml/min/1 73sq m     Narrative:      [place] guidelines for Chronic Kidney Disease (CKD):   •  Stage 1 with normal or high GFR (GFR > 90 mL/min/1 73 square meters)  •  Stage 2 Mild CKD (GFR = 60-89 mL/min/1 73 square meters)  •  Stage 3A Moderate CKD (GFR = 45-59 mL/min/1 73 square meters)  •  Stage 3B Moderate CKD (GFR = 30-44 mL/min/1 73 square meters)  •  Stage 4 Severe CKD (GFR = 15-29 mL/min/1 73 square meters)  •  Stage 5 End Stage CKD (GFR <15 mL/min/1 73 square meters)  Note: GFR calculation is accurate only with a steady state creatinine    Rapid drug screen, urine [[id number]]  (Normal) Collected: 11/11/22 1703    Lab Status: Final result Specimen: Urine, Clean Catch Updated: 11/11/22 1808     Amph/Meth UR Negative     Barbiturate Ur Negative     Benzodiazepine Urine Negative     Cocaine Urine Negative     Methadone Urine Negative     Opiate Urine Negative     PCP Ur Negative     THC Urine Negative     Oxycodone Urine Negative    Narrative:      FOR MEDICAL PURPOSES ONLY  IF CONFIRMATION NEEDED PLEASE CONTACT THE LAB WITHIN 5 DAYS  Drug Screen Cutoff Levels:  AMPHETAMINE/METHAMPHETAMINES  1000 ng/mL  BARBITURATES     200 ng/mL  BENZODIAZEPINES     200 ng/mL  COCAINE      300 ng/mL  METHADONE      300 ng/mL  OPIATES      300 ng/mL  PHENCYCLIDINE     25 ng/mL  THC       50 ng/mL  OXYCODONE      100 ng/mL    Magnesium [[id number]]  (Normal) Collected: 11/11/22 1703    Lab Status: Final result Specimen: Blood from Arm, Right Updated: 11/11/22 1808     Magnesium 2 5 mg/dL     Phosphorus [[id number]]  (Normal) Collected: 11/11/22 1703    Lab Status: Final result Specimen: Blood from Arm, Right Updated: 11/11/22 1808     Phosphorus 2 5 mg/dL     TSH, 3rd generation with Free T4 reflex [[id number]]  (Normal) Collected: 11/11/22 1703    Lab Status: Final result Specimen: Blood from Arm, Right Updated: 11/11/22 1808     TSH 3RD GENERATON 0 668 uIU/mL     Narrative:      Patients undergoing fluorescein dye angiography may retain small amounts of fluorescein in the body for 48-72 hours post procedure  Samples containing fluorescein can produce falsely depressed TSH values  If the patient had this procedure,a specimen should be resubmitted post fluorescein clearance  Salicylate level [[id number]]  (Normal) Collected: 11/11/22 1703    Lab Status: Final result Specimen: Blood from Arm, Right Updated: 38/84/73 7661     Salicylate Lvl 3 mg/dL     Acetaminophen level-If concentration is detectable, please discuss with medical  on call   [[id number]]  (Abnormal) Collected: 11/11/22 1703    Lab Status: Final result Specimen: Blood from Arm, Right Updated: 11/11/22 1808     Acetaminophen Level <2 ug/mL     Urine Microscopic [[id number]]  (Abnormal) Collected: 11/11/22 1703    Lab Status: Final result Specimen: Urine, Clean Catch Updated: 11/11/22 1721     RBC, UA None Seen /hpf      WBC, UA 1-2 /hpf      Epithelial Cells None Seen /hpf      Bacteria, UA Occasional /hpf      MUCUS THREADS Occasional     Hyaline Casts, UA 10-25 /lpf     UA w Reflex to Microscopic w Reflex to Culture [[id number]]  (Abnormal) Collected: 11/11/22 1703    Lab Status: Final result Specimen: Urine, Clean Catch Updated: 11/11/22 1719     Color, UA Yellow     Clarity, UA Clear     Specific Gravity, UA 1 018     pH, UA 6 5     Leukocytes, UA Negative     Nitrite, UA Negative     Protein, UA 30 (1+) mg/dl      Glucose, UA 70 (7/100%) mg/dl      Ketones, UA Trace mg/dl      Urobilinogen, UA <2 0 mg/dl      Bilirubin, UA Negative     Occult Blood, UA Negative    Ethanol [[id number]]  (Normal) Collected: 11/11/22 1552    Lab Status: Final result Specimen: Blood from Arm, Right Updated: 11/11/22 1629     Ethanol Lvl <3 mg/dL     Ammonia [[id number]]  (Normal) Collected: 11/11/22 1552    Lab Status: Final result Specimen: Blood from Arm, Right Updated: 11/11/22 1620     Ammonia 35 umol/L     CBC and differential [[id number]]  (Abnormal) Collected: 11/11/22 1552    Lab Status: Final result Specimen: Blood from Arm, Right Updated: 11/11/22 1604     WBC 8 34 Thousand/uL      RBC 4 10 Million/uL      Hemoglobin 12 8 g/dL      Hematocrit 37 6 %      MCV 92 fL      MCH 31 2 pg      MCHC 34 0 g/dL      RDW 12 5 %      MPV 9 0 fL      Platelets 286 Thousands/uL      nRBC 0 /100 WBCs      Neutrophils Relative 87 %      Immat GRANS % 0 %      Lymphocytes Relative 7 %      Monocytes Relative 6 %      Eosinophils Relative 0 %      Basophils Relative 0 %      Neutrophils Absolute 7 25 Thousands/µL      Immature Grans Absolute 0 02 Thousand/uL      Lymphocytes Absolute 0 54 Thousands/µL      Monocytes Absolute 0 52 Thousand/µL      Eosinophils Absolute 0 00 Thousand/µL      Basophils Absolute 0 01 Thousands/µL     Blood gas, venous [[id number]]  (Abnormal) Collected: 11/11/22 1552    Lab Status: Final result Specimen: Blood from Arm, Right Updated: 11/11/22 1602     pH, Kelvin 7 520     pCO2, Kelvin 28 2 mm Hg      pO2, Kelvin 91 7 mm Hg      HCO3, Kelvin 22 5 mmol/L      Base Excess, Kelvin 0 8 mmol/L      O2 Content, Kelvin 18 6 ml/dL      O2 HGB, VENOUS 95 5 %                  CT head without contrast   Final Result by Almita Bolanos MD (11/11 2023)      No acute intracranial abnormality  Workstation performed: [id number]         CT spine cervical without contrast   Final Result by Almita Bolanos MD (11/11 2041)      No cervical spine fracture or traumatic malalignment                     Workstation performed: [id number]               Procedures  ECG 12 Lead Documentation Only    Date/Time: 11/12/2022 5:45 PM  Performed by: Sarah Buckner DO  Authorized by: Sarah Buckner DO     ECG reviewed by me, the ED Provider: yes    Patient location:  ED  Interpretation:     Interpretation: abnormal    Rate:     ECG rate:  119    ECG rate assessment: tachycardic    Rhythm:     Rhythm: sinus tachycardia    Ectopy:     Ectopy: none    QRS:     QRS axis:  Normal  Conduction:     Conduction: normal    ST segments:     ST segments:  Normal  T waves:     T waves: normal    Other findings:     Other findings: prolonged qTc interval    Comments:        ECG 12 Lead Documentation Only    Date/Time: 11/12/2022 5:46 PM  Performed by: Sarah Buckner DO  Authorized by: Sarah Buckner DO     ECG reviewed by me, the ED Provider: yes    Patient location:  ED  Interpretation:     Interpretation: abnormal    Rate:     ECG rate:  121    ECG rate assessment: tachycardic    Rhythm:     Rhythm: sinus tachycardia    Ectopy:     Ectopy: none    QRS:     QRS axis: Normal  Conduction:     Conduction: normal    ST segments:     ST segments:  Normal  T waves:     T waves: normal    Other findings:     Other findings: prolonged qTc interval    Comments:        ECG 12 Lead Documentation Only    Date/Time: 11/12/2022 5:46 PM  Performed by: Eddie Carrillo DO  Authorized by: Eddie Carrillo DO     ECG reviewed by me, the ED Provider: yes    Patient location:  ED  Interpretation:     Interpretation: abnormal    Rate:     ECG rate:  111    ECG rate assessment: tachycardic    Rhythm:     Rhythm: sinus tachycardia    Ectopy:     Ectopy: none    QRS:     QRS axis:  Normal  Conduction:     Conduction: normal    ST segments:     ST segments:  Normal  T waves:     T waves: normal    Other findings:     Other findings: prolonged qTc interval    Comments:                ED Course  ED Course as of 11/12/22 1754 Fri Nov 11, 2022   1549 TT sent to toxicology   [facility] Spoke with Dr Venus Bob  Recommends 10 mg IV diazepam, 650 of phenobarb, and 2 g of Mag  Can be transferred over to the detox center  1649 Patient re-evaluated  Resting comfortably  Feeling a little better  Tremulousness somewhat improved  Mildly diaphoretic  Will continue to monitor  [phone] Discussed with Dr Venus Bob  Will give  260 mg of phenobarb, and two mg of magnesium  1820 Patient found on the floor  States he was trying to get up to go to the bathroom  Will obtain CT of the head and C-spine  Dr Venus Bob notified  [facility] EMTALA signed               Identification of Seniors at [facility] Most Recent Value   (ISAR) Identification of Seniors at Risk    Before the illness or injury that brought you to the Emergency, did you need someone to help you on a regular basis? 0 Filed at: 11/11/2022 1542   In the last 24 hours, have you needed more help than usual? 0 Filed at: 11/11/2022 1542   Have you been hospitalized for one or more nights during the past 6 months?  0 Filed at: 11/11/2022 1542   In general, do you see well? 0 Filed at: 11/11/2022 1542   In general, do you have serious problems with your memory? 0 Filed at: 11/11/2022 1542   Do you take more than three different medications every day? 0 Filed at: 11/11/2022 1542   ISAR Score 0 Filed at: 11/11/2022 1542                    SBIRT 20yo+    Flowsheet Row Most Recent Value   SBIRT (23 yo +)    In order to provide better care to our patients, we are screening all of our patients for alcohol and drug use  Would it be okay to ask you these screening questions? No Filed at: 11/11/2022 1743                MDM  Number of Diagnoses or Management Options  Alcohol abuse  Alcohol withdrawal (HCC)  Hydroxyzine overdose, accidental or unintentional, initial encounter  QT prolongation  Diagnosis management comments: Patient is a 77 y o  male who presents to the ED for hydroxyzine overdose and alcohol withdrawal   Patient ill appearing, in acute distress  Patient is tachycardic and hypertensive  On exam, he is profusely diaphoretic with tremoring and tongue fasciculation    Presentation more consistent with alcohol withdrawal than an anticholinergic syndrome  Plan:  Benzos, labs, toxicology consult, reassessment                 Portions of the record may have been created with voice recognition software  Occasional wrong word or "sound a like" substitutions may have occurred due to the inherent limitations of voice recognition software  Read the chart carefully and recognize, using context, where substitutions have occurred           Amount and/or Complexity of Data Reviewed  Clinical lab tests: ordered  Tests in the radiology section of CPT®: ordered  Tests in the medicine section of CPT®: ordered  Discuss the patient with other providers: yes  Independent visualization of images, tracings, or specimens: yes    Risk of Complications, Morbidity, and/or Mortality  Presenting problems: high  Diagnostic procedures: moderate  Management options: high    Patient Progress  Patient progress: stable      Disposition  Final diagnoses:   Alcohol abuse   Alcohol withdrawal ([facility] )   QT prolongation   Hydroxyzine overdose, accidental or unintentional, initial encounter     Time reflects when diagnosis was documented in both MDM as applicable and the Disposition within this note     Time User Action Codes Description Comment    11/11/2022  3:57 PM Cintia Guitar Add [F10 10] Alcohol abuse     11/12/2022  5:28 PM Cintia Guitar Add [F10 939] Alcohol withdrawal ([facility] )     11/12/2022  5:28 PM Cintia Guitar Add [R94 31] QT prolongation     11/12/2022  5:29 PM Cintia Guitar Add [B11 730F] Hydroxyzine overdose, accidental or unintentional, initial encounter       ED Disposition     ED Disposition   Transfer to Another Facility-In Network    Condition   --    Date/Time   Fri Nov 11, 2022  3:56 PM    Comment   Sadie Merritt should be transferred out to Detox center             MD Documentation    [address] Most Recent Value   Patient Condition The patient has been stabilized such that within reasonable medical probability, no material deterioration of the patient condition or the condition of the unborn child(adelaide) is likely to result from the transfer   Reason for Transfer Other (Include comment)____________________  [facility]]   Benefits of Transfer Specialized equipment and/or services available at the receiving facility (Include comment)________________________  [facility]]   Risks of Transfer Potential for delay in receiving treatment, Potential deterioration of medical condition, Loss of IV, Increased discomfort during transfer, Possible worsening of condition or death during transfer   Accepting Physician Dr Vika Garcia Name, Adilene Manning      RN Documentation    [address] [facility] Angel Curry Name, [address]  Heart      Follow-up Information    None Discharge Medication List as of 11/11/2022  8:22 PM      CONTINUE these medications which have NOT CHANGED    Details   amLODIPine (NORVASC) 10 mg tablet TAKE 1 TABLET DAILY, Normal      aspirin 81 MG tablet Take 1 tablet (81 mg total) by mouth daily, Starting Tue 9/17/2019, Normal      atorvastatin (LIPITOR) 40 mg tablet TAKE 1 TABLET DAILY, Normal      gabapentin (NEURONTIN) 400 mg capsule Take 3 capsules (1,200 mg total) by mouth daily at bedtime, Starting Tue 10/18/2022, Normal      gabapentin (NEURONTIN) 600 MG tablet TAKE 1-2 TABLETS BY MOUTH NIGHTLY AS NEEDED, Historical Med      hydrochlorothiazide (HYDRODIURIL) 12 5 mg tablet TAKE 1 TABLET DAILY, Normal      hydrOXYzine HCL (ATARAX) 50 mg tablet Take 1 tablet (50 mg total) by mouth daily at bedtime, Starting Tue 10/18/2022, Normal      !!  Lidocaine 4 % PTCH Place 1 patch on the skin daily, Starting Fri 9/30/2022, Historical Med      metFORMIN (GLUCOPHAGE) 1000 MG tablet Take 1 tablet (1,000 mg total) by mouth daily with breakfast, Starting Fri 5/27/2022, Normal      metroNIDAZOLE (METROGEL) 1 % gel APPLY TOPICALLY TO AFFECTED AREA ONCE DAILY, Normal      !! mirtazapine (REMERON) 15 mg tablet TAKE 1 TABLET (15 MG TOTAL) BY MOUTH NIGHTLY, Historical Med      !! mirtazapine (REMERON) 30 mg tablet Take 1 tablet (30 mg total) by mouth daily at bedtime, Starting Mon 9/26/2022, Normal      nicotine (NICODERM CQ) 14 mg/24hr TD 24 hr patch Place 1 patch on the skin daily, Starting Fri 9/30/2022, Historical Med      oxyCODONE (ROXICODONE) 5 immediate release tablet Take 5 mg by mouth every 6 (six) hours as needed, Starting Fri 9/30/2022, Historical Med      PARoxetine (PAXIL) 40 MG tablet Take 1 tablet (40 mg total) by mouth every morning, Starting Mon 9/26/2022, Normal      !! QC Lidocaine Pain Relief 4 % PTCH Place 1 patch on the skin daily, Starting Fri 9/30/2022, Historical Med      QC Pain Relief Extra Strength 500 MG tablet TAKE 1 TABLET (500 MG TOTAL) BY MOUTH EVERY 4 (FOUR) HOURS AS NEEDED FOR MILD PAIN (PAIN SCORE 1-3), HEADACHES OR FEVER , Historical Med      !! QC Stool Softener Pls Laxative 8 6-50 MG per tablet Take 1 tablet by mouth 2 (two) times a day, Starting Fri 9/30/2022, Historical Med      !! senna-docusate sodium (SENOKOT S) 8 6-50 mg per tablet Take 1 tablet by mouth 2 (two) times a day, Starting Fri 9/30/2022, Until Sat 9/30/2023, Historical Med      simvastatin (ZOCOR) 10 mg tablet TAKE 1 TABLET (10 MG TOTAL) BY MOUTH NIGHTLY, Historical Med       !! - Potential duplicate medications found  Please discuss with provider  Outpatient Discharge Orders   Transfer to other facility       PDMP Review       Value Time User    PDMP Reviewed  Yes 11/12/2022  5:30 AM Ethlyn Cranker, 10 [place]            ED Provider  Attending physically available and evaluated Hieu Liu I managed the patient along with the ED Attending      Electronically Signed by         Chole Blanco DO  11/12/22 2407

## 2022-11-12 NOTE — PROGRESS NOTES
22 1331   Referral Data   Referral Source Patient   Referral Name Self   Referral Reason Drug/Alcohol Abuse;Psych   [address]   Readmission Root Cause   30 Day Readmission No   Patient Information   Mental Status Alert   Primary Caregiver Self   Accompanied by/Relationship Self   Support System Immediate family   Legal Information   Legal Issues Pt has a pending DUI charge in [facility], next court date is 2022   Activities of Daily Living Prior to Admission   Functional Status Independent   Assistive Device No device needed   Living Arrangement Apartment;Lives alone   Ambulation Independent   Access to Firearms   Access to Firearms No  (pt denies)   [address] Pension/[employer]  ($2,000/month)   Means of Transportation   Means of Transport to Appts: Drives Self     Pt's name, , address, phone number were verified by case management  Pt was informed of case management role and the purpose of completion of intake with case management  Pt was pleasant and cooperative throughout intake process  Pt reports that he has been drinking 6 pack of 16oz beers daily for "a long time"  Pt reports first use at age 23 and last use on 11/10/2022 of 6 beers  Pt reports longest period of sobriety is 8 months  Pt denies previous inpatient substance use treatment; reports history of attending [place] [id number] meetings  Pt denies current withdrawal symptoms; history of symptoms include insomnia, diaphoresis, hypertension, nausea and disorientation  Pt denies withdrawal seizures however, per chart records pt has had withdrawal seizures in the past  Pt reports family history of alcoholism- paternal uncles  AUDIT: No score   PAWSS: 5  UDS (+) for: - for all substances   Ethanol level in ED:  Not completed     Pt reports history of MDD; pt reports that he is currently engaged with psychiatrist, Dr Wylene Sandifer   Pt declined to sign MARY LOU for his psychiatrist  Pt denies engagement with mental health therapist  Pt reports multiple inpatient psychiatric admissions; most recent was in July 2015  Pt reports multiple suicide attempts in the past  Pt states that he does not know about family history of mental health diagnoses  Pt denies SI/HI; denies history of abuse; denies access to firearms  Pt reports history of hypertension and type 2 diabetes; pt verified PCP as Dr Jesenia Murray; declined to sign MARY LOU  Pt verified insurance as Medicare [employer] PPO; signed MARY LOU and signed admission IMM  Pt verified that he uses express scripts  Pt reports that he has a pending DUI case in 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  Pt reports that he receives SSI of $2000/month  Pt reports that he has a car and drives himself  Pt denies any  service  Pt reports that he lives alone in an apartment; he states he can return there upon discharge  Pt declined to sign ROIs for supportive contacts at this time  Pt and CM completed relapse prevention plan  Pt and CM signed plan and pt received a copy of this plan  Pt unable to identify any triggers stating, "I just like to drink"; pt appears to have limited insight into his alcohol use  He was unable to identify any coping skills to use to prevent relapse other than attending AA meetings at [facility]  Pt declined any assistance with setting up outpatient treatment  Pt presents in the contemplation stage of change

## 2022-11-12 NOTE — PROCEDURES
EKG   Date 11/11/2022  Time 2233  Rate: 111bpm   IA interval 166ms  QRS: 100ms  QTc: 516ms    Sinus tachycardia, ST depression inferior leads, nonspecific t wave abnormality in inferior leads  prolonged QT interval improving when compared to last ECG

## 2022-11-12 NOTE — H&P
HISTORY & PHYSICAL EXAM  DEPARTMENT OF MEDICAL TOXICOLOGY  LEVEL 4 MEDICAL DETOX UNIT  Hieu Liu 77 y o  male MRN: [id number]  Unit/Bed#: 5T DETOX 977-45 Encounter: [id number]      Reason for Admission/Principal Problem: Ethanol withdrawal, Ethanol use disorder  Admitting Provider: Ethlyn Cranker, CRNP  Attending Provider: Uri Miguel MD   11/11/2022  8:51 PM        * Accidental hydroxyzine overdose  Assessment & Plan  · Presented to [facility] ED on 11/11 approximately 24 hours post ingestion of 8-10 50mg hydroxyzine tablets with intent to induce sleep  Denies any intent of self harm  Reports insomnia after recent alprazolam taper by PCP  · He reported feeling drowsy, thirsty, fatigued  Denies any loss of consciousness  · Has history of suicidal ideation but denies in the the last several months  · Reports taking hydroxyzine with his prescribed dose of mirtazepine and escitalopram   · Continue to monitor mentation, respiratory status, and anticholinergic symptoms  · Telemetry and pulse oximetry monitoring   · Fall and seizure precautions   · Virtual observation for safety     QT prolongation  Assessment & Plan  EKG in the ED revealed QTc 627  Repeat EKG upon arrival to unit 516-->504-->515-->508  Avoid QT prolonging agents, holding mirtazepine and escitalopram  Providing magnesium supplementation   Routine EKG monitoring    Benzodiazepine withdrawal with complication (HCC)  Assessment & Plan  · Low concern for NELIA agonist withdrawal due to his taper and last dose being approximately one week ago  · Nonetheless, will monitor clinically and any symptoms will be treated with SEWS protocol     · Encourage psychiatry follow up     Alcohol use disorder, moderate, dependence ([facility] [address] )  Assessment & Plan  Longstanding history of ETOH use disorder without withdrawal related seizure   Drinks 6 pack of 16oz beer daily  Does not endorse using vivitrol or oral naltrexone in the past   Initiate IVFs, high dose thiamine, daily folic acid supplementation, and MVI  Consult case management for assistance with disposition planning - pt interested in outpatient     Benzodiazepine dependence ([place] [address] )  Assessment & Plan  · Hx of benzodiazepine dependence  Was managed on alprazolam  25mg TID by PCP for insomnia and anxiety  · Reports he was tapered off over 3 weeks and on 11/10 due to worsened insomnia he overused his hydroxyzine in attempt to experience similar effects  · Last dose of alprazolam was more than one week ago  · Continues to follow with psychiatrist for management of anxiety and depression       MDD (major depressive disorder), recurrent episode ([place] [address] )  Assessment & Plan  · Hx of anxiety and depression managed with mirtazepine and paroxetine daily   · Will hold temporarily in setting of QT prolongation and restart when QT normalized  · Patient denies any SI or HI   · Encourage outpatient follow up     Alcohol withdrawal syndrome with complication [facility])  Assessment & Plan  Presents exhibiting symptoms of ETOH withdrawal, complicated by overuse of antihistamine   Last drink 11/10 evening   Serum alcohol <3 mg/dL 10/11 in ED  Initiate SEWS protocol with symptom triggered phenobarbital for medical management of alcohol withdrawal  Fall and seizure precautions   Telemetry and continuous pulse ox monitoring    Tobacco use disorder  Assessment & Plan  · Daily tobacco use   · Offered NRT  · Encourage tobacco cessation     Mixed hyperlipidemia  Assessment & Plan  · Continue atorvastatin 40mg QHS   · Encourage outpatient follow up     Hypertension  Assessment & Plan  · Continue home regimen of amlodipine 10mg and HCTZ 12 5mg  · Monitor VS per unit protocol   · Encourage PCP follow up     Diabetes mellitus type 2, noninsulin dependent [facility])  Assessment & Plan  Lab Results   Component Value Date    HGBA1C 5 7 (H) 07/21/2022       Recent Labs     11/11/22  1839 11/11/22  2318   POCGLU 148* 129       Blood Sugar Average: Last 72 hrs:  (P) 129     · Continue daily metformin regimen  · Daily POC glucose monitoring   · Last A1c 5 7 on 7/21/2022         VTE Prophylaxis: Enoxaparin (Lovenox)  / sequential compression device   Code Status: Level 1      Anticipated Length of Stay:  Patient will be admitted on an Inpatient basis with an anticipated length of stay of  2  midnights  Justification for Hospital Stay: management of alcohol withdrawal and hydroxyzine overdose     For any questions or concerns, please Tiger Text the advanced practitioner in the role of [facility]-DETOX-AP On Call      This patient qualifies for Level IV medically managed intensive inpatient services under the criteria set by the American Society of Addiction Medicine, including dimensions 1-3  The patient is in withdrawal (or is intoxicated with high risk of withdrawal), with severe and unstable medical and/or psychiatric (dual diagnosis) problems, requiring requires 24-hour medical and nursing care and the full resources of a [address] patient to medical detox unit and continue supportive care and stabilization of acute ethanol withdrawal per medical toxicology/detox treatment pathway  Monitor ethanol withdrawal severity via the Severity of Ethanol Withdrawal Scale (SEWS) Q4 hours and then hourly if/when SEWS > 6  Treat withdrawal per pathway and reassess Q30-60 minutes  Mild SEWS Score 1-6  Administer medications* (IV or PO; PO preferred):  • If initial SEWS score: diazepam 10mg PO/IV x 1 AND phenobarbital 65 mg PO/IV x 1  • If repeat SEWS score 1-6: phenobarbital 65 mg PO/IV q1 hour x 5 doses maximum   Reassessment:   • SEWS q1 hour after each dose until SEWS 0 x 2 hours  • VS q1 hours (until SEWS 0, then q4 hours)  • Notify provider for bedside evaluation if 5-dose maximum is reached, RASS of -3 to -5, or SEWS score escalates to moderate or severe     Moderate SEWS Score 7-12  Administer medications* (IV):  • If initial SEWS score: diazepam 10mg IV x 1 AND phenobarbital 260 mg IV x 1  • If repeat SEWS score 7-12 or score escalated from mild: phenobarbital 130 mg IV q30 minutes x 5 doses maximum   Reassessment:  • SEWS q30 minutes after each dose until SEWS < 7 (then hourly until SEWS 0 x 2 hours)  • VS q30 minutes until SEWS < 7 (then hourly until SEWS 0, then q4 hours)  • Notify provider for bedside evaluation if 5-dose maximum is reached, RASS of -3 to -5, or SEWS score escalates to severe  Severe SEWS Score ? 13  Administer medications* (IV):  • If initial SEWS score: Diazepam 10 mg IV x 1 AND phenobarbital 650 mg IV piggyback x 1 over 15-30 minutes  • If repeat SEWS score ? 13 or score escalated from mild or moderate: phenobarbital 130 mg IV q30 minutes x 5 doses maximum   Reassessment:  • SEWS q30 minutes after each dose until SEWS < 7 (then hourly until SEWS 0 x 2 hours)   • VS q30 minutes until SEWS < 7 (then hourly until SEWS 0, then q4 hours)  • Notify provider for bedside evaluation if 5-dose maximum is reached or RASS of -3 to -5   *Hold medications and notify provider if CNS depression, respirations < 10/min, or RASS of -3 to -5  Medications to be administered adjunctively if more than 2 grams of phenobarbital is needed for stabilization of withdrawal; require attending approval    • Dexmedetomidine infusion 0 1-1mcg/kg/hr IV infusion, titratable to reduced agitation (Goal: RASS -2)  • Ketamine   o Acute agitated delirium: 1-2 mg/kg IV or 4-5 mg/kg IM  o Refractory withdrawal: 0 1-1mg/kg/hr IV infusion, titratable to reduced agitation (Goal: RASS -2)    Further evaluation, screening and treatment:  Evaluate complete metabolic panel, transaminases, INR, and lipase  Assess hepatic ultrasound for any sign of alcoholic liver disease or cirrhosis, and ultimately refer for further hepatic evaluation and care as/if indicated      Additional medications for ethanol associated malnutrition: Thiamine 100 mg IV daily, increase to 500 mg TID for signs/symptoms of Wernicke's Encephalopathy or Wernicke Korsakoff Syndrome   Folic acid 1 mg IV daily   Multivitamin PO daily      Will offer first monthly injection of Naltrexone 380 mg IM, once patient is stabilized, as it has been shown to assist in decreasing cravings for ethanol  Evaluate and treat for coexisting substance use, such as opioids and nicotine  Discuss risk factors for infectious disease, such as history of intravenous drug abuse, and offer hepatitis and HIV screening if indicated  Case management consultation to assist with coordination of subsequent treatment after discharge  Hx and PE limited by: not limited     HPI: Agnes Davalos is a 77y o  year old male who presented to [place] ED on 11/11 approximately 24 hours post ingestion of 8-10 50mg hydroxyzine tablets with intent to induce sleep  He denies any intent of self harm  He endorses worsening insomnia after recent alprazolam 3-4 week taper by his PCP  He reported feeling drowsy, thirsty, fatigued after taking hydroxyzine but denies loss of consciousness  Has history of suicidal ideation but denies in the the last several months   Reports taking hydroxyzine with his prescribed dose of mirtazepine and escitalopram  He endorses history of alcohol use disorder and withdrawal       Preferred alcoholic beverage(s): beer  Quantity and frequency of alcohol intake: 6 pack daily   Use of any ethanol substitutes (toxic alcohols): no  Date/Time of last alcohol intake: 11/9 PM  Current signs and symptoms of ethanol withdrawal: insomnia, diaphoresis, hypertension, nausea and disorientation    SEWS Total Score: 7 (11/12/2022  9:45 AM)      Ethanol Withdrawal History  Previous ethanol withdrawal? yes  Prior inpatient treatment for ethanol withdrawal? yes  Prior outpatient treatment for ethanol withdrawal? yes  History of seizures with prior ethanol withdrawal? no  Prior treatment with naltrexone (Vivitrol)? no  Current treatment with naltrexone (Vivitrol)? no  Other current treatment for ethanol use disorder? no  Co-existing substance use? no    Review of PDMP: yes     Social History     Substance and Sexual Activity   Alcohol Use Yes    Comment: states last was 2 days ago     Social History     Substance and Sexual Activity   Drug Use No     Social History     Tobacco Use   Smoking Status Current Every Day Smoker   • Packs/day: 1 00   • Types: Cigarettes   Smokeless Tobacco Former User   Tobacco Comment    uses vape/current every day smoker (as per [employer])       Review of Systems   Constitutional: Negative for chills, diaphoresis and fever  Respiratory: Negative for shortness of breath  Cardiovascular: Negative for chest pain and palpitations  Gastrointestinal: Positive for diarrhea and nausea  Negative for abdominal pain  Musculoskeletal: Negative for gait problem  Neurological: Negative for tremors and headaches  Psychiatric/Behavioral: Positive for confusion  Negative for hallucinations  The patient is nervous/anxious  Historical Information   Past Medical History:   Diagnosis Date   • Colon polyp    • Diabetes mellitus ([address] )    • Hyperlipidemia    • Hypertension      Past Surgical History:   Procedure Laterality Date   • COLON SURGERY      found growth, bengin   • COLONOSCOPY     • COLONOSCOPY N/A 3/13/2018    Procedure: COLONOSCOPY;  Surgeon: Yandy Still DO;  Location: [facility]; Service: Gastroenterology   • ORIF HUMERUS FRACTURE Left 10/6/2022    Procedure: OPEN REDUCTION W/ INTERNAL FIXATION (ORIF) HUMERUS (SHOULDER);   Surgeon: Denisha Daugherty MD;  Location: [address] [place] [address];  Service: Orthopedics     Family History   Problem Relation Age of Onset   • Heart failure Mother    • Hypertension Other      Social History   Marital Status: Single   Patient Pre-hospital Living Situation: stable  Patient Pre-hospital Level of Mobility: independent   Patient Pre-hospital Diet Restrictions: none    Allergies   Allergen Reactions   • Lisinopril Angioedema       Prior to Admission medications    Medication Sig Start Date End Date Taking?  Authorizing Provider   amLODIPine (NORVASC) 10 mg tablet TAKE 1 TABLET DAILY 10/1/21  Yes Eder Rocha DO   aspirin 81 MG tablet Take 1 tablet (81 mg total) by mouth daily 9/17/19  Yes Nadeen Marr MD   mirtazapine (REMERON) 30 mg tablet Take 1 tablet (30 mg total) by mouth daily at bedtime 9/26/22  Yes Kyra Curtis MD   PARoxetine (PAXIL) 40 MG tablet Take 1 tablet (40 mg total) by mouth every morning 9/26/22  Yes Kyra Curtis MD   atorvastatin (LIPITOR) 40 mg tablet TAKE 1 TABLET DAILY 10/1/21   Eder Rocha DO   gabapentin (NEURONTIN) 400 mg capsule Take 3 capsules (1,200 mg total) by mouth daily at bedtime 10/18/22   Yung Hoover MD   gabapentin (NEURONTIN) 600 MG tablet TAKE 1-2 TABLETS BY MOUTH NIGHTLY AS NEEDED 10/19/22   Historical Provider, MD   hydrochlorothiazide (HYDRODIURIL) 12 5 mg tablet TAKE 1 TABLET DAILY 10/1/21   Eder Rocha DO   hydrOXYzine HCL (ATARAX) 50 mg tablet Take 1 tablet (50 mg total) by mouth daily at bedtime 10/18/22   Yung Hoover MD   Lidocaine 4 % PTCH Place 1 patch on the skin daily 9/30/22   Historical Provider, MD   metFORMIN (GLUCOPHAGE) 1000 MG tablet Take 1 tablet (1,000 mg total) by mouth daily with breakfast 5/27/22   Kyra Curtis MD   metroNIDAZOLE (METROGEL) 1 % gel APPLY TOPICALLY TO AFFECTED AREA ONCE DAILY 7/13/22   Belle Calvo PA-C   mirtazapine (REMERON) 15 mg tablet TAKE 1 TABLET (15 MG TOTAL) BY MOUTH NIGHTLY 9/30/22   Historical Provider, MD   nicotine (NICODERM CQ) 14 mg/24hr TD 24 hr patch Place 1 patch on the skin daily 9/30/22   Historical Provider, MD   oxyCODONE (ROXICODONE) 5 immediate release tablet Take 5 mg by mouth every 6 (six) hours as needed 9/30/22   Historical Provider, MD ARAYA Lidocaine Pain Relief 4 % PTCH Place 1 patch on the skin daily 9/30/22   Historical Provider, MD ARAYA Pain Relief Extra Strength 500 MG tablet TAKE 1 TABLET (500 MG TOTAL) BY MOUTH EVERY 4 (FOUR) HOURS AS NEEDED FOR MILD PAIN (PAIN SCORE 1-3), HEADACHES OR FEVER  10/6/22   Historical Provider, MD ARAYA Stool Softener Pls Laxative 8 6-50 MG per tablet Take 1 tablet by mouth 2 (two) times a day 9/30/22   Historical Provider, MD   senna-docusate sodium (SENOKOT S) 8 6-50 mg per tablet Take 1 tablet by mouth 2 (two) times a day 9/30/22 9/30/23  Historical Provider, MD   simvastatin (ZOCOR) 10 mg tablet TAKE 1 TABLET (10 MG TOTAL) BY MOUTH NIGHTLY 9/30/22   Historical Provider, MD       Current Facility-Administered Medications   Medication Dose Route Frequency   • amLODIPine (NORVASC) tablet 10 mg  10 mg Oral Daily   • atorvastatin (LIPITOR) tablet 40 mg  40 mg Oral Daily With Dinner   • enoxaparin (LOVENOX) subcutaneous injection 40 mg  40 mg Subcutaneous Daily   • folic acid (FOLVITE) tablet 1 mg  1 mg Oral Daily   • hydrochlorothiazide (HYDRODIURIL) tablet 12 5 mg  12 5 mg Oral Daily   • metFORMIN (GLUCOPHAGE) tablet 1,000 mg  1,000 mg Oral Daily With Breakfast   • multivitamin-minerals (CENTRUM) tablet 1 tablet  1 tablet Oral Daily   • nicotine (NICODERM CQ) 14 mg/24hr TD 24 hr patch 1 patch  1 patch Transdermal Daily   • PHENobarbital injection 130 mg  130 mg Intravenous Once   • potassium chloride 20 mEq IVPB (premix)  20 mEq Intravenous Q2H   • sodium chloride 0 9 % infusion  100 mL/hr Intravenous Continuous   • thiamine (VITAMIN B1) 500 mg in sodium chloride 0 9 % 50 mL IVPB  500 mg Intravenous Q8H [address]       Continuous Infusions:sodium chloride, 100 mL/hr, Last Rate: 100 mL/hr (11/12/22 1885)             Objective       Intake/Output Summary (Last 24 hours) at 11/12/2022 1006  Last data filed at 11/12/2022 0814  Gross per 24 hour   Intake 900 ml   Output 950 ml   Net -50 ml       Invasive Devices:   Peripheral IV 11/11/22 Left Antecubital (Active) Peripheral IV 22 Proximal;Right;Ventral (anterior) Forearm (Active)       Vitals   Vitals:    22 0509 22 0545 22 0742 22 0925   BP: (!) 177/99 165/76 (!) 183/99 153/79   TempSrc: Tympanic  Temporal    Pulse: (!) 113  (!) 116 104   Resp: 18  20 20   Patient Position - Orthostatic VS: Lying  Lying Lying   Temp: 98 3 °F (36 8 °C)  (!) 97 2 °F (36 2 °C)        Physical Exam  Vitals and nursing note reviewed  Constitutional:       General: He is not in acute distress  HENT:      Head: Normocephalic  Mouth/Throat:      Pharynx: Oropharynx is clear  Eyes:      Conjunctiva/sclera: Conjunctivae normal       Pupils: Pupils are equal, round, and reactive to light  Cardiovascular:      Rate and Rhythm: Regular rhythm  Tachycardia present  Heart sounds: Normal heart sounds  Pulmonary:      Effort: No respiratory distress  Breath sounds: Normal breath sounds  Abdominal:      General: Bowel sounds are normal  There is no distension  Palpations: Abdomen is soft  Tenderness: There is no abdominal tenderness  Musculoskeletal:         General: No swelling  Cervical back: Normal range of motion  Right lower leg: No edema  Left lower leg: No edema  Skin:     General: Skin is warm  Neurological:      Mental Status: He is alert and oriented to person, place, and time  He is confused  Motor: No weakness  Comments: Alert, cooperative, follows commands however requires intermittent redirection  Psychiatric:         Mood and Affect: Mood normal          Behavior: Behavior is cooperative  Data:    EKG, Pathology, and Other Studies: I have personally reviewed pertinent reports      EKbpm sinus tachycardia, possible left atrial enlargement, cannot rule out atrial enlargement, QTc 516    Lab Results:  CBC ETOH     Lab Results   Component Value Date    WBC 12 12 (H) 2022    WBC 6 3 2018    RBC 4 20 2022    RBC 4 23 (L) 04/06/2018    HGB 12 7 11/12/2022    HGB 13 4 (L) 04/06/2018    HCT 39 1 11/12/2022    HCT 40 3 (L) 04/06/2018    MCV 93 11/12/2022    MCV 96 04/06/2018    MCH 30 2 11/12/2022    MCH 31 7 04/06/2018    MCHC 32 5 11/12/2022    MCHC 33 2 04/06/2018    RDW 12 3 11/12/2022    RDW 13 5 04/06/2018     11/12/2022     04/06/2018    MPV 9 4 11/12/2022    MPV 8 6 02/27/2017      Lab Results   Component Value Date    LACTICACID 1 2 09/22/2022      CMP UA         Component Value Date/Time     04/06/2018 0902    K 2 9 (L) 11/12/2022 0524    K 4 9 04/06/2018 0902     11/12/2022 0524     04/06/2018 0902    CO2 30 11/12/2022 0524    CO2 28 04/06/2018 0902    BUN 11 11/12/2022 0524    BUN 14 04/06/2018 0902    CREATININE 0 69 (L) 11/12/2022 0524    CREATININE 1 01 02/27/2017 1204         Component Value Date/Time    CALCIUM 7 9 (L) 11/12/2022 0524    CALCIUM 9 6 04/06/2018 0902    ALKPHOS 167 (H) 11/12/2022 0524    ALKPHOS 73 04/06/2018 0902    AST 43 11/12/2022 0524    AST 48 04/06/2018 0902    ALT 20 11/12/2022 0524    ALT 52 04/06/2018 0902    BILITOT 0 3 04/06/2018 0902      Lab Results   Component Value Date    CLARITYU Clear 11/11/2022    COLORU Yellow 11/11/2022    SPECGRAV 1 018 11/11/2022    PHUR 6 5 11/11/2022    GLUCOSEU 70 (7/100%) (A) 11/11/2022    KETONESU Trace (A) 11/11/2022    BLOODU Negative 11/11/2022    PROTEIN UA 30 (1+) (A) 11/11/2022    NITRITE Negative 11/11/2022    BILIRUBINUR Negative 11/11/2022    UROBILINOGEN <2 0 11/11/2022    UROBILINOGEN 4 0 (A) 10/02/2022    UROBILINOGEN 0 2 11/09/2021    LEUKOCYTESUR Negative 11/11/2022    WBCUA 1-2 11/11/2022    RBCUA None Seen 11/11/2022    HYALINE 10-25 (A) 11/11/2022    BACTERIA Occasional 11/11/2022    EPIS None Seen 11/11/2022        Liver Function Test: ASA     Lab Results   Component Value Date    TBILI 1 02 (H) 11/12/2022    ALKPHOS 167 (H) 11/12/2022    ALKPHOS 73 04/06/2018    AST 43 11/12/2022    AST 48 04/06/2018 ALT 20 11/12/2022    ALT 52 04/06/2018    TP 6 5 11/12/2022    ALB 3 3 (L) 11/12/2022    ALB 4 6 04/06/2018      Lab Results   Component Value Date    SALICYLATE 3 80/31/2155      Troponin APAP     No results found for: TROPONINI   Lab Results   Component Value Date    ACTMNPHEN <2 (L) 11/11/2022      VBG HCG     Lab Results   Component Value Date/Time    PHVEN 7 452 (H) 11/12/2022 05:24 AM    JJI9EQV 48 2 11/12/2022 05:24 AM    PO2VEN 77 1 (H) 11/12/2022 05:24 AM    NCH0NOG 32 9 (H) 11/12/2022 05:24 AM    BEVEN 7 7 11/12/2022 05:24 AM    P3AGWKFMB 18 1 11/12/2022 05:24 AM    C9VZQGI 93 3 (H) 11/12/2022 05:24 AM      No results found for: HCGQUANT   ABG Urine Drug Screen     No results found for: PHART, MUK6HVD, PO2ART, YCQ0WKD, BEART, K2XORKPCB, O2HGB, SOURC, ITZ, VTAC, ACRATE, INSPIREDAIR, PEEP   Lab Results   Component Value Date    AMPMETHUR Negative 11/11/2022    BARBTUR Negative 11/11/2022    BDZUR Negative 11/11/2022    COCAINEUR Negative 11/11/2022    METHADONEUR Negative 11/11/2022    OPIATEUR Negative 11/11/2022    PCPUR Negative 11/11/2022    THCUR Negative 11/11/2022    OXYCODONEUR Negative 11/11/2022      Lactate INR     Lab Results   Component Value Date    LACTICACID 1 2 09/22/2022      Lab Results   Component Value Date    INR 0 82 (L) 10/05/2022      PTT Protime     Lab Results   Component Value Date/Time    PTT 29 10/05/2022 04:34 AM        Lab Results   Component Value Date/Time    PROTIME 11 6 10/05/2022 04:34 AM              Imaging Studies: I have personally reviewed pertinent reports  Counseling / Coordination of Care  Total floor / unit time spent today 40 minutes  Greater than 50% of total time was spent with the patient and / or family counseling and / or coordination of care         Minutes of critical care time 45  -Critical care time was exclusive of separately billable procedures and teaching time    -Critical care was necessary to treat or prevent imminent or life-threatening deterioration of the following condition: CNS failure/compromise, toxidrome (ethanol withdrawal),  toxidrome and withdrawal  -Critical care time was spent personally by me on the following activities as well as the above as per the course and rest of chart: obtaining history from patient/surrogate, development of a treatment plan, discussions with referring provider(s), evaluation of patient's response to the treatment, examination of the patient, performing treatments and interventions, re-evaluation of the patient's condition, review of old charts, ordering/interpreting laboratory studies, ordering/interpreting of radiographic studies  ** Please Note: This note has been constructed using a voice recognition system   **

## 2022-11-12 NOTE — DISCHARGE INSTR - OTHER ORDERS
Drug and Alcohol Resources in [facility]    If you have health insurance, including medical assistance, there should be a phone number on your insurance card that you can call to find out how to access services  The card may say, “For [address] or “For Drug and Alcohol Services” or “For Substance Abuse Services” call the number provided  Or contact [phone]    [facility] ([facility])  The [facility] provides care management for adults with Opioid Use Disorder  The [facility] has a team of care managers who will help individuals get into treatment, coordinate behavioral and physical health care, and provide recovery support and guidance  Care managers will also provide information about Medication-Assisted Treatment  Contact [phone]    [facility] Drug and Alcohol Administration [phone] For additional information, contact the Department of Human Services Information and Referral Unit at [phone] between the hours of 8:30 a m  and 4:30 p m , Monday through Friday  Recovery Centers  These centers offer a safe, sober environment to those in recovery  A variety of programming including 12-Step Meetings, Lorriane Larch, Life Skills Workshops, etc  is offered at each location  Recovery Centers  These centers offer a safe, sober environment to those in recovery  A variety of programming including 12-Step Meetings, Lorriane Larch, Life Skills Workshops, etc  is offered at each location  [address]  [phone]  [email or web address]  cleanslatebangor  org Change on Main  Kerri Dove, [address] [facility]  [phone]  [email or web address]  Caitlin  Z[address], 210 [facility]  [phone]   [address] [place] [address]  [phone]  www  [facility], [address] [place] [address]  [phone]  [facility]  ESTRELLITA BENSON [facility] is [phone] [place]  Open Dayron Pederson Score, Thursday from 1pm-5pm and Friday, Saturday from 11am-3pm    [employer]  Confidential free help, from public health agencies, to find substance use treatment and information  [phone]    Link for Zoom Codes for Virtual 12 step Meetings PodBladimirk tn  aspx    [facility]  If you feel you have a problem with alcohol, or if you simply want to know more about AA, call our 24-hour hotline at: [address]: [phone]    [address] Meetings can be found at [email or web address] [email or web address]-wood [email or web address]/  NA Meeting list [email or web address]    [facility] mental health resources      Crisis Intervention  24 hour Emergency [address]  Call [phone]  Outside of [facility]: call 1--195-870-YTGE (2345) [address] mental health: Information & Referral at [phone]  [facility]  [address], [place], 98 [facility] [phone] drop in center  [address] [address]  [address] [place] [address] [address]  [phone]

## 2022-11-12 NOTE — ASSESSMENT & PLAN NOTE
· Hx of anxiety and depression managed with mirtazepine and paroxetine daily   · History of inpatient psychiatric hospitalizations    · Will hold temporarily in setting of QT prolongation and restart when QT normalized  · Psychiatry consulted, appreciate recommendations   · 1:1 virtual

## 2022-11-12 NOTE — ASSESSMENT & PLAN NOTE
· Last drink 11/10 evening   Serum alcohol < 3 mg/dL 10/11 in ED  Discontinue SEWS protocol with symptom triggered phenobarbital for medical management of alcohol withdrawal  Patient currently denies withdrawal symptoms of anxiety, tremors, nausea, or vomiting   Received a total of 1820 mg of phenobarbital; last dose administered on 11/12/22 @ 1435   Withdrawal appears well managed at this time will continue to monitor patient off of protocol   Will continue high dose thiamine, patient has received 4/9 doses   Continue telemetry due to prolonged QTC and electrolyte abnormalities

## 2022-11-12 NOTE — ASSESSMENT & PLAN NOTE
· Continue home regimen of amlodipine 10mg and HCTZ 12 5mg  · Monitor VS per unit protocol   · Encourage PCP follow up

## 2022-11-12 NOTE — ASSESSMENT & PLAN NOTE
Lab Results   Component Value Date    HGBA1C 5 7 (H) 07/21/2022       Recent Labs     11/11/22  1839 11/11/22  2318 11/12/22  1540 11/12/22  2237   POCGLU 148* 129 168* 160*       Blood Sugar Average: Last 72 hrs:  (P) 921 [id number]     · Continue daily metformin regimen  · Daily POC glucose monitoring   · Last A1c 5 7 on 7/21/2022

## 2022-11-12 NOTE — ASSESSMENT & PLAN NOTE
· Presented to [place] ED on 11/11 approximately 24 hours post ingestion of 8-10 50mg hydroxyzine tablets  States he " just wanted to sleep"   Reports insomnia after recent alprazolam taper by PCP  · He reported feeling drowsy, thirsty, fatigued  Denies any loss of consciousness  · Has history of suicidal ideation with attempts by overdose   · Reports taking hydroxyzine with his prescribed dose of mirtazepine and escitalopram    · Patient does not exhibit any respiratory status or anticholinergic symptoms upon reassessment this morning     · Continue virtual 1:1   · Psychiatry consulted, appreciate recommendations

## 2022-11-12 NOTE — PROCEDURES
EKG  Date 11/12/2022  Time: 1:18A  Rate: 107bpm  WA: 168bpm  QRS: 94 bpm  QTc: 504 ms    Sinus tachycardia, QT prolongation improving when compared to last EKG, otherwise no significant changes

## 2022-11-12 NOTE — ASSESSMENT & PLAN NOTE
· Low concern for NELIA agonist withdrawal due to his taper and last dose being approximately one week ago  · Nonetheless, will monitor clinically and any symptoms will be treated with SEWS protocol     · Encourage psychiatry follow up

## 2022-11-12 NOTE — ASSESSMENT & PLAN NOTE
· Hx of benzodiazepine dependence  Was managed on alprazolam  25mg TID by PCP for insomnia and anxiety  · Reports he was tapered off over 3 weeks and on 11/10 due to worsened insomnia he overused his hydroxyzine in attempt to experience similar effects  · Last dose of alprazolam was more than one week ago  · Psychiatry consulted, appreciate recommendations

## 2022-11-12 NOTE — ASSESSMENT & PLAN NOTE
Longstanding history of ETOH use disorder without withdrawal related seizure   Drinks 6 pack of 16oz beer daily  Does not endorse using vivitrol or oral naltrexone in the past   Continue IVF as patient continues to report poor PO intake  Continue high dose thiamine, daily folic acid supplementation, and MVI  Consult case management for assistance with disposition planning - pt interested in outpatient

## 2022-11-13 PROBLEM — E83.42 HYPOMAGNESEMIA: Status: ACTIVE | Noted: 2022-11-13

## 2022-11-13 LAB
ALBUMIN SERPL BCP-MCNC: 2.8 G/DL (ref 3.5–5)
ALP SERPL-CCNC: 171 U/L (ref 43–122)
ALT SERPL W P-5'-P-CCNC: 16 U/L
ANION GAP SERPL CALCULATED.3IONS-SCNC: 3 MMOL/L (ref 5–14)
ANION GAP SERPL CALCULATED.3IONS-SCNC: 4 MMOL/L (ref 5–14)
AST SERPL W P-5'-P-CCNC: 33 U/L (ref 17–59)
ATRIAL RATE: 92 BPM
BILIRUB SERPL-MCNC: 0.49 MG/DL (ref 0.2–1)
BUN SERPL-MCNC: 10 MG/DL (ref 5–25)
BUN SERPL-MCNC: 7 MG/DL (ref 5–25)
CALCIUM ALBUM COR SERPL-MCNC: 8.7 MG/DL (ref 8.3–10.1)
CALCIUM SERPL-MCNC: 7.7 MG/DL (ref 8.4–10.2)
CALCIUM SERPL-MCNC: 8.1 MG/DL (ref 8.4–10.2)
CHLORIDE SERPL-SCNC: 101 MMOL/L (ref 96–108)
CHLORIDE SERPL-SCNC: 103 MMOL/L (ref 96–108)
CO2 SERPL-SCNC: 29 MMOL/L (ref 21–32)
CO2 SERPL-SCNC: 32 MMOL/L (ref 21–32)
CREAT SERPL-MCNC: 0.72 MG/DL (ref 0.7–1.5)
CREAT SERPL-MCNC: 0.73 MG/DL (ref 0.7–1.5)
ERYTHROCYTE [DISTWIDTH] IN BLOOD BY AUTOMATED COUNT: 12.3 % (ref 11.6–15.1)
GFR SERPL CREATININE-BSD FRML MDRD: 96 ML/MIN/1.73SQ M
GFR SERPL CREATININE-BSD FRML MDRD: 97 ML/MIN/1.73SQ M
GLUCOSE SERPL-MCNC: 112 MG/DL (ref 70–99)
GLUCOSE SERPL-MCNC: 116 MG/DL (ref 70–99)
GLUCOSE SERPL-MCNC: 127 MG/DL (ref 65–140)
HCT VFR BLD AUTO: 37.3 % (ref 36.5–49.3)
HGB BLD-MCNC: 11.9 G/DL (ref 12–17)
MAGNESIUM SERPL-MCNC: 1.6 MG/DL (ref 1.6–2.3)
MAGNESIUM SERPL-MCNC: 1.7 MG/DL (ref 1.6–2.3)
MCH RBC QN AUTO: 30.9 PG (ref 26.8–34.3)
MCHC RBC AUTO-ENTMCNC: 31.9 G/DL (ref 31.4–37.4)
MCV RBC AUTO: 97 FL (ref 82–98)
P AXIS: 52 DEGREES
PLATELET # BLD AUTO: 145 THOUSANDS/UL (ref 149–390)
PMV BLD AUTO: 10.1 FL (ref 8.9–12.7)
POTASSIUM SERPL-SCNC: 2.7 MMOL/L (ref 3.5–5.3)
POTASSIUM SERPL-SCNC: 3.9 MMOL/L (ref 3.5–5.3)
PR INTERVAL: 138 MS
PROT SERPL-MCNC: 5.7 G/DL (ref 6.4–8.4)
QRS AXIS: 52 DEGREES
QRSD INTERVAL: 94 MS
QT INTERVAL: 412 MS
QTC INTERVAL: 509 MS
RBC # BLD AUTO: 3.85 MILLION/UL (ref 3.88–5.62)
SODIUM SERPL-SCNC: 134 MMOL/L (ref 135–147)
SODIUM SERPL-SCNC: 138 MMOL/L (ref 135–147)
T WAVE AXIS: 56 DEGREES
VENTRICULAR RATE: 92 BPM
WBC # BLD AUTO: 9.66 THOUSAND/UL (ref 4.31–10.16)

## 2022-11-13 RX ORDER — NICOTINE 21 MG/24HR
21 PATCH, TRANSDERMAL 24 HOURS TRANSDERMAL DAILY
Status: DISCONTINUED | OUTPATIENT
Start: 2022-11-13 | End: 2022-11-15 | Stop reason: HOSPADM

## 2022-11-13 RX ORDER — NICOTINE 21 MG/24HR
1 PATCH, TRANSDERMAL 24 HOURS TRANSDERMAL DAILY
Status: CANCELLED | OUTPATIENT
Start: 2022-11-13

## 2022-11-13 RX ORDER — MAGNESIUM SULFATE HEPTAHYDRATE 40 MG/ML
2 INJECTION, SOLUTION INTRAVENOUS ONCE
Status: COMPLETED | OUTPATIENT
Start: 2022-11-13 | End: 2022-11-13

## 2022-11-13 RX ORDER — NALTREXONE HYDROCHLORIDE 50 MG/1
25 TABLET, FILM COATED ORAL DAILY
Status: CANCELLED | OUTPATIENT
Start: 2022-11-13

## 2022-11-13 RX ORDER — MIRTAZAPINE 7.5 MG/1
7.5 TABLET, FILM COATED ORAL
Status: DISCONTINUED | OUTPATIENT
Start: 2022-11-13 | End: 2022-11-15 | Stop reason: HOSPADM

## 2022-11-13 RX ORDER — NALTREXONE HYDROCHLORIDE 50 MG/1
25 TABLET, FILM COATED ORAL DAILY
Status: DISCONTINUED | OUTPATIENT
Start: 2022-11-13 | End: 2022-11-15 | Stop reason: HOSPADM

## 2022-11-13 RX ORDER — POTASSIUM CHLORIDE 14.9 MG/ML
20 INJECTION INTRAVENOUS
Status: COMPLETED | OUTPATIENT
Start: 2022-11-13 | End: 2022-11-13

## 2022-11-13 RX ADMIN — ATORVASTATIN CALCIUM 40 MG: 40 TABLET, FILM COATED ORAL at 15:59

## 2022-11-13 RX ADMIN — NICOTINE 1 PATCH: 14 PATCH, EXTENDED RELEASE TRANSDERMAL at 08:08

## 2022-11-13 RX ADMIN — SODIUM CHLORIDE 100 ML/HR: 0.9 INJECTION, SOLUTION INTRAVENOUS at 15:59

## 2022-11-13 RX ADMIN — NALTREXONE HYDROCHLORIDE 25 MG: 50 TABLET, FILM COATED ORAL at 13:33

## 2022-11-13 RX ADMIN — MULTIPLE VITAMINS W/ MINERALS TAB 1 TABLET: TAB ORAL at 08:08

## 2022-11-13 RX ADMIN — FOLIC ACID 1 MG: 1 TABLET ORAL at 08:07

## 2022-11-13 RX ADMIN — HYDROCHLOROTHIAZIDE 12.5 MG: 12.5 TABLET ORAL at 08:07

## 2022-11-13 RX ADMIN — THIAMINE HYDROCHLORIDE 500 MG: 100 INJECTION, SOLUTION INTRAMUSCULAR; INTRAVENOUS at 21:14

## 2022-11-13 RX ADMIN — THIAMINE HYDROCHLORIDE 500 MG: 100 INJECTION, SOLUTION INTRAMUSCULAR; INTRAVENOUS at 13:33

## 2022-11-13 RX ADMIN — MIRTAZAPINE 7.5 MG: 7.5 TABLET, FILM COATED ORAL at 21:14

## 2022-11-13 RX ADMIN — METFORMIN HYDROCHLORIDE 1000 MG: 500 TABLET ORAL at 08:08

## 2022-11-13 RX ADMIN — ENOXAPARIN SODIUM 40 MG: 100 INJECTION SUBCUTANEOUS at 08:08

## 2022-11-13 RX ADMIN — THIAMINE HYDROCHLORIDE 500 MG: 100 INJECTION, SOLUTION INTRAMUSCULAR; INTRAVENOUS at 05:13

## 2022-11-13 RX ADMIN — SODIUM CHLORIDE 100 ML/HR: 0.9 INJECTION, SOLUTION INTRAVENOUS at 04:41

## 2022-11-13 RX ADMIN — MAGNESIUM SULFATE HEPTAHYDRATE 2 G: 2 INJECTION, SOLUTION INTRAVENOUS at 08:08

## 2022-11-13 RX ADMIN — POTASSIUM CHLORIDE 20 MEQ: 14.9 INJECTION, SOLUTION INTRAVENOUS at 12:12

## 2022-11-13 RX ADMIN — NICOTINE 21 MG: 21 PATCH, EXTENDED RELEASE TRANSDERMAL at 13:32

## 2022-11-13 RX ADMIN — POTASSIUM CHLORIDE 20 MEQ: 14.9 INJECTION, SOLUTION INTRAVENOUS at 08:08

## 2022-11-13 RX ADMIN — AMLODIPINE BESYLATE 10 MG: 10 TABLET ORAL at 08:08

## 2022-11-13 NOTE — PROGRESS NOTES
Progress Note - Medical Toxicology    Mercedes Austin 77 y o  male MRN: [id number]  Unit/Bed#: 5T DETOX 514-01 Encounter: [id number]  MEDICAL DETOX UNIT, LEVEL 4  Department of Medical Toxicology  Reason for Admission/Principal Problem: alcohol withdrawal   Rounding Provider: Jesse Chavez PA-C, Delaney Cai MD         Alcohol withdrawal syndrome with complication [facility])  Assessment & Plan  · Last drink 11/10 evening   Serum alcohol < 3 mg/dL 10/11 in ED  Discontinue SEWS protocol with symptom triggered phenobarbital for medical management of alcohol withdrawal  Patient currently denies withdrawal symptoms of anxiety, tremors, nausea, or vomiting   Received a total of 1820 mg of phenobarbital; last dose administered on 11/12/22 @ 1435   Withdrawal appears well managed at this time will continue to monitor patient off of protocol   Will continue high dose thiamine, patient has received 4/9 doses   Continue telemetry due to prolonged QTC and electrolyte abnormalities     Alcohol use disorder, moderate, dependence (HCC)  Assessment & Plan  Longstanding history of ETOH use disorder without withdrawal related seizure   Drinks 6 pack of 16oz beer daily  Does not endorse using vivitrol or oral naltrexone in the past   Continue IVF as patient continues to report poor PO intake  Continue high dose thiamine, daily folic acid supplementation, and MVI  Consult case management for assistance with disposition planning - pt interested in outpatient     Hypomagnesemia  Assessment & Plan  · 1 7  · Prolonged QTC at 509 ms  · Replete with 2g Magnesium Sulfate IV   · Continue to monitor EKG   · Continue telemetry     QT prolongation  Assessment & Plan  EKG in the ED revealed QTc 627  Repeat EKG upon arrival to unit 516-->504-->515-->508--> 509  Avoid QT prolonging agents, holding mirtazepine and escitalopram  Providing magnesium supplementation   Routine EKG monitoring    Benzodiazepine withdrawal with complication [facility])  Assessment & Plan  · Low concern for NELIA agonist withdrawal due to his taper and last dose being approximately one week ago  · Nonetheless, will monitor clinically and any symptoms [facility]  · Encourage psychiatry follow up     Benzodiazepine dependence ([place] [address] )  Assessment & Plan  · Hx of benzodiazepine dependence  Was managed on alprazolam  25mg TID by PCP for insomnia and anxiety  · Reports he was tapered off over 3 weeks and on 11/10 due to worsened insomnia he overused his hydroxyzine in attempt to experience similar effects  · Last dose of alprazolam was more than one week ago  · Psychiatry consulted, appreciate recommendations       MDD (major depressive disorder), recurrent episode ([place] [address] )  Assessment & Plan  · Hx of anxiety and depression managed with mirtazepine and paroxetine daily   · History of inpatient psychiatric hospitalizations    · Will hold temporarily in setting of QT prolongation and restart when QT normalized  · Psychiatry consulted, appreciate recommendations   · 1:1 virtual     Hypokalemia  Assessment & Plan  · K: 2 7  · M 7  · Patient reports multiple episodes of diarrhea in the setting of alcohol withdrawal  · Will administer 29 mEq KCL IV x 2 and 2g Magnesium Sulfate   · Telemetry monitoring in place  · Continue to monitor BMP and replace electrolytes as needed       Tobacco use disorder  Assessment & Plan  · Daily tobacco use   · Offered NRT  · Encourage tobacco cessation     Mixed hyperlipidemia  Assessment & Plan  · Continue atorvastatin 40mg QHS   · Encourage outpatient follow up     Hypertension  Assessment & Plan  · Continue home regimen of amlodipine 10mg and HCTZ 12 5mg  · Monitor VS per unit protocol   · Encourage PCP follow up     Diabetes mellitus type 2, noninsulin dependent [facility])  Assessment & Plan  Lab Results   Component Value Date    HGBA1C 5 7 (H) 2022       Recent Labs     22  1839 22  2318 22  1540 11/12/22  2237   POCGLU 148* 129 168* 160*       Blood Sugar Average: Last 72 hrs:  (P) 077 [id number]     · Continue daily metformin regimen  · Daily POC glucose monitoring   · Last A1c 5 7 on 7/21/2022    * Accidental hydroxyzine overdose  Assessment & Plan  · Presented to [facility] ED on 11/11 approximately 24 hours post ingestion of 8-10 50mg hydroxyzine tablets  States he " just wanted to sleep"   Reports insomnia after recent alprazolam taper by PCP  · He reported feeling drowsy, thirsty, fatigued  Denies any loss of consciousness  · Has history of suicidal ideation with attempts by overdose   · Reports taking hydroxyzine with his prescribed dose of mirtazepine and escitalopram    · Patient does not exhibit any respiratory status or anticholinergic symptoms upon reassessment this morning  · Continue virtual 1:1   · Psychiatry consulted, appreciate recommendations           VTE Pharmacologic Prophylaxis:   Pharmacologic: Enoxaparin (Lovenox)  Mechanical VTE Prophylaxis in Place: no    Code Status: Level 1 - Full Code    Patient Centered Rounds: I have performed bedside rounds with nursing staff today  Discussions with Specialists or Other Care Team Provider: Case Management, Attending Dr Harriet Patten   Education and Discussions with Family / Patient: I personally answered all of the patient's questions to the best of my ability     Time Spent for Care: 30 minutes  More than 50% of total time spent on counseling and coordination of care as described above  Current Length of Stay: 2 day(s)    Current Patient Status: Inpatient     Certification Statement: The patient will continue to require additional inpatient hospital stay due to alcohol withdrawal, electrolyte abnormalities, requiring high dose thiamine  Discharge Plan:  Anticipated Discharge once medically cleared     Subjective:   Patient seen and examined at bedside   Reports episodes of diarrhea have resolved this AM  Currently denies any anxiety, tremors, sweats, nausea, or vomiting  Patient continues to report fatigue and poor PO intake  Denies any headache, chest pain, abdominal pain, or shortness of breath  Also denies any SI/HI    Patient reports that he has not attempted to ambulate without assistance  Agreeable to PT eval      Objective:     Clinical Opiate Withdrawal Scale  Pulse: 94    SEWS Total Score: 0 (2022  4:45 AM)        Last 24 Hours Medication List:   Current Facility-Administered Medications   Medication Dose Route Frequency Provider Last Rate   • amLODIPine  10 mg Oral Daily Keith Keyes PA-C     • atorvastatin  40 mg Oral Daily With American GiantMARK     • enoxaparin  40 mg Subcutaneous Daily Veverly MARK Eli     • folic acid  1 mg Oral Daily MARK Melendez     • hydrochlorothiazide  12 5 mg Oral Daily Keith Keyes PA-C     • magnesium sulfate  2 g Intravenous Once Keith Keyes PA-C     • metFORMIN  1,000 mg Oral Daily With Breakfast Veverly MARK Eli     • multivitamin-minerals  1 tablet Oral Daily Veverly MARK Eli     • nicotine  1 patch Transdermal Daily MARK Melendez     • potassium chloride  20 mEq Intravenous Q2H Keith Keyes PA-C 20 mEq (22 2681)   • sodium chloride  100 mL/hr Intravenous Continuous Claire Fuentes CRNP 100 mL/hr (22 0441)   • thiamine  500 mg Intravenous Q8H [facility] Claire Gina CRNP 500 mg (22 0513)         Vitals:   Temp (24hrs), Av 1 °F (36 7 °C), Min:97 6 °F (36 4 °C), Max:99 3 °F (37 4 °C)    Temp:  [97 6 °F (36 4 °C)-99 3 °F (37 4 °C)] 97 6 °F (36 4 °C)  HR:  [] 94  Resp:  [16-19] 18  BP: (141-155)/(69-84) 151/75  SpO2:  [96 %-99 %] 99 %  Body mass index is 23 04 kg/m²       Input and Output Summary (last 24 hours):No intake or output data in the 24 hours ending 22 0955    Physical Exam:   Physical Exam  Constitutional:       Appearance: He is not ill-appearing or diaphoretic  HENT:      Nose: No congestion  Eyes:      General: No scleral icterus  Pupils: Pupils are equal, round, and reactive to light  Cardiovascular:      Rate and Rhythm: Normal rate and regular rhythm  Heart sounds: No murmur heard  Pulmonary:      Effort: No respiratory distress  Breath sounds: Normal breath sounds  No wheezing  Abdominal:      Palpations: Abdomen is soft  Neurological:      Mental Status: He is alert and oriented to person, place, and time  Motor: No tremor  Psychiatric:         Mood and Affect: Mood is anxious  Mood is not depressed  Affect is flat  Additional Data:     Labs: keep all most recent labs as listed on admission templates   Results from last 7 days   Lab Units 11/13/22  0438 11/12/22  0524 11/11/22  1552   WBC Thousand/uL 9 66   < > 8 34   HEMOGLOBIN g/dL 11 9*   < > 12 8   HEMATOCRIT % 37 3   < > 37 6   PLATELETS Thousands/uL 145*   < > 209   NEUTROS PCT %  --   --  87*   LYMPHS PCT %  --   --  7*   MONOS PCT %  --   --  6   EOS PCT %  --   --  0    < > = values in this interval not displayed  Results from last 7 days   Lab Units 11/13/22  0438   SODIUM mmol/L 134*   POTASSIUM mmol/L 2 7*   CHLORIDE mmol/L 101   CO2 mmol/L 29   BUN mg/dL 10   CREATININE mg/dL 0 73   ANION GAP mmol/L 4*   CALCIUM mg/dL 7 7*   ALBUMIN g/dL 2 8*   TOTAL BILIRUBIN mg/dL 0 49   ALK PHOS U/L 171*   ALT U/L 16   AST U/L 33   GLUCOSE RANDOM mg/dL 112*           Results from last 7 days   Lab Units 11/12/22  2237 11/12/22  1540 11/11/22  2318 11/11/22  1839   POC GLUCOSE mg/dl 160* 168* 129 148*                    * I Have Reviewed All Lab Data Listed Above  * Additional Pertinent Lab Tests Reviewed: Trang 66 Admission Reviewed      Imaging Studies: I have personally reviewed pertinent reports          Recent Cultures (last 7 days):         Minutes of critical care time 32   -Critical care time was exclusive of separately billable procedures and teaching time    -Critical care was necessary to treat or prevent imminent or life-threatening deterioration of the following condition: withdrawal and metabolic crisis  -Critical care time was spent personally by me on the following activities as well as the above as per the course and rest of chart: obtaining history from patient/surrogate, review of old charts, development of a treatment plan, discussions with referring provider(s) and/or consultants, examination of the patient, performing treatments and interventions, evaluation of patient's response to the treatment, re-evaluation of the patient's condition, ordering/interpreting laboratory studies, ordering/interpreting of radiographic studies  Today, Patient Was Seen By: Isra Kemp PA-C    ** Please Note: Dictation voice to text software may have been used in the creation of this document   **

## 2022-11-13 NOTE — CONSULTS
Psychiatry Consultation Note   Harshal Peck 77 y o  male MRN: [id number]  Unit/Bed#: 5T DETOX 162-84 Encounter: [id number]    Assessment and Plan     Assessment:    Harshal Peck is a 77 y o  male, single, no children, living in apartment by himself, retired on SSI with multiple inpatient prior psychiatric admissions and follows outpatient with psychiatrist who presents after an overdose on Atarax  Patient adamantly denies this was a suicide attempt, but more for management of his ongoing anxiety after switching off of Xanax  Additionally, within upcoming court hearing for his DUI and potential retirement time, he has been extremely anxious and relapsed into drinking alcohol  Currently he is being medically stabilized on the detox unit  His risk factors include being a male, unmarried, no pets/children, alcohol use, and prior suicide attempts  However, he has not had any recent suicide attempts, has outpatient establishment, is compliant with his medications, denies being suicidal or this being a suicide attempt, and called the ambulance/for help for this current overdose  Patient appeared agreeable to coming inpatient on a 201 to address his uncontrolled anxiety, and having started naltrexone for his alcohol abuse - where there is concerns for possible increased suicidal tendencies, patient would benefit from an inpatient stay  Patient's brother, also believes patient may benefit from an inpatient stay just for safety purposes  However, at this time if patient were to refuse coming inpatient voluntarily, there would not be criteria for inpatient involuntary commitment  If statements or behaviors were to change, can re-contact psychiatry team for possible evaluation for involuntary commitment  Principal Problem:  1   Unspecified depression    Active Problems:  Principal Problem:    Accidental hydroxyzine overdose  Active Problems:    Diabetes mellitus type 2, noninsulin dependent (HCC)    Hypertension    Mixed hyperlipidemia    Tobacco use disorder    Alcohol withdrawal syndrome with complication (HCC)    Hypokalemia    MDD (major depressive disorder), recurrent episode ([facility] )    Benzodiazepine dependence ([facility] )    [facility] use disorder, moderate, dependence [facility]    Benzodiazepine withdrawal with complication (HCC)    QT prolongation    Hypomagnesemia      Plan:   Discussed with primary team, with the following recommendations:    1  Treatment Recommendations:  a  Patient voices openness to signing a 61 51 81 though also states wanting to go home to smoke a cigarette - at this time, patient would likely benefit from an inpatient stay to address his uncontrolled anxiety, monitor his safety, medication management  b  Medical management per primary team, no new labs indicated at this time  2  Pharmacological:   a  Recommend the following medication changes:   i  Continue home medications  ii  Restart Remeron at 7 5 mg HS  iii  Begin naltrexone 25 mg for alcohol addiction  iv  Increase nicotine patch and add p r n  Nicotine gum due to cravings to smoke  v  Continue to hold Paxil until stabilized EKG/QTC  b  Recommend no further changes in psychiatric mediation at this time as patient is being seen in an acute setting  3  Observation level: Routine  4  Collaborate with collaterals for baseline assessment and disposition as indicated  5  Psychiatry will continue to follow as needed  Please contact our service via HelpHub with any additional questions or concerns  If contacting after hours, please call or [facility] the on-call team (PIERRE: [phone]) with any questions or concerns        Thank you for the consultation - Please do not hesitate to call/contact our service via HelpHub with any additional concerns/comments    Risks, benefits and possible side effects of Medications: Risks, benefits, and possible side effects of medications explained to patient and patient verbalizes understanding          Suicide/Homicide Risk Assessment:  Risk of Harm to Self:   Nursing Suicide Risk Assessment Last 24 hours:    Current Specific Risk Factors include: diagnosis of depression, alcohol use, Legal issues  Protective Factors: no current suicidal ideation, compliant with medications, having a desire to be alive, having a desire to live, resiliency, supportive family  Based on today's assessment, Daren Benitez presents the following risk of harm to self: moderate    Risk of Harm to Others:  Nursing Homicide Risk Assessment:    Current Specific Risk Factors include: Alcohol use, multiple stressors, social difficulties  Protective Factors: no current homicidal ideation, willing to remain free from substance use, supportive family, medical compliance, resilience, restricted access to lethal means  Based on today's assessment, Daren Benitez presents the following risk of harm to others: minimal    The following interventions are recommended: Routine    HPI     Chief Complaint: "I took extra hydroxyzine"    History of Present Illness    Physician Requesting Consult: Sandra Whyte MD  Reason for Consult / Principal Problem:  MDD    Sadie Merritt is a 77 y o  male, single, no children, living in apartment by himself, retired on SSI with multiple inpatient prior psychiatric admissions and follows outpatient with psychiatrist who presented to [facility] due to recent overdose on Atarax  Patient was admitted to the medical service on 11/11/2022 due to alcohol abuse and overdose  Psychiatric consultation was requested due to depression and anxiety and possible suicide attempt  Symptoms prior to admission included Worsening anxiety and decreased sleep  Onset of symptoms was abrupt starting few week ago with rapidly worsening course since that time  Stressors preceding admission included alcohol use problems and legal issues  Patient reports that having been switched off of Xanax to hydroxyzine, it has been difficult to manage his anxiety    He notes that he had taken extra hydroxyzine in attempts to manage his anxiety but did not take the whole bottle only a handful  He notes it takes more of the hydroxyzine to get the same effects of the Xanax, which he has been previously on for roughly 30 years  He states this was not an attempt to end his life or hurt himself in any way  He denies any depression since July 2015, but admits to ongoing anxiety  He states having multiple DUIs and a court hearing upcoming on December 1st   When he received the summons, his anxiety had increased significantly and he began to drink as well as feel more anxious overall  He has concerns about going to longterm and how long he may be in longterm for  Patient has multiple prior inpatient admissions with the last being on 10/07/2022 while being detoxed for alcohol and Xanax  He notes his 1st admission was in [address] [place] [address] after an overdose  He notes following with Dr Koki Reddy on an outpatient basis from psychiatry  He has no prior trauma/flashbacks/nightmares  He notes no self-harming behaviors  He notes to prior suicide attempts via overdose, and 1 suicide attempt from carbon monoxide which was his latest suicide attempt in July 2015  He notes medication trials of Paxil, Remeron, Xanax, Atarax  He denies any family history of substance abuse, psychiatric history, or suicide attempts  However, he later notes that his sister is on Paxil  He notes only a drug history of alcohol  He has been previously clean on/off with the longest being up to 8 months  He states his last drink was roughly 2 or 3 days ago drinking about a 6 pack of 16 oz or roughly 8 beers  He notes smoking 1 pack per day of cigarettes  He denies any marijuana, cocaine, heroin, other substances  He notes 1 other detox admission and no prior rehab  He does attend AA  He has no prior seizures, concussions, other traumatic history  Currently he denies any depression    He admits to anxiety significantly increased from his recent court some ending  He denies any sleep issues, interest, guilt, energy, concentration, appetite changes  He notes some decrease in weight roughly 5 lb in 1 month though states he was in the hospital at the time  He then later admits to some sleep difficulties despite denying it earlier  He denies any current or recent suicidal ideations  He denies any homicidal ideations  Denies any prior manic symptoms or episodes  He denies any paranoia, feeling safe  He denies any auditory or visual hallucinations  He notes that if he were to feel unsafe he could return to the hospital   He states he could call someone for help if there were concerns for safety  He notes not having any thoughts wanting to hurt himself or harm himself since 2015  Spoke with collateral/brother Danielle August [phone]  Danielle August notes that he was on vacation for the last week and has not seen his brother in 1 5 weeks or so  States that the patient has been struggling with alcohol and mental health in the past   He notes it has been a long time since his overdose and he is usually since here about wanting to continue living  He states that after their parents passed in last 8 years, patient has been drinking more frequently  He states there can be some concerns for patient's safety due to his history of manipulating things to his own agenda and his history of mental health  He does note that the patient is stressed about his upcoming DUI court hearing and the patient's concern for going to skilled nursing/senior care  Danielle August wants to give a call to the patient as he thinks it would be beneficial for the patient to come inpatient, but understands that if patient decides not to come inpatient there may not be enough grounds for an involuntary commitment  Solis plans to attempt to call patient later today to discuss further        Major Stressors currently include:  Legal issues, recent medication switch, alcohol use    Psychiatric ROS and PMH Psychiatric Review Of Systems:  Sleep changes: no  Interest /pleasure changes: no  • Interests include:  Time with family, being by the water  Guilty/hopeless: no  Energy/anergy changes: no  Concentration/focus changes: no  Appetite changes: no  • Weight changes: yes, decreased of 5 lb in last month but contributes to hospitalization  Psychomotor slowing/agitation: no  Somatic symptoms: no  Anxiety/panic: yes  Depression: no  Jigna: no  Self injurious behavior/risky behavior: no    Suicidal ideation: no  Homicidal ideation: no  Auditory hallucinations: no  Visual hallucinations: no  Other hallucinations: no  Delusional thinking: no    Eating disorder history: no  Obsessive/compulsive symptoms: no  PTSD history:  None    Historical Information   Past Psychiatric History:   • Inpatient Admissions: Multiple inpatient admissions with 1st being in [address] [place] [address], last admission 10/07/2022    • Outpatient Services: Follows with outpatient psychiatrist Dr Rosario Roach, no outpatient therapist    Past Suicide Attempts: yes  - to prior overdose attempts, last suicide attempt in July 2015 attempting carbon monoxide poisoning via car in garage   Past Violent Behavior: no  Past Psychiatric Medication Trials: Paxil, Remeron, Xanax, hydroxyzine, Zoloft    Substance Abuse History:  Social History     Tobacco History     Smoking Status  Current Every Day Smoker Smoking Frequency  1 pack/day Smoking Tobacco Type  Cigarettes    Smokeless Tobacco Use  Former User    Tobacco Comment  uses vape/current every day smoker (as per [employer])          Alcohol History     Alcohol Use Status  Yes Comment  states last was 2 days ago          Drug Use     Drug Use Status  No          Sexual Activity     Sexually Active  Not Asked          Activities of Daily Living    Not Asked               Additional Substance Use Detail     Questions Responses    Problems Due to Past Use of Alcohol? Yes    Problems Due to Past Use of Substances?  No    Substance Use Assessment Denies substance use within the past 12 months    Alcohol Use Frequency Daily    Cannabis frequency Never used    Comment:  Never used on 10/7/2022     Heroin Frequency Denies use in past 12 months    Alcohol Drink of Choice beer    1st Use of Alcohol 19    Last Use of Alcohol & Amount 11/10/2022; 6 beers    Longest Abstinence from Alcohol 8 months    Cocaine frequency Never used    Comment:  Never used on 10/7/2022     Crack Cocaine Frequency Denies use in past 12 months    Methamphetamine Frequency Denies use in past 12 months    Narcotic Frequency Denies use in past 12 months    Benzodiazepine Frequency Prior dependence    Amphetamine frequency Denies use in past 12 months    Barbituate Frequency Denies use use in past 12 months    Inhalant frequency Never used    Comment:  Never used on 10/7/2022     Hallucinogen frequency Never used    Comment:  Never used on 10/7/2022     Ecstasy frequency Never used    Comment:  Never used on 10/7/2022     Other drug frequency Never used    Comment:  Past regular use on 10/7/2022 Never used on 10/7/2022     Opiate frequency Denies use in past 12 months    Not reviewed          UDS on admission:  Negative on 11/11/2022    Alcohol use: Last used 3 days ago drinking roughly 6, 16 oz beer  Nicotine use: 1 pack per day  Marijuana: Denies current use  Other substance use:  Denies current use  Longest clean time: Eight months  History of Inpatient/Outpatient rehabilitation program:  1 prior detox, no prior rehab    I have assessed this patient for substance use within the past 12 months      Family Psychiatric History:   Psychiatric Illness:  no family history of psychiatric illness  Substance Abuse:  no family history of substance abuse  Suicide Attempts:  no family history of suicide attempts    Social History:  Education: college graduate  • Learning Disabilities: None  Marital history: single  Children: no  Living arrangement, social support: Lives alone in apartment  Occupational History: retired, on [employer]  Functioning Relationships:  Some family support  Legal History: Currently facing DUI charges for possible correction time   History: None  Firearms: no    Traumatic History:   Abuse: Denies  Other Traumatic Events: None  Flashbacks/Nightmares: no      Past Medical History:  History of Seizures: no  History of Head injury with loss of consciousness: no    Past Medical History:   Diagnosis Date   • Colon polyp    • Diabetes mellitus ([address] )    • Hyperlipidemia    • Hypertension      Past Surgical History:   Procedure Laterality Date   • COLON SURGERY      found growth, bengin   • COLONOSCOPY     • COLONOSCOPY N/A 3/13/2018    Procedure: COLONOSCOPY;  Surgeon: Dodie Hurtado DO;  Location: [facility] GI LAB; Service: Gastroenterology   • ORIF HUMERUS FRACTURE Left 10/6/2022    Procedure: OPEN REDUCTION W/ INTERNAL FIXATION (ORIF) HUMERUS (SHOULDER);   Surgeon: Reshma Clayton MD;  Location: [address] [place] [address];  Service: Orthopedics       Mental Status Evaluation and Medical ROS     Medical Review Of Systems:  Review of Systems - Negative    Meds/Allergies   all current active meds have been reviewed  Allergies   Allergen Reactions   • Lisinopril Angioedema       Objective   Vital signs in last 24 hours:  Temp:  [97 6 °F (36 4 °C)-99 3 °F (37 4 °C)] 97 6 °F (36 4 °C)  HR:  [] 94  Resp:  [16-18] 18  BP: (141-155)/(69-83) 151/75    No intake or output data in the 24 hours ending 11/13/22 1533    Mental Status Evaluation:  Appearance:  age appropriate, hospital attire, some hygiene and grooming   Behavior:  Overall cooperative and receptive, good eye contact   Speech:  normal rate, normal volume, normal pitch, spontaneous   Mood:  "Anxious"   Affect:  constricted   Language: naming objects and repeating phrases   Thought Process:  organized, goal directed, linear, normal rate of thoughts   Associations: intact associations   Thought Content:  no overt delusions   Perceptual Disturbances: no auditory hallucinations, no visual hallucinations   Risk Potential: Suicidal ideation - None at present  Homicidal ideation - None at present  Potential for aggression - Not at present   Sensorium:  oriented to person, place and time/date   Memory:  recent and remote memory grossly intact   Consciousness:  alert and awake   Attention/Concentration: attention span and concentration are age appropriate   Intellect: normal   Fund of Knowledge: awareness of current events: yes   Insight:  limited   Judgment: limited   Muscle Strength Muscle Tone: normal  normal   Gait/Station: in bed   Motor Activity: no abnormal movements     Laboratory Results: I have personally reviewed all pertinent laboratory/tests results    Results from the past 24 hours:   Recent Results (from the past 24 hour(s))   Fingerstick Glucose (POCT)    Collection Time: 11/12/22 10:37 PM   Result Value Ref Range    POC Glucose 160 (H) 65 - 140 mg/dl   CBC and Platelet    Collection Time: 11/13/22  4:38 AM   Result Value Ref Range    WBC 9 66 4 31 - 10 16 Thousand/uL    RBC 3 85 (L) 3 88 - 5 62 Million/uL    Hemoglobin 11 9 (L) 12 0 - 17 0 g/dL    Hematocrit 37 3 36 5 - 49 3 %    MCV 97 82 - 98 fL    MCH 30 9 26 8 - 34 3 pg    MCHC 31 9 31 4 - 37 4 g/dL    RDW 12 3 11 6 - 15 1 %    Platelets 304 (L) 090 - 390 Thousands/uL    MPV 10 1 8 9 - 12 7 fL   Comprehensive metabolic panel    Collection Time: 11/13/22  4:38 AM   Result Value Ref Range    Sodium 134 (L) 135 - 147 mmol/L    Potassium 2 7 (LL) 3 5 - 5 3 mmol/L    Chloride 101 96 - 108 mmol/L    CO2 29 21 - 32 mmol/L    ANION GAP 4 (L) 5 - 14 mmol/L    BUN 10 5 - 25 mg/dL    Creatinine 0 73 0 70 - 1 50 mg/dL    Glucose 112 (H) 70 - 99 mg/dL    Calcium 7 7 (L) 8 4 - 10 2 mg/dL    Corrected Calcium 8 7 8 3 - 10 1 mg/dL    AST 33 17 - 59 U/L    ALT 16 <50 U/L    Alkaline Phosphatase 171 (H) 43 - 122 U/L    Total Protein 5 7 (L) 6 4 - 8 4 g/dL    Albumin 2 8 (L) 3 5 - 5 0 g/dL    Total Bilirubin 0 49 0 20 - 1 00 mg/dL    eGFR 96 ml/min/1 73sq m   Magnesium    Collection Time: 11/13/22  4:38 AM   Result Value Ref Range    Magnesium 1 7 1 6 - 2 3 mg/dL   ECG 12 lead    Collection Time: 11/13/22  7:31 AM   Result Value Ref Range    Ventricular Rate 92 BPM    Atrial Rate 92 BPM    PA Interval 138 ms    QRSD Interval 94 ms    QT Interval 412 ms    QTC Interval 509 ms    P Axis 52 degrees    QRS Axis 52 degrees    T Wave [place] 56 degrees       Imaging Studies: XR shoulder 2+ vw left    Result Date: 11/4/2022  Narrative: LEFT SHOULDER INDICATION:   S42 222A: 2-part displaced fracture of surgical neck of left humerus, initial encounter for closed fracture  COMPARISON:  Left shoulder x-ray dated October 6, 2022  VIEWS:  XR SHOULDER 2+ VW LEFT FINDINGS: There is stable alignment of a healing mildly displaced proximal left humeral fracture, status post ORIF  The surgical hardware appears intact  Mild osteoarthritis of the glenohumeral and acromioclavicular joints  No lytic or blastic osseous lesion  Soft tissues are unremarkable  Impression: Stable alignment of a healing proximal left humeral fracture, status post ORIF  Workstation performed: [id number]     CT head without contrast    Result Date: 11/11/2022  Narrative: CT BRAIN - WITHOUT CONTRAST INDICATION:   fall out of bed  "76 y/o m with history of EtOH, encephalopathy abuse and previous admissions for alcohol w/d, HTN, HLD and DM who presents to the ED with sxms of w/d  + diffuse tremors" COMPARISON:  None  TECHNIQUE:  CT examination of the brain was performed  In addition to axial images, sagittal and coronal 2D reformatted images were created and submitted for interpretation  Radiation dose length product (DLP) for this visit:     This examination, like all CT scans performed in the [facility], was performed utilizing techniques to minimize radiation dose exposure, including the use of iterative reconstruction  and automated exposure control  IMAGE QUALITY:  Diagnostic  FINDINGS: PARENCHYMA: Decreased attenuation is noted in periventricular and subcortical white matter demonstrating an appearance that is statistically most likely to represent mild microangiopathic change; this appearance is similar when compared to most recent prior examination  No CT signs of acute infarction  No intracranial mass, mass effect or midline shift  No acute parenchymal hemorrhage  VENTRICLES AND EXTRA-AXIAL SPACES:  Normal for the patient's age  VISUALIZED ORBITS AND PARANASAL SINUSES:  Unremarkable  CALVARIUM AND EXTRACRANIAL SOFT TISSUES:  Normal      Impression: No acute intracranial abnormality  Workstation performed: [id number]     CT spine cervical without contrast    Result Date: 11/11/2022  Narrative: CT CERVICAL SPINE - WITHOUT CONTRAST INDICATION:   fall  COMPARISON:  None  TECHNIQUE:  CT examination of the cervical spine was performed without intravenous contrast   Contiguous axial images were obtained  Sagittal and coronal reconstructions were performed  Radiation dose length product (DLP) for this visit:  383 mGy-cm   This examination, like all CT scans performed in the [facility], was performed utilizing techniques to minimize radiation dose exposure, including the use of iterative reconstruction and automated exposure control  IMAGE QUALITY:  Diagnostic  FINDINGS: ALIGNMENT:  Normal alignment of the cervical spine  No subluxation  VERTEBRAL BODIES:  No fracture  DEGENERATIVE CHANGES:  No significant cervical degenerative changes are noted  PREVERTEBRAL AND PARASPINAL SOFT TISSUES:  Atherosclerotic calcifications of the carotid bifurcations  THORACIC INLET:  Normal      Impression: No cervical spine fracture or traumatic malalignment    Workstation performed: [id number]     EKG: QTc= 509 on 11/13/2022    Code Status: Level 1 - Full Code  Advance Directive and Living Will:       Power of :      Oscar Becker DO 11/13/22  Psychiatry Resident, PGY-II      This note may have been constructed using a voice recognition system  There may be translation, syntax, or grammatical errors  If you have any questions, please contact the dictating provider

## 2022-11-13 NOTE — ASSESSMENT & PLAN NOTE
· K: 2 7  · M 7  · Patient reports multiple episodes of diarrhea in the setting of alcohol withdrawal  · Will administer 29 mEq KCL IV x 2 and 2g Magnesium Sulfate   · Telemetry monitoring in place  · Continue to monitor BMP and replace electrolytes as needed

## 2022-11-13 NOTE — UTILIZATION REVIEW
OUR INTERQUAL APPLICATION IS NON-FUNCTIONAL TODAY  PT WOULD HAVE MET CRITERIA FOR SUBSTANCE WITHDRAWAL D/T RAPID TRANQUILIZATION AND SEWS 8  Initial Clinical Review    Admission: Date/Time/Statement:   Admission Orders (From admission, onward)     Ordered        11/11/22 2207  Inpatient Admission  Once                      Orders Placed This Encounter   Procedures   • Inpatient Admission     Standing Status:   Standing     Number of Occurrences:   1     Order Specific Question:   Level of Care     Answer:   Med Surg [16]     Order Specific Question:   Estimated length of stay     Answer:   More than 2 Midnights     Order Specific Question:   Certification     Answer:   I certify that inpatient services are medically necessary for this patient for a duration of greater than two midnights  See H&P and MD Progress Notes for additional information about the patient's course of treatment  Initial Presentation: 77 y o  male  Presents as a transfer from the ED [facility] on 11/11 with c/o ingesting extra hydroxyzine tabs (total 9) to assist with sleep thinking they were not helpful  Weaned off Alprazolam and on Hydroxyzine  C/o dry mouth, dizziness, feeling unwell, denies alcohol use but last alcohol was 3 days PTA  PMH: AUD, BUD, depression, HTN, NIDDM, HLD, smoker  On exam he is awake, A&O x 3  Neuro: no tongue fasciculations, mild bilateral intention tremor, mental status appropriate, no focal deficits, speech clear  He is admitted to INPATIENT status with accidental OD Hydroxyzine - no s/s anticholinergic toxicity, continual virtual observation, psych consult  QTC prolongation - Tele, avoid QTC prolonging meds, optimize lytes  Alcohol withdrawal - SEWS, had Phenobarb  Benzo use disorder - recent taper off, monitor  AUD - vitamin supplementation, CM for rehab options  Depression - psych consult   ms  SEWS = 8  PHENobarbital 260 mg in sodium chloride 0 9 % 100 mL IVPB  Dose: 260 mg  Freq:  Once Route: IV  Last Dose: 260 mg (11/11/22 2002)  Start: 11/11/22 1815 End: 11/11/22 2032   Admin Instructions:   High Alert Medication, Use OVERRIDE function to dispense infusion from automated dispensing cabinet  2002 11/11        PHENobarbital 650 mg in sodium chloride 0 9 % 100 mL IVPB  Dose: 650 mg  Freq: Once Route: IV  Last Dose: Stopped (11/11/22 1713)  Start: 11/11/22 1600 End: 11/11/22 1713   Admin Instructions:   High Alert Medication, Use OVERRIDE function to dispense infusion from automated dispensing cabinet  1643     1713          PHENobarbital injection 130 mg  Dose: 130 mg  Freq: Once Route: IV  Start: 11/12/22 1445 End: 11/12/22 1435   Admin Instructions:   High alert medication  1435  11/12       PHENobarbital injection 130 mg  Dose: 130 mg  Freq: Once Route: IV  Start: 11/12/22 1215 End: 11/12/22 1221   Admin Instructions:   High alert medication  1221         PHENobarbital injection 130 mg  Dose: 130 mg  Freq: Once Route: IV  Start: 11/12/22 1015 End: 11/12/22 1020   Admin Instructions:   High alert medication  1020         PHENobarbital injection 130 mg  Dose: 130 mg  Freq: Once Route: IV  Start: 11/12/22 0800 End: 11/12/22 0810   Admin Instructions:   High alert medication  0810         PHENobarbital injection 130 mg  Dose: 130 mg  Freq: Once Route: IV  Start: 11/11/22 2230 End: 11/11/22 2229   Admin Instructions:   High alert medication  2229          PHENobarbital injection 260 mg  Dose: 260 mg  Freq: Once Route: IV  Indications Comment: as per benitez white  Start: 11/12/22 0045 End: 11/12/22 0055   Admin Instructions:   High alert medication              7606           Medications to be administered adjunctively if more than 2 grams of phenobarbital is needed for stabilization of withdrawal; require attending approval    · Dexmedetomidine infusion 0 1-1mcg/kg/hr IV infusion, titratable to reduced agitation (Goal: RASS -2)  · Ketamine   ? Acute agitated delirium: 1-2 mg/kg IV or 4-5 mg/kg IM  ? Refractory withdrawal: 0 1-1mg/kg/hr IV infusion, titratable to reduced agitation (Goal: RASS -2)  Additional medications for ethanol associated malnutrition: Thiamine 100 mg IV daily, increase to 500 mg TID for signs/symptoms of Wernicke's Encephalopathy or Wernicke Korsakoff Syndrome   Folic acid 1 mg IV daily   Multivitamin PO daily       Will offer first monthly injection of Naltrexone 380 mg IM, once patient is stabilized, as it has been shown to assist in decreasing cravings for ethanol  Date 11/12 Day 2:   Continues with prolonged  ms, Tele, continual observation  Continues with tachycardia  SEWS 8, 7  [address] [address] Name 11/13/22 0445 11/13/22 0001 11/12/22 2000 11/12/22 1600 11/12/22 1505   Severity of Ethanol Withdrawal Scale (SEWS)   ANXIETY: Do you feel that something bad is about to happen to you right now? 0  -KP 0  -KP 0  -KP 0  -RG 0  -RG   NAUSEA and DRY HEAVES or VOMITING? 0  -KP 0  -KP 0  -KP 0  -RG 0  -RG   SWEATING: (includes moist palms, sweating now)? Score 0 or 2 0  -KP 0  -KP 0  -KP 0  -RG 0  -RG   TREMOR: with arms extended eyes closed? 0  -KP 0  -KP 0  -KP 0  -RG 0  -RG   AGITATION: Fidgety, restless, pacing? 0  -KP 0  -KP 0  -KP 0  -RG 0  -RG   DISORIENTATION: 0  -KP 0  -KP 0  -KP 0  -RG 0  -RG   HALLUCINATIONS: 0  -KP 0  -KP 0  -KP 0  -RG 0  -RG   VITAL SIGNS: ANY (Pulse >001, Diastolic BP >21, Temp >31 1) 0  -KP 0  sleeping  -KP 0  -KP 0  -RG 0  -RG   SEWS Total Score 0  -KP 0  -KP 0  -KP 0  -RG 0  -RG   Hartley Agitation Sedation Scale (RASS)   Hartley Agitation Sedation Scale (RASS) 0  -KP 0  -KP 0  -KP 0  -RG -1  -RG   Row Name 11/12/22 1430 11/12/22 1350 11/12/22 1250 11/12/22 1213 11/12/22 1150   Severity of Ethanol Withdrawal Scale (SEWS)   ANXIETY: Do you feel that something bad is about to happen to you right now?   0  -RG 0  -RG 0  -RG 0  -RG 0  -RG   NAUSEA and DRY HEAVES or VOMITING? 0  -RG 0  -RG 0  -RG 0  -RG 0  -RG   SWEATING: (includes moist palms, sweating now)? Score 0 or 2 2  -RG 0  -RG 0  -RG 2  -RG 0  -RG   TREMOR: with arms extended eyes closed? 2  -RG 0  -RG 0  -RG 2  -RG 0  -RG   AGITATION: Fidgety, restless, pacing? 0  -RG 0  -RG 0  -RG 0  -RG 0  -RG   DISORIENTATION: 0  -RG 0  -RG 0  -RG 0  -RG 0  -RG   HALLUCINATIONS: 0  -RG 0  -RG 0  -RG 0  -RG 0  -RG   VITAL SIGNS: ANY (Pulse >231, Diastolic BP >02, Temp >21 2) 3  -RG 0  -RG 0  -RG 3  -RG 0  -RG   SEWS Total Score 7  -RG 0  -RG 0  -RG 7  -RG 0  -RG   Hartley Agitation Sedation Scale (RASS)   Hartley Agitation Sedation Scale (RASS) 0  -RG 0  -RG 0  -RG 0  -RG 0  -RG   Row Name 11/12/22 1050 11/12/22 0945 11/12/22 0845 11/12/22 0757 11/12/22 0257   Severity of Ethanol Withdrawal Scale (SEWS)   ANXIETY: Do you feel that something bad is about to happen to you right now? 0  -RG 0  -RG 0  -RG 0  -RG 0  -WL   NAUSEA and DRY HEAVES or VOMITING? 0  -RG 0  -RG 0  -RG 0  -RG 0  -WL   SWEATING: (includes moist palms, sweating now)? Score 0 or 2 0  -RG 2  -RG 0  -RG 2  -RG 0  -WL   TREMOR: with arms extended eyes closed? 0  -RG 2  -RG 0  -RG 2  -RG 0  -WL   AGITATION: Fidgety, restless, pacing? 0  -RG 0  -RG 0  -RG 0  -RG 0  -WL   DISORIENTATION: 0  -RG 0  -RG 0  -RG 0  -RG 0  -WL   HALLUCINATIONS: 0  -RG 0  -RG 0  -RG 0  -RG 0  -WL   VITAL SIGNS: ANY (Pulse >830, Diastolic BP >64, Temp >59 1) 0  -RG 3  -RG 0  -RG 3  -RG 0  -WL   SEWS Total Score 0  -RG 7  -RG 0  -RG 7  -RG 0  -WL   Row Name 11/12/22 0038 11/11/22 2219      Severity of Ethanol Withdrawal Scale (SEWS)   ANXIETY: Do you feel that something bad is about to happen to you right now? 3  -WL 3  -WL      NAUSEA and DRY HEAVES or VOMITING? 0  -WL 0  -WL      SWEATING: (includes moist palms, sweating now)? Score 0 or 2 0  -WL 0  -WL      TREMOR: with arms extended eyes closed? 2  -WL 2  -WL      AGITATION: Fidgety, restless, pacing?  0  -WL 0  -WL      DISORIENTATION: 0  -WL 0  -WL      HALLUCINATIONS: 0  -WL 0  -WL      VITAL SIGNS: ANY (Pulse >912, Diastolic BP >70, Temp >03 9) 3  -WL 3  -WL      SEWS Total Score 8  -WL 8  -WL          Date: 11/13   Day 3:  Still with prolonged  ms  HR < 100  SEWS 0    ED Triage Vitals [11/11/22 2104]   Temperature Pulse Respirations Blood Pressure SpO2   98 9 °F (37 2 °C) (!) 112 18 170/88 93 %      Temp Source Heart Rate Source Patient Position - Orthostatic VS BP Location FiO2 (%)   Temporal Monitor Lying Right arm --      Pain Score       No Pain          Wt Readings from Last 1 Encounters:   11/11/22 70 8 kg (156 lb)     Additional Vital Signs:   11/13/22 0900 97 6 °F (36 4 °C) 94 18 151/75 100 99 % None (Room air) Lying   11/13/22 0446 98 °F (36 7 °C) 92 16 151/83 -- 98 % None (Room air) Lying   11/13/22 0000 -- 94 -- -- -- 97 % None (Room air) --   11/12/22 1910 98 2 °F (36 8 °C) 101 16 155/75 -- 96 % None (Room air) Lying   11/12/22 1545 99 3 °F (37 4 °C) 103 -- 141/69 -- 98 % None (Room air) Lying   11/12/22 1152 97 6 °F (36 4 °C) 98 19 155/84 -- 98 % None (Room air) Lying   11/12/22 0925 -- 104 20 153/79 -- 99 % None (Room air) Lying   11/12/22 0742 97 2 °F (36 2 °C) Abnormal  116 Abnormal  20 183/99 Abnormal  -- 95 % -- Lying   11/12/22 0545 -- -- -- 165/76 -- -- -- --   11/12/22 0509 98 3 °F (36 8 °C) 113 Abnormal  18 177/99 Abnormal  -- 99 % None (Room air) Lying   11/12/22 0255 98 5 °F (36 9 °C) 99 16 144/88 -- 97 % None (Room air) Lying   11/12/22 0036 -- 111 Abnormal  -- 156/87 -- 96 % None (Room air) --   11/11/22 2350 99 1 °F (37 3 °C) 114 Abnormal  18 156/87 -- 97 % None (Room air) Lying   11/11/22 2218 -- 113 Abnormal  18 173/84 Abnormal  -- 97 % None (Room air) Lying     Pertinent Labs/Diagnostic Test Results:   11/11 CT head - no acute injury/disease     11/11 CT  C spine - No cervical spine fracture or traumatic malalignment    11/11 ECG - Sinus tachycardia  Biatrial enlargement  Nonspecific ST and T wave abnormality  Prolonged QT  Abnormal ECG    11/11 ECG - Sinus tachycardia  Possible Left atrial enlargement  Cannot rule out Inferior infarct , age undetermined  Abnormal ECG    11/12 ECG - Sinus tachycardia  Possible Left atrial enlargement  T wave abnormality, consider inferior ischemia  Abnormal ECG    11/13 ECG - Normal sinus rhythm  Prolonged QT  Abnormal ECG      Results from last 7 days   Lab Units 11/13/22  0438 11/12/22  0524 11/11/22  1552   WBC Thousand/uL 9 66 12 12* 8 34   HEMOGLOBIN g/dL 11 9* 12 7 12 8   HEMATOCRIT % 37 3 39 1 37 6   PLATELETS Thousands/uL 145* 176 209   NEUTROS ABS Thousands/µL  --   --  7 25         Results from last 7 days   Lab Units 11/13/22  0438 11/12/22  0524 11/11/22  1703   SODIUM mmol/L 134* 136 142   POTASSIUM mmol/L 2 7* 2 9* 3 3*   CHLORIDE mmol/L 101 101 105   CO2 mmol/L 29 30 28   ANION GAP mmol/L 4* 5 9   BUN mg/dL 10 11 17   CREATININE mg/dL 0 73 0 69* 1 16   EGFR ml/min/1 73sq m 96 98 65   CALCIUM mg/dL 7 7* 7 9* 8 6   MAGNESIUM mg/dL 1 7 2 9* 2 5   PHOSPHORUS mg/dL  --   --  2 5     Results from last 7 days   Lab Units 11/13/22  0438 11/12/22  0524 11/11/22  1703 11/11/22  1552   AST U/L 33 43 35  --    ALT U/L 16 20 20  --    ALK PHOS U/L 171* 167* 156*  --    TOTAL PROTEIN g/dL 5 7* 6 5 6 5  --    ALBUMIN g/dL 2 8* 3 3* 2 9*  --    TOTAL BILIRUBIN mg/dL 0 49 1 02* 0 93  --    AMMONIA umol/L  --   --   --  35     Results from last 7 days   Lab Units 11/12/22  2237 11/12/22  1540 11/11/22  2318 11/11/22  1839   POC GLUCOSE mg/dl 160* 168* 129 148*     Results from last 7 days   Lab Units 11/13/22  0438 11/12/22  0524 11/11/22  1703   GLUCOSE RANDOM mg/dL 112* 127* 153*     Results from last 7 days   Lab Units 11/12/22  0524 11/11/22  1552   PH PHONG  7 452* 7 520*   PCO2 PHONG mm Hg 48 2 28 2*   PO2 PHONG mm Hg 77 1* 91 7*   HCO3 PHONG mmol/L 32 9* 22 5*   BASE EXC PHONG mmol/L 7 7 0 8   O2 CONTENT PHONG ml/dL 18 1 18 6   O2 HGB, VENOUS % 93 3* 95 5*             Results from last 7 days   Lab Units 11/12/22  0524 11/12/22  0113 11/11/22  2323   HS TNI 0HR ng/L  --   --  25   HS TNI 2HR ng/L  --  24  --    HSTNI D2 ng/L  --  -1  --    HS TNI 4HR ng/L 23  --   --    HSTNI D4 ng/L -2  --   --              Results from last 7 days   Lab Units 11/11/22  1703   TSH 3RD GENERATON uIU/mL 0 668     Results from last 7 days   Lab Units 11/11/22  1703   CLARITY UA  Clear   COLOR UA  Yellow   SPEC GRAV UA  1 018   PH UA  6 5   GLUCOSE UA mg/dl 70 (7/100%)*   KETONES UA mg/dl Trace*   BLOOD UA  Negative   PROTEIN UA mg/dl 30 (1+)*   NITRITE UA  Negative   BILIRUBIN UA  Negative   UROBILINOGEN UA (BE) mg/dl <2 0   LEUKOCYTES UA  Negative   WBC UA /hpf 1-2   RBC UA /hpf None Seen   BACTERIA UA /hpf Occasional   EPITHELIAL CELLS WET PREP /hpf None Seen   MUCUS THREADS  Occasional*             Results from last 7 days   Lab Units 11/11/22  1703   AMPH/METH  Negative   BARBITURATE UR  Negative   BENZODIAZEPINE UR  Negative   COCAINE UR  Negative   METHADONE URINE  Negative   OPIATE UR  Negative   PCP UR  Negative   THC UR  Negative     Results from last 7 days   Lab Units 11/11/22  1703 11/11/22  1552   ETHANOL LVL mg/dL  --  <3   ACETAMINOPHEN LVL ug/mL <2*  --    SALICYLATE LVL mg/dL 3  --        Past Medical History:   Diagnosis Date   • Colon polyp    • Diabetes mellitus (HCC)    • Hyperlipidemia    • Hypertension      Present on Admission:  • Alcohol use disorder, moderate, dependence (HCC)  • Accidental hydroxyzine overdose  • MDD (major depressive disorder), recurrent episode ([facility] [address] )  • Hypertension  • Mixed hyperlipidemia  • Benzodiazepine dependence (HCC)  • Diabetes mellitus type 2, noninsulin dependent (HCC)  • QT prolongation  • Benzodiazepine withdrawal with complication (HCC)      Admitting Diagnosis: Alcohol abuse  Age/Sex: 77 y o  male  Admission Orders:  Scheduled Medications:  amLODIPine, 10 mg, Oral, Daily  atorvastatin, 40 mg, Oral, Daily With Dinner  enoxaparin, 40 mg, Subcutaneous, Daily  folic acid, 1 mg, Oral, Daily  hydrochlorothiazide, 12 5 mg, Oral, Daily  metFORMIN, 1,000 mg, Oral, Daily With Breakfast  mirtazapine, 7 5 mg, Oral, HS  multivitamin-minerals, 1 tablet, Oral, Daily  naltrexone, 25 mg, Oral, Daily  nicotine, 21 mg, Transdermal, Daily  potassium chloride, 20 mEq, Intravenous, Q2H  thiamine, 500 mg, Intravenous, Q8H [address]      Continuous IV Infusions:  sodium chloride, 100 mL/hr, Intravenous, Continuous      PRN Meds:     Tele  Virtual continual observation  Continuous pulse ox  IP CONSULT TO CASE MANAGEMENT  IP CONSULT TO PSYCHIATRY    Network Utilization Review Department  ATTENTION: Please call with any questions or concerns to [phone] and carefully listen to the prompts so that you are directed to the right person  All voicemails are confidential   Rody Delgado all requests for admission clinical reviews, approved or denied determinations and any other requests to dedicated fax number below belonging to the campus where the patient is receiving treatment   List of dedicated fax numbers for the Facilities:  [address] DENIALS (Administrative/Medical Necessity) [phone]   [address] (Maternity/NICU/Pediatrics) [phone]   [address] Kasey Brewer [phone]   [place] [address] [phone]   [facility] [address] [place] [id number] [place] [address] [facility] [phone]   [address] [facility] 134 [address] [facility] [phone]

## 2022-11-13 NOTE — ASSESSMENT & PLAN NOTE
· 1 7  · Prolonged QTC at 509 ms  · Replete with 2g Magnesium Sulfate IV   · Continue to monitor EKG   · Continue telemetry

## 2022-11-14 LAB
ALBUMIN SERPL BCP-MCNC: 2.8 G/DL (ref 3.5–5)
ALP SERPL-CCNC: 171 U/L (ref 43–122)
ALT SERPL W P-5'-P-CCNC: 15 U/L
ANION GAP SERPL CALCULATED.3IONS-SCNC: 4 MMOL/L (ref 5–14)
ANION GAP SERPL CALCULATED.3IONS-SCNC: 5 MMOL/L (ref 5–14)
AST SERPL W P-5'-P-CCNC: 31 U/L (ref 17–59)
ATRIAL RATE: 111 BPM
ATRIAL RATE: 93 BPM
BASOPHILS # BLD AUTO: 0.03 THOUSANDS/ÂΜL (ref 0–0.1)
BASOPHILS NFR BLD AUTO: 0 % (ref 0–1)
BILIRUB SERPL-MCNC: 0.2 MG/DL (ref 0.2–1)
BUN SERPL-MCNC: 4 MG/DL (ref 5–25)
BUN SERPL-MCNC: 4 MG/DL (ref 5–25)
CALCIUM ALBUM COR SERPL-MCNC: 8.9 MG/DL (ref 8.3–10.1)
CALCIUM SERPL-MCNC: 7.9 MG/DL (ref 8.4–10.2)
CALCIUM SERPL-MCNC: 8.2 MG/DL (ref 8.4–10.2)
CHLORIDE SERPL-SCNC: 101 MMOL/L (ref 96–108)
CHLORIDE SERPL-SCNC: 103 MMOL/L (ref 96–108)
CO2 SERPL-SCNC: 28 MMOL/L (ref 21–32)
CO2 SERPL-SCNC: 30 MMOL/L (ref 21–32)
CREAT SERPL-MCNC: 0.56 MG/DL (ref 0.7–1.5)
CREAT SERPL-MCNC: 0.64 MG/DL (ref 0.7–1.5)
EOSINOPHIL # BLD AUTO: 0.14 THOUSAND/ÂΜL (ref 0–0.61)
EOSINOPHIL NFR BLD AUTO: 2 % (ref 0–6)
ERYTHROCYTE [DISTWIDTH] IN BLOOD BY AUTOMATED COUNT: 11.9 % (ref 11.6–15.1)
GFR SERPL CREATININE-BSD FRML MDRD: 102 ML/MIN/1.73SQ M
GFR SERPL CREATININE-BSD FRML MDRD: 107 ML/MIN/1.73SQ M
GLUCOSE SERPL-MCNC: 106 MG/DL (ref 65–140)
GLUCOSE SERPL-MCNC: 112 MG/DL (ref 70–99)
GLUCOSE SERPL-MCNC: 140 MG/DL (ref 70–99)
HCT VFR BLD AUTO: 36.4 % (ref 36.5–49.3)
HGB BLD-MCNC: 11.8 G/DL (ref 12–17)
IMM GRANULOCYTES # BLD AUTO: 0.02 THOUSAND/UL (ref 0–0.2)
IMM GRANULOCYTES NFR BLD AUTO: 0 % (ref 0–2)
LYMPHOCYTES # BLD AUTO: 1.31 THOUSANDS/ÂΜL (ref 0.6–4.47)
LYMPHOCYTES NFR BLD AUTO: 16 % (ref 14–44)
MAGNESIUM SERPL-MCNC: 1.5 MG/DL (ref 1.6–2.3)
MCH RBC QN AUTO: 30.6 PG (ref 26.8–34.3)
MCHC RBC AUTO-ENTMCNC: 32.4 G/DL (ref 31.4–37.4)
MCV RBC AUTO: 95 FL (ref 82–98)
MONOCYTES # BLD AUTO: 0.44 THOUSAND/ÂΜL (ref 0.17–1.22)
MONOCYTES NFR BLD AUTO: 5 % (ref 4–12)
NEUTROPHILS # BLD AUTO: 6.23 THOUSANDS/ÂΜL (ref 1.85–7.62)
NEUTS SEG NFR BLD AUTO: 77 % (ref 43–75)
NRBC BLD AUTO-RTO: 0 /100 WBCS
P AXIS: 49 DEGREES
P AXIS: 63 DEGREES
PLATELET # BLD AUTO: 145 THOUSANDS/UL (ref 149–390)
PMV BLD AUTO: 10 FL (ref 8.9–12.7)
POTASSIUM SERPL-SCNC: 2.8 MMOL/L (ref 3.5–5.3)
POTASSIUM SERPL-SCNC: 3.6 MMOL/L (ref 3.5–5.3)
PR INTERVAL: 136 MS
PR INTERVAL: 154 MS
PROT SERPL-MCNC: 5.7 G/DL (ref 6.4–8.4)
QRS AXIS: 60 DEGREES
QRS AXIS: 80 DEGREES
QRSD INTERVAL: 88 MS
QRSD INTERVAL: 96 MS
QT INTERVAL: 374 MS
QT INTERVAL: 374 MS
QTC INTERVAL: 465 MS
QTC INTERVAL: 508 MS
RBC # BLD AUTO: 3.85 MILLION/UL (ref 3.88–5.62)
SODIUM SERPL-SCNC: 135 MMOL/L (ref 135–147)
SODIUM SERPL-SCNC: 136 MMOL/L (ref 135–147)
T WAVE AXIS: 26 DEGREES
T WAVE AXIS: 45 DEGREES
VENTRICULAR RATE: 111 BPM
VENTRICULAR RATE: 93 BPM
WBC # BLD AUTO: 8.17 THOUSAND/UL (ref 4.31–10.16)

## 2022-11-14 RX ORDER — POTASSIUM CHLORIDE 20 MEQ/1
40 TABLET, EXTENDED RELEASE ORAL DAILY
Status: DISCONTINUED | OUTPATIENT
Start: 2022-11-14 | End: 2022-11-15 | Stop reason: HOSPADM

## 2022-11-14 RX ORDER — PHENOBARBITAL SODIUM 130 MG/ML
260 INJECTION INTRAMUSCULAR ONCE
Status: DISCONTINUED | OUTPATIENT
Start: 2022-11-14 | End: 2022-11-14

## 2022-11-14 RX ORDER — MAGNESIUM SULFATE HEPTAHYDRATE 40 MG/ML
2 INJECTION, SOLUTION INTRAVENOUS ONCE
Status: COMPLETED | OUTPATIENT
Start: 2022-11-14 | End: 2022-11-14

## 2022-11-14 RX ORDER — POTASSIUM CHLORIDE 14.9 MG/ML
20 INJECTION INTRAVENOUS ONCE
Status: COMPLETED | OUTPATIENT
Start: 2022-11-14 | End: 2022-11-14

## 2022-11-14 RX ADMIN — ENOXAPARIN SODIUM 40 MG: 100 INJECTION SUBCUTANEOUS at 08:32

## 2022-11-14 RX ADMIN — THIAMINE HYDROCHLORIDE 500 MG: 100 INJECTION, SOLUTION INTRAMUSCULAR; INTRAVENOUS at 21:30

## 2022-11-14 RX ADMIN — NALTREXONE HYDROCHLORIDE 25 MG: 50 TABLET, FILM COATED ORAL at 08:23

## 2022-11-14 RX ADMIN — SODIUM CHLORIDE 100 ML/HR: 0.9 INJECTION, SOLUTION INTRAVENOUS at 01:23

## 2022-11-14 RX ADMIN — AMLODIPINE BESYLATE 10 MG: 10 TABLET ORAL at 08:24

## 2022-11-14 RX ADMIN — POTASSIUM CHLORIDE 40 MEQ: 1500 TABLET, EXTENDED RELEASE ORAL at 08:23

## 2022-11-14 RX ADMIN — HYDROCHLOROTHIAZIDE 12.5 MG: 12.5 TABLET ORAL at 08:23

## 2022-11-14 RX ADMIN — NICOTINE 21 MG: 21 PATCH, EXTENDED RELEASE TRANSDERMAL at 08:24

## 2022-11-14 RX ADMIN — MIRTAZAPINE 7.5 MG: 7.5 TABLET, FILM COATED ORAL at 21:30

## 2022-11-14 RX ADMIN — METFORMIN HYDROCHLORIDE 1000 MG: 500 TABLET ORAL at 08:23

## 2022-11-14 RX ADMIN — THIAMINE HYDROCHLORIDE 500 MG: 100 INJECTION, SOLUTION INTRAMUSCULAR; INTRAVENOUS at 05:22

## 2022-11-14 RX ADMIN — MAGNESIUM SULFATE HEPTAHYDRATE 2 G: 2 INJECTION, SOLUTION INTRAVENOUS at 11:14

## 2022-11-14 RX ADMIN — THIAMINE HYDROCHLORIDE 500 MG: 100 INJECTION, SOLUTION INTRAMUSCULAR; INTRAVENOUS at 13:49

## 2022-11-14 RX ADMIN — POTASSIUM CHLORIDE 20 MEQ: 14.9 INJECTION, SOLUTION INTRAVENOUS at 08:24

## 2022-11-14 RX ADMIN — ATORVASTATIN CALCIUM 40 MG: 40 TABLET, FILM COATED ORAL at 15:34

## 2022-11-14 RX ADMIN — FOLIC ACID 1 MG: 1 TABLET ORAL at 08:23

## 2022-11-14 RX ADMIN — MULTIPLE VITAMINS W/ MINERALS TAB 1 TABLET: TAB ORAL at 08:23

## 2022-11-14 NOTE — PROGRESS NOTES
PROGRESS NOTE  DEPARTMENT OF MEDICAL TOXICOLOGY  LEVEL 4 MEDICAL DETOX UNIT  Noah Calix 77 y o  male MRN: [id number]  Unit/Bed#: 5T DETOX 141-80 Encounter: [id number]      Reason for Admission/Principal Problem: Hydroxyzine overdose, alcohol withdrawal syndrome with complication  Rounding Provider: Meaghan De La Cruz DO  Attending Provider: Yazan Monroe MD   11/11/2022  8:51 PM       * Accidental hydroxyzine overdose  Assessment & Plan  Presented to [place] ED on 11/11 approximately 24 hours post ingestion of 8-10 50 mg hydroxyzine tablets  States he "just wanted to sleep " Reports insomnia after recent alprazolam taper by PCP  · He reported feeling drowsy, thirsty, fatigued  Denies any loss of consciousness  · Has history of suicidal ideation with attempts by overdose  Reports taking hydroxyzine with his prescribed dose of mirtazepine and SSRI  Plan:  · Patient does not exhibit any respiratory status or anticholinergic symptoms upon reassessment this morning, 11/14/22  · Continue virtual 1:1   · Psychiatry consulted; restarted patient's Mirtazapine and recommend 201 if patient agreeable, to re-assess today, 11/14/22    Alcohol withdrawal syndrome with complication [facility])  Assessment & Plan  Last drink 11/10/22 evening   Serum alcohol < 3 mg/dL 11/10/22 in ED  Withdrawal appears well managed at this time     Plan:  · SEWS protocol discontinued  · Patient currently denies withdrawal symptoms of anxiety, tremors, nausea, or vomiting   · Patient eceived a total of 1820 mg of phenobarbital; last dose administered on 11/12/22 @ 1435   · Continue to monitor patient off of protocol  · Continue high dose thiamine, patient has received 7/9 doses   · Continue telemetry due to prolonged QTC and electrolyte abnormalities     Benzodiazepine withdrawal with complication ([facility] [address] )  Assessment & Plan  Low concern for NELIA agonist withdrawal due to his taper and last dose being approximately one week ago     · Nonetheless, will monitor clinically for any symptoms; patient was treated with SEWS protocol which has been discontinued given clinical improvement     Alcohol use disorder, moderate, dependence (HCC)  Assessment & Plan  Longstanding history of ETOH use disorder without withdrawal related seizure   Drinks 6 pack of 16oz beer daily  Does not endorse using vivitrol or oral naltrexone in the past     Plan:  · Continue IVF as patient continues to report poor PO intake  Continue high dose thiamine, daily folic acid supplementation, and MVI  Consult case management for assistance with disposition planning - pt interested in outpatient services    Benzodiazepine dependence ([place] [address] )  Assessment & Plan  Hx of benzodiazepine dependence  Was managed on alprazolam 0 25mg TID by PCP for insomnia and anxiety  · Reports he was tapered off over 3 weeks and on 11/10/22 due to worsened insomnia, he overused his hydroxyzine in attempt to experience similar effects  · Last dose of alprazolam was more than one week ago  Plan:  · Continue to monitor clinically for withdrawal symptoms  · Psychiatry consulted, appreciate recommendations       Hypomagnesemia  Assessment & Plan  Recent Labs     11/13/22  0438 11/13/22 2003 11/14/22  0451   K 2 7* 3 9 2 8*   MG 1 7 1 6 1 5*     Prolonged QTC at 509 ms on 11/13/22  Repleted with 2g Magnesium Sulfate IV x2 on 11/11 and 11/12 and x1 on 11/13/22    Plan:  · Replete with 2 g Magnesium sulfate IV  · Monitor with daily blood work   · Continue to monitor EKG   · Continue telemetry     QT prolongation  Assessment & Plan  EKG in the ED revealed QTc 627  Repeat EKG upon arrival to unit 516-->504-->515-->508--> 509    Plan:  Avoid QT prolonging agents, holding mirtazepine and paroxetine  Continue magnesium supplementation   Continue telemetry monitoring and routine EKG monitoring    MDD (major depressive disorder), recurrent episode ([place] [address] )  Assessment & Plan  Hx of anxiety and depression managed with mirtazepine and paroxetine daily   · History of inpatient psychiatric hospitalizations    · Paroxetine and mirtazapine held on admission in setting of QT prolongation    Plan:  · Psychiatry consulted, appreciate recommendations  · Mirtazapine restarted at 7 5 mg QHS  · Patient currently declining in patient psychiatric admission; psychiatry to re-assess  · Continue 1:1 virtual observation    Hypokalemia  Assessment & Plan  Recent Labs     11/13/22  0438 11/13/22 2003 11/14/22  0451   K 2 7* 3 9 2 8*   MG 1 7 1 6 1 5*     · Patient reports multiple episodes of diarrhea in the setting of alcohol withdrawal  · 11/12/22: repleted with 20 mEq KCL IV x 2 and 2g Magnesium Sulfate     Plan:  · Continue K Char 40 mEq daily  · Replete with 20 mEq KCl IV and 2 g magnesium sulfate IV  · Telemetry monitoring in place  · Continue to monitor BMP and replace electrolytes as needed       Tobacco use disorder  Assessment & Plan  · Daily tobacco use   · Offered NRT  · Encourage tobacco cessation     Mixed hyperlipidemia  Assessment & Plan  · Continue atorvastatin 40mg QHS   · Encourage outpatient follow up     Hypertension  Assessment & Plan  · Continue home regimen of amlodipine 10mg and HCTZ 12 5mg  · Monitor VS per unit protocol   · Encourage PCP follow up     Diabetes mellitus type 2, noninsulin dependent [facility])  Assessment & Plan  Lab Results   Component Value Date    HGBA1C 5 7 (H) 07/21/2022       Recent Labs     11/11/22  2318 11/12/22  1540 11/12/22  2237 11/13/22  2119   POCGLU 129 168* 160* 127       Blood Sugar Average: Last 72 hrs:  (P) 146     · Continue daily metformin regimen  · Daily POC glucose monitoring   · Last A1c 5 7 on 7/21/2022        VTE Pharmacologic Prophylaxis:   Pharmacologic: Enoxaparin (Lovenox)  Mechanical VTE Prophylaxis in Place: yes    Code Status: Level 1 - Full Code    Patient Centered Rounds: I have performed bedside rounds with nursing staff today      Discussions with Specialists or Other Care Team Provider: Nursing, Case management, Psychiatry     Education and Discussions with Family / Patient: yes    Time Spent for Care: 30 minutes  More than 50% of total time spent on counseling and coordination of care as described above  Current Length of Stay: 3 day(s)    Current Patient Status: Inpatient     Certification Statement: The patient will continue to require additional inpatient hospital stay due to alcohol withdrawal syndrome, hydroxyzine overdose, QT/QTc prolongation, hypokalemia, hypomagnesemia Discharge Plan: to be determined pending continued inpatient therapies and disposition planning      Subjective:   Patient seen and evaluated at bedside  Patient in no acute distress  Patient endorses depressed mood today and anxiety, he reports his anxiety is the same compared to prior  Patient continues to endorse decreased sleep  Patient endorses improving appetite though has decreased overall appetite  Patient denies N/V/C, reports no diarrheal episodes this morning, though notes diarrhea last night  Patient reports no suicidal or homicidal ideations, patient denied passive death wishes  Patient at this time does not wish to pursue an inpatient behavioral health admission, patient agreeable to continue to be seen by Psychiatry to determine disposition      Review of Systems - General ROS: positive for  - fatigue  negative for - chills or fever  Psychological ROS: positive for - anxiety, depression and sleep disturbances  negative for - hallucinations, irritability or suicidal ideation  ENT ROS: negative for - hearing change, tinnitus or visual changes  Respiratory ROS: no cough, shortness of breath, or wheezing  Cardiovascular ROS: no chest pain or dyspnea on exertion  Gastrointestinal ROS: positive for - diarrhea  negative for - abdominal pain, constipation, melena or nausea/vomiting  Musculoskeletal ROS: negative for - gait disturbance, muscle pain or muscular weakness  Neurological ROS: negative for - headaches, numbness/tingling, tremors or visual changes    Objective:     Clinical Opiate Withdrawal Scale  Pulse: 92    SEWS Total Score: 0 (2022  4:45 AM)        Last 24 Hours Medication List:   Current Facility-Administered Medications   Medication Dose Route Frequency Provider Last Rate   • amLODIPine  10 mg Oral Daily Margo Gardiner PA-C     • atorvastatin  40 mg Oral Daily With TipbitMARK     • enoxaparin  40 mg Subcutaneous Daily Claire FuentesMARK summers     • folic acid  1 mg Oral Daily Claire FuentesMARK summers     • hydrochlorothiazide  12 5 mg Oral Daily Margo Gardiner PA-C     • magnesium sulfate  2 g Intravenous Once Sara Downing, DO     • metFORMIN  1,000 mg Oral Daily With Breakfast MARK Melendez     • mirtazapine  7 5 mg Oral HS Margo Gardiner PA-C     • multivitamin-minerals  1 tablet Oral Daily Marzetta MARK Gray     • naltrexone  25 mg Oral Daily Margo Gardiner PA-C     • nicotine  21 mg Transdermal Daily Margo Gardiner PA-C     • potassium chloride  40 mEq Oral Daily Obioma Nasra Velasquez PA-C     • sodium chloride  100 mL/hr Intravenous Continuous Claire Fuentes, CRNP 100 mL/hr (22 0123)   • thiamine  500 mg Intravenous Q8H [address] Claire Fuentes, CRNP 500 mg (22 0522)         Vitals:   Temp (24hrs), Av 8 °F (36 6 °C), Min:97 3 °F (36 3 °C), Max:98 8 °F (37 1 °C)    Temp:  [97 3 °F (36 3 °C)-98 8 °F (37 1 °C)] 97 3 °F (36 3 °C)  HR:  [77-92] 92  Resp:  [16-18] 16  BP: (143-158)/(81-98) 154/86  SpO2:  [95 %-98 %] 98 %  Body mass index is 23 04 kg/m²  Input and Output Summary (last 24 hours): Intake/Output Summary (Last 24 hours) at 2022 1042  Last data filed at 2022 1559  Gross per 24 hour   Intake 950 ml   Output --   Net 950 ml       Physical Exam:   Physical Exam  Vitals reviewed  Constitutional:       General: He is not in acute distress       Appearance: He is not diaphoretic  HENT:      Head: Normocephalic and atraumatic  Right Ear: External ear normal       Nose: Nose normal       Mouth/Throat:      Mouth: Mucous membranes are moist       Pharynx: Oropharynx is clear  Eyes:      General: No scleral icterus  Extraocular Movements: Extraocular movements intact  Conjunctiva/sclera: Conjunctivae normal       Pupils: Pupils are equal, round, and reactive to light  Cardiovascular:      Rate and Rhythm: Normal rate and regular rhythm  Pulses: Normal pulses  Heart sounds: Normal heart sounds  No murmur heard  No friction rub  Pulmonary:      Effort: Pulmonary effort is normal  No respiratory distress  Breath sounds: Normal breath sounds  No wheezing, rhonchi or rales  Abdominal:      General: Abdomen is flat  Bowel sounds are normal       Palpations: Abdomen is soft  Tenderness: There is no abdominal tenderness  There is no guarding  Musculoskeletal:      Right lower leg: No edema  Left lower leg: No edema  Skin:     General: Skin is warm and dry  Capillary Refill: Capillary refill takes less than 2 seconds  Coloration: Skin is not jaundiced  Findings: No rash  Neurological:      Mental Status: He is alert and oriented to person, place, and time  Mental status is at baseline  GCS: GCS eye subscore is 4  GCS verbal subscore is 5  GCS motor subscore is 6  Motor: No tremor  Coordination: Finger-Nose-Finger Test normal    Psychiatric:         Attention and Perception: Attention normal  He does not perceive auditory or visual hallucinations  Mood and Affect: Mood is anxious and depressed  Behavior: Behavior is cooperative  Thought Content: Thought content does not include homicidal or suicidal ideation           Additional Data:     Labs:   Results from last 7 days   Lab Units 11/14/22  0451   WBC Thousand/uL 8 17   HEMOGLOBIN g/dL 11 8*   HEMATOCRIT % 36 4*   PLATELETS Thousands/uL 145*   NEUTROS PCT % 77*   LYMPHS PCT % 16   MONOS PCT % 5   EOS PCT % 2      Results from last 7 days   Lab Units 11/14/22  0451   SODIUM mmol/L 136   POTASSIUM mmol/L 2 8*   CHLORIDE mmol/L 103   CO2 mmol/L 28   BUN mg/dL 4*   CREATININE mg/dL 0 56*   ANION GAP mmol/L 5   CALCIUM mg/dL 7 9*   ALBUMIN g/dL 2 8*   TOTAL BILIRUBIN mg/dL 0 20   ALK PHOS U/L 171*   ALT U/L 15   AST U/L 31   GLUCOSE RANDOM mg/dL 112*           Results from last 7 days   Lab Units 11/13/22  2119 11/12/22  2237 11/12/22  1540 11/11/22  2318 11/11/22  1839   POC GLUCOSE mg/dl 127 160* 168* 129 148*                    * I Have Reviewed All Lab Data Listed Above  * Additional Pertinent Lab Tests Reviewed: [address] Admission Reviewed      Imaging Studies: I have personally reviewed pertinent reports  Recent Cultures (last 7 days): Today, Patient Was Seen By: Yovani Campbell DO    ** Please Note: Dictation voice to text software may have been used in the creation of this document   **

## 2022-11-14 NOTE — ASSESSMENT & PLAN NOTE
Low concern for NELIA agonist withdrawal due to his taper and last dose being approximately one week ago     · Nonetheless, will monitor clinically for any symptoms; patient was treated with SEWS protocol which has been discontinued given clinical improvement

## 2022-11-14 NOTE — ASSESSMENT & PLAN NOTE
Hx of benzodiazepine dependence  Was managed on alprazolam 0 25mg TID by PCP for insomnia and anxiety  · Reports he was tapered off over 3 weeks and on 11/10/22 due to worsened insomnia, he overused his hydroxyzine in attempt to experience similar effects  · Last dose of alprazolam was more than one week ago  Plan:  · Continue to monitor clinically for withdrawal symptoms  · Psychiatry consulted, appreciate recommendations

## 2022-11-14 NOTE — CASE MANAGEMENT
Worker spoke with pt regarding a 61 51 81 or discharge to home, pt reports he would like to go home  Pt reports he has a hearing on 12/1 and knows he will go to [facility] as it is not his first DUI  Pt declined for worker to arrange any outpatient drug and alcohol services

## 2022-11-14 NOTE — ASSESSMENT & PLAN NOTE
Hx of anxiety and depression managed with mirtazepine and paroxetine daily   · History of inpatient psychiatric hospitalizations    · Paroxetine and mirtazapine held on admission in setting of QT prolongation    Plan:  · Psychiatry consulted, appreciate recommendations  · Mirtazapine restarted at 7 5 mg QHS  · Patient currently declining in patient psychiatric admission; psychiatry to re-assess  · Continue 1:1 virtual observation

## 2022-11-14 NOTE — ASSESSMENT & PLAN NOTE
Lab Results   Component Value Date    HGBA1C 5 7 (H) 07/21/2022       Recent Labs     11/11/22  2318 11/12/22  1540 11/12/22  2237 11/13/22 2119   POCGLU 129 168* 160* 127       Blood Sugar Average: Last 72 hrs:  (P) 146     · Continue daily metformin regimen  · Daily POC glucose monitoring   · Last A1c 5 7 on 7/21/2022

## 2022-11-14 NOTE — CASE MANAGEMENT
Worker spoke with pt regarding outpatient psychiatric appointment, pt reports he is scheduled to see Dr Prashant Jacob on 1/17 but agreeable for worker to see if a sooner appointment can be arranged  Pt signed MARY LOU for [address], earliest appointment is 12/21 at 3:00pm via phone

## 2022-11-14 NOTE — ASSESSMENT & PLAN NOTE
Presented to [place] ED on 11/11 approximately 24 hours post ingestion of 8-10 50 mg hydroxyzine tablets  States he "just wanted to sleep " Reports insomnia after recent alprazolam taper by PCP  · He reported feeling drowsy, thirsty, fatigued  Denies any loss of consciousness  · Has history of suicidal ideation with attempts by overdose  Reports taking hydroxyzine with his prescribed dose of mirtazepine and SSRI  Plan:  · Patient does not exhibit any respiratory status or anticholinergic symptoms upon reassessment this morning, 11/14/22     · Continue virtual 1:1   · Psychiatry consulted; restarted patient's Mirtazapine and recommend 201 if patient agreeable, to re-assess today, 11/14/22

## 2022-11-14 NOTE — PROGRESS NOTES
Progress Note - Behavioral Health   Boris Griffiths 77 y o  male MRN: [id number]  Unit/Bed#: 5T DETOX 123-32 Encounter: [id number]    Assessment  Boris Griffiths is a at 70-year-old overtly appearing  male, single, no children, living in apartment by himself, retired on SSI with multiple inpatient prior psychiatric admissions and follows outpatient with psychiatrist Dr Idalia Peralta  Patient has past medical history of hypertension, mixed hyperlipidemia, type 2 diabetes, QT prolongation and past psychiatric history of major depressive disorder , alcohol use disorder, admitted for hydroxyzine overdose and electrolyte abnormalities  Predisposing factors include being unmarried with no pets her children, substance use, prior suicide attempt, male sex; precipitating factors include summons for upcoming DUI court appearance with high potential for retirement time, recent discontinuation of Xanax, perpetuating factors include poor coping skills and poor impulse control, protective factors include having a strong desire to live and and understanding that “I have to face and deal with” his DUI eyes  Additionally patient states that he is looking forward to family time and has lot to live for with regards to his nieces and nephews being that he is the oldest of 8 siblings  Patient denies SI, HI, AVH and currently does not meet criteria for a 302 or 201 commitment  Discussed at length with patient a safety plan that includes potentially staying with his siblings:  Carmen Borrego as well as reaching out to his neighbor Paul Martinez to see her every day if he needs to stay in his apartment as well as reaching out to Pa Lantigua and to his brother Jatinder's wife  Patient is okay with his main point of contact being [place]  Patient agrees to come to the hospital if he can ultimately not overcome his feeling of overwhelm should something unexpected happen between now and serving his potential retirement sentence      Risk of Harm to Self:   The following ratings are based on assessment at the time of the interview  Demographic risk factors include: , male, age: over 48 or older, Single  Historical Risk Factors include: chronic depression, history of suicide attempt, alcohol use, substance use  Current Specific Risk Factors include: recent inpatient psychiatric admission - being discharged today, significant legal issues pending, Recent accidental overdose  Protective Factors: no current suicidal ideation, stable mood, ability to make plans for the future, having a desire to be alive, having a desire to live, having a sense of purpose or meaning in life, responsibilities and duties to others, safe and stable living environment, strong relationships, supportive family, ability to contract for safety with staff, ability to communicate with staff  Weapons/Firearms: none  The following steps have been taken to ensure weapons are properly secured: not applicable  Based on today's assessment, Perla Lobo presents the following risk of harm to self: low    Risk of Harm to Others: The following ratings are based on assessment at the time of the interview  Demographic Risk Factors include: male, unemployed  Historical Risk Factors include: drug abuse, alcohol abuse  Current Specific Risk Factors include: recent difficulty with impulse control, multiple stressors  Protective Factors: no current homicidal ideation, willing to continue psychiatric treatment, outpatient follow up established  Weapons/Firearms: none  The following steps have been taken to ensure weapons are properly secured: not applicable  Based on today's assessment, Perla Lobo presents the following risk of harm to others: minimal      Principal Psychiatric Problem:  1  Unspecified Stress reaction rule out depression  2  Alcohol use disorder  3   Tobacco dependence    Principal Problem:    Hypokalemia  Active Problems:    Diabetes mellitus type 2, noninsulin dependent (HCC)    Hypertension    Mixed hyperlipidemia    Tobacco use disorder    Alcohol withdrawal syndrome with complication (HCC)    MDD (major depressive disorder), recurrent episode ([facility] )    Benzodiazepine dependence ([facility] )    Alcohol use disorder, moderate, dependence ([facility] )    Benzodiazepine withdrawal with complication (HCC)    Accidental hydroxyzine overdose    QT prolongation    Hypomagnesemia      Plan  1  Recommend discontinuation of Paxil on discharge as is known to cause QTC prolongation  on 11/13/22  a  No changes to any medications at this time, can consider increase to mirtazapine to 15mg  b  Patient knows to reach out to his support system, is agreeable to therapy, will begin going on walks and restarting enjoyable activities such as reading  2  Please reach out to on-call Psychiatry team via [employer] with any questions or concerns  3  Before DC, a provider (CM, primary team or psychiatry), must connect with patient family to go over safety planning, Tim May or Shea Lewis have been consented for  Attempted to reach twice each person, left voicemails        A  Safety plan is follows: must either stay with family or at his apartment (only if daily contact is made with Mulu Riddle, his neighbor), and he needs to make sure he is around other people for the holiday (Nov 25) before his summons Dec  1  Per patient, Tim May stated he would attend summons with patient but is unwilling to support beyond that  B  Patient knows that he can call 65 or other crisis line if he becomes overwhelmed/too anxious prior to legal summons Dec 1, and during timeframe before serving [facility] time  ------------------------------------------------------------    Subjective:     Hudson Dunlap is a at 70-year-old overtly appearing  male, single, no children, living in apartment by himself, retired on SSI with multiple inpatient prior psychiatric admissions and follows outpatient with psychiatrist Dr Bree Corey    Patient has past medical history of hypertension, mixed hyperlipidemia, type 2 diabetes, QT prolongation and past psychiatric history of major depressive disorder , alcohol use disorder, admitted for hydroxyzine overdose and electrolyte abnormalities  Patient was seen for follow-up today  Patient was considering 201 however would like to spend these next two weeks enjoying his “freedom” knowing that he has a court appearance that will likely and did [facility] time  Patient I discussed at length about his previous suicide attempts and what makes this overdose different than an actual suicide attempt  Patient is insistent that he has no SI, denies depressive symptoms of decreased concentration, anhedonia, change in appetite, pervasive and unnecessary guilt/worthlessness, endorses decreased sleep quality, states his mood is “fine”  Patient does however endorse anxiety and impulsiveness as specifically related to receiving a summons and he states he was just looking for relief for his anxiety  Patient gave verbal permission for this writer to speak to his brother Doc Elizabeth:  Attempted to call , left a voicemail  Reached out to sister-in-law per patient request and left voicemail  Psychiatric Review of Systems:  Behavior over the last 24 hours: improved  Sleep: difficulty falling asleep  Appetite: adequate  Medication side effects: none verbalized  ROS: Complete review of systems is negative except as noted above      Vital signs in last 24 hours:  Temp:  [97 3 °F (36 3 °C)-98 8 °F (37 1 °C)] 97 3 °F (36 3 °C)  HR:  [77-92] 92  Resp:  [16-18] 16  BP: (143-158)/(81-98) 154/86    Mental Status Exam:  Appearance:  alert, good eye contact, appears stated age, casually dressed, appropriate grooming and hygiene and Overtly appearing  male, in no apparent acute distress   Behavior:  calm and cooperative   Motor: no abnormal movements   Speech:  spontaneous, normal rate, normal volume and coherent   Mood:  "Fine"   Affect:  constricted, depressed and brighter than previous   Thought Process:  Organized, logical, goal-directed   Thought Content: no verbalized delusions or overt paranoia   Perceptual disturbances: denies current hallucinations and does not appear to be responding to internal stimuli at this time   Risk Potential: No active suicidal ideation, No active homicidal ideation   Cognition: oriented to person, place, time, and situation, memory grossly intact, appears to be of average intelligence and normal abstract reasoning   Insight:  Fair   Judgment: Fair     Current Medications: All current medications have been reviewed  Current Facility-Administered Medications   Medication Dose Route Frequency Provider Last Rate   • amLODIPine  10 mg Oral Daily Karena Archer PA-C     • atorvastatin  40 mg Oral Daily With KingdeeMARK     • enoxaparin  40 mg Subcutaneous Daily Claire FuentesMARK summers     • folic acid  1 mg Oral Daily Claire Fuentes, CRNP     • hydrochlorothiazide  12 5 mg Oral Daily Karena Archer PA-C     • magnesium sulfate  2 g Intravenous Once Sara Downing,      • metFORMIN  1,000 mg Oral Daily With Breakfast ClaireMARK Rowell     • mirtazapine  7 5 mg Oral HS Karena Archer PA-C     • multivitamin-minerals  1 tablet Oral Daily FátimaMARK Lucas     • naltrexone  25 mg Oral Daily Karena Archer PA-C     • nicotine  21 mg Transdermal Daily Karena Archer PA-C     • potassium chloride  40 mEq Oral Daily Obioma Nasra Velasquez PA-C     • sodium chloride  100 mL/hr Intravenous Continuous Claire Fuentes, CRNP 100 mL/hr (11/14/22 0123)   • thiamine  500 mg Intravenous Q8H [address] Claire Fuentes, CRNP 500 mg (11/14/22 0522)       Laboratory results:  I have personally reviewed all pertinent laboratory/tests results    Recent Results (from the past 48 hour(s))   Fingerstick Glucose (POCT)    Collection Time: 11/12/22  3:40 PM   Result Value Ref Range    POC Glucose 168 (H) 65 - 140 mg/dl   Fingerstick Glucose (POCT)    Collection Time: 11/12/22 10:37 PM   Result Value Ref Range    POC Glucose 160 (H) 65 - 140 mg/dl   CBC and Platelet    Collection Time: 11/13/22  4:38 AM   Result Value Ref Range    WBC 9 66 4 31 - 10 16 Thousand/uL    RBC 3 85 (L) 3 88 - 5 62 Million/uL    Hemoglobin 11 9 (L) 12 0 - 17 0 g/dL    Hematocrit 37 3 36 5 - 49 3 %    MCV 97 82 - 98 fL    MCH 30 9 26 8 - 34 3 pg    MCHC 31 9 31 4 - 37 4 g/dL    RDW 12 3 11 6 - 15 1 %    Platelets 122 (L) 947 - 390 Thousands/uL    MPV 10 1 8 9 - 12 7 fL   Comprehensive metabolic panel    Collection Time: 11/13/22  4:38 AM   Result Value Ref Range    Sodium 134 (L) 135 - 147 mmol/L    Potassium 2 7 (LL) 3 5 - 5 3 mmol/L    Chloride 101 96 - 108 mmol/L    CO2 29 21 - 32 mmol/L    ANION GAP 4 (L) 5 - 14 mmol/L    BUN 10 5 - 25 mg/dL    Creatinine 0 73 0 70 - 1 50 mg/dL    Glucose 112 (H) 70 - 99 mg/dL    Calcium 7 7 (L) 8 4 - 10 2 mg/dL    Corrected Calcium 8 7 8 3 - 10 1 mg/dL    AST 33 17 - 59 U/L    ALT 16 <50 U/L    Alkaline Phosphatase 171 (H) 43 - 122 U/L    Total Protein 5 7 (L) 6 4 - 8 4 g/dL    Albumin 2 8 (L) 3 5 - 5 0 g/dL    Total Bilirubin 0 49 0 20 - 1 00 mg/dL    eGFR 96 ml/min/1 73sq m   Magnesium    Collection Time: 11/13/22  4:38 AM   Result Value Ref Range    Magnesium 1 7 1 6 - 2 3 mg/dL   ECG 12 lead    Collection Time: 11/13/22  7:31 AM   Result Value Ref Range    Ventricular Rate 92 BPM    Atrial Rate 92 BPM    MD Interval 138 ms    QRSD Interval 94 ms    QT Interval 412 ms    QTC Interval 509 ms    P Axis 52 degrees    QRS Axis 52 degrees    T Wave Axis 56 degrees   Basic metabolic panel    Collection Time: 11/13/22  8:03 PM   Result Value Ref Range    Sodium 138 135 - 147 mmol/L    Potassium 3 9 3 5 - 5 3 mmol/L    Chloride 103 96 - 108 mmol/L    CO2 32 21 - 32 mmol/L    ANION GAP 3 (L) 5 - 14 mmol/L    BUN 7 5 - 25 mg/dL    Creatinine 0 72 0 70 - 1 50 mg/dL    Glucose 116 (H) 70 - 99 mg/dL    Calcium 8 1 (L) 8 4 - 10 2 mg/dL    eGFR 97 ml/min/1 73sq m   Magnesium    Collection Time: 11/13/22  8:03 PM   Result Value Ref Range    Magnesium 1 6 1 6 - 2 3 mg/dL   Fingerstick Glucose (POCT)    Collection Time: 11/13/22  9:19 PM   Result Value Ref Range    POC Glucose 127 65 - 140 mg/dl   CBC and differential    Collection Time: 11/14/22  4:51 AM   Result Value Ref Range    WBC 8 17 4 31 - 10 16 Thousand/uL    RBC 3 85 (L) 3 88 - 5 62 Million/uL    Hemoglobin 11 8 (L) 12 0 - 17 0 g/dL    Hematocrit 36 4 (L) 36 5 - 49 3 %    MCV 95 82 - 98 fL    MCH 30 6 26 8 - 34 3 pg    MCHC 32 4 31 4 - 37 4 g/dL    RDW 11 9 11 6 - 15 1 %    MPV 10 0 8 9 - 12 7 fL    Platelets 093 (L) 624 - 390 Thousands/uL    nRBC 0 /100 WBCs    Neutrophils Relative 77 (H) 43 - 75 %    Immat GRANS % 0 0 - 2 %    Lymphocytes Relative 16 14 - 44 %    Monocytes Relative 5 4 - 12 %    Eosinophils Relative 2 0 - 6 %    Basophils Relative 0 0 - 1 %    Neutrophils Absolute 6 23 1 85 - 7 62 Thousands/µL    Immature Grans Absolute 0 02 0 00 - 0 20 Thousand/uL    Lymphocytes Absolute 1 31 0 60 - 4 47 Thousands/µL    Monocytes Absolute 0 44 0 17 - 1 22 Thousand/µL    Eosinophils Absolute 0 14 0 00 - 0 61 Thousand/µL    Basophils Absolute 0 03 0 00 - 0 10 Thousands/µL   Comprehensive metabolic panel    Collection Time: 11/14/22  4:51 AM   Result Value Ref Range    Sodium 136 135 - 147 mmol/L    Potassium 2 8 (L) 3 5 - 5 3 mmol/L    Chloride 103 96 - 108 mmol/L    CO2 28 21 - 32 mmol/L    ANION GAP 5 5 - 14 mmol/L    BUN 4 (L) 5 - 25 mg/dL    Creatinine 0 56 (L) 0 70 - 1 50 mg/dL    Glucose 112 (H) 70 - 99 mg/dL    Calcium 7 9 (L) 8 4 - 10 2 mg/dL    Corrected Calcium 8 9 8 3 - 10 1 mg/dL    AST 31 17 - 59 U/L    ALT 15 <50 U/L    Alkaline Phosphatase 171 (H) 43 - 122 U/L    Total Protein 5 7 (L) 6 4 - 8 4 g/dL    Albumin 2 8 (L) 3 5 - 5 0 g/dL    Total Bilirubin 0 20 0 20 - 1 00 mg/dL    eGFR 107 ml/min/1 73sq m   Magnesium    Collection Time: 11/14/22  4:51 AM Result Value Ref Range    Magnesium 1 5 (L) 1 6 - 2 3 mg/dL        Nupur Must, DO  Psychiatry Residency, PGY-2

## 2022-11-14 NOTE — ASSESSMENT & PLAN NOTE
Recent Labs     11/13/22  0438 11/13/22 2003 11/14/22  0451   K 2 7* 3 9 2 8*   MG 1 7 1 6 1 5*     · Patient reports multiple episodes of diarrhea in the setting of alcohol withdrawal  · 11/12/22: repleted with 20 mEq KCL IV x 2 and 2g Magnesium Sulfate     Plan:  · Continue K Char 40 mEq daily  · Replete with 20 mEq KCl IV and 2 g magnesium sulfate IV  · Telemetry monitoring in place  · Continue to monitor BMP and replace electrolytes as needed

## 2022-11-14 NOTE — ASSESSMENT & PLAN NOTE
Recent Labs     11/13/22  0438 11/13/22 2003 11/14/22  0451   K 2 7* 3 9 2 8*   MG 1 7 1 6 1 5*     Prolonged QTC at 509 ms on 11/13/22  Repleted with 2g Magnesium Sulfate IV x2 on 11/11 and 11/12 and x1 on 11/13/22    Plan:  · Replete with 2 g Magnesium sulfate IV  · Monitor with daily blood work   · Continue to monitor EKG   · Continue telemetry

## 2022-11-14 NOTE — PROCEDURES
EKG 11/14/2022 1210: normal sinus rhythm, no acute ST segment elevation/depression  QTc shortened from 509 ms to 465 ms

## 2022-11-14 NOTE — ASSESSMENT & PLAN NOTE
Longstanding history of ETOH use disorder without withdrawal related seizure   Drinks 6 pack of 16oz beer daily  Does not endorse using vivitrol or oral naltrexone in the past     Plan:  · Continue IVF as patient continues to report poor PO intake  Continue high dose thiamine, daily folic acid supplementation, and MVI  Consult case management for assistance with disposition planning - pt interested in outpatient services

## 2022-11-14 NOTE — PHYSICAL THERAPY NOTE
Physical Therapy Evaluation    Patient's Name: Agnes Davalos    Admitting Diagnosis  Alcohol abuse    Problem List  Patient Active Problem List   Diagnosis    Atherosclerotic PVD with intermittent claudication ([facility] )    Diabetes mellitus type 2, noninsulin dependent ([facility]  )    Hypertension    Mixed hyperlipidemia    Type 2 diabetes mellitus, without long-term current use of insulin ([facility] )    Tobacco use disorder    Rosacea    Rhinophyma    Bilateral carotid artery stenosis    Chronic bronchitis (HCC)    Alcohol withdrawal syndrome with complication (HCC)    Hypokalemia    MDD (major depressive disorder), recurrent episode ([facility] )    Benzodiazepine dependence ([facility] )    Closed fracture of left proximal humerus    Alcohol abuse    Alcohol use disorder, moderate, dependence ([facility]  )    Benzodiazepine withdrawal with complication (HCC)    Accidental hydroxyzine overdose    QT prolongation    Hypomagnesemia       Past Medical History  Past Medical History:   Diagnosis Date    Colon polyp     Diabetes mellitus ([facility] )     Hyperlipidemia     Hypertension        Past Surgical History  Past Surgical History:   Procedure Laterality Date    COLON SURGERY      found growth, bengin    COLONOSCOPY      COLONOSCOPY N/A 3/13/2018    Procedure: COLONOSCOPY;  Surgeon: Marc Mo DO;  Location: [facility] GI LAB; Service: Gastroenterology    ORIF HUMERUS FRACTURE Left 10/6/2022    Procedure: OPEN REDUCTION W/ INTERNAL FIXATION (ORIF) HUMERUS (SHOULDER);   Surgeon: Luis Miguel Najera MD;  Location: [address] [place] [address];  Service: Orthopedics       Recent Imaging  No orders to display       Recent Vital Signs  Vitals:    11/13/22 1537 11/13/22 2002 11/14/22 0052 11/14/22 0720   BP: 143/83 147/81 158/98 154/86   BP Location: Right arm Right arm Right arm Right arm   Pulse: 85 85 77 92   Resp: 18 18 16 16   Temp: 98 8 °F (37 1 °C) 97 5 °F (36 4 °C) 97 5 °F (36 4 °C) (!) 97 3 °F (36 3 °C)   TempSrc: Temporal Temporal Temporal Temporal   SpO2: 95% 96% 96% 98% Weight:       Height:            11/14/22 1130   PT Last Visit   PT Visit Date 11/14/22   Note Type   Note type Evaluation   Pain Assessment   Pain Assessment Tool 0-10   Pain Score No Pain   Home Living   Type of Home House   Prior Function   Level of [place] Independent with ADLs; Independent with functional mobility; Independent with IADLS   Lives With Alone   Receives Help From Family   General   Family/Caregiver Present No   Cognition   Arousal/Participation Cooperative   Orientation Level Oriented X4   Memory Within functional limits   Following Commands Follows all commands and directions without difficulty   RLE Assessment   RLE Assessment   (4/5)   LLE Assessment   LLE Assessment   (4/5)   Coordination   Movements are Fluid and Coordinated 0   Coordination and Movement Description very mild unsteadiness, decreased to LUE   Sensation X   Light Touch   RLE Light Touch Impaired   LLE Light Touch Impaired   Bed Mobility   Supine to Sit 6  Modified independent   Additional items Increased time required   Sit to Supine 6  Modified independent   Additional items Increased time required   Transfers   Sit to Stand 5  Supervision   Additional items Increased time required   Stand to Sit 5  Supervision   Additional items Increased time required   Ambulation/Elevation   Gait pattern Step through pattern;Decreased heel strike;Decreased toe off; Short stride;Decreased foot clearance; Improper Weight shift   Gait Assistance 5  Supervision   Assistive Device None   Distance 250ft   Balance   Static Sitting Fair +   Dynamic Sitting Fair +   Static Standing Fair   Dynamic Standing Fair -   Ambulatory Fair -   Endurance Deficit   Endurance Deficit Yes   Endurance Deficit Description fatigue and deconditioning   Activity Tolerance   Activity Tolerance Patient tolerated treatment well   Medical Staff Made Aware spoke to CM   Nurse Made Aware spoke to RN   Assessment   Prognosis Good   Problem List Decreased strength;Decreased endurance; Impaired balance;Decreased mobility; Decreased coordination; Impaired sensation   Barriers to Discharge Inaccessible home environment;Decreased caregiver support   Goals   Patient Goals to go home   Recommendation   PT Discharge Recommendation Home with outpatient rehabilitation   [address] Mobility Inpatient   Turning in Bed Without Bedrails 4   Lying on Back to Sitting on Edge of Flat Bed 4   Moving Bed to Chair 4   Standing Up From Chair 3   Walk in Room 3   Climb 3-5 Stairs 3   Basic Mobility Inpatient Raw Score 21   Basic Mobility Standardized Score 45 55   Highest Level Of Mobility   -HLM Goal 6: Walk 10 steps or more   JH-HLM Achieved 8: Walk 250 feet ot more   End of Consult   Patient Position at End of Consult Seated edge of bed;Bed/Chair alarm activated         ASSESSMENT                                                                                                                     Vita Garcia is a 77 y o  male admitted to [facility] on 11/11/2022 for Hypokalemia  Pt  has a past medical history of Colon polyp, Diabetes mellitus ([address] ), Hyperlipidemia, and Hypertension    PT was consulted and pt was seen on 11/14/2022 for mobility assessment and d/c planning  Pt presents supine in bed alert and agreeable to therapy  Impairments limiting pt at this time include impaired balance, decreased endurance, decreased coordination, new onset of impairment of functional mobility, decreased activity tolerance, decreased sensation, and decreased strength  Pt is currently functioning at a modified independent assistance level for bed mobility, supervision assistance x1 level for transfers, supervision assistance x1 level for ambulation with no assistive device  The patient's AM-PAC Basic Mobility Inpatient Short Form Raw Score is 21  A Raw score of greater than 16 suggests the patient may benefit from discharge to home   Please also refer to the recommendation of the Physical Therapist for safe discharge planning      Recommendations                                                                                                                  DME: None    Discharge Disposition:  Home with Outpatient Physical Therapy       Marlen Faustin PT, DPT

## 2022-11-14 NOTE — ASSESSMENT & PLAN NOTE
Last drink 11/10/22 evening   Serum alcohol < 3 mg/dL 11/10/22 in ED  Withdrawal appears well managed at this time     Plan:  · SEWS protocol discontinued  · Patient currently denies withdrawal symptoms of anxiety, tremors, nausea, or vomiting   · Patient eceived a total of 1820 mg of phenobarbital; last dose administered on 11/12/22 @ 1435   · Continue to monitor patient off of protocol  · Continue high dose thiamine, patient has received 7/9 doses   · Continue telemetry due to prolonged QTC and electrolyte abnormalities

## 2022-11-14 NOTE — ASSESSMENT & PLAN NOTE
EKG in the ED revealed QTc 627  Repeat EKG upon arrival to unit 516-->504-->515-->508--> 509    Plan:  Avoid QT prolonging agents, holding mirtazepine and paroxetine  Continue magnesium supplementation   Continue telemetry monitoring and routine EKG monitoring

## 2022-11-15 VITALS
BODY MASS INDEX: 23.11 KG/M2 | SYSTOLIC BLOOD PRESSURE: 168 MMHG | HEIGHT: 69 IN | DIASTOLIC BLOOD PRESSURE: 94 MMHG | TEMPERATURE: 97.2 F | OXYGEN SATURATION: 97 % | RESPIRATION RATE: 17 BRPM | HEART RATE: 93 BPM | WEIGHT: 156 LBS

## 2022-11-15 PROBLEM — F10.939 ALCOHOL WITHDRAWAL SYNDROME WITH COMPLICATION (HCC): Status: RESOLVED | Noted: 2022-09-21 | Resolved: 2022-11-15

## 2022-11-15 PROBLEM — D69.6 THROMBOCYTOPENIA (HCC): Status: ACTIVE | Noted: 2022-11-15

## 2022-11-15 PROBLEM — E87.6 HYPOKALEMIA: Status: RESOLVED | Noted: 2022-10-05 | Resolved: 2022-11-15

## 2022-11-15 PROBLEM — E83.42 HYPOMAGNESEMIA: Status: RESOLVED | Noted: 2022-11-13 | Resolved: 2022-11-15

## 2022-11-15 PROBLEM — T43.591A: Status: RESOLVED | Noted: 2022-11-11 | Resolved: 2022-11-15

## 2022-11-15 PROBLEM — F13.939 BENZODIAZEPINE WITHDRAWAL WITH COMPLICATION (HCC): Status: RESOLVED | Noted: 2022-11-11 | Resolved: 2022-11-15

## 2022-11-15 PROBLEM — R94.31 QT PROLONGATION: Status: RESOLVED | Noted: 2022-11-12 | Resolved: 2022-11-15

## 2022-11-15 LAB
ANION GAP SERPL CALCULATED.3IONS-SCNC: 6 MMOL/L (ref 5–14)
BUN SERPL-MCNC: 4 MG/DL (ref 5–25)
CALCIUM SERPL-MCNC: 8.6 MG/DL (ref 8.4–10.2)
CHLORIDE SERPL-SCNC: 102 MMOL/L (ref 96–108)
CO2 SERPL-SCNC: 25 MMOL/L (ref 21–32)
CREAT SERPL-MCNC: 0.51 MG/DL (ref 0.7–1.5)
ERYTHROCYTE [DISTWIDTH] IN BLOOD BY AUTOMATED COUNT: 12.1 % (ref 11.6–15.1)
GFR SERPL CREATININE-BSD FRML MDRD: 112 ML/MIN/1.73SQ M
GLUCOSE SERPL-MCNC: 109 MG/DL (ref 70–99)
HCT VFR BLD AUTO: 38.3 % (ref 36.5–49.3)
HGB BLD-MCNC: 12.2 G/DL (ref 12–17)
MAGNESIUM SERPL-MCNC: 1.7 MG/DL (ref 1.6–2.3)
MCH RBC QN AUTO: 30.6 PG (ref 26.8–34.3)
MCHC RBC AUTO-ENTMCNC: 31.9 G/DL (ref 31.4–37.4)
MCV RBC AUTO: 96 FL (ref 82–98)
PLATELET # BLD AUTO: 148 THOUSANDS/UL (ref 149–390)
PMV BLD AUTO: 9.6 FL (ref 8.9–12.7)
POTASSIUM SERPL-SCNC: 3.5 MMOL/L (ref 3.5–5.3)
RBC # BLD AUTO: 3.99 MILLION/UL (ref 3.88–5.62)
SODIUM SERPL-SCNC: 133 MMOL/L (ref 135–147)
WBC # BLD AUTO: 6.94 THOUSAND/UL (ref 4.31–10.16)

## 2022-11-15 RX ORDER — UREA 10 %
500 LOTION (ML) TOPICAL 2 TIMES DAILY
Qty: 14 TABLET | Refills: 0 | Status: SHIPPED | OUTPATIENT
Start: 2022-11-15 | End: 2022-11-22

## 2022-11-15 RX ORDER — HYDROCHLOROTHIAZIDE 12.5 MG/1
12.5 TABLET ORAL DAILY
Qty: 30 TABLET | Refills: 0 | Status: SHIPPED | OUTPATIENT
Start: 2022-11-15

## 2022-11-15 RX ORDER — AMLODIPINE BESYLATE 10 MG/1
10 TABLET ORAL DAILY
Qty: 30 TABLET | Refills: 0 | Status: SHIPPED | OUTPATIENT
Start: 2022-11-15

## 2022-11-15 RX ORDER — MIRTAZAPINE 7.5 MG/1
7.5 TABLET, FILM COATED ORAL
Qty: 30 TABLET | Refills: 0 | Status: SHIPPED | OUTPATIENT
Start: 2022-11-15

## 2022-11-15 RX ORDER — FOLIC ACID 1 MG/1
1 TABLET ORAL DAILY
Refills: 0
Start: 2022-11-16

## 2022-11-15 RX ORDER — FOLIC ACID 1 MG/1
1 TABLET ORAL DAILY
Status: DISCONTINUED | OUTPATIENT
Start: 2022-11-15 | End: 2022-11-15 | Stop reason: HOSPADM

## 2022-11-15 RX ORDER — ATORVASTATIN CALCIUM 40 MG/1
40 TABLET, FILM COATED ORAL DAILY
Qty: 30 TABLET | Refills: 0 | Status: SHIPPED | OUTPATIENT
Start: 2022-11-15

## 2022-11-15 RX ORDER — NALTREXONE HYDROCHLORIDE 50 MG/1
50 TABLET, FILM COATED ORAL DAILY
Qty: 30 TABLET | Refills: 0 | Status: SHIPPED | OUTPATIENT
Start: 2022-11-16

## 2022-11-15 RX ORDER — POTASSIUM CHLORIDE 20 MEQ/1
40 TABLET, EXTENDED RELEASE ORAL DAILY
Qty: 7 TABLET | Refills: 0 | Status: SHIPPED | OUTPATIENT
Start: 2022-11-16

## 2022-11-15 RX ORDER — THIAMINE MONONITRATE (VIT B1) 100 MG
100 TABLET ORAL DAILY
Refills: 0
Start: 2022-11-16

## 2022-11-15 RX ORDER — LANOLIN ALCOHOL/MO/W.PET/CERES
100 CREAM (GRAM) TOPICAL DAILY
Status: DISCONTINUED | OUTPATIENT
Start: 2022-11-15 | End: 2022-11-15 | Stop reason: HOSPADM

## 2022-11-15 RX ADMIN — POTASSIUM CHLORIDE 40 MEQ: 1500 TABLET, EXTENDED RELEASE ORAL at 08:24

## 2022-11-15 RX ADMIN — SODIUM CHLORIDE 100 ML/HR: 0.9 INJECTION, SOLUTION INTRAVENOUS at 00:14

## 2022-11-15 RX ADMIN — THIAMINE HYDROCHLORIDE 500 MG: 100 INJECTION, SOLUTION INTRAMUSCULAR; INTRAVENOUS at 06:08

## 2022-11-15 RX ADMIN — AMLODIPINE BESYLATE 10 MG: 10 TABLET ORAL at 08:24

## 2022-11-15 RX ADMIN — NALTREXONE HYDROCHLORIDE 25 MG: 50 TABLET, FILM COATED ORAL at 08:24

## 2022-11-15 RX ADMIN — Medication 100 MG: at 08:24

## 2022-11-15 RX ADMIN — HYDROCHLOROTHIAZIDE 12.5 MG: 12.5 TABLET ORAL at 08:24

## 2022-11-15 RX ADMIN — MULTIPLE VITAMINS W/ MINERALS TAB 1 TABLET: TAB ORAL at 08:24

## 2022-11-15 RX ADMIN — ENOXAPARIN SODIUM 40 MG: 100 INJECTION SUBCUTANEOUS at 08:25

## 2022-11-15 RX ADMIN — NICOTINE 21 MG: 21 PATCH, EXTENDED RELEASE TRANSDERMAL at 08:31

## 2022-11-15 RX ADMIN — FOLIC ACID 1 MG: 1 TABLET ORAL at 08:25

## 2022-11-15 RX ADMIN — METFORMIN HYDROCHLORIDE 1000 MG: 500 TABLET ORAL at 08:25

## 2022-11-15 NOTE — ASSESSMENT & PLAN NOTE
· Last drink 11/10/22 evening   Serum alcohol < 3 mg/dL 11/10/22 in ED  SEWS protocol with symptom-triggered phenobarbital followed for medical management of alcohol withdrawal   · Received 1820 mg total phenobarbital, last dose administered 11/12/2022 1435  · Remains stable off phenobarbital aside from underlying HTN- no tremors  Acute withdrawal resolved

## 2022-11-15 NOTE — ASSESSMENT & PLAN NOTE
· Continue home regimen of amlodipine 10 mg and HCTZ 12 5 mg  · Recommend outpatient f/u PCP for ongoing monitoring/maintenance

## 2022-11-15 NOTE — ASSESSMENT & PLAN NOTE
· Presented to [place] ED on 11/11 approximately 24 hours post ingestion of 8-10 50 mg hydroxyzine tablets  · Reports taking hydroxyzine with his prescribed dose of mirtazepine and SSRI  · Has history of suicidal ideation with attempts by overdose  · Patient remains stable from toxicology perspective- does not exhibit any respiratory status or anticholinergic symptoms upon reassessment this morning, 11/15/22     · Psychiatry consulted, appreciate recommendations  · restarted patient's Mirtazapine  · Continue to hold Paxil until QTc <450 ms (465 ms on 11/14)  · Virtual 1:1 discontinued   · Does not meet criteria for inpatient mental health treatment  · Recommend outpatient psychiatry follow-up

## 2022-11-15 NOTE — ASSESSMENT & PLAN NOTE
· EKG in the ED 11/11/2022 revealed QTc 627 ms  QTc monitored throughout admission with serial EKGs: QTc shortened to 465 ms 11/14/2022  Continue to avoid QT-prolonging agents  Continue to optimize electrolytes   Recommend routine outpatient f/u PCP

## 2022-11-15 NOTE — DISCHARGE SUMMARY
[address]  Discharge- Jamilah Perfect 1955, 77 y o  male MRN: [id number]  Unit/Bed#: 5T DETOX 551-22 Encounter: [id number]  Primary Care Provider: Mason Shepard MD   Date and time admitted to hospital: 11/11/2022  8:51 PM    [facility], LEVEL 4  Department of Medical Toxicology  Reason for Admission/Principal Problem: alcohol and benzodiazepine withdrawal   Admitting provider: TIARRA Gallardo MD   11/11/2022  8:51 PM       Discharging Physician / Practitioner: Jenni Morris PA-C  PCP: Mason Shepard MD  Admission Date:   Admission Orders (From admission, onward)     Ordered        11/11/22 2207  Inpatient Admission  Once                      Discharge Date: 11/15/22    Medical Problems             Resolved Problems  Date Reviewed: 11/14/2022          Resolved    Alcohol withdrawal syndrome with complication ([facility] [address] ) 53/28/3405     Resolved by  Roger Maldonado PA-C    * (Principal) Hypokalemia 11/15/2022     Resolved by  Roger Maldonado PA-C    Benzodiazepine withdrawal with complication ([facility] [address] ) 49/51/0815     Resolved by  Roger Maldonado PA-C    Accidental hydroxyzine overdose 11/15/2022     Resolved by  Roger Maldonado PA-C    QT prolongation 11/15/2022     Resolved by  Roger Maldonado PA-C    Hypomagnesemia 11/15/2022     Resolved by  Rgoer Maldonado PA-C                Alcohol withdrawal syndrome with complication (HCC)-resolved as of 11/15/2022  Assessment & Plan  · Last drink 11/10/22 evening   Serum alcohol < 3 mg/dL 11/10/22 in ED  SEWS protocol with symptom-triggered phenobarbital followed for medical management of alcohol withdrawal   · Received 1820 mg total phenobarbital, last dose administered 11/12/2022 1435  · Remains stable off phenobarbital aside from underlying HTN- no tremors  Acute withdrawal resolved     Accidental hydroxyzine overdose-resolved as of 11/15/2022  Assessment & Plan  · Presented to [place] ED on 11/11 approximately 24 hours post ingestion of 8-10 50 mg hydroxyzine tablets  · Reports taking hydroxyzine with his prescribed dose of mirtazepine and SSRI  · Has history of suicidal ideation with attempts by overdose  · Patient remains stable from toxicology perspective- does not exhibit any respiratory status or anticholinergic symptoms upon reassessment this morning, 11/15/22  · Psychiatry consulted, appreciate recommendations  · restarted patient's Mirtazapine  · Continue to hold Paxil until QTc <450 ms (465 ms on 11/14)  · Virtual 1:1 discontinued   · Does not meet criteria for inpatient mental health treatment  · Recommend outpatient psychiatry follow-up      Thrombocytopenia ([facility] [address] )  Assessment & Plan  · CBC this AM revealed very mild stable thrombocytopenia   · Possibly 2/2 myelosuppression from chronic alcohol use  · Continue thiamine and folic acid supplementation  · Routine outpatient monitoring  · Encourage alcohol cessation     Alcohol use disorder, moderate, dependence ([facility] )  Assessment & Plan  · Longstanding history of ETOH use disorder   Drinks 6 pack of 16oz beer daily  Naltrexone started 11/13/2022- continue   Continue daily thiamine, daily folic acid supplementation, and MVI  · Completed high-dose thiamine regimen x 3 days   Consult case management for assistance with disposition planning - pt currently declines drug/alcohol services upon discharge    Benzodiazepine dependence ([facility] )  Assessment & Plan  · Hx of benzodiazepine dependence  Was prescribed alprazolam 0 25mg TID for insomnia and anxiety  · PDMP reviewed- last script filled 9/26/2022 (30 tabs / 8 days)  · Reports he was tapered off over 3 weeks and, on 11/10/22 due to worsened insomnia, he overused his hydroxyzine in attempt to experience similar effects  · Reports Last dose of alprazolam was more than one week ago    · Withdrawal managed as above   · Educated patient regarding risks of long-term benzodiazepine use, especially in conjunction with alcohol use  · Psychiatry consulted, appreciate recommendations       MDD (major depressive disorder), recurrent episode ([address] )  Assessment & Plan  · Hx of anxiety and depression managed with mirtazepine and paroxetine daily   · History of inpatient psychiatric hospitalizations - most recently 10/7-10/18/2022  · Paroxetine and mirtazapine initially held on admission in setting of QT prolongation  · Psychiatry consulted, appreciate recommendations  · Mirtazapine restarted at 7 5 mg QHS  · Patient currently declining in patient psychiatric admission; psychiatry cleared patient for discharge home with outpatient f/u   · Continue 1:1 virtual observation    Tobacco use disorder  Assessment & Plan  · Daily tobacco use   · Offered NRT  · Encourage tobacco cessation     Mixed hyperlipidemia  Assessment & Plan  · Continue atorvastatin 40 mg daily   · Encourage healthy diet, exercise   · Recommend outpatient follow up PCP    Hypertension  Assessment & Plan  · Continue home regimen of amlodipine 10 mg and HCTZ 12 5 mg  · Recommend outpatient f/u PCP for ongoing monitoring/maintenance    Diabetes mellitus type 2, noninsulin dependent [facility])  Assessment & Plan  Lab Results   Component Value Date    HGBA1C 5 7 (H) 07/21/2022       Recent Labs     11/12/22  1540 11/12/22  2237 11/13/22  2119 11/14/22  2134   POCGLU 168* 160* 127 106       Blood Sugar Average: Last 72 hrs:  (P) 140 25     · Continue daily metformin regimen  · encourage Diabetic diet, exercise  · Recommend outpatient f/u PCP for ongoing monitoring/maintenance     Hypomagnesemia-resolved as of 11/15/2022  Assessment & Plan  Recent Labs     11/13/22  2003 11/14/22  0451 11/14/22  1416 11/15/22  0802   K 3 9 2 8* 3 6 3 5   MG 1 6 1 5*  --  1 7     · Electrolytes appear stable this AM  · Continue daily supplementation on discharge  · Routine outpatient monitoring- provided with lab script for outpatient BMP, Mg  · Recommend outpatient f/u PCP    QT prolongation-resolved as of 11/15/2022  Assessment & Plan  · EKG in the ED 11/11/2022 revealed QTc 627 ms  QTc monitored throughout admission with serial EKGs: QTc shortened to 465 ms 11/14/2022  Continue to avoid QT-prolonging agents  Continue to optimize electrolytes   Recommend routine outpatient f/u PCP    Benzodiazepine withdrawal with complication (HCC)-resolved as of 11/15/2022  Assessment & Plan  · Low concern for NELIA agonist withdrawal due to his taper and last dose being approximately one week ago  · Monitored on SEWS protocol with symptom-triggered phenobarbital (refer to 'alcohol withdrawal' above)  · Acute NELIA-agonist withdrawal resolved     * Hypokalemia-resolved as of 11/15/2022  Assessment & Plan  Recent Labs     11/13/22  2003 11/14/22  0451 11/14/22  1416 11/15/22  0802   K 3 9 2 8* 3 6 3 5   MG 1 6 1 5*  --  1 7     · Electrolytes appear stable this AM  · Continue daily supplementation on discharge  · Routine outpatient monitoring- provided with lab script for outpatient BMP, Mg  · Recommend outpatient f/u PCP    Consultations During Hospital Stay:  · Case management   · Psychiatry     Procedures Performed:   · None     Significant Findings / Test Results:   · Hypokalemia  · Hypomagnesemia  · Prolonged QTc- shortened    Incidental Findings:   · none     Test Results Pending at Discharge (will require follow up):   · none     Outpatient Tests Requested:  · BMP  · Mg    Complications:  none    Reason for Admission: alcohol and benzodiazepine withdrawal     Hospital Course:     Maxwell Stringer is a 77 y o  male patient PMH AUD, benzodiazepine dependence, HTN, DM2, HLD, MDD who originally presented to the hospital on 11/11/2022 due to alcohol and benzodiazepine withdrawal and accidental overdose on hydroxyzine   Patient originally presented to [facility] ED 11/11/2022 following accidental overdose of hydroxyzine and mirtazapine and evaluation of alcohol withdrawal  Patient subsequently admitted to [facility] detox unit for medically-assisted alcohol withdrawal  SEWS protocol with symptom-triggered phenobarbital was followed during admission  In total, patient received 1820 mg phenobarbital, last dose administered 11/12/2022 1435  Patient started on naltrexone during admission  Case management followed patient during admission  Patient declined drug and alcohol resources on discharge  Of note, patient was monitored on virtual 1:1 observation during admission  Psychiatry team was consulted during admission, mirtazapine restarted, 1:1 observation discontinued 11/14, and patient cleared from psychiatry stand point for discharge home without patient psychiatry follow up  Labs were monitored throughout admission, electrolytes supplemented as indicated and QTc prolongation resolved  Patient discharged on daily magnesium and potassium supplementation and encouraged to f/u with PCP  Please see above list of diagnoses and related plan for additional information  Condition at Discharge: good     Discharge Day Visit / Exam:     Subjective:  Patient seen and examined bedside this morning  Reports he is feeling good this morning, wants to go home  Currently denies headaches, lightheadedness/dizziness, coughing/sneezing/congestion/rhinorrhea, chest pain, SOB/dyspnea, abdominal pain, N/V/C, dysuria/hematuria, hallucinations  Vitals: Blood Pressure: 168/94 (11/15/22 0734)  Pulse: 93 (11/15/22 0734)  Temperature: (!) 97 2 °F (36 2 °C) (11/15/22 0734)  Temp Source: Temporal (11/15/22 0734)  Respirations: 17 (11/15/22 0734)  Height: 5' 9" (175 3 cm) (11/11/22 2104)  Weight - Scale: 70 8 kg (156 lb) (11/11/22 2104)  SpO2: 97 % (11/15/22 0734)  Exam:   Physical Exam  Vitals and nursing note reviewed  Constitutional:       General: He is not in acute distress  Appearance: Normal appearance  He is well-developed and normal weight  He is not ill-appearing or diaphoretic     HENT:      Head: Normocephalic and atraumatic  Eyes:      General: No scleral icterus  Extraocular Movements: Extraocular movements intact  Right eye: No nystagmus  Left eye: No nystagmus  Conjunctiva/sclera: Conjunctivae normal       Pupils: Pupils are equal, round, and reactive to light  Cardiovascular:      Rate and Rhythm: Normal rate and regular rhythm  Pulses: Normal pulses  Dorsalis pedis pulses are 2+ on the right side and 2+ on the left side  Posterior tibial pulses are 2+ on the right side and 2+ on the left side  Heart sounds: Normal heart sounds  No murmur heard  No friction rub  No gallop  Pulmonary:      Effort: Pulmonary effort is normal  No respiratory distress  Breath sounds: Normal breath sounds  No wheezing, rhonchi or rales  Abdominal:      General: Abdomen is flat  Bowel sounds are normal  There is no distension  Palpations: Abdomen is soft  Tenderness: There is no abdominal tenderness  There is no guarding  Musculoskeletal:         General: Normal range of motion  Cervical back: Normal range of motion and neck supple  Right lower leg: No edema  Left lower leg: No edema  Skin:     General: Skin is warm and dry  Neurological:      General: No focal deficit present  Mental Status: He is alert and oriented to person, place, and time  Mental status is at baseline  Motor: No tremor  Psychiatric:         Attention and Perception: Attention normal          Mood and Affect: Mood normal          Speech: Speech normal          Behavior: Behavior is cooperative  Thought Content: Thought content does not include homicidal or suicidal ideation  Thought content does not include homicidal or suicidal plan  Discussion with Family: I personally did not discuss patient with family at this time  Discussed current plan with patient, answered all questions to best of my ability       Discharge instructions/Information to patient and family:   See after visit summary for information provided to patient and family  Provisions for Follow-Up Care:  See after visit summary for information related to follow-up care and any pertinent home health orders  Disposition:     Home    For Discharges to [facility] SNF:   · Not Applicable to this Patient - Not Applicable to this Patient    Planned Readmission: none      Discharge Statement:  I spent 43 minutes discharging the patient  This time was spent on the day of discharge  I had direct contact with the patient on the day of discharge  Greater than 50% of the total time was spent examining patient, answering all patient questions, arranging and discussing plan of care with patient as well as directly providing post-discharge instructions  Additional time then spent on discharge activities  Discharge Medications:  See after visit summary for reconciled discharge medications provided to patient and family        ** Please Note: This note has been constructed using a voice recognition system **

## 2022-11-15 NOTE — ASSESSMENT & PLAN NOTE
· Hx of anxiety and depression managed with mirtazepine and paroxetine daily   · History of inpatient psychiatric hospitalizations - most recently 10/7-10/18/2022  · Paroxetine and mirtazapine initially held on admission in setting of QT prolongation  · Psychiatry consulted, appreciate recommendations  · Mirtazapine restarted at 7 5 mg QHS  · Patient currently declining in patient psychiatric admission; psychiatry cleared patient for discharge home with outpatient f/u   · Continue 1:1 virtual observation

## 2022-11-15 NOTE — ASSESSMENT & PLAN NOTE
Lab Results   Component Value Date    HGBA1C 5 7 (H) 07/21/2022       Recent Labs     11/12/22  1540 11/12/22  2237 11/13/22  2119 11/14/22 2134   POCGLU 168* 160* 127 106       Blood Sugar Average: Last 72 hrs:  (P) 140 25     · Continue daily metformin regimen  · encourage Diabetic diet, exercise  · Recommend outpatient f/u PCP for ongoing monitoring/maintenance

## 2022-11-15 NOTE — PLAN OF CARE
Problem: Nutrition/Hydration-ADULT  Goal: Nutrient/Hydration intake appropriate for improving, restoring or maintaining nutritional needs  Description: Monitor and assess patient's nutrition/hydration status for malnutrition  Collaborate with interdisciplinary team and initiate plan and interventions as ordered  Monitor patient's weight and dietary intake as ordered or per policy  Utilize nutrition screening tool and intervene as necessary  Determine patient's food preferences and provide high-protein, high-caloric foods as appropriate       INTERVENTIONS:  - Monitor oral intake, urinary output, labs, and treatment plans  - Assess nutrition and hydration status and recommend course of action  - Evaluate amount of meals eaten  - Assist patient with eating if necessary   - Allow adequate time for meals  - Recommend/ encourage appropriate diets, oral nutritional supplements, and vitamin/mineral supplements  - Order, calculate, and assess calorie counts as needed  - Recommend, monitor, and adjust tube feedings and TPN/PPN based on assessed needs  - Assess need for intravenous fluids  - Provide specific nutrition/hydration education as appropriate  - Include patient/family/caregiver in decisions related to nutrition  Outcome: Progressing     Problem: MOBILITY - ADULT  Goal: Maintain or return to baseline ADL function  Description: INTERVENTIONS:  -  Assess patient's ability to carry out ADLs; assess patient's baseline for ADL function and identify physical deficits which impact ability to perform ADLs (bathing, care of mouth/teeth, toileting, grooming, dressing, etc )  - Assess/evaluate cause of self-care deficits   - Assess range of motion  - Assess patient's mobility; develop plan if impaired  - Assess patient's need for assistive devices and provide as appropriate  - Encourage maximum independence but intervene and supervise when necessary  - Involve family in performance of ADLs  - Assess for home care needs following discharge   - Consider OT consult to assist with ADL evaluation and planning for discharge  - Provide patient education as appropriate  Outcome: Progressing  Goal: Maintains/Returns to pre admission functional level  Description: INTERVENTIONS:  - Perform BMAT or MOVE assessment daily    - Set and communicate daily mobility goal to care team and patient/family/caregiver  - Collaborate with rehabilitation services on mobility goals if consulted  - Perform Range of Motion 3 times a day  - Reposition patient every 2 hours    - Dangle patient 3 times a day  - Stand patient 3 times a day  - Ambulate patient 3 times a day  - Out of bed to chair 3 times a day   - Out of bed for meals 3 times a day  - Out of bed for toileting  - Record patient progress and toleration of activity level   Outcome: Progressing     Problem: Potential for Falls  Goal: Patient will remain free of falls  Description: INTERVENTIONS:  - Educate patient/family on patient safety including physical limitations  - Instruct patient to call for assistance with activity   - Consult OT/PT to assist with strengthening/mobility   - Keep Call bell within reach  - Keep bed low and locked with side rails adjusted as appropriate  - Keep care items and personal belongings within reach  - Initiate and maintain comfort rounds  - Make Fall Risk Sign visible to staff  - Offer Toileting every 2 Hours, in advance of need  - Initiate/Maintain alarm  - Obtain necessary fall risk management equipment  - Apply yellow socks and bracelet for high fall risk patients  - Consider moving patient to room near nurses station  Outcome: Progressing

## 2022-11-15 NOTE — ASSESSMENT & PLAN NOTE
· Hx of benzodiazepine dependence  Was prescribed alprazolam 0 25mg TID for insomnia and anxiety  · PDMP reviewed- last script filled 9/26/2022 (30 tabs / 8 days)  · Reports he was tapered off over 3 weeks and, on 11/10/22 due to worsened insomnia, he overused his hydroxyzine in attempt to experience similar effects  · Reports Last dose of alprazolam was more than one week ago  · Withdrawal managed as above   · Educated patient regarding risks of long-term benzodiazepine use, especially in conjunction with alcohol use  · Psychiatry consulted, appreciate recommendations

## 2022-11-15 NOTE — ASSESSMENT & PLAN NOTE
· Continue atorvastatin 40 mg daily   · Encourage healthy diet, exercise   · Recommend outpatient follow up PCP

## 2022-11-15 NOTE — ASSESSMENT & PLAN NOTE
Recent Labs     11/13/22 2003 11/14/22  0451 11/14/22  1416 11/15/22  0802   K 3 9 2 8* 3 6 3 5   MG 1 6 1 5*  --  1 7     · Electrolytes appear stable this AM  · Continue daily supplementation on discharge  · Routine outpatient monitoring- provided with lab script for outpatient BMP, Mg  · Recommend outpatient f/u PCP

## 2022-11-15 NOTE — CASE MANAGEMENT
Case Management Discharge Planning Note    Patient name Ryan Peralta  Location 5T DETOX 514/5T DETOX 46* MRN [id number]  : 1955 Date 11/15/2022       Current Admission Date: 2022  Current Admission Diagnosis:Alcohol use disorder, moderate, dependence ([place] [address] )   Patient Active Problem List    Diagnosis Date Noted   • Alcohol use disorder, moderate, dependence ([place] [address] ) 2022   • Alcohol abuse 2022   • MDD (major depressive disorder), recurrent episode ([place] [address] ) 10/08/2022   • Benzodiazepine dependence ([place] [address] ) 10/08/2022   • Closed fracture of left proximal humerus 2022   • Chronic bronchitis ([place] [address] ) 2022   • Bilateral carotid artery stenosis 10/14/2021   • Rosacea 2020   • Rhinophyma 2020   • Tobacco use disorder 2020   • Type 2 diabetes mellitus, without long-term current use of insulin ([place] [address] ) 2020   • Atherosclerotic PVD with intermittent claudication ([place] [address] ) 2017   • Diabetes mellitus type 2, noninsulin dependent ([place] [address] ) 2017   • Mixed hyperlipidemia 2017   • Hypertension 2017      LOS (days): 4  Geometric Mean LOS (GMLOS) (days):   Days to GMLOS:     OBJECTIVE:  Risk of Unplanned Readmission Score: 38 22         Current admission status: Inpatient   Preferred Pharmacy:    [facility] [place] - [address]  Phone: [phone] Fax: [phone] [place] [address]  Phone: [phone] Fax: [phone]    Primary Care Provider: Robni Pierson MD    Primary Insurance: [address] [facility]  Secondary Insurance:     DISCHARGE DETAILS: Pt is being discharged today, escorted home via [facility], [facility] waiver signed  Pt declined any inpatient or outpatient rehab, plans to follow-up with AA meetings  Pt has an outpatient psychiatric appointment for  at 1500  Pt declined to sign [facility] for PCP  Pt's medications were sent to his pharmacy  Pt signed Medicare IMM form           Discharge planning discussed with[de-identified] Patient  Freedom of Choice: Yes                   Contacts  Patient Contacts: Kris Thorpe  Relationship to Patient[de-identified] Treatment Provider  Contact Method: Phone  Phone Number: [phone]  Reason/Outcome: Continuity of Care, Discharge Planning              Other Referral/Resources/Interventions Provided:  Financial Resources Provided: Indigent Transportation  Referrals Provided[de-identified] Declines resources                                             IMM Given (Date):: 11/15/22  IMM Given to[de-identified] Patient

## 2022-11-15 NOTE — NURSING NOTE
Patient discharged, IV removed, belongings accounted for  AVS reviewed with patient, questions answered and understanding stated  Patient taken to lobby via wheelchair with RN for [place]

## 2022-11-15 NOTE — ASSESSMENT & PLAN NOTE
· Longstanding history of ETOH use disorder   Drinks 6 pack of 16oz beer daily  Naltrexone started 11/13/2022- continue   Continue daily thiamine, daily folic acid supplementation, and MVI  · Completed high-dose thiamine regimen x 3 days   Consult case management for assistance with disposition planning - pt currently declines drug/alcohol services upon discharge

## 2022-11-15 NOTE — PLAN OF CARE
Problem: Potential for Falls  Goal: Patient will remain free of falls  Description: INTERVENTIONS:  - Educate patient/family on patient safety including physical limitations  - Instruct patient to call for assistance with activity   - Consult OT/PT to assist with strengthening/mobility   - Keep Call bell within reach  - Keep bed low and locked with side rails adjusted as appropriate  - Keep care items and personal belongings within reach  - Initiate and maintain comfort rounds  - Make Fall Risk Sign visible to staff  - Offer Toileting every Hours, in advance of need  - Initiate/Maintainalarm  - Obtain necessary fall risk management equipment  - Apply yellow socks and bracelet for high fall risk patients  - Consider moving patient to room near nurses station  Outcome: Progressing

## 2022-11-15 NOTE — ASSESSMENT & PLAN NOTE
· CBC this AM revealed very mild stable thrombocytopenia   · Possibly 2/2 myelosuppression from chronic alcohol use  · Continue thiamine and folic acid supplementation  · Routine outpatient monitoring  · Encourage alcohol cessation

## 2022-11-15 NOTE — ASSESSMENT & PLAN NOTE
· Low concern for NELIA agonist withdrawal due to his taper and last dose being approximately one week ago     · Monitored on SEWS protocol with symptom-triggered phenobarbital (refer to 'alcohol withdrawal' above)  · Acute NELIA-agonist withdrawal resolved

## 2022-11-16 ENCOUNTER — PATIENT OUTREACH (OUTPATIENT)
Dept: INTERNAL MEDICINE CLINIC | Facility: CLINIC | Age: 67
End: 2022-11-16

## 2022-11-16 DIAGNOSIS — Z71.89 COMPLEX CARE COORDINATION: Primary | ICD-10-CM

## 2022-11-18 ENCOUNTER — PATIENT OUTREACH (OUTPATIENT)
Dept: INTERNAL MEDICINE CLINIC | Facility: CLINIC | Age: 67
End: 2022-11-18

## 2022-11-22 ENCOUNTER — PATIENT OUTREACH (OUTPATIENT)
Dept: INTERNAL MEDICINE CLINIC | Facility: CLINIC | Age: 67
End: 2022-11-22

## 2022-11-22 NOTE — LETTER
Date: 11/22/22    Dear Maxwell Stringer,   My name is Ernesto Jhon; I am a registered nurse care manager working with [address] [facility]  [facility] [address]   [place] [address]  I have not been able to reach you and would like to set a time that I can talk with you over the phone   My work is to help patients that have complex medical conditions get the care they need  This includes patients who may have been in the hospital or emergency room  Please call me with any questions you may have  I look forward to meeting with you    Sincerely,  Ernesto Ferreira  [phone]  Outpatient Care Manager

## 2022-11-29 ENCOUNTER — PATIENT OUTREACH (OUTPATIENT)
Dept: INTERNAL MEDICINE CLINIC | Facility: CLINIC | Age: 67
End: 2022-11-29

## 2022-12-21 DIAGNOSIS — E78.49 OTHER HYPERLIPIDEMIA: ICD-10-CM

## 2022-12-21 DIAGNOSIS — E11.51 TYPE 2 DIABETES MELLITUS WITH DIABETIC PERIPHERAL ANGIOPATHY WITHOUT GANGRENE, WITHOUT LONG-TERM CURRENT USE OF INSULIN (HCC): ICD-10-CM

## 2022-12-21 DIAGNOSIS — F17.200 CURRENT EVERY DAY SMOKER: ICD-10-CM

## 2022-12-21 DIAGNOSIS — I10 ESSENTIAL HYPERTENSION: ICD-10-CM

## 2022-12-21 RX ORDER — ATORVASTATIN CALCIUM 40 MG/1
40 TABLET, FILM COATED ORAL DAILY
Qty: 30 TABLET | Refills: 0 | Status: SHIPPED | OUTPATIENT
Start: 2022-12-21

## 2022-12-21 RX ORDER — HYDROCHLOROTHIAZIDE 12.5 MG/1
12.5 TABLET ORAL DAILY
Qty: 30 TABLET | Refills: 0 | Status: SHIPPED | OUTPATIENT
Start: 2022-12-21

## 2022-12-21 RX ORDER — AMLODIPINE BESYLATE 10 MG/1
10 TABLET ORAL DAILY
Qty: 30 TABLET | Refills: 0 | Status: SHIPPED | OUTPATIENT
Start: 2022-12-21

## 2023-02-24 DIAGNOSIS — F17.200 CURRENT EVERY DAY SMOKER: ICD-10-CM

## 2023-02-24 DIAGNOSIS — F33.2 SEVERE EPISODE OF RECURRENT MAJOR DEPRESSIVE DISORDER, WITHOUT PSYCHOTIC FEATURES (HCC): ICD-10-CM

## 2023-02-24 DIAGNOSIS — F41.9 ANXIETY AND DEPRESSION: Primary | ICD-10-CM

## 2023-02-24 DIAGNOSIS — E87.6 HYPOKALEMIA: ICD-10-CM

## 2023-02-24 DIAGNOSIS — F32.A ANXIETY AND DEPRESSION: Primary | ICD-10-CM

## 2023-02-24 DIAGNOSIS — E83.42 HYPOMAGNESEMIA: ICD-10-CM

## 2023-02-24 DIAGNOSIS — E11.51 TYPE 2 DIABETES MELLITUS WITH DIABETIC PERIPHERAL ANGIOPATHY WITHOUT GANGRENE, WITHOUT LONG-TERM CURRENT USE OF INSULIN (HCC): ICD-10-CM

## 2023-02-24 DIAGNOSIS — I10 ESSENTIAL HYPERTENSION: ICD-10-CM

## 2023-02-24 DIAGNOSIS — F10.20 ALCOHOL USE DISORDER, MODERATE, DEPENDENCE (HCC): ICD-10-CM

## 2023-02-24 DIAGNOSIS — E78.49 OTHER HYPERLIPIDEMIA: ICD-10-CM

## 2023-02-24 RX ORDER — ATORVASTATIN CALCIUM 40 MG/1
40 TABLET, FILM COATED ORAL DAILY
Qty: 30 TABLET | Refills: 0 | Status: SHIPPED | OUTPATIENT
Start: 2023-02-24

## 2023-02-24 RX ORDER — HYDROCHLOROTHIAZIDE 12.5 MG/1
12.5 TABLET ORAL DAILY
Qty: 30 TABLET | Refills: 0 | Status: SHIPPED | OUTPATIENT
Start: 2023-02-24

## 2023-02-24 RX ORDER — MIRTAZAPINE 30 MG/1
30 TABLET, FILM COATED ORAL
Qty: 30 TABLET | Refills: 0 | Status: SHIPPED | OUTPATIENT
Start: 2023-02-24

## 2023-02-24 RX ORDER — UREA 10 %
500 LOTION (ML) TOPICAL 2 TIMES DAILY
Qty: 14 TABLET | Refills: 0 | Status: SHIPPED | OUTPATIENT
Start: 2023-02-24 | End: 2023-03-03

## 2023-02-24 RX ORDER — MIRTAZAPINE 30 MG/1
TABLET, FILM COATED ORAL
COMMUNITY
Start: 2023-02-01 | End: 2023-02-24 | Stop reason: SDUPTHER

## 2023-02-24 RX ORDER — NALTREXONE HYDROCHLORIDE 50 MG/1
50 TABLET, FILM COATED ORAL DAILY
Qty: 30 TABLET | Refills: 0 | Status: SHIPPED | OUTPATIENT
Start: 2023-02-24

## 2023-02-24 RX ORDER — FOLIC ACID 1 MG/1
1 TABLET ORAL DAILY
Qty: 90 TABLET | Refills: 0 | Status: SHIPPED | OUTPATIENT
Start: 2023-02-24

## 2023-02-24 RX ORDER — POTASSIUM CHLORIDE 20 MEQ/1
40 TABLET, EXTENDED RELEASE ORAL DAILY
Qty: 7 TABLET | Refills: 0 | Status: SHIPPED | OUTPATIENT
Start: 2023-02-24

## 2023-02-24 RX ORDER — MIRTAZAPINE 7.5 MG/1
7.5 TABLET, FILM COATED ORAL
Qty: 30 TABLET | Refills: 0 | Status: SHIPPED | OUTPATIENT
Start: 2023-02-24

## 2023-02-24 RX ORDER — AMLODIPINE BESYLATE 10 MG/1
10 TABLET ORAL DAILY
Qty: 30 TABLET | Refills: 0 | Status: SHIPPED | OUTPATIENT
Start: 2023-02-24

## 2023-02-24 RX ORDER — PAROXETINE HYDROCHLORIDE 20 MG/1
20 TABLET, FILM COATED ORAL DAILY
Qty: 30 TABLET | Refills: 0 | Status: SHIPPED | OUTPATIENT
Start: 2023-02-24 | End: 2023-03-26

## 2023-02-24 RX ORDER — THIAMINE MONONITRATE (VIT B1) 100 MG
100 TABLET ORAL DAILY
Qty: 90 TABLET | Refills: 0 | Status: SHIPPED | OUTPATIENT
Start: 2023-02-24

## 2023-02-24 RX ORDER — PAROXETINE HYDROCHLORIDE 20 MG/1
TABLET, FILM COATED ORAL
COMMUNITY
Start: 2023-02-01 | End: 2023-02-24 | Stop reason: SDUPTHER

## 2023-02-24 NOTE — TELEPHONE ENCOUNTER
A message came in regarding steven from tiana PUCKETT from [employer]   Also in the attached message are refill requests from [facility]  Please advise  Hi, my name is Ale Lin  I'm a nurse practitioner with [employer]  I was out to see one of doctor tenderers people  His name is Giovani Austin  That's G0 [place]DW [place] 1st name Colletta Morgans, date of birth December 2nd, 1955  Mr Noberto Hamman was out of his medications for, we believe, blood pressure and cholesterol  He really wasn't familiar with the names of the medications  It did look like the Lipitor  was one of the cholesterol ones  And he wasn't taking aspirin  He said it was for about 2 weeks  He stable  His blood pressure was good considering 138 / 80  He was encouraged to schedule a visit with Doctor Nisha Lozoya  He's made a lot of lifestyle changes in the last six months like interesting doctor tender  So I told him too that he may not need the blood pressure dose that he did before and it and it may be best to just go in and see the doctor about that again, everything was stable with him and the doctor will be getting there for the healthy home visit  If you have any questions for me, please call me back at [phone]  Thank you    [address] AN APPT ON 3/17  I OFFERED MONTHDAY 2/27  BUT HE said this is the earliest he can come in to the office  He said he is out of all meds except metformin

## 2023-02-24 NOTE — TELEPHONE ENCOUNTER
Ajay Álvarez called back and moved appt up to 3/6/23  He is aware meds were refilled and that he needs to keep appt with you

## 2023-02-26 ENCOUNTER — RA CDI HCC (OUTPATIENT)
Dept: OTHER | Facility: HOSPITAL | Age: 68
End: 2023-02-26

## 2023-02-26 NOTE — PROGRESS NOTES
F10 20, f13 20  [facility]  coding opportunities          Chart Reviewed number of suggestions sent to Provider: 2     Patients Insurance     Medicare Insurance: [employer]

## 2023-03-06 ENCOUNTER — OFFICE VISIT (OUTPATIENT)
Dept: INTERNAL MEDICINE CLINIC | Facility: CLINIC | Age: 68
End: 2023-03-06

## 2023-03-06 VITALS
OXYGEN SATURATION: 99 % | DIASTOLIC BLOOD PRESSURE: 86 MMHG | SYSTOLIC BLOOD PRESSURE: 140 MMHG | HEART RATE: 108 BPM | TEMPERATURE: 97.1 F | WEIGHT: 179.4 LBS | BODY MASS INDEX: 25.68 KG/M2 | HEIGHT: 70 IN

## 2023-03-06 DIAGNOSIS — Z12.5 SCREENING FOR PROSTATE CANCER: ICD-10-CM

## 2023-03-06 DIAGNOSIS — E11.9 DIABETES MELLITUS TYPE 2, NONINSULIN DEPENDENT (HCC): ICD-10-CM

## 2023-03-06 DIAGNOSIS — F10.20 ALCOHOL USE DISORDER, MODERATE, DEPENDENCE (HCC): ICD-10-CM

## 2023-03-06 DIAGNOSIS — I10 PRIMARY HYPERTENSION: ICD-10-CM

## 2023-03-06 DIAGNOSIS — E78.2 MIXED HYPERLIPIDEMIA: ICD-10-CM

## 2023-03-06 DIAGNOSIS — F33.2 SEVERE EPISODE OF RECURRENT MAJOR DEPRESSIVE DISORDER, WITHOUT PSYCHOTIC FEATURES (HCC): ICD-10-CM

## 2023-03-06 DIAGNOSIS — Z12.11 SCREENING FOR COLON CANCER: ICD-10-CM

## 2023-03-06 DIAGNOSIS — Z91.199 NON-COMPLIANT PATIENT: ICD-10-CM

## 2023-03-06 DIAGNOSIS — J41.0 SIMPLE CHRONIC BRONCHITIS (HCC): ICD-10-CM

## 2023-03-06 DIAGNOSIS — F17.200 TOBACCO USE DISORDER: ICD-10-CM

## 2023-03-06 DIAGNOSIS — Z00.00 MEDICARE ANNUAL WELLNESS VISIT, SUBSEQUENT: Primary | ICD-10-CM

## 2023-03-06 DIAGNOSIS — I70.219 ATHEROSCLEROTIC PVD WITH INTERMITTENT CLAUDICATION (HCC): ICD-10-CM

## 2023-03-06 NOTE — PROGRESS NOTES
Assessment and Plan:     Problem List Items Addressed This Visit        Endocrine    Diabetes mellitus type 2, noninsulin dependent ([address] )       Respiratory    Chronic bronchitis ([address] )       Cardiovascular and Mediastinum    Atherosclerotic PVD with intermittent claudication (HCC)    Hypertension       Other    Mixed hyperlipidemia    Tobacco use disorder    MDD (major depressive disorder), recurrent episode ([address] )    Alcohol use disorder, moderate, dependence ([address] )   Other Visit Diagnoses     Medicare annual wellness visit, subsequent    -  Primary    Non-compliant patient        Screening for prostate cancer               Preventive health issues were discussed with patient, and age appropriate screening tests were ordered as noted in patient's After Visit Summary  Personalized health advice and appropriate referrals for health education or preventive services given if needed, as noted in patient's After Visit Summary       History of Present Illness:     Patient presents for a Medicare Wellness Visit    HPI   Patient Care Team:  Emerson Valle MD as PCP - General (Internal Medicine)  Jaclyn Duggan MD  [address] [place] [address]  Jericho Gayle DO as Endoscopist  Vita Iglesias MD (Ophthalmology)     Review of Systems:     Review of Systems     Problem List:     Patient Active Problem List   Diagnosis   • Atherosclerotic PVD with intermittent claudication ([address] )   • Diabetes mellitus type 2, noninsulin dependent ([address] )   • Hypertension   • Mixed hyperlipidemia   • Tobacco use disorder   • Rosacea   • Rhinophyma   • Bilateral carotid artery stenosis   • Chronic bronchitis ([address] )   • MDD (major depressive disorder), recurrent episode ([address] )   • Benzodiazepine dependence ([address] )   • Closed fracture of left proximal humerus   • Alcohol abuse   • Alcohol use disorder, moderate, dependence ([address] )   • Thrombocytopenia ([address] )      Past Medical and Surgical History:     Past Medical History:   Diagnosis Date   • Colon polyp    • Diabetes mellitus ([facility] )    • Hyperlipidemia    • Hypertension      Past Surgical History:   Procedure Laterality Date   • COLON SURGERY      found growth, bengin   • COLONOSCOPY     • COLONOSCOPY N/A 3/13/2018    Procedure: COLONOSCOPY;  Surgeon: Tin Rincon DO;  Location: [facility]; Service: Gastroenterology   • ORIF HUMERUS FRACTURE Left 10/6/2022    Procedure: OPEN REDUCTION W/ INTERNAL FIXATION (ORIF) HUMERUS (SHOULDER); Surgeon: Victorina Appiah MD;  Location: [address] [place] [address];  Service: Orthopedics      Family History:     Family History   Problem Relation Age of Onset   • Heart failure Mother    • Hypertension Other       Social History:     Social History     Socioeconomic History   • Marital status: Single     Spouse name: Not on file   • Number of children: Not on file   • Years of education: Not on file   • Highest education level: Not on file   Occupational History   • Not on file   Tobacco Use   • Smoking status: Every Day     Packs/day: 1 00     Types: Cigarettes   • Smokeless tobacco: Former   • Tobacco comments:     uses vape/current every day smoker (as per [employer])   Vaping Use   • Vaping Use: Never used   Substance and Sexual Activity   • Alcohol use: Yes     Comment: states last was 2 days ago   • Drug use: No   • Sexual activity: Not on file   Other Topics Concern   • Not on file   Social History Narrative   • Not on file     Social Determinants of Health     Financial Resource Strain: Not on file   Food Insecurity: No Food Insecurity   • Worried About Running Out of Food in the Last Year: Never true   • Ran Out of Food in the Last Year: Never true   Transportation Needs: No Transportation Needs   • Lack of Transportation (Medical): No   • Lack of Transportation (Non-Medical):  No   Physical Activity: Not on file   Stress: Not on file   Social Connections: Not on file   Intimate Partner Violence: Not on file   Housing Stability: Low Risk    • Unable to Pay for Housing in the Last Year: No   • Number of Places Lived in the Last Year: 1   • Unstable Housing in the Last Year: No      Medications and Allergies:     Current Outpatient Medications   Medication Sig Dispense Refill   • amLODIPine (NORVASC) 10 mg tablet Take 1 tablet (10 mg total) by mouth daily 30 tablet 0   • atorvastatin (LIPITOR) 40 mg tablet Take 1 tablet (40 mg total) by mouth daily 30 tablet 0   • folic acid (FOLVITE) 1 mg tablet Take 1 tablet (1 mg total) by mouth daily 90 tablet 0   • hydrochlorothiazide (HYDRODIURIL) 12 5 mg tablet Take 1 tablet (12 5 mg total) by mouth daily 30 tablet 0   • magnesium gluconate (MAGONATE) 500 mg tablet Take 1 tablet (500 mg total) by mouth 2 (two) times a day for 7 days 14 tablet 0   • metFORMIN (GLUCOPHAGE) 1000 MG tablet Take 1 tablet (1,000 mg total) by mouth daily with breakfast 30 tablet 0   • mirtazapine (REMERON) 30 mg tablet Take 1 tablet (30 mg total) by mouth daily at bedtime 30 tablet 0   • mirtazapine (REMERON) 7 5 MG tablet Take 1 tablet (7 5 mg total) by mouth daily at bedtime 30 tablet 0   • naltrexone (REVIA) 50 mg tablet Take 1 tablet (50 mg total) by mouth daily 30 tablet 0   • PARoxetine (PAXIL) 20 mg tablet Take 1 tablet (20 mg total) by mouth daily 30 tablet 0   • potassium chloride (K-DUR,KLOR-CON) 20 mEq tablet Take 2 tablets (40 mEq total) by mouth daily 7 tablet 0   • thiamine (VITAMIN B1) 100 mg tablet Take 1 tablet (100 mg total) by mouth daily 90 tablet 0     No current facility-administered medications for this visit       Allergies   Allergen Reactions   • Lisinopril Angioedema      Immunizations:     Immunization History   Administered Date(s) Administered   • COVID-19 PFIZER VACCINE 0 3 ML IM 03/02/2021, 11/19/2021   • INFLUENZA 11/11/2003, 02/10/2011, 10/10/2018, 10/22/2020, 10/03/2022   • Influenza, high dose seasonal 0 7 mL 10/10/2018, 10/14/2021, 10/03/2022   • Influenza, recombinant, quadrivalent,injectable, preservative free 09/17/2019, 10/22/2020   • Influenza, seasonal, injectable 10/06/2016, 09/10/2017   • Pneumococcal Polysaccharide PPV23 03/07/2017   • Tdap 10/04/2018, 07/14/2021      Health Maintenance:         Topic Date Due   • Colorectal Cancer Screening  03/13/2028   • Hepatitis C Screening  Completed         Topic Date Due   • Hepatitis A Vaccine (1 of 2 - Risk 2-dose series) Never done   • Hepatitis B Vaccine (1 of 3 - Risk 3-dose series) Never done   • Pneumococcal Vaccine: 65+ Years (2 - PCV) 03/07/2018   • COVID-19 Vaccine (3 - Booster for Pfizer series) 01/14/2022      Medicare Screening Tests and Risk Assessments:         Health Risk Assessment:   Patient rates overall health as good  Patient feels that their physical health rating is same  Patient is satisfied with their life  Eyesight was rated as same  Hearing was rated as same  Patient feels that their emotional and mental health rating is same  Patients states they are never, rarely angry  Patient states they are never, rarely unusually tired/fatigued  Pain experienced in the last 7 days has been none  Patient states that he has experienced no weight loss or gain in last 6 months  Fall Risk Screening: In the past year, patient has experienced: no history of falling in past year      Home Safety:  Patient does not have trouble with stairs inside or outside of their home  Patient has working smoke alarms and has working carbon monoxide detector  Home safety hazards include: none  Nutrition:   Current diet is Diabetic  Medications:   Patient is not currently taking any over-the-counter supplements  Patient is able to manage medications  Activities of Daily Living (ADLs)/Instrumental Activities of Daily Living (IADLs):   Walk and transfer into and out of bed and chair?: Yes  Dress and groom yourself?: Yes    Bathe or shower yourself?: Yes    Feed yourself?  Yes  Do your laundry/housekeeping?: Yes  Manage your money, pay your bills and track your expenses?: Yes  Make your own meals?: Yes    Do your own shopping?: Yes    Previous Hospitalizations:   Any hospitalizations or ED visits within the last 12 months?: Yes    How many hospitalizations have you had in the last year?: 1-2    Advance Care Planning:   Living will: Yes    Durable POA for healthcare: Yes    Advanced directive: Yes      PREVENTIVE SCREENINGS      Cardiovascular Screening:    General: Screening Not Indicated and History Lipid Disorder      Diabetes Screening:     General: Screening Not Indicated and History Diabetes      Colorectal Cancer Screening:     General: Screening Current      Abdominal Aortic Aneurysm (AAA) Screening:    Risk factors include: age between 73-69 yo and tobacco use        Lung Cancer Screening:     General: Screening Not Indicated      Hepatitis C Screening:    General: Screening Current    Screening, Brief Intervention, and Referral to Treatment (SBIRT)    Screening  Typical number of drinks in a day: 0  Typical number of drinks in a week: 0  Interpretation: Low risk drinking behavior  Single Item Drug Screening:  How often have you used an illegal drug (including marijuana) or a prescription medication for non-medical reasons in the past year? never    Single Item Drug Screen Score: 0  Interpretation: Negative screen for possible drug use disorder    No results found  Physical Exam:     There were no vitals taken for this visit      Physical Exam     Osito Cheney MD

## 2023-03-06 NOTE — PATIENT INSTRUCTIONS
Medicare Preventive Visit Patient Instructions  Thank you for completing your Welcome to Medicare Visit or Medicare Annual Wellness Visit today  Your next wellness visit will be due in one year (3/6/2024)  The screening/preventive services that you may require over the next 5-10 years are detailed below  Some tests may not apply to you based off risk factors and/or age  Screening tests ordered at today's visit but not completed yet may show as past due  Also, please note that scanned in results may not display below  Preventive Screenings:  Service Recommendations Previous Testing/Comments   Colorectal Cancer Screening  · Colonoscopy    · Fecal Occult Blood Test (FOBT)/Fecal Immunochemical Test (FIT)  · Fecal DNA/Cologuard Test  · Flexible Sigmoidoscopy Age: 39-70 years old   Colonoscopy: every 10 years (May be performed more frequently if at higher risk)  OR  FOBT/FIT: every 1 year  OR  Cologuard: every 3 years  OR  Sigmoidoscopy: every 5 years  Screening may be recommended earlier than age 39 if at higher risk for colorectal cancer  Also, an individualized decision between you and your healthcare provider will decide whether screening between the ages of 74-80 would be appropriate   Colonoscopy: 03/13/2018  FOBT/FIT: Not on file  Cologuard: Not on file  Sigmoidoscopy: Not on file    Screening Current     Prostate Cancer Screening Individualized decision between patient and health care provider in men between ages of 53-78   Medicare will cover every 12 months beginning on the day after your 50th birthday PSA: 1 4 ng/mL           Hepatitis C Screening Once for adults born between 1945 and 1965  More frequently in patients at high risk for Hepatitis C Hep C Antibody: 04/06/2018    Screening Current   Diabetes Screening 1-2 times per year if you're at risk for diabetes or have pre-diabetes Fasting glucose: 111 mg/dL (10/10/2022)  A1C: 5 7 % (7/21/2022)  Screening Not Indicated  History Diabetes   Cholesterol Screening Once every 5 years if you don't have a lipid disorder  May order more often based on risk factors  Lipid panel: 07/21/2022  Screening Not Indicated  History Lipid Disorder      Other Preventive Screenings Covered by Medicare:  1  Abdominal Aortic Aneurysm (AAA) Screening: covered once if your at risk  You're considered to be at risk if you have a family history of AAA or a male between the age of 73-68 who smoking at least 100 cigarettes in your lifetime  2  Lung Cancer Screening: covers low dose CT scan once per year if you meet all of the following conditions: (1) Age 50-69; (2) No signs or symptoms of lung cancer; (3) Current smoker or have quit smoking within the last 15 years; (4) You have a tobacco smoking history of at least 20 pack years (packs per day x number of years you smoked); (5) You get a written order from a healthcare provider  3  Glaucoma Screening: covered annually if you're considered high risk: (1) You have diabetes OR (2) Family history of glaucoma OR (3)  aged 48 and older OR (3)  American aged 72 and older  3  Osteoporosis Screening: covered every 2 years if you meet one of the following conditions: (1) Have a vertebral abnormality; (2) On glucocorticoid therapy for more than 3 months; (3) Have primary hyperparathyroidism; (4) On osteoporosis medications and need to assess response to drug therapy  5  HIV Screening: covered annually if you're between the age of 12-76  Also covered annually if you are younger than 13 and older than 72 with risk factors for HIV infection  For pregnant patients, it is covered up to 3 times per pregnancy      Immunizations:  Immunization Recommendations   Influenza Vaccine Annual influenza vaccination during flu season is recommended for all persons aged >= 6 months who do not have contraindications   Pneumococcal Vaccine   * Pneumococcal conjugate vaccine = PCV13 (Prevnar 13), PCV15 (Vaxneuvance), PCV20 (Prevnar 20)  * Pneumococcal polysaccharide vaccine = PPSV23 (Pneumovax) Adults 2364 years old: 1-3 doses may be recommended based on certain risk factors  Adults 72 years old: 1-2 doses may be recommended based off what pneumonia vaccine you previously received   Hepatitis B Vaccine 3 dose series if at intermediate or high risk (ex: diabetes, end stage renal disease, liver disease)   Tetanus (Td) Vaccine - COST NOT COVERED BY MEDICARE PART B Following completion of primary series, a booster dose should be given every 10 years to maintain immunity against tetanus  Td may also be given as tetanus wound prophylaxis  Tdap Vaccine - COST NOT COVERED BY MEDICARE PART B Recommended at least once for all adults  For pregnant patients, recommended with each pregnancy  Shingles Vaccine (Shingrix) - COST NOT COVERED BY MEDICARE PART B  2 shot series recommended in those aged 48 and above     Health Maintenance Due:      Topic Date Due   • Colorectal Cancer Screening  03/13/2028   • Hepatitis C Screening  Completed     Immunizations Due:      Topic Date Due   • Hepatitis A Vaccine (1 of 2 - Risk 2-dose series) Never done   • Hepatitis B Vaccine (1 of 3 - Risk 3-dose series) Never done   • Pneumococcal Vaccine: 65+ Years (2 - PCV) 03/07/2018   • COVID-19 Vaccine (3 - Booster for Kidd Peter series) 01/14/2022     Advance Directives   What are advance directives? Advance directives are legal documents that state your wishes and plans for medical care  These plans are made ahead of time in case you lose your ability to make decisions for yourself  Advance directives can apply to any medical decision, such as the treatments you want, and if you want to donate organs  What are the types of advance directives? There are many types of advance directives, and each state has rules about how to use them  You may choose a combination of any of the following:  · Living will: This is a written record of the treatment you want   You can also choose which treatments you do not want, which to limit, and which to stop at a certain time  This includes surgery, medicine, IV fluid, and tube feedings  · Durable power of  for healthcare [facility]): This is a written record that states who you want to make healthcare choices for you when you are unable to make them for yourself  This person, called a proxy, is usually a family member or a friend  You may choose more than 1 proxy  · Do not resuscitate (DNR) order:  A DNR order is used in case your heart stops beating or you stop breathing  It is a request not to have certain forms of treatment, such as CPR  A DNR order may be included in other types of advance directives  · Medical directive: This covers the care that you want if you are in a coma, near death, or unable to make decisions for yourself  You can list the treatments you want for each condition  Treatment may include pain medicine, surgery, blood transfusions, dialysis, IV or tube feedings, and a ventilator (breathing machine)  · Values history: This document has questions about your views, beliefs, and how you feel and think about life  This information can help others choose the care that you would choose  Why are advance directives important? An advance directive helps you control your care  Although spoken wishes may be used, it is better to have your wishes written down  Spoken wishes can be misunderstood, or not followed  Treatments may be given even if you do not want them  An advance directive may make it easier for your family to make difficult choices about your care  Cigarette Smoking and Your Health   Risks to your health if you smoke:  Nicotine and other chemicals found in tobacco damage every cell in your body  Even if you are a light smoker, you have an increased risk for cancer, heart disease, and lung disease  If you are pregnant or have diabetes, smoking increases your risk for complications     Benefits to your health if you stop smoking:   · You decrease respiratory symptoms such as coughing, wheezing, and shortness of breath  · You reduce your risk for cancers of the lung, mouth, throat, kidney, bladder, pancreas, stomach, and cervix  If you already have cancer, you increase the benefits of chemotherapy  You also reduce your risk for cancer returning or a second cancer from developing  · You reduce your risk for heart disease, blood clots, heart attack, and stroke  · You reduce your risk for lung infections, and diseases such as pneumonia, asthma, chronic bronchitis, and emphysema  · Your circulation improves  More oxygen can be delivered to your body  If you have diabetes, you lower your risk for complications, such as kidney, artery, and eye diseases  You also lower your risk for nerve damage  Nerve damage can lead to amputations, poor vision, and blindness  · You improve your body's ability to heal and to fight infections  For more information and support to stop smoking:   · [employer]  Phone: 1- 660 - 339-7156  Web Address: [employer]  Weight Management   Why it is important to manage your weight:  Being overweight increases your risk of health conditions such as heart disease, high blood pressure, type 2 diabetes, and certain types of cancer  It can also increase your risk for osteoarthritis, sleep apnea, and other respiratory problems  Aim for a slow, steady weight loss  Even a small amount of weight loss can lower your risk of health problems  How to lose weight safely:  A safe and healthy way to lose weight is to eat fewer calories and get regular exercise  You can lose up about 1 pound a week by decreasing the number of calories you eat by 500 calories each day  Healthy meal plan for weight management:  A healthy meal plan includes a variety of foods, contains fewer calories, and helps you stay healthy  A healthy meal plan includes the following:  · Eat whole-grain foods more often    A healthy meal plan should contain fiber  Fiber is the part of grains, fruits, and vegetables that is not broken down by your body  Whole-grain foods are healthy and provide extra fiber in your diet  Some examples of whole-grain foods are whole-wheat breads and pastas, oatmeal, brown rice, and bulgur  · Eat a variety of vegetables every day  Include dark, leafy greens such as spinach, kale, glo greens, and mustard greens  Eat yellow and orange vegetables such as carrots, sweet potatoes, and winter squash  · Eat a variety of fruits every day  Choose fresh or canned fruit (canned in its own juice or light syrup) instead of juice  Fruit juice has very little or no fiber  · Eat low-fat dairy foods  Drink fat-free (skim) milk or 1% milk  Eat fat-free yogurt and low-fat cottage cheese  Try low-fat cheeses such as mozzarella and other reduced-fat cheeses  · Choose meat and other protein foods that are low in fat  Choose beans or other legumes such as split peas or lentils  Choose fish, skinless poultry (chicken or turkey), or lean cuts of red meat (beef or pork)  Before you cook meat or poultry, cut off any visible fat  · Use less fat and oil  Try baking foods instead of frying them  Add less fat, such as margarine, sour cream, regular salad dressing and mayonnaise to foods  Eat fewer high-fat foods  Some examples of high-fat foods include french fries, doughnuts, ice cream, and cakes  · Eat fewer sweets  Limit foods and drinks that are high in sugar  This includes candy, cookies, regular soda, and sweetened drinks  Exercise:  Exercise at least 30 minutes per day on most days of the week  Some examples of exercise include walking, biking, dancing, and swimming  You can also fit in more physical activity by taking the stairs instead of the elevator or parking farther away from stores  Ask your healthcare provider about the best exercise plan for you        © Copyright [employer] 2018 Information is for End User's use only and may not be sold, redistributed or otherwise used for commercial purposes   All illustrations and images included in CareNotes® are the copyrighted property of [employer]  or [facility] Sarabjit Pritchett

## 2023-03-06 NOTE — PROGRESS NOTES
INTERNAL MEDICINE OFFICE VISIT       NAME: Chepe Galeana  AGE: 79 y o  SEX: male       : 1955        MRN: [id number]    DATE: 3/6/2023  TIME: 9:23 AM    Assessment and Plan   1  Medicare annual wellness visit, subsequent    2  Non-compliant patient    3  Alcohol use disorder, moderate, dependence ([facility] )    4  Severe episode of recurrent major depressive disorder, without psychotic features ([facility] )    5  Tobacco use disorder    6  Diabetes mellitus type 2, noninsulin dependent ([facility] )    7  Primary hypertension    8  Mixed hyperlipidemia    9  Atherosclerotic PVD with intermittent claudication ([facility] )    10  Simple chronic bronchitis ([facility] )    11  Screening for prostate cancer  -     PSA, Total Screen    12  Screening for colon cancer  -     Cologuard         Patient Instructions       Medicare Preventive Visit Patient Instructions  Thank you for completing your Welcome to Medicare Visit or Medicare Annual Wellness Visit today  Your next wellness visit will be due in one year (3/6/2024)  The screening/preventive services that you may require over the next 5-10 years are detailed below  Some tests may not apply to you based off risk factors and/or age  Screening tests ordered at today's visit but not completed yet may show as past due  Also, please note that scanned in results may not display below  Preventive Screenings:  Service Recommendations Previous Testing/Comments   Colorectal Cancer Screening  · Colonoscopy    · Fecal Occult Blood Test (FOBT)/Fecal Immunochemical Test (FIT)  · Fecal DNA/Cologuard Test  · Flexible Sigmoidoscopy Age: 39-70 years old   Colonoscopy: every 10 years (May be performed more frequently if at higher risk)  OR  FOBT/FIT: every 1 year  OR  Cologuard: every 3 years  OR  Sigmoidoscopy: every 5 years  Screening may be recommended earlier than age 39 if at higher risk for colorectal cancer   Also, an individualized decision between you and your healthcare provider will decide whether screening between the ages of 74-80 would be appropriate  Colonoscopy: 03/13/2018  FOBT/FIT: Not on file  Cologuard: Not on file  Sigmoidoscopy: Not on file    Screening Current     Prostate Cancer Screening Individualized decision between patient and health care provider in men between ages of 53-78   Medicare will cover every 12 months beginning on the day after your 50th birthday PSA: 1 4 ng/mL           Hepatitis C Screening Once for adults born between 1945 and 1965  More frequently in patients at high risk for Hepatitis C Hep C Antibody: 04/06/2018    Screening Current   Diabetes Screening 1-2 times per year if you're at risk for diabetes or have pre-diabetes Fasting glucose: 111 mg/dL (10/10/2022)  A1C: 5 7 % (7/21/2022)  Screening Not Indicated  History Diabetes   Cholesterol Screening Once every 5 years if you don't have a lipid disorder  May order more often based on risk factors  Lipid panel: 07/21/2022  Screening Not Indicated  History Lipid Disorder      Other Preventive Screenings Covered by Medicare:  1  Abdominal Aortic Aneurysm (AAA) Screening: covered once if your at risk  You're considered to be at risk if you have a family history of AAA or a male between the age of 73-68 who smoking at least 100 cigarettes in your lifetime  2  Lung Cancer Screening: covers low dose CT scan once per year if you meet all of the following conditions: (1) Age 50-69; (2) No signs or symptoms of lung cancer; (3) Current smoker or have quit smoking within the last 15 years; (4) You have a tobacco smoking history of at least 20 pack years (packs per day x number of years you smoked); (5) You get a written order from a healthcare provider  3  Glaucoma Screening: covered annually if you're considered high risk: (1) You have diabetes OR (2) Family history of glaucoma OR (3)  aged 48 and older OR (3)  American aged 72 and older  3   Osteoporosis Screening: covered every 2 years if you meet one of the following conditions: (1) Have a vertebral abnormality; (2) On glucocorticoid therapy for more than 3 months; (3) Have primary hyperparathyroidism; (4) On osteoporosis medications and need to assess response to drug therapy  5  HIV Screening: covered annually if you're between the age of 12-76  Also covered annually if you are younger than 13 and older than 72 with risk factors for HIV infection  For pregnant patients, it is covered up to 3 times per pregnancy  Immunizations:  Immunization Recommendations   Influenza Vaccine Annual influenza vaccination during flu season is recommended for all persons aged >= 6 months who do not have contraindications   Pneumococcal Vaccine   * Pneumococcal conjugate vaccine = PCV13 (Prevnar 13), PCV15 (Vaxneuvance), PCV20 (Prevnar 20)  * Pneumococcal polysaccharide vaccine = PPSV23 (Pneumovax) Adults 25-60 years old: 1-3 doses may be recommended based on certain risk factors  Adults 72 years old: 1-2 doses may be recommended based off what pneumonia vaccine you previously received   Hepatitis B Vaccine 3 dose series if at intermediate or high risk (ex: diabetes, end stage renal disease, liver disease)   Tetanus (Td) Vaccine - COST NOT COVERED BY MEDICARE PART B Following completion of primary series, a booster dose should be given every 10 years to maintain immunity against tetanus  Td may also be given as tetanus wound prophylaxis  Tdap Vaccine - COST NOT COVERED BY MEDICARE PART B Recommended at least once for all adults  For pregnant patients, recommended with each pregnancy     Shingles Vaccine (Shingrix) - COST NOT COVERED BY MEDICARE PART B  2 shot series recommended in those aged 48 and above     Health Maintenance Due:      Topic Date Due   • Colorectal Cancer Screening  03/13/2028   • Hepatitis C Screening  Completed     Immunizations Due:      Topic Date Due   • Hepatitis A Vaccine (1 of 2 - Risk 2-dose series) Never done   • Hepatitis B Vaccine (1 of 3 - Risk 3-dose series) Never done   • Pneumococcal Vaccine: 65+ Years (2 - PCV) 03/07/2018   • COVID-19 Vaccine (3 - Booster for [place] series) 01/14/2022     Advance Directives   What are advance directives? Advance directives are legal documents that state your wishes and plans for medical care  These plans are made ahead of time in case you lose your ability to make decisions for yourself  Advance directives can apply to any medical decision, such as the treatments you want, and if you want to donate organs  What are the types of advance directives? There are many types of advance directives, and each state has rules about how to use them  You may choose a combination of any of the following:  · Living will: This is a written record of the treatment you want  You can also choose which treatments you do not want, which to limit, and which to stop at a certain time  This includes surgery, medicine, IV fluid, and tube feedings  · Durable power of  for healthcare [facility]): This is a written record that states who you want to make healthcare choices for you when you are unable to make them for yourself  This person, called a proxy, is usually a family member or a friend  You may choose more than 1 proxy  · Do not resuscitate (DNR) order:  A DNR order is used in case your heart stops beating or you stop breathing  It is a request not to have certain forms of treatment, such as CPR  A DNR order may be included in other types of advance directives  · Medical directive: This covers the care that you want if you are in a coma, near death, or unable to make decisions for yourself  You can list the treatments you want for each condition  Treatment may include pain medicine, surgery, blood transfusions, dialysis, IV or tube feedings, and a ventilator (breathing machine)  · Values history: This document has questions about your views, beliefs, and how you feel and think about life   This information can help others choose the care that you would choose  Why are advance directives important? An advance directive helps you control your care  Although spoken wishes may be used, it is better to have your wishes written down  Spoken wishes can be misunderstood, or not followed  Treatments may be given even if you do not want them  An advance directive may make it easier for your family to make difficult choices about your care  Cigarette Smoking and Your Health   Risks to your health if you smoke:  Nicotine and other chemicals found in tobacco damage every cell in your body  Even if you are a light smoker, you have an increased risk for cancer, heart disease, and lung disease  If you are pregnant or have diabetes, smoking increases your risk for complications  Benefits to your health if you stop smoking:   · You decrease respiratory symptoms such as coughing, wheezing, and shortness of breath  · You reduce your risk for cancers of the lung, mouth, throat, kidney, bladder, pancreas, stomach, and cervix  If you already have cancer, you increase the benefits of chemotherapy  You also reduce your risk for cancer returning or a second cancer from developing  · You reduce your risk for heart disease, blood clots, heart attack, and stroke  · You reduce your risk for lung infections, and diseases such as pneumonia, asthma, chronic bronchitis, and emphysema  · Your circulation improves  More oxygen can be delivered to your body  If you have diabetes, you lower your risk for complications, such as kidney, artery, and eye diseases  You also lower your risk for nerve damage  Nerve damage can lead to amputations, poor vision, and blindness  · You improve your body's ability to heal and to fight infections  For more information and support to stop smoking:   · [employer]  Phone: 2- 112 - 720-3743  Web Address: www [employer]  Weight Management   Why it is important to manage your weight:  Being overweight increases your risk of health conditions such as heart disease, high blood pressure, type 2 diabetes, and certain types of cancer  It can also increase your risk for osteoarthritis, sleep apnea, and other respiratory problems  Aim for a slow, steady weight loss  Even a small amount of weight loss can lower your risk of health problems  How to lose weight safely:  A safe and healthy way to lose weight is to eat fewer calories and get regular exercise  You can lose up about 1 pound a week by decreasing the number of calories you eat by 500 calories each day  Healthy meal plan for weight management:  A healthy meal plan includes a variety of foods, contains fewer calories, and helps you stay healthy  A healthy meal plan includes the following:  · Eat whole-grain foods more often  A healthy meal plan should contain fiber  Fiber is the part of grains, fruits, and vegetables that is not broken down by your body  Whole-grain foods are healthy and provide extra fiber in your diet  Some examples of whole-grain foods are whole-wheat breads and pastas, oatmeal, brown rice, and bulgur  · Eat a variety of vegetables every day  Include dark, leafy greens such as spinach, kale, glo greens, and mustard greens  Eat yellow and orange vegetables such as carrots, sweet potatoes, and winter squash  · Eat a variety of fruits every day  Choose fresh or canned fruit (canned in its own juice or light syrup) instead of juice  Fruit juice has very little or no fiber  · Eat low-fat dairy foods  Drink fat-free (skim) milk or 1% milk  Eat fat-free yogurt and low-fat cottage cheese  Try low-fat cheeses such as mozzarella and other reduced-fat cheeses  · Choose meat and other protein foods that are low in fat  Choose beans or other legumes such as split peas or lentils  Choose fish, skinless poultry (chicken or turkey), or lean cuts of red meat (beef or pork)  Before you cook meat or poultry, cut off any visible fat  · Use less fat and oil    Try baking foods instead of frying them  Add less fat, such as margarine, sour cream, regular salad dressing and mayonnaise to foods  Eat fewer high-fat foods  Some examples of high-fat foods include french fries, doughnuts, ice cream, and cakes  · Eat fewer sweets  Limit foods and drinks that are high in sugar  This includes candy, cookies, regular soda, and sweetened drinks  Exercise:  Exercise at least 30 minutes per day on most days of the week  Some examples of exercise include walking, biking, dancing, and swimming  You can also fit in more physical activity by taking the stairs instead of the elevator or parking farther away from stores  Ask your healthcare provider about the best exercise plan for you  © Copyright [address] 2018 Information is for End User's use only and may not be sold, redistributed or otherwise used for commercial purposes  All illustrations and images included in CareNotes® are the copyrighted property of A D A M , Inc  or [address] [place] [address]          Chief Complaint     Chief Complaint   Patient presents with   • Follow-up   • Medicare Wellness Visit       History of Present Illness   Felix Bautista is a 79y o -year-old male who has been lost to follow-up and has been noncompliant with his care  Last visit in this office, Edward Alvarez was admitted for alcohol detoxification from November 11 to November 15 of last year at [address]  We did contact him multiple times afterwards that he did not respond to request to come in for follow-up visit  Edward Alvarez reports that he is under house arrest and is wearing a bracelet on his ankle related to his alcoholism  He is in a long-term outpatient alcohol treatment program through [facility] in [place]  He has sessions Tuesday Wednesday and Thursday  Reports that he is very well supported there and feels that his depression is under good control with current medicines and counseling      He has not had an alcoholic drink since his admission last November  Other problems include smoking and he states he simply cannot quit while dealing with abstaining from alcohol  He smokes half a pack of cigarettes a day  He denies cough, dyspnea, hemoptysis, chest pain  He did not follow-up with CT lung screen last visit and this was ordered to be performed in the near future to review at follow-up visit which will be after his studies ordered today are performed and lab work  He has no symptoms of hyperglycemia or hypoglycemia with underlying type 2 diabetes  He continues metformin 1000 mg daily with breakfast   He has an eye exam coming up in the near future and is up-to-date on eye care  Hypertension: Blood pressure is borderline high and he continues amlodipine 10 mg each morning and hydrochlorothiazide 12 5 mg daily  He is allergic to lisinopril  We will avoid ARB medications because of chance of cross sensitivity  We will adjust medications next visit blood pressure is not at goal   Lab work ordered August 8 of last year has not been performed and will be performed in the near future to be reviewed at next visit  Hyperlipidemia: Lipid panel is ordered and is to be drawn in the near future  Continue atorvastatin 40 mg daily discuss results at next visit  Mild claudication continues without ischemic rest pain  Smoking cessation was discussed  Last August, carotid arterial ultrasound, arterial lower extremity duplex studies and AAA screen were ordered but were not performed and we will proceed at this time with testing and then follow-up at next visit  PSA is due and was ordered  There are no urinary complaints  We discussed Cologuard as an alternative to colonoscopy Diego Ramandeepvictor manuel is agreeable and this was ordered  Glennmitchell Sanamcaren feels that his life is getting back on track  He has good support from his family and his brother Toya Channel drives him to and from appointments    Scheduling for upcoming tests and follow-up will be through his brother Shayy [employer]  Review of Systems   Review of Systems no abdominal pain, moving his bowels regularly, no melena or blood per rectum  Please see questionnaire regarding depression screening which was negative for depression  Active Problem List     Patient Active Problem List   Diagnosis   • Atherosclerotic PVD with intermittent claudication (HCC)   • Diabetes mellitus type 2, noninsulin dependent ([facility] )   • Hypertension   • Mixed hyperlipidemia   • Tobacco use disorder   • Rosacea   • Rhinophyma   • Bilateral carotid artery stenosis   • Chronic bronchitis (HCC)   • MDD (major depressive disorder), recurrent episode ([facility] )   • Benzodiazepine dependence ([facility] )   • Closed fracture of left proximal humerus   • Alcohol abuse   • Alcohol use disorder, moderate, dependence ([facility] )   • Thrombocytopenia ([facility] )       The following portions of the patient's history were reviewed and updated as appropriate: allergies, current medications, past family history, past medical history, past social history, past surgical history, and problem list     Objective     Vitals:    03/06/23 0830   BP: 140/86   Pulse: (!) 108   Temp: (!) 97 1 °F (36 2 °C)   SpO2: 99%     Wt Readings from Last 3 Encounters:   03/06/23 81 4 kg (179 lb 6 4 oz)   11/11/22 70 8 kg (156 lb)   11/11/22 74 8 kg (165 lb)       Physical Exam     Vital signs stable, alert and oriented, in good spirits in no distress  Normocephalic/atraumatic  Hearing and vision both grossly normal   Head and neck without adenopathy or mass  No thyromegaly  No JVD  No carotid bruits and carotid pulses are normal   The lungs are clear throughout  There are no decreased breath sounds  There is no respiratory distress or prolongation of expiratory phase  Cardiac: Regular rate and rhythm, normal S1 and S2, no murmur, no S4 or S3  Abdomen: Nondistended with normal bowel sounds, soft and nontender without masses bruits or organomegaly  Extremities:  There is trace edema lower legs without phlebitic findings  There are diminished peripheral pulses with normal capillary refill of the lower extremities  Gait is stable and normal     Pertinent Laboratory/Diagnostic Studies:        Orders Placed This Encounter   Procedures   • Cologuard   • PSA, Total Screen       ALLERGIES:  Allergies   Allergen Reactions   • Lisinopril Angioedema       Current Medications     Current Outpatient Medications   Medication Sig Dispense Refill   • amLODIPine (NORVASC) 10 mg tablet Take 1 tablet (10 mg total) by mouth daily 30 tablet 0   • atorvastatin (LIPITOR) 40 mg tablet Take 1 tablet (40 mg total) by mouth daily 30 tablet 0   • folic acid (FOLVITE) 1 mg tablet Take 1 tablet (1 mg total) by mouth daily 90 tablet 0   • hydrochlorothiazide (HYDRODIURIL) 12 5 mg tablet Take 1 tablet (12 5 mg total) by mouth daily 30 tablet 0   • metFORMIN (GLUCOPHAGE) 1000 MG tablet Take 1 tablet (1,000 mg total) by mouth daily with breakfast 30 tablet 0   • mirtazapine (REMERON) 30 mg tablet Take 1 tablet (30 mg total) by mouth daily at bedtime 30 tablet 0   • mirtazapine (REMERON) 7 5 MG tablet Take 1 tablet (7 5 mg total) by mouth daily at bedtime 30 tablet 0   • naltrexone (REVIA) 50 mg tablet Take 1 tablet (50 mg total) by mouth daily 30 tablet 0   • PARoxetine (PAXIL) 20 mg tablet Take 1 tablet (20 mg total) by mouth daily 30 tablet 0   • potassium chloride (K-DUR,KLOR-CON) 20 mEq tablet Take 2 tablets (40 mEq total) by mouth daily 7 tablet 0   • thiamine (VITAMIN B1) 100 mg tablet Take 1 tablet (100 mg total) by mouth daily 90 tablet 0   • magnesium gluconate (MAGONATE) 500 mg tablet Take 1 tablet (500 mg total) by mouth 2 (two) times a day for 7 days 14 tablet 0     No current facility-administered medications for this visit           Devora Jarrett MD

## 2023-03-10 LAB
LEFT EYE DIABETIC RETINOPATHY: NORMAL
RIGHT EYE DIABETIC RETINOPATHY: NORMAL

## 2023-03-10 PROCEDURE — 2023F DILAT RTA XM W/O RTNOPTHY: CPT | Performed by: INTERNAL MEDICINE

## 2023-03-13 ENCOUNTER — APPOINTMENT (OUTPATIENT)
Dept: LAB | Facility: CLINIC | Age: 68
End: 2023-03-13

## 2023-03-13 DIAGNOSIS — E78.5 HYPERLIPIDEMIA, UNSPECIFIED HYPERLIPIDEMIA TYPE: ICD-10-CM

## 2023-03-13 LAB
ALBUMIN SERPL BCP-MCNC: 3.8 G/DL (ref 3.5–5)
ALP SERPL-CCNC: 112 U/L (ref 46–116)
ALT SERPL W P-5'-P-CCNC: 23 U/L (ref 12–78)
ANION GAP SERPL CALCULATED.3IONS-SCNC: 0 MMOL/L (ref 4–13)
AST SERPL W P-5'-P-CCNC: 20 U/L (ref 5–45)
BACTERIA UR QL AUTO: ABNORMAL /HPF
BASOPHILS # BLD AUTO: 0.08 THOUSANDS/ÂΜL (ref 0–0.1)
BASOPHILS NFR BLD AUTO: 1 % (ref 0–1)
BILIRUB SERPL-MCNC: 0.44 MG/DL (ref 0.2–1)
BILIRUB UR QL STRIP: NEGATIVE
BUN SERPL-MCNC: 12 MG/DL (ref 5–25)
CALCIUM SERPL-MCNC: 9.4 MG/DL (ref 8.3–10.1)
CHLORIDE SERPL-SCNC: 106 MMOL/L (ref 96–108)
CHOLEST SERPL-MCNC: 150 MG/DL
CLARITY UR: CLEAR
CO2 SERPL-SCNC: 30 MMOL/L (ref 21–32)
COLOR UR: YELLOW
CREAT SERPL-MCNC: 0.77 MG/DL (ref 0.6–1.3)
EOSINOPHIL # BLD AUTO: 0.15 THOUSAND/ÂΜL (ref 0–0.61)
EOSINOPHIL NFR BLD AUTO: 2 % (ref 0–6)
ERYTHROCYTE [DISTWIDTH] IN BLOOD BY AUTOMATED COUNT: 13.3 % (ref 11.6–15.1)
EST. AVERAGE GLUCOSE BLD GHB EST-MCNC: 192 MG/DL
GFR SERPL CREATININE-BSD FRML MDRD: 93 ML/MIN/1.73SQ M
GLUCOSE P FAST SERPL-MCNC: 149 MG/DL (ref 65–99)
GLUCOSE UR STRIP-MCNC: NEGATIVE MG/DL
HBA1C MFR BLD: 8.3 %
HCT VFR BLD AUTO: 42.8 % (ref 36.5–49.3)
HDLC SERPL-MCNC: 37 MG/DL
HGB BLD-MCNC: 13.4 G/DL (ref 12–17)
HGB UR QL STRIP.AUTO: NEGATIVE
IMM GRANULOCYTES # BLD AUTO: 0.02 THOUSAND/UL (ref 0–0.2)
IMM GRANULOCYTES NFR BLD AUTO: 0 % (ref 0–2)
KETONES UR STRIP-MCNC: NEGATIVE MG/DL
LDLC SERPL CALC-MCNC: 73 MG/DL (ref 0–100)
LEUKOCYTE ESTERASE UR QL STRIP: NEGATIVE
LYMPHOCYTES # BLD AUTO: 2.36 THOUSANDS/ÂΜL (ref 0.6–4.47)
LYMPHOCYTES NFR BLD AUTO: 32 % (ref 14–44)
MCH RBC QN AUTO: 29.8 PG (ref 26.8–34.3)
MCHC RBC AUTO-ENTMCNC: 31.3 G/DL (ref 31.4–37.4)
MCV RBC AUTO: 95 FL (ref 82–98)
MONOCYTES # BLD AUTO: 0.5 THOUSAND/ÂΜL (ref 0.17–1.22)
MONOCYTES NFR BLD AUTO: 7 % (ref 4–12)
MUCOUS THREADS UR QL AUTO: ABNORMAL
NEUTROPHILS # BLD AUTO: 4.35 THOUSANDS/ÂΜL (ref 1.85–7.62)
NEUTS SEG NFR BLD AUTO: 58 % (ref 43–75)
NITRITE UR QL STRIP: NEGATIVE
NON-SQ EPI CELLS URNS QL MICRO: ABNORMAL /HPF
NONHDLC SERPL-MCNC: 113 MG/DL
NRBC BLD AUTO-RTO: 0 /100 WBCS
PH UR STRIP.AUTO: 7 [PH]
PLATELET # BLD AUTO: 349 THOUSANDS/UL (ref 149–390)
PMV BLD AUTO: 9.7 FL (ref 8.9–12.7)
POTASSIUM SERPL-SCNC: 4.2 MMOL/L (ref 3.5–5.3)
PROT SERPL-MCNC: 7 G/DL (ref 6.4–8.4)
PROT UR STRIP-MCNC: ABNORMAL MG/DL
PSA SERPL-MCNC: 0.4 NG/ML (ref 0–4)
RBC # BLD AUTO: 4.49 MILLION/UL (ref 3.88–5.62)
RBC #/AREA URNS AUTO: ABNORMAL /HPF
SODIUM SERPL-SCNC: 136 MMOL/L (ref 135–147)
SP GR UR STRIP.AUTO: 1.02 (ref 1–1.03)
TRIGL SERPL-MCNC: 199 MG/DL
UROBILINOGEN UR STRIP-ACNC: <2 MG/DL
WBC # BLD AUTO: 7.46 THOUSAND/UL (ref 4.31–10.16)
WBC #/AREA URNS AUTO: ABNORMAL /HPF

## 2023-03-14 DIAGNOSIS — F17.200 CURRENT EVERY DAY SMOKER: ICD-10-CM

## 2023-03-14 DIAGNOSIS — E78.49 OTHER HYPERLIPIDEMIA: ICD-10-CM

## 2023-03-14 DIAGNOSIS — I10 ESSENTIAL HYPERTENSION: ICD-10-CM

## 2023-03-14 DIAGNOSIS — E11.51 TYPE 2 DIABETES MELLITUS WITH DIABETIC PERIPHERAL ANGIOPATHY WITHOUT GANGRENE, WITHOUT LONG-TERM CURRENT USE OF INSULIN (HCC): ICD-10-CM

## 2023-03-14 DIAGNOSIS — F10.20 ALCOHOL USE DISORDER, MODERATE, DEPENDENCE (HCC): ICD-10-CM

## 2023-03-14 RX ORDER — HYDROCHLOROTHIAZIDE 12.5 MG/1
TABLET ORAL
Qty: 30 TABLET | Refills: 11 | Status: SHIPPED | OUTPATIENT
Start: 2023-03-14

## 2023-03-14 RX ORDER — ATORVASTATIN CALCIUM 40 MG/1
TABLET, FILM COATED ORAL
Qty: 30 TABLET | Refills: 11 | Status: SHIPPED | OUTPATIENT
Start: 2023-03-14

## 2023-03-14 RX ORDER — AMLODIPINE BESYLATE 10 MG/1
TABLET ORAL
Qty: 30 TABLET | Refills: 11 | Status: SHIPPED | OUTPATIENT
Start: 2023-03-14

## 2023-03-15 RX ORDER — NALTREXONE HYDROCHLORIDE 50 MG/1
TABLET, FILM COATED ORAL
Qty: 30 TABLET | Refills: 11 | Status: SHIPPED | OUTPATIENT
Start: 2023-03-15

## 2023-04-18 PROBLEM — S42.202A CLOSED FRACTURE OF LEFT PROXIMAL HUMERUS: Status: RESOLVED | Noted: 2022-09-28 | Resolved: 2023-04-18

## 2023-04-18 PROBLEM — D69.6 THROMBOCYTOPENIA (HCC): Status: RESOLVED | Noted: 2022-11-15 | Resolved: 2023-04-18

## 2023-04-18 PROBLEM — F13.20 BENZODIAZEPINE DEPENDENCE (HCC): Status: RESOLVED | Noted: 2022-10-08 | Resolved: 2023-04-18

## 2023-04-26 DIAGNOSIS — F32.A ANXIETY AND DEPRESSION: ICD-10-CM

## 2023-04-26 DIAGNOSIS — F41.9 ANXIETY AND DEPRESSION: ICD-10-CM

## 2023-04-26 DIAGNOSIS — F17.200 CURRENT EVERY DAY SMOKER: ICD-10-CM

## 2023-04-26 DIAGNOSIS — F33.2 SEVERE EPISODE OF RECURRENT MAJOR DEPRESSIVE DISORDER, WITHOUT PSYCHOTIC FEATURES (HCC): ICD-10-CM

## 2023-04-26 DIAGNOSIS — E78.49 OTHER HYPERLIPIDEMIA: ICD-10-CM

## 2023-04-26 DIAGNOSIS — F10.20 ALCOHOL USE DISORDER, MODERATE, DEPENDENCE (HCC): ICD-10-CM

## 2023-04-26 DIAGNOSIS — E87.6 HYPOKALEMIA: ICD-10-CM

## 2023-04-26 DIAGNOSIS — E11.51 TYPE 2 DIABETES MELLITUS WITH DIABETIC PERIPHERAL ANGIOPATHY WITHOUT GANGRENE, WITHOUT LONG-TERM CURRENT USE OF INSULIN (HCC): ICD-10-CM

## 2023-04-26 DIAGNOSIS — I10 ESSENTIAL HYPERTENSION: ICD-10-CM

## 2023-04-26 RX ORDER — FOLIC ACID 1 MG/1
1 TABLET ORAL DAILY
Qty: 90 TABLET | Refills: 3 | Status: SHIPPED | OUTPATIENT
Start: 2023-04-26

## 2023-04-26 RX ORDER — AMLODIPINE BESYLATE 10 MG/1
10 TABLET ORAL DAILY
Qty: 90 TABLET | Refills: 3 | Status: SHIPPED | OUTPATIENT
Start: 2023-04-26

## 2023-04-26 RX ORDER — POTASSIUM CHLORIDE 20 MEQ/1
40 TABLET, EXTENDED RELEASE ORAL DAILY
Qty: 180 TABLET | Refills: 3 | Status: SHIPPED | OUTPATIENT
Start: 2023-04-26

## 2023-04-26 RX ORDER — NALTREXONE HYDROCHLORIDE 50 MG/1
50 TABLET, FILM COATED ORAL DAILY
Qty: 90 TABLET | Refills: 3 | Status: SHIPPED | OUTPATIENT
Start: 2023-04-26

## 2023-04-26 RX ORDER — ATORVASTATIN CALCIUM 40 MG/1
40 TABLET, FILM COATED ORAL DAILY
Qty: 90 TABLET | Refills: 3 | Status: SHIPPED | OUTPATIENT
Start: 2023-04-26

## 2023-04-26 RX ORDER — HYDROCHLOROTHIAZIDE 12.5 MG/1
12.5 TABLET ORAL DAILY
Qty: 90 TABLET | Refills: 3 | Status: SHIPPED | OUTPATIENT
Start: 2023-04-26

## 2023-04-26 RX ORDER — MIRTAZAPINE 7.5 MG/1
7.5 TABLET, FILM COATED ORAL
Qty: 90 TABLET | Refills: 3 | Status: SHIPPED | OUTPATIENT
Start: 2023-04-26

## 2023-04-26 RX ORDER — PAROXETINE HYDROCHLORIDE 20 MG/1
20 TABLET, FILM COATED ORAL DAILY
Qty: 90 TABLET | Refills: 3 | Status: SHIPPED | OUTPATIENT
Start: 2023-04-26 | End: 2023-05-26

## 2023-04-26 RX ORDER — MIRTAZAPINE 30 MG/1
30 TABLET, FILM COATED ORAL
Qty: 90 TABLET | Refills: 3 | Status: SHIPPED | OUTPATIENT
Start: 2023-04-26

## 2023-05-01 DIAGNOSIS — E11.51 TYPE 2 DIABETES MELLITUS WITH DIABETIC PERIPHERAL ANGIOPATHY WITHOUT GANGRENE, WITHOUT LONG-TERM CURRENT USE OF INSULIN (HCC): ICD-10-CM

## 2023-05-04 NOTE — ASSESSMENT & PLAN NOTE
OPERATIVE REPORT   Name: Maria De Jesus Gonzales  Medical Record Number:  [id number]  YOB: 2020    Date of procedure:  5/2/23     PreOperative Diagnosis:   Thyroglossal duct cyst, infected    Post Operative Diagnosis and Findings:   Thyroglossal duct cyst, infected    Surgeon(s):   Helio Barth MD    Assistant(s)  Stephanie Abdullahi MD    Procedure   Sistrunk procedure    Indications and Consent:    Maria De Jesus Gonzales is a 2 y.o. male who initially presented with midline neck cyst with exam and imaging showed to be consistent with an infected thyroglossal duct cyst.  Risks/benefits/alternatives to surgical excision were reviewed with family and consent was obtained.  The family requested I proceed with surgery.     Operative Detail:    The patient was brought to the operating room and placed in supine position.  Smooth induction of endotracheal anesthesia was accomplished by the anesthesia team.  [place] protocol undertaken. The standard surgical pause undertaken and the Surgical Safety Checklist was reviewed and executed.    The patient was prepped and draped in routine fashion. A shoulder roll was placed for gentle cervical extension.  The neck mass, hyoid, and cricoid were identified and a 3 cm incision in a skin crease was planned and injected with 1% lidocaine with epinephrine.  The neck was prepped and draped in the usual sterile fashion.      The skin was then incised and the subcutaneous tissues were also divided.  The platysma muscle was divided and sub-platysmal flaps were elevated and placed in retention throughout the surgery. There was pus and a ruptured cyst seen on entering the neck, The sternocleidomastoid muscle was identified and the anterior border was dissected.  The cervical fascia was divided and the strap muscles were identified.  The cyst wall remnants were brought into view and it was dissected from the surrounding tissue.  The thyroid notch was identified. Dissection was then taken around the cyst wall and  Well controlled, continue current regimen  the central portion of the hyoid was skeletonized with the cyst left attached. The posterior hyoid space was bluntly dissected and the heavy scissor was used to cut the hyoid in between attachments of the anterior digastrics. The tongue base was then clamped and a 3-0 prolene applied here in a figure of 8 fashion. A penrose was sewn into the deep neck. Anuj was applied.   The incision was then closed in layers using absorbable suture with dermabond as a final skin closure.     The patient was then turned over to the anesthesia team, awakened, extubated, and transferred to the postanesthesia care unit for further recovery.  All sponge and needle counts were correct times 2.       Helio Barth MD  Pediatric Otolaryngology Attending

## 2023-07-16 ENCOUNTER — RA CDI HCC (OUTPATIENT)
Dept: OTHER | Facility: HOSPITAL | Age: 68
End: 2023-07-16

## 2023-07-16 NOTE — PROGRESS NOTES
[address] [facility] coding opportunities          Chart Reviewed number of suggestions sent to Provider: 2     Patients Insurance     Medicare Insurance: [address] [place] [address]

## 2023-07-24 PROBLEM — F10.10 ALCOHOL ABUSE: Status: RESOLVED | Noted: 2022-11-09 | Resolved: 2023-07-24

## 2024-01-25 ENCOUNTER — VBI (OUTPATIENT)
Dept: ADMINISTRATIVE | Facility: OTHER | Age: 69
End: 2024-01-25

## 2024-03-28 ENCOUNTER — APPOINTMENT (OUTPATIENT)
Dept: LAB | Facility: CLINIC | Age: 69
End: 2024-03-28
Payer: COMMERCIAL

## 2024-03-28 DIAGNOSIS — E78.2 MIXED HYPERLIPIDEMIA: ICD-10-CM

## 2024-03-28 LAB
CHOLEST SERPL-MCNC: 144 MG/DL
HDLC SERPL-MCNC: 35 MG/DL
LDLC SERPL CALC-MCNC: 57 MG/DL (ref 0–100)
NONHDLC SERPL-MCNC: 109 MG/DL
TRIGL SERPL-MCNC: 259 MG/DL

## 2024-03-28 PROCEDURE — 80061 LIPID PANEL: CPT

## 2024-04-15 DIAGNOSIS — E78.49 OTHER HYPERLIPIDEMIA: ICD-10-CM

## 2024-04-15 DIAGNOSIS — I10 ESSENTIAL HYPERTENSION: ICD-10-CM

## 2024-04-15 DIAGNOSIS — F17.200 CURRENT EVERY DAY SMOKER: ICD-10-CM

## 2024-04-15 DIAGNOSIS — E11.51 TYPE 2 DIABETES MELLITUS WITH DIABETIC PERIPHERAL ANGIOPATHY WITHOUT GANGRENE, WITHOUT LONG-TERM CURRENT USE OF INSULIN (HCC): ICD-10-CM

## 2024-04-15 RX ORDER — AMLODIPINE BESYLATE 10 MG/1
10 TABLET ORAL DAILY
Qty: 90 TABLET | Refills: 3 | Status: SHIPPED | OUTPATIENT
Start: 2024-04-15

## 2024-04-15 RX ORDER — HYDROCHLOROTHIAZIDE 12.5 MG/1
12.5 TABLET ORAL DAILY
Qty: 90 TABLET | Refills: 3 | Status: SHIPPED | OUTPATIENT
Start: 2024-04-15

## 2024-04-15 RX ORDER — ATORVASTATIN CALCIUM 40 MG/1
40 TABLET, FILM COATED ORAL DAILY
Qty: 90 TABLET | Refills: 3 | Status: SHIPPED | OUTPATIENT
Start: 2024-04-15

## 2024-05-13 ENCOUNTER — VBI (OUTPATIENT)
Dept: ADMINISTRATIVE | Facility: OTHER | Age: 69
End: 2024-05-13

## 2024-12-31 ENCOUNTER — TELEPHONE (OUTPATIENT)
Age: 69
End: 2024-12-31

## 2024-12-31 DIAGNOSIS — E78.2 MIXED HYPERLIPIDEMIA: ICD-10-CM

## 2024-12-31 DIAGNOSIS — E11.9 DIABETES MELLITUS TYPE 2, NONINSULIN DEPENDENT (HCC): ICD-10-CM

## 2024-12-31 DIAGNOSIS — I10 PRIMARY HYPERTENSION: Primary | ICD-10-CM

## 2024-12-31 DIAGNOSIS — Z12.5 SCREENING FOR PROSTATE CANCER: ICD-10-CM

## 2024-12-31 NOTE — TELEPHONE ENCOUNTER
Pt called in regarding upcoming appointment on 1/7/25 w/Dr. Dickey.  Pt would like any blood work needed for this appointment to be put in his MyChart.    Please advise back to Pt via MyChart.

## 2025-01-02 ENCOUNTER — RA CDI HCC (OUTPATIENT)
Dept: OTHER | Facility: HOSPITAL | Age: 70
End: 2025-01-02

## 2025-01-02 NOTE — PROGRESS NOTES
HCC coding opportunities       Chart reviewed, no opportunity found: CHART REVIEWED, NO OPPORTUNITY FOUND     Please review and document all HCC diagnoses, using M.E.A.T. criteria, as risk scores reset with the New Year.       Patients Insurance     Medicare Insurance: [employer]

## 2025-01-03 ENCOUNTER — APPOINTMENT (OUTPATIENT)
Dept: LAB | Facility: CLINIC | Age: 70
End: 2025-01-03
Payer: COMMERCIAL

## 2025-01-03 DIAGNOSIS — E11.9 DIABETES MELLITUS TYPE 2, NONINSULIN DEPENDENT (HCC): ICD-10-CM

## 2025-01-03 DIAGNOSIS — I10 PRIMARY HYPERTENSION: ICD-10-CM

## 2025-01-03 DIAGNOSIS — E78.2 MIXED HYPERLIPIDEMIA: ICD-10-CM

## 2025-01-03 LAB
BACTERIA UR QL AUTO: NORMAL /HPF
BASOPHILS # BLD AUTO: 0.11 THOUSANDS/ΜL (ref 0–0.1)
BASOPHILS NFR BLD AUTO: 1 % (ref 0–1)
BILIRUB UR QL STRIP: NEGATIVE
CLARITY UR: CLEAR
COLOR UR: COLORLESS
EOSINOPHIL # BLD AUTO: 0.26 THOUSAND/ΜL (ref 0–0.61)
EOSINOPHIL NFR BLD AUTO: 3 % (ref 0–6)
ERYTHROCYTE [DISTWIDTH] IN BLOOD BY AUTOMATED COUNT: 13 % (ref 11.6–15.1)
EST. AVERAGE GLUCOSE BLD GHB EST-MCNC: 160 MG/DL
GLUCOSE UR STRIP-MCNC: NEGATIVE MG/DL
HBA1C MFR BLD: 7.2 %
HCT VFR BLD AUTO: 43.6 % (ref 36.5–49.3)
HGB BLD-MCNC: 14.3 G/DL (ref 12–17)
HGB UR QL STRIP.AUTO: NEGATIVE
IMM GRANULOCYTES # BLD AUTO: 0.03 THOUSAND/UL (ref 0–0.2)
IMM GRANULOCYTES NFR BLD AUTO: 0 % (ref 0–2)
KETONES UR STRIP-MCNC: NEGATIVE MG/DL
LEUKOCYTE ESTERASE UR QL STRIP: NEGATIVE
LYMPHOCYTES # BLD AUTO: 2.36 THOUSANDS/ΜL (ref 0.6–4.47)
LYMPHOCYTES NFR BLD AUTO: 29 % (ref 14–44)
MCH RBC QN AUTO: 32.4 PG (ref 26.8–34.3)
MCHC RBC AUTO-ENTMCNC: 32.8 G/DL (ref 31.4–37.4)
MCV RBC AUTO: 99 FL (ref 82–98)
MONOCYTES # BLD AUTO: 0.69 THOUSAND/ΜL (ref 0.17–1.22)
MONOCYTES NFR BLD AUTO: 8 % (ref 4–12)
NEUTROPHILS # BLD AUTO: 4.76 THOUSANDS/ΜL (ref 1.85–7.62)
NEUTS SEG NFR BLD AUTO: 59 % (ref 43–75)
NITRITE UR QL STRIP: NEGATIVE
NON-SQ EPI CELLS URNS QL MICRO: NORMAL /HPF
NRBC BLD AUTO-RTO: 0 /100 WBCS
PH UR STRIP.AUTO: 7 [PH]
PLATELET # BLD AUTO: 301 THOUSANDS/UL (ref 149–390)
PMV BLD AUTO: 9.3 FL (ref 8.9–12.7)
PROT UR STRIP-MCNC: NEGATIVE MG/DL
RBC # BLD AUTO: 4.41 MILLION/UL (ref 3.88–5.62)
RBC #/AREA URNS AUTO: NORMAL /HPF
SP GR UR STRIP.AUTO: 1.01 (ref 1–1.03)
UROBILINOGEN UR STRIP-ACNC: <2 MG/DL
WBC # BLD AUTO: 8.21 THOUSAND/UL (ref 4.31–10.16)
WBC #/AREA URNS AUTO: NORMAL /HPF

## 2025-01-03 PROCEDURE — 82043 UR ALBUMIN QUANTITATIVE: CPT

## 2025-01-03 PROCEDURE — 80061 LIPID PANEL: CPT

## 2025-01-03 PROCEDURE — 82570 ASSAY OF URINE CREATININE: CPT

## 2025-01-03 PROCEDURE — 83036 HEMOGLOBIN GLYCOSYLATED A1C: CPT | Performed by: INTERNAL MEDICINE

## 2025-01-03 PROCEDURE — 85025 COMPLETE CBC W/AUTO DIFF WBC: CPT | Performed by: INTERNAL MEDICINE

## 2025-01-03 PROCEDURE — 80053 COMPREHEN METABOLIC PANEL: CPT | Performed by: INTERNAL MEDICINE

## 2025-01-03 PROCEDURE — 81001 URINALYSIS AUTO W/SCOPE: CPT

## 2025-01-03 PROCEDURE — G0103 PSA SCREENING: HCPCS | Performed by: INTERNAL MEDICINE

## 2025-01-03 PROCEDURE — 36415 COLL VENOUS BLD VENIPUNCTURE: CPT | Performed by: INTERNAL MEDICINE

## 2025-01-04 LAB
ALBUMIN SERPL BCG-MCNC: 4.2 G/DL (ref 3.5–5)
ALP SERPL-CCNC: 68 U/L (ref 34–104)
ALT SERPL W P-5'-P-CCNC: 24 U/L (ref 7–52)
ANION GAP SERPL CALCULATED.3IONS-SCNC: 11 MMOL/L (ref 4–13)
AST SERPL W P-5'-P-CCNC: 29 U/L (ref 13–39)
BILIRUB SERPL-MCNC: 0.5 MG/DL (ref 0.2–1)
BUN SERPL-MCNC: 14 MG/DL (ref 5–25)
CALCIUM SERPL-MCNC: 9.6 MG/DL (ref 8.4–10.2)
CHLORIDE SERPL-SCNC: 102 MMOL/L (ref 96–108)
CHOLEST SERPL-MCNC: 178 MG/DL (ref ?–200)
CO2 SERPL-SCNC: 26 MMOL/L (ref 21–32)
CREAT SERPL-MCNC: 0.7 MG/DL (ref 0.6–1.3)
CREAT UR-MCNC: 42.8 MG/DL
GFR SERPL CREATININE-BSD FRML MDRD: 96 ML/MIN/1.73SQ M
GLUCOSE P FAST SERPL-MCNC: 97 MG/DL (ref 65–99)
HDLC SERPL-MCNC: 51 MG/DL
LDLC SERPL CALC-MCNC: 89 MG/DL (ref 0–100)
MICROALBUMIN UR-MCNC: <7 MG/L
NONHDLC SERPL-MCNC: 127 MG/DL
POTASSIUM SERPL-SCNC: 4.9 MMOL/L (ref 3.5–5.3)
PROT SERPL-MCNC: 7.1 G/DL (ref 6.4–8.4)
PSA SERPL-MCNC: 0.5 NG/ML (ref 0–4)
SODIUM SERPL-SCNC: 139 MMOL/L (ref 135–147)
TRIGL SERPL-MCNC: 191 MG/DL (ref ?–150)

## 2025-01-07 ENCOUNTER — OFFICE VISIT (OUTPATIENT)
Age: 70
End: 2025-01-07
Payer: COMMERCIAL

## 2025-01-07 VITALS
HEIGHT: 69 IN | OXYGEN SATURATION: 98 % | WEIGHT: 185 LBS | BODY MASS INDEX: 27.4 KG/M2 | DIASTOLIC BLOOD PRESSURE: 68 MMHG | SYSTOLIC BLOOD PRESSURE: 130 MMHG | HEART RATE: 105 BPM

## 2025-01-07 DIAGNOSIS — E11.9 DIABETES MELLITUS TYPE 2, NONINSULIN DEPENDENT (HCC): Primary | ICD-10-CM

## 2025-01-07 DIAGNOSIS — I65.23 BILATERAL CAROTID ARTERY STENOSIS: ICD-10-CM

## 2025-01-07 DIAGNOSIS — J41.0 SIMPLE CHRONIC BRONCHITIS (HCC): ICD-10-CM

## 2025-01-07 DIAGNOSIS — I70.219 ATHEROSCLEROTIC PVD WITH INTERMITTENT CLAUDICATION (HCC): ICD-10-CM

## 2025-01-07 DIAGNOSIS — I10 PRIMARY HYPERTENSION: ICD-10-CM

## 2025-01-07 DIAGNOSIS — F33.2 SEVERE EPISODE OF RECURRENT MAJOR DEPRESSIVE DISORDER, WITHOUT PSYCHOTIC FEATURES (HCC): ICD-10-CM

## 2025-01-07 DIAGNOSIS — F10.20 ALCOHOL USE DISORDER, MODERATE, DEPENDENCE (HCC): ICD-10-CM

## 2025-01-07 DIAGNOSIS — F17.210 NICOTINE DEPENDENCE, CIGARETTES, UNCOMPLICATED: ICD-10-CM

## 2025-01-07 DIAGNOSIS — F17.200 TOBACCO USE DISORDER: ICD-10-CM

## 2025-01-07 DIAGNOSIS — Z12.11 SCREENING FOR COLON CANCER: ICD-10-CM

## 2025-01-07 DIAGNOSIS — E78.2 MIXED HYPERLIPIDEMIA: ICD-10-CM

## 2025-01-07 PROCEDURE — 99214 OFFICE O/P EST MOD 30 MIN: CPT | Performed by: INTERNAL MEDICINE

## 2025-01-07 PROCEDURE — G0439 PPPS, SUBSEQ VISIT: HCPCS | Performed by: INTERNAL MEDICINE

## 2025-01-07 RX ORDER — LORAZEPAM 1 MG/1
1 TABLET ORAL 3 TIMES DAILY PRN
COMMUNITY
Start: 2024-10-29

## 2025-01-07 RX ORDER — ASPIRIN 81 MG/1
81 TABLET ORAL DAILY
Start: 2025-01-07

## 2025-01-07 NOTE — PROGRESS NOTES
INTERNAL MEDICINE OFFICE VISIT       NAME: Oscar Garcia  AGE: 69 y.o. SEX: male       : 1955        MRN: [id number]    DATE: 2025  TIME: 9:43 AM    Assessment and Plan   1. Diabetes mellitus type 2, noninsulin dependent (HCC) (Primary)  - Comprehensive metabolic panel  - Hemoglobin A1C  - aspirin (Aspirin 81) 81 mg EC tablet; Take 1 tablet (81 mg total) by mouth daily    2. Primary hypertension  - CBC and differential  - Comprehensive metabolic panel    3. Mixed hyperlipidemia  - Lipid panel; Future    4. Bilateral carotid artery stenosis  - VAS carotid complete study; Future    5. Atherosclerotic PVD with intermittent claudication (HCC)  - VAS ARTERIAL DUPLEX- LOWER LIMB BILATERAL; Future    6. Simple chronic bronchitis (HCC)  - Complete PFT with post bronchodilator; Future    7. Tobacco use disorder  - US abdominal aorta screening aaa; Future  - CT lung screening program; Future    8. Severe episode of recurrent major depressive disorder, without psychotic features (HCC)  - Paroxetine 20 mg     9. Alcohol use disorder, moderate, dependence (HCC)      10. Screening for colon cancer  - Ambulatory Referral to Gastroenterology; Future    11. Nicotine dependence, cigarettes, uncomplicated  - CT lung screening program; Future         Chief Complaint   No chief complaint on file.      History of Present Illness   Oscar Garcia is a 69 y.o.-year-old male who presents for a follow up visit. He reports no complaints and states that he is doing well. He is aware that his A1c is above the goal and mentioned dietary and lifestyle modifications that he is willing to implement to improve his blood glucose control. His last alcoholic drink was in October and continues to smoke 1 pack of cigarettes per day. He is due for a colonoscopy, vision and dermatology follow ups and is up to date with his dental checkup. He denies symptoms of claudication and any recent illnesses. He endorses taking a baby aspirin everyday for his  vasculopathy. We discussed about the VAS carotid ultrasound and abdominal aorta screening as well as the CT lung screen.      Review of Systems   Review of Systems   Constitutional:  Negative for chills and fever.   HENT:  Negative for ear pain and sore throat.    Eyes:  Negative for pain and visual disturbance.   Respiratory:  Negative for cough and shortness of breath.    Cardiovascular:  Negative for chest pain and palpitations.   Gastrointestinal:  Negative for abdominal pain and vomiting.   Genitourinary:  Negative for dysuria and hematuria.   Musculoskeletal:  Negative for arthralgias and back pain.   Skin:  Negative for color change and rash.   Neurological:  Negative for seizures and syncope.   All other systems reviewed and are negative.      Active Problem List     Patient Active Problem List   Diagnosis    Atherosclerotic PVD with intermittent claudication (HCC)    Diabetes mellitus type 2, noninsulin dependent (HCC)    Hypertension    Mixed hyperlipidemia    Tobacco use disorder    Rosacea    Rhinophyma    Bilateral carotid artery stenosis    Chronic bronchitis (HCC)    MDD (major depressive disorder), recurrent episode (HCC)    Alcohol use disorder, moderate, dependence (HCC)       The following portions of the patient's history were reviewed and updated as appropriate: allergies, current medications, past family history, past medical history, past social history, past surgical history, and problem list.    Objective     Vitals:    01/07/25 0906   BP: 130/68   Pulse: 105   SpO2: 98%     Wt Readings from Last 3 Encounters:   01/07/25 83.9 kg (185 lb)   04/18/23 82.9 kg (182 lb 12.8 oz)   03/06/23 81.4 kg (179 lb 6.4 oz)       Physical Exam  Constitutional:       General: He is not in acute distress.     Appearance: Normal appearance.   HENT:      Head: Normocephalic and atraumatic.      Nose: No congestion or rhinorrhea.      Mouth/Throat:      Mouth: Mucous membranes are moist.   Eyes:      Extraocular  Movements: Extraocular movements intact.      Conjunctiva/sclera: Conjunctivae normal.   Neck:      Vascular: No carotid bruit.   Cardiovascular:      Rate and Rhythm: Normal rate and regular rhythm.      Pulses:           Dorsalis pedis pulses are 1+ on the right side and 1+ on the left side.      Heart sounds: Normal heart sounds.   Pulmonary:      Effort: Pulmonary effort is normal. No respiratory distress.      Breath sounds: Normal breath sounds.   Abdominal:      General: Bowel sounds are normal. There is no distension.      Palpations: Abdomen is soft.      Tenderness: There is no abdominal tenderness.   Musculoskeletal:         General: No swelling. Normal range of motion.      Cervical back: Normal range of motion.   Skin:     General: Skin is warm and dry.   Neurological:      General: No focal deficit present.   Psychiatric:         Mood and Affect: Mood normal.         Behavior: Behavior normal.         Pertinent Laboratory/Diagnostic Studies:  I have reviewed pertinent labs:  Most Recent Labs:   Lab Results   Component Value Date    WBC 8.21 01/03/2025    RBC 4.41 01/03/2025    HGB 14.3 01/03/2025    HCT 43.6 01/03/2025     01/03/2025    RDW 13.0 01/03/2025    NEUTROABS 4.76 01/03/2025    SODIUM 139 01/03/2025    K 4.9 01/03/2025     01/03/2025    CO2 26 01/03/2025    BUN 14 01/03/2025    CREATININE 0.70 01/03/2025    GLUC 109 (H) 11/15/2022    GLUF 97 01/03/2025    CALCIUM 9.6 01/03/2025    AST 29 01/03/2025    ALT 24 01/03/2025    ALKPHOS 68 01/03/2025    TP 7.1 01/03/2025    ALB 4.2 01/03/2025    TBILI 0.50 01/03/2025    CHOLESTEROL 178 01/03/2025    HDL 51 01/03/2025    TRIG 191 (H) 01/03/2025    LDLCALC 89 01/03/2025    NONHDLC 127 01/03/2025    AMMONIA 35 11/11/2022    [id number] 0.668 11/11/2022    FREET4 0.9 02/27/2017    HGBA1C 7.2 (H) 01/03/2025     01/03/2025         Orders Placed This Encounter   Procedures    US abdominal aorta screening aaa    CT lung screening  program    CBC and differential    Comprehensive metabolic panel    Lipid panel    Hemoglobin A1C    Ambulatory Referral to Gastroenterology    Complete PFT with post bronchodilator       ALLERGIES:  Allergies   Allergen Reactions    Lisinopril Angioedema       Current Medications     Current Outpatient Medications   Medication Sig Dispense Refill    amLODIPine (NORVASC) 10 mg tablet TAKE 1 TABLET DAILY 90 tablet 3    aspirin (Aspirin 81) 81 mg EC tablet Take 1 tablet (81 mg total) by mouth daily      atorvastatin (LIPITOR) 40 mg tablet TAKE 1 TABLET DAILY 90 tablet 3    Blood Glucose Monitoring Suppl (OneTouch Verio Reflect) w/Device KIT Check blood sugars once daily. Please substitute with appropriate alternative as covered by patient's insurance. Dx: E11.65 1 kit 0    folic acid (FOLVITE) 1 mg tablet Take 1 tablet (1 mg total) by mouth daily 90 tablet 3    glucose blood (OneTouch Verio) test strip Check blood sugars once daily. Please substitute with appropriate alternative as covered by patient's insurance. Dx: E11.65 100 each 3    hydroCHLOROthiazide 12.5 mg tablet TAKE 1 TABLET DAILY 90 tablet 3    LORazepam (ATIVAN) 1 mg tablet Take 1 mg by mouth 3 (three) times a day as needed for anxiety      metFORMIN (GLUCOPHAGE) 1000 MG tablet TAKE 1 TABLET DAILY 90 tablet 3    mirtazapine (REMERON) 30 mg tablet Take 1 tablet (30 mg total) by mouth daily at bedtime 90 tablet 3    mirtazapine (REMERON) 7.5 MG tablet Take 1 tablet (7.5 mg total) by mouth daily at bedtime 90 tablet 3    naltrexone (REVIA) 50 mg tablet Take 1 tablet (50 mg total) by mouth daily 90 tablet 3    OneTouch Delica Lancets 33G MISC Check blood sugars once daily. Please substitute with appropriate alternative as covered by patient's insurance. Dx: E11.65 100 each 3    potassium chloride (K-DUR,KLOR-CON) 20 mEq tablet Take 2 tablets (40 mEq total) by mouth daily 180 tablet 3    thiamine (VITAMIN B1) 100 mg tablet Take 1 tablet (100 mg total) by mouth  daily 90 tablet 0    magnesium gluconate (MAGONATE) 500 mg tablet Take 1 tablet (500 mg total) by mouth 2 (two) times a day for 7 days 14 tablet 0    PARoxetine (PAXIL) 20 mg tablet Take 1 tablet (20 mg total) by mouth daily 90 tablet 3     No current facility-administered medications for this visit.         Health Maintenance        Trung Oro MD

## 2025-01-09 ENCOUNTER — TELEPHONE (OUTPATIENT)
Age: 70
End: 2025-01-09

## 2025-01-09 NOTE — TELEPHONE ENCOUNTER
Patient called because he wanted to inform Dr. Dickey that he has something that looks like a bruise on his left arm but he does not remember injuring it in any way so he was wondering if doctor Keli would be able to refer him to a specialist like a dermatologist to have this checked out please period can someone please follow up with the patient in regards to this?     thank you

## 2025-01-10 ENCOUNTER — OFFICE VISIT (OUTPATIENT)
Age: 70
End: 2025-01-10
Payer: COMMERCIAL

## 2025-01-10 VITALS
HEIGHT: 69 IN | WEIGHT: 186 LBS | OXYGEN SATURATION: 97 % | DIASTOLIC BLOOD PRESSURE: 72 MMHG | TEMPERATURE: 96.4 F | RESPIRATION RATE: 16 BRPM | BODY MASS INDEX: 27.55 KG/M2 | HEART RATE: 115 BPM | SYSTOLIC BLOOD PRESSURE: 138 MMHG

## 2025-01-10 DIAGNOSIS — T14.8XXA BRUISE: Primary | ICD-10-CM

## 2025-01-10 PROCEDURE — 99213 OFFICE O/P EST LOW 20 MIN: CPT | Performed by: INTERNAL MEDICINE

## 2025-01-10 NOTE — PROGRESS NOTES
Name: Oscar Garcia      : 1955      MRN: [id number]  Encounter Provider: Stephanie Scruggs DO  Encounter Date: 1/10/2025   Encounter department: [facility] INTERNAL MEDICINE  :  Assessment & Plan  Bruise  -2cm circular L forearm bruise that developed 1.5weeks ago, asymptomatic  -pt on ASA  -recommend observation  -will also refer to Derm.  Orders:  •  Ambulatory Referral to Dermatology; Future           History of Present Illness     HPI    Has lesion on his L forearm, developed 1.5 weeks ago.   Size has been the same, denies pruritus, trauma, fever or pain.  Once or twice applied a dermacream without change.  Denies history of skin cancer.    He is on ASA.    Review of Systems   Constitutional:  Negative for fever.   Skin:  Positive for color change. Negative for pallor, rash and wound.   Hematological:  Does not bruise/bleed easily.         Current Outpatient Medications:   •  amLODIPine (NORVASC) 10 mg tablet, TAKE 1 TABLET DAILY, Disp: 90 tablet, Rfl: 3  •  aspirin (Aspirin 81) 81 mg EC tablet, Take 1 tablet (81 mg total) by mouth daily, Disp: , Rfl:   •  atorvastatin (LIPITOR) 40 mg tablet, TAKE 1 TABLET DAILY, Disp: 90 tablet, Rfl: 3  •  Blood Glucose Monitoring Suppl (OneTouch Verio Reflect) w/Device KIT, Check blood sugars once daily. Please substitute with appropriate alternative as covered by patient's insurance. Dx: E11.65, Disp: 1 kit, Rfl: 0  •  folic acid (FOLVITE) 1 mg tablet, Take 1 tablet (1 mg total) by mouth daily, Disp: 90 tablet, Rfl: 3  •  glucose blood (OneTouch Verio) test strip, Check blood sugars once daily. Please substitute with appropriate alternative as covered by patient's insurance. Dx: E11.65, Disp: 100 each, Rfl: 3  •  hydroCHLOROthiazide 12.5 mg tablet, TAKE 1 TABLET DAILY, Disp: 90 tablet, Rfl: 3  •  LORazepam (ATIVAN) 1 mg tablet, Take 1 mg by mouth 3 (three) times a day as needed for anxiety, Disp: , Rfl:   •  magnesium gluconate (MAGONATE) 500 mg tablet,  "Take 1 tablet (500 mg total) by mouth 2 (two) times a day for 7 days, Disp: 14 tablet, Rfl: 0  •  metFORMIN (GLUCOPHAGE) 1000 MG tablet, TAKE 1 TABLET DAILY, Disp: 90 tablet, Rfl: 3  •  mirtazapine (REMERON) 30 mg tablet, Take 1 tablet (30 mg total) by mouth daily at bedtime, Disp: 90 tablet, Rfl: 3  •  mirtazapine (REMERON) 7.5 MG tablet, Take 1 tablet (7.5 mg total) by mouth daily at bedtime, Disp: 90 tablet, Rfl: 3  •  naltrexone (REVIA) 50 mg tablet, Take 1 tablet (50 mg total) by mouth daily, Disp: 90 tablet, Rfl: 3  •  OneTouch Delica Lancets 33G MISC, Check blood sugars once daily. Please substitute with appropriate alternative as covered by patient's insurance. Dx: E11.65, Disp: 100 each, Rfl: 3  •  PARoxetine (PAXIL) 20 mg tablet, Take 1 tablet (20 mg total) by mouth daily, Disp: 90 tablet, Rfl: 3  •  potassium chloride (K-DUR,KLOR-CON) 20 mEq tablet, Take 2 tablets (40 mEq total) by mouth daily, Disp: 180 tablet, Rfl: 3  •  thiamine (VITAMIN B1) 100 mg tablet, Take 1 tablet (100 mg total) by mouth daily, Disp: 90 tablet, Rfl: 0      Objective   /72 (BP Location: Left arm, Patient Position: Sitting, Cuff Size: Large)   Pulse (!) 115   Temp (!) 96.4 °F (35.8 °C) (Tympanic Core)   Resp 16   Ht 5' 9\" (1.753 m)   Wt 84.4 kg (186 lb)   SpO2 97%   BMI 27.47 kg/m²      Physical Exam  Vitals reviewed.   Skin:     Comments: 2cm annular purple bruise on L upper forearm, flat, nontender   Neurological:      Mental Status: He is alert and oriented to person, place, and time.         "

## 2025-01-15 ENCOUNTER — TELEPHONE (OUTPATIENT)
Age: 70
End: 2025-01-15

## 2025-01-15 ENCOUNTER — VBI (OUTPATIENT)
Dept: ADMINISTRATIVE | Facility: OTHER | Age: 70
End: 2025-01-15

## 2025-01-15 NOTE — TELEPHONE ENCOUNTER
Patient called Rx refill line asking the status of this - advised its still pending when they Dr frazier clinical staff will contact him back

## 2025-01-15 NOTE — TELEPHONE ENCOUNTER
I saw him for the bruise on his L arm.  Discussion regarding his alcohol consumption was not performed at that time.  Please schedule him with Dr. Dickey to discuss his alcohol use.  Thanks!

## 2025-01-15 NOTE — TELEPHONE ENCOUNTER
Patient states that he has been drinking more since he has been worried about the sore on his arm. Patient is asking for something to help control the drinking. Started about 2 weeks ago      [facility] - [place], [place] - [phone]

## 2025-01-16 ENCOUNTER — TELEPHONE (OUTPATIENT)
Age: 70
End: 2025-01-16

## 2025-01-16 NOTE — TELEPHONE ENCOUNTER
"Patient called the RX Refill Line. Message is being forwarded to the office.     Patient called asking if Dr Dickey called his medication in.  When I asked him the name of the med, he stated he didn't know.  I told him that I would send a message to the office to see if Dr Dickey knew what medication he was referring to.  He stated that he has called several times trying to get help to stop drinking and \"we\" don't help him.  He stated he was done with the office and hung up.         "

## 2025-01-16 NOTE — TELEPHONE ENCOUNTER
Casey Smith  I wanted to know if this is something  that  can wait  or should  I  put  him in sooner  . I put him in for  1/31  Please be advise

## 2025-02-03 ENCOUNTER — HOSPITAL ENCOUNTER (EMERGENCY)
Facility: HOSPITAL | Age: 70
End: 2025-02-04
Attending: EMERGENCY MEDICINE
Payer: COMMERCIAL

## 2025-02-03 ENCOUNTER — HOSPITAL ENCOUNTER (OUTPATIENT)
Dept: NON INVASIVE DIAGNOSTICS | Facility: CLINIC | Age: 70
Discharge: HOME | End: 2025-02-03
Payer: COMMERCIAL

## 2025-02-03 DIAGNOSIS — I65.23 BILATERAL CAROTID ARTERY STENOSIS: ICD-10-CM

## 2025-02-03 DIAGNOSIS — F10.20 ALCOHOL USE DISORDER, MODERATE, DEPENDENCE (HCC): ICD-10-CM

## 2025-02-03 DIAGNOSIS — I65.23 BILATERAL CAROTID ARTERY STENOSIS: Primary | ICD-10-CM

## 2025-02-03 DIAGNOSIS — F10.10 ALCOHOL ABUSE: Primary | ICD-10-CM

## 2025-02-03 LAB
ANION GAP SERPL CALCULATED.3IONS-SCNC: 14 MMOL/L (ref 4–13)
BASOPHILS # BLD AUTO: 0.13 THOUSANDS/ΜL (ref 0–0.1)
BASOPHILS NFR BLD AUTO: 2 % (ref 0–1)
BUN SERPL-MCNC: 10 MG/DL (ref 5–25)
CALCIUM SERPL-MCNC: 9 MG/DL (ref 8.4–10.2)
CHLORIDE SERPL-SCNC: 103 MMOL/L (ref 96–108)
CO2 SERPL-SCNC: 26 MMOL/L (ref 21–32)
CREAT SERPL-MCNC: 0.76 MG/DL (ref 0.6–1.3)
EOSINOPHIL # BLD AUTO: 0.17 THOUSAND/ΜL (ref 0–0.61)
EOSINOPHIL NFR BLD AUTO: 2 % (ref 0–6)
ERYTHROCYTE [DISTWIDTH] IN BLOOD BY AUTOMATED COUNT: 13.5 % (ref 11.6–15.1)
ETHANOL SERPL-MCNC: 227 MG/DL
GFR SERPL CREATININE-BSD FRML MDRD: 93 ML/MIN/1.73SQ M
GLUCOSE SERPL-MCNC: 95 MG/DL (ref 65–140)
HCT VFR BLD AUTO: 45.8 % (ref 36.5–49.3)
HGB BLD-MCNC: 15.4 G/DL (ref 12–17)
IMM GRANULOCYTES # BLD AUTO: 0.02 THOUSAND/UL (ref 0–0.2)
IMM GRANULOCYTES NFR BLD AUTO: 0 % (ref 0–2)
LYMPHOCYTES # BLD AUTO: 3.84 THOUSANDS/ΜL (ref 0.6–4.47)
LYMPHOCYTES NFR BLD AUTO: 46 % (ref 14–44)
MCH RBC QN AUTO: 32.8 PG (ref 26.8–34.3)
MCHC RBC AUTO-ENTMCNC: 33.6 G/DL (ref 31.4–37.4)
MCV RBC AUTO: 97 FL (ref 82–98)
MONOCYTES # BLD AUTO: 0.54 THOUSAND/ΜL (ref 0.17–1.22)
MONOCYTES NFR BLD AUTO: 7 % (ref 4–12)
NEUTROPHILS # BLD AUTO: 3.53 THOUSANDS/ΜL (ref 1.85–7.62)
NEUTS SEG NFR BLD AUTO: 43 % (ref 43–75)
NRBC BLD AUTO-RTO: 0 /100 WBCS
PLATELET # BLD AUTO: 411 THOUSANDS/UL (ref 149–390)
PMV BLD AUTO: 8.7 FL (ref 8.9–12.7)
POTASSIUM SERPL-SCNC: 4.3 MMOL/L (ref 3.5–5.3)
RBC # BLD AUTO: 4.7 MILLION/UL (ref 3.88–5.62)
SODIUM SERPL-SCNC: 143 MMOL/L (ref 135–147)
TSH SERPL DL<=0.05 MIU/L-ACNC: 2.54 UIU/ML (ref 0.45–4.5)
WBC # BLD AUTO: 8.23 THOUSAND/UL (ref 4.31–10.16)

## 2025-02-03 PROCEDURE — 93880 EXTRACRANIAL BILAT STUDY: CPT

## 2025-02-03 PROCEDURE — 82077 ASSAY SPEC XCP UR&BREATH IA: CPT

## 2025-02-03 PROCEDURE — 99285 EMERGENCY DEPT VISIT HI MDM: CPT | Performed by: EMERGENCY MEDICINE

## 2025-02-03 PROCEDURE — 93005 ELECTROCARDIOGRAM TRACING: CPT

## 2025-02-03 PROCEDURE — 36415 COLL VENOUS BLD VENIPUNCTURE: CPT

## 2025-02-03 PROCEDURE — 85025 COMPLETE CBC W/AUTO DIFF WBC: CPT

## 2025-02-03 PROCEDURE — 80048 BASIC METABOLIC PNL TOTAL CA: CPT

## 2025-02-03 PROCEDURE — 84443 ASSAY THYROID STIM HORMONE: CPT

## 2025-02-03 PROCEDURE — 99284 EMERGENCY DEPT VISIT MOD MDM: CPT

## 2025-02-03 RX ORDER — DIAZEPAM 5 MG/1
10 TABLET ORAL ONCE
Status: COMPLETED | OUTPATIENT
Start: 2025-02-03 | End: 2025-02-03

## 2025-02-03 RX ADMIN — DIAZEPAM 10 MG: 5 TABLET ORAL at 21:26

## 2025-02-04 ENCOUNTER — HOSPITAL ENCOUNTER (INPATIENT)
Facility: HOSPITAL | Age: 70
LOS: 2 days | Discharge: HOME | End: 2025-02-06
Attending: HOSPITALIST | Admitting: HOSPITALIST
Payer: COMMERCIAL

## 2025-02-04 VITALS
SYSTOLIC BLOOD PRESSURE: 169 MMHG | OXYGEN SATURATION: 97 % | TEMPERATURE: 98.7 F | RESPIRATION RATE: 18 BRPM | HEART RATE: 115 BPM | DIASTOLIC BLOOD PRESSURE: 80 MMHG

## 2025-02-04 DIAGNOSIS — F10.939 ALCOHOL WITHDRAWAL SYNDROME WITH COMPLICATION (HCC): Primary | ICD-10-CM

## 2025-02-04 LAB
ALBUMIN SERPL BCG-MCNC: 3.6 G/DL (ref 3.5–5)
ALP SERPL-CCNC: 74 U/L (ref 34–104)
ALT SERPL W P-5'-P-CCNC: 30 U/L (ref 7–52)
AMPHETAMINES SERPL QL SCN: NEGATIVE
ANION GAP SERPL CALCULATED.3IONS-SCNC: 8 MMOL/L (ref 4–13)
AST SERPL W P-5'-P-CCNC: 35 U/L (ref 13–39)
ATRIAL RATE: 95 BPM
BARBITURATES UR QL: NEGATIVE
BENZODIAZ UR QL: POSITIVE
BILIRUB SERPL-MCNC: 0.5 MG/DL (ref 0.2–1)
BUN SERPL-MCNC: 12 MG/DL (ref 5–25)
CALCIUM SERPL-MCNC: 8.6 MG/DL (ref 8.4–10.2)
CHLORIDE SERPL-SCNC: 102 MMOL/L (ref 96–108)
CO2 SERPL-SCNC: 28 MMOL/L (ref 21–32)
COCAINE UR QL: NEGATIVE
CREAT SERPL-MCNC: 0.9 MG/DL (ref 0.6–1.3)
FENTANYL UR QL SCN: NEGATIVE
GFR SERPL CREATININE-BSD FRML MDRD: 86 ML/MIN/1.73SQ M
GLUCOSE SERPL-MCNC: 157 MG/DL (ref 65–140)
GLUCOSE SERPL-MCNC: 226 MG/DL (ref 65–140)
GLUCOSE SERPL-MCNC: 248 MG/DL (ref 65–140)
HYDROCODONE UR QL SCN: NEGATIVE
MAGNESIUM SERPL-MCNC: 1.7 MG/DL (ref 1.9–2.7)
METHADONE UR QL: NEGATIVE
OPIATES UR QL SCN: NEGATIVE
OXYCODONE+OXYMORPHONE UR QL SCN: NEGATIVE
P AXIS: 68 DEGREES
PCP UR QL: NEGATIVE
POTASSIUM SERPL-SCNC: 3.9 MMOL/L (ref 3.5–5.3)
PR INTERVAL: 160 MS
PROT SERPL-MCNC: 6.1 G/DL (ref 6.4–8.4)
QRS AXIS: 83 DEGREES
QRSD INTERVAL: 102 MS
QT INTERVAL: 346 MS
QTC INTERVAL: 435 MS
SODIUM SERPL-SCNC: 138 MMOL/L (ref 135–147)
T WAVE AXIS: 32 DEGREES
THC UR QL: POSITIVE
VENTRICULAR RATE: 95 BPM

## 2025-02-04 PROCEDURE — 99223 1ST HOSP IP/OBS HIGH 75: CPT | Performed by: HOSPITALIST

## 2025-02-04 PROCEDURE — 93880 EXTRACRANIAL BILAT STUDY: CPT | Performed by: SURGERY

## 2025-02-04 PROCEDURE — 82948 REAGENT STRIP/BLOOD GLUCOSE: CPT

## 2025-02-04 PROCEDURE — 80053 COMPREHEN METABOLIC PANEL: CPT

## 2025-02-04 PROCEDURE — 93010 ELECTROCARDIOGRAM REPORT: CPT | Performed by: INTERNAL MEDICINE

## 2025-02-04 PROCEDURE — 80307 DRUG TEST PRSMV CHEM ANLYZR: CPT

## 2025-02-04 PROCEDURE — 83735 ASSAY OF MAGNESIUM: CPT

## 2025-02-04 RX ORDER — INSULIN LISPRO 100 [IU]/ML
1-5 INJECTION, SOLUTION INTRAVENOUS; SUBCUTANEOUS
Status: DISCONTINUED | OUTPATIENT
Start: 2025-02-04 | End: 2025-02-06 | Stop reason: HOSPADM

## 2025-02-04 RX ORDER — DIAZEPAM 5 MG/1
5 TABLET ORAL ONCE
Status: COMPLETED | OUTPATIENT
Start: 2025-02-04 | End: 2025-02-04

## 2025-02-04 RX ORDER — PHENOBARBITAL SODIUM 65 MG/ML
65 INJECTION, SOLUTION INTRAMUSCULAR; INTRAVENOUS ONCE
Status: COMPLETED | OUTPATIENT
Start: 2025-02-04 | End: 2025-02-04

## 2025-02-04 RX ORDER — MIRTAZAPINE 15 MG/1
30 TABLET, FILM COATED ORAL
Status: DISCONTINUED | OUTPATIENT
Start: 2025-02-04 | End: 2025-02-06 | Stop reason: HOSPADM

## 2025-02-04 RX ORDER — ATORVASTATIN CALCIUM 40 MG/1
40 TABLET, FILM COATED ORAL
Status: DISCONTINUED | OUTPATIENT
Start: 2025-02-04 | End: 2025-02-06 | Stop reason: HOSPADM

## 2025-02-04 RX ORDER — SODIUM CHLORIDE 9 MG/ML
100 INJECTION, SOLUTION INTRAVENOUS CONTINUOUS
Status: DISCONTINUED | OUTPATIENT
Start: 2025-02-04 | End: 2025-02-05

## 2025-02-04 RX ORDER — PHENOBARBITAL SODIUM 130 MG/ML
130 INJECTION, SOLUTION INTRAMUSCULAR; INTRAVENOUS ONCE
Status: COMPLETED | OUTPATIENT
Start: 2025-02-04 | End: 2025-02-04

## 2025-02-04 RX ORDER — NICOTINE 21 MG/24HR
21 PATCH, TRANSDERMAL 24 HOURS TRANSDERMAL DAILY
Status: DISCONTINUED | OUTPATIENT
Start: 2025-02-04 | End: 2025-02-06 | Stop reason: HOSPADM

## 2025-02-04 RX ORDER — ASPIRIN 81 MG/1
81 TABLET, CHEWABLE ORAL DAILY
Status: DISCONTINUED | OUTPATIENT
Start: 2025-02-04 | End: 2025-02-06 | Stop reason: HOSPADM

## 2025-02-04 RX ORDER — ENOXAPARIN SODIUM 100 MG/ML
40 INJECTION SUBCUTANEOUS DAILY
Status: DISCONTINUED | OUTPATIENT
Start: 2025-02-05 | End: 2025-02-06 | Stop reason: HOSPADM

## 2025-02-04 RX ORDER — MAGNESIUM SULFATE 1 G/100ML
1 INJECTION INTRAVENOUS ONCE
Status: COMPLETED | OUTPATIENT
Start: 2025-02-04 | End: 2025-02-04

## 2025-02-04 RX ORDER — ONDANSETRON 2 MG/ML
4 INJECTION INTRAMUSCULAR; INTRAVENOUS EVERY 6 HOURS PRN
Status: DISCONTINUED | OUTPATIENT
Start: 2025-02-04 | End: 2025-02-06 | Stop reason: HOSPADM

## 2025-02-04 RX ORDER — ACETAMINOPHEN 325 MG/1
650 TABLET ORAL EVERY 6 HOURS PRN
Status: DISCONTINUED | OUTPATIENT
Start: 2025-02-04 | End: 2025-02-06 | Stop reason: HOSPADM

## 2025-02-04 RX ORDER — DIAZEPAM 5 MG/1
10 TABLET ORAL ONCE
Status: COMPLETED | OUTPATIENT
Start: 2025-02-04 | End: 2025-02-04

## 2025-02-04 RX ADMIN — PHENOBARBITAL SODIUM 130 MG: 130 INJECTION INTRAMUSCULAR; INTRAVENOUS at 15:41

## 2025-02-04 RX ADMIN — MAGNESIUM SULFATE 1 G: 1 INJECTION INTRAVENOUS at 18:15

## 2025-02-04 RX ADMIN — ATORVASTATIN CALCIUM 40 MG: 40 TABLET, FILM COATED ORAL at 15:41

## 2025-02-04 RX ADMIN — MULTIPLE VITAMINS W/ MINERALS TAB 1 TABLET: TAB ORAL at 13:34

## 2025-02-04 RX ADMIN — MIRTAZAPINE 30 MG: 15 TABLET, FILM COATED ORAL at 21:26

## 2025-02-04 RX ADMIN — PHENOBARBITAL SODIUM 65 MG: 65 INJECTION INTRAMUSCULAR; INTRAVENOUS at 19:59

## 2025-02-04 RX ADMIN — DIAZEPAM 10 MG: 5 TABLET ORAL at 09:56

## 2025-02-04 RX ADMIN — ASPIRIN 81 MG 81 MG: 81 TABLET ORAL at 13:35

## 2025-02-04 RX ADMIN — DIAZEPAM 5 MG: 5 TABLET ORAL at 05:17

## 2025-02-04 RX ADMIN — SODIUM CHLORIDE 100 ML/HR: 0.9 INJECTION, SOLUTION INTRAVENOUS at 13:31

## 2025-02-04 RX ADMIN — INSULIN LISPRO 1 UNITS: 100 INJECTION, SOLUTION INTRAVENOUS; SUBCUTANEOUS at 16:26

## 2025-02-04 RX ADMIN — NICOTINE 21 MG: 21 PATCH, EXTENDED RELEASE TRANSDERMAL at 14:29

## 2025-02-04 RX ADMIN — FOLIC ACID: 5 INJECTION, SOLUTION INTRAMUSCULAR; INTRAVENOUS; SUBCUTANEOUS at 15:41

## 2025-02-04 RX ADMIN — Medication 650 MG: at 13:31

## 2025-02-04 RX ADMIN — DIAZEPAM 5 MG: 5 TABLET ORAL at 08:51

## 2025-02-04 NOTE — ASSESSMENT & PLAN NOTE
Patient with a history of HTN  Home medication regimen: Norvasc 10 mgm HCTZ 12.5 mg   BP hypertensive upon arrival to the unit in the setting of acute alcohol withdrawal and chronic HTN  Most Recent 168/70   Will plan to resume anti-hypertensive therapy if BP remains high after resolution of acute withdrawal  Continue monitoring BP

## 2025-02-04 NOTE — H&P
"H&P - Hospitalist   Name: Oscar Garcia 69 y.o. male I MRN: [id number]  Unit/Bed#: 5T DETOX 508-01 I Date of Admission: 2/4/2025   Date of Service: 2/4/2025 I Hospital Day: 0     Assessment & Plan  Alcohol withdrawal syndrome with complication (HCC)  H/o chronic daily alcohol consumption, denies h/o withdrawal seizures  Last drink: 2/3/25   Ethanol lvl: 227  2/3/25   Received 30 mg of Valium prior to arrival on the withdrawal management unit   Toxicology consulted;  Follow SEWS protocol with symptom-triggered phenobarbital for medically-assisted alcohol withdrawal  Current symptoms include anxiety, diaphoresis, tremors, tachycardia, HTN   SEWS score upon admission 13   Administer 650 mg of phenobarbital   Telemetry and pulse oximetry ordered   Repeat CMP and Mag   Continue to monitor vitals, symptoms      Alcohol use disorder, moderate, dependence (HCC)  Pt with a h/o chronic heavy alcohol use   Had recent relapse  drinking 1 pint of liquor and 4 beers daily x 3 weeks   Denies history of withdrawal seizures   Previous admission to the [facility] Medical Detox Unit November 2022   Previously started on naltrexone at this time, not interested in restarting   Withdrawal management as above  Initiate IVFs, IV thiamine, folic acid, and MVI  Consult case management/CRS for assistance with aftercare resources - pt interested in outpatient resources at this time   Diabetes mellitus type 2, noninsulin dependent (HCC)  Lab Results   Component Value Date    HGBA1C 7.2 (H) 01/03/2025       No results for input(s): \"POCGLU\" in the last 72 hours.    Blood Sugar Average: Last 72 hrs:    Patient with a history of DM2  Home medications Metformin 1000 mg   Hgb A1C 7.5   Initiate SSI coverage  Accuchecks AC/HS  Carb-controlled diet  Hypoglycemia protocol     Hypertension  Patient with a history of HTN  Home medication regimen: Norvasc 10 mgm HCTZ 12.5 mg   BP hypertensive upon arrival to the unit in the setting of acute alcohol withdrawal and " chronic HTN  Most Recent 168/70   Will plan to resume anti-hypertensive therapy if BP remains high after resolution of acute withdrawal  Continue monitoring BP   Mixed hyperlipidemia  Continue Statin   Tobacco use disorder  I personally provided greater than 7 min of tobacco cessation education   Patient smokes 1/2 pack of day   Requesting NRT will order nicotine patch   MDD (major depressive disorder), recurrent episode (HCC)  Patient endorses a h/o chronic anxiety and depression  Home medications include Remeron 30 mg   Denies SI/HI/thoughts of self harm  Continue home medications  Recommend OP f/u with PCP/OP Psychiatry       VTE Pharmacologic Prophylaxis:   Low Risk (Score 0-2) - Encourage Ambulation.  Code Status: Level 1 - Full Code   Discussion with family: Patient declined call to .     Anticipated Length of Stay: Patient will be admitted on an inpatient basis with an anticipated length of stay of greater than 2 midnights secondary to alcohol withdrawal.    History of Present Illness   Chief Complaint: alcohol withdrawal     Oscar Garcia is a 69 y.o. male with a PMH of Type II DM, HTN, Hyperlipidemia, arthrosclerosis, tobacco use disorder who presents with alcohol withdrawal seeking detoxification. Patient initially presented to the [facility] ED requesting alcohol detox with sx of anxiety, tachycardia, hypertension, and tremors. He received 30 mg of valium prior to transfer to the [facility] medical withdrawal management unit. Patient states that he has been drinking 1 pint of liquor and 4 beers daily for the last two weeks. He states that he relapsed secondary to a recent health concern. He has previously tried Naltrexone and is not interested in restarting this medication.     Upon admission patient is endorsing withdrawal symptoms of  tachycardia, anxiety, sweats, bilateral tremors. He is interested in completing medical detox and being transitioned to outpatient resources upon discharge.      Review of Systems   Constitutional: Negative.    HENT: Negative.     Respiratory:  Negative for shortness of breath.    Cardiovascular:  Negative for chest pain.   Gastrointestinal: Negative.    Musculoskeletal: Negative.    Skin: Negative.    Neurological:  Negative for tremors.   Psychiatric/Behavioral:  The patient is nervous/anxious.    All other systems reviewed and are negative.      Historical Information   Past Medical History:   Diagnosis Date    Colon polyp     Diabetes mellitus (HCC)     Hyperlipidemia     Hypertension      Past Surgical History:   Procedure Laterality Date    COLON SURGERY      found growth, bengin    COLONOSCOPY      COLONOSCOPY N/A 3/13/2018    Procedure: COLONOSCOPY;  Surgeon: Nadine Richards DO;  Location: [facility] GI LAB;  Service: Gastroenterology    ORIF HUMERUS FRACTURE Left 10/6/2022    Procedure: OPEN REDUCTION W/ INTERNAL FIXATION (ORIF) HUMERUS (SHOULDER);  Surgeon: Avni Dubose MD;  Location: [facility];  Service: Orthopedics     Social History     Tobacco Use    Smoking status: Every Day     Current packs/day: 1.00     Average packs/day: 1 pack/day for 50.0 years (50.0 ttl pk-yrs)     Types: Cigarettes    Smokeless tobacco: Never    Tobacco comments:     uses vape/current every day smoker (as per [employer])   Vaping Use    Vaping status: Never Used   Substance and Sexual Activity    Alcohol use: Yes     Comment: states last was 2 days ago    Drug use: No    Sexual activity: Not on file     E-Cigarette/Vaping    E-Cigarette Use Never User      E-Cigarette/Vaping Substances    Nicotine No     THC No     CBD No     Flavoring No     Other No     Unknown No      Family history non-contributory  Social History:  Marital Status: Single   Occupation: Retired  Patient Pre-hospital Living Situation: Home  Patient Pre-hospital Level of Mobility: none   Patient Pre-hospital Diet Restrictions: none     Meds/Allergies   I have reviewed home medications with patient  personally.  Prior to Admission medications    Medication Sig Start Date End Date Taking? Authorizing Provider   amLODIPine (NORVASC) 10 mg tablet TAKE 1 TABLET DAILY 4/15/24  Yes Alon Dickey MD   aspirin (Aspirin 81) 81 mg EC tablet Take 1 tablet (81 mg total) by mouth daily 1/7/25  Yes Alon Dickey MD   atorvastatin (LIPITOR) 40 mg tablet TAKE 1 TABLET DAILY 4/15/24  Yes Alon Dickey MD   folic acid (FOLVITE) 1 mg tablet Take 1 tablet (1 mg total) by mouth daily 4/26/23  Yes Alon Dickey MD   hydroCHLOROthiazide 12.5 mg tablet TAKE 1 TABLET DAILY 4/15/24  Yes Alon Dickey MD   LORazepam (ATIVAN) 1 mg tablet Take 1 mg by mouth 3 (three) times a day as needed for anxiety 10/29/24  Yes Historical Provider, MD   metFORMIN (GLUCOPHAGE) 1000 MG tablet TAKE 1 TABLET DAILY 4/15/24  Yes Alon Dickey MD   mirtazapine (REMERON) 30 mg tablet Take 1 tablet (30 mg total) by mouth daily at bedtime 4/26/23  Yes Alon Dickey MD   mirtazapine (REMERON) 7.5 MG tablet Take 1 tablet (7.5 mg total) by mouth daily at bedtime 4/26/23  Yes Alon Dickey MD   thiamine (VITAMIN B1) 100 mg tablet Take 1 tablet (100 mg total) by mouth daily 2/24/23  Yes Alon Dickey MD   magnesium gluconate (MAGONATE) 500 mg tablet Take 1 tablet (500 mg total) by mouth 2 (two) times a day for 7 days 2/24/23 3/3/23  Alon Dickey MD   naltrexone (REVIA) 50 mg tablet Take 1 tablet (50 mg total) by mouth daily  Patient not taking: Reported on 2/4/2025 4/26/23   Alon Dickey MD   PARoxetine (PAXIL) 20 mg tablet Take 1 tablet (20 mg total) by mouth daily 4/26/23 5/26/23  Alon Dickey MD   potassium chloride (K-DUR,KLOR-CON) 20 mEq tablet Take 2 tablets (40 mEq total) by mouth daily  Patient not taking: Reported on 2/4/2025 4/26/23   Alon Dickey MD     Allergies   Allergen Reactions    Lisinopril Angioedema       Objective :  Temp:  [98.7 °F (37.1 °C)-98.8 °F (37.1 °C)] 98.8 °F (37.1 °C)  HR:  [] 114  BP: (142169)/(70-58) 168/70  Resp:  [16-20] 20  SpO2:   [95 %-97 %] 95 %  O2 Device: None (Room air)    Physical Exam  Vitals reviewed.   Constitutional:       General: He is in acute distress.      Appearance: He is diaphoretic.   Cardiovascular:      Rate and Rhythm: Regular rhythm. Tachycardia present.      Heart sounds: No murmur heard.  Pulmonary:      Effort: No respiratory distress.      Breath sounds: Normal breath sounds.   Abdominal:      General: There is no distension.      Palpations: Abdomen is soft.   Neurological:      Mental Status: He is alert and oriented to person, place, and time.      Motor: No tremor.   Psychiatric:         Mood and Affect: Mood is anxious.          Lines/Drains:            Lab Results: I have reviewed the following results:  Results from last 7 days   Lab Units 02/03/25  2126   WBC Thousand/uL 8.23   HEMOGLOBIN g/dL 15.4   HEMATOCRIT % 45.8   PLATELETS Thousands/uL 411*   SEGS PCT % 43   LYMPHO PCT % 46*   MONO PCT % 7   EOS PCT % 2     Results from last 7 days   Lab Units 02/03/25  2126   SODIUM mmol/L 143   POTASSIUM mmol/L 4.3   CHLORIDE mmol/L 103   CO2 mmol/L 26   BUN mg/dL 10   CREATININE mg/dL 0.76   ANION GAP mmol/L 14*   CALCIUM mg/dL 9.0   GLUCOSE RANDOM mg/dL 95             Lab Results   Component Value Date    HGBA1C 7.2 (H) 01/03/2025    HGBA1C 8.4 (H) 03/28/2024    HGBA1C 8.3 (H) 03/13/2023           Imaging Results Review: No pertinent imaging studies reviewed.  Other Study Results Review: EKG was reviewed.     Administrative Statements       ** Please Note: This note has been constructed using a voice recognition system. **

## 2025-02-04 NOTE — CERTIFIED RECOVERY SPECIALIST
Certified  Note    Patient name: Oscar Garcia  Location: 5T DETOX 508/5T DETOX 50*  Grandy: [facility]  Attending:  Avni Callejas DO MRN [id number]  : 1955  Age: 69 y.o.    Sex: male Date 2025         Substance Use History:     Social History     Substance and Sexual Activity   Alcohol Use Yes    Comment: states last was 2 days ago        Social History     Substance and Sexual Activity   Drug Use No     CRS met with patient briefly when he arrived .   Patient shared that he has been in recovery prev and has attened OP treatment - Patient is open to recovery resource and OP care.     CRS will cintinue conversation with patient tomorrow.         Antionette Murry

## 2025-02-04 NOTE — ED NOTES
Pt was asked to void for a urine sample. Pt stated he did not feel like he could void and did not want to try going to the bathroom.     Tahmina Mojica RN  02/04/25 1560

## 2025-02-04 NOTE — ED CARE HANDOFF
Emergency Department Sign Out Note        Sign out and transfer of care from night resident. See Separate Emergency Department note.     The patient, Oscar Garcia, was evaluated by the previous provider for detox.    Workup Completed:  Was pending host referral patient is opting for [facility] at this time.  [facility] excepted.  Will be transferred to [facility].    ED Course / Workup Pending (followup):                                    ED Course as of 02/04/25 1126   Tue Feb 04, 2025   0700 Waiting for host     Procedures  Medical Decision Making  Amount and/or Complexity of Data Reviewed  Labs: ordered.    Risk  Prescription drug management.  Decision regarding hospitalization.            Disposition  Final diagnoses:   Alcohol abuse   Alcohol use disorder, moderate, dependence (HCC)     Time reflects when diagnosis was documented in both MDM as applicable and the Disposition within this note       Time User Action Codes Description Comment    2/4/2025  3:47 AM Yenny Arroyo Add [F10.10] Alcohol abuse     2/4/2025  3:48 AM Yenny Arroyo Add [Z92.89] S/P alcohol detoxification     2/4/2025  3:48 AM Yenny Arroyo Remove [Z92.89] S/P alcohol detoxification     2/4/2025  8:29 AM Tara Ochoa Add [F10.20] Alcohol use disorder, moderate, dependence (HCC)           ED Disposition       ED Disposition   Transfer to Behavioral Health Condition   --    Date/Time   Tue Feb 4, 2025 10:31 AM    Comment   Oscar Garcia should be transferred out to  detox and has been medically cleared.               Follow-up Information       Follow up With Specialties Details Why Contact Info Additional Information    Critical access hospital Emergency Department Emergency Medicine Go to  If symptoms worsen [address] [facility] [address] [phone] Critical access hospital Emergency Department, [address] [place], [place], [address] [phone]    Alon Dickey MD  Internal Medicine Schedule an appointment as soon as possible for a visit  for follow up evaluation [address]  Second Floor  [place] [address]  [phone]             Patient's Medications   Discharge Prescriptions    No medications on file     No discharge procedures on file.       ED Provider  Electronically Signed by     Velasquez Young MD  02/04/25 1126

## 2025-02-04 NOTE — ASSESSMENT & PLAN NOTE
I personally provided greater than 7 min of tobacco cessation education   Patient smokes 1/2 pack of day   Requesting NRT will order nicotine patch

## 2025-02-04 NOTE — DISCHARGE INSTR - OTHER ORDERS
Irene Perez  Certified     [facility]  [place] [place] [facility]  [phone] meeting guide   https://www.aa.org/find-aa    AA Phone apps:   Meeting Guide  Everything AA  In the Rooms    AA/24 hour hotline   [phone]    AALV   [email or web address]@[email or web address]  [phone]    [facility]  [address]  [place]  [address]  [phone]    [facility]  [address]  [place] [address]  [phone]    [facility]   [address]  [employer] [place] [address]  [phone]      [facility]  [address]  [address]  [place] [address]  [phone]      [facility]  [address]  [place]  [address]  [phone]              Celebrate Recovery    The purpose of Celebrate Recovery is to help individuals discover and experience God's healing power in their lives by applying the Eight Recovery Principles, which are based on the Beatitudes from the Bible, alongside the Carlos-centered Twelve Steps, allowing them to overcome hurts, habits, and hang-ups through a obed-based approach. Meetings consist of a large group meeting with Orthodox or a lesson or testimony, and then participants are broken into small groups to share. Celebrate Recovery will provide a safe place for you to begin to work on issues that have been keeping you trapped and  from the life that God intended for you to live and, in some cases, things that have kept you  from God.    Find a group, meeting, or an event: [email or web address].Promobucket/    [facility]    [facility] offers a trauma-informed, empowered approach to recovery based on Muslim principles. Our program is peer-led and non-theistic. We welcome all those who wish to pursue recovery as part of our community. At [facility], we learn to gently investigate the underlying causes of our substance and process addictions, which we've often found rooted in  pain and trauma. Our trauma-informed practice emphasizes compassion, non-judgment, and spaciousness to tenderly understand why we turned to our addictive habits for relief or survival. We don't rely on motivators of shame and fear. This is a program of empowerment and we don't rely on anything other than our own potential to change and heal. We believe in our inner wisdom; that there's a pure seed of good in each of us that can always be nourished. In recovery, everyone deserves a chance and a choice. But regardless of the path or program chosen, we want every person suffering from addiction to be able to find the recovery program that meets their needs. May all beings be free from suffering.    Find a meeting or additional resources at: https://recoverydharma.org/    [facility]  [address], [place]  [address] [phone]    SMART Recovery Meetings    Self Management and Recovery Training (SMART)  SMART Recovery is an evidenced-informed recovery method grounded in Rational Emotive Behavioral Therapy (REBT) and Cognitive Behavioral Therapy (CBT), that supports people with substance dependencies or problem behaviors to:  Build and maintain motivation  [place] with urges and cravings  Manage thoughts, feelings and behaviors  Live a balanced life    Find meetings and tools: https://AdMobilize.org/    SYNC Recovery Events    [facility] organizes meaningful events for people in recovery and their families. Our main goal is to have fun by connecting with nature and other people. We can then realize that there is a world of possibilities open to us, now that we are no longer in the bondage of addiction. These events are designed to provide :  Hope for those in early recovery  Tangible proof that life gets better when we’re sober  Service opportunities for people with recovery experience  Social connectedness for everyone involved    [address]  [place] [address]  Phone:  [phone]  Email: [email or web address]   Website: [email or web address]. Recovery Events    SynSopsy.com Recovery Adventure organizes meaningful events for people in recovery and their families. Our main goal is to have fun by connecting with nature and other people. We can then realize that there is a world of possibilities open to us, now that we are no longer in the bondage of addiction. These events are designed to provide :  Hope for those in early recovery  Tangible proof that life gets better when we’re sober  Service opportunities for people with recovery experience  Social connectedness for everyone involved    [address]  [place] [address]  Phone: [phone]  Email: [email or web address]   Website: [email or web address]. Recovery Events    EyesBot Recovery Adventure organizes meaningful events for people in recovery and their families. Our main goal is to have fun by connecting with nature and other people. We can then realize that there is a world of possibilities open to us, now that we are no longer in the bondage of addiction. These events are designed to provide :  Hope for those in early recovery  Tangible proof that life gets better when we’re sober  Service opportunities for people with recovery experience  Social connectedness for everyone involved    [address]  [place] [address]  Phone: [phone]  Email: [email or web address]   Website: [email or web address]. Recovery Events    [employer] Recovery Adventure organizes meaningful events for people in recovery and their families. Our main goal is to have fun by connecting with nature and other people. We can then realize that there is a world of possibilities open to us, now that we are no longer in the bondage of addiction. These events are designed to provide :  Hope for those in early recovery  Tangible proof that life gets better when we’re sober  Service opportunities for people with recovery experience  Social  connectedness for everyone involved    [address]  [place] [address]  Phone: [phone]  Email: info@[email or web address]   Website: [email or web address]

## 2025-02-04 NOTE — CASE MANAGEMENT
CM attempted to meet with patient to complete initial intake assessment, pt declined, reports he already spoke with a few people and does not want to talk. Was willing to sign release though: signed release for brother Solis Garcia as emergency contact only, [facility] insurance (medicare replacement), and for his primary care physician through [facility]. CM will attempt to meet with pt again later.

## 2025-02-04 NOTE — DISCHARGE INSTR - OTHER ORDERS
Antionette Murry   Certified     [facility]  [phone]    [facility]  [address]  [place]  [address]  [phone]    [facility]  [address]  [place] [address]  [phone]      STR  [address]  [place] [address]  [phone]       AA meeting guide   https://www.aa.org/find-aa    AA Phone apps:   Meeting Guide  Everything AA  In the Rooms    AA/24 hour hotline   [phone]     SMART Recovery Meetings    Self Management and Recovery Training (SMART)  SMART Recovery is an evidenced-informed recovery method grounded in Rational Emotive Behavioral Therapy (REBT) and Cognitive Behavioral Therapy (CBT), that supports people with substance dependencies or problem behaviors to:  Build and maintain motivation  [place] with urges and cravings  Manage thoughts, feelings and behaviors  Live a balanced life    Find meetings and tools: https://NetPress Digital.org/    SYNC Recovery Events    Sync Recovery [facility] organizes meaningful events for people in recovery and their families. Our main goal is to have fun by connecting with nature and other people. We can then realize that there is a world of possibilities open to us, now that we are no longer in the bondage of addiction. These events are designed to provide :  Hope for those in early recovery  Tangible proof that life gets better when we’re sober  Service opportunities for people with recovery experience  Social connectedness for everyone involved    [address]  [place] [address]  Phone: [phone]  Email: info@[email or web address]   Website: https://iubenda.org/    CRISIS INFORMATION  If you are experiencing a mental health emergency, you may call the [facility] Crisis Intervention Office 24 hours a day, 7 days per week at [phone].    In [facility], call [phone].    Warmline is a confidential 24/7 telephone  support service manned by trained mental health consumers.  Merrill provides support, a listening ear and can provide information about available services.[facility] specializes in the concerns of mental health consumers, their families and friends.  However, we are also here for anyone who has a mental health concern, is confused about or just doesn't know anything about mental health or where to get information.  To reach [facility], call [phone].    HOW TO GET SUBSTANCE ABUSE HELP:  If you or someone you know has a drug or alcohol problem, there is help:  [facility] Drug & Alcohol Abuse Services: [phone]  [facility] Abuse [facility]: [phone]  An assessment is the first step.   In addition to those listed there are other programs available in the area but assessment is best to determine an appropriate level of care.  If you DO NOT have Medical Assistance (MA) or Private Insurance, an assessment can be scheduled at one of these providers:  [facility]  [address] [place] [address]  [phone]   [address] [address]  [phone]   [facility]  [address] [place] [address]. [place] [address]  [phone]   [facility]  [address] [place] [address]  [phone]   [facility]  [address] [place] [address]  [phone]   [facility]  [address] [place] [address]  [phone]     If you HAVE Medical Assistance, an assessment can be scheduled at one of these providers:  [facility]  [address] [facility] [address] [place] [address]  [phone]   [facility]  [address] [place] [address]  [phone]   [facility]  [address]st [address], [place] [address]  [phone]  [address] [place] [address], [address], [place] [address] [phone]   [address] [place] [address]   [phone]   [facility]  [address] [place] [address] [place] [address]  [phone]   [facility])  [address]  [place] [address]  [phone]   [facility]  [address] [place] [address]  [phone]   [facility]  [address] [place] [address]  [phone]   [facility]  [address] [place] [address]  [phone]     If you HAVE Private Insurance, an assessment can be scheduled at one of these providers.  Please contact these Providers to determine if they are in your network plan:  [facility]ake [facility]  [address], [place] [address]  [phone]   [address] [address]  [phone]   [place] [address] [facility] [place] [address]  [phone]   [facility]  [address] [place] [address]  [phone]   [facility]  [address] [place] [address]  [phone]     From the [facility] website www.[place].gov/guides/opioid-epidemic/#GetNaloxone    How do I get naloxone?  Family members and friends can access naloxone by:    Obtaining a prescription from their family doctor  Using the standing order issued by Acting Physician General Raven [facility]. A standing order is a prescription written for the general public, rather than specifically for an individual.  To use the standing order, print it and take it with you to the pharmacy or have the digital version on your phone. Download the standing order from the Department of Health (PDF).    If you are unable to print it or use the digital version, the standing order is kept on file at many pharmacies. If a pharmacy does not have it on file, they may have the ability to look it up.    Naloxone prescriptions can be filled at most pharmacies. Although the medication might not be  available for same-day pickup, it often can be ordered and available within a day or two.

## 2025-02-04 NOTE — EMTALA/ACUTE CARE TRANSFER
[facility] EMERGENCY DEPARTMENT  [address] [address]  Dept: [phone]      EMTALA TRANSFER CONSENT    NAME Oscar Garcia                                         1955                              MRN [id number]    I have been informed of my rights regarding examination, treatment, and transfer   by Dr. Juan Chris MD    Benefits:      Risks:        Transfer Request   I acknowledge that my medical condition has been evaluated and explained to me by the emergency department physician or other qualified medical person and/or my attending physician who has recommended and offered to me further medical examination and treatment. I understand the Hospital's obligation with respect to the treatment and stabilization of my emergency medical condition. I nevertheless request to be transferred. I release the Hospital, the doctor, and any other persons caring for me from all responsibility or liability for any injury or ill effects that may result from my transfer and agree to accept all responsibility for the consequences of my choice to transfer, rather than receive stabilizing treatment at the Hospital. I understand that because the transfer is my request, my insurance may not provide reimbursement for the services.  The Hospital will assist and direct me and my family in how to make arrangements for transfer, but the hospital is not liable for any fees charged by the transport service.  In spite of this understanding, I refuse to consent to further medical examination and treatment which has been offered to me, and request transfer to  . I authorize the performance of emergency medical procedures and treatments upon me in both transit and upon arrival at the receiving facility.  Additionally, I authorize the release of any and all medical records to the receiving facility and request they be transported with me, if possible.    I authorize the performance of emergency  medical procedures and treatments upon me in both transit and upon arrival at the receiving facility.  Additionally, I authorize the release of any and all medical records to the receiving facility and request they be transported with me, if possible.  I understand that the safest mode of transportation during a medical emergency is an ambulance and that the Hospital advocates the use of this mode of transport. Risks of traveling to the receiving facility by car, including absence of medical control, life sustaining equipment, such as oxygen, and medical personnel has been explained to me and I fully understand them.    (DEBRA CORRECT BOX BELOW)  [  ]  I consent to the stated transfer and to be transported by ambulance/helicopter.  [  ]  I consent to the stated transfer, but refuse transportation by ambulance and accept full responsibility for my transportation by car.  I understand the risks of non-ambulance transfers and I exonerate the Hospital and its staff from any deterioration in my condition that results from this refusal.    X___________________________________________    DATE  25  TIME________  Signature of patient or legally responsible individual signing on patient behalf           RELATIONSHIP TO PATIENT_________________________          Provider Certification    NAME Oscar Garcia                                         1955                              MRN [id number]    A medical screening exam was performed on the above named patient.  Based on the examination:    Condition Necessitating Transfer The primary encounter diagnosis was Alcohol abuse. A diagnosis of Alcohol use disorder, moderate, dependence (HCC) was also pertinent to this visit.    Patient Condition:      Reason for Transfer:      Transfer Requirements: Facility     Space available and qualified personnel available for treatment as acknowledged by    Agreed to accept transfer and to provide appropriate medical treatment as  acknowledged by          Appropriate medical records of the examination and treatment of the patient are provided at the time of transfer   STAFF INITIAL WHEN COMPLETED _______  Transfer will be performed by qualified personnel from    and appropriate transfer equipment as required, including the use of necessary and appropriate life support measures.    Provider Certification: I have examined the patient and explained the following risks and benefits of being transferred/refusing transfer to the patient/family:         Based on these reasonable risks and benefits to the patient and/or the unborn child(adelaide), and based upon the information available at the time of the patient’s examination, I certify that the medical benefits reasonably to be expected from the provision of appropriate medical treatments at another medical facility outweigh the increasing risks, if any, to the individual’s medical condition, and in the case of labor to the unborn child, from effecting the transfer.    X____________________________________________ DATE 02/04/25        TIME_______      ORIGINAL - SEND TO MEDICAL RECORDS   COPY - SEND WITH PATIENT DURING TRANSFER

## 2025-02-04 NOTE — ED PROVIDER NOTES
Time reflects when diagnosis was documented in both MDM as applicable and the Disposition within this note       Time User Action Codes Description Comment    2/4/2025  3:47 AM Yenny Arroyo Add [F10.10] Alcohol abuse     2/4/2025  3:48 AM DinaJadeYenny Add [Z92.89] S/P alcohol detoxification     2/4/2025  3:48 AM DinaJadeYenny Remove [Z92.89] S/P alcohol detoxification           ED Disposition       ED Disposition   Discharge    Condition   Stable    Date/Time   Tue Feb 4, 2025  3:47 AM    Comment   Oscar Garcia discharge to home/self care.                   Assessment & Plan       Medical Decision Making  Oscar Garcia is a 69 y.o. who presents requesting detox evaluation     Vital signs are HD stable, afebrile    Plan: patient medically cleared for detox, see results of work up below  HOST referral provided  Patient desires inpatient detox and rehab    Disposition: stable for transfer to inpatient detox/rehab. Pending placement.          Amount and/or Complexity of Data Reviewed  Labs: ordered.    Risk  Prescription drug management.             Medications   diazepam (VALIUM) tablet 10 mg (10 mg Oral Given 2/3/25 2126)       ED Risk Strat Scores               CIWA-Ar Score       Row Name 02/03/25 2200             CIWA-Ar    Nausea and Vomiting 0      Tactile Disturbances 0      Tremor 0      Auditory Disturbances 0      Paroxysmal Sweats 1      Visual Disturbances 0      Anxiety 5      Headache, Fullness in Head 0      Agitation 0      Orientation and Clouding of Sensorium 0      CIWA-Ar Total 6                              SBIRT 20yo+      Flowsheet Row Most Recent Value   Initial Alcohol Screen: US AUDIT-C     1. How often do you have a drink containing alcohol? 6 Filed at: 02/03/2025 1740   2. How many drinks containing alcohol do you have on a typical day you are drinking?  6 Filed at: 02/03/2025 1740   3a. Male UNDER 65: How often do you have five or more drinks on one occasion? 6 Filed at:  02/03/2025 1740   Audit-C Score 18 Filed at: 02/03/2025 1740   Full Alcohol Screen: US AUDIT    7. How often in past year have you had feeling of guilt or remorse after drinking?  4 Filed at: 02/03/2025 1740   10. Has a relative, friend, doctor or other health worker been concerned about your drinking and suggested you cut down?  4 Filed at: 02/03/2025 1740   RUBI: How many times in the past year have you...    Used an illegal drug or used a prescription medication for non-medical reasons? Never Filed at: 02/03/2025 1740                            History of Present Illness       Chief Complaint   Patient presents with    Alcohol Intoxication     Pt here for detox eval. Pt's brother reports heavy drinking, and referred by pcp to be seen. Brother states last drink he is aware of was around 1230pm.        Past Medical History:   Diagnosis Date    Colon polyp     Diabetes mellitus (HCC)     Hyperlipidemia     Hypertension       Past Surgical History:   Procedure Laterality Date    COLON SURGERY      found growth, bengin    COLONOSCOPY      COLONOSCOPY N/A 3/13/2018    Procedure: COLONOSCOPY;  Surgeon: Nadine Richards DO;  Location: [facility] GI LAB;  Service: Gastroenterology    ORIF HUMERUS FRACTURE Left 10/6/2022    Procedure: OPEN REDUCTION W/ INTERNAL FIXATION (ORIF) HUMERUS (SHOULDER);  Surgeon: Avni Dubose MD;  Location: [facility] OR;  Service: Orthopedics      Family History   Problem Relation Age of Onset    Heart failure Mother     Hypertension Other       Social History     Tobacco Use    Smoking status: Every Day     Current packs/day: 1.00     Average packs/day: 1 pack/day for 50.0 years (50.0 ttl pk-yrs)     Types: Cigarettes    Smokeless tobacco: Never    Tobacco comments:     uses vape/current every day smoker (as per [employer])   Vaping Use    Vaping status: Never Used   Substance Use Topics    Alcohol use: Yes     Comment: states last was 2 days ago    Drug use: No      E-Cigarette/Vaping     E-Cigarette Use Never User       E-Cigarette/Vaping Substances    Nicotine No     THC No     CBD No     Flavoring No     Other No     Unknown No       I have reviewed and agree with the history as documented.     Patient is a 69-year-old male with pertinent past medical history of alcohol use disorder who presents requesting detox evaluation.  Patient states that he has been drinking approximately 1 pint of liquor and 4 beers daily for the past 2 weeks as he has increasing anxiety over an upcoming medical test.  Patient states his last drink was sometime earlier today.  States that he has approximately 10-year history of alcohol use disorder.  Had stopped drinking in 2022, but has had 3 short relapses since then. He is currently feeling anxious but denies chest pain, shortness of breath, nausea, vomiting, abdominal pain, tremors, headache, dizziness, lightheadedness, auditory or visual hallucinations, SI or HI.  Denies history of alcohol withdrawal seizures.           Review of Systems   All other systems reviewed and are negative.          Objective       ED Triage Vitals [02/03/25 1738]   Temperature Pulse Blood Pressure Respirations SpO2 Patient Position - Orthostatic VS   98.7 °F (37.1 °C) 73 142/78 18 95 % --      Temp Source Heart Rate Source BP Location FiO2 (%) Pain Score    Temporal Monitor -- -- --      Vitals      Date and Time Temp Pulse SpO2 Resp BP Pain Score FACES Pain Rating User   02/03/25 1738 98.7 °F (37.1 °C) 73 95 % 18 142/78 -- -- JR            Physical Exam  Constitutional:       General: He is not in acute distress.     Appearance: He is not ill-appearing, toxic-appearing or diaphoretic.   HENT:      Head: Normocephalic and atraumatic.      Mouth/Throat:      Mouth: Mucous membranes are moist.      Pharynx: Oropharynx is clear.   Eyes:      Extraocular Movements: Extraocular movements intact.      Conjunctiva/sclera: Conjunctivae normal.      Pupils: Pupils are equal, round, and reactive to  light.   Cardiovascular:      Rate and Rhythm: Normal rate.   Pulmonary:      Effort: Pulmonary effort is normal.   Musculoskeletal:         General: Normal range of motion.      Cervical back: Normal range of motion and neck supple.   Skin:     General: Skin is warm and dry.      Capillary Refill: Capillary refill takes less than 2 seconds.   Neurological:      Mental Status: He is alert and oriented to person, place, and time.      Comments: No tremors      Psychiatric:         Behavior: Behavior normal.         Thought Content: Thought content normal.         Judgment: Judgment normal.      Comments: anxious         Results Reviewed       Procedure Component Value Units Date/Time    TSH [[id number]]  (Normal) Collected: 02/03/25 2126    Lab Status: Final result Specimen: Blood from Arm, Left Updated: 02/03/25 2223     TSH 3RD GENERATON 2.536 uIU/mL     Basic metabolic panel [[id number]]  (Abnormal) Collected: 02/03/25 2126    Lab Status: Final result Specimen: Blood from Arm, Left Updated: 02/03/25 2215     Sodium 143 mmol/L      Potassium 4.3 mmol/L      Chloride 103 mmol/L      CO2 26 mmol/L      ANION GAP 14 mmol/L      BUN 10 mg/dL      Creatinine 0.76 mg/dL      Glucose 95 mg/dL      Calcium 9.0 mg/dL      eGFR 93 ml/min/1.73sq m     Narrative:      National Kidney Disease Foundation guidelines for Chronic Kidney Disease (CKD):     Stage 1 with normal or high GFR (GFR > 90 mL/min/1.73 square meters)    Stage 2 Mild CKD (GFR = 60-89 mL/min/1.73 square meters)    Stage 3A Moderate CKD (GFR = 45-59 mL/min/1.73 square meters)    Stage 3B Moderate CKD (GFR = 30-44 mL/min/1.73 square meters)    Stage 4 Severe CKD (GFR = 15-29 mL/min/1.73 square meters)    Stage 5 End Stage CKD (GFR <15 mL/min/1.73 square meters)  Note: GFR calculation is accurate only with a steady state creatinine    Ethanol [[id number]]  (Abnormal) Collected: 02/03/25 2126    Lab Status: Final result Specimen: Blood from Arm, Left Updated: 02/03/25  2211     Ethanol Lvl 227 mg/dL     CBC and differential [[id number]]  (Abnormal) Collected: 02/03/25 2126    Lab Status: Final result Specimen: Blood from Arm, Left Updated: 02/03/25 2152     WBC 8.23 Thousand/uL      RBC 4.70 Million/uL      Hemoglobin 15.4 g/dL      Hematocrit 45.8 %      MCV 97 fL      MCH 32.8 pg      MCHC 33.6 g/dL      RDW 13.5 %      MPV 8.7 fL      Platelets 411 Thousands/uL      nRBC 0 /100 WBCs      Segmented % 43 %      Immature Grans % 0 %      Lymphocytes % 46 %      Monocytes % 7 %      Eosinophils Relative 2 %      Basophils Relative 2 %      Absolute Neutrophils 3.53 Thousands/µL      Absolute Immature Grans 0.02 Thousand/uL      Absolute Lymphocytes 3.84 Thousands/µL      Absolute Monocytes 0.54 Thousand/µL      Eosinophils Absolute 0.17 Thousand/µL      Basophils Absolute 0.13 Thousands/µL     Rapid drug screen, urine [[id number]]     Lab Status: No result Specimen: Urine             No orders to display       ECG 12 Lead Documentation Only    Date/Time: 2/4/2025 3:18 AM    Performed by: Yenny Arroyo MD  Authorized by: Yenny Arroyo MD    Indications / Diagnosis:  Detox evaluation  ECG reviewed by me, the ED Provider: yes    Patient location:  ED  Previous ECG:     Previous ECG:  Compared to current    Similarity:  No change  Rate:     ECG rate:  95    ECG rate assessment: normal    Rhythm:     Rhythm: sinus rhythm    Ectopy:     Ectopy: none    QRS:     QRS axis:  Normal    QRS intervals:  Normal  Conduction:     Conduction: normal        ED Medication and Procedure Management   Prior to Admission Medications   Prescriptions Last Dose Informant Patient Reported? Taking?   LORazepam (ATIVAN) 1 mg tablet   Yes No   Sig: Take 1 mg by mouth 3 (three) times a day as needed for anxiety   PARoxetine (PAXIL) 20 mg tablet   No No   Sig: Take 1 tablet (20 mg total) by mouth daily   amLODIPine (NORVASC) 10 mg tablet   No No   Sig: TAKE 1 TABLET DAILY   aspirin (Aspirin 81) 81 mg EC  tablet   No No   Sig: Take 1 tablet (81 mg total) by mouth daily   atorvastatin (LIPITOR) 40 mg tablet   No No   Sig: TAKE 1 TABLET DAILY   folic acid (FOLVITE) 1 mg tablet   No No   Sig: Take 1 tablet (1 mg total) by mouth daily   hydroCHLOROthiazide 12.5 mg tablet   No No   Sig: TAKE 1 TABLET DAILY   magnesium gluconate (MAGONATE) 500 mg tablet   No No   Sig: Take 1 tablet (500 mg total) by mouth 2 (two) times a day for 7 days   metFORMIN (GLUCOPHAGE) 1000 MG tablet   No No   Sig: TAKE 1 TABLET DAILY   mirtazapine (REMERON) 30 mg tablet   No No   Sig: Take 1 tablet (30 mg total) by mouth daily at bedtime   mirtazapine (REMERON) 7.5 MG tablet   No No   Sig: Take 1 tablet (7.5 mg total) by mouth daily at bedtime   naltrexone (REVIA) 50 mg tablet   No No   Sig: Take 1 tablet (50 mg total) by mouth daily   potassium chloride (K-DUR,KLOR-CON) 20 mEq tablet   No No   Sig: Take 2 tablets (40 mEq total) by mouth daily   thiamine (VITAMIN B1) 100 mg tablet  Self No No   Sig: Take 1 tablet (100 mg total) by mouth daily      Facility-Administered Medications: None     Patient's Medications   Discharge Prescriptions    No medications on file     No discharge procedures on file.  ED SEPSIS DOCUMENTATION   Time reflects when diagnosis was documented in both MDM as applicable and the Disposition within this note       Time User Action Codes Description Comment    2/4/2025  3:47 AM Yenny Arroyo [F10.10] Alcohol abuse     2/4/2025  3:48 AM Yenny Arroyo Add [Z92.89] S/P alcohol detoxification     2/4/2025  3:48 AM Yenny Arroyo Remove [Z92.89] S/P alcohol detoxification                  Yenny Arroyo MD  02/04/25 0321       Yenny Arroyo MD  02/04/25 7860

## 2025-02-04 NOTE — ASSESSMENT & PLAN NOTE
H/o chronic daily alcohol consumption, denies h/o withdrawal seizures  Last drink: 2/3/25   Ethanol lvl: 227  2/3/25   Received 30 mg of Valium prior to arrival on the withdrawal management unit   Toxicology consulted;  Follow SEWS protocol with symptom-triggered phenobarbital for medically-assisted alcohol withdrawal  Current symptoms include anxiety, diaphoresis, tremors, tachycardia, HTN   SEWS score upon admission 13   Administer 650 mg of phenobarbital   Telemetry and pulse oximetry ordered   Repeat CMP and Mag   Continue to monitor vitals, symptoms

## 2025-02-04 NOTE — ASSESSMENT & PLAN NOTE
Pt with a h/o chronic heavy alcohol use   Had recent relapse  drinking 1 pint of liquor and 4 beers daily x 3 weeks   Denies history of withdrawal seizures   Previous admission to the [facility] Medical Detox Unit November 2022   Previously started on naltrexone at this time, not interested in restarting   Withdrawal management as above  Initiate IVFs, IV thiamine, folic acid, and MVI  Consult case management/CRS for assistance with aftercare resources - pt interested in outpatient resources at this time

## 2025-02-04 NOTE — CASE MANAGEMENT
"Pt's name, date of birth, home address and telephone number were verified. Pt was informed of case management role and the purpose of the completion of intake with case management.  Pt had signed ROIs for brother as emergency contact only, insurance - Medicare replacement [employer] blue shield, and his primary care physician.     SUBSTANCE ABUSE    Stressor/Trigger    Pt presented to [facility] ED at suggestion of his doctor, to esek out detox   UDS: +benzodiazepine, THC  Audit:   PAWSS:  Nicotine: 1/2 PPD  Ethanol: 227 in ED   Prior D&A treatment   Admission to [facility] W/M in Nov 2022. Prior outpatient with [facility] approx 2023.    AA/NA Meetings   Has tried in the past and didn't think they were helpful, will consider again. CRS gave him hotline he will consider using.    Withdrawal  Symptoms   Reports symptoms of anxiety, tachycardia, hypertension, and tremors in the ED. Denies any symptoms today other than anxiety.    History of OD/blackouts or Withdrawal Seizures   Denies seizures, denies blackouts, reports \"OD intentional, and that was a long time ago\"   MENTAL HEALTH INFORMATION    Hx of Mental Health Dx   Anxiety/depression   Outpatient Mental Health Tx   Prescribed dwayne roberts. Singh Barboza, psychiatrist in private practice   Inpatient Hospitalizations (Mental Health)   Hospitalized \"years ago\" doesn't remember when and that was the most recent, and the first time was in 1996 and 2022   Family History of Mental Health    Doesn't think so   Trauma History    Denies   Current MH Symptoms   Denies, contracts for safety   Access to Firearms    Denies   PHYSICAL HEALTH INFORMATION    Physical Health Conditions (Chronic)   Type II DM, HTN, Hyperlipidemia, arthrosclerosis, tobacco use. prescribed [facility]   PCP   Alon MITCHELL [facility]   Insurance   [facility]-Medicare Advantage   Preferred Pharmacy   [facility] or [place] [facility]    LEGAL ISSUES    Past or present " "legal issues   2022 DUI, still on probation but hasn't seen a PO for over two years-reports he is still on probation from the DUI and also owns money in costs and fines   Probation/[place]   [facility] probation-pt does NOT want CM to contact PO   EMPLOYMENT/INCOME/NEEDS    Education   Bachelor's degree   Employment   retired    History   denies   Spiritual/Mandaen Beliefs   Sabianist, used to attend Mandaeism every Sunday until he moved to his current apartment   Transportation/Transport Home   Brother helps with rides but he just got back from [place], may need a ride back home (pt does not have car or a license) if brother is around he will pick him up   DEMOGRAPHICS    Discharge Address   [address] [place] [address]    Living Arrangement   Lives alone, but does a lot of walking and spends most of the day in the community room with other people   Can pt return home Yes     External Supports     All brothers and sisters, he has 9 brothers/sisters and 27 nieces and nephews   Phone Number   [phone]    Email      Marital Status/Children   Single, no children      Substance First use Last Use Frequency Amount Used How long Longest period of sobriety and when Method of use   THC   18 or 19  Tried it for the past month, once in a while with someone he knows \"Not much\" doesn't know how much he used Reports this was a short term phase, hasn't used in 40 years prior ot this past month     Heroin            Cocaine            ETOH   16   1 pint of liquor, 4 beers daily 2-3 weeks Had been sober on and off for past several years    Meth            Benzos            Other:                 Patient is a 69-year-old male with pertinent past medical history of alcohol use disorder who presents requesting detox evaluation. Patient states that he has been drinking approximately 1 pint of liquor and 4 beers daily for the past 2 weeks as he has increasing anxiety over an upcoming medical test. Patient states " "his last drink was sometime earlier today. States that he has approximately 10-year history of alcohol use disorder. Had stopped drinking in 2022, but has had 3 short relapses since then. He has previously tried Naltrexone and is not interested in restarting this medication     Does not want to go to residential facility, when asked why, pt states \"I just don't want to.\" And reports that his psychiatrist took him off his naltrexone, so he does not want to resume that medication either. CM asked why and pt said that because he hadn't drank in so long, he guesses he took him off because of that. Does not want to do any counseling either, unless someone comes to his house for the counseling. Agrees he will attend meetings, sees his psychiatrist every three months, declines moving up this appointment. Reports I'm not depression as all, has not suffered from depression since 2015 .  "

## 2025-02-04 NOTE — ASSESSMENT & PLAN NOTE
"Lab Results   Component Value Date    HGBA1C 7.2 (H) 01/03/2025       No results for input(s): \"POCGLU\" in the last 72 hours.    Blood Sugar Average: Last 72 hrs:    Patient with a history of DM2  Home medications Metformin 1000 mg   Hgb A1C 7.5   Initiate SSI coverage  Accuchecks AC/HS  Carb-controlled diet  Hypoglycemia protocol     "

## 2025-02-04 NOTE — CERTIFIED RECOVERY SPECIALIST
CRS contacted [facility] for follow up (request charge nurse) and per Felicia at [facility], there may be bed at [facility] but patient in need of [facility] funding, still await approval. Another conversation with patient revealed he wanted to go to  Detox as he had prior in October.     9:45am provider consulted  Detox for possible admission to their unit. Awaiting response, Charge nurse and patient aware.      CRS to remain available.

## 2025-02-04 NOTE — DISCHARGE INSTRUCTIONS
"Patient Education     Alcohol and your health   The Basics   Written by the doctors and editors at [facility]   Is alcohol safe for me? -- There is not always a simple answer. Drinking any amount of alcohol comes with risks.  Some people should not drink any alcohol at all. This includes people who:   Are younger than the legal drinking age   Are pregnant or trying to get pregnant   Have liver disease or certain other health conditions   Are working with dangerous equipment or machines   Take certain medicines   Have had problems with alcohol use in the past  If you do choose to drink alcohol, it's important to know the risks.  How does alcohol affect my health? -- Alcohol can cause serious health problems. In general, the more a person drinks over time, the higher their risk of health problems.  Drinking increases the risk of:   Cancer - Heavy drinking increases the risk of several types of cancer. These include breast, esophageal, colorectal, pancreatic, liver, and head and neck cancer. Even having 1 to 2 drinks a day might increase the risk of some cancers, such as breast cancer.   Liver disease - People who drink a lot of alcohol can get something called \"cirrhosis.\" This is a disease that scars the liver. It can cause bleeding in the digestive tract, swelling, breathing problems, and death.   Heart problems - Heavy drinking increases the risk \"atrial fibrillation,\" which is an abnormal heart rhythm. It also increases the risk of high blood pressure and stroke. Over time, heavy drinking can lead to heart failure.   Pancreatitis - This is when the pancreas gets irritated or swollen. It can cause severe belly pain. Over time, it can make it hard for the body to digest food normally. It also increases a person's risk of diabetes.   Gout flares - In people with gout, drinking alcohol can trigger attacks, or \"flares.\"   Osteoporosis - People who drink heavily are more likely to have osteoporosis. This is a disease that " "makes your bones weak. It increases the risk of fractures (broken bones).   Problems with the brain and nerves - Over time, heavy drinking can lead to trouble with thinking and memory. It can also cause muscle weakness and problems with coordination.   Problems with sex  Drinking makes it hard to think clearly. It also affects decision-making and coordination. People who drink are more likely to:   Get into accidents   Die by suicide   Drown   Get seriously hurt   Hurt someone else  Can alcohol be good for you? -- It's not clear. There is some evidence that a small amount of alcohol might be good for heart health. Some studies suggest that this might be true for wine, but not for other types of alcohol. Other studies have not found any benefit. Also, drinking more than about 1 drink a day cancels out any possible benefit.  Doctors do not recommend drinking alcohol to try to improve your health.  How do doctors define different levels of drinking? -- Doctors use different definitions for \"moderate,\" \"heavy,\" and \"binge\" drinking. They are all based on a standard definition of a \"drink.\" In the [place], 1 drink equals:   12 ounces (about 350 mL) of beer   5 ounces (about 150 mL) of wine   1 shot (1.5 ounces or about 50 mL) of liquor  If a serving size is larger than these examples, it contains more alcohol.  Commonly used definitions include:   Moderate drinking - If people choose to drink, doctors usually recommend only \"moderate\" alcohol use. This means:   For males - No more than 2 drinks a day   For females - No more than 1 drink a day  The guidelines are different for males and females for several reasons. These include differences in average body size, the amount of water in the body, and how the body processes alcohol.   Heavy drinking - This means drinking enough to increase your risk of health problems. It is sometimes called \"risky\" drinking. This usually means:   For males under 65 years - More than 14 drinks a " "week or 4 drinks on any day.   For females and males 65 years and older - More than 7 drinks a week or 3 drinks on any day.   \"Binge\" drinking - This means drinking a lot of alcohol in a short time (about 2 hours). It is usually defined as 5 or more drinks for males, or 4 or more drinks for females. Binge drinking is dangerous, even if it does not happen often. That's because people are more likely to get hurt or make dangerous decisions while drunk.  What if I want to get pregnant? -- Drinking alcohol during pregnancy can cause serious problems. If you want to get pregnant, it's best to stop drinking before you start trying.  Drinking during pregnancy increases the risk of:   Your baby having \"fetal alcohol spectrum disorder\" - This causes brain damage and growth problems. Compared with healthy babies, babies with this condition tend to weigh less, have smaller heads, and be very fussy. When they grow up, they have life-long problems with thinking and behavior.   Pregnancy loss or stillbirth - Pregnancy loss is when a pregnancy ends before 20 weeks. It is also called \"miscarriage.\" Stillbirth is when a baby dies before it is born, in the second half of pregnancy (after 20 weeks).  Because of these risks, doctors recommend completely avoiding alcohol during pregnancy. No amount of alcohol is known to be safe during pregnancy.  What should I do if I think that I have a drinking problem? -- Some people have problems with alcohol. They might:   Drink more than they mean to   Need more and more alcohol to get the same effects   Notice symptoms if they drink less   Have a strong need or craving to drink alcohol   Be unable to stop or limit their drinking   Have problems with work or relationships because of their drinking  If you think that you might have a problem with alcohol, talk to your doctor or nurse. When a person becomes addicted to alcohol, doctors call this \"alcohol use disorder.\" There are treatments that can " "help.  Many people can cut back on drinking on their own. But if you have been drinking several days a week for weeks in a row, do not try to cut back without the help of a doctor or nurse. Stopping or reducing drinking too quickly can cause something called \"alcohol withdrawal.\" This can cause symptoms and, in some cases, even lead to death.  All topics are updated as new evidence becomes available and our peer review process is complete.  This topic retrieved from [employer] on: May 01, 2024.  Topic 858235 Version 1.0  Release: 32.4.2 - C32.120  © 2024 UpToDate, Inc. and/or its affiliates. All rights reserved.  Consumer Information Use and Disclaimer   Disclaimer: This generalized information is a limited summary of diagnosis, treatment, and/or medication information. It is not meant to be comprehensive and should be used as a tool to help the user understand and/or assess potential diagnostic and treatment options. It does NOT include all information about conditions, treatments, medications, side effects, or risks that may apply to a specific patient. It is not intended to be medical advice or a substitute for the medical advice, diagnosis, or treatment of a health care provider based on the health care provider's examination and assessment of a patient's specific and unique circumstances. Patients must speak with a health care provider for complete information about their health, medical questions, and treatment options, including any risks or benefits regarding use of medications. This information does not endorse any treatments or medications as safe, effective, or approved for treating a specific patient. UpToDate, Inc. and its affiliates disclaim any warranty or liability relating to this information or the use thereof.The use of this information is governed by the Terms of Use, available at https://www.[employer].com/en/know/clinical-effectiveness-terms. 2024© UpToDate, Inc. and its affiliates and/or " licensors. All rights reserved.  Copyright   © 2024 [employer] and/or its affiliates. All rights reserved.

## 2025-02-04 NOTE — CERTIFIED RECOVERY SPECIALIST
"  Time spent: 15 minutes    Consult Rec'd: Yes  Patient seen in: ED  Stage of change: Action    Substance used: [employer] / beer  Frequency/Quantity: 3-4 times wk / pint + cans  Date of last use: 2/3/25      CRS met with patient to explain services & offer support for AUD. Patient shared that he \"needs detox and has struggled since 2002, but more recently for past 2.5 yrs, this being his 3rd time at hospital for help.\" CRS and patient discussed pt's hx of treatment which has been \"outpatient with [facility] ([facility] [place]) approx 2023.\" Patient said he is \"not interested in Inpatient treatment, just Detox\". Patient also shared he \"tried AA for support over the years but had not found it helpful, but considering it again\". Patient was receptive to community support suggestions for sober support groups and meeting options. (AA, [facility], [facility], [facility], [facility]) CRS provided in patient instructions.     CRS made provider and nurse aware of conversation.    CRS  provided patient with business card and local recovery meeting options/resources, and will continue to follow and support.                "

## 2025-02-04 NOTE — ED CARE HANDOFF
[facility] Warm Handoff Outcome Note    Patient name Oscar Garcia  Location 5T DETOX 508/5T DETOX 50* MRN [id number]  Age: 69 y.o.          Plan Type:  Warm Handoff                                                                                    Plan Date: 2/4/2025  Service:  ED Warm Handoff      Substance Use History:  ETOH    Warm Handoff Update:  Pt transferred to [facility] for detox    Warm Handoff Outcome: Withdrawal Management

## 2025-02-04 NOTE — ASSESSMENT & PLAN NOTE
Patient endorses a h/o chronic anxiety and depression  Home medications include Remeron 30 mg   Denies SI/HI/thoughts of self harm  Continue home medications  Recommend OP f/u with PCP/OP Psychiatry

## 2025-02-05 LAB
ALBUMIN SERPL BCG-MCNC: 3.4 G/DL (ref 3.5–5)
ALP SERPL-CCNC: 82 U/L (ref 34–104)
ALT SERPL W P-5'-P-CCNC: 26 U/L (ref 7–52)
ANION GAP SERPL CALCULATED.3IONS-SCNC: 8 MMOL/L (ref 4–13)
AST SERPL W P-5'-P-CCNC: 30 U/L (ref 13–39)
BILIRUB SERPL-MCNC: 0.57 MG/DL (ref 0.2–1)
BUN SERPL-MCNC: 7 MG/DL (ref 5–25)
CALCIUM ALBUM COR SERPL-MCNC: 8.9 MG/DL (ref 8.3–10.1)
CALCIUM SERPL-MCNC: 8.4 MG/DL (ref 8.4–10.2)
CHLORIDE SERPL-SCNC: 107 MMOL/L (ref 96–108)
CO2 SERPL-SCNC: 27 MMOL/L (ref 21–32)
CREAT SERPL-MCNC: 0.62 MG/DL (ref 0.6–1.3)
ERYTHROCYTE [DISTWIDTH] IN BLOOD BY AUTOMATED COUNT: 13.3 % (ref 11.6–15.1)
GFR SERPL CREATININE-BSD FRML MDRD: 101 ML/MIN/1.73SQ M
GLUCOSE SERPL-MCNC: 120 MG/DL (ref 65–140)
GLUCOSE SERPL-MCNC: 156 MG/DL (ref 65–140)
GLUCOSE SERPL-MCNC: 158 MG/DL (ref 65–140)
GLUCOSE SERPL-MCNC: 165 MG/DL (ref 65–140)
GLUCOSE SERPL-MCNC: 208 MG/DL (ref 65–140)
HCT VFR BLD AUTO: 41.6 % (ref 36.5–49.3)
HGB BLD-MCNC: 13.6 G/DL (ref 12–17)
MAGNESIUM SERPL-MCNC: 1.8 MG/DL (ref 1.9–2.7)
MCH RBC QN AUTO: 32.5 PG (ref 26.8–34.3)
MCHC RBC AUTO-ENTMCNC: 32.7 G/DL (ref 31.4–37.4)
MCV RBC AUTO: 99 FL (ref 82–98)
PLATELET # BLD AUTO: 320 THOUSANDS/UL (ref 149–390)
PMV BLD AUTO: 9.6 FL (ref 8.9–12.7)
POTASSIUM SERPL-SCNC: 3.2 MMOL/L (ref 3.5–5.3)
PROT SERPL-MCNC: 5.7 G/DL (ref 6.4–8.4)
RBC # BLD AUTO: 4.19 MILLION/UL (ref 3.88–5.62)
SODIUM SERPL-SCNC: 142 MMOL/L (ref 135–147)
WBC # BLD AUTO: 7.86 THOUSAND/UL (ref 4.31–10.16)

## 2025-02-05 PROCEDURE — 80053 COMPREHEN METABOLIC PANEL: CPT

## 2025-02-05 PROCEDURE — 83735 ASSAY OF MAGNESIUM: CPT

## 2025-02-05 PROCEDURE — 85027 COMPLETE CBC AUTOMATED: CPT

## 2025-02-05 PROCEDURE — 82948 REAGENT STRIP/BLOOD GLUCOSE: CPT

## 2025-02-05 PROCEDURE — 99222 1ST HOSP IP/OBS MODERATE 55: CPT | Performed by: EMERGENCY MEDICINE

## 2025-02-05 PROCEDURE — 99232 SBSQ HOSP IP/OBS MODERATE 35: CPT | Performed by: HOSPITALIST

## 2025-02-05 RX ORDER — LANOLIN ALCOHOL/MO/W.PET/CERES
100 CREAM (GRAM) TOPICAL DAILY
Status: DISCONTINUED | OUTPATIENT
Start: 2025-02-05 | End: 2025-02-06 | Stop reason: HOSPADM

## 2025-02-05 RX ORDER — POTASSIUM CHLORIDE 1500 MG/1
40 TABLET, EXTENDED RELEASE ORAL ONCE
Status: COMPLETED | OUTPATIENT
Start: 2025-02-05 | End: 2025-02-05

## 2025-02-05 RX ORDER — FOLIC ACID 1 MG/1
1 TABLET ORAL DAILY
Status: DISCONTINUED | OUTPATIENT
Start: 2025-02-05 | End: 2025-02-06 | Stop reason: HOSPADM

## 2025-02-05 RX ORDER — PHENOBARBITAL SODIUM 65 MG/ML
65 INJECTION, SOLUTION INTRAMUSCULAR; INTRAVENOUS ONCE
Status: COMPLETED | OUTPATIENT
Start: 2025-02-05 | End: 2025-02-05

## 2025-02-05 RX ORDER — HYDROXYZINE HYDROCHLORIDE 25 MG/1
25 TABLET, FILM COATED ORAL EVERY 6 HOURS PRN
Status: DISCONTINUED | OUTPATIENT
Start: 2025-02-05 | End: 2025-02-06 | Stop reason: HOSPADM

## 2025-02-05 RX ADMIN — INSULIN LISPRO 1 UNITS: 100 INJECTION, SOLUTION INTRAVENOUS; SUBCUTANEOUS at 06:09

## 2025-02-05 RX ADMIN — HYDROXYZINE HYDROCHLORIDE 25 MG: 25 TABLET, FILM COATED ORAL at 21:10

## 2025-02-05 RX ADMIN — HYDROXYZINE HYDROCHLORIDE 25 MG: 25 TABLET, FILM COATED ORAL at 14:46

## 2025-02-05 RX ADMIN — FOLIC ACID 1 MG: 1 TABLET ORAL at 09:09

## 2025-02-05 RX ADMIN — POTASSIUM CHLORIDE 40 MEQ: 1500 TABLET, EXTENDED RELEASE ORAL at 07:52

## 2025-02-05 RX ADMIN — NICOTINE 21 MG: 21 PATCH, EXTENDED RELEASE TRANSDERMAL at 08:27

## 2025-02-05 RX ADMIN — ENOXAPARIN SODIUM 40 MG: 40 INJECTION SUBCUTANEOUS at 08:26

## 2025-02-05 RX ADMIN — THIAMINE HCL TAB 100 MG 100 MG: 100 TAB at 09:09

## 2025-02-05 RX ADMIN — MIRTAZAPINE 30 MG: 15 TABLET, FILM COATED ORAL at 21:10

## 2025-02-05 RX ADMIN — ASPIRIN 81 MG 81 MG: 81 TABLET ORAL at 08:26

## 2025-02-05 RX ADMIN — MULTIPLE VITAMINS W/ MINERALS TAB 1 TABLET: TAB ORAL at 08:26

## 2025-02-05 RX ADMIN — ATORVASTATIN CALCIUM 40 MG: 40 TABLET, FILM COATED ORAL at 17:01

## 2025-02-05 RX ADMIN — PHENOBARBITAL SODIUM 65 MG: 65 INJECTION INTRAMUSCULAR; INTRAVENOUS at 01:33

## 2025-02-05 RX ADMIN — INSULIN LISPRO 1 UNITS: 100 INJECTION, SOLUTION INTRAVENOUS; SUBCUTANEOUS at 11:23

## 2025-02-05 NOTE — CASE MANAGEMENT
CM attempted to schedule follow up appointment with pt primary care physician office, Dr Alon Dickey. They would not schedule the appointment, said that something changed and they cannot schedule the appointment until the patient is discharged. CM explained that she cannot schedule the appointment after pt is discharged. They expressed understanding, said that they should be following up with the patient at discharge regardless, because they would get notice as well.

## 2025-02-05 NOTE — ASSESSMENT & PLAN NOTE
Lab Results   Component Value Date    HGBA1C 7.2 (H) 01/03/2025       Recent Labs     02/04/25  1606 02/04/25  2111 02/05/25  0607   POCGLU 157* 226* 156*       Blood Sugar Average: Last 72 hrs:  (P) [id number]  Patient with a history of DM2  Home medications Metformin 1000 mg   Hgb A1C 7.5   Initiate SSI coverage  Accuchecks AC/HS  Carb-controlled diet  Hypoglycemia protocol

## 2025-02-05 NOTE — PROGRESS NOTES
02/05/25 0917   Physical Activity   On average, how many days per week do you engage in moderate to strenuous exercise (like a brisk walk)? 7 days   On average, how many minutes do you engage in exercise at this level? 50 min  (Walks a quarter mile 3 or more times per day)   Financial Resource Strain   How hard is it for you to pay for the very basics like food, housing, medical care, and heating? Not very   Housing Stability   In the last 12 months, was there a time when you were not able to pay the mortgage or rent on time? Y  (This past month missed rent because of bills, had two surgeries this year that he also had to pay for)   In the past 12 months, how many times have you moved where you were living? 0   At any time in the past 12 months, were you homeless or living in a shelter (including now)? N   Transportation Needs   In the past 12 months, has lack of transportation kept you from medical appointments or from getting medications? no   In the past 12 months, has lack of transportation kept you from meetings, work, or from getting things needed for daily living? No   Food Insecurity   Within the past 12 months, you worried that your food would run out before you got the money to buy more. Never true   Within the past 12 months, the food you bought just didn't last and you didn't have money to get more. Never true   Stress   Do you feel stress - tense, restless, nervous, or anxious, or unable to sleep at night because your mind is troubled all the time - these days? Rather much  (Reports he was stressed before vascular test, and had a lesion on his arm that he was worried about)   Social Connections   In a typical week, how many times do you talk on the phone with family, friends, or neighbors? Three   How often do you get together with friends or relatives? Three times   How often do you attend Pentecostalism or Islam services? Never   Do you belong to any clubs or organizations such as Pentecostalism groups,  unions, fraternal or athletic groups, or school groups? No   How often do you attend meetings of the clubs or organizations you belong to? Never   Are you , , , , never , or living with a partner? Never marrie   Intimate Partner Violence   Within the last year, have you been afraid of your partner or ex-partner? No   Within the last year, have you been humiliated or emotionally abused in other ways by your partner or ex-partner? No   Within the last year, have you been kicked, hit, slapped, or otherwise physically hurt by your partner or ex-partner? No   Within the last year, have you been raped or forced to have any kind of sexual activity by your partner or ex-partner? No   Alcohol Use   Q1: How often do you have a drink containing alcohol? 4 or more ti   Q2: How many drinks containing alcohol do you have on a typical day when you are drinking? 10 or more   Q3: How often do you have six or more drinks on one occasion? Daily   Utilities   In the past 12 months has the electric, gas, oil, or water company threatened to shut off services in your home? No   Health Literacy   How often do you need to have someone help you when you read instructions, pamphlets, or other written material from your doctor or pharmacy? Never   Follow-Ups   We make community resources available to all of our patients to assist with everyday needs. We may be able to connect you with those resources. Would you be interested? Y   Would you be interested in assistance with any of these areas? If so, which ones? 9

## 2025-02-05 NOTE — CONSULTS
Consultation - Medical Toxicology   Name: Oscar Garcia 69 y.o. male I MRN: [id number]  Unit/Bed#: 5T DETOX 508-01 I Date of Admission: 2/4/2025   Date of Service: 2/5/2025 I Hospital Day: 1   Inpatient consult to Toxicology  Consult performed by: Phuong Woodard MD  Consult ordered by: Court Fry PA-C        Physician Requesting Evaluation: Avni Callejas DO   Reason for Evaluation / Principal Problem: alcohol withdrawal    Assessment & Plan  Alcohol withdrawal syndrome with complication (HCC)     Recent Labs     02/03/25  2126   ETOH 227*     In the ED, received 30 mg diazepam for withdrawal.    Start symptom triggered phenobarbital administration via protocol   On the unit, received 0 mg IV diazepam and loading dose of 650 mg IV phenobarbital.  Total received: 910 mg phenobarbital  Current withdrawal symptoms: none  Supportive care including IV fluids, antiemetics, nonopioid analgesics, antidiarrheals as needed  Cardiac monitoring and telemetry  Seizure precautions    Alcohol use disorder, moderate, dependence (HCC)  Longstanding history of alcohol use past years with intermittent periods of sobriety, currently drinking approximately 1 pint of liquor and 4 beers daily over the past 3 weeks, last drink at an morning or afternoon of 2/3  (-)history of withdrawal seizures  (-) history of ICU admissions  (+) history of admission to detox unit on 11/11/2022  (-) history of inpatient, (+) outpatient rehab  (+) history of PO naltrexone -- did not feel like it was effective  Would consider IM naltrexone - will reevaluate tomorrow  Multivitamin, thiamine, folic acid  Encourage cessation  Appreciate case management recommendations in regards to disposition planning -- wants outpatient resources    I have discussed the above management plan in detail with the primary service.     Please see additional teaching note below (if available):    Ethanol Withdrawal  Department of Medical Toxicology  [facility]  [facility]    Updated June 2019    Ethanol withdrawal can be precipitated by sudden discontinuation of chronic ethanol use. Symptoms of ethanol withdrawal syndrome include tremor, anxiety, headache, palpitations, insomnia, nausea and vomiting, diaphoresis, tachycardia and hypertension. In severe cases, seizures may also occur. Seizures are usually brief and generalized, and often occur within 6-12 hours after cessation of ethanol use. Each time someone goes through ethanol withdrawal, it is generally worse secondary to the Kindling Effect.     Sympathetic nervous system overactivity may progress to delirium tremens.  Delirium tremens is a life-threatening condition that is characterized by tachycardia, diaphoresis, hyperthermia, delirium and hallucinations.  It usually occurs about 48-72 hours after discontinuation of heavy ethanol use.  If not treated appropriately, significant morbidity and mortality can be associated.    The pathophysiology in ethanol dependence is associated with downregulation of GABAa receptors and upregulation of NMDA receptors. This is secondary to ethanol's continuous NELIA agonism and NDMA antagonism. When ethanol use is discontinued, this resulting state leads to the hyperexcitation associated with the withdrawal syndrome. Treatment is primarily targeted at decreasing the excitatory state with sedatives, such as benzodiazepines and phenobarbital.  Adjunctive treatments may include dexmedetomidine or ketamine. Propofol may also be utilized in cases where the airway is protected.      Other complications to consider in the setting of ethanol dependence include hypoglycemia, alcoholic ketoacidosis, Wernicke's Encephalopahty and Wernicke-Korsakoff Syndrome. It is important to routinely provide nutrition, hydration and often thiamine.       For further questions, please contact the medical  on call via SecureChat between 8am and 9pm. If between 9pm and 8am, please  reach out to the Poison Center at [phone].     History of Present Illness   Oscar Garcia is a 69 y.o. year old male who presents with request for alcohol detoxification.  His past medical history is significant for alcohol use disorder.  Patient has a 10+ year history of alcohol use disorder with intermittent periods of sobriety.  Most recently, patient has been drinking 1 pint of liquor and 4 beers daily for the past 3 weeks.  Patient arrived to the ED on 2/3 reporting anxiety, had normal vital signs and no physical exam findings at the time.  Prior to transfer and ED, patient became more symptomatic and received total of 30 mg of diazepam that was transferred to the [place] detox unit.  This morning, after receiving 910 mg of phenobarbital total, patient feels mostly improved and has no complaints.    Review of Systems   Constitutional:  Negative for activity change, chills and fever.   HENT:  Negative for sneezing and sore throat.    Eyes:  Negative for pain.   Respiratory:  Negative for apnea, cough, choking, chest tightness, shortness of breath, wheezing and stridor.    Cardiovascular:  Negative for chest pain, palpitations and leg swelling.   Gastrointestinal:  Negative for abdominal distention, abdominal pain, anal bleeding, blood in stool, constipation, diarrhea, nausea, rectal pain and vomiting.   Genitourinary:  Negative for dysuria and hematuria.   Musculoskeletal:  Negative for back pain, gait problem, myalgias, neck pain and neck stiffness.   Skin:  Negative for color change, pallor, rash and wound.   Neurological:  Negative for dizziness, tremors, seizures, syncope, facial asymmetry, speech difficulty, weakness, light-headedness, numbness and headaches.       Historical Information   I have reviewed the patient's PMH, PSH, Social History, Family History, Meds, and Allergies  Social History     Tobacco Use    Smoking status: Every Day     Current packs/day: 1.00     Average packs/day: 1  pack/day for 50.0 years (50.0 ttl pk-yrs)     Types: Cigarettes    Smokeless tobacco: Never    Tobacco comments:     uses vape/current every day smoker (as per [employer])   Vaping Use    Vaping status: Never Used   Substance and Sexual Activity    Alcohol use: Yes     Comment: states last was 2 days ago    Drug use: No    Sexual activity: Not on file     Family History   Problem Relation Age of Onset    Heart failure Mother     Hypertension Other        Meds/Allergies   Prior to Admission medications    Medication Sig Start Date End Date Taking? Authorizing Provider   amLODIPine (NORVASC) 10 mg tablet TAKE 1 TABLET DAILY 4/15/24  Yes Alon Dickey MD   aspirin (Aspirin 81) 81 mg EC tablet Take 1 tablet (81 mg total) by mouth daily 1/7/25  Yes Alon Dickey MD   atorvastatin (LIPITOR) 40 mg tablet TAKE 1 TABLET DAILY 4/15/24  Yes Alon Dickey MD   folic acid (FOLVITE) 1 mg tablet Take 1 tablet (1 mg total) by mouth daily 4/26/23  Yes Alon Dickey MD   hydroCHLOROthiazide 12.5 mg tablet TAKE 1 TABLET DAILY 4/15/24  Yes Alon Dickey MD   LORazepam (ATIVAN) 1 mg tablet Take 1 mg by mouth 3 (three) times a day as needed for anxiety 10/29/24  Yes Historical Provider, MD   metFORMIN (GLUCOPHAGE) 1000 MG tablet TAKE 1 TABLET DAILY 4/15/24  Yes Alon Dickey MD   mirtazapine (REMERON) 30 mg tablet Take 1 tablet (30 mg total) by mouth daily at bedtime 4/26/23  Yes Alon Dickey MD   mirtazapine (REMERON) 7.5 MG tablet Take 1 tablet (7.5 mg total) by mouth daily at bedtime 4/26/23  Yes Alon Dickey MD   thiamine (VITAMIN B1) 100 mg tablet Take 1 tablet (100 mg total) by mouth daily 2/24/23  Yes Alon Dickey MD   magnesium gluconate (MAGONATE) 500 mg tablet Take 1 tablet (500 mg total) by mouth 2 (two) times a day for 7 days 2/24/23 3/3/23  Alon Dickey MD   naltrexone (REVIA) 50 mg tablet Take 1 tablet (50 mg total) by mouth daily  Patient not taking: Reported on 2/4/2025 4/26/23   Alon Dickey MD   PARoxetine (PAXIL) 20 mg  tablet Take 1 tablet (20 mg total) by mouth daily 4/26/23 5/26/23  Alon Dickey MD   potassium chloride (K-DUR,KLOR-CON) 20 mEq tablet Take 2 tablets (40 mEq total) by mouth daily  Patient not taking: Reported on 2/4/2025 4/26/23   Alon Dickey MD     Current Facility-Administered Medications:     acetaminophen (TYLENOL) tablet 650 mg, 650 mg, Oral, Q6H PRN, Court Perdomo Monteagle, PA-C    aspirin chewable tablet 81 mg, 81 mg, Oral, Daily, Court Yadavce Ang, PA-C, 81 mg at 02/05/25 0826    atorvastatin (LIPITOR) tablet 40 mg, 40 mg, Oral, Daily With Dinner, Court Fry, PA-C, 40 mg at 02/04/25 1541    enoxaparin (LOVENOX) subcutaneous injection 40 mg, 40 mg, Subcutaneous, Daily, Court Fry, PA-C, 40 mg at 02/05/25 0826    folic acid (FOLVITE) tablet 1 mg, 1 mg, Oral, Daily, Avni WOODWARD Prechtel, DO, 1 mg at 02/05/25 0909    insulin lispro (HumALOG/ADMELOG) 100 units/mL subcutaneous injection 1-5 Units, 1-5 Units, Subcutaneous, TID AC, 1 Units at 02/05/25 0609 **AND** Fingerstick Glucose (POCT), , , TID AC, Court Perdomo Ang, PA-C    mirtazapine (REMERON) tablet 30 mg, 30 mg, Oral, HS, Court Perdomo Monteagle, PA-C, 30 mg at 02/04/25 2126    multivitamin-minerals (CENTRUM) tablet 1 tablet, 1 tablet, Oral, Daily, Court Perdomo Ang, PA-C, 1 tablet at 02/05/25 0826    nicotine (NICODERM CQ) 21 mg/24 hr TD 24 hr patch 21 mg, 21 mg, Transdermal, Daily, Court Perdomo Monteagle, PA-C, 21 mg at 02/05/25 0827    ondansetron (ZOFRAN) injection 4 mg, 4 mg, Intravenous, Q6H PRN, Court Fry PA-C    thiamine tablet 100 mg, 100 mg, Oral, Daily, Avni Callejas, , 100 mg at 02/05/25 0909   Allergies   Allergen Reactions    Lisinopril Angioedema       Objective :  Temp:  [97 °F (36.1 °C)-98.8 °F (37.1 °C)] 98 °F (36.7 °C)  HR:  [] 78  BP: (141-170)/(70-86) 154/84  Resp:  [16-20] 18  SpO2:  [92 %-100 %] 100 %  O2 Device: None (Room air)      Intake/Output Summary (Last 24 hours) at  2/5/2025 1049  Last data filed at 2/5/2025 0601  Gross per 24 hour   Intake 720 ml   Output 1250 ml   Net -530 ml       Physical Exam  Vitals and nursing note reviewed.   Constitutional:       Appearance: He is not ill-appearing.   HENT:      Head: Normocephalic and atraumatic.      Right Ear: External ear normal.      Left Ear: External ear normal.      Nose: Nose normal.      Mouth/Throat:      Mouth: Mucous membranes are moist.   Eyes:      General:         Right eye: No discharge.         Left eye: No discharge.      Extraocular Movements: Extraocular movements intact.      Conjunctiva/sclera: Conjunctivae normal.      Pupils: Pupils are equal, round, and reactive to light.   Cardiovascular:      Rate and Rhythm: Normal rate.   Pulmonary:      Effort: Pulmonary effort is normal.      Breath sounds: Normal breath sounds.   Abdominal:      General: Abdomen is flat.      Tenderness: There is no abdominal tenderness. There is no guarding.   Musculoskeletal:      Cervical back: Normal range of motion and neck supple.   Skin:     General: Skin is warm and dry.   Neurological:      General: No focal deficit present.      Mental Status: He is alert.      GCS: GCS eye subscore is 4. GCS verbal subscore is 5. GCS motor subscore is 6.      Motor: No weakness or tremor.   Psychiatric:         Attention and Perception: Attention normal.         Mood and Affect: Mood normal. Mood is not anxious.           Lab Results: I have reviewed the following results:  Results from last 7 days   Lab Units 02/05/25  0524 02/03/25  2126   WBC Thousand/uL 7.86 8.23   HEMOGLOBIN g/dL 13.6 15.4   HEMATOCRIT % 41.6 45.8   PLATELETS Thousands/uL 320 411*   SEGS PCT %  --  43   LYMPHO PCT %  --  46*   MONO PCT %  --  7   EOS PCT %  --  2      Results from last 7 days   Lab Units 02/05/25  0524   POTASSIUM mmol/L 3.2*   CHLORIDE mmol/L 107   CO2 mmol/L 27   BUN mg/dL 7   CREATININE mg/dL 0.62   CALCIUM mg/dL 8.4   ALBUMIN g/dL 3.4*   ALK PHOS U/L  82   ALT U/L 26   AST U/L 30   MAGNESIUM mg/dL 1.8*                       Results from last 7 days   Lab Units 02/03/25 2126   ETHANOL LVL mg/dL 227*     Imaging Results Review: I reviewed radiology reports from this admission including: CT head and CT C-spine.  Other Study Results Review: EKG was personally reviewed and my interpretation is: NSR. HR 95, , QTc 435, no ST elevation or depression, no ectopy, normal terminal R.  Overall impression: No toxicologic findings..    Administrative Statements   I have spent a total time of 35 minutes in caring for this patient on the day of the visit/encounter including Diagnostic results, Risks and benefits of tx options, Instructions for management, Patient and family education, Importance of tx compliance, Risk factor reductions, Impressions, Counseling / Coordination of care, Documenting in the medical record, Reviewing / ordering tests, medicine, procedures  , Obtaining or reviewing history  , and Communicating with other healthcare professionals .

## 2025-02-05 NOTE — CERTIFIED RECOVERY SPECIALIST
Certified  Note    Patient name: Oscar Garcia  Location: 5T DETOX 508/5T DETOX 50*  [place]: [facility]  Attending:  Avni Callejas DO MRN [id number]  : 1955  Age: 69 y.o.    Sex: male Date 2025         Substance Use History:     Social History     Substance and Sexual Activity   Alcohol Use Yes    Comment: states last was 2 days ago        Social History     Substance and Sexual Activity   Drug Use No      CRS initial consult rcvd Y     CRS time spent 20 min    CRS met with patient briefly for introduction and explanation of services.     Patient explained that he has been in recovery before and has successfully completed 6 month IOP program. Patient comes from large family and has a lot of family support.     CRS and patient discussed recovery resources and options on how to stay sober including with the aid of vivitrol shot.     CRS will follow up with patient to see if has any other questions tomorrow     Contact and resources provided       Antionette Murry

## 2025-02-05 NOTE — PROGRESS NOTES
02/05/25 0903   Referral Data   Referral Source Physician   Referral Name [facility], pt's PCP, directed pt to present to [facility] for admission to [facility] withdrawal management unit   Referral Reason Drug/Alcohol Abuse   County Information   [place] [facility]   Readmission Root Cause   30 Day Readmission No   Patient Information   Mental Status Alert   Primary Caregiver Self   Support System Immediate family;Extended family;Neighbors   Mandaen/Cultural Requests Hinduism   Legal Information   Legal Issues Probation in [facility] from 2022 DUI   Activities of Daily Living Prior to Admission   Functional Status Independent   Assistive Device No device needed   Living Arrangement Apartment   Ambulation Independent   Access to Firearms   Access to Firearms No   Income Information   Income Source Pension/intermediate   Means of Transportation   Means of Transport to Appts: Public Transportation - Bus  (Walks, sometimes takes the bus, brother also takes him to appointments)

## 2025-02-05 NOTE — ASSESSMENT & PLAN NOTE
Longstanding history of alcohol use past years with intermittent periods of sobriety, currently drinking approximately 1 pint of liquor and 4 beers daily over the past 3 weeks, last drink at an morning or afternoon of 2/3  (-)history of withdrawal seizures  (-) history of ICU admissions  (+) history of admission to detox unit on 11/11/2022  (-) history of inpatient, (+) outpatient rehab  (+) history of PO naltrexone -- did not feel like it was effective  Would consider IM naltrexone - will reevaluate tomorrow  Multivitamin, thiamine, folic acid  Encourage cessation  Appreciate case management recommendations in regards to disposition planning -- wants outpatient resources

## 2025-02-05 NOTE — ASSESSMENT & PLAN NOTE
Recent Labs     02/03/25  2126   ETOH 227*     In the ED, received 30 mg diazepam for withdrawal.    Start symptom triggered phenobarbital administration via protocol   On the unit, received 0 mg IV diazepam and loading dose of 650 mg IV phenobarbital.  Total received: 910 mg phenobarbital  Current withdrawal symptoms: none  Supportive care including IV fluids, antiemetics, nonopioid analgesics, antidiarrheals as needed  Cardiac monitoring and telemetry  Seizure precautions

## 2025-02-05 NOTE — ASSESSMENT & PLAN NOTE
H/o chronic daily alcohol consumption, denies h/o withdrawal seizures  Last drink: 2/3/25   Ethanol lvl: 227  2/3/25   Received 30 mg of Valium prior to arrival on the withdrawal management unit   Toxicology consulted;  Follow SEWS protocol with symptom-triggered phenobarbital for medically-assisted alcohol withdrawal  Current symptoms include anxiety, diaphoresis, tremors, tachycardia, HTN   SEWS score upon admission 13   Administer 650 mg of phenobarbital,910 mg total dose thus far, hopefully discontinuing SEWs later today  Telemetry and pulse oximetry ordered   Repeat CMP and Mag   Continue to monitor vitals, symptoms

## 2025-02-05 NOTE — PROGRESS NOTES
02/05/25 0903   Patient Intake   Special Needs None   Living Arrangement Apartment   Can patient return home? Yes   Address to be Discharge to: [address] [place] [address]   [address]   [place] [address]   Patient's Telephone Number [phone]   Access to Firearms No   Work History Retired   Admission Status   [facility]   Patient History   Presenting Problems Pt presented to [facility] ED at the suggestion of his primary care physician, also through [facility], to seek admission for withdrawal management   Treatment History Pt has completed stay at [facility] in Nov 2022, prior outpatient with [facility] in [place] in 2023   Currently in Treatment Yes   Current Psychiatrist/Therapist Dr. Singh Barboza with [facility] (private psychiatrist, sees 1x every three months)   Current Treatment Appt Info next appointment within the next 1-3 months, CM offered to move up this appointment and pt declined, reports he is okay with his mental health, and hasn't been depressed since 2015   Medical Problems Type II DM, HTN, Hyperlipidemia, arthrosclerosis, tobacco use. prescribed Norvasc, remeron, paxil   Legal Issues Probation in [facility] from 2022 DUI   Indicate type of legal issues present: Probation   Probation/ Name (if applicable) Pt did not provide  name, does not want CM to contact probation   Substance Abuse Yes (See BH History section for detail)   Crisis Info   Release of Information Signed Yes        02/05/25 1025   Substance Abuse Addendum Details   History of Withdrawal Symptoms Other withdrawal symptoms (specify in comment)  (Shakey, anxious, tremors)   Medical Complications Type II DM, HTN, Hyperlipidemia, arthrosclerosis, tobacco use. prescribed Norvasc   Sober Supports Brother helps him with apopintments, other brother's and sisters and extended nieces/nephews all supportive, also social with his neighbors in apartment complex   Present Treatment Ouptiatne  "psychiatry/medicatino management through [facility]   Substance Abuse Treatment Hx Past Tx, Inpatient   ASAM Level & Criteria Pt meets for 3.5 LOC, declining at this time, also declining any outpatient resources, could not provide a reason why other than, \"I just don't want to.\"   Additional Comments CM strongly encouraged engagement with at least 12 step meetings, pt willing to attend meeting and use AA hotline   Stage of Change   Stage of Change Contemplation     "

## 2025-02-05 NOTE — ASSESSMENT & PLAN NOTE
Pt with a h/o chronic heavy alcohol use   Had recent relapse  drinking 1 pint of liquor and 4 beers daily x 3 weeks   Denies history of withdrawal seizures   Previous admission to the [facility] Medical Detox Unit November 2022   Previously started on naltrexone at this time, not interested in restarting   Withdrawal management as above  Initiate IVFs, IV thiamine, folic acid, and MVI  Consult case management/CRS for assistance with aftercare resources - pt interested in outpatient resources at this time

## 2025-02-05 NOTE — PROGRESS NOTES
Progress Note - Hospitalist   Name: Oscar Garcia 69 y.o. male I MRN: [id number]  Unit/Bed#: 5T DETOX 508-01 I Date of Admission: 2/4/2025   Date of Service: 2/5/2025 I Hospital Day: 1    Assessment & Plan  Alcohol withdrawal syndrome with complication (HCC)  H/o chronic daily alcohol consumption, denies h/o withdrawal seizures  Last drink: 2/3/25   Ethanol lvl: 227  2/3/25   Received 30 mg of Valium prior to arrival on the withdrawal management unit   Toxicology consulted;  Follow SEWS protocol with symptom-triggered phenobarbital for medically-assisted alcohol withdrawal  Current symptoms include anxiety, diaphoresis, tremors, tachycardia, HTN   SEWS score upon admission 13   Administer 650 mg of phenobarbital,910 mg total dose thus far, hopefully discontinuing SEWs later today  Telemetry and pulse oximetry ordered   Repeat CMP and Mag   Continue to monitor vitals, symptoms      Alcohol use disorder, moderate, dependence (HCC)  Pt with a h/o chronic heavy alcohol use   Had recent relapse  drinking 1 pint of liquor and 4 beers daily x 3 weeks   Denies history of withdrawal seizures   Previous admission to the [facility]ox Unit November 2022   Previously started on naltrexone at this time, not interested in restarting   Withdrawal management as above  Initiate IVFs, IV thiamine, folic acid, and MVI  Consult case management/CRS for assistance with aftercare resources - pt interested in outpatient resources at this time   Diabetes mellitus type 2, noninsulin dependent (HCC)  Lab Results   Component Value Date    HGBA1C 7.2 (H) 01/03/2025       Recent Labs     02/04/25  1606 02/04/25  2111 02/05/25  0607   POCGLU 157* 226* 156*       Blood Sugar Average: Last 72 hrs:  (P) [id number]  Patient with a history of DM2  Home medications Metformin 1000 mg   Hgb A1C 7.5   Initiate SSI coverage  Accuchecks AC/HS  Carb-controlled diet  Hypoglycemia protocol     Hypertension  Patient with a history of HTN  Home  medication regimen: Norvasc 10 mgm HCTZ 12.5 mg   BP hypertensive upon arrival to the unit in the setting of acute alcohol withdrawal and chronic HTN  Most Recent 168/70   Will plan to resume anti-hypertensive therapy if BP remains high after resolution of acute withdrawal  Continue monitoring BP   Mixed hyperlipidemia  Continue Statin   Tobacco use disorder  I personally provided greater than 7 min of tobacco cessation education   Patient smokes 1/2 pack of day   Requesting NRT will order nicotine patch   MDD (major depressive disorder), recurrent episode (HCC)  Patient endorses a h/o chronic anxiety and depression  Home medications include Remeron 30 mg   Denies SI/HI/thoughts of self harm  Continue home medications  Recommend OP f/u with PCP/OP Psychiatry     VTE Pharmacologic Prophylaxis:   Moderate Risk (Score 3-4) - Pharmacological DVT Prophylaxis Ordered: enoxaparin (Lovenox).    Mobility:   Basic Mobility Inpatient Raw Score: 24  JH-HLM Goal: 8: Walk 250 feet or more  JH-HLM Achieved: 8: Walk 250 feet ot more  JH-HLM Goal achieved. Continue to encourage appropriate mobility.    Patient Centered Rounds: I performed bedside rounds with nursing staff today.   Discussions with Specialists or Other Care Team Provider: CM, tox    Education and Discussions with Family / Patient: Patient declined call to .     Current Length of Stay: 1 day(s)  Current Patient Status: Inpatient   Certification Statement: The patient will continue to require additional inpatient hospital stay due to continue to monitor patient while undergoing detoxification  Discharge Plan: Anticipate discharge tomorrow to home with home services.    Code Status: Level 1 - Full Code    Subjective   Patient reports to be feeling improved from yesterday however continues to have some anxiety and mild tremors.  He currently denies any chest pain/pressure, palpitations, numbness, nausea, shortness of breath, or chills.    Objective :  Temp:   [97 °F (36.1 °C)-98.8 °F (37.1 °C)] 98 °F (36.7 °C)  HR:  [] 78  BP: (141-170)/(70-86) 154/84  Resp:  [16-20] 18  SpO2:  [92 %-100 %] 100 %  O2 Device: None (Room air)    Body mass index is 27.48 kg/m².     Input and Output Summary (last 24 hours):     Intake/Output Summary (Last 24 hours) at 2/5/2025 1052  Last data filed at 2/5/2025 0601  Gross per 24 hour   Intake 720 ml   Output 1250 ml   Net -530 ml       Physical Exam  Vitals and nursing note reviewed.   Constitutional:       General: He is not in acute distress.     Appearance: He is normal weight. He is not ill-appearing, toxic-appearing or diaphoretic.   HENT:      Head: Normocephalic.      Nose: Nose normal.      Mouth/Throat:      Mouth: Mucous membranes are moist.      Pharynx: Oropharynx is clear.   Eyes:      General: No scleral icterus.     Conjunctiva/sclera: Conjunctivae normal.      Pupils: Pupils are equal, round, and reactive to light.   Cardiovascular:      Rate and Rhythm: Normal rate and regular rhythm.      Heart sounds: No murmur heard.     No friction rub. No gallop.   Pulmonary:      Effort: Pulmonary effort is normal. No respiratory distress.      Breath sounds: Normal breath sounds. No stridor. No wheezing, rhonchi or rales.   Abdominal:      General: Abdomen is flat.      Palpations: Abdomen is soft.   Musculoskeletal:         General: Normal range of motion.      Cervical back: Normal range of motion and neck supple.      Right lower leg: No edema.      Left lower leg: No edema.   Lymphadenopathy:      Cervical: No cervical adenopathy.   Skin:     General: Skin is warm.      Coloration: Skin is not jaundiced or pale.      Findings: No bruising, erythema or lesion.   Neurological:      General: No focal deficit present.      Mental Status: He is alert and oriented to person, place, and time. Mental status is at baseline.      Cranial Nerves: No cranial nerve deficit.      Motor: No weakness.   Psychiatric:         Mood and  Affect: Mood normal.         Behavior: Behavior normal.         Thought Content: Thought content normal.           Lines/Drains:        Telemetry:  Telemetry Orders (From admission, onward)               24 Hour Telemetry Monitoring  Continuous x 24 Hours (Telem)        Expiring   Question:  Reason for 24 Hour Telemetry  Answer:  Alcohol withdrawal and CIWA >7, electrolyte abnormalities, abnormal ECG and/or heart disease                     Telemetry Reviewed: Normal Sinus Rhythm  Indication for Continued Telemetry Use: Drug overdose known to cause cardiac arrhthymias               Lab Results: I have reviewed the following results:   Results from last 7 days   Lab Units 02/05/25  0524 02/03/25  2126   WBC Thousand/uL 7.86 8.23   HEMOGLOBIN g/dL 13.6 15.4   HEMATOCRIT % 41.6 45.8   PLATELETS Thousands/uL 320 411*   SEGS PCT %  --  43   LYMPHO PCT %  --  46*   MONO PCT %  --  7   EOS PCT %  --  2     Results from last 7 days   Lab Units 02/05/25  0524   SODIUM mmol/L 142   POTASSIUM mmol/L 3.2*   CHLORIDE mmol/L 107   CO2 mmol/L 27   BUN mg/dL 7   CREATININE mg/dL 0.62   ANION GAP mmol/L 8   CALCIUM mg/dL 8.4   ALBUMIN g/dL 3.4*   TOTAL BILIRUBIN mg/dL 0.57   ALK PHOS U/L 82   ALT U/L 26   AST U/L 30   GLUCOSE RANDOM mg/dL 158*         Results from last 7 days   Lab Units 02/05/25  0607 02/04/25  2111 02/04/25  1606   POC GLUCOSE mg/dl 156* 226* 157*               Recent Cultures (last 7 days):         Imaging Results Review: No pertinent imaging studies reviewed.  Other Study Results Review: No additional pertinent studies reviewed.    Last 24 Hours Medication List:     Current Facility-Administered Medications:     acetaminophen (TYLENOL) tablet 650 mg, Q6H PRN    aspirin chewable tablet 81 mg, Daily    atorvastatin (LIPITOR) tablet 40 mg, Daily With Dinner    enoxaparin (LOVENOX) subcutaneous injection 40 mg, Daily    folic acid (FOLVITE) tablet 1 mg, Daily    insulin lispro (HumALOG/ADMELOG) 100 units/mL  subcutaneous injection 1-5 Units, TID AC **AND** Fingerstick Glucose (POCT), TID AC    mirtazapine (REMERON) tablet 30 mg, HS    multivitamin-minerals (CENTRUM) tablet 1 tablet, Daily    nicotine (NICODERM CQ) 21 mg/24 hr TD 24 hr patch 21 mg, Daily    ondansetron (ZOFRAN) injection 4 mg, Q6H PRN    thiamine tablet 100 mg, Daily    Administrative Statements   Today, Patient Was Seen By: Richie Medina PA-C  I have spent a total time of 35 minutes in caring for this patient on the day of the visit/encounter including Documenting in the medical record, Reviewing / ordering tests, medicine, procedures  , Obtaining or reviewing history  , and Communicating with other healthcare professionals .    **Please Note: This note may have been constructed using a voice recognition system.**

## 2025-02-05 NOTE — UTILIZATION REVIEW
Initial Clinical Review    Pt initially presented to [facility] ED. Pt was transferred by EMS to [facility] for its Level IV medically managed intensive inpatient detox unit, not available at [facility].    Admission: Date/Time/Statement:   Admission Orders (From admission, onward)       Ordered        02/04/25 1256  Inpatient Admission  Once                          Orders Placed This Encounter   Procedures    Inpatient Admission     Standing Status:   Standing     Number of Occurrences:   1     Level of Care:   Med Surg [16]     Estimated length of stay:   More than 2 Midnights     Certification:   I certify that inpatient services are medically necessary for this patient for a duration of greater than two midnights. See H&P and MD Progress Notes for additional information about the patient's course of treatment.              No chief complaint on file.      Initial Presentation: 69 y.o. male who presented to [facility]. Inpatient admission for evaluation and treatment of DM, HTN, HLD, tobacco abuse, MDD & alcohol withdrawal syndrome. Presented w/  initially presented to the [facility] ED requesting alcohol detox with sx of anxiety, tachycardia, hypertension, and tremors. Exam: anxiety. Plan: car controlled diet with SSI, NRT, IVF, telemetry, continuous pulse ox, continue PTA meds except anti-hypertensives, trend labs, replete electrolytes as needed; I&O, fall & seizure precautions. Toxicology consulted.     Anticipated Length of Stay: Patient will be admitted on an inpatient basis with an anticipated length of stay of greater than 2 midnights secondary to alcohol withdrawal.     Toxicology: Presented w/ need for detox from alcohol. Serum ETOH: 227. Reports 1 pint of liquor and 4 beers  daily, last drink on 2/3. Has prior rehab treatment for withdrawal. Reports no hx of withdrawal seizures. On exam, diaphoresis,  tachycardia, anxiety. SEWS 13. Plan: SEWS monitoring w/ phenobarbital management, PO thiamine/folic acid supplement.       Date: 2/5       Day 2: Pt reports to be feeling improved from yesterday however continues to have some anxiety and mild tremors.  SEWS 0. Plan: continue SEWS monitoring w/ phenobarbital management, PO thiamine/folic acid supplement, telemetry, continuous pulse ox, continue current meds, trend labs, replete electrolytes as needed. Received 910 mg phenobarbital since admission.     Date: 2/6  Day 3: Has surpassed a 2nd midnight with active treatments and services. Last phenobarbital was administered yesterday at 0133. Discussed risks and benefits of Vivitrol. Denies any opioid use or kratom supplementation. Have ordered naltrexone. Multivitamin, thiamine, folic acid. Appreciate case management recommendations in regards to disposition planning -- wants outpatient resources         Scheduled Medications:  aspirin, 81 mg, Oral, Daily  atorvastatin, 40 mg, Oral, Daily With Dinner  enoxaparin, 40 mg, Subcutaneous, Daily  folic acid, 1 mg, Oral, Daily  insulin lispro, 1-5 Units, Subcutaneous, TID AC  mirtazapine, 30 mg, Oral, HS  multivitamin-minerals, 1 tablet, Oral, Daily  nicotine, 21 mg, Transdermal, Daily  thiamine, 100 mg, Oral, Daily      Continuous IV Infusions:     PRN Meds:  acetaminophen, 650 mg, Oral, Q6H PRN  ondansetron, 4 mg, Intravenous, Q6H PRN      phenobarbital, 650 mg, Intravenous; x1  phenobarbital, 130 mg, Intravenous; x1  phenobarbital, 65 mg, Intravenous; x2    ED Triage Vitals [02/04/25 1304]   Temperature Pulse Respirations Blood Pressure SpO2 Pain Score   98.8 °F (37.1 °C) (!) 114 20 168/70 95 % No Pain     Weight (last 2 days)       Date/Time Weight    02/04/25 1304 84.4 (186.07)              Vital Signs (last 3 days)       Date/Time Temp Pulse Resp BP MAP (mmHg) SpO2 O2 Device Patient Position - Orthostatic VS SEWS Total Score Pain    02/05/25 1112 98 °F (36.7 °C) 81 18  146/83 104 98 % None (Room air) Sitting -- --    02/05/25 0900 -- -- -- -- -- -- -- -- -- No Pain    02/05/25 0800 -- -- -- -- -- -- -- -- 0 --    02/05/25 0733 98 °F (36.7 °C) 78 18 154/84 107 100 % None (Room air) Lying -- --    02/05/25 0530 -- -- -- -- -- -- -- -- 0 --    02/05/25 0527 97.2 °F (36.2 °C) 65 16 152/74 -- 97 % None (Room air) Lying -- --    02/05/25 0116 -- -- -- -- -- -- -- -- 5 --    02/05/25 0115 97 °F (36.1 °C) 86 18 169/86 -- 96 % None (Room air) Lying -- --    02/04/25 2330 -- -- -- -- -- -- -- -- 0 --    02/04/25 2324 97.2 °F (36.2 °C) 83 18 141/81 101 94 % None (Room air) Lying -- --    02/04/25 2100 97 °F (36.1 °C) 88 18 160/75 -- 96 % None (Room air) -- 0 No Pain    02/04/25 1953 -- -- -- -- -- -- -- -- 5 --    02/04/25 1930 97.8 °F (36.6 °C) 92 18 164/71 102 95 % -- Lying -- --    02/04/25 1531 -- -- -- -- -- -- -- -- 8 --    02/04/25 1522 97.3 °F (36.3 °C) 104 18 170/75 106 92 % None (Room air) Lying -- --    02/04/25 1317 -- -- -- -- -- -- -- -- 13 --    02/04/25 1304 98.8 °F (37.1 °C) 114 20 168/70 -- 95 % None (Room air) Lying -- No Pain            SEWS:   Row Name 02/05/25 0800 02/05/25 0530 02/05/25 0116 02/04/25 2330 02/04/25 2100   Severity of Ethanol Withdrawal Scale (SEWS)   ANXIETY: Do you feel that something bad is about to happen to you right now? 0  -LB 0  -TS 3  -TS 0  -TS 0  -TS   NAUSEA and DRY HEAVES or VOMITING? 0  -LB 0  -TS 0  -TS 0  -TS 0  -TS   SWEATING: (includes moist palms, sweating now)? Score 0 or 2 0  -LB 0  -TS 0  -TS 0  -TS 0  -TS   TREMOR: with arms extended eyes closed? 0  -LB 0  -TS 2  -TS 0  -TS 0  -TS   AGITATION: Fidgety, restless, pacing? 0  -LB 0  -TS 0  -TS 0  -TS 0  -TS   DISORIENTATION: 0  -LB 0  -TS 0  -TS 0  -TS 0  -TS   HALLUCINATIONS: 0  -LB 0  -TS 0  -TS 0  -TS 0  -TS   VITAL SIGNS: ANY (Pulse >110, Diastolic BP >90, Temp >99.6) 0  -LB 0  -TS 0  -TS 0  -TS 0  -TS   SEWS Total Score 0  -LB 0  -TS 5  -TS 0  -TS 0  -TS   Hartley Agitation  Sedation Scale (RASS)   Hartley Agitation Sedation Scale (RASS) 0  -LB -- -- -- --   Row Name 02/04/25 1953 02/04/25 1531 02/04/25 1317     Severity of Ethanol Withdrawal Scale (SEWS)   ANXIETY: Do you feel that something bad is about to happen to you right now? 3  -TS 3  -LB 3  -LB     NAUSEA and DRY HEAVES or VOMITING? 0  -TS 0  -LB 0  -LB     SWEATING: (includes moist palms, sweating now)? Score 0 or 2 0  -TS 0  -LB 2  -LB     TREMOR: with arms extended eyes closed? 2  -TS 2  -LB 2  -LB     AGITATION: Fidgety, restless, pacing? 0  -TS 0  -LB 3  -LB     DISORIENTATION: 0  -TS 0  -LB 0  -LB     HALLUCINATIONS: 0  -TS 0  -LB 0  -LB     VITAL SIGNS: ANY (Pulse >110, Diastolic BP >90, Temp >99.6) 0  -TS 3  -LB 3  -LB     SEWS Total Score 5  -TS 8  -LB 13  -LB         Pertinent Labs/Diagnostic Test Results:   Radiology:  No orders to display     Cardiology:  No orders to display     GI:  No orders to display         Results from last 7 days   Lab Units 02/05/25  0524 02/03/25  2126   WBC Thousand/uL 7.86 8.23   HEMOGLOBIN g/dL 13.6 15.4   HEMATOCRIT % 41.6 45.8   PLATELETS Thousands/uL 320 411*   TOTAL NEUT ABS Thousands/µL  --  3.53         Results from last 7 days   Lab Units 02/05/25  0524 02/04/25  1427 02/03/25  2126   SODIUM mmol/L 142 138 143   POTASSIUM mmol/L 3.2* 3.9 4.3   CHLORIDE mmol/L 107 102 103   CO2 mmol/L 27 28 26   ANION GAP mmol/L 8 8 14*   BUN mg/dL 7 12 10   CREATININE mg/dL 0.62 0.90 0.76   EGFR ml/min/1.73sq m 101 86 93   CALCIUM mg/dL 8.4 8.6 9.0   MAGNESIUM mg/dL 1.8* 1.7*  --      Results from last 7 days   Lab Units 02/05/25  0524 02/04/25  1427   AST U/L 30 35   ALT U/L 26 30   ALK PHOS U/L 82 74   TOTAL PROTEIN g/dL 5.7* 6.1*   ALBUMIN g/dL 3.4* 3.6   TOTAL BILIRUBIN mg/dL 0.57 0.50     Results from last 7 days   Lab Units 02/05/25  1113 02/05/25  0607 02/04/25  2111 02/04/25  1606   POC GLUCOSE mg/dl 165* 156* 226* 157*     Results from last 7 days   Lab Units 02/05/25  0524  02/04/25  1427 02/03/25  2126   GLUCOSE RANDOM mg/dL 158* 248* 95           Results from last 7 days   Lab Units 02/03/25  2126   TSH 3RD GENERATON uIU/mL 2.536           Results from last 7 days   Lab Units 02/04/25  0515   AMPH/METH  Negative   BARBITURATE UR  Negative   BENZODIAZEPINE UR  Positive*   COCAINE UR  Negative   METHADONE URINE  Negative   OPIATE UR  Negative   PCP UR  Negative   THC UR  Positive*     Results from last 7 days   Lab Units 02/03/25  2126   ETHANOL LVL mg/dL 227*           Past Medical History:   Diagnosis Date    Colon polyp     Diabetes mellitus (HCC)     Hyperlipidemia     Hypertension      Present on Admission:   Alcohol use disorder, moderate, dependence (HCC)   Diabetes mellitus type 2, noninsulin dependent (HCC)   MDD (major depressive disorder), recurrent episode (HCC)   Mixed hyperlipidemia   Hypertension   Tobacco use disorder      Admitting Diagnosis: Alcohol abuse [F10.10]  Age/Sex: 69 y.o. male      SEWS PHENOBARBITAL PROTOCOL FOR ALCOHOL WITHDRAWAL  Admit patient to medical detox unit and continue supportive care and stabilization of acute ethanol withdrawal per medical toxicology/detox treatment pathway. Monitor ethanol withdrawal severity via the Severity of Ethanol Withdrawal Scale (SEWS) Q4 hours and then hourly if/when SEWS > 6. Treat withdrawal per pathway and reassess Q30-60 minutes.          Mild SEWS Score 1-6  Administer medications* (IV or PO; PO preferred):  If initial SEWS score: diazepam 10mg PO/IV x 1 AND phenobarbital 65 mg PO/IV x 1  If repeat SEWS score 1-6: phenobarbital 65 mg PO/IV q1 hour x 5 doses maximum   Reassessment:   SEWS q1 hour after each dose until SEWS 0 x 2 hours  VS q1 hours (until SEWS 0, then q4 hours)  Notify provider for bedside evaluation if 5-dose maximum is reached, RASS of -3 to -5, or SEWS score escalates to moderate or severe.   Moderate SEWS Score 7-12  Administer medications* (IV):  If initial SEWS score: diazepam 10mg IV x 1 AND  phenobarbital 260 mg IV x 1  If repeat SEWS score 7-12 or score escalated from mild: phenobarbital 130 mg IV q30 minutes x 5 doses maximum   Reassessment:  SEWS q30 minutes after each dose until SEWS < 7 (then hourly until SEWS 0 x 2 hours)  VS q30 minutes until SEWS < 7 (then hourly until SEWS 0, then q4 hours)  Notify provider for bedside evaluation if 5-dose maximum is reached, RASS of -3 to -5, or SEWS score escalates to severe.   Severe SEWS Score >= 13  Administer medications* (IV):  If initial SEWS score: Diazepam 10 mg IV x 1 AND phenobarbital 650 mg IV piggyback x 1 over 15-30 minutes  If repeat SEWS score >= 13 or score escalated from mild or moderate: phenobarbital 130 mg IV q30 minutes x 5 doses maximum   Reassessment:  SEWS q30 minutes after each dose until SEWS < 7 (then hourly until SEWS 0 x 2 hours)   VS q30 minutes until SEWS < 7 (then hourly until SEWS 0, then q4 hours)  Notify provider for bedside evaluation if 5-dose maximum is reached or RASS of -3 to -5   *Hold medications and notify provider if CNS depression, respirations < 10/min, or RASS of -3 to -5.        Network Utilization Review Department  ATTENTION: Please call with any questions or concerns to [phone] and carefully listen to the prompts so that you are directed to the right person. All voicemails are confidential.   For Discharge needs, contact Care Management DC Support Team at [phone] opt. 2  Send all requests for admission clinical reviews, approved or denied determinations and any other requests to dedicated fax number below belonging to the campus where the patient is receiving treatment. List of dedicated fax numbers for the Facilities:  FACILITY NAME UR FAX NUMBER   ADMISSION DENIALS (Administrative/Medical Necessity) [phone]   DISCHARGE SUPPORT TEAM (NETWORK) [phone]   PARENT CHILD HEALTH (Maternity/NICU/Pediatrics) [phone]   [facility] [phone]   [facility]  [place] [facility] [phone]   [facility] [phone]   [facility] [phone]   [facility] [phone]   [facility] [phone]   [facility] [phone]   [facility] [phone]   [facility] [phone]   [facility] [phone]   [facility] [phone]   [facility] [phone]

## 2025-02-06 VITALS
WEIGHT: 186.07 LBS | BODY MASS INDEX: 27.56 KG/M2 | HEIGHT: 69 IN | RESPIRATION RATE: 18 BRPM | HEART RATE: 68 BPM | DIASTOLIC BLOOD PRESSURE: 80 MMHG | OXYGEN SATURATION: 98 % | SYSTOLIC BLOOD PRESSURE: 154 MMHG | TEMPERATURE: 97.6 F

## 2025-02-06 DIAGNOSIS — F10.20 ALCOHOL USE DISORDER, SEVERE, DEPENDENCE (HCC): Primary | ICD-10-CM

## 2025-02-06 PROBLEM — F10.939 ALCOHOL WITHDRAWAL SYNDROME WITH COMPLICATION (HCC): Status: RESOLVED | Noted: 2025-02-04 | Resolved: 2025-02-06

## 2025-02-06 LAB
ANION GAP SERPL CALCULATED.3IONS-SCNC: 7 MMOL/L (ref 4–13)
BUN SERPL-MCNC: 6 MG/DL (ref 5–25)
CALCIUM SERPL-MCNC: 8.8 MG/DL (ref 8.4–10.2)
CHLORIDE SERPL-SCNC: 107 MMOL/L (ref 96–108)
CO2 SERPL-SCNC: 27 MMOL/L (ref 21–32)
CREAT SERPL-MCNC: 0.66 MG/DL (ref 0.6–1.3)
ERYTHROCYTE [DISTWIDTH] IN BLOOD BY AUTOMATED COUNT: 13.2 % (ref 11.6–15.1)
GFR SERPL CREATININE-BSD FRML MDRD: 98 ML/MIN/1.73SQ M
GLUCOSE SERPL-MCNC: 150 MG/DL (ref 65–140)
GLUCOSE SERPL-MCNC: 194 MG/DL (ref 65–140)
GLUCOSE SERPL-MCNC: 198 MG/DL (ref 65–140)
GLUCOSE SERPL-MCNC: 228 MG/DL (ref 65–140)
HCT VFR BLD AUTO: 42.5 % (ref 36.5–49.3)
HGB BLD-MCNC: 14.1 G/DL (ref 12–17)
MCH RBC QN AUTO: 33.3 PG (ref 26.8–34.3)
MCHC RBC AUTO-ENTMCNC: 33.2 G/DL (ref 31.4–37.4)
MCV RBC AUTO: 101 FL (ref 82–98)
PLATELET # BLD AUTO: 301 THOUSANDS/UL (ref 149–390)
PMV BLD AUTO: 9.6 FL (ref 8.9–12.7)
POTASSIUM SERPL-SCNC: 3.9 MMOL/L (ref 3.5–5.3)
RBC # BLD AUTO: 4.23 MILLION/UL (ref 3.88–5.62)
SODIUM SERPL-SCNC: 141 MMOL/L (ref 135–147)
WBC # BLD AUTO: 7.36 THOUSAND/UL (ref 4.31–10.16)

## 2025-02-06 PROCEDURE — 82948 REAGENT STRIP/BLOOD GLUCOSE: CPT

## 2025-02-06 PROCEDURE — 85027 COMPLETE CBC AUTOMATED: CPT

## 2025-02-06 PROCEDURE — 99232 SBSQ HOSP IP/OBS MODERATE 35: CPT | Performed by: EMERGENCY MEDICINE

## 2025-02-06 PROCEDURE — 80048 BASIC METABOLIC PNL TOTAL CA: CPT

## 2025-02-06 PROCEDURE — 99239 HOSP IP/OBS DSCHRG MGMT >30: CPT | Performed by: HOSPITALIST

## 2025-02-06 RX ADMIN — NICOTINE 21 MG: 21 PATCH, EXTENDED RELEASE TRANSDERMAL at 08:54

## 2025-02-06 RX ADMIN — INSULIN LISPRO 2 UNITS: 100 INJECTION, SOLUTION INTRAVENOUS; SUBCUTANEOUS at 11:30

## 2025-02-06 RX ADMIN — NALTREXONE 380 MG: KIT at 12:58

## 2025-02-06 RX ADMIN — ENOXAPARIN SODIUM 40 MG: 40 INJECTION SUBCUTANEOUS at 08:55

## 2025-02-06 RX ADMIN — ASPIRIN 81 MG 81 MG: 81 TABLET ORAL at 08:54

## 2025-02-06 RX ADMIN — INSULIN LISPRO 1 UNITS: 100 INJECTION, SOLUTION INTRAVENOUS; SUBCUTANEOUS at 06:27

## 2025-02-06 RX ADMIN — HYDROXYZINE HYDROCHLORIDE 25 MG: 25 TABLET, FILM COATED ORAL at 05:22

## 2025-02-06 RX ADMIN — MULTIPLE VITAMINS W/ MINERALS TAB 1 TABLET: TAB ORAL at 08:54

## 2025-02-06 RX ADMIN — THIAMINE HCL TAB 100 MG 100 MG: 100 TAB at 08:54

## 2025-02-06 RX ADMIN — FOLIC ACID 1 MG: 1 TABLET ORAL at 08:54

## 2025-02-06 NOTE — DISCHARGE SUMMARY
DIscharge Summary - Hospitalist   Name: Oscar Garcia 69 y.o. male I MRN: [id number]  Unit/Bed#: 5T DETOX 508-01 I Date of Admission: 2/4/2025   Date of Service: 2/6/2025 I Hospital Day: 2    Assessment & Plan  Alcohol withdrawal syndrome with complication (HCC)  Did well with alcohol withdrawal protocols.    He no longer has any withdrawal symptoms    He commits to trying to stay sober    He is comfortable going home  Alcohol use disorder, moderate, dependence (HCC)  Pt with a h/o chronic heavy alcohol use   Had recent relapse  drinking 1 pint of liquor and 4 beers daily x 3 weeks   Denies history of withdrawal seizures   Previous admission to the [facility] Det[facility] Unit November 2022   Previously started on naltrexone at this time, not interested in restarting   Withdrawal management as above  Initiate IVFs, IV thiamine, folic acid, and MVI  Consult case management/CRS for assistance with aftercare resources - pt interested in outpatient resources at this time   Diabetes mellitus type 2, noninsulin dependent (formerly [facility])  Lab Results   Component Value Date    HGBA1C 7.2 (H) 01/03/2025       Recent Labs     02/05/25  1624 02/05/25  2106 02/06/25  0621 02/06/25  1100   POCGLU 120 208* 194* 228*       Blood Sugar Average: Last 72 hrs:  (P) 181.75  Resume home medications    Hypertension  Patient with a history of HTN  Home medication regimen: Norvasc 10 mgm HCTZ 12.5 mg   BP hypertensive upon arrival to the unit in the setting of acute alcohol withdrawal and chronic HTN  Most Recent 168/70   Will plan to resume anti-hypertensive therapy if BP remains high after resolution of acute withdrawal  Continue monitoring BP   Mixed hyperlipidemia  Continue Statin   Tobacco use disorder  I personally provided greater than 7 min of tobacco cessation education   Patient smokes 1/2 pack of day   Requesting NRT will order nicotine patch   MDD (major depressive disorder), recurrent episode (HCC)  Patient endorses a h/o chronic anxiety and  depression  Home medications include Remeron 30 mg   Denies SI/HI/thoughts of self harm  Continue home medications  Recommend OP f/u with PCP/OP Psychiatry       Medical Problems       Resolved Problems  Date Reviewed: 1/10/2025   None        Discharging Physician / Practitioner: Avni Callejas DO  PCP: Alon Dickey MD  Admission Date:   Admission Orders (From admission, onward)       Ordered        02/04/25 1256  Inpatient Admission  Once                        Discharge Date: 02/06/25    Consultations During Hospital Stay:  IP CONSULT TO TOXICOLOGY     Procedures Performed:   * No surgery found *       Lab Results:   Results from last 7 days   Lab Units 02/06/25  0518 02/05/25  0524 02/03/25  2126   WBC Thousand/uL 7.36 7.86 8.23   HEMOGLOBIN g/dL 14.1 13.6 15.4   HEMATOCRIT % 42.5 41.6 45.8   MCV fL 101* 99* 97   PLATELETS Thousands/uL 301 320 411*     Results from last 7 days   Lab Units 02/06/25  0518 02/05/25  0524 02/04/25  1427 02/03/25  2126   SODIUM mmol/L 141 142 138 143   POTASSIUM mmol/L 3.9 3.2* 3.9 4.3   CHLORIDE mmol/L 107 107 102 103   CO2 mmol/L 27 27 28 26   BUN mg/dL 6 7 12 10   CREATININE mg/dL 0.66 0.62 0.90 0.76   CALCIUM mg/dL 8.8 8.4 8.6 9.0   ALBUMIN g/dL  --  3.4* 3.6  --    TOTAL BILIRUBIN mg/dL  --  0.57 0.50  --    ALK PHOS U/L  --  82 74  --    ALT U/L  --  26 30  --    AST U/L  --  30 35  --    EGFR ml/min/1.73sq m 98 101 86 93   GLUCOSE RANDOM mg/dL 150* 158* 248* 95                   Reason for Admission:   No chief complaint on file.    Hospital Course:   Oscar Garcia is a 69 y.o. male patient who originally presented to the hospital on 2/4/2025 due to alcohol withdrawal.  Patient did well with alcohol detox protocols.  He has steadily improved.  No longer having any withdrawal symptoms.  He is okay to go home.  He is confident he can quit alcohol        Please see above list of diagnoses and related plan for additional information.     Condition at Discharge: stable  "    Discharge Day Visit / Exam:   Subjective:  feels well  No withdrawal symptoms.  No tremor..    Vitals: Blood Pressure: 154/80 (02/06/25 0724)  Pulse: 68 (02/06/25 0724)  Temperature: 97.6 °F (36.4 °C) (02/06/25 0724)  Temp Source: Temporal (02/06/25 0724)  Respirations: 18 (02/06/25 0724)  Height: 5' 9\" (175.3 cm) (02/04/25 1304)  Weight - Scale: 84.4 kg (186 lb 1.1 oz) (02/04/25 1304)  SpO2: 98 % (02/06/25 0724)    Exam:   Physical Exam  Vitals and nursing note reviewed.   Constitutional:       General: He is not in acute distress.     Appearance: He is well-developed.   HENT:      Head: Normocephalic and atraumatic.   Eyes:      Conjunctiva/sclera: Conjunctivae normal.   Cardiovascular:      Rate and Rhythm: Normal rate and regular rhythm.      Heart sounds: No murmur heard.  Pulmonary:      Effort: Pulmonary effort is normal. No respiratory distress.      Breath sounds: Normal breath sounds.   Abdominal:      Palpations: Abdomen is soft.      Tenderness: There is no abdominal tenderness.   Musculoskeletal:         General: No swelling.      Cervical back: Neck supple.      Right lower leg: No edema.      Left lower leg: No edema.   Skin:     General: Skin is warm and dry.      Capillary Refill: Capillary refill takes less than 2 seconds.   Neurological:      Mental Status: He is alert.   Psychiatric:         Mood and Affect: Mood normal.           Discharge instructions/Information to patient and family:   See after visit summary for information provided to patient and family.      Provisions for Follow-Up Care:  See after visit summary for information related to follow-up care and any pertinent home health orders.        Disposition:   Home       Discharge Medications:  See after visit summary for reconciled discharge medications provided to patient and family.      Administrative Statements   I spent 36 minutes discharging the patient. This time was spent on the day of discharge. I had direct contact with " the patient on the day of discharge. Greater than 50% of the total time was spent examining patient, answering all patient questions, arranging and discussing plan of care with patient as well as directly providing post-discharge instructions.  Additional time then spent on discharge activities.    **Please Note: This note may have been constructed using a voice recognition system**

## 2025-02-06 NOTE — ASSESSMENT & PLAN NOTE
Did well with alcohol withdrawal protocols.    He no longer has any withdrawal symptoms    He commits to trying to stay sober    He is comfortable going home

## 2025-02-06 NOTE — NURSING NOTE
Discharge instructions given both written and verbally with details regarding f/u out patient and medications. Reviewed medications with patient for when the next dose is due. Explained that no new meds where order at this time. IV d/c with catheter intact. Dressed in street clothes. Personal items returned to patient. Waiting for [employer] schedule at 1630

## 2025-02-06 NOTE — PLAN OF CARE
Problem: SUBSTANCE USE/ABUSE  Goal: By discharge, patient will have ongoing treatment plan addressing chemical dependency  Description: INTERVENTIONS:  - Assist patient with resources and/or appointments for ongoing recovery based living  2/6/2025 1559 by Johnny London RN  Outcome: Adequate for Discharge  2/6/2025 0911 by Johnny London RN  Outcome: Progressing

## 2025-02-06 NOTE — ASSESSMENT & PLAN NOTE
Lab Results   Component Value Date    HGBA1C 7.2 (H) 01/03/2025       Recent Labs     02/05/25  1624 02/05/25  2106 02/06/25  0621 02/06/25  1100   POCGLU 120 208* 194* 228*       Blood Sugar Average: Last 72 hrs:  (P) 181.75  Resume home medications

## 2025-02-06 NOTE — ASSESSMENT & PLAN NOTE
[facility] Quality Flow/Interdisciplinary Rounds Progress Note        Quality Flow Rounds held on August 3, 2021    Disciplines Attending:  Bedside Nurse, ,  and Nursing Unit Leadership    Venkat Harding was admitted on 7/23/2021 11:33 AM    Anticipated Discharge Date:  Expected Discharge Date: 07/30/21    Disposition:    Rigo Score:  Rigo Scale Score: 12    Readmission Risk              Risk of Unplanned Readmission:  25           Discussed patient goal for the day, patient clinical progression, and barriers to discharge.   The following Goal(s) of the Day/Commitment(s) have been identified:  Discharge - Obtain Order      Doug Jaimes RN  August 3, 2021 Patient with a history of HTN  Home medication regimen: Norvasc 10 mgm HCTZ 12.5 mg   BP hypertensive upon arrival to the unit in the setting of acute alcohol withdrawal and chronic HTN  Most Recent 168/70   Will plan to resume anti-hypertensive therapy if BP remains high after resolution of acute withdrawal  Continue monitoring BP

## 2025-02-06 NOTE — ASSESSMENT & PLAN NOTE
Longstanding history of alcohol use past years with intermittent periods of sobriety, currently drinking approximately 1 pint of liquor and 4 beers daily over the past 3 weeks, last drink at an morning or afternoon of 2/3  Discussed risks and benefits of Vivitrol.  Denies any opioid use or kratom supplementation.  Have ordered naltrexone.  He should follow-up with the share office for further administration patient follow-up  Multivitamin, thiamine, folic acid  Encourage cessation  Appreciate case management recommendations in regards to disposition planning -- wants outpatient resources

## 2025-02-06 NOTE — PROGRESS NOTES
Progress Note - Medical Toxicology   Name: Oscar Garcia 69 y.o. male I MRN: [id number]  Unit/Bed#: 5T DETOX 508-01 I Date of Admission: 2/4/2025   Date of Service: 2/6/2025 I Hospital Day: 2    Assessment & Plan  Alcohol withdrawal syndrome with complication (HCC)     Recent Labs     02/03/25  2126   ETOH 227*     Vital signs have been stable  No hyperadrenergic exam findings  Last phenobarbital was administered yesterday at 0133  Protocol has been canceled  Toxicology will sign off, stable for disposition    Alcohol use disorder, moderate, dependence (HCC)  Longstanding history of alcohol use past years with intermittent periods of sobriety, currently drinking approximately 1 pint of liquor and 4 beers daily over the past 3 weeks, last drink at an morning or afternoon of 2/3  Discussed risks and benefits of Vivitrol.  Denies any opioid use or kratom supplementation.  Have ordered naltrexone.  He should follow-up with the share office for further administration patient follow-up  Multivitamin, thiamine, folic acid  Encourage cessation  Appreciate case management recommendations in regards to disposition planning -- wants outpatient resources      Reason for current consult: AUD/MADELINE     Subjective   Patient feels well.  He has chronic anxiety that he takes medications for.  Denies any symptoms of withdrawal.        Objective :  Temp:  [97.6 °F (36.4 °C)-98 °F (36.7 °C)] 97.6 °F (36.4 °C)  HR:  [68-81] 68  BP: (146-154)/(80-84) 154/80  Resp:  [16-18] 18  SpO2:  [98 %-99 %] 98 %  O2 Device: None (Room air)      Intake/Output Summary (Last 24 hours) at 2/6/2025 1052  Last data filed at 2/6/2025 0900  Gross per 24 hour   Intake 240 ml   Output --   Net 240 ml       Physical Exam  Vitals and nursing note reviewed.   Constitutional:       General: He is not in acute distress.     Appearance: He is well-developed.   HENT:      Head: Normocephalic and atraumatic.      Right Ear: External ear normal.      Left Ear: External  ear normal.      Nose: Nose normal.   Eyes:      Conjunctiva/sclera: Conjunctivae normal.      Pupils: Pupils are equal, round, and reactive to light.   Cardiovascular:      Rate and Rhythm: Normal rate and regular rhythm.      Pulses: Normal pulses.      Heart sounds: No murmur heard.  Pulmonary:      Effort: Pulmonary effort is normal. No respiratory distress.      Breath sounds: Normal breath sounds.   Abdominal:      General: Abdomen is flat.      Palpations: Abdomen is soft.      Tenderness: There is no abdominal tenderness.   Musculoskeletal:         General: No swelling. Normal range of motion.      Cervical back: Normal range of motion and neck supple.      Right lower leg: No edema.      Left lower leg: No edema.   Skin:     General: Skin is warm and dry.      Capillary Refill: Capillary refill takes less than 2 seconds.   Neurological:      General: No focal deficit present.      Mental Status: He is alert.      GCS: GCS eye subscore is 4. GCS verbal subscore is 5. GCS motor subscore is 6.      Cranial Nerves: No dysarthria or facial asymmetry.      Sensory: No sensory deficit.      Motor: No tremor or abnormal muscle tone.      Coordination: Finger-Nose-Finger Test normal.   Psychiatric:         Mood and Affect: Mood normal.           Lab Results: I have reviewed the following results:CBC/BMP:   .     02/06/25  0518   WBC 7.36   HGB 14.1   HCT 42.5      SODIUM 141   K 3.9      CO2 27   BUN 6   CREATININE 0.66   GLUC 150*    , Creatinine Clearance: Estimated Creatinine Clearance: 105.6 mL/min (by C-G formula based on SCr of 0.66 mg/dL)., LFTs: No new results in last 24 hours.     Imaging Results Review: No pertinent imaging studies reviewed.  Other Study Results Review: No additional pertinent studies reviewed.    Administrative Statements   I have spent a total time of 25 minutes in caring for this patient on the day of the visit/encounter including Diagnostic results, Prognosis, Risks and  benefits of tx options, Instructions for management, Patient and family education, Importance of tx compliance, Risk factor reductions, Impressions, Counseling / Coordination of care, Documenting in the medical record, Reviewing / ordering tests, medicine, procedures  , Obtaining or reviewing history  , and Communicating with other healthcare professionals .

## 2025-02-06 NOTE — ED ATTENDING ATTESTATION
2/3/2025  I, Juan Chris MD, saw and evaluated the patient. I have discussed the patient with the resident/non-physician practitioner and agree with the resident's/non-physician practitioner's findings, Plan of Care, and MDM as documented in the resident's/non-physician practitioner's note, except where noted. All available labs and Radiology studies were reviewed.  I was present for key portions of any procedure(s) performed by the resident/non-physician practitioner and I was immediately available to provide assistance.       At this point I agree with the current assessment done in the Emergency Department.  I have conducted an independent evaluation of this patient a history and physical is as follows:    ED Course     Impression: Encounter for detox evaluation, history of alcohol abuse    Differential diagnosis acute alcohol intoxication, doubt withdrawal, anxiety, depression, alcohol use disorder    Patient requesting detox evaluation with increased anxiety.  Plan to give benzodiazepines for anxiety possible early withdrawal symptoms.  Plan to place HOST referral for detox unit    Critical Care Time  Procedures

## 2025-02-06 NOTE — ASSESSMENT & PLAN NOTE
Recent Labs     02/03/25  2126   ETOH 227*     Vital signs have been stable  No hyperadrenergic exam findings  Last phenobarbital was administered yesterday at 0133  Protocol has been canceled  Toxicology will sign off, stable for disposition

## 2025-02-06 NOTE — ASSESSMENT & PLAN NOTE
Pt with a h/o chronic heavy alcohol use   Had recent relapse  drinking 1 pint of liquor and 4 beers daily x 3 weeks   Denies history of withdrawal seizures   Previous admission to the [facility] Medical Detox Unit November 2022   Previously started on naltrexone at this time, not interested in restarting   Withdrawal management as above  Initiate IVFs, IV thiamine, folic acid, and MVI  Consult case management/CRS for assistance with aftercare resources - pt interested in outpatient resources at this time

## 2025-02-07 ENCOUNTER — PATIENT OUTREACH (OUTPATIENT)
Dept: CASE MANAGEMENT | Facility: OTHER | Age: 70
End: 2025-02-07

## 2025-02-07 ENCOUNTER — TELEPHONE (OUTPATIENT)
Dept: PSYCHIATRY | Facility: CLINIC | Age: 70
End: 2025-02-07

## 2025-02-07 DIAGNOSIS — Z59.819 HOUSING INSECURITY: Primary | ICD-10-CM

## 2025-02-07 DIAGNOSIS — F10.20 ALCOHOL USE DISORDER, MODERATE, DEPENDENCE (HCC): ICD-10-CM

## 2025-02-07 SDOH — ECONOMIC STABILITY - HOUSING INSECURITY: HOUSING INSTABILITY UNSPECIFIED: Z59.819

## 2025-02-07 NOTE — UTILIZATION REVIEW
NOTIFICATION OF ADMISSION DISCHARGE   This is a Notification of Discharge from [facility]. Please be advised that this patient has been discharge from our facility. Below you will find the admission and discharge date and time including the patient’s disposition.   UTILIZATION REVIEW CONTACT:  Yael Irizarry  Utilization   Network Utilization Review Department  Phone: [phone] x carefully listen to the prompts. All voicemails are confidential.  Email: [email or web address]     ADMISSION INFORMATION  PRESENTATION DATE: 2/4/2025 12:56 PM  OBERVATION ADMISSION DATE: N/A  INPATIENT ADMISSION DATE: 2/4/25 12:56 PM   DISCHARGE DATE: 2/6/2025  4:58 PM   DISPOSITION:Home/Self Care    Network Utilization Review Department  ATTENTION: Please call with any questions or concerns to [phone] and carefully listen to the prompts so that you are directed to the right person. All voicemails are confidential.   For Discharge needs, contact Care Management DC Support Team at [phone] opt. 2  Send all requests for admission clinical reviews, approved or denied determinations and any other requests to dedicated fax number below belonging to the campus where the patient is receiving treatment. List of dedicated fax numbers for the Facilities:  FACILITY NAME UR FAX NUMBER   ADMISSION DENIALS (Administrative/Medical Necessity) [phone]   DISCHARGE SUPPORT TEAM ([facility]) [phone]   PARENT CHILD HEALTH (Maternity/NICU/Pediatrics) [phone]   [facility] [phone]   [facility] [phone]   [facility] [phone]   [facility] [phone]   [facility] [phone]   [facility] [phone]   [facility] [phone]   [facility] [phone]    [facility] [phone]   [facility] [phone]   [facility] [phone]   [facility] [phone]

## 2025-02-07 NOTE — PROGRESS NOTES
WILMAN FONG received referral and in-basket message from patient from Marge Ho, [place], for housing insecurity and AUD. Per chart review, patient was recently discharged from [facility] on 2/6. As of 2/5, patient declined outpatient resources, but was agreeable to attend 12 step meeting and use AA hotline when needed.    Patient has prior OP RN CM outreach, but contact was unable to be made. Patient does not have any prior OP SW CM outreach.    WILMAN FONG placed initial outreach call to patient and left a voicemail. WILMAN FONG to place second outreach call within one week if return call is not received prior.

## 2025-02-07 NOTE — TELEPHONE ENCOUNTER
Called and left a VM for Oscar to call us back and schedule an appt int he MAT office in order to continue to receive his Vivitrol injection. MAT Office number provided.

## 2025-02-10 NOTE — PROGRESS NOTES
02/10/25 1642   Other Referral/Resources/Interventions Provided:   Referrals Provided: [facility];Support Group;Other (Specify);Crisis Hotline  (IP and OP AUD treatment resources)   Discharge Communications   Discharge planning discussed with: pt   Freedom of Choice Yes   Comments - Freedom of Choice Pt chose OP at [facility] program   Were Treatment Team discharge recommendations reviewed with patient/caregiver? Yes   Did patient/caregiver verbalize understanding of patient care needs? Yes   Were patient/caregiver advised of the risks associated with not following Treatment Team discharge recommendations? Yes   Contacts   Patient Contacts  [facility] program   Relationship to Patient: Treatment Provider   Contact Method Phone   Phone Number [phone]   Reason/Outcome Continuity of Care;Discharge Planning;Referral     CM was made aware by medical provider that pt was medically cleared for discharge. Pt to discharge same day. Pt denies any withdrawal symptoms at this time. Pt to discharge to home and family will  upon discharge. Pt to follow up with PCP and [facility] office within 1 week, Pt's medications were sent to preferred pharmacy.     Discharge Address: [address] [place] [address]   [place] [address]     Phone Number: [phone] (Mobile)   [phone] (Home Phone)

## 2025-02-12 ENCOUNTER — PATIENT OUTREACH (OUTPATIENT)
Dept: CASE MANAGEMENT | Facility: OTHER | Age: 70
End: 2025-02-12

## 2025-02-12 NOTE — LETTER
02/12/25    Dear Oscar Garcia,    I am a Care Manager with [facility]. We have made several attempts to call you by phone. It is important that you contact us back at [phone] so that we can assist with your care needs.     Sincerely,         Felicia TRUJILLO  Outpatient Social Work Care Manager

## 2025-02-12 NOTE — PROGRESS NOTES
WILMAN FONG placed second outreach call to patient and left a voicemail. WILMAN FONG sent Unable to Reach letter to patient's MyChart. Referral closed.

## 2025-02-14 ENCOUNTER — HOSPITAL ENCOUNTER (OUTPATIENT)
Dept: PULMONOLOGY | Facility: HOSPITAL | Age: 70
End: 2025-02-14
Payer: COMMERCIAL

## 2025-02-14 ENCOUNTER — HOSPITAL ENCOUNTER (OUTPATIENT)
Dept: RADIOLOGY | Facility: HOSPITAL | Age: 70
Discharge: HOME | End: 2025-02-14
Payer: COMMERCIAL

## 2025-02-14 DIAGNOSIS — F17.210 NICOTINE DEPENDENCE, CIGARETTES, UNCOMPLICATED: ICD-10-CM

## 2025-02-14 DIAGNOSIS — F17.200 TOBACCO USE DISORDER: ICD-10-CM

## 2025-02-14 DIAGNOSIS — J41.0 SIMPLE CHRONIC BRONCHITIS (HCC): ICD-10-CM

## 2025-02-14 PROCEDURE — 94060 EVALUATION OF WHEEZING: CPT | Performed by: INTERNAL MEDICINE

## 2025-02-14 PROCEDURE — 94726 PLETHYSMOGRAPHY LUNG VOLUMES: CPT | Performed by: INTERNAL MEDICINE

## 2025-02-14 PROCEDURE — 94726 PLETHYSMOGRAPHY LUNG VOLUMES: CPT

## 2025-02-14 PROCEDURE — 94060 EVALUATION OF WHEEZING: CPT

## 2025-02-14 PROCEDURE — 94729 DIFFUSING CAPACITY: CPT

## 2025-02-14 PROCEDURE — 94760 N-INVAS EAR/PLS OXIMETRY 1: CPT

## 2025-02-14 PROCEDURE — 94729 DIFFUSING CAPACITY: CPT | Performed by: INTERNAL MEDICINE

## 2025-02-14 RX ORDER — ALBUTEROL SULFATE 0.83 MG/ML
2.5 SOLUTION RESPIRATORY (INHALATION) ONCE
Status: COMPLETED | OUTPATIENT
Start: 2025-02-14 | End: 2025-02-14

## 2025-02-14 RX ADMIN — ALBUTEROL SULFATE 2.5 MG: 2.5 SOLUTION RESPIRATORY (INHALATION) at 09:49

## 2025-02-19 ENCOUNTER — RESULTS FOLLOW-UP (OUTPATIENT)
Age: 70
End: 2025-02-19

## 2025-02-19 ENCOUNTER — PATIENT OUTREACH (OUTPATIENT)
Dept: CASE MANAGEMENT | Facility: OTHER | Age: 70
End: 2025-02-19

## 2025-02-19 NOTE — PROGRESS NOTES
WILMAN FONG received voicemail from patient stating that he received WILMAN FONG's calls, but did not answer due to not recognizing the phone number. Initial outreach made due to referral received for housing insecurity. WILMAN FONG placed call back to patient and left a voicemail.

## 2025-03-11 ENCOUNTER — TELEPHONE (OUTPATIENT)
Dept: VASCULAR SURGERY | Facility: CLINIC | Age: 70
End: 2025-03-11

## 2025-03-11 ENCOUNTER — CONSULT (OUTPATIENT)
Dept: VASCULAR SURGERY | Facility: CLINIC | Age: 70
End: 2025-03-11
Payer: COMMERCIAL

## 2025-03-11 VITALS
SYSTOLIC BLOOD PRESSURE: 176 MMHG | HEART RATE: 95 BPM | BODY MASS INDEX: 26.36 KG/M2 | RESPIRATION RATE: 20 BRPM | WEIGHT: 178 LBS | OXYGEN SATURATION: 99 % | DIASTOLIC BLOOD PRESSURE: 94 MMHG | HEIGHT: 69 IN

## 2025-03-11 DIAGNOSIS — F17.200 TOBACCO USE DISORDER: ICD-10-CM

## 2025-03-11 DIAGNOSIS — I70.219 ATHEROSCLEROTIC PVD WITH INTERMITTENT CLAUDICATION (HCC): Primary | ICD-10-CM

## 2025-03-11 DIAGNOSIS — I65.23 BILATERAL CAROTID ARTERY STENOSIS: ICD-10-CM

## 2025-03-11 PROCEDURE — 99204 OFFICE O/P NEW MOD 45 MIN: CPT | Performed by: NURSE PRACTITIONER

## 2025-03-11 NOTE — PROGRESS NOTES
Name: Oscar Garcia      : 1955      MRN: [id number]  Encounter Provider: MARK Osman  Encounter Date: 3/11/2025   Encounter department: [facility] [place]  :  Assessment & Plan  Bilateral carotid artery stenosis  69-year-old male smoker with DM type II, HTN, HLD, alcohol dependence, MDD, PAD with intermittent claudication and asymptomatic bilateral carotid stenosis R>L presents to reestablish care and review carotid duplex imaging.    Patient last seen by vascular in     -Patient presents accompanied by his brother EDGAR Ely  -Presents without complaints.  Denies TIA or strokelike symptoms  -Neuroexam intact  -Recent hospitalization 2/3- for alcohol withdraw  -Current smoker 1.5 pack/day    Carotid duplex 2/3/2025  RIGHT 70+ ICA stenosis 286/61 ratio 2.17  LEFT 50-69% ICA stenosis 186/40 ratio 1.10    Kidney fxn stable  sCr 0.66/ eGFR 98 (25    Plan:  -Carotid duplex in 6 months (August) with return office visit at that time  -Continue daily aspirin 81 mg  -Continue statin  -Smoking cessation  -Educated on TIA and strokelike symptoms and when to seek medical attention    Orders:    Ambulatory Referral to Vascular Surgery    VAS carotid complete study; Future    Atherosclerotic PVD with intermittent claudication (HCC)  - Patient reports remote history of right calf claudication  -Denies current symptoms, able to walk 0.25-0.75 miles without any symptoms  -No ischemic rest pain or wounds  -No updated imaging  -Duplex ordered by primary care.  Will follow-up on results at patient's return office visit    Plan:  -LEAD  -Will review at next office visit with carotid duplex  -Continue aspirin and statin  -Encourage daily dedicated walking  -Call with new or worsening symptoms, wounds or skin breakdown  -Smoking cessation    Orders:    VAS ARTERIAL DUPLEX- LOWER LIMB BILATERAL; Future    Tobacco use disorder  -current 1.5 pk per day smoker  -discussed the pathophysiology and  "relationship of smoking and vascular disease  -encourage smoking cessation    Tobacco use is a significant patient-modifiable risk factor for this patient’s vascular disease with multiple vascular comorbidities, and a significant risk factor for failure of and complications from any endovascular or surgical interventions.    I explained to the patient the effects of smoking including peripheral artery disease, coronary artery disease, cerebrovascular disease as well as cancer and chronic obstructive pulmonary disease. I asked the patient to stop smoking immediately. It is never too late to quit, and many studies show significant health benefits as well as economical savings after smoking cessation.   Based on our conversation, this patient does not appear ready to quit      The patient did not set a quit date. I will continue to follow up on this issue at our next scheduled visit.     I spent approximately 3 minutes on tobacco cessation counseling with this patient.               History of Present Illness   Cc: Patient had a CV 2/3/25. Denies TIA/ CVA symptoms.  HPI  Oscar Garcia is a 69 y.o. male who presents to reestablish care and review carotid duplex imaging.    Patient is asymptomatic.  Denies TIA or strokelike symptoms, neuroexam intact.  Updated imaging ordered by PCP secondary to lapse in carotid duplex imaging.  Last imaging was 2022.  Last seen by vascular office 2017.    Imaging reviewed with progression of disease bilaterally R>L  Discussed pathophysiology of carotid artery stenosis and continue surveillance +/- future intervention.  Will continue every 6 month duplex surveillance and monitor velocities +/- future CTA head and neck    Kidney function is stable    Patient reports no current lower extremity pain or claudication.  He reports in the past he would have right calf pain with ambulation however this no longer affects him.  He is able to walk 1/4 to 1 mile per day \"religiously\" without " symptoms.  Patient has lower extremity arterial duplex imaging ordered by PCP.  Will follow-up after imaging.    He is maintained on OMT with aspirin and statin.  Patient is current smoker 1.5 pack/day.  Discussed smoking cessation.  Patient is not agreeable nor motivated to quit at this time.      History obtained from: patient    Review of Systems   Eyes:  Negative for visual disturbance.   Neurological:  Negative for dizziness, speech difficulty, weakness, light-headedness, numbness and headaches.     Medical History Reviewed by provider this encounter:     .  Past Medical History   Past Medical History:   Diagnosis Date    Colon polyp     Diabetes mellitus (HCC)     Hyperlipidemia     Hypertension      Past Surgical History:   Procedure Laterality Date    COLON SURGERY      found growth, bengin    COLONOSCOPY      COLONOSCOPY N/A 3/13/2018    Procedure: COLONOSCOPY;  Surgeon: Nadine Richards DO;  Location: [facility];  Service: Gastroenterology    ORIF HUMERUS FRACTURE Left 10/6/2022    Procedure: OPEN REDUCTION W/ INTERNAL FIXATION (ORIF) HUMERUS (SHOULDER);  Surgeon: Avni Dubose MD;  Location: [facility] OR;  Service: Orthopedics     Family History   Problem Relation Age of Onset    Heart failure Mother     Hypertension Other       reports that he has been smoking cigarettes. He has a 50 pack-year smoking history. He has never used smokeless tobacco. He reports current alcohol use. He reports that he does not use drugs.  Current Outpatient Medications   Medication Instructions    amLODIPine (NORVASC) 10 mg, Oral, Daily    aspirin (ASPIRIN 81) 81 mg, Oral, Daily    atorvastatin (LIPITOR) 40 mg, Oral, Daily    folic acid (FOLVITE) 1 mg, Oral, Daily    hydroCHLOROthiazide 12.5 mg, Oral, Daily    LORazepam (ATIVAN) 1 mg, 3 times daily PRN    magnesium gluconate (MAGONATE) 500 mg, Oral, 2 times daily    metFORMIN (GLUCOPHAGE) 1000 MG tablet TAKE 1 TABLET DAILY    mirtazapine (REMERON) 30 mg, Oral, Daily at  bedtime    mirtazapine (REMERON) 7.5 mg, Oral, Daily at bedtime    PARoxetine (PAXIL) 20 mg, Oral, Daily    thiamine (VITAMIN B1) 100 mg, Oral, Daily     Allergies   Allergen Reactions    Lisinopril Angioedema      Current Outpatient Medications on File Prior to Visit   Medication Sig Dispense Refill    amLODIPine (NORVASC) 10 mg tablet TAKE 1 TABLET DAILY 90 tablet 3    aspirin (Aspirin 81) 81 mg EC tablet Take 1 tablet (81 mg total) by mouth daily      atorvastatin (LIPITOR) 40 mg tablet TAKE 1 TABLET DAILY 90 tablet 3    folic acid (FOLVITE) 1 mg tablet Take 1 tablet (1 mg total) by mouth daily 90 tablet 3    hydroCHLOROthiazide 12.5 mg tablet TAKE 1 TABLET DAILY 90 tablet 3    LORazepam (ATIVAN) 1 mg tablet Take 1 mg by mouth 3 (three) times a day as needed for anxiety      magnesium gluconate (MAGONATE) 500 mg tablet Take 1 tablet (500 mg total) by mouth 2 (two) times a day for 7 days 14 tablet 0    metFORMIN (GLUCOPHAGE) 1000 MG tablet TAKE 1 TABLET DAILY 90 tablet 3    mirtazapine (REMERON) 30 mg tablet Take 1 tablet (30 mg total) by mouth daily at bedtime 90 tablet 3    mirtazapine (REMERON) 7.5 MG tablet Take 1 tablet (7.5 mg total) by mouth daily at bedtime 90 tablet 3    PARoxetine (PAXIL) 20 mg tablet Take 1 tablet (20 mg total) by mouth daily 90 tablet 3    thiamine (VITAMIN B1) 100 mg tablet Take 1 tablet (100 mg total) by mouth daily 90 tablet 0     No current facility-administered medications on file prior to visit.      Social History     Tobacco Use    Smoking status: Every Day     Current packs/day: 1.00     Average packs/day: 1 pack/day for 50.0 years (50.0 ttl pk-yrs)     Types: Cigarettes    Smokeless tobacco: Never    Tobacco comments:     uses vape/current every day smoker (as per [employer])   Vaping Use    Vaping status: Never Used   Substance and Sexual Activity    Alcohol use: Yes     Comment: states last was 2 days ago    Drug use: No    Sexual activity: Not on file        Objective   BP  "(!) 176/94 (BP Location: Right arm, Patient Position: Sitting)   Pulse 95   Resp 20   Ht 5' 9\" (1.753 m)   Wt 80.7 kg (178 lb)   SpO2 99%   BMI 26.29 kg/m²      Physical Exam  Vitals (BP elevated in office , anxious) reviewed.   Constitutional:       General: He is not in acute distress.  HENT:      Head: Normocephalic and atraumatic.      Nose:      Comments: Rhinophyma  Cardiovascular:      Rate and Rhythm: Normal rate.      Pulses:           Carotid pulses are 2+ on the right side and 2+ on the left side.       Radial pulses are 2+ on the right side and 2+ on the left side.   Pulmonary:      Effort: Pulmonary effort is normal. No respiratory distress.   Musculoskeletal:         General: Normal range of motion.      Cervical back: Normal range of motion.   Skin:     General: Skin is warm and dry.   Neurological:      General: No focal deficit present.      Mental Status: He is alert and oriented to person, place, and time.      Cranial Nerves: No cranial nerve deficit.      Sensory: No sensory deficit.      Motor: No weakness.   Psychiatric:         Behavior: Behavior normal.         Administrative Statements   I have spent a total time of 35 minutes in caring for this patient on the day of the visit/encounter including Diagnostic results, Risks and benefits of tx options, Instructions for management, Patient and family education, Risk factor reductions, Impressions, Documenting in the medical record, Reviewing/placing orders in the medical record (including tests, medications, and/or procedures), and Obtaining or reviewing history  .  "

## 2025-03-11 NOTE — PATIENT INSTRUCTIONS
Lower extremity arterial duplex  Carotid duplex in 6 months  Return to office after carotid duplex to review    Continue daily aspirin  Continue daily statin    Smoking cessation    Encourage daily dedicated walking  Call 911 or go to the ED with TIA or strokelike symptoms we discussed in office today

## 2025-03-11 NOTE — ASSESSMENT & PLAN NOTE
- Patient reports remote history of right calf claudication  -Denies current symptoms, able to walk 0.25-0.75 miles without any symptoms  -No ischemic rest pain or wounds  -No updated imaging  -Duplex ordered by primary care.  Will follow-up on results at patient's return office visit    Plan:  -LEAD  -Will review at next office visit with carotid duplex  -Continue aspirin and statin  -Encourage daily dedicated walking  -Call with new or worsening symptoms, wounds or skin breakdown  -Smoking cessation    Orders:    VAS ARTERIAL DUPLEX- LOWER LIMB BILATERAL; Future

## 2025-03-11 NOTE — TELEPHONE ENCOUNTER
This is a reminder; patient is due for office visit . Please call patient and schedule the following by the dates provided.    Patient's appointment(s) are due on or after 8/4/25 .    Dopplers  [] Abdominal Aorta Iliac (AOIL)  [x] Carotid (CV) scheduled for 8/4/25  [] Celiac and/or Mesenteric  [] Endovascular Aortic Repair (EVAR)   [] Hemodialysis Access (HD)   [x] Lower Limb Arterial (CHRIS) scheduled for 3/31/25  [] Lower Limb Venous (LEV)  [] Lower Limb Venous Duplex with Reflux (LEVDR)  [] Renal Artery  [] Upper Limb Arterial (UEA)    [] Upper Limb Venous (UEV)              [] GINI and Waveform analysis     Advanced Imaging   [] CTA head/neck    [] CTA abdomen    [] CTA abdomen & pelvis    [] CT abdomen with/ without contrast  [] CT abdomen with contrast  [] CT abdomen without contrast    [] CT abdomen & pelvis with/ without contrast  [] CT abdomen & pelvis with contrast  [] CT abdomen & pelvis without contrast    Office Visit   [] New patient, patient last seen over 3 years ago  [] Risk factor modification (RFM)   [x] Follow up last seen 3/11/25  [] Lost to follow up (LTFU)

## 2025-03-11 NOTE — ASSESSMENT & PLAN NOTE
69-year-old male smoker with DM type II, HTN, HLD, alcohol dependence, MDD, PAD with intermittent claudication and asymptomatic bilateral carotid stenosis R>L presents to reestablish care and review carotid duplex imaging.    Patient last seen by vascular in 2017    -Patient presents accompanied by his brother EDGAR E[place]  -Presents without complaints.  Denies TIA or strokelike symptoms  -Neuroexam intact  -Recent hospitalization 2/3-2/6 for alcohol withdraw  -Current smoker 1.5 pack/day    Carotid duplex 2/3/2025  RIGHT 70+ ICA stenosis 286/61 ratio 2.17  LEFT 50-69% ICA stenosis 186/40 ratio 1.10    Kidney fxn stable  sCr 0.66/ eGFR 98 (2/6/25    Plan:  -Carotid duplex in 6 months (August) with return office visit at that time  -Continue daily aspirin 81 mg  -Continue statin  -Smoking cessation  -Educated on TIA and strokelike symptoms and when to seek medical attention    Orders:    Ambulatory Referral to Vascular Surgery    VAS carotid complete study; Future

## 2025-03-11 NOTE — ASSESSMENT & PLAN NOTE
-current 1.5 pk per day smoker  -discussed the pathophysiology and relationship of smoking and vascular disease  -encourage smoking cessation    Tobacco use is a significant patient-modifiable risk factor for this patient’s vascular disease with multiple vascular comorbidities, and a significant risk factor for failure of and complications from any endovascular or surgical interventions.    I explained to the patient the effects of smoking including peripheral artery disease, coronary artery disease, cerebrovascular disease as well as cancer and chronic obstructive pulmonary disease. I asked the patient to stop smoking immediately. It is never too late to quit, and many studies show significant health benefits as well as economical savings after smoking cessation.   Based on our conversation, this patient does not appear ready to quit      The patient did not set a quit date. I will continue to follow up on this issue at our next scheduled visit.     I spent approximately 3 minutes on tobacco cessation counseling with this patient.

## 2025-03-12 NOTE — ASSESSMENT & PLAN NOTE
Lab Results   Component Value Date    HGBA1C 5 7 (H) 07/21/2022       Recent Labs     10/01/22  1626 10/02/22  1336   POCGLU 111 118       Blood Sugar Average: Last 72 hrs:  (P) 118     · Blood sugar acceptable  · Hold home metformin  · Diabetic diet, sliding scale insulin and Accu-Cheks PAST SURGICAL HISTORY:  History of cataract surgery     History of incisional hernia repair     History of ovarian cystectomy     History of total abdominal hysterectomy and bilateral salpingo-oophorectomy 2007

## 2025-03-25 ENCOUNTER — TELEPHONE (OUTPATIENT)
Age: 70
End: 2025-03-25

## 2025-03-25 NOTE — TELEPHONE ENCOUNTER
Patient has an upcoming appointment on April 7th with doctor Dickey, and he wanted to know if there were active blood work orders in his chart that he should have done before his appointment. I wasn't sure if the ones that are dated from January of this year were active orders for the appointment on April 7th. Can someone please check with doctor Dickey about this and get back to the patient and let him know whether or not he has to get blood work done please?    thank you

## 2025-03-26 ENCOUNTER — APPOINTMENT (OUTPATIENT)
Dept: LAB | Facility: CLINIC | Age: 70
End: 2025-03-26
Payer: COMMERCIAL

## 2025-03-26 DIAGNOSIS — E78.2 MIXED HYPERLIPIDEMIA: ICD-10-CM

## 2025-03-26 LAB
ALBUMIN SERPL BCG-MCNC: 4.4 G/DL (ref 3.5–5)
ALP SERPL-CCNC: 78 U/L (ref 34–104)
ALT SERPL W P-5'-P-CCNC: 23 U/L (ref 7–52)
ANION GAP SERPL CALCULATED.3IONS-SCNC: 11 MMOL/L (ref 4–13)
AST SERPL W P-5'-P-CCNC: 31 U/L (ref 13–39)
BASOPHILS # BLD AUTO: 0.11 THOUSANDS/ÂΜL (ref 0–0.1)
BASOPHILS NFR BLD AUTO: 1 % (ref 0–1)
BILIRUB SERPL-MCNC: 0.4 MG/DL (ref 0.2–1)
BUN SERPL-MCNC: 15 MG/DL (ref 5–25)
CALCIUM SERPL-MCNC: 9.5 MG/DL (ref 8.4–10.2)
CHLORIDE SERPL-SCNC: 102 MMOL/L (ref 96–108)
CHOLEST SERPL-MCNC: 175 MG/DL (ref ?–200)
CO2 SERPL-SCNC: 27 MMOL/L (ref 21–32)
CREAT SERPL-MCNC: 0.77 MG/DL (ref 0.6–1.3)
EOSINOPHIL # BLD AUTO: 0.28 THOUSAND/ÂΜL (ref 0–0.61)
EOSINOPHIL NFR BLD AUTO: 3 % (ref 0–6)
ERYTHROCYTE [DISTWIDTH] IN BLOOD BY AUTOMATED COUNT: 12.4 % (ref 11.6–15.1)
EST. AVERAGE GLUCOSE BLD GHB EST-MCNC: 154 MG/DL
GFR SERPL CREATININE-BSD FRML MDRD: 92 ML/MIN/1.73SQ M
GLUCOSE P FAST SERPL-MCNC: 120 MG/DL (ref 65–99)
HBA1C MFR BLD: 7 %
HCT VFR BLD AUTO: 47.8 % (ref 36.5–49.3)
HDLC SERPL-MCNC: 49 MG/DL
HGB BLD-MCNC: 15.3 G/DL (ref 12–17)
IMM GRANULOCYTES # BLD AUTO: 0.03 THOUSAND/UL (ref 0–0.2)
IMM GRANULOCYTES NFR BLD AUTO: 0 % (ref 0–2)
LDLC SERPL CALC-MCNC: 96 MG/DL (ref 0–100)
LYMPHOCYTES # BLD AUTO: 2.39 THOUSANDS/ÂΜL (ref 0.6–4.47)
LYMPHOCYTES NFR BLD AUTO: 26 % (ref 14–44)
MCH RBC QN AUTO: 32.9 PG (ref 26.8–34.3)
MCHC RBC AUTO-ENTMCNC: 32 G/DL (ref 31.4–37.4)
MCV RBC AUTO: 103 FL (ref 82–98)
MONOCYTES # BLD AUTO: 0.96 THOUSAND/ÂΜL (ref 0.17–1.22)
MONOCYTES NFR BLD AUTO: 10 % (ref 4–12)
NEUTROPHILS # BLD AUTO: 5.46 THOUSANDS/ÂΜL (ref 1.85–7.62)
NEUTS SEG NFR BLD AUTO: 60 % (ref 43–75)
NONHDLC SERPL-MCNC: 126 MG/DL
NRBC BLD AUTO-RTO: 0 /100 WBCS
PLATELET # BLD AUTO: 336 THOUSANDS/UL (ref 149–390)
PMV BLD AUTO: 9.7 FL (ref 8.9–12.7)
POTASSIUM SERPL-SCNC: 4.6 MMOL/L (ref 3.5–5.3)
PROT SERPL-MCNC: 7 G/DL (ref 6.4–8.4)
RBC # BLD AUTO: 4.65 MILLION/UL (ref 3.88–5.62)
SODIUM SERPL-SCNC: 140 MMOL/L (ref 135–147)
TRIGL SERPL-MCNC: 150 MG/DL (ref ?–150)
WBC # BLD AUTO: 9.23 THOUSAND/UL (ref 4.31–10.16)

## 2025-03-26 PROCEDURE — 80061 LIPID PANEL: CPT

## 2025-03-31 ENCOUNTER — HOSPITAL ENCOUNTER (OUTPATIENT)
Dept: NON INVASIVE DIAGNOSTICS | Facility: CLINIC | Age: 70
Discharge: HOME | End: 2025-03-31
Payer: COMMERCIAL

## 2025-03-31 ENCOUNTER — RESULTS FOLLOW-UP (OUTPATIENT)
Dept: VASCULAR SURGERY | Facility: CLINIC | Age: 70
End: 2025-03-31

## 2025-03-31 DIAGNOSIS — I70.219 ATHEROSCLEROTIC PVD WITH INTERMITTENT CLAUDICATION (HCC): ICD-10-CM

## 2025-03-31 PROCEDURE — 93923 UPR/LXTR ART STDY 3+ LVLS: CPT

## 2025-03-31 PROCEDURE — 93925 LOWER EXTREMITY STUDY: CPT | Performed by: SURGERY

## 2025-03-31 PROCEDURE — 93922 UPR/L XTREMITY ART 2 LEVELS: CPT | Performed by: SURGERY

## 2025-03-31 PROCEDURE — 93925 LOWER EXTREMITY STUDY: CPT

## 2025-04-01 ENCOUNTER — TRANSCRIBE ORDERS (OUTPATIENT)
Dept: VASCULAR SURGERY | Facility: CLINIC | Age: 70
End: 2025-04-01

## 2025-04-01 DIAGNOSIS — I70.219 ATHEROSCLEROTIC PVD WITH INTERMITTENT CLAUDICATION (HCC): Primary | ICD-10-CM

## 2025-04-08 DIAGNOSIS — E11.51 TYPE 2 DIABETES MELLITUS WITH DIABETIC PERIPHERAL ANGIOPATHY WITHOUT GANGRENE, WITHOUT LONG-TERM CURRENT USE OF INSULIN (HCC): ICD-10-CM

## 2025-04-08 DIAGNOSIS — F17.200 CURRENT EVERY DAY SMOKER: ICD-10-CM

## 2025-04-08 DIAGNOSIS — I10 ESSENTIAL HYPERTENSION: ICD-10-CM

## 2025-04-08 DIAGNOSIS — E78.49 OTHER HYPERLIPIDEMIA: ICD-10-CM

## 2025-04-09 RX ORDER — AMLODIPINE BESYLATE 10 MG/1
10 TABLET ORAL DAILY
Qty: 90 TABLET | Refills: 3 | Status: SHIPPED | OUTPATIENT
Start: 2025-04-09

## 2025-04-09 RX ORDER — ATORVASTATIN CALCIUM 40 MG/1
40 TABLET, FILM COATED ORAL DAILY
Qty: 90 TABLET | Refills: 3 | Status: SHIPPED | OUTPATIENT
Start: 2025-04-09

## 2025-04-09 RX ORDER — HYDROCHLOROTHIAZIDE 12.5 MG/1
12.5 TABLET ORAL DAILY
Qty: 90 TABLET | Refills: 3 | Status: SHIPPED | OUTPATIENT
Start: 2025-04-09

## 2025-06-13 ENCOUNTER — HOSPITAL ENCOUNTER (EMERGENCY)
Facility: HOSPITAL | Age: 70
Discharge: OTHER INSTITUTION | DRG: 897 | End: 2025-06-13
Attending: EMERGENCY MEDICINE
Payer: COMMERCIAL

## 2025-06-13 ENCOUNTER — APPOINTMENT (EMERGENCY)
Dept: RADIOLOGY | Facility: HOSPITAL | Age: 70
DRG: 897 | End: 2025-06-13
Payer: COMMERCIAL

## 2025-06-13 ENCOUNTER — HOSPITAL ENCOUNTER (INPATIENT)
Facility: HOSPITAL | Age: 70
LOS: 2 days | Discharge: HOME | DRG: 897 | End: 2025-06-15
Attending: STUDENT IN AN ORGANIZED HEALTH CARE EDUCATION/TRAINING PROGRAM | Admitting: STUDENT IN AN ORGANIZED HEALTH CARE EDUCATION/TRAINING PROGRAM
Payer: COMMERCIAL

## 2025-06-13 VITALS
TEMPERATURE: 98.5 F | HEART RATE: 94 BPM | SYSTOLIC BLOOD PRESSURE: 150 MMHG | DIASTOLIC BLOOD PRESSURE: 96 MMHG | OXYGEN SATURATION: 92 % | RESPIRATION RATE: 18 BRPM

## 2025-06-13 DIAGNOSIS — F10.90 ALCOHOL USE DISORDER: ICD-10-CM

## 2025-06-13 DIAGNOSIS — F10.20 ALCOHOL USE DISORDER, SEVERE, DEPENDENCE (HCC): Primary | ICD-10-CM

## 2025-06-13 DIAGNOSIS — R07.9 CHEST PAIN, UNSPECIFIED: Primary | ICD-10-CM

## 2025-06-13 DIAGNOSIS — F10.939 ALCOHOL WITHDRAWAL (HCC): ICD-10-CM

## 2025-06-13 PROBLEM — F17.200 TOBACCO USE DISORDER: Chronic | Status: ACTIVE | Noted: 2020-01-28

## 2025-06-13 PROBLEM — F33.9 MDD (MAJOR DEPRESSIVE DISORDER), RECURRENT EPISODE (HCC): Chronic | Status: ACTIVE | Noted: 2022-10-08

## 2025-06-13 PROBLEM — E78.2 MIXED HYPERLIPIDEMIA: Chronic | Status: ACTIVE | Noted: 2017-03-07

## 2025-06-13 PROBLEM — E11.9 DIABETES MELLITUS TYPE 2, NONINSULIN DEPENDENT (HCC): Chronic | Status: ACTIVE | Noted: 2017-03-07

## 2025-06-13 PROBLEM — I10 PRIMARY HYPERTENSION: Chronic | Status: ACTIVE | Noted: 2017-02-07

## 2025-06-13 LAB
2HR DELTA HS TROPONIN: -1 NG/L
4HR DELTA HS TROPONIN: 2 NG/L
ANION GAP SERPL CALCULATED.3IONS-SCNC: 13 MMOL/L (ref 4–13)
BASOPHILS # BLD AUTO: 0.02 THOUSANDS/ÂΜL (ref 0–0.1)
BASOPHILS NFR BLD AUTO: 0 % (ref 0–1)
BUN SERPL-MCNC: 6 MG/DL (ref 5–25)
CALCIUM SERPL-MCNC: 8.8 MG/DL (ref 8.4–10.2)
CARDIAC TROPONIN I PNL SERPL HS: 5 NG/L (ref ?–50)
CARDIAC TROPONIN I PNL SERPL HS: 6 NG/L (ref ?–50)
CARDIAC TROPONIN I PNL SERPL HS: 8 NG/L (ref ?–50)
CHLORIDE SERPL-SCNC: 101 MMOL/L (ref 96–108)
CO2 SERPL-SCNC: 24 MMOL/L (ref 21–32)
CREAT SERPL-MCNC: 0.55 MG/DL (ref 0.6–1.3)
EOSINOPHIL # BLD AUTO: 0.08 THOUSAND/ÂΜL (ref 0–0.61)
EOSINOPHIL NFR BLD AUTO: 1 % (ref 0–6)
ERYTHROCYTE [DISTWIDTH] IN BLOOD BY AUTOMATED COUNT: 13.4 % (ref 11.6–15.1)
ETHANOL SERPL-MCNC: 168 MG/DL
GFR SERPL CREATININE-BSD FRML MDRD: 106 ML/MIN/1.73SQ M
GLUCOSE SERPL-MCNC: 130 MG/DL (ref 65–140)
GLUCOSE SERPL-MCNC: 147 MG/DL (ref 65–140)
HCT VFR BLD AUTO: 41.5 % (ref 36.5–49.3)
HGB BLD-MCNC: 14.4 G/DL (ref 12–17)
IMM GRANULOCYTES # BLD AUTO: 0.01 THOUSAND/UL (ref 0–0.2)
IMM GRANULOCYTES NFR BLD AUTO: 0 % (ref 0–2)
LYMPHOCYTES # BLD AUTO: 1.99 THOUSANDS/ÂΜL (ref 0.6–4.47)
LYMPHOCYTES NFR BLD AUTO: 25 % (ref 14–44)
MCH RBC QN AUTO: 33.5 PG (ref 26.8–34.3)
MCHC RBC AUTO-ENTMCNC: 34.7 G/DL (ref 31.4–37.4)
MCV RBC AUTO: 97 FL (ref 82–98)
MONOCYTES # BLD AUTO: 0.75 THOUSAND/ÂΜL (ref 0.17–1.22)
MONOCYTES NFR BLD AUTO: 10 % (ref 4–12)
NEUTROPHILS # BLD AUTO: 5.05 THOUSANDS/ÂΜL (ref 1.85–7.62)
NEUTS SEG NFR BLD AUTO: 64 % (ref 43–75)
NRBC BLD AUTO-RTO: 0 /100 WBCS
PLATELET # BLD AUTO: 153 THOUSANDS/UL (ref 149–390)
PMV BLD AUTO: 9.4 FL (ref 8.9–12.7)
POTASSIUM SERPL-SCNC: 3.3 MMOL/L (ref 3.5–5.3)
RBC # BLD AUTO: 4.3 MILLION/UL (ref 3.88–5.62)
SODIUM SERPL-SCNC: 138 MMOL/L (ref 135–147)
WBC # BLD AUTO: 7.9 THOUSAND/UL (ref 4.31–10.16)

## 2025-06-13 PROCEDURE — 82948 REAGENT STRIP/BLOOD GLUCOSE: CPT

## 2025-06-13 PROCEDURE — 99285 EMERGENCY DEPT VISIT HI MDM: CPT | Performed by: EMERGENCY MEDICINE

## 2025-06-13 PROCEDURE — 71046 X-RAY EXAM CHEST 2 VIEWS: CPT

## 2025-06-13 PROCEDURE — HZ2ZZZZ DETOXIFICATION SERVICES FOR SUBSTANCE ABUSE TREATMENT: ICD-10-PCS | Performed by: STUDENT IN AN ORGANIZED HEALTH CARE EDUCATION/TRAINING PROGRAM

## 2025-06-13 PROCEDURE — 84484 ASSAY OF TROPONIN QUANT: CPT

## 2025-06-13 PROCEDURE — 99285 EMERGENCY DEPT VISIT HI MDM: CPT

## 2025-06-13 PROCEDURE — 99223 1ST HOSP IP/OBS HIGH 75: CPT | Performed by: PHYSICIAN ASSISTANT

## 2025-06-13 PROCEDURE — 85025 COMPLETE CBC W/AUTO DIFF WBC: CPT

## 2025-06-13 PROCEDURE — 80048 BASIC METABOLIC PNL TOTAL CA: CPT

## 2025-06-13 PROCEDURE — 36415 COLL VENOUS BLD VENIPUNCTURE: CPT

## 2025-06-13 PROCEDURE — 93005 ELECTROCARDIOGRAM TRACING: CPT

## 2025-06-13 PROCEDURE — 82077 ASSAY SPEC XCP UR&BREATH IA: CPT

## 2025-06-13 RX ORDER — GABAPENTIN 300 MG/1
300 CAPSULE ORAL EVERY 8 HOURS PRN
Status: DISCONTINUED | OUTPATIENT
Start: 2025-06-13 | End: 2025-06-15 | Stop reason: HOSPADM

## 2025-06-13 RX ORDER — ACETAMINOPHEN 325 MG/1
650 TABLET ORAL EVERY 6 HOURS PRN
Status: DISCONTINUED | OUTPATIENT
Start: 2025-06-13 | End: 2025-06-15 | Stop reason: HOSPADM

## 2025-06-13 RX ORDER — NICOTINE 21 MG/24HR
1 PATCH, TRANSDERMAL 24 HOURS TRANSDERMAL DAILY
Status: DISCONTINUED | OUTPATIENT
Start: 2025-06-14 | End: 2025-06-13

## 2025-06-13 RX ORDER — CLONIDINE HYDROCHLORIDE 0.1 MG/1
0.1 TABLET ORAL EVERY 6 HOURS PRN
Status: DISCONTINUED | OUTPATIENT
Start: 2025-06-13 | End: 2025-06-15 | Stop reason: HOSPADM

## 2025-06-13 RX ORDER — ENOXAPARIN SODIUM 100 MG/ML
40 INJECTION SUBCUTANEOUS DAILY
Status: DISCONTINUED | OUTPATIENT
Start: 2025-06-14 | End: 2025-06-15 | Stop reason: HOSPADM

## 2025-06-13 RX ORDER — DIAZEPAM 10 MG/2ML
5 INJECTION, SOLUTION INTRAMUSCULAR; INTRAVENOUS ONCE
Status: DISCONTINUED | OUTPATIENT
Start: 2025-06-13 | End: 2025-06-13

## 2025-06-13 RX ORDER — LORAZEPAM 1 MG/1
1 TABLET ORAL 3 TIMES DAILY PRN
Status: DISCONTINUED | OUTPATIENT
Start: 2025-06-13 | End: 2025-06-15 | Stop reason: HOSPADM

## 2025-06-13 RX ORDER — ATORVASTATIN CALCIUM 40 MG/1
40 TABLET, FILM COATED ORAL DAILY
Status: DISCONTINUED | OUTPATIENT
Start: 2025-06-14 | End: 2025-06-15 | Stop reason: HOSPADM

## 2025-06-13 RX ORDER — POTASSIUM CHLORIDE 1500 MG/1
40 TABLET, EXTENDED RELEASE ORAL ONCE
Status: COMPLETED | OUTPATIENT
Start: 2025-06-13 | End: 2025-06-13

## 2025-06-13 RX ORDER — MIRTAZAPINE 30 MG/1
30 TABLET, FILM COATED ORAL
Status: DISCONTINUED | OUTPATIENT
Start: 2025-06-13 | End: 2025-06-15 | Stop reason: HOSPADM

## 2025-06-13 RX ORDER — ONDANSETRON 2 MG/ML
4 INJECTION INTRAMUSCULAR; INTRAVENOUS EVERY 6 HOURS PRN
Status: DISCONTINUED | OUTPATIENT
Start: 2025-06-13 | End: 2025-06-15 | Stop reason: HOSPADM

## 2025-06-13 RX ORDER — INSULIN LISPRO 100 [IU]/ML
1-6 INJECTION, SOLUTION INTRAVENOUS; SUBCUTANEOUS
Status: DISCONTINUED | OUTPATIENT
Start: 2025-06-14 | End: 2025-06-15 | Stop reason: HOSPADM

## 2025-06-13 RX ORDER — INSULIN LISPRO 100 [IU]/ML
1-5 INJECTION, SOLUTION INTRAVENOUS; SUBCUTANEOUS
Status: DISCONTINUED | OUTPATIENT
Start: 2025-06-13 | End: 2025-06-15 | Stop reason: HOSPADM

## 2025-06-13 RX ORDER — NICOTINE 21 MG/24HR
1 PATCH, TRANSDERMAL 24 HOURS TRANSDERMAL DAILY
Status: DISCONTINUED | OUTPATIENT
Start: 2025-06-13 | End: 2025-06-15 | Stop reason: HOSPADM

## 2025-06-13 RX ORDER — INSULIN LISPRO 100 [IU]/ML
5 INJECTION, SOLUTION INTRAVENOUS; SUBCUTANEOUS
Status: DISCONTINUED | OUTPATIENT
Start: 2025-06-14 | End: 2025-06-15 | Stop reason: HOSPADM

## 2025-06-13 RX ORDER — PANTOPRAZOLE SODIUM 40 MG/1
40 TABLET, DELAYED RELEASE ORAL
Status: DISCONTINUED | OUTPATIENT
Start: 2025-06-13 | End: 2025-06-15 | Stop reason: HOSPADM

## 2025-06-13 RX ORDER — HYDROXYZINE HYDROCHLORIDE 25 MG/1
25 TABLET, FILM COATED ORAL EVERY 6 HOURS PRN
Status: DISCONTINUED | OUTPATIENT
Start: 2025-06-13 | End: 2025-06-15 | Stop reason: HOSPADM

## 2025-06-13 RX ORDER — PHENOBARBITAL SODIUM 130 MG/ML
130 INJECTION, SOLUTION INTRAMUSCULAR; INTRAVENOUS ONCE
Status: COMPLETED | OUTPATIENT
Start: 2025-06-14 | End: 2025-06-14

## 2025-06-13 RX ORDER — ASPIRIN 81 MG/1
81 TABLET ORAL DAILY
Status: DISCONTINUED | OUTPATIENT
Start: 2025-06-14 | End: 2025-06-15 | Stop reason: HOSPADM

## 2025-06-13 RX ORDER — DIAZEPAM 10 MG/2ML
INJECTION, SOLUTION INTRAMUSCULAR; INTRAVENOUS
Status: DISPENSED
Start: 2025-06-13 | End: 2025-06-14

## 2025-06-13 RX ADMIN — Medication 650 MG: at 22:42

## 2025-06-13 RX ADMIN — POTASSIUM CHLORIDE 40 MEQ: 1500 TABLET, EXTENDED RELEASE ORAL at 22:40

## 2025-06-13 RX ADMIN — PANTOPRAZOLE SODIUM 40 MG: 40 TABLET, DELAYED RELEASE ORAL at 22:40

## 2025-06-13 RX ADMIN — MIRTAZAPINE 30 MG: 30 TABLET, FILM COATED ORAL at 23:06

## 2025-06-13 RX ADMIN — POTASSIUM CHLORIDE 40 MEQ: 1500 TABLET, EXTENDED RELEASE ORAL at 17:57

## 2025-06-13 RX ADMIN — DIAZEPAM 5 MG: 10 INJECTION, SOLUTION INTRAMUSCULAR; INTRAVENOUS at 22:48

## 2025-06-13 RX ADMIN — NICOTINE 1 PATCH: 21 PATCH, EXTENDED RELEASE TRANSDERMAL at 22:40

## 2025-06-13 NOTE — ED CARE HANDOFF
[facility] Warm Handoff Outcome Note    Patient name Oscar Garcia  Location Z5/Z5 MRN [id number]  Age: 69 y.o.          Plan Type:  Warm Handoff                                                                                    Plan Date: 6/13/2025  Service:  ED Warm Handoff      Substance Use History:  ETOH    Warm Handoff Update:  Pt declined HOST placement/Pt being admitted to [facility] for medical detox.    Warm Handoff Outcome: Residential  Inpatient

## 2025-06-13 NOTE — ED PROVIDER NOTES
Time reflects when diagnosis was documented in both MDM as applicable and the Disposition within this note       Time User Action Codes Description Comment    6/13/2025  6:47 PM Kem Mares Add [R07.9] Chest pain, unspecified     6/13/2025  7:33 PM Kem Mares Add [F10.90] Alcohol use disorder           ED Disposition       ED Disposition   Transfer to Another Facility-In Network    Condition   --    Date/Time   Fri Jun 13, 2025  7:33 PM    Comment   Oscar Garcia should be transferred out to [facility].               Assessment & Plan       Medical Decision Making  ASSESSMENT: Patient is a 69 y.o. male who presents with chest pain, alcohol intoxication.  Patient is afebrile, hemodynamically stable, mildly tachycardic.  Physical exam is grossly unremarkable, however patient is visibly intoxicated.  DDX includes but not limited to: ACS, pneumonia, mass, pneumothorax, pleural effusion, musculoskeletal pain, atypical chest pain, alcohol use disorder, hypovolemia, electrolyte abnormalities, anemia.   PLAN: CBC, CMP, chest x-ray, EKG, host referral.    EKG-see ED course for full interpretation, presence of sinus tachycardia, otherwise unremarkable  CBC-no signs of leukocytosis or anemia  BMP-mild hypokalemia, will replete with oral potassium  Troponin-6, will obtain delta troponin  Chest x-ray-2 small nodular opacities over the right base, 1 potentially nipple shadowing, the other potential inflammatory/infectious, otherwise unremarkable, patient informed of the results and told to follow-up with PCP.    2-hour troponin-5, delta -1, heart score of 5, will be provided cardiology referral.    Patient reassessed following the medical workup, patient is now clinically sober.  After conversation with the patient, patient is now interested in receiving medical detox and would like to be admitted/transferred for detox.  Patient's case was discussed with the detox at [place] and the patient was excepted  for transfer.  Patient is understanding and agreeable to transfer.  Patient is stable for transfer to the detox unit at [place].  Plan to obtain serum ethanol level pending transfer.    Amount and/or Complexity of Data Reviewed  Labs: ordered. Decision-making details documented in ED Course.  Radiology: ordered. Decision-making details documented in ED Course.  ECG/medicine tests:  Decision-making details documented in ED Course.    Risk  Prescription drug management.        ED Course as of 06/15/25 1823   Fri Jun 13, 2025   1610 ECG 12 lead  Procedure Note: EKG  Date/Time: 06/13/25 4:10 PM   Interpreted by:Kem Mares  Indications / Diagnosis: Chest pain  ECG reviewed by me, the ED Provider: yes   The EKG demonstrates:  Rate: 100-110  Rhythm: sinus tachycardia  Intervals: normal  Axis: normal  QRS/Blocks: normal  ST Changes: No acute ST Changes, no STD/REBEL, non-specific changes unchanged from prior.  Compared to prior EKG on 02/03/25, unchanged.     1611 WBC: 7.90   1611 Hemoglobin: 14.4   1628 hs TnI 0hr: 6   1637 Potassium(!): 3.3   1706 XR chest 2 views  2 small nodular opacities over the right base, one a nipple shadow, the other likely infectious/inflammatory, as it was not present in February 2025    Will recommend PCP follow up   1846 hs TnI 2hr: 5   1917 I had a repeat conversation with the patient, patient is requesting inpatient admission for detox.        Medications   potassium chloride (Klor-Con M20) CR tablet 40 mEq (40 mEq Oral Given 6/13/25 1757)       ED Risk Strat Scores   HEART Risk Score      Flowsheet Row Most Recent Value   Heart Score Risk Calculator    History 0 Filed at: 06/13/2025 1847   ECG 1 Filed at: 06/13/2025 1847   Age 2 Filed at: 06/13/2025 1847   Risk Factors 2 Filed at: 06/13/2025 1847   Troponin 0 Filed at: 06/13/2025 1847   HEART Score 5 Filed at: 06/13/2025 1847          HEART Risk Score      Flowsheet Row Most Recent Value   Heart Score Risk Calculator    History 0  Filed at: 06/13/2025 1847   ECG 1 Filed at: 06/13/2025 1847   Age 2 Filed at: 06/13/2025 1847   Risk Factors 2 Filed at: 06/13/2025 1847   Troponin 0 Filed at: 06/13/2025 1847   HEART Score 5 Filed at: 06/13/2025 1847                   CIWA-Ar Score       Row Name 06/13/25 2043 06/13/25 1800 06/13/25 1556       CIWA-Ar    Blood Pressure 150/96 -- --    Pulse 94 -- --    Nausea and Vomiting 0 0 0    Tactile Disturbances 0 0 0    Tremor 0 0 0    Auditory Disturbances 0 0 0    Paroxysmal Sweats 0 0 0    Visual Disturbances 0 0 0    Anxiety 1 1 0  intoxicated    Headache, Fullness in Head 0 0 0    Agitation 0 0 0    Orientation and Clouding of Sensorium 0 0 0    CIWA-Ar Total 1 1 0                    No data recorded          SBIRT 20yo+      Flowsheet Row Most Recent Value   Initial Alcohol Screen: US AUDIT-C     1. How often do you have a drink containing alcohol? 6 Filed at: 06/13/2025 1541   2. How many drinks containing alcohol do you have on a typical day you are drinking?  6 Filed at: 06/13/2025 1541   3b. FEMALE Any Age, or MALE 65+: How often do you have 4 or more drinks on one occassion? 6 Filed at: 06/13/2025 1541   Audit-C Score 18 Filed at: 06/13/2025 1541   Full Alcohol Screen: US AUDIT    4. How often during the last year have you found that you were not able to stop drinking once you had started? 4 Filed at: 06/13/2025 1541   5. How often during past year have you failed to do what was normally expected of you because of drinking?  3 Filed at: 06/13/2025 1541   6. How often in past year have you needed a first drink in the morning to get yourself going after a heavy drinking session?  4 Filed at: 06/13/2025 1541   7. How often in past year have you had feeling of guilt or remorse after drinking?  2 Filed at: 06/13/2025 1541   8. How often in past year have you been unable to remember what happened night before because you had been drinking?  2 Filed at: 06/13/2025 1541   9. Have you or someone else been  injured as a result of your drinking?  0 Filed at: 06/13/2025 1547   10. Has a relative, friend, doctor or other health worker been concerned about your drinking and suggested you cut down?  0 Filed at: 06/13/2025 1541   AUDIT Total Score 33 Filed at: 06/13/2025 1541   RUBI: How many times in the past year have you...    Used an illegal drug or used a prescription medication for non-medical reasons? Never Filed at: 06/13/2025 1541                            History of Present Illness       Chief Complaint   Patient presents with    Chest Pain     Drinking daily for the past 2 weeks. Had CP that came and went around 2pm.        Past Medical History[1]   Past Surgical History[2]   Family History[3]   Social History[4]   E-Cigarette/Vaping    E-Cigarette Use Never User       E-Cigarette/Vaping Substances    Nicotine No     THC No     CBD No     Flavoring No     Other No     Unknown No       I have reviewed and agree with the history as documented.     Patient is a 69-year-old male with past medical history of carotid artery stenosis, PVD, hypertension, tobacco use, diabetes, alcohol use disorder who is presenting to the emergency department via EMS for concern of substernal chest pain, sharp in nature that lasted approximately 5 to 10 minutes and spontaneously resolved.  Patient states the symptoms occurred approximately 2 hours ago in which she felt the prescribed sensation and proceeded to call 911 out of concern for the pain.  Upon EMS arrival, patient states the symptoms spontaneously resolved.  He denies diaphoresis, dyspnea, radiating arm pain, radiating neck pain, jaw pain during and prior to the episode.  He denies recent fevers, chills, injury, nausea, vomiting, diarrhea, abdominal pain, urinary symptoms, hematuria.  He denies leg swelling, calf tenderness, history of blood clots, recent immobilizations, recent surgeries, history of cancer, history of blood clots.  Patient states he has been compliant with all  his home medications.  Patient also endorses daily alcohol use, stating he drinks approximately 1 pint of whiskey and 6-10 beers daily, including 1 pint of whiskey with 4-5 beers today.  Patient states he has an appointment with psychiatrist in several days to discuss his drinking habits, however is not interested in inpatient rehab/detox at this moment in time.  He denies any other drug history, IV drug usage.          Review of Systems        Objective       ED Triage Vitals   Temperature Pulse Blood Pressure Respirations SpO2 Patient Position - Orthostatic VS   06/13/25 1540 06/13/25 1540 06/13/25 1540 06/13/25 1540 06/13/25 1540 06/13/25 2002   98.5 °F (36.9 °C) (!) 110 142/68 18 96 % Lying      Temp Source Heart Rate Source BP Location FiO2 (%) Pain Score    06/13/25 1540 06/13/25 1540 06/13/25 2002 -- 06/13/25 1540    Oral Monitor Left arm  No Pain      Vitals      Date and Time Temp Pulse SpO2 Resp BP Pain Score FACES Pain Rating User   06/13/25 2043 -- 94 -- -- 150/96 -- -- MI   06/13/25 2002 -- 93 92 % 18 151/70 -- -- MI   06/13/25 1800 -- 91 95 % 18 134/64 -- -- JT   06/13/25 1700 -- 92 92 % 18 124/62 -- -- JT   06/13/25 1540 98.5 °F (36.9 °C) 110 96 % 18 142/68 No Pain -- JT            Physical Exam  Vitals and nursing note reviewed.   Constitutional:       General: He is not in acute distress.     Appearance: He is well-developed. He is not ill-appearing or diaphoretic.      Comments: Visibly intoxicated   HENT:      Head: Normocephalic and atraumatic.     Eyes:      Extraocular Movements: Extraocular movements intact.      Conjunctiva/sclera: Conjunctivae normal.      Pupils: Pupils are equal, round, and reactive to light.     Neck:      Vascular: No JVD.     Cardiovascular:      Rate and Rhythm: Regular rhythm. Tachycardia present.      Pulses:           Radial pulses are 2+ on the right side and 2+ on the left side.        Posterior tibial pulses are 2+ on the right side and 2+ on the left side.       Heart sounds: Normal heart sounds. No murmur heard.  Pulmonary:      Effort: Pulmonary effort is normal. No respiratory distress.      Breath sounds: Normal breath sounds. No decreased breath sounds, wheezing, rhonchi or rales.   Chest:      Chest wall: No tenderness.   Abdominal:      General: Bowel sounds are normal.      Palpations: Abdomen is soft. There is no mass.      Tenderness: There is no abdominal tenderness. There is no guarding or rebound.     Musculoskeletal:         General: No swelling.      Cervical back: Normal range of motion and neck supple.      Right lower leg: No tenderness. No edema.      Left lower leg: No tenderness. No edema.     Skin:     General: Skin is warm and dry.      Capillary Refill: Capillary refill takes less than 2 seconds.     Neurological:      General: No focal deficit present.      Mental Status: He is alert and oriented to person, place, and time.      Cranial Nerves: No cranial nerve deficit.      Motor: No weakness.     Psychiatric:         Mood and Affect: Mood normal.         Results Reviewed       Procedure Component Value Units Date/Time    HS Troponin I 4hr [[id number]]  (Normal) Collected: 06/13/25 2004    Lab Status: Final result Specimen: Blood from Arm, Left Updated: 06/13/25 2032     hs TnI 4hr 8 ng/L      Delta 4hr hsTnI 2 ng/L     Ethanol [[id number]]  (Abnormal) Collected: 06/13/25 2004    Lab Status: Final result Specimen: Blood from Arm, Left Updated: 06/13/25 2032     Ethanol Lvl 168 mg/dL     HS Troponin I 2hr [[id number]]  (Normal) Collected: 06/13/25 1756    Lab Status: Final result Specimen: Blood from Arm, Left Updated: 06/13/25 1839     hs TnI 2hr 5 ng/L      Delta 2hr hsTnI -1 ng/L     Basic metabolic panel [[id number]]  (Abnormal) Collected: 06/13/25 1555    Lab Status: Final result Specimen: Blood from Arm, Left Updated: 06/13/25 1634     Sodium 138 mmol/L      Potassium 3.3 mmol/L      Chloride 101 mmol/L      CO2 24 mmol/L      ANION GAP 13 mmol/L       BUN 6 mg/dL      Creatinine 0.55 mg/dL      Glucose 130 mg/dL      Calcium 8.8 mg/dL      eGFR 106 ml/min/1.73sq m     Narrative:      National Kid[employer] Foundation guidelines for Chronic Kidney Disease (CKD):     Stage 1 with normal or high GFR (GFR > 90 mL/min/1.73 square meters)    Stage 2 Mild CKD (GFR = 60-89 mL/min/1.73 square meters)    Stage 3A Moderate CKD (GFR = 45-59 mL/min/1.73 square meters)    Stage 3B Moderate CKD (GFR = 30-44 mL/min/1.73 square meters)    Stage 4 Severe CKD (GFR = 15-29 mL/min/1.73 square meters)    Stage 5 End Stage CKD (GFR <15 mL/min/1.73 square meters)  Note: GFR calculation is accurate only with a steady state creatinine    HS Troponin 0hr (reflex protocol) [[id number]]  (Normal) Collected: 06/13/25 1555    Lab Status: Final result Specimen: Blood from Arm, Left Updated: 06/13/25 1627     hs TnI 0hr 6 ng/L     CBC and differential [[id number]] Collected: 06/13/25 1555    Lab Status: Final result Specimen: Blood from Arm, Left Updated: 06/13/25 1611     WBC 7.90 Thousand/uL      RBC 4.30 Million/uL      Hemoglobin 14.4 g/dL      Hematocrit 41.5 %      MCV 97 fL      MCH 33.5 pg      MCHC 34.7 g/dL      RDW 13.4 %      MPV 9.4 fL      Platelets 153 Thousands/uL      nRBC 0 /100 WBCs      Segmented % 64 %      Immature Grans % 0 %      Lymphocytes % 25 %      Monocytes % 10 %      Eosinophils Relative 1 %      Basophils Relative 0 %      Absolute Neutrophils 5.05 Thousands/µL      Absolute Immature Grans 0.01 Thousand/uL      Absolute Lymphocytes 1.99 Thousands/µL      Absolute Monocytes 0.75 Thousand/µL      Eosinophils Absolute 0.08 Thousand/µL      Basophils Absolute 0.02 Thousands/µL             XR chest 2 views   Final Interpretation by Aurora Francis MD (06/13 1655)      No acute cardiopulmonary disease.      2 small nodular opacities over the right base, one a nipple shadow, the other likely infectious/inflammatory, as it was not present in February 2025.             Workstation performed: [id number]             Procedures    ED Medication and Procedure Management   Prior to Admission Medications   Prescriptions Last Dose Informant Patient Reported? Taking?   LORazepam (ATIVAN) 1 mg tablet   Yes No   Sig: Take 1 mg by mouth as needed in the morning and 1 mg as needed at noon and 1 mg as needed in the evening for anxiety.   PARoxetine (PAXIL) 20 mg tablet   No No   Sig: Take 1 tablet (20 mg total) by mouth daily   amLODIPine (NORVASC) 10 mg tablet   No No   Sig: TAKE 1 TABLET DAILY   aspirin (Aspirin 81) 81 mg EC tablet   No No   Sig: Take 1 tablet (81 mg total) by mouth daily   atorvastatin (LIPITOR) 40 mg tablet   No No   Sig: TAKE 1 TABLET DAILY   hydroCHLOROthiazide 12.5 mg tablet   No No   Sig: TAKE 1 TABLET DAILY   magnesium gluconate (MAGONATE) 500 mg tablet   No No   Sig: Take 1 tablet (500 mg total) by mouth 2 (two) times a day for 7 days   metFORMIN (GLUCOPHAGE) 1000 MG tablet   No No   Sig: TAKE 1 TABLET DAILY   mirtazapine (REMERON) 30 mg tablet   No No   Sig: Take 1 tablet (30 mg total) by mouth daily at bedtime   mirtazapine (REMERON) 7.5 MG tablet   No No   Sig: Take 1 tablet (7.5 mg total) by mouth daily at bedtime      Facility-Administered Medications: None     Discharge Medication List as of 6/13/2025  9:12 PM        CONTINUE these medications which have NOT CHANGED    Details   amLODIPine (NORVASC) 10 mg tablet TAKE 1 TABLET DAILY, Starting Wed 4/9/2025, Normal      aspirin (Aspirin 81) 81 mg EC tablet Take 1 tablet (81 mg total) by mouth daily, Starting Tue 1/7/2025, No Print      atorvastatin (LIPITOR) 40 mg tablet TAKE 1 TABLET DAILY, Starting Wed 4/9/2025, Normal      hydroCHLOROthiazide 12.5 mg tablet TAKE 1 TABLET DAILY, Starting Wed 4/9/2025, Normal      LORazepam (ATIVAN) 1 mg tablet Take 1 mg by mouth 3 (three) times a day as needed for anxiety, Starting Tue 10/29/2024, Historical Med      magnesium gluconate (MAGONATE) 500 mg tablet Take 1 tablet  (500 mg total) by mouth 2 (two) times a day for 7 days, Starting Fri 2/24/2023, Until Fri 3/3/2023, Normal      metFORMIN (GLUCOPHAGE) 1000 MG tablet TAKE 1 TABLET DAILY, Starting Wed 4/9/2025, Normal      !! mirtazapine (REMERON) 30 mg tablet Take 1 tablet (30 mg total) by mouth daily at bedtime, Starting Wed 4/26/2023, Normal      !! mirtazapine (REMERON) 7.5 MG tablet Take 1 tablet (7.5 mg total) by mouth daily at bedtime, Starting Wed 4/26/2023, Normal      PARoxetine (PAXIL) 20 mg tablet Take 1 tablet (20 mg total) by mouth daily, Starting Wed 4/26/2023, Until Fri 5/26/2023, Normal      folic acid (FOLVITE) 1 mg tablet Take 1 tablet (1 mg total) by mouth daily, Starting Wed 4/26/2023, Normal      thiamine (VITAMIN B1) 100 mg tablet Take 1 tablet (100 mg total) by mouth daily, Starting Fri 2/24/2023, Normal       !! - Potential duplicate medications found. Please discuss with provider.          ED SEPSIS DOCUMENTATION   Time reflects when diagnosis was documented in both MDM as applicable and the Disposition within this note       Time User Action Codes Description Comment    6/13/2025  6:47 PM Kem Mares [R07.9] Chest pain, unspecified     6/13/2025  7:33 PM Kem Mares [F10.90] Alcohol use disorder                    [1]   Past Medical History:  Diagnosis Date    Colon polyp     Diabetes mellitus (HCC)     Hyperlipidemia     Hypertension    [2]   Past Surgical History:  Procedure Laterality Date    COLON SURGERY      found growth, bengin    COLONOSCOPY      COLONOSCOPY N/A 3/13/2018    Procedure: COLONOSCOPY;  Surgeon: Nadine Richards DO;  Location: [facility];  Service: Gastroenterology    ORIF HUMERUS FRACTURE Left 10/6/2022    Procedure: OPEN REDUCTION W/ INTERNAL FIXATION (ORIF) HUMERUS (SHOULDER);  Surgeon: Avni Dubose MD;  Location:  MAIN OR;  Service: Orthopedics   [3]   Family History  Problem Relation Name Age of Onset    Heart failure Mother      Lung disease Father       Hypertension Other Sibling    [4]   Social History  Tobacco Use    Smoking status: Every Day     Current packs/day: 1.00     Average packs/day: 1 pack/day for 50.0 years (50.0 ttl pk-yrs)     Types: Cigarettes    Smokeless tobacco: Never    Tobacco comments:     uses vape/current every day smoker (as per [employer])   Vaping Use    Vaping status: Never Used   Substance Use Topics    Alcohol use: Yes     Alcohol/week: 28.0 - 63.0 standard drinks of alcohol     Types: 28 - 63 Standard drinks or equivalent per week     Comment: states last was 2 days ago    Drug use: No        Kem Mares DO  06/15/25 [id number]

## 2025-06-13 NOTE — ED ATTENDING ATTESTATION
"6/13/2025  IVelasquez DO, saw and evaluated the patient. I have discussed the patient with the resident/non-physician practitioner and agree with the resident's/non-physician practitioner's findings, Plan of Care, and MDM as documented in the resident's/non-physician practitioner's note, except where noted. All available labs and Radiology studies were reviewed.  I was present for key portions of any procedure(s) performed by the resident/non-physician practitioner and I was immediately available to provide assistance.       At this point I agree with the current assessment done in the Emergency Department.  I have conducted an independent evaluation of this patient a history and physical is as follows:    69-year-old male presents via EMS for evaluation of substernal chest pain that was sharp in nature and lasted 5 to 10 minutes before resolving spontaneously.  Symptoms occurred approximately 2 hours prior to evaluation.  He called 911 out of concern for the pain and the symptoms had resolved prior to EMS arrival.  He has multiple risk factors, including carotid artery stenosis, PVD, hypertension, long-term tobacco use disorder and diabetes, but has never had a cardiac stress test or catheterization.  He did have cardiac calcium screening and cardiac CT about 3 months ago.  He denies other, associated cardiac symptoms with the chest pain.    Of note, he recently \"fell off the wagon\" and resumed drinking daily.  He is drinking a pint of whiskey and 6-10 beers daily, including today.  He does appear intoxicated in the ED.  He has an appointment in 4 days with psychiatrist to discuss his drinking as he does not want to go to inpatient rehab/detox.  He reports being embarrassed by his behavior.    No recent travel or sick contacts.    ROS: Denies f/c, HA, LH/dizziness, diaphoresis, SOB, abdominal pain, n/v/d. 12 system ROS o/w negative.     PE: NAD, appears comfortable, alert, labile emotions, intoxicated; PERRL, " EOMI; MMM, no posterior oropharyngeal exudate, edema or erythema; HRR, no murmur, monitor shows sinus tachycardia at 110 bpm; lungs CTA w/o w/r/r, POx 96% on RA (nl); abdomen s/nt/nd, nl BS in all 4 quadrants; (-) LE edema or calf TTP, FROM extremities x4; skin p/w/d; CNs GI/NF, oriented.    MDM/DDx: Chest pain - ACS/MI, GERD, costochondritis, less likely but at risk for pneumonia, pneumothorax or PE.                     Alcohol use disorder with current intoxication.  Has not had symptoms of alcohol withdrawal, including DTs or seizure.     I independently reviewed and interpreted ordered labs, EKG and CXR from this encounter.    A/P: Will check cardiac w/u, treat symptoms, reevaluate for further workup and disposition.  Will make a HOST referral for additional options.     ED Course         Critical Care Time  ECG 12 Lead Documentation Only    Date/Time: 6/13/2025 4:03 PM    Performed by: Velasquez Truong DO  Authorized by: Velasquez Truong DO    Indications / Diagnosis:  Chest pain  ECG reviewed by me, the ED Provider: yes    Patient location:  ED  Interpretation:     Interpretation: abnormal    Rate:     ECG rate:  102    ECG rate assessment: tachycardic    Rhythm:     Rhythm: sinus tachycardia    Ectopy:     Ectopy: none    Conduction:     Conduction: normal    ST segments:     ST segments:  Non-specific  T waves:     T waves: inverted      Inverted:  II, III and aVF

## 2025-06-13 NOTE — ED NOTES
*Accepting - Dr. Law  P/u 2045 with [facility] to [facility] Room 504.   Call [phone] for report.      Renea Felipe RN  06/13/25 1957

## 2025-06-13 NOTE — ED NOTES
ECG performed, though not crossing into Epic or printing. ED engineering, ED charge, and ED attending made aware     Castro Braxton RN  06/13/25 3403

## 2025-06-13 NOTE — ED CARE HANDOFF
Emergency Department Sign Out Note        Sign out and transfer of care from Dr. Truong. See Separate Emergency Department note.     The patient, Oscar Garcia, was evaluated by the previous provider for chest pain, ETOH intoxication.    Workup Completed:  Initial cardiac panel    ED Course / Workup Pending (followup):  Delta troponin       HEART Risk Score      Flowsheet Row Most Recent Value   Heart Score Risk Calculator    History 0 Filed at: 06/13/2025 1847   ECG 1 Filed at: 06/13/2025 1847   Age 2 Filed at: 06/13/2025 1847   Risk Factors 2 Filed at: 06/13/2025 1847   Troponin 0 Filed at: 06/13/2025 1847   HEART Score 5 Filed at: 06/13/2025 1847          (ISAR) Identification of Seniors at Risk  Before the illness or injury that brought you to the Emergency, did you need someone to help you on a regular basis?: 0  In the last 24 hours, have you needed more help than usual?: 0  Have you been hospitalized for one or more nights during the past 6 months?: 1  In general, do you see well?: 0  In general, do you have serious problems with your memory?: 0  Do you take more than three different medications every day?: 1  ISAR Score: 2                              ED Course as of 06/13/25 2344   Fri Jun 13, 2025   1717 68 yo M with history of chronic alcohol use, recently relapsed ETOH, here with intoxication and CP. CP resolved now. +Risk factors for CAD. Pending delta trop. If negative delta trop can follow up with cards, HOST referral. Patient does not want inpatient detox.       On re-evaluation patient would like to undergo detox treatment. No withdrawal symptoms at this time. Resident discussed with detox unit who accepts patient for further management.     Procedures  Medical Decision Making  Amount and/or Complexity of Data Reviewed  Labs: ordered.  Radiology: ordered.    Risk  Prescription drug management.            Disposition  Final diagnoses:   Chest pain, unspecified   Alcohol use disorder     Time reflects  when diagnosis was documented in both MDM as applicable and the Disposition within this note       Time User Action Codes Description Comment    6/13/2025  6:47 PM Kem Mares Add [R07.9] Chest pain, unspecified     6/13/2025  7:33 PM Kem Mares Add [F10.90] Alcohol use disorder           ED Disposition       ED Disposition   Transfer to Another Facility-In Network    Condition   --    Date/Time   Fri Jun 13, 2025 1933    Comment   Oscar Garcia should be transferred out to [facility].               MD Documentation      Flowsheet Row Most Recent Value   Patient Condition The patient has been stabilized such that within reasonable medical probability, no material deterioration of the patient condition or the condition of the unborn child(adelaide) is likely to result from the transfer   Reason for Transfer Level of Care needed not available at this facility  [Detox]   Risks of Transfer Potential for delay in receiving treatment, Potential deterioration of medical condition, Loss of IV, Possible worsening of condition or death during transfer, Increased discomfort during transfer   Accepting Physician Dr. Law   Accepting Facility Name, [facility]   Sending MD Dr. Kem Mares   Provider Certification General risk, such as traffic hazards, adverse weather conditions, rough terrain or turbulence, possible failure of equipment (including vehicle or aircraft), or consequences of actions of persons outside the control of the transport personnel, Unanticipated needs of medical equipment and personnel during transport, Risk of worsening condition          RN Documentation      Flowsheet Row Most Recent Value   Accepting Facility Name, [facility]          Follow-up Information    None       Discharge Medication List as of 6/13/2025  9:12 PM        CONTINUE these medications which have NOT CHANGED    Details   amLODIPine (NORVASC) 10 mg tablet TAKE 1 TABLET DAILY, Starting Wed  4/9/2025, Normal      aspirin (Aspirin 81) 81 mg EC tablet Take 1 tablet (81 mg total) by mouth daily, Starting Tue 1/7/2025, No Print      atorvastatin (LIPITOR) 40 mg tablet TAKE 1 TABLET DAILY, Starting Wed 4/9/2025, Normal      hydroCHLOROthiazide 12.5 mg tablet TAKE 1 TABLET DAILY, Starting Wed 4/9/2025, Normal      LORazepam (ATIVAN) 1 mg tablet Take 1 mg by mouth 3 (three) times a day as needed for anxiety, Starting Tue 10/29/2024, Historical Med      magnesium gluconate (MAGONATE) 500 mg tablet Take 1 tablet (500 mg total) by mouth 2 (two) times a day for 7 days, Starting Fri 2/24/2023, Until Fri 3/3/2023, Normal      metFORMIN (GLUCOPHAGE) 1000 MG tablet TAKE 1 TABLET DAILY, Starting Wed 4/9/2025, Normal      !! mirtazapine (REMERON) 30 mg tablet Take 1 tablet (30 mg total) by mouth daily at bedtime, Starting Wed 4/26/2023, Normal      !! mirtazapine (REMERON) 7.5 MG tablet Take 1 tablet (7.5 mg total) by mouth daily at bedtime, Starting Wed 4/26/2023, Normal      PARoxetine (PAXIL) 20 mg tablet Take 1 tablet (20 mg total) by mouth daily, Starting Wed 4/26/2023, Until Fri 5/26/2023, Normal      folic acid (FOLVITE) 1 mg tablet Take 1 tablet (1 mg total) by mouth daily, Starting Wed 4/26/2023, Normal      thiamine (VITAMIN B1) 100 mg tablet Take 1 tablet (100 mg total) by mouth daily, Starting Fri 2/24/2023, Normal       !! - Potential duplicate medications found. Please discuss with provider.               ED Provider  Electronically Signed by     Suzy Castellano MD  06/13/25 7532

## 2025-06-13 NOTE — EMTALA/ACUTE CARE TRANSFER
Pending sale to [facility] EMERGENCY DEPARTMENT  [address] [address]  Dept: [phone]      EMTALA TRANSFER CONSENT    NAME Oscar Garcia                                         1955                              MRN [id number]    I have been informed of my rights regarding examination, treatment, and transfer   by Dr. Suzy Castellano MD    Benefits:      Risks: Potential for delay in receiving treatment, Potential deterioration of medical condition, Loss of IV, Possible worsening of condition or death during transfer, Increased discomfort during transfer      Transfer Request   I acknowledge that my medical condition has been evaluated and explained to me by the emergency department physician or other qualified medical person and/or my attending physician who has recommended and offered to me further medical examination and treatment. I understand the Hospital's obligation with respect to the treatment and stabilization of my emergency medical condition. I nevertheless request to be transferred. I release the Hospital, the doctor, and any other persons caring for me from all responsibility or liability for any injury or ill effects that may result from my transfer and agree to accept all responsibility for the consequences of my choice to transfer, rather than receive stabilizing treatment at the Hospital. I understand that because the transfer is my request, my insurance may not provide reimbursement for the services.  The Hospital will assist and direct me and my family in how to make arrangements for transfer, but the hospital is not liable for any fees charged by the transport service.  In spite of this understanding, I refuse to consent to further medical examination and treatment which has been offered to me, and request transfer to Accepting Facility Name, City & State : [place]. I authorize the performance of emergency medical procedures and treatments upon me in both  transit and upon arrival at the receiving facility.  Additionally, I authorize the release of any and all medical records to the receiving facility and request they be transported with me, if possible.    I authorize the performance of emergency medical procedures and treatments upon me in both transit and upon arrival at the receiving facility.  Additionally, I authorize the release of any and all medical records to the receiving facility and request they be transported with me, if possible.  I understand that the safest mode of transportation during a medical emergency is an ambulance and that the Hospital advocates the use of this mode of transport. Risks of traveling to the receiving facility by car, including absence of medical control, life sustaining equipment, such as oxygen, and medical personnel has been explained to me and I fully understand them.    (DEBRA CORRECT BOX BELOW)  [  ]  I consent to the stated transfer and to be transported by ambulance/helicopter.  [  ]  I consent to the stated transfer, but refuse transportation by ambulance and accept full responsibility for my transportation by car.  I understand the risks of non-ambulance transfers and I exonerate the Hospital and its staff from any deterioration in my condition that results from this refusal.    X___________________________________________    DATE  25  TIME________  Signature of patient or legally responsible individual signing on patient behalf           RELATIONSHIP TO PATIENT_________________________          Provider Certification    NAME Oscar Garcia                                         1955                              MRN [id number]    A medical screening exam was performed on the above named patient.  Based on the examination:    Condition Necessitating Transfer The primary encounter diagnosis was Chest pain, unspecified. A diagnosis of Alcohol use disorder was also pertinent to this visit.    Patient Condition:  The patient has been stabilized such that within reasonable medical probability, no material deterioration of the patient condition or the condition of the unborn child(adelaide) is likely to result from the transfer    Reason for Transfer: Level of Care needed not available at this facility (Detox)    Transfer Requirements: Facility [place]   Space available and qualified personnel available for treatment as acknowledged by    Agreed to accept transfer and to provide appropriate medical treatment as acknowledged by       Dr. Law  Appropriate medical records of the examination and treatment of the patient are provided at the time of transfer   STAFF INITIAL WHEN COMPLETED _______  Transfer will be performed by qualified personnel from    and appropriate transfer equipment as required, including the use of necessary and appropriate life support measures.    Provider Certification: I have examined the patient and explained the following risks and benefits of being transferred/refusing transfer to the patient/family:  General risk, such as traffic hazards, adverse weather conditions, rough terrain or turbulence, possible failure of equipment (including vehicle or aircraft), or consequences of actions of persons outside the control of the transport personnel, Unanticipated needs of medical equipment and personnel during transport, Risk of worsening condition      Based on these reasonable risks and benefits to the patient and/or the unborn child(adelaide), and based upon the information available at the time of the patient’s examination, I certify that the medical benefits reasonably to be expected from the provision of appropriate medical treatments at another medical facility outweigh the increasing risks, if any, to the individual’s medical condition, and in the case of labor to the unborn child, from effecting the transfer.    X____________________________________________ DATE 06/13/25         TIME_______      ORIGINAL - SEND TO MEDICAL RECORDS   COPY - SEND WITH PATIENT DURING TRANSFER

## 2025-06-14 LAB
ALBUMIN SERPL BCG-MCNC: 3.7 G/DL (ref 3.5–5)
ALP SERPL-CCNC: 75 U/L (ref 34–104)
ALT SERPL W P-5'-P-CCNC: 34 U/L (ref 7–52)
ANION GAP SERPL CALCULATED.3IONS-SCNC: 9 MMOL/L (ref 4–13)
AST SERPL W P-5'-P-CCNC: 47 U/L (ref 13–39)
ATRIAL RATE: 100 BPM
BILIRUB SERPL-MCNC: 0.84 MG/DL (ref 0.2–1)
BUN SERPL-MCNC: 8 MG/DL (ref 5–25)
CALCIUM SERPL-MCNC: 8.7 MG/DL (ref 8.4–10.2)
CHLORIDE SERPL-SCNC: 104 MMOL/L (ref 96–108)
CO2 SERPL-SCNC: 23 MMOL/L (ref 21–32)
CREAT SERPL-MCNC: 0.59 MG/DL (ref 0.6–1.3)
ERYTHROCYTE [DISTWIDTH] IN BLOOD BY AUTOMATED COUNT: 13.7 % (ref 11.6–15.1)
GFR SERPL CREATININE-BSD FRML MDRD: 103 ML/MIN/1.73SQ M
GLUCOSE SERPL-MCNC: 103 MG/DL (ref 65–140)
GLUCOSE SERPL-MCNC: 136 MG/DL (ref 65–140)
GLUCOSE SERPL-MCNC: 160 MG/DL (ref 65–140)
GLUCOSE SERPL-MCNC: 213 MG/DL (ref 65–140)
GLUCOSE SERPL-MCNC: 93 MG/DL (ref 65–140)
HCT VFR BLD AUTO: 46.5 % (ref 36.5–49.3)
HGB BLD-MCNC: 15.1 G/DL (ref 12–17)
MAGNESIUM SERPL-MCNC: 1.8 MG/DL (ref 1.9–2.7)
MCH RBC QN AUTO: 32.5 PG (ref 26.8–34.3)
MCHC RBC AUTO-ENTMCNC: 32.5 G/DL (ref 31.4–37.4)
MCV RBC AUTO: 100 FL (ref 82–98)
P AXIS: 58 DEGREES
PHOSPHATE SERPL-MCNC: 2.8 MG/DL (ref 2.3–4.1)
PLATELET # BLD AUTO: 144 THOUSANDS/UL (ref 149–390)
PMV BLD AUTO: 9.7 FL (ref 8.9–12.7)
POTASSIUM SERPL-SCNC: 4.3 MMOL/L (ref 3.5–5.3)
PR INTERVAL: 156 MS
PROT SERPL-MCNC: 6.2 G/DL (ref 6.4–8.4)
QRS AXIS: 83 DEGREES
QRSD INTERVAL: 106 MS
QT INTERVAL: 352 MS
QTC INTERVAL: 454 MS
RBC # BLD AUTO: 4.64 MILLION/UL (ref 3.88–5.62)
SODIUM SERPL-SCNC: 136 MMOL/L (ref 135–147)
T WAVE AXIS: -1 DEGREES
VENTRICULAR RATE: 100 BPM
WBC # BLD AUTO: 11.4 THOUSAND/UL (ref 4.31–10.16)

## 2025-06-14 PROCEDURE — 85027 COMPLETE CBC AUTOMATED: CPT | Performed by: PHYSICIAN ASSISTANT

## 2025-06-14 PROCEDURE — 84100 ASSAY OF PHOSPHORUS: CPT | Performed by: PHYSICIAN ASSISTANT

## 2025-06-14 PROCEDURE — 99223 1ST HOSP IP/OBS HIGH 75: CPT | Performed by: EMERGENCY MEDICINE

## 2025-06-14 PROCEDURE — 93010 ELECTROCARDIOGRAM REPORT: CPT | Performed by: INTERNAL MEDICINE

## 2025-06-14 PROCEDURE — 99232 SBSQ HOSP IP/OBS MODERATE 35: CPT | Performed by: STUDENT IN AN ORGANIZED HEALTH CARE EDUCATION/TRAINING PROGRAM

## 2025-06-14 PROCEDURE — 82948 REAGENT STRIP/BLOOD GLUCOSE: CPT

## 2025-06-14 PROCEDURE — 80053 COMPREHEN METABOLIC PANEL: CPT | Performed by: PHYSICIAN ASSISTANT

## 2025-06-14 PROCEDURE — 83735 ASSAY OF MAGNESIUM: CPT | Performed by: PHYSICIAN ASSISTANT

## 2025-06-14 RX ORDER — MAGNESIUM SULFATE HEPTAHYDRATE 40 MG/ML
2 INJECTION, SOLUTION INTRAVENOUS ONCE
Status: COMPLETED | OUTPATIENT
Start: 2025-06-14 | End: 2025-06-14

## 2025-06-14 RX ORDER — NALTREXONE HYDROCHLORIDE 50 MG/1
50 TABLET, FILM COATED ORAL DAILY
Status: DISCONTINUED | OUTPATIENT
Start: 2025-06-14 | End: 2025-06-15 | Stop reason: HOSPADM

## 2025-06-14 RX ORDER — PHENOBARBITAL SODIUM 130 MG/ML
130 INJECTION, SOLUTION INTRAMUSCULAR; INTRAVENOUS ONCE
Status: COMPLETED | OUTPATIENT
Start: 2025-06-14 | End: 2025-06-14

## 2025-06-14 RX ORDER — AMLODIPINE BESYLATE 10 MG/1
10 TABLET ORAL DAILY
Status: DISCONTINUED | OUTPATIENT
Start: 2025-06-14 | End: 2025-06-15 | Stop reason: HOSPADM

## 2025-06-14 RX ADMIN — PANTOPRAZOLE SODIUM 40 MG: 40 TABLET, DELAYED RELEASE ORAL at 07:17

## 2025-06-14 RX ADMIN — GABAPENTIN 300 MG: 300 CAPSULE ORAL at 11:45

## 2025-06-14 RX ADMIN — MIRTAZAPINE 30 MG: 30 TABLET, FILM COATED ORAL at 21:07

## 2025-06-14 RX ADMIN — MAGNESIUM SULFATE HEPTAHYDRATE 2 G: 40 INJECTION, SOLUTION INTRAVENOUS at 08:36

## 2025-06-14 RX ADMIN — INSULIN LISPRO 2 UNITS: 100 INJECTION, SOLUTION INTRAVENOUS; SUBCUTANEOUS at 11:41

## 2025-06-14 RX ADMIN — CLONIDINE HYDROCHLORIDE 0.1 MG: 0.1 TABLET ORAL at 16:23

## 2025-06-14 RX ADMIN — INSULIN LISPRO 5 UNITS: 100 INJECTION, SOLUTION INTRAVENOUS; SUBCUTANEOUS at 16:19

## 2025-06-14 RX ADMIN — MULTIPLE VITAMINS W/ MINERALS TAB 1 TABLET: TAB ORAL at 08:35

## 2025-06-14 RX ADMIN — PHENOBARBITAL SODIUM 130 MG: 130 INJECTION INTRAMUSCULAR; INTRAVENOUS at 01:30

## 2025-06-14 RX ADMIN — NICOTINE 1 PATCH: 21 PATCH, EXTENDED RELEASE TRANSDERMAL at 08:36

## 2025-06-14 RX ADMIN — ENOXAPARIN SODIUM 40 MG: 40 INJECTION SUBCUTANEOUS at 08:36

## 2025-06-14 RX ADMIN — PANTOPRAZOLE SODIUM 40 MG: 40 TABLET, DELAYED RELEASE ORAL at 16:19

## 2025-06-14 RX ADMIN — GABAPENTIN 300 MG: 300 CAPSULE ORAL at 21:11

## 2025-06-14 RX ADMIN — AMLODIPINE BESYLATE 10 MG: 10 TABLET ORAL at 11:40

## 2025-06-14 RX ADMIN — PHENOBARBITAL SODIUM 130 MG: 130 INJECTION INTRAMUSCULAR; INTRAVENOUS at 00:01

## 2025-06-14 RX ADMIN — ATORVASTATIN CALCIUM 40 MG: 40 TABLET, FILM COATED ORAL at 08:34

## 2025-06-14 RX ADMIN — FOLIC ACID: 5 INJECTION, SOLUTION INTRAMUSCULAR; INTRAVENOUS; SUBCUTANEOUS at 08:36

## 2025-06-14 RX ADMIN — INSULIN LISPRO 1 UNITS: 100 INJECTION, SOLUTION INTRAVENOUS; SUBCUTANEOUS at 16:19

## 2025-06-14 RX ADMIN — NALTREXONE HYDROCHLORIDE 50 MG: 50 TABLET, FILM COATED ORAL at 10:05

## 2025-06-14 RX ADMIN — ASPIRIN 81 MG: 81 TABLET, COATED ORAL at 08:34

## 2025-06-14 NOTE — ASSESSMENT & PLAN NOTE
CM/CRS involved for aftercare planning and linkage to ongoing AUD treatment after discharge  He received Vivitrol during his last admission and his psychiatrist has prescribed PO naltrexone, but he hasn't started taking it  We will start PO naltrexone here. PDMP without opioid prescriptions and he confirms he tales no opioids. Transaminases are OK

## 2025-06-14 NOTE — ASSESSMENT & PLAN NOTE
Lab Results   Component Value Date    HGBA1C 7.0 (H) 03/26/2025       Recent Labs     06/13/25  2151 06/14/25  0627   POCGLU 147* 103       Blood Sugar Average: Last 72 hrs:  (P) 125

## 2025-06-14 NOTE — DISCHARGE INSTR - OTHER ORDERS
CALL and GO TO:      Appointment with Dr. Singh Barboza, June 17, 2025    SMART Recovery Meetings    Self Management and Recovery Training (SMART)  SMART Recovery is an evidenced-informed recovery method grounded in Rational Emotive Behavioral Therapy (REBT) and Cognitive Behavioral Therapy (CBT), that supports people with substance dependencies or problem behaviors to:  Build and maintain motivation  Davis with urges and cravings  Manage thoughts, feelings and behaviors  Live a balanced life    Find meetings and tools: https://smartrecovery.org/    CRISIS INFORMATION  If you are experiencing a mental health emergency, you may call the [facility] Crisis Intervention Office 24 hours a day, 7 days per week at [phone].    In [facility], call [phone].    Warmline is a confidential 24/7 telephone support service manned by trained mental health consumers.  [facility]armline provides support, a listening ear and can provide information about available services.[facility] specializes in the concerns of mental health consumers, their families and friends.  However, we are also here for anyone who has a mental health concern, is confused about or just doesn't know anything about mental health or where to get information.  To reach [facility], call [phone].    HOW TO GET SUBSTANCE ABUSE HELP:  If you or someone you know has a drug or alcohol problem, there is help:  [facility] Drug & Alcohol Abuse Services: [phone]  [facility] Alcohol Abuse Services: [phone]  An assessment is the first step.   In addition to those listed there are other programs available in the area but assessment is best to determine an appropriate level of care.  If you DO NOT have Medical Assistance (MA) or Private Insurance, an assessment can be scheduled at one of these providers:  [facility]  [address] [place] [address] [phone]   [address] [place] [address]   [phone]   [address] [place] [address]  [phone]   [facility]  [address] [place] [address]  [phone]   [facility]  [address] [place] [address] [place] [address]  [phone]   [facility]  [address] [place] [address]  [phone]     If you HAVE Medical Assistance, an assessment can be scheduled at one of these providers:  [facility]  [address] [place] [address]  [phone]   [facility]  [address] [place] [address]  [phone]   [facility]  [address], [place] [address]  [phone]  [address] [place] [address], [address], [place] [address] [phone]   [address] [place] [address]  [phone]   [address] [place] [address]  [phone]   [facility] ([facility])  [address] [place] [address]  [phone]   [facility]  [address] [place] [address]  [phone]   [facility]  [address] [place] [address] [place] [address]  [phone]   [facility]  [address] [place] [address]  [phone]     If you HAVE Private Insurance, an assessment can be scheduled at one of these providers.  Please contact these Providers to determine if they are in your network plan:  [facility]  [address], [place] [address]  [phone]   [address] [place] [address]  [phone]   [address] [place] [address]  [phone]   [facility] ([facility])  [address] [place] [address]  [phone]   [facility]  [address] [place] [address]  [phone]     From the [facility] website  "[place]guides/opioid-epidemic/#GetNaloxone    How do I get naloxone?  Family members and friends can access naloxone by:    Obtaining a prescription from their family doctor  Using the standing order issued by Acting Physician General Raven Lunsford. A standing order is a prescription written for the general public, rather than specifically for an individual.  To use the standing order, print it and take it with you to the pharmacy or have the digital version on your phone. Download the standing order from the Department of [facility] (PDF).    If you are unable to print it or use the digital version, the standing order is kept on file at many pharmacies. If a pharmacy does not have it on file, they may have the ability to look it up.    Naloxone prescriptions can be filled at most pharmacies. Although the medication might not be available for same-day pickup, it often can be ordered and available within a day or two.            [facility]    Walk-In [address] [address]  Fridays 10am to 12pm  [phone]     Our [place]  Abstinence from mind/mood altering chemicals is only one part of recovery. We recognize the need for a holistic approach, which promotes both physical and mental wellness. We utilize a collaborative process which allows each individual to have a voice in their client-centered recovery plan. We understand that recovery is not a \"one size fits all\" concept. Therefore, each recovery plan is customized to the specific needs of the individual. Our goal is to assist in removing obstacles which may prevent a lifestyle of recovery. By traveling your path of recovery with you, we will help motivate engagement in the treatment process, assist in developing and enhancing life skills, and facilitate independence through positive change.       What is RSS (Recovery Support Services)?   Recovery Support Services is a peer to peer service " offered to individuals seeking recovery from addiction. Certified Recovery Specialists (CRS) help guide individuals in establishing recovery supports, accessing treatment, and getting their basic needs met. Certified Recovery Specialists draw on their personal experience in collaborating with individuals in the office, in treatment, and in the community to removed obstacles to getting and staying clean and sober. The collaborative journey between the individual and their CRS will address basic needs, as well as enhance the individual's efforts to get and stay clean. They will provide you with support and guidance, and advocate for you when needed. They can introduce you to recovery supports within the local community (12-step groups, yoga, martial arts, Adventist groups, art classes, etc.), and even participate in these activities with you. There are many pathways to recovery and a CRS will explore them with you.       What is a Certified  (CRS)?   These are individuals who have a shared recovery experience, either by being in recovery themselves, or by having a loved one in recovery. They are certified through [place] Certification Board after numerous trainings and an exam. Their goal is to collaborate with you in identifying the supports you need to achieve recovery for yourself.     Areas in which they may be able to assist you:  Accessing support groups  Getting linked to recovery supportive activities  Basic needs (food, clothing, etc.)  Applying for health insurance  Employment  Literacy classes  Bus passes  Housing assistance  Etc.               Why should I work with a ?   If you have tried to get engaged in recovery before and haven't been able to, or feel you could really use some extra support and guidance with the journey, then working with a CRS is for you.     Am I eligible for RSS?   Ask your current counselor and any staff at [facility] or the  [facility].     How much does the service cost?   Currently, this service is at no cost to those who have [facility] or [facility] or if you are a [facility] resident. For assistance in obtaining Substance Use treatment:    [facility]: [phone]  [place]: [phone]  [place]: [phone]  [place]: [phone]  [place]: [phone]  [place]: [phone]  [place]: [phone]  [place] Addictions Hotline: 253.727.3064  [place] Help Line: 165.758.9768    Alcoholics Anonymous: [phone]  Narcotics Anonymous: [phone]

## 2025-06-14 NOTE — ASSESSMENT & PLAN NOTE
Patient on amlodipine 10 mg and HCTZ 12.5 mg  Will be receiving phenobarb protocol, hold home medications and resume as indicated

## 2025-06-14 NOTE — PROGRESS NOTES
Progress Note - Hospitalist   Name: Oscar Garcia 69 y.o. male I MRN: [id number]  Unit/Bed#: 5T [facility] 504-01 I Date of Admission: 6/13/2025   Date of Service: 6/14/2025 I Hospital Day: 1    Assessment & Plan  Alcohol withdrawal with complication with inpatient treatment (HCC)  Last drink prior to arrival. Serum alcohol 168 in the ED.  No Hx of withdrawal seizures  SEWS protocol. Further management per toxicology service.   Alcohol use disorder, severe, dependence (HCC)  Pt with a h/o of daily alcohol use   Drinks 1 pint of whiskey +4 beers for the past 2 to 3 weeks daily  Previous admission to the [facility] 2/4/2025  Thiamine, folic acid, MVI  Consult case management/CRS for assistance with aftercare resources  Hypokalemia  Repleted    Recent Labs     06/13/25  1555 06/14/25  0532   K 3.3* 4.3   MG  --  1.8*     Diabetes mellitus type 2, noninsulin dependent ([facility])  Lab Results   Component Value Date    HGBA1C 7.0 (H) 03/26/2025       Recent Labs     06/13/25  2151 06/14/25  0627 06/14/25  1044   POCGLU 147* 103 213*       Blood Sugar Average: Last 72 hrs:  (P) [id number] home metformin    Sliding scale, monitor fingersticks, hypoglycemic protocol  Primary hypertension  Home regimen: Amlodipine 10 mg and HCTZ 12.5 mg  Inpatient regimen: Amlodipine 10mg daily  Mixed hyperlipidemia  Continue home statin  Tobacco use disorder  Smokes pack per day  Nicotine patch 21 mg  Smoking cessation recommended  MDD (major depressive disorder), recurrent episode (HCC)  Patient with chronic anxiety and depression   continue home Remeron and lorazepam as needed    VTE Pharmacologic Prophylaxis: VTE Score: 3 Moderate Risk (Score 3-4) - Pharmacological DVT Prophylaxis Ordered: enoxaparin (Lovenox).    Mobility:   Basic Mobility Inpatient Raw Score: 24  JH-HLM Goal: 8: Walk 250 feet or more  JH-HLM Achieved: 8: Walk 250 feet ot more    Discussions with Specialists or Other Care Team Provider:  toxicology    Education and Discussions with Family / Patient: patient    Current Length of Stay: 1 day(s)  Current Patient Status: Inpatient   Certification Statement: The patient will continue to require additional inpatient hospital stay due to alcohol withdrawal management  Discharge Plan: 24-48 hours    Code Status: Level 1 - Full Code    Subjective   Patient seen and examined. Reports that he is feeling much better today.    Objective   Vitals:   Temp (24hrs), Av.9 °F (36.6 °C), Min:97.3 °F (36.3 °C), Max:98.5 °F (36.9 °C)    Temp:  [97.3 °F (36.3 °C)-98.5 °F (36.9 °C)] 98.1 °F (36.7 °C)  HR:  [] 74  Resp:  [18-20] 18  BP: (124-173)/(62-96) 152/76  SpO2:  [92 %-97 %] 95 %  Body mass index is 25.25 kg/m².     Input and Output Summary (last 24 hours):     Intake/Output Summary (Last 24 hours) at 2025 1056  Last data filed at 2025 0901  Gross per 24 hour   Intake 1200 ml   Output --   Net 1200 ml       Physical Exam  Vitals reviewed.   Constitutional:       General: He is not in acute distress.  HENT:      Head: Normocephalic.      Nose: Nose normal.      Mouth/Throat:      Mouth: Mucous membranes are moist.     Eyes:      General: No scleral icterus.      Cardiovascular:      Rate and Rhythm: Normal rate and regular rhythm.   Pulmonary:      Effort: Pulmonary effort is normal.   Abdominal:      General: There is no distension.      Palpations: Abdomen is soft.      Tenderness: There is no abdominal tenderness.     Skin:     General: Skin is warm.     Neurological:      Mental Status: He is alert.     Psychiatric:         Mood and Affect: Mood normal.         Behavior: Behavior normal.       Lines/Drains:        Telemetry:  Telemetry Orders (From admission, onward)               24 Hour Telemetry Monitoring  Continuous x 24 Hours (Telem)        Question:  Reason for 24 Hour Telemetry  Answer:  Alcohol withdrawal and CIWA >7, electrolyte abnormalities, abnormal ECG and/or heart disease                      Indication for Continued Telemetry Use: alcohol withdrawal               Lab Results: I have reviewed the following results:   Results from last 7 days   Lab Units 06/14/25  0532 06/13/25  1555   WBC Thousand/uL 11.40* 7.90   HEMOGLOBIN g/dL 15.1 14.4   PLATELETS Thousands/uL 144* 153   MCV fL 100* 97     Results from last 7 days   Lab Units 06/14/25  0532 06/13/25  1555   SODIUM mmol/L 136 138   POTASSIUM mmol/L 4.3 3.3*   CHLORIDE mmol/L 104 101   CO2 mmol/L 23 24   ANION GAP mmol/L 9 13   BUN mg/dL 8 6   CREATININE mg/dL 0.59* 0.55*   CALCIUM mg/dL 8.7 8.8   ALBUMIN g/dL 3.7  --    TOTAL BILIRUBIN mg/dL 0.84  --    ALK PHOS U/L 75  --    ALT U/L 34  --    AST U/L 47*  --    EGFR ml/min/1.73sq m 103 106   GLUCOSE RANDOM mg/dL 93 130     Results from last 7 days   Lab Units 06/14/25  0532   MAGNESIUM mg/dL 1.8*   PHOSPHORUS mg/dL 2.8         Results from last 7 days   Lab Units 06/13/25  2004 06/13/25  1756 06/13/25  1555   HS TNI 0HR ng/L  --   --  6   HS TNI 2HR ng/L  --  5  --    HS TNI 4HR ng/L 8  --   --               Results from last 7 days   Lab Units 06/14/25  1044 06/14/25  0627 06/13/25  2151   POC GLUCOSE mg/dl 213* 103 147*               Recent Cultures (last 7 days):         Imaging:  Reviewed radiology reports from this admission: xr chest    Last 24 Hours Medication List:     Current Facility-Administered Medications:     acetaminophen (TYLENOL) tablet 650 mg, Q6H PRN    aspirin (ECOTRIN LOW STRENGTH) EC tablet 81 mg, Daily    atorvastatin (LIPITOR) tablet 40 mg, Daily    cloNIDine (CATAPRES) tablet 0.1 mg, Q6H PRN    diazepam (VALIUM) 5 mg/mL injection **ADS Override Pull**,     enoxaparin (LOVENOX) subcutaneous injection 40 mg, Daily    folic acid 1 mg, thiamine (VITAMIN B1) 100 mg in dextrose 5 % 100 mL IV piggyback, Daily    gabapentin (NEURONTIN) capsule 300 mg, Q8H PRN    hydrOXYzine HCL (ATARAX) tablet 25 mg, Q6H PRN    insulin lispro (HumALOG/ADMELOG) 100 units/mL subcutaneous  injection 1-5 Units, HS    insulin lispro (HumALOG/ADMELOG) 100 units/mL subcutaneous injection 1-6 Units, TID AC **AND** Fingerstick Glucose (POCT), TID AC    insulin lispro (HumALOG/ADMELOG) 100 units/mL subcutaneous injection 5 Units, TID With Meals    LORazepam (ATIVAN) tablet 1 mg, TID PRN    mirtazapine (REMERON) tablet 30 mg, HS    multivitamin-minerals (CENTRUM) tablet 1 tablet, Daily    naltrexone (REVIA) tablet 50 mg, Daily    nicotine (NICODERM CQ) 21 mg/24 hr TD 24 hr patch 1 patch, Daily    ondansetron (ZOFRAN) injection 4 mg, Q6H PRN    pantoprazole (PROTONIX) EC tablet 40 mg, BID AC      **Please Note: This note may have been constructed using a voice recognition system.**

## 2025-06-14 NOTE — PLAN OF CARE
Problem: INFECTION - ADULT  Goal: Absence or prevention of progression during hospitalization  Description: INTERVENTIONS:  - Assess and monitor for signs and symptoms of infection  - Monitor lab/diagnostic results  - Monitor all insertion sites, i.e. indwelling lines, tubes, and drains  - Monitor endotracheal if appropriate and nasal secretions for changes in amount and color  - [place] appropriate cooling/warming therapies per order  - Administer medications as ordered  - Instruct and encourage patient and family to use good hand hygiene technique  - Identify and instruct in appropriate isolation precautions for identified infection/condition  Outcome: Progressing  Goal: Absence of fever/infection during neutropenic period  Description: INTERVENTIONS:  - Monitor WBC  - Perform strict hand hygiene  - Limit to healthy visitors only  - No plants, dried, fresh or silk flowers with ramirez in patient room  Outcome: Progressing     Problem: Knowledge Deficit  Goal: Patient/family/caregiver demonstrates understanding of disease process, treatment plan, medications, and discharge instructions  Description: Complete learning assessment and assess knowledge base.  Interventions:  - Provide teaching at level of understanding  - Provide teaching via preferred learning methods  Outcome: Progressing     Problem: SAFETY ADULT  Goal: Patient will remain free of falls  Description: INTERVENTIONS:  - Educate patient/family on patient safety including physical limitations  - Instruct patient to call for assistance with activity   - Consider consulting OT/PT to assist with strengthening/mobility based on AM PAC & JH-HLM score  - Consult OT/PT to assist with strengthening/mobility   - Keep Call bell within reach  - Keep bed low and locked with side rails adjusted as appropriate  - Keep care items and personal belongings within reach  - Initiate and maintain comfort rounds  - Make Fall Risk Sign visible to staff  - Apply yellow socks  and bracelet for high fall risk patients  - Consider moving patient to room near nurses station  Outcome: Progressing  Goal: Maintain or return to baseline ADL function  Description: INTERVENTIONS:  -  Assess patient's ability to carry out ADLs; assess patient's baseline for ADL function and identify physical deficits which impact ability to perform ADLs (bathing, care of mouth/teeth, toileting, grooming, dressing, etc.)  - Assess/evaluate cause of self-care deficits   - Assess range of motion  - Assess patient's mobility; develop plan if impaired  - Assess patient's need for assistive devices and provide as appropriate  - Encourage maximum independence but intervene and supervise when necessary  - Involve family in performance of ADLs  - Assess for home care needs following discharge   - Consider OT consult to assist with ADL evaluation and planning for discharge  - Provide patient education as appropriate  - Monitor functional capacity and physical performance, use of AM PAC & JH-HLM   - Monitor gait, balance and fatigue with ambulation    Outcome: Progressing  Goal: Maintains/Returns to pre admission functional level  Description: INTERVENTIONS:  - Perform AM-PAC 6 Click Basic Mobility/ Daily Activity assessment daily.  - Set and communicate daily mobility goal to care team and patient/family/caregiver.   - Collaborate with rehabilitation services on mobility goals if consulted  - Out of bed for toileting  - Record patient progress and toleration of activity level   Outcome: Progressing

## 2025-06-14 NOTE — PLAN OF CARE
Problem: INFECTION - ADULT  Goal: Absence or prevention of progression during hospitalization  Description: INTERVENTIONS:  - Assess and monitor for signs and symptoms of infection  - Monitor lab/diagnostic results  - Monitor all insertion sites, i.e. indwelling lines, tubes, and drains  - Monitor endotracheal if appropriate and nasal secretions for changes in amount and color  - [place] appropriate cooling/warming therapies per order  - Administer medications as ordered  - Instruct and encourage patient and family to use good hand hygiene technique  - Identify and instruct in appropriate isolation precautions for identified infection/condition  6/14/2025 0654 by Marquis Anand RN  Outcome: Progressing  6/13/2025 2313 by Marquis Anand RN  Outcome: Progressing  Goal: Absence of fever/infection during neutropenic period  Description: INTERVENTIONS:  - Monitor WBC  - Perform strict hand hygiene  - Limit to healthy visitors only  - No plants, dried, fresh or silk flowers with ramirez in patient room  6/14/2025 0654 by Marquis Anand RN  Outcome: Progressing  6/13/2025 2313 by Marquis Anand RN  Outcome: Progressing     Problem: SAFETY ADULT  Goal: Patient will remain free of falls  Description: INTERVENTIONS:  - Educate patient/family on patient safety including physical limitations  - Instruct patient to call for assistance with activity   - Consider consulting OT/PT to assist with strengthening/mobility based on AM PAC & JH-HLM score  - Consult OT/PT to assist with strengthening/mobility   - Keep Call bell within reach  - Keep bed low and locked with side rails adjusted as appropriate  - Keep care items and personal belongings within reach  - Initiate and maintain comfort rounds  - Make Fall Risk Sign visible to staff  - Apply yellow socks and bracelet for high fall risk patients  - Consider moving patient to room near nurses station  Outcome: Progressing  Goal: Maintain or return to baseline ADL  function  Description: INTERVENTIONS:  -  Assess patient's ability to carry out ADLs; assess patient's baseline for ADL function and identify physical deficits which impact ability to perform ADLs (bathing, care of mouth/teeth, toileting, grooming, dressing, etc.)  - Assess/evaluate cause of self-care deficits   - Assess range of motion  - Assess patient's mobility; develop plan if impaired  - Assess patient's need for assistive devices and provide as appropriate  - Encourage maximum independence but intervene and supervise when necessary  - Involve family in performance of ADLs  - Assess for home care needs following discharge   - Consider OT consult to assist with ADL evaluation and planning for discharge  - Provide patient education as appropriate  - Monitor functional capacity and physical performance, use of AM PAC & JH-HLM   - Monitor gait, balance and fatigue with ambulation    Outcome: Progressing  Goal: Maintains/Returns to pre admission functional level  Description: INTERVENTIONS:  - Perform AM-PAC 6 Click Basic Mobility/ Daily Activity assessment daily.  - Set and communicate daily mobility goal to care team and patient/family/caregiver.   - Collaborate with rehabilitation services on mobility goals if consulted  - Out of bed for toileting  - Record patient progress and toleration of activity level   Outcome: Progressing

## 2025-06-14 NOTE — ASSESSMENT & PLAN NOTE
Pt with a h/o of daily alcohol use   Drinks 1 pint of whiskey +4 beers for the past 2 to 3 weeks daily  Previous admission to the [facility] 2/4/2025  No h/o withdrawal seizures   Withdrawal management as above  Initiate IVFs, IV  thiamine, folic acid, and MVI  Consult case management/CRS for assistance with aftercare resources

## 2025-06-14 NOTE — H&P
H&P - Hospitalist   Name: Oscar Garcia 69 y.o. male I MRN: [id number]  Unit/Bed#: 5T DETOX 504-01 I Date of Admission: 6/13/2025   Date of Service: 6/13/2025 I Hospital Day: 0     Assessment & Plan  Alcohol withdrawal with complication with inpatient treatment (HCC)  Last drink prior to arrival   Serum alcohol 168 in the ED  No Hx of withdrawal seizures  Toxicology consult  Initiate SEWS protocol for medical management of alcohol withdrawal  Current alcohol withdrawal signs/symptoms include anxiety, tremors, hypertension  SEWS score 13 upon admission  Administer 650 mg of phenobarbital and 5 mg Valium as initial dose  Continue monitoring under protocol and administer phenobarbital as indicated  Continuous pulse ox and telemetry monitoring   Alcohol use disorder, severe, dependence (HCC)  Pt with a h/o of daily alcohol use   Drinks 1 pint of whiskey +4 beers for the past 2 to 3 weeks daily  Previous admission to the [facility] Det[facility] Unit 2/4/2025  No h/o withdrawal seizures   Withdrawal management as above  Initiate IVFs, IV  thiamine, folic acid, and MVI  Consult case management/CRS for assistance with aftercare resources  Hypokalemia  Replete and monitor  Diabetes mellitus type 2, noninsulin dependent (HCC)  Lab Results   Component Value Date    HGBA1C 7.0 (H) 03/26/2025       Recent Labs     06/13/25  2151   POCGLU 147*       Blood Sugar Average: Last 72 hrs:  (P) 147Hold home metformin  Meal coverage and sliding scale insulin coverage with Accu-Cheks while in the hospital  Hypoglycemia protocol  Primary hypertension  Patient on amlodipine 10 mg and HCTZ 12.5 mg  Will be receiving phenobarb protocol, hold home medications and resume as indicated  Mixed hyperlipidemia  Continue home statin  Tobacco use disorder  Smokes pack per day  Nicotine patch 21 mg  Smoking cessation recommended  MDD (major depressive disorder), recurrent episode (HCC)  Patient with chronic anxiety and depression   continue home Remeron and  lorazepam as needed    Administrative Statements   VIRTUAL CARE DOCUMENTATION:     1. This service was provided via Telemedicine using Teams Virtual Rounding      2. Parties in the room with patient during teleconsult RN    3. Confidentiality My office door was closed     4. Participants No one else was in the room    5. Patient acknowledged consent and understanding of privacy and security of the  Telemedicine consult. I informed the patient that I have reviewed their record in Epic and presented the opportunity for them to ask any questions regarding the visit today.  The patient agreed to participate.    6. I have spent a total time of 75 minutes in caring for this patient on the day of the visit/encounter including Diagnostic results, Counseling / Coordination of care, Documenting in the medical record, Reviewing/placing orders in the medical record (including tests, medications, and/or procedures), and Obtaining or reviewing history  , not including the time spent for establishing the audio/video connection.        VTE Pharmacologic Prophylaxis: VTE Score: 3 Moderate Risk (Score 3-4) - Pharmacological DVT Prophylaxis Ordered: enoxaparin (Lovenox).  Code Status: Level 1 - Full Code   Discussion with patient    Anticipated Length of Stay: Patient will be admitted on an inpatient basis with an anticipated length of stay of greater than 2 midnights secondary to alcohol withdrawal support, specialist input.    History of Present Illness   Chief Complaint: Chest pain    Oscar Garcia is a 69 y.o. male with a PMH of diabetes mellitus type 2 not on insulin with hypertension and hyperlipidemia, atherosclerosis, tobacco abuse, alcohol dependence who presents with chest pain. Patient reported chest pain  at [facility], cardiac enzymes were negative and patient was cleared. Patient reports drinking pint whiskey with 8-10 beers for past 2 weeks. No hx of alcohol withdrawal seizures. +previous admission for Detox 2/2025. Patient  believes he was sober about 3 months. No particular reason that he started drinking. He has a psychiatry that he follows for Depression and anxiety and is planning to try TMS.     Review of Systems   Constitutional:  Negative for chills and fever.   HENT:  Negative for rhinorrhea, sore throat and trouble swallowing.    Eyes:  Negative for discharge and redness.   Respiratory:  Negative for cough and shortness of breath.    Cardiovascular:  Positive for chest pain. Negative for palpitations.   Gastrointestinal:  Negative for abdominal pain, diarrhea, nausea and vomiting.   Genitourinary:  Negative for dysuria and hematuria.   Musculoskeletal:  Negative for back pain, myalgias and neck pain.   Skin:  Negative for rash and wound.   Neurological:  Positive for dizziness and tremors. Negative for headaches.   Psychiatric/Behavioral:  The patient is nervous/anxious.        Historical Information   Past Medical History[1]  Past Surgical History[2]  Social History[3]  E-Cigarette/Vaping    E-Cigarette Use Never User      E-Cigarette/Vaping Substances    Nicotine No     THC No     CBD No     Flavoring No     Other No     Unknown No      Family History[4]  Social History:  Marital Status: Single   Occupation: Retired  Patient Pre-hospital Living Situation: Home  Patient Pre-hospital Level of Mobility: walks  Patient Pre-hospital Diet Restrictions: None    Meds/Allergies   I have reviewed home medications with patient personally.  Prior to Admission medications    Medication Sig Start Date End Date Taking? Authorizing Provider   aspirin (Aspirin 81) 81 mg EC tablet Take 1 tablet (81 mg total) by mouth daily 1/7/25  Yes Alon Dickey MD   atorvastatin (LIPITOR) 40 mg tablet TAKE 1 TABLET DAILY 4/9/25  Yes Alon Dickey MD   metFORMIN (GLUCOPHAGE) 1000 MG tablet TAKE 1 TABLET DAILY 4/9/25  Yes Alon Dickey MD   mirtazapine (REMERON) 30 mg tablet Take 1 tablet (30 mg total) by mouth daily at bedtime 4/26/23  Yes Alon Dickey MD    folic acid (FOLVITE) 1 mg tablet Take 1 tablet (1 mg total) by mouth daily 4/26/23 6/13/25 Yes Aoln Dickey MD   amLODIPine (NORVASC) 10 mg tablet TAKE 1 TABLET DAILY 4/9/25   Alon Dickey MD   hydroCHLOROthiazide 12.5 mg tablet TAKE 1 TABLET DAILY 4/9/25   Alon Dickey MD   LORazepam (ATIVAN) 1 mg tablet Take 1 mg by mouth as needed in the morning and 1 mg as needed at noon and 1 mg as needed in the evening for anxiety. 10/29/24   Historical Provider, MD   magnesium gluconate (MAGONATE) 500 mg tablet Take 1 tablet (500 mg total) by mouth 2 (two) times a day for 7 days 2/24/23 3/3/23  Alon Dickey MD   mirtazapine (REMERON) 7.5 MG tablet Take 1 tablet (7.5 mg total) by mouth daily at bedtime 4/26/23   Alon Dickey MD   PARoxetine (PAXIL) 20 mg tablet Take 1 tablet (20 mg total) by mouth daily 4/26/23 5/26/23  Alon Dickey MD   thiamine (VITAMIN B1) 100 mg tablet Take 1 tablet (100 mg total) by mouth daily 2/24/23 6/13/25  Alon Dickey MD     Allergies   Allergen Reactions    Lisinopril Angioedema       Objective :  Temp:  [98 °F (36.7 °C)-98.5 °F (36.9 °C)] 98 °F (36.7 °C)  HR:  [] 96  BP: (124-173)/(62-96) 173/90  Resp:  [18] 18  SpO2:  [92 %-96 %] 96 %  O2 Device: None (Room air)    Physical Exam  Vitals and nursing note reviewed.   Constitutional:       Appearance: He is well-developed.      Comments: Elderly  male awake and alert   HENT:      Head: Normocephalic and atraumatic.     Eyes:      General:         Right eye: No discharge.         Left eye: No discharge.     Neck:      Trachea: No tracheal deviation.      Comments: Able to turn head side-to-side without difficulty  Cardiovascular:      Rate and Rhythm: Normal rate.      Comments: Rate 96  Pulmonary:      Effort: Pulmonary effort is normal.      Comments: Respiratory rate 18, 96% on room air, speaking full sentences without difficulty  Abdominal:      General: Bowel sounds are normal.      Comments: No reported abdominal pain      Musculoskeletal:         General: Normal range of motion.      Cervical back: Normal range of motion.      Comments: Moving extremities without difficulty     Neurological:      General: No focal deficit present.      Mental Status: He is alert and oriented to person, place, and time. Mental status is at baseline.     Psychiatric:         Mood and Affect: Mood normal.         Behavior: Behavior normal.         Thought Content: Thought content normal.         Judgment: Judgment normal.        Lines/Drains:      Lab Results: I have reviewed the following results:  Results from last 7 days   Lab Units 06/13/25  1555   WBC Thousand/uL 7.90   HEMOGLOBIN g/dL 14.4   HEMATOCRIT % 41.5   PLATELETS Thousands/uL 153   SEGS PCT % 64   LYMPHO PCT % 25   MONO PCT % 10   EOS PCT % 1     Results from last 7 days   Lab Units 06/13/25  1555   SODIUM mmol/L 138   POTASSIUM mmol/L 3.3*   CHLORIDE mmol/L 101   CO2 mmol/L 24   BUN mg/dL 6   CREATININE mg/dL 0.55*   ANION GAP mmol/L 13   CALCIUM mg/dL 8.8   GLUCOSE RANDOM mg/dL 130         Results from last 7 days   Lab Units 06/13/25  2151   POC GLUCOSE mg/dl 147*     Lab Results   Component Value Date    HGBA1C 7.0 (H) 03/26/2025    HGBA1C 7.2 (H) 01/03/2025    HGBA1C 8.4 (H) 03/28/2024     Imaging Results Review: I reviewed radiology reports from this admission including: chest xray.  Other Study Results Review: EKG was personally reviewed and my interpretation is: NSR. ..    ** Please Note: This note has been constructed using a voice recognition system. **         [1]   Past Medical History:  Diagnosis Date    Colon polyp     Diabetes mellitus (HCC)     Hyperlipidemia     Hypertension    [2]   Past Surgical History:  Procedure Laterality Date    COLON SURGERY      found growth, bengin    COLONOSCOPY      COLONOSCOPY N/A 3/13/2018    Procedure: COLONOSCOPY;  Surgeon: Nadine Richards DO;  Location: [facility] GI LAB;  Service: Gastroenterology    ORIF HUMERUS FRACTURE Left  10/6/2022    Procedure: OPEN REDUCTION W/ INTERNAL FIXATION (ORIF) HUMERUS (SHOULDER);  Surgeon: Avni Dubose MD;  Location:  MAIN OR;  Service: Orthopedics   [3]   Social History  Tobacco Use    Smoking status: Every Day     Current packs/day: 1.00     Average packs/day: 1 pack/day for 50.0 years (50.0 ttl pk-yrs)     Types: Cigarettes    Smokeless tobacco: Never    Tobacco comments:     uses vape/current every day smoker (as per [employer])   Vaping Use    Vaping status: Never Used   Substance and Sexual Activity    Alcohol use: Yes     Comment: states last was 2 days ago    Drug use: No   [4]   Family History  Problem Relation Name Age of Onset    Heart failure Mother      Lung disease Father      Hypertension Other Sibling

## 2025-06-14 NOTE — ASSESSMENT & PLAN NOTE
Lab Results   Component Value Date    HGBA1C 7.0 (H) 03/26/2025       Recent Labs     06/13/25  2151   POCGLU 147*       Blood Sugar Average: Last 72 hrs:  (P) 147Hold home metformin  Meal coverage and sliding scale insulin coverage with Accu-Cheks while in the hospital  Hypoglycemia protocol

## 2025-06-14 NOTE — ASSESSMENT & PLAN NOTE
Last drink prior to arrival. Serum alcohol 168 in the ED.  No Hx of withdrawal seizures  SEWS protocol. Further management per toxicology service.

## 2025-06-14 NOTE — ASSESSMENT & PLAN NOTE
Withdrawal is adequately controlled with phenobarbital per SEWS. He has received 910 mg thus far  Continue SEWS today for monitoring and additional dosing as indicated  Continue supportive care, thiamine, folate supplementation

## 2025-06-14 NOTE — SOCIAL WORK
Discussed with patient: AUDIT score of 33 UDS/Identified Substance(s) used:  alcohol  Risks discussed included: risks to physical health, consideration of Type 2 Diabetes  Recommendations discussed: drinking cessation and rehabilitation  Patient's response: Patient is not in favor of AA or rehabilitation at this time. He prefers to go back to his psychiatrist for an appointment.

## 2025-06-14 NOTE — ASSESSMENT & PLAN NOTE
Pt with a h/o of daily alcohol use   Drinks 1 pint of whiskey +4 beers for the past 2 to 3 weeks daily  Previous admission to the [facility] Detox [facility] 2/4/2025  Thiamine, folic acid, MVI  Consult case management/CRS for assistance with aftercare resources

## 2025-06-14 NOTE — ASSESSMENT & PLAN NOTE
Last drink prior to arrival   Serum alcohol 168 in the ED  No Hx of withdrawal seizures  Toxicology consult  Initiate [facility] protocol for medical management of alcohol withdrawal  Current alcohol withdrawal signs/symptoms include anxiety, tremors, hypertension  SEWS score 13 upon admission  Administer 650 mg of phenobarbital and 5 mg Valium as initial dose  Continue monitoring under protocol and administer phenobarbital as indicated  Continuous pulse ox and telemetry monitoring

## 2025-06-14 NOTE — CONSULTS
Consultation - Medical Toxicology   Name: Oscar Garcia 69 y.o. male I MRN: [id number]  Unit/Bed#: 5T DETOX 504-01 I Date of Admission: 6/13/2025   Date of Service: 6/14/2025 I Hospital Day: 1   Inpatient consult to Toxicology  Consult performed by: Josef Green DO  Consult ordered by: Sparkle Thomason PA-C        Physician Requesting Evaluation: Oscar Law MD   Reason for Evaluation / Principal Problem: Alcohol withdrawal    Assessment & Plan  Alcohol withdrawal with complication with inpatient treatment (HCC)  Withdrawal is adequately controlled with phenobarbital per SEWS. He has received 910 mg thus far  Continue SEWS today for monitoring and additional dosing as indicated  Continue supportive care, thiamine, folate supplementation  Alcohol use disorder, severe, dependence (HCC)  CM/CRS involved for aftercare planning and linkage to ongoing AUD treatment after discharge  He received Vivitrol during his last admission and his psychiatrist has prescribed PO naltrexone, but he hasn't started taking it  We will start PO naltrexone here. PDMP without opioid prescriptions and he confirms he tales no opioids. Transaminases are OK  Diabetes mellitus type 2, noninsulin dependent (HCC)  Lab Results   Component Value Date    HGBA1C 7.0 (H) 03/26/2025       Recent Labs     06/13/25  2151 06/14/25  0627   POCGLU 147* 103       Blood Sugar Average: Last 72 hrs:  (P) 125    Primary hypertension    Mixed hyperlipidemia    Tobacco use disorder    Hypokalemia    MDD (major depressive disorder), recurrent episode (HCC)    I have discussed the above management plan in detail with the primary service.       For further questions, please contact the medical  on call via SecureChat between 8am and 9pm. If between 9pm and 8am, please reach out to the [facility] at [phone].     History of Present Illness   Oscar Garcia is a 69 y.o. year old male who presents with Alcohol withdrawal. He has a /o AUD and  had been admitted to the [facility] in February. He received Vivitrol prior to discharge that admission and did well without alcohol use until 2 weeks ago when he suffered recurrence of use. He had been drinking 8-10 beers per day plus a pint of [employer]. His last drink was about 11AM yesterday. There is no h/o withdrawal seizures. He has not has concomitant substance use, but has been taking alprazolam the past 3 days. He was initially hypertensive and had a SEWS score of 13. He has received 910 mg phenobarbital since admission. His BP is improved and he feels better this AM.     Review of Systems   Constitutional:  Negative for chills and fever.   HENT:  Negative for ear pain and sore throat.    Eyes:  Negative for pain and visual disturbance.   Respiratory:  Negative for cough and shortness of breath.    Cardiovascular:  Positive for chest pain. Negative for palpitations.   Gastrointestinal:  Negative for abdominal pain and vomiting.   Genitourinary:  Negative for dysuria and hematuria.   Musculoskeletal:  Negative for arthralgias and back pain.   Skin:  Negative for color change and rash.   Neurological:  Negative for seizures and syncope.   Psychiatric/Behavioral:  The patient is nervous/anxious.    All other systems reviewed and are negative.      Historical Information   Medical History Review: I have reviewed the patient's PMH, PSH, Social History, Family History, Meds, and Allergies   Social History[1]  Family History[2]    Meds/Allergies   Prior to Admission medications    Medication Sig Start Date End Date Taking? Authorizing Provider   aspirin (Aspirin 81) 81 mg EC tablet Take 1 tablet (81 mg total) by mouth daily 1/7/25  Yes Alon Dickey MD   atorvastatin (LIPITOR) 40 mg tablet TAKE 1 TABLET DAILY 4/9/25  Yes Alon Dickey MD   metFORMIN (GLUCOPHAGE) 1000 MG tablet TAKE 1 TABLET DAILY 4/9/25  Yes Alon Dickey MD   mirtazapine (REMERON) 30 mg tablet Take 1 tablet (30 mg total) by mouth daily at bedtime  4/26/23  Yes Alon Dickey MD   amLODIPine (NORVASC) 10 mg tablet TAKE 1 TABLET DAILY 4/9/25   Alon Dickey MD   hydroCHLOROthiazide 12.5 mg tablet TAKE 1 TABLET DAILY 4/9/25   Alon Dickey MD   LORazepam (ATIVAN) 1 mg tablet Take 1 mg by mouth as needed in the morning and 1 mg as needed at noon and 1 mg as needed in the evening for anxiety. 10/29/24   Historical Provider, MD   magnesium gluconate (MAGONATE) 500 mg tablet Take 1 tablet (500 mg total) by mouth 2 (two) times a day for 7 days 2/24/23 3/3/23  Alon Dickey MD   mirtazapine (REMERON) 7.5 MG tablet Take 1 tablet (7.5 mg total) by mouth daily at bedtime 4/26/23   Alon Dickey MD   PARoxetine (PAXIL) 20 mg tablet Take 1 tablet (20 mg total) by mouth daily 4/26/23 5/26/23  Alon Dickey MD   Current Medications[3]   Allergies[4]    Objective :  Temp:  [97.3 °F (36.3 °C)-98.5 °F (36.9 °C)] 98 °F (36.7 °C)  HR:  [] 68  BP: (124-173)/(62-96) 158/84  Resp:  [18-20] 18  SpO2:  [92 %-97 %] 95 %  O2 Device: None (Room air)      Intake/Output Summary (Last 24 hours) at 6/14/2025 0910  Last data filed at 6/14/2025 0901  Gross per 24 hour   Intake 1200 ml   Output --   Net 1200 ml       Physical Exam  Constitutional:       General: He is not in acute distress.     Appearance: Normal appearance.   HENT:      Mouth/Throat:      Mouth: Mucous membranes are moist.     Eyes:      Extraocular Movements: Extraocular movements intact.       Cardiovascular:      Rate and Rhythm: Normal rate and regular rhythm.   Pulmonary:      Effort: Pulmonary effort is normal.   Abdominal:      General: There is no distension.     Skin:     Coloration: Skin is not jaundiced.     Neurological:      Mental Status: He is alert and oriented to person, place, and time.      Comments: No tremor or tongue fasciculations         Lab Results: I have reviewed the following results:  Results from last 7 days   Lab Units 06/14/25  0532 06/13/25  1555   WBC Thousand/uL 11.40* 7.90   HEMOGLOBIN g/dL  15.1 14.4   HEMATOCRIT % 46.5 41.5   PLATELETS Thousands/uL 144* 153   SEGS PCT %  --  64   LYMPHO PCT %  --  25   MONO PCT %  --  10   EOS PCT %  --  1      Results from last 7 days   Lab Units 06/14/25  0532   POTASSIUM mmol/L 4.3   CHLORIDE mmol/L 104   CO2 mmol/L 23   BUN mg/dL 8   CREATININE mg/dL 0.59*   CALCIUM mg/dL 8.7   ALBUMIN g/dL 3.7   ALK PHOS U/L 75   ALT U/L 34   AST U/L 47*   MAGNESIUM mg/dL 1.8*   PHOSPHORUS mg/dL 2.8              Results from last 7 days   Lab Units 06/13/25  1756 06/13/25  1555   HS TNI 0HR ng/L  --  6   HS TNI 2HR ng/L 5  --           Results from last 7 days   Lab Units 06/13/25 2004   ETHANOL LVL mg/dL 168*     Imaging Results Review: No pertinent imaging studies reviewed.  Other Study Results Review: EKG was reviewed.     Administrative Statements   I have spent a total time of 45 minutes in caring for this patient on the day of the visit/encounter including Impressions, Documenting in the medical record, Obtaining or reviewing history  , and Communicating with other healthcare professionals .       [1]   Social History  Tobacco Use    Smoking status: Every Day     Current packs/day: 1.00     Average packs/day: 1 pack/day for 50.0 years (50.0 ttl pk-yrs)     Types: Cigarettes    Smokeless tobacco: Never    Tobacco comments:     uses vape/current every day smoker (as per [employer])   Vaping Use    Vaping status: Never Used   Substance and Sexual Activity    Alcohol use: Yes     Comment: states last was 2 days ago    Drug use: No   [2]   Family History  Problem Relation Name Age of Onset    Heart failure Mother      Lung disease Father      Hypertension Other Sibling    [3]   Current Facility-Administered Medications:     acetaminophen (TYLENOL) tablet 650 mg, 650 mg, Oral, Q6H PRN, Sparkle Thomason PA-C    aspirin (ECOTRIN LOW STRENGTH) EC tablet 81 mg, 81 mg, Oral, Daily, Sparkle Thomason PA-C, 81 mg at 06/14/25 0834    atorvastatin (LIPITOR) tablet 40 mg,  40 mg, Oral, Daily, Sparkle Thomason PA-C, 40 mg at 06/14/25 0834    cloNIDine (CATAPRES) tablet 0.1 mg, 0.1 mg, Oral, Q6H PRN, Sparkle Thomason PA-C    diazepam (VALIUM) 5 mg/mL injection **ADS Override Pull**, , , ,     enoxaparin (LOVENOX) subcutaneous injection 40 mg, 40 mg, Subcutaneous, Daily, Sparkle Thomason PA-C, 40 mg at 06/14/25 0836    folic acid 1 mg, thiamine (VITAMIN B1) 100 mg in dextrose 5 % 100 mL IV piggyback, , Intravenous, Daily, Sparkle Thomason PA-C, New Bag at 06/14/25 0836    gabapentin (NEURONTIN) capsule 300 mg, 300 mg, Oral, Q8H PRN, Sparkle Thomason PA-C    hydrOXYzine HCL (ATARAX) tablet 25 mg, 25 mg, Oral, Q6H PRN, Sparkle Thomason PA-C    insulin lispro (HumALOG/ADMELOG) 100 units/mL subcutaneous injection 1-5 Units, 1-5 Units, Subcutaneous, HS, Sparkle Thomason PA-C    insulin lispro (HumALOG/ADMELOG) 100 units/mL subcutaneous injection 1-6 Units, 1-6 Units, Subcutaneous, TID AC **AND** Fingerstick Glucose (POCT), , , TID AC, Sparkle Thomason PA-C    insulin lispro (HumALOG/ADMELOG) 100 units/mL subcutaneous injection 5 Units, 5 Units, Subcutaneous, TID With Meals, Sparkle Thomason PA-C    LORazepam (ATIVAN) tablet 1 mg, 1 mg, Oral, TID PRN, Sparkle Thomason PA-C    magnesium sulfate 2 g/50 mL IVPB (premix) 2 g, 2 g, Intravenous, Once, Oscar Law MD, Last Rate: 25 mL/hr at 06/14/25 0836, 2 g at 06/14/25 0836    mirtazapine (REMERON) tablet 30 mg, 30 mg, Oral, HS, Sparkle Thomason PA-C, 30 mg at 06/13/25 2306    multivitamin-minerals (CENTRUM) tablet 1 tablet, 1 tablet, Oral, Daily, Sparkle Thomason PA-C, 1 tablet at 06/14/25 0835    nicotine (NICODERM CQ) 21 mg/24 hr TD 24 hr patch 1 patch, 1 patch, Transdermal, Daily, Sparkle Thomason PA-C, 1 patch at 06/14/25 0836    ondansetron (ZOFRAN) injection 4 mg, 4 mg, Intravenous, Q6H PRN, Sparkle Thomason PA-C    pantoprazole  (PROTONIX) EC tablet 40 mg, 40 mg, Oral, BID Sparkle JOHNSON PA-C, 40 mg at 06/14/25 0717  [4]   Allergies  Allergen Reactions    Lisinopril Angioedema

## 2025-06-14 NOTE — SOCIAL WORK
06/14/25 1525   Referral Data   Referral Reason Drug/Alcohol Abuse   County Information   County of Residence [facility]   Readmission Root Cause   30 Day Readmission No   Patient Information   Mental Status Alert   Primary Caregiver Self   Accompanied by/Relationship no   Support System Immediate family  (brothers, nieces)   Hinduism/Cultural Requests no   Activities of Daily Living Prior to Admission   Functional Status Independent   Assistive Device No device needed   Living Arrangement Apartment   Ambulation Independent   Access to Firearms   Access to Firearms No   Income Information   Income Source Pension/correction   Means of Transportation   Means of Transport to Appts: Family transport       SUBSTANCE ABUSE    Stressor/Trigger    Alcohol Withdrawal  Alcohol Use Disorder HCC   UDS: THC+, Benzos+  Audit: 33  PAWSS: 8  Nicotine:   yes  Ethanol: 227   Prior D&A treatment   [facility] detox, IOP for 6 months   AA/NA Meetings   Did AA but is not in favor of it   Withdrawal  Symptoms   None to speak of   History of OD/blackouts or Withdrawal Seizures   no   MENTAL HEALTH INFORMATION    Hx of Mental Health Dx   Depression, anxiety   Had electric shock therapy for depression   Outpatient Mental Health Tx   Has a psychiatrist, Singh Barboza   Inpatient Hospitalizations (Mental Health)   [facility]   Family History of Mental Health    Dad was a psychiatrist   Trauma History    denies   Current MH Symptoms   denies   Access to Firearms    no   PHYSICAL HEALTH INFORMATION    Physical Health Conditions (Chronic)   Diabetes type 2, Carotid Artery Stenosis   PCP   Dr. 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     History   no   Spiritual/Lutheran Beliefs   Shinto   Transportation/Transport Home   Needs Uber   DEMOGRAPHICS    Discharge Address   [address]  [place] [address]   Living Arrangement   Lives alone   Can pt return home yes     External Supports     2 brothersapril   Phone Number   [phone]   Email   [email or web address]   Marital Status/Children   single     Substance First use Last Use Frequency Amount Used How long Longest period of sobriety and when Method of use   THC            Heroin            Cocaine            ETOH   15 June 12, 2025 Pint of whiskey and 6--10 beers daily years 18 months drink   Meth            Benzos            Other:                    06/14/25 1530   Substance Abuse Addendum Details   History of Withdrawal Symptoms Denies past symptoms   Medical Complications Alcohol Withdrawal, Carotid Artery Stenosis, Diabetes Type 2   Sober Supports 2 brothers   Present Treatment [facility]   Substance Abuse Treatment Hx Past Tx, Inpatient;Past detox;Past Tx, Outpatient   ASAM Level & Criteria 4.0   Stage of Change   Stage of Change Contemplation     Additional Substance Use Detail    Questions Responses   Problems Due to Past Use of Alcohol? Yes   Problems Due to Past Use of Substances? No   Substance Use Assessment Denies substance use within the past 12 months   Alcohol Use Frequency Daily   Cannabis frequency Never used   Comment:  Never used on 10/7/2022    Heroin Frequency Denies use in past 12 months   Alcohol Drink of Choice beer   1st Use of Alcohol 15   Last Use of Alcohol & Amount June 12, 2025   Longest Abstinence from Alcohol 18 months   Cocaine frequency Never used   Comment:  Never used on 10/7/2022    Crack Cocaine Frequency Denies use in past 12 months   Methamphetamine Frequency Denies use in past 12 months   Narcotic Frequency Denies use in past 12 months   Benzodiazepine Frequency Prior dependence    Amphetamine frequency Denies use in past 12 months   Barbituate Frequency Denies use use in past 12 months   Inhalant frequency Never used   Comment:  Never used on 10/7/2022    Hallucinogen frequency Never used   Comment:  Never used on 10/7/2022    Ecstasy frequency Never used   Comment:  Never used on 10/7/2022    Other drug frequency Never used   Comment:  Past regular use on 10/7/2022 Never used on 10/7/2022    Opiate frequency Denies use in past 12 month        06/14/25 1521   Physical Activity   On average, how many days per week do you engage in moderate to strenuous exercise (like a brisk walk)? 7 days   On average, how many minutes do you engage in exercise at this level? 20 min   Financial Resource Strain   How hard is it for you to pay for the very basics like food, housing, medical care, and heating? Not hard   Housing Stability   In the last 12 months, was there a time when you were not able to pay the mortgage or rent on time? N   In the past 12 months, how many times have you moved where you were living? 0   At any time in the past 12 months, were you homeless or living in a shelter (including now)? N   Transportation Needs   In the past 12 months, has lack of transportation kept you from medical appointments or from getting medications? no  (uses [place], friends, neighbors for transportation)   In the past 12 months, has lack of transportation kept you from meetings, work, or from getting things needed for daily living? No   Food Insecurity   Within the past 12 months, you worried that your food would run out before you got the money to buy more. Never true   Within the past 12 months, the food you bought just didn't last and you didn't have money to get more. Never true   Stress   Do you feel stress - tense, restless, nervous, or anxious, or unable to sleep at night because your mind is troubled all the time - these days? Rather much   Social Connections   In a typical week, how many times do you  talk on the phone with family, friends, or neighbors? Twice a week   How often do you get together with friends or relatives?   (once a month; really looks forward to any family gatherings)   How often do you attend Druze or Jewish services? Never   Do you belong to any clubs or organizations such as Druze groups, unions, fraternal or athletic groups, or school groups? No   How often do you attend meetings of the clubs or organizations you belong to? Never   Are you , , , , never , or living with a partner? Never marrie   Intimate Partner Violence   Within the last year, have you been afraid of your partner or ex-partner? No   Within the last year, have you been humiliated or emotionally abused in other ways by your partner or ex-partner? No   Within the last year, have you been kicked, hit, slapped, or otherwise physically hurt by your partner or ex-partner? No   Within the last year, have you been raped or forced to have any kind of sexual activity by your partner or ex-partner? No   Alcohol Use   Q1: How often do you have a drink containing alcohol? 4 or more ti   Q2: How many drinks containing alcohol do you have on a typical day when you are drinking? 7 to 9   Q3: How often do you have six or more drinks on one occasion? Daily   Utilities   In the past 12 months has the electric, gas, oil, or water company threatened to shut off services in your home? No   Health Literacy   How often do you need to have someone help you when you read instructions, pamphlets, or other written material from your doctor or pharmacy? Never   Follow-Ups   We make community resources available to all of our patients to assist with everyday needs. We may be able to connect you with those resources. Would you be interested? N     The  met with the patient to verify his  and name.   explained the role of case management in the context of medical withdrawal from  alcohol.  The patient is a 69 year old male who has had a history of alcohol abuse, along with Diabetes Type 2.    The patient was cooperative an completed a relapse prevention plan but did not fill out any ROIs.  The patient explained he will return to his psychiatrist,Dr. Singh Barboza, and will not be in need of any rehabilitation facilities at this time.  The patient tried AA as well as IOP services for 6 months.  He felt that was successful but AA was not meaningful for him.

## 2025-06-14 NOTE — ASSESSMENT & PLAN NOTE
Lab Results   Component Value Date    HGBA1C 7.0 (H) 03/26/2025       Recent Labs     06/13/25 2151 06/14/25 0627 06/14/25  1044   POCGLU 147* 103 213*       Blood Sugar Average: Last 72 hrs:  (P) [id number] home metformin    Sliding scale, monitor fingersticks, hypoglycemic protocol

## 2025-06-15 VITALS
TEMPERATURE: 97.5 F | WEIGHT: 171 LBS | SYSTOLIC BLOOD PRESSURE: 143 MMHG | RESPIRATION RATE: 18 BRPM | HEART RATE: 82 BPM | HEIGHT: 69 IN | BODY MASS INDEX: 25.33 KG/M2 | DIASTOLIC BLOOD PRESSURE: 84 MMHG | OXYGEN SATURATION: 98 %

## 2025-06-15 LAB
ALBUMIN SERPL BCG-MCNC: 3.5 G/DL (ref 3.5–5)
ALP SERPL-CCNC: 71 U/L (ref 34–104)
ALT SERPL W P-5'-P-CCNC: 26 U/L (ref 7–52)
ANION GAP SERPL CALCULATED.3IONS-SCNC: 9 MMOL/L (ref 4–13)
AST SERPL W P-5'-P-CCNC: 35 U/L (ref 13–39)
BASOPHILS # BLD AUTO: 0.05 THOUSANDS/ÂΜL (ref 0–0.1)
BASOPHILS NFR BLD AUTO: 1 % (ref 0–1)
BILIRUB SERPL-MCNC: 0.72 MG/DL (ref 0.2–1)
BUN SERPL-MCNC: 8 MG/DL (ref 5–25)
CALCIUM SERPL-MCNC: 8.6 MG/DL (ref 8.4–10.2)
CHLORIDE SERPL-SCNC: 107 MMOL/L (ref 96–108)
CO2 SERPL-SCNC: 26 MMOL/L (ref 21–32)
CREAT SERPL-MCNC: 0.61 MG/DL (ref 0.6–1.3)
EOSINOPHIL # BLD AUTO: 0.31 THOUSAND/ÂΜL (ref 0–0.61)
EOSINOPHIL NFR BLD AUTO: 4 % (ref 0–6)
ERYTHROCYTE [DISTWIDTH] IN BLOOD BY AUTOMATED COUNT: 13.6 % (ref 11.6–15.1)
GFR SERPL CREATININE-BSD FRML MDRD: 101 ML/MIN/1.73SQ M
GLUCOSE SERPL-MCNC: 125 MG/DL (ref 65–140)
GLUCOSE SERPL-MCNC: 150 MG/DL (ref 65–140)
HCT VFR BLD AUTO: 41.9 % (ref 36.5–49.3)
HGB BLD-MCNC: 13.7 G/DL (ref 12–17)
IMM GRANULOCYTES # BLD AUTO: 0.03 THOUSAND/UL (ref 0–0.2)
IMM GRANULOCYTES NFR BLD AUTO: 0 % (ref 0–2)
LYMPHOCYTES # BLD AUTO: 2.03 THOUSANDS/ÂΜL (ref 0.6–4.47)
LYMPHOCYTES NFR BLD AUTO: 24 % (ref 14–44)
MAGNESIUM SERPL-MCNC: 1.9 MG/DL (ref 1.9–2.7)
MCH RBC QN AUTO: 32.9 PG (ref 26.8–34.3)
MCHC RBC AUTO-ENTMCNC: 32.7 G/DL (ref 31.4–37.4)
MCV RBC AUTO: 101 FL (ref 82–98)
MONOCYTES # BLD AUTO: 0.88 THOUSAND/ÂΜL (ref 0.17–1.22)
MONOCYTES NFR BLD AUTO: 11 % (ref 4–12)
NEUTROPHILS # BLD AUTO: 5.02 THOUSANDS/ÂΜL (ref 1.85–7.62)
NEUTS SEG NFR BLD AUTO: 60 % (ref 43–75)
NRBC BLD AUTO-RTO: 0 /100 WBCS
PHOSPHATE SERPL-MCNC: 2.8 MG/DL (ref 2.3–4.1)
PLATELET # BLD AUTO: 128 THOUSANDS/UL (ref 149–390)
PMV BLD AUTO: 9.7 FL (ref 8.9–12.7)
POTASSIUM SERPL-SCNC: 3.6 MMOL/L (ref 3.5–5.3)
PROT SERPL-MCNC: 5.8 G/DL (ref 6.4–8.4)
RBC # BLD AUTO: 4.16 MILLION/UL (ref 3.88–5.62)
SODIUM SERPL-SCNC: 142 MMOL/L (ref 135–147)
WBC # BLD AUTO: 8.32 THOUSAND/UL (ref 4.31–10.16)

## 2025-06-15 PROCEDURE — 83735 ASSAY OF MAGNESIUM: CPT | Performed by: STUDENT IN AN ORGANIZED HEALTH CARE EDUCATION/TRAINING PROGRAM

## 2025-06-15 PROCEDURE — 85025 COMPLETE CBC W/AUTO DIFF WBC: CPT | Performed by: STUDENT IN AN ORGANIZED HEALTH CARE EDUCATION/TRAINING PROGRAM

## 2025-06-15 PROCEDURE — 99232 SBSQ HOSP IP/OBS MODERATE 35: CPT | Performed by: EMERGENCY MEDICINE

## 2025-06-15 PROCEDURE — 80053 COMPREHEN METABOLIC PANEL: CPT | Performed by: STUDENT IN AN ORGANIZED HEALTH CARE EDUCATION/TRAINING PROGRAM

## 2025-06-15 PROCEDURE — 82948 REAGENT STRIP/BLOOD GLUCOSE: CPT

## 2025-06-15 PROCEDURE — 99239 HOSP IP/OBS DSCHRG MGMT >30: CPT | Performed by: STUDENT IN AN ORGANIZED HEALTH CARE EDUCATION/TRAINING PROGRAM

## 2025-06-15 PROCEDURE — 84100 ASSAY OF PHOSPHORUS: CPT | Performed by: STUDENT IN AN ORGANIZED HEALTH CARE EDUCATION/TRAINING PROGRAM

## 2025-06-15 RX ORDER — NALTREXONE HYDROCHLORIDE 50 MG/1
50 TABLET, FILM COATED ORAL DAILY
Qty: 30 TABLET | Refills: 0 | Status: SHIPPED | OUTPATIENT
Start: 2025-06-16 | End: 2025-07-16

## 2025-06-15 RX ORDER — GABAPENTIN 300 MG/1
300 CAPSULE ORAL EVERY 8 HOURS PRN
Qty: 30 CAPSULE | Refills: 0 | Status: SHIPPED | OUTPATIENT
Start: 2025-06-15 | End: 2025-06-25

## 2025-06-15 RX ADMIN — MULTIPLE VITAMINS W/ MINERALS TAB 1 TABLET: TAB ORAL at 08:49

## 2025-06-15 RX ADMIN — FOLIC ACID: 5 INJECTION, SOLUTION INTRAMUSCULAR; INTRAVENOUS; SUBCUTANEOUS at 08:49

## 2025-06-15 RX ADMIN — AMLODIPINE BESYLATE 10 MG: 10 TABLET ORAL at 08:49

## 2025-06-15 RX ADMIN — NALTREXONE HYDROCHLORIDE 50 MG: 50 TABLET, FILM COATED ORAL at 08:49

## 2025-06-15 RX ADMIN — PANTOPRAZOLE SODIUM 40 MG: 40 TABLET, DELAYED RELEASE ORAL at 06:05

## 2025-06-15 RX ADMIN — NICOTINE 1 PATCH: 21 PATCH, EXTENDED RELEASE TRANSDERMAL at 08:50

## 2025-06-15 RX ADMIN — GABAPENTIN 300 MG: 300 CAPSULE ORAL at 10:33

## 2025-06-15 RX ADMIN — ASPIRIN 81 MG: 81 TABLET, COATED ORAL at 08:50

## 2025-06-15 RX ADMIN — INSULIN LISPRO 1 UNITS: 100 INJECTION, SOLUTION INTRAVENOUS; SUBCUTANEOUS at 08:50

## 2025-06-15 RX ADMIN — INSULIN LISPRO 5 UNITS: 100 INJECTION, SOLUTION INTRAVENOUS; SUBCUTANEOUS at 08:50

## 2025-06-15 RX ADMIN — ATORVASTATIN CALCIUM 40 MG: 40 TABLET, FILM COATED ORAL at 08:49

## 2025-06-15 NOTE — ASSESSMENT & PLAN NOTE
Last drink prior to arrival. Serum alcohol 168 in the ED.  No Hx of withdrawal seizures  Cleared by toxicology for discharge.

## 2025-06-15 NOTE — UTILIZATION REVIEW
Initial Clinical Review    Pt initially presented to [facility] ED. Pt was transferred by EMS to [facility] for medically managed detox.    Admission: Date/Time/Statement:   Admission Orders (From admission, onward)       Ordered        06/13/25 2144  Inpatient Admission  Once                          Orders Placed This Encounter   Procedures    Inpatient Admission     Standing Status:   Standing     Number of Occurrences:   1     Level of Care:   Med Surg [16]     Estimated length of stay:   More than 2 Midnights     Certification:   I certify that inpatient services are medically necessary for this patient for a duration of greater than two midnights. See H&P and MD Progress Notes for additional information about the patient's course of treatment.         Initial Presentation: 69 y.o. male who presented to [facility] as transfer from [facility] ED  Inpatient admission for evaluation and treatment of hypokalemia T2DM, HTN, Major depressive disorder  & alcohol withdrawal syndrome. Presented initially w/ chest pain to [facility] ED, cardiac enzymes negative. Pt  reports drinking pint whiskey , 8-10 beers daily for past 2 weeks. Blood alcohol level 168.  Previous admit to detox 2/2025 no hx of withdrawal seizures . Exam: SEWS 13 . Plan: replete and monitor potassium, IVF, telemetry, continuous pulse ox, hold home metformin, amlodipine, HCTZ  Continue statin, remeron, lorazepam as needed trend labs, replete electrolytes as needed; I&O, fall & seizure precautions. Toxicology consulted.     Anticipated Length of Stay: Patient will be admitted on an inpatient basis with an anticipated length of stay of greater than 2 midnights secondary to alcohol withdrawal support, specialist input.     Toxicology: Presented w/ need for detox from alcohol. Serum ETOH: 168. Reports beer and whiskey  daily, last drink on 6/13 @ 1100, just prior to arrival . Has  prior rehab treatment for withdrawal. Reports no hx of withdrawal seizures. On exam, hypertensive . No tremor or tongue fasciculations  SEWS 13, 7  Plan: SEWS monitoring w/ phenobarbital management, high-dose IV thiamine/folic acid supplement.       Date: 6/ 14  Day 2: Pt reports . Feeling better this am, During the night pt noted to have  agitation tremor, anxiety, SEWS 10. He received IV Phenobarbital  130 mg x 2 doses.   Plan: continue SEWS monitoring w/ phenobarbital management, restart Norvasc for HTN Nicotine patch 21 mg high-dose IV thiamine/folic acid supplement, telemetry, continuous pulse ox, continue current meds, trend labs, replete electrolytes as needed. Received 910 mg  phenobarbital since admission. Continue Accucheck, SSI coverage       Date: 6/15   Day 3: Has surpassed a 2nd midnight with active treatments and services. CBC, BMP, Mg Phos 6/15 am.  Accuchecks and RISS coverage    DC SEWS today LAST SEWS score 0      Scheduled Medications:  amLODIPine, 10 mg, Oral, Daily  aspirin, 81 mg, Oral, Daily  atorvastatin, 40 mg, Oral, Daily  enoxaparin, 40 mg, Subcutaneous, Daily  folic acid 1 mg, thiamine (VITAMIN B1) 100 mg in dextrose 5 % 100 mL IV piggyback, , Intravenous, Daily  insulin lispro, 1-5 Units, Subcutaneous, HS  insulin lispro, 1-6 Units, Subcutaneous, TID AC  insulin lispro, 5 Units, Subcutaneous, TID With Meals  mirtazapine, 30 mg, Oral, HS  multivitamin-minerals, 1 tablet, Oral, Daily  naltrexone, 50 mg, Oral, Daily  nicotine, 1 patch, Transdermal, Daily  pantoprazole, 40 mg, Oral, BID AC      Continuous IV Infusions:     PRN Meds:  acetaminophen, 650 mg, Oral, Q6H PRN  cloNIDine, 0.1 mg, Oral, Q6H PRN x 1   gabapentin, 300 mg, Oral, Q8H PRN x 3   hydrOXYzine HCL, 25 mg, Oral, Q6H PRN  LORazepam, 1 mg, Oral, TID PRN  ondansetron, 4 mg, Intravenous, Q6H PRN      diazepam, 10 mg, Intravenous; x 1   phenobarbital, 650 mg, Intravenous; x 1    phenobarbital, 130 mg, Intravenous; x2     ED  Triage Vitals [06/13/25 2128]   Temperature Pulse Respirations Blood Pressure SpO2 Pain Score   98 °F (36.7 °C) 96 18 (!) 173/90 96 % No Pain     Weight (last 2 days) before discharge       Date/Time Weight    06/13/25 2128 77.6 (171)              Vital Signs (last 3 days) before discharge       Date/Time Temp Pulse Resp BP MAP (mmHg) SpO2 O2 Device Patient Position - Orthostatic VS SEWS Total Score Pain    06/15/25 0900 -- -- -- -- -- -- -- -- -- No Pain    06/15/25 0725 97.5 °F (36.4 °C) 82 18 143/84 103 98 % None (Room air) Sitting -- --    06/14/25 2027 -- -- -- -- -- -- -- -- -- No Pain    06/14/25 1839 97.8 °F (36.6 °C) 83 18 147/80 102 95 % None (Room air) Lying -- --    06/14/25 1509 97.8 °F (36.6 °C) 75 18 157/73 101 98 % None (Room air) Lying -- --    06/14/25 1500 -- -- -- -- -- -- -- -- 0 --    06/14/25 1243 -- -- -- -- -- -- -- -- 0 --    06/14/25 1242 -- -- -- 134/71 -- -- -- Lying -- --    06/14/25 1047 98.1 °F (36.7 °C) 74 18 152/76 -- 95 % None (Room air) Lying -- No Pain    06/14/25 0857 -- -- -- -- -- -- -- -- 0 --    06/14/25 0724 -- -- -- -- -- -- -- -- 0 --    06/14/25 0723 98 °F (36.7 °C) 68 18 158/84 -- 95 % None (Room air) Lying -- No Pain    06/14/25 0533 97.5 °F (36.4 °C) 90 20 163/77 -- 94 % None (Room air) Lying -- --    06/14/25 0300 -- -- -- -- -- -- -- -- 0 --    06/14/25 0125 -- -- -- -- -- -- -- -- 10 --    06/14/25 0123 98 °F (36.7 °C) -- -- 146/74 100 96 % None (Room air) Lying -- 5    06/13/25 2349 -- -- -- -- -- -- -- -- 7 --    06/13/25 2348 97.3 °F (36.3 °C) 97 18 161/67 -- 97 % None (Room air) Lying -- --    06/13/25 2146 -- -- -- -- -- -- -- -- 13 --    06/13/25 2128 98 °F (36.7 °C) 96 18 173/90 -- 96 % None (Room air) Sitting -- No Pain            SEWS:    SEWS    Row Name 06/14/25 1500 06/14/25 1243 06/14/25 0857 06/14/25 0724 06/14/25 0300   Severity of Ethanol Withdrawal Scale (SEWS)   ANXIETY: Do you feel that something bad is about to happen to you right now? 0  -LL 0   -LL 0  -LL 0  -LL 0  -TO   NAUSEA and DRY HEAVES or VOMITING? 0  -LL 0  -LL 0  -LL 0  -LL 0  -TO   SWEATING: (includes moist palms, sweating now)? Score 0 or 2 0  -LL 0  -LL 0  -LL 0  -LL 0  -TO   TREMOR: with arms extended eyes closed? 0  -LL 0  -LL 0  -LL 0  -LL 0  -TO   AGITATION: Fidgety, restless, pacing? 0  -LL 0  -LL 0  -LL 0  -LL 0  -TO   DISORIENTATION: 0  -LL 0  -LL 0  -LL 0  -LL 0  -TO   HALLUCINATIONS: 0  -LL 0  -LL 0  -LL 0  -LL 0  -TO   VITAL SIGNS: ANY (Pulse >110, Diastolic BP >90, Temp >99.6) 0  -LL 0  -LL 0  -LL 0  -LL 0  -TO   SEWS Total Score 0  -LL 0  -LL 0  -LL 0  -LL 0  -TO   Hartley Agitation Sedation Scale (RASS)   Hartley Agitation Sedation Scale (RASS) 0  -LL 0  -LL 0  -LL 0  -LL 0  -TO   Row Name 06/14/25 0125 06/13/25 2349 06/13/25 214     Severity of Ethanol Withdrawal Scale (SEWS)   ANXIETY: Do you feel that something bad is about to happen to you right now? 3  -BL 3  -TO 3  -TO     NAUSEA and DRY HEAVES or VOMITING? 0  -BL 0  -TO 0  -TO     SWEATING: (includes moist palms, sweating now)? Score 0 or 2 2  -BL 2  -TO 2  -TO     TREMOR: with arms extended eyes closed? 2  -BL 2  -TO 2  -TO     AGITATION: Fidgety, restless, pacing? 3  -BL 0  -TO 3  -TO     DISORIENTATION: 0  -BL 0  -TO 0  -TO     HALLUCINATIONS: 0  -BL 0  -TO 0  -TO     VITAL SIGNS: ANY (Pulse >110, Diastolic BP >90, Temp >99.6) 0  -BL 0  -TO 3  -TO     SEWS Total Score 10  -BL 7  -TO 13  -TO     Hartley Agitation Sedation Scale (RASS)   Hartley Agitation Sedation Scale (RASS) +1  -BL 0  -TO +1  -TO       User Key  (r) = Recorded By, (t) = Taken By, (c) =    Pertinent Labs/Diagnostic Test Results:     Results from last 7 days   Lab Units 06/15/25  9336 06/14/25  0532 06/13/25  1555   WBC Thousand/uL 8.32 11.40* 7.90   HEMOGLOBIN g/dL 13.7 15.1 14.4   HEMATOCRIT % 41.9 46.5 41.5   PLATELETS Thousands/uL 128* 144* 153   TOTAL NEUT ABS Thousands/µL 5.02  --  5.05         Results from last 7 days   Lab Units 06/15/25  0451  "06/14/25  0532 06/13/25  1555   SODIUM mmol/L 142 136 138   POTASSIUM mmol/L 3.6 4.3 3.3*   CHLORIDE mmol/L 107 104 101   CO2 mmol/L 26 23 24   ANION GAP mmol/L 9 9 13   BUN mg/dL 8 8 6   CREATININE mg/dL 0.61 0.59* 0.55*   EGFR ml/min/1.73sq m 101 103 106   CALCIUM mg/dL 8.6 8.7 8.8   MAGNESIUM mg/dL 1.9 1.8*  --    PHOSPHORUS mg/dL 2.8 2.8  --      Results from last 7 days   Lab Units 06/15/25  0451 06/14/25  0532   AST U/L 35 47*   ALT U/L 26 34   ALK PHOS U/L 71 75   TOTAL PROTEIN g/dL 5.8* 6.2*   ALBUMIN g/dL 3.5 3.7   TOTAL BILIRUBIN mg/dL 0.72 0.84     Results from last 7 days   Lab Units 06/15/25  0556 06/14/25  2025 06/14/25  1546 06/14/25  1044 06/14/25  0627 06/13/25  2151   POC GLUCOSE mg/dl 150* 136 160* 213* 103 147*     Results from last 7 days   Lab Units 06/15/25  0451 06/14/25  0532 06/13/25  1555   GLUCOSE RANDOM mg/dL 125 93 130             No results found for: \"BETA-HYDROXYBUTYRATE\"                   Results from last 7 days   Lab Units 06/13/25 2004 06/13/25  1756 06/13/25  1555   HS TNI 0HR ng/L  --   --  6   HS TNI 2HR ng/L  --  5  --    HSTNI D2 ng/L  --  -1  --    HS TNI 4HR ng/L 8  --   --    HSTNI D4 ng/L 2  --   --                                                                                      Results from last 7 days   Lab Units 06/13/25 2004   ETHANOL LVL mg/dL 168*                                     Past Medical History[1]  Present on Admission:   Alcohol withdrawal with complication with inpatient treatment (HCC)   Diabetes mellitus type 2, noninsulin dependent (HCC)   Primary hypertension   Mixed hyperlipidemia   Tobacco use disorder   Alcohol use disorder, severe, dependence (HCC)   Hypokalemia   MDD (major depressive disorder), recurrent episode ([facility])      Admitting Diagnosis: Chest pain, unspecified [R07.9]  Age/Sex: 69 y.o. male      JOSE PHENOBARBITAL PROTOCOL FOR ALCOHOL WITHDRAWAL  Admit patient to medical detox unit and continue supportive care and stabilization of " acute ethanol withdrawal per medical toxicology/detox treatment pathway. Monitor ethanol withdrawal severity via the Severity of Ethanol Withdrawal Scale (SEWS) Q4 hours and then hourly if/when SEWS > 6. Treat withdrawal per pathway and reassess Q30-60 minutes.          Mild SEWS Score 1-6  Administer medications* (IV or PO; PO preferred):  If initial SEWS score: diazepam 10mg PO/IV x 1 AND phenobarbital 65 mg PO/IV x 1  If repeat SEWS score 1-6: phenobarbital 65 mg PO/IV q1 hour x 5 doses maximum   Reassessment:   SEWS q1 hour after each dose until SEWS 0 x 2 hours  VS q1 hours (until SEWS 0, then q4 hours)  Notify provider for bedside evaluation if 5-dose maximum is reached, RASS of -3 to -5, or SEWS score escalates to moderate or severe.   Moderate SEWS Score 7-12  Administer medications* (IV):  If initial SEWS score: diazepam 10mg IV x 1 AND phenobarbital 260 mg IV x 1  If repeat SEWS score 7-12 or score escalated from mild: phenobarbital 130 mg IV q30 minutes x 5 doses maximum   Reassessment:  SEWS q30 minutes after each dose until SEWS < 7 (then hourly until SEWS 0 x 2 hours)  VS q30 minutes until SEWS < 7 (then hourly until SEWS 0, then q4 hours)  Notify provider for bedside evaluation if 5-dose maximum is reached, RASS of -3 to -5, or SEWS score escalates to severe.   Severe SEWS Score >= 13  Administer medications* (IV):  If initial SEWS score: Diazepam 10 mg IV x 1 AND phenobarbital 650 mg IV piggyback x 1 over 15-30 minutes  If repeat SEWS score >= 13 or score escalated from mild or moderate: phenobarbital 130 mg IV q30 minutes x 5 doses maximum   Reassessment:  SEWS q30 minutes after each dose until SEWS < 7 (then hourly until SEWS 0 x 2 hours)   VS q30 minutes until SEWS < 7 (then hourly until SEWS 0, then q4 hours)  Notify provider for bedside evaluation if 5-dose maximum is reached or RASS of -3 to -5   *Hold medications and notify provider if CNS depression, respirations < 10/min, or RASS of -3 to  -5.        Network Utilization Review Department  ATTENTION: Please call with any questions or concerns to [phone] and carefully listen to the prompts so that you are directed to the right person. All voicemails are confidential.   For Discharge needs, contact Care Management DC Support Team at [phone] opt. 2  Send all requests for admission clinical reviews, approved or denied determinations and any other requests to dedicated fax number below belonging to the campus where the patient is receiving treatment. List of dedicated fax numbers for the Facilities:  FACILITY NAME UR FAX NUMBER   ADMISSION DENIALS (Administrative/Medical Necessity) [phone]   DISCHARGE SUPPORT TEAM (NETWORK) [phone]   PARENT CHILD HEALTH (Maternity/NICU/Pediatrics) [phone]   [facility] [phone]   [facility] [phone]   [facility] [phone]   [facility] [phone]   [facility] [phone]   [facility] [phone]   [facility] [phone]   [facility] [phone]   [facility] [phone]   [facility] [phone]   [facility] [phone]   [facility] [phone]              [1]   Past Medical History:  Diagnosis Date    Colon polyp     Diabetes mellitus (HCC)     Hyperlipidemia     Hypertension

## 2025-06-15 NOTE — ASSESSMENT & PLAN NOTE
Repleted    Recent Labs     06/13/25  1555 06/14/25  0532 06/15/25  0451   K 3.3* 4.3 3.6   MG  --  1.8* 1.9

## 2025-06-15 NOTE — ASSESSMENT & PLAN NOTE
Lab Results   Component Value Date    HGBA1C 7.0 (H) 03/26/2025       Recent Labs     06/14/25  1044 06/14/25  1546 06/14/25  2025 06/15/25  0556   POCGLU 213* 160* 136 150*       Blood Sugar Average: Last 72 hrs:  (P) 151.5

## 2025-06-15 NOTE — PROGRESS NOTES
Progress Note - Medical Toxicology   Name: Oscar Garcia 69 y.o. male I MRN: [id number]  Unit/Bed#: 5T DETOX 504-01 I Date of Admission: 6/13/2025   Date of Service: 6/15/2025 I Hospital Day: 2    Assessment & Plan  Alcohol withdrawal with complication with inpatient treatment (HCC)  Withdrawal adequately controlled with phenobarbital. Last 4 SEWS scores were 0.   Will d/c SEWS today  He is stable from a med tox standpoint for dispo per the primary team  Alcohol use disorder, severe, dependence (HCC)  CM/CRS involved for aftercare planning and linkage to ongoing AUD treatment after discharge  He received Vivitrol during his last admission and his psychiatrist has prescribed PO naltrexone, but he hasn't started taking it  PO naltrexone started  Pt requests something for anxiety. He has received a couple doses of gabapentin here. It's reasonable to d/c with gabapentin 300mg TID for a couple weeks.   Diabetes mellitus type 2, noninsulin dependent ([facility])  Lab Results   Component Value Date    HGBA1C 7.0 (H) 03/26/2025       Recent Labs     06/14/25  1044 06/14/25  1546 06/14/25  2025 06/15/25  0556   POCGLU 213* 160* 136 150*       Blood Sugar Average: Last 72 hrs:  (P) 151.5    Primary hypertension    Mixed hyperlipidemia    Tobacco use disorder    Hypokalemia    MDD (major depressive disorder), recurrent episode (HCC)      Reason for current consult: alcohol withdrawal     Subjective   Feels well. No acute events overngiht    Objective :  Temp:  [97.5 °F (36.4 °C)-98.1 °F (36.7 °C)] 97.5 °F (36.4 °C)  HR:  [74-83] 82  BP: (134-157)/(71-84) 143/84  Resp:  [18] 18  SpO2:  [95 %-98 %] 98 %  O2 Device: None (Room air)      Intake/Output Summary (Last 24 hours) at 6/15/2025 0932  Last data filed at 6/15/2025 0901  Gross per 24 hour   Intake 480 ml   Output --   Net 480 ml       Physical Exam  Constitutional:       General: He is not in acute distress.     Appearance: Normal appearance.     Cardiovascular:      Rate and  Rhythm: Normal rate.   Pulmonary:      Effort: Pulmonary effort is normal.     Neurological:      Mental Status: He is alert and oriented to person, place, and time.           Lab Results: I have reviewed the following results:CBC/BMP:   .     06/15/25  0451   WBC 8.32   HGB 13.7   HCT 41.9   *   SODIUM 142   K 3.6      CO2 26   BUN 8   CREATININE 0.61   GLUC 125   MG 1.9   PHOS 2.8        Imaging Results Review: No pertinent imaging studies reviewed.  Other Study Results Review: No additional pertinent studies reviewed.    Administrative Statements   I have spent a total time of 15 minutes in caring for this patient on the day of the visit/encounter including Impressions, Documenting in the medical record, Obtaining or reviewing history  , and Communicating with other healthcare professionals .

## 2025-06-15 NOTE — ASSESSMENT & PLAN NOTE
Lab Results   Component Value Date    HGBA1C 7.0 (H) 03/26/2025       Recent Labs     06/14/25  1044 06/14/25  1546 06/14/25  2025 06/15/25  0556   POCGLU 213* 160* 136 150*       Blood Sugar Average: Last 72 hrs:  (P) 151.5  Continue home regimen

## 2025-06-15 NOTE — PLAN OF CARE
Problem: INFECTION - ADULT  Goal: Absence or prevention of progression during hospitalization  Description: INTERVENTIONS:  - Assess and monitor for signs and symptoms of infection  - Monitor lab/diagnostic results  - Monitor all insertion sites, i.e. indwelling lines, tubes, and drains  - Monitor endotracheal if appropriate and nasal secretions for changes in amount and color  - [place] appropriate cooling/warming therapies per order  - Administer medications as ordered  - Instruct and encourage patient and family to use good hand hygiene technique  - Identify and instruct in appropriate isolation precautions for identified infection/condition  6/14/2025 2028 by Marquis Anand RN  Outcome: Progressing  6/14/2025 0654 by Marquis Anand RN  Outcome: Progressing  Goal: Absence of fever/infection during neutropenic period  Description: INTERVENTIONS:  - Monitor WBC  - Perform strict hand hygiene  - Limit to healthy visitors only  - No plants, dried, fresh or silk flowers with ramirez in patient room  6/14/2025 2028 by Marquis Anand RN  Outcome: Progressing  6/14/2025 0654 by Marquis Anand RN  Outcome: Progressing     Problem: SAFETY ADULT  Goal: Patient will remain free of falls  Description: INTERVENTIONS:  - Educate patient/family on patient safety including physical limitations  - Instruct patient to call for assistance with activity   - Consider consulting OT/PT to assist with strengthening/mobility based on AM PAC & JH-HLM score  - Consult OT/PT to assist with strengthening/mobility   - Keep Call bell within reach  - Keep bed low and locked with side rails adjusted as appropriate  - Keep care items and personal belongings within reach  - Initiate and maintain comfort rounds  - Make Fall Risk Sign visible to staff  - Apply yellow socks and bracelet for high fall risk patients  - Consider moving patient to room near nurses station  Outcome: Progressing  Goal: Maintain or return to baseline ADL  function  Description: INTERVENTIONS:  -  Assess patient's ability to carry out ADLs; assess patient's baseline for ADL function and identify physical deficits which impact ability to perform ADLs (bathing, care of mouth/teeth, toileting, grooming, dressing, etc.)  - Assess/evaluate cause of self-care deficits   - Assess range of motion  - Assess patient's mobility; develop plan if impaired  - Assess patient's need for assistive devices and provide as appropriate  - Encourage maximum independence but intervene and supervise when necessary  - Involve family in performance of ADLs  - Assess for home care needs following discharge   - Consider OT consult to assist with ADL evaluation and planning for discharge  - Provide patient education as appropriate  - Monitor functional capacity and physical performance, use of AM PAC & JH-HLM   - Monitor gait, balance and fatigue with ambulation    Outcome: Progressing  Goal: Maintains/Returns to pre admission functional level  Description: INTERVENTIONS:  - Perform AM-PAC 6 Click Basic Mobility/ Daily Activity assessment daily.  - Set and communicate daily mobility goal to care team and patient/family/caregiver.   - Collaborate with rehabilitation services on mobility goals if consulted  - Out of bed for toileting  - Record patient progress and toleration of activity level   Outcome: Progressing     Problem: DISCHARGE PLANNING  Goal: Discharge to home or other facility with appropriate resources  Description: INTERVENTIONS:  - Identify barriers to discharge w/patient and caregiver  - Arrange for needed discharge resources and transportation as appropriate  - Identify discharge learning needs (meds, wound care, etc.)  - Arrange for interpretive services to assist at discharge as needed  - Refer to Case Management Department for coordinating discharge planning if the patient needs post-hospital services based on physician/advanced practitioner order or complex needs related to  functional status, cognitive ability, or social support system  6/14/2025 2028 by Marquis Anand RN  Outcome: Progressing  6/14/2025 0654 by Marquis Anand RN  Outcome: Progressing

## 2025-06-15 NOTE — ASSESSMENT & PLAN NOTE
CM/CRS involved for aftercare planning and linkage to ongoing AUD treatment after discharge  He received Vivitrol during his last admission and his psychiatrist has prescribed PO naltrexone, but he hasn't started taking it  PO naltrexone started  Pt requests something for anxiety. He has received a couple doses of gabapentin here. It's reasonable to d/c with gabapentin 300mg TID for a couple weeks.    118

## 2025-06-15 NOTE — PLAN OF CARE
Problem: INFECTION - ADULT  Goal: Absence or prevention of progression during hospitalization  Description: INTERVENTIONS:  - Assess and monitor for signs and symptoms of infection  - Monitor lab/diagnostic results  - Monitor all insertion sites, i.e. indwelling lines, tubes, and drains  - Monitor endotracheal if appropriate and nasal secretions for changes in amount and color  - [place] appropriate cooling/warming therapies per order  - Administer medications as ordered  - Instruct and encourage patient and family to use good hand hygiene technique  - Identify and instruct in appropriate isolation precautions for identified infection/condition  6/15/2025 0455 by Marquis Anand RN  Outcome: Progressing  6/14/2025 2028 by Marquis Anand RN  Outcome: Progressing  Goal: Absence of fever/infection during neutropenic period  Description: INTERVENTIONS:  - Monitor WBC  - Perform strict hand hygiene  - Limit to healthy visitors only  - No plants, dried, fresh or silk flowers with ramirez in patient room  6/15/2025 0455 by Marquis Anand RN  Outcome: Progressing  6/14/2025 2028 by Marquis Anand RN  Outcome: Progressing

## 2025-06-15 NOTE — ASSESSMENT & PLAN NOTE
Withdrawal adequately controlled with phenobarbital. Last 4 SEWS scores were 0.   Will d/c SEWS today  He is stable from a med tox standpoint for dispo per the primary team

## 2025-06-15 NOTE — DISCHARGE SUMMARY
Discharge Summary - Hospitalist   Name: Oscar Garcia 69 y.o. male I MRN: [id number]  Unit/Bed#: 5T [facility] 504-01 I Date of Admission: 6/13/2025   Date of Service: 6/15/2025 I Hospital Day: 2     Assessment & Plan  Alcohol withdrawal with complication with inpatient treatment (HCC)  Last drink prior to arrival. Serum alcohol 168 in the ED.  No Hx of withdrawal seizures  Cleared by toxicology for discharge.  Alcohol use disorder, severe, dependence (HCC)  Pt with a h/o of daily alcohol use   Drinks 1 pint of whiskey +4 beers for the past 2 to 3 weeks daily  Previous admission to the [facility] 2/4/2025  Thiamine, folic acid, MVI  Cleared by case management for discharge  Hypokalemia  Repleted    Recent Labs     06/13/25  1555 06/14/25  0532 06/15/25  0451   K 3.3* 4.3 3.6   MG  --  1.8* 1.9     Diabetes mellitus type 2, noninsulin dependent (HCC)  Lab Results   Component Value Date    HGBA1C 7.0 (H) 03/26/2025       Recent Labs     06/14/25  1044 06/14/25  1546 06/14/25  2025 06/15/25  0556   POCGLU 213* 160* 136 150*       Blood Sugar Average: Last 72 hrs:  (P) 151.5  Continue home regimen  Primary hypertension  Home regimen: Amlodipine 10 mg and HCTZ 12.5 mg  Mixed hyperlipidemia  Continue home statin  Tobacco use disorder  Smoking cessation recommended  MDD (major depressive disorder), recurrent episode (HCC)  Patient with chronic anxiety and depression   Continue home Remeron and lorazepam as needed     Discharging Physician / Practitioner: Oscar Law MD  PCP: Alon Dickey MD  Admission Date:   Admission Orders (From admission, onward)       Ordered        06/13/25 2144  Inpatient Admission  Once                          Discharge Date: 06/15/25    Medical Problems       Resolved Problems  Date Reviewed: 6/13/2025   None         Consultations During Hospital Stay:  toxicology    Procedures Performed:   none    Significant Findings / Test Results:   Imaging  XR Chest pa and lateral:    No acute  "cardiopulmonary disease.     2 small nodular opacities over the right base, one a nipple shadow, the other likely infectious/inflammatory, as it was not present in February 2025.    Incidental Findings:   As written above     Test Results Pending at Discharge (will require follow up):   none     Outpatient Tests Requested:  PCP  CBC, BMP    Complications:  none    Reason for Admission: alcohol withdrawal    History of present illness:    \"Oscar Garcia is a 69 y.o. male with a PMH of diabetes mellitus type 2 not on insulin with hypertension and hyperlipidemia, atherosclerosis, tobacco abuse, alcohol dependence who presents with chest pain. Patient reported chest pain  at B, cardiac enzymes were negative and patient was cleared. Patient reports drinking pint whiskey with 8-10 beers for past 2 weeks. No hx of alcohol withdrawal seizures. +previous admission for Detox 2/2025. Patient believes he was sober about 3 months. No particular reason that he started drinking. He has a psychiatry that he follows for Depression and anxiety and is planning to try TMS.\"     Hospital Course:     He was admitted for alcohol detox. [facility] under [facility]ology's care. He clinically improved and has since then been cleared by toxicology for discharge. He declined rehabilitation facilities at this time.    Patient agrees to follow-up with his providers as scheduled and to take his medications as prescribed. All questions were addressed.    he understood the need to seek immediate medical attention should he develop any chest pain, shortness of breath, severe pain, fever, chills, or any other concerning symptoms.  Please see above list of diagnoses and related plan for additional information.     Condition at Discharge: good     Discharge Day Visit / Exam:     Subjective:  patient seen and examined at bedside. Comfortable, no new issues or complaints overnight.   Vitals: Blood Pressure: 143/84 (06/15/25 " "0725)  Pulse: 82 (06/15/25 0725)  Temperature: 97.5 °F (36.4 °C) (06/15/25 0725)  Temp Source: Temporal (06/15/25 0725)  Respirations: 18 (06/15/25 0725)  Height: 5' 9\" (175.3 cm) (06/13/25 2128)  Weight - Scale: 77.6 kg (171 lb) (06/13/25 2128)  SpO2: 98 % (06/15/25 0725)  Exam:   Physical Exam  Vitals reviewed.   Constitutional:       General: He is not in acute distress.  HENT:      Head: Normocephalic.      Nose: Nose normal.      Mouth/Throat:      Mouth: Mucous membranes are moist.     Eyes:      General: No scleral icterus.      Cardiovascular:      Rate and Rhythm: Normal rate and regular rhythm.   Pulmonary:      Effort: Pulmonary effort is normal. No respiratory distress.      Breath sounds: No wheezing.   Abdominal:      General: There is no distension.      Palpations: Abdomen is soft.      Tenderness: There is no abdominal tenderness.     Skin:     General: Skin is warm.     Neurological:      Mental Status: He is alert and oriented to person, place, and time.     Psychiatric:         Mood and Affect: Mood normal.         Behavior: Behavior normal.       Discharge instructions/Information to patient and family:   See after visit summary for information provided to patient and family.      Provisions for Follow-Up Care:  See after visit summary for information related to follow-up care and any pertinent home health orders.      Disposition:     Home    Planned Readmission: No     Discharge Statement:  I spent 40 minutes discharging the patient. This time was spent on the day of discharge. I had direct contact with the patient on the day of discharge. Greater than 50% of the total time was spent examining patient, answering all patient questions, arranging and discussing plan of care with patient as well as directly providing post-discharge instructions.  Additional time then spent on discharge activities.    Discharge Medications:  See after visit summary for reconciled discharge medications provided to " patient and family.      ** Please Note: This note has been constructed using a voice recognition system **

## 2025-06-15 NOTE — PLAN OF CARE
Problem: INFECTION - ADULT  Goal: Absence or prevention of progression during hospitalization  Description: INTERVENTIONS:  - Assess and monitor for signs and symptoms of infection  - Monitor lab/diagnostic results  - Monitor all insertion sites, i.e. indwelling lines, tubes, and drains  - Monitor endotracheal if appropriate and nasal secretions for changes in amount and color  - [place] appropriate cooling/warming therapies per order  - Administer medications as ordered  - Instruct and encourage patient and family to use good hand hygiene technique  - Identify and instruct in appropriate isolation precautions for identified infection/condition  Outcome: Progressing  Goal: Absence of fever/infection during neutropenic period  Description: INTERVENTIONS:  - Monitor WBC  - Perform strict hand hygiene  - Limit to healthy visitors only  - No plants, dried, fresh or silk flowers with ramirez in patient room  Outcome: Progressing     Problem: SAFETY ADULT  Goal: Patient will remain free of falls  Description: INTERVENTIONS:  - Educate patient/family on patient safety including physical limitations  - Instruct patient to call for assistance with activity   - Consider consulting OT/PT to assist with strengthening/mobility based on AM PAC & JH-HLM score  - Consult OT/PT to assist with strengthening/mobility   - Keep Call bell within reach  - Keep bed low and locked with side rails adjusted as appropriate  - Keep care items and personal belongings within reach  - Initiate and maintain comfort rounds  - Make Fall Risk Sign visible to staff  - Apply yellow socks and bracelet for high fall risk patients  - Consider moving patient to room near nurses station  Outcome: Progressing  Goal: Maintain or return to baseline ADL function  Description: INTERVENTIONS:  -  Assess patient's ability to carry out ADLs; assess patient's baseline for ADL function and identify physical deficits which impact ability to perform ADLs (bathing, care  of mouth/teeth, toileting, grooming, dressing, etc.)  - Assess/evaluate cause of self-care deficits   - Assess range of motion  - Assess patient's mobility; develop plan if impaired  - Assess patient's need for assistive devices and provide as appropriate  - Encourage maximum independence but intervene and supervise when necessary  - Involve family in performance of ADLs  - Assess for home care needs following discharge   - Consider OT consult to assist with ADL evaluation and planning for discharge  - Provide patient education as appropriate  - Monitor functional capacity and physical performance, use of AM PAC & JH-HLM   - Monitor gait, balance and fatigue with ambulation    Outcome: Progressing  Goal: Maintains/Returns to pre admission functional level  Description: INTERVENTIONS:  - Perform AM-PAC 6 Click Basic Mobility/ Daily Activity assessment daily.  - Set and communicate daily mobility goal to care team and patient/family/caregiver.   - Collaborate with rehabilitation services on mobility goals if consulted  - Out of bed for toileting  - Record patient progress and toleration of activity level   Outcome: Progressing     Problem: DISCHARGE PLANNING  Goal: Discharge to home or other facility with appropriate resources  Description: INTERVENTIONS:  - Identify barriers to discharge w/patient and caregiver  - Arrange for needed discharge resources and transportation as appropriate  - Identify discharge learning needs (meds, wound care, etc.)  - Arrange for interpretive services to assist at discharge as needed  - Refer to Case Management Department for coordinating discharge planning if the patient needs post-hospital services based on physician/advanced practitioner order or complex needs related to functional status, cognitive ability, or social support system  Outcome: Progressing

## 2025-06-16 ENCOUNTER — PATIENT OUTREACH (OUTPATIENT)
Dept: CASE MANAGEMENT | Facility: OTHER | Age: 70
End: 2025-06-16

## 2025-06-16 ENCOUNTER — TRANSITIONAL CARE MANAGEMENT (OUTPATIENT)
Age: 70
End: 2025-06-16

## 2025-06-16 NOTE — PROGRESS NOTES
06/16/25 0929   Other Referral/Resources/Interventions Provided:   Financial Resources Provided Indigent Transportation   Referrals Provided: AuntBertha;Crisis Hotline;Other (Specify);Support Group;Psychiatrist  (IP and OP AUD treatment resources)   Discharge Communications   Discharge planning discussed with: pt   Freedom of Choice Yes   Comments - Freedom of Choice Pt chose to return to his current OP psychiatrist; declined additional services   Were Treatment Team discharge recommendations reviewed with patient/caregiver? Yes   Did patient/caregiver verbalize understanding of patient care needs? Yes   Were patient/caregiver advised of the risks associated with not following Treatment Team discharge recommendations? Yes     CM was made aware that pt was medically stable for discharge. Pt to discharge 6/15/25. Pt denies any withdrawal symptoms at this time. Pt to discharge to home and was provided with Dolly upon discharge. Pt to follow up with  Dr. Alon Dickey  (PCP)in 1 week and with Dr Barboza (psychiatrist) on 6/17/25. Pt's medications were sent to preferred pharmacy.     Discharge Address: [address] [place] [address]   [place] [address]     Phone Number: [phone] (Other Phone)    [phone]

## 2025-06-16 NOTE — UTILIZATION REVIEW
NOTIFICATION OF ADMISSION DISCHARGE   This is a Notification of Discharge from [facility]. Please be advised that this patient has been discharge from our facility. Below you will find the admission and discharge date and time including the patient’s disposition.   UTILIZATION REVIEW CONTACT:  Utilization Review Assistants  Network Utilization Review Department  Phone: [phone] x carefully listen to the prompts. All voicemails are confidential.  Email: [email or web address]     ADMISSION INFORMATION  PRESENTATION DATE: 6/13/2025  9:17 PM  OBERVATION ADMISSION DATE: N/A  INPATIENT ADMISSION DATE: 6/13/25  9:17 PM   DISCHARGE DATE: 6/15/2025 10:57 AM   DISPOSITION:Home/Self Care    Network Utilization Review Department  ATTENTION: Please call with any questions or concerns to [phone] and carefully listen to the prompts so that you are directed to the right person. All voicemails are confidential.   For Discharge needs, contact Care Management DC Support Team at [phone] opt. 2  Send all requests for admission clinical reviews, approved or denied determinations and any other requests to dedicated fax number below belonging to the campus where the patient is receiving treatment. List of dedicated fax numbers for the Facilities:  FACILITY NAME UR FAX NUMBER   ADMISSION DENIALS (Administrative/Medical Necessity) [phone]   DISCHARGE SUPPORT TEAM ([facility]) [phone]   PARENT CHILD HEALTH (Maternity/NICU/Pediatrics) [phone]   [facility] [phone]   [facility] [phone]   [facility] [phone]   [facility] [phone]   [facility] [phone]   [facility] [phone]   [facility] [phone]   [facility] [phone]   [facility]  [facility] [phone]   Critical access hospital [phone]   [facility] [phone]   [facility] [phone]

## 2025-06-17 ENCOUNTER — TELEPHONE (OUTPATIENT)
Age: 70
End: 2025-06-17

## 2025-06-17 ENCOUNTER — PATIENT MESSAGE (OUTPATIENT)
Dept: CASE MANAGEMENT | Facility: OTHER | Age: 70
End: 2025-06-17

## 2025-06-17 ENCOUNTER — PATIENT OUTREACH (OUTPATIENT)
Dept: CASE MANAGEMENT | Facility: OTHER | Age: 70
End: 2025-06-17

## 2025-06-17 NOTE — PROGRESS NOTES
Outpatient Care Management Note    HRR referral received. Covering RN CM received in-basket message with HRR referral. Chart reviewed.  Per chart, patient with PMH that includes: atherosclerosis, type 2 diabetes, hypertension, hyperlipidemia, and alcohol use disorder.    Patient presented to [facility] on 6/13/25 for chest pain and alcohol use disorder. Following work up for chest pain, patient interested in medical detox.  Patient was transferred to [facility].  Hospitalized at [facility] 6/13//25.    Covering RN CM will place outreach call for follow up to hospitalization and to assess for care management needs.  RN CM placed outreach call to preferred number listed in Epic, brother Solis (on communication consent).  No answer received. Left general VM with covering RN CM contact information for return call today as well as for case team DAYANNA Sweet for return call after today    Covering RN CM will schedule a second outreach attempt for later this week to be completed by assigned case team DAYANNA Sweet.

## 2025-06-17 NOTE — PROGRESS NOTES
Transition of Care Visit:  Name: Oscra Garcia      : 1955      MRN: [id number]  Encounter Provider: Mark Unger DO  Encounter Date: 2025   Encounter department: [facility] INTERNAL MEDICINE    Assessment & Plan  Alcohol use disorder, severe, in early remission, dependence (HCC)  He presents to clinic today for TCM visit following hospitalization at [facility] from 2025 through 06/15/2025 for alcohol withdrawal with complication with inpatient treatment.    Patient originally presented to [facility] with chest pain.  Cardiac enzymes were negative and patient was cleared for ACS.    He reported drinking whiskey a pint with 8-10 beers for the past 2 weeks.  He denied any history of alcohol drawl seizures.    He did have previous admissions for detox in 2025.  He believes he was sober for about 3 months.  He did not cite any particular reason that he started drinking again.    He has a psychiatrist that he follows for depression anxiety.  He would like to establish with a therapist and referral was placed for him today.    Patient was ultimately admitted to [place] for alcohol detox.  He was under toxicologies care.  He clinically improved and was cleared by toxicology for discharge.  He declined rehabilitation facilities at time of discharge.  He presents today compliant with naltrexone 50 mg daily.  He also remains compliant with gabapentin 300 mg every 8 hours as needed.  He states that he has not had an alcoholic beverage since his discharge.  He has not had any cravings either.  He is serious about his sobriety and wishes to maintain sobriety.  Orders:    Ambulatory referral to Psych Services; Future    Anxiety and depression  History of anxiety and depression.  Sees a psychiatrist and request referral to behavioral therapy.  Referral placed at today's visit.  Continue Remeron.  Patient reports taking 30 mg daily at  bedtime.  Continue Paxil at 20 mg daily.  Patient states he is no longer taking lorazepam or Xanax.    Orders:    PARoxetine (PAXIL) 20 mg tablet; Take 1 tablet (20 mg total) by mouth daily    Ambulatory referral to Psych Services; Future    Type 2 diabetes mellitus with diabetic peripheral angiopathy without gangrene, without long-term current use of insulin ([facility])    Lab Results   Component Value Date    HGBA1C 7.0 (H) 03/26/2025     Patient with history of type 2 diabetes.  Most recent A1c from March 2025 was controlled at 7.0%.    Continue metformin at 1000 mg daily.  Continue to trend A1c.  May need to increase metformin to twice daily if A1c remains borderline or elevated.       Primary hypertension  Blood pressure acceptable in clinic today at 126/78.  Continue amlodipine 10 mg daily and hydrochlorothiazide 12.5 mg daily.       Mixed hyperlipidemia  Continue atorvastatin 40 mg daily.            History of Present Illness     Transitional Care Management Review:   Oscar Garcia is a 69 y.o. male here for TCM follow up.     During the TCM phone call patient stated:  TCM Call (since 6/4/2025)       Date and time call was made  6/16/2025 10:19 AM    Hospital care reviewed  Records reviewed    Patient was hospitialized at  [facility]    Date of Admission  06/13/25    Date of discharge  06/15/25    Diagnosis  Alcohol withdrawal with complication with inpatient treatment ([facility])    Disposition  Home    Were the patients medications reviewed and updated  Yes          TCM Call (since 6/4/2025)       Scheduled for follow up?  Yes  appt 06/18/25    Did you obtain your prescribed medications  Yes    I have advised the patient to call PCP with any new or worsening symptoms  Neetu Maynard CMA          Mr. Oscar Garcia is a 69-year-old male with past medical history of hypertension, atherosclerotic PVD with intermittent claudication, chronic bronchitis, type 2 diabetes, depression, alcohol use disorder with  "history of withdrawal, hyperlipidemia.  He presents to clinic today for TCM visit following hospitalization at [facility] from 06/13/2025 through 06/15/2025 for alcohol withdrawal with complication with inpatient treatment.  Patient originally presented to [facility] with chest pain.  Cardiac enzymes were negative and patient was cleared for ACS.  He reported drinking whiskey a pint with 8-10 beers for the past 2 weeks.  He denied any history of alcohol drawl seizures.  He did have previous admissions for detox in February 2025.  He believes he was sober for about 3 months.  He did not cite any particular reason that he started drinking again.  He has a psychiatrist that he follows for depression anxiety.  Patient was admitted to [place] for alcohol detox.  He was under toxicologies care.  He clinically improved and was cleared by toxicology for discharge.  He declined rehabilitation facilities at time of discharge.      Review of Systems   Constitutional:  Negative for chills and fever.   HENT:  Negative for congestion and rhinorrhea.    Respiratory:  Negative for cough, shortness of breath and wheezing.    Cardiovascular:  Negative for chest pain and leg swelling.   Gastrointestinal:  Negative for abdominal pain, constipation, diarrhea, nausea and vomiting.   Genitourinary:  Negative for difficulty urinating and dysuria.   Musculoskeletal:  Negative for arthralgias and back pain.   Skin:  Negative for rash and wound.   Neurological:  Negative for dizziness, weakness and headaches.   Psychiatric/Behavioral:  Negative for agitation, behavioral problems and confusion.      Objective   /78   Pulse 88   Ht 5' 9\" (1.753 m)   Wt 80.7 kg (178 lb)   SpO2 99%   BMI 26.29 kg/m²     Physical Exam  Constitutional:       Appearance: Normal appearance.   HENT:      Right Ear: External ear normal.      Left Ear: External ear normal.     Eyes:      Extraocular " Movements: Extraocular movements intact.      Conjunctiva/sclera: Conjunctivae normal.      Pupils: Pupils are equal, round, and reactive to light.       Cardiovascular:      Rate and Rhythm: Normal rate and regular rhythm.      Pulses: Normal pulses.      Heart sounds: Normal heart sounds. No murmur heard.  Pulmonary:      Effort: Pulmonary effort is normal. No respiratory distress.      Breath sounds: Normal breath sounds. No wheezing, rhonchi or rales.   Abdominal:      General: Abdomen is flat. Bowel sounds are normal. There is no distension.      Palpations: Abdomen is soft.      Tenderness: There is no abdominal tenderness.     Musculoskeletal:      Right lower leg: No edema.      Left lower leg: No edema.     Skin:     General: Skin is warm and dry.     Neurological:      Mental Status: He is alert and oriented to person, place, and time.     Psychiatric:         Mood and Affect: Mood normal.         Behavior: Behavior normal.         Thought Content: Thought content normal.       Medications have been reviewed by provider in current encounter

## 2025-06-17 NOTE — TELEPHONE ENCOUNTER
Oscar called stating that he missed a call from [facility] about something being approved, unsure if it is for after care since being discharged from the hospital, seen there is a referral that was placed for chest pain 6/15 that has a connection to [facility].    Stated he attempted to contact [facility] to clarify however could not get through to anyone. Stated that they were to reach out to provider, wanted to know what it was about.     Please advise.

## 2025-06-18 ENCOUNTER — TELEPHONE (OUTPATIENT)
Age: 70
End: 2025-06-18

## 2025-06-18 ENCOUNTER — OFFICE VISIT (OUTPATIENT)
Age: 70
End: 2025-06-18
Payer: COMMERCIAL

## 2025-06-18 VITALS
HEIGHT: 69 IN | SYSTOLIC BLOOD PRESSURE: 126 MMHG | DIASTOLIC BLOOD PRESSURE: 78 MMHG | OXYGEN SATURATION: 99 % | BODY MASS INDEX: 26.36 KG/M2 | WEIGHT: 178 LBS | HEART RATE: 88 BPM

## 2025-06-18 DIAGNOSIS — E78.2 MIXED HYPERLIPIDEMIA: Chronic | ICD-10-CM

## 2025-06-18 DIAGNOSIS — E11.51 TYPE 2 DIABETES MELLITUS WITH DIABETIC PERIPHERAL ANGIOPATHY WITHOUT GANGRENE, WITHOUT LONG-TERM CURRENT USE OF INSULIN (HCC): ICD-10-CM

## 2025-06-18 DIAGNOSIS — F10.21 ALCOHOL USE DISORDER, SEVERE, IN EARLY REMISSION, DEPENDENCE (HCC): Primary | ICD-10-CM

## 2025-06-18 DIAGNOSIS — F32.A ANXIETY AND DEPRESSION: ICD-10-CM

## 2025-06-18 DIAGNOSIS — F41.9 ANXIETY AND DEPRESSION: ICD-10-CM

## 2025-06-18 DIAGNOSIS — I10 PRIMARY HYPERTENSION: Chronic | ICD-10-CM

## 2025-06-18 PROBLEM — R56.9 ALCOHOL WITHDRAWAL SEIZURE WITHOUT COMPLICATION (HCC): Status: RESOLVED | Noted: 2025-06-18 | Resolved: 2025-06-18

## 2025-06-18 PROBLEM — E11.9 DIABETES MELLITUS TYPE 2, NONINSULIN DEPENDENT (HCC): Chronic | Status: RESOLVED | Noted: 2017-03-07 | Resolved: 2025-06-18

## 2025-06-18 PROBLEM — F10.931 ALCOHOL WITHDRAWAL SYNDROME, WITH DELIRIUM (HCC): Status: ACTIVE | Noted: 2025-02-04

## 2025-06-18 PROBLEM — F10.930 ALCOHOL WITHDRAWAL SEIZURE WITHOUT COMPLICATION (HCC): Status: ACTIVE | Noted: 2025-06-18

## 2025-06-18 PROBLEM — E87.6 HYPOKALEMIA: Status: RESOLVED | Noted: 2022-10-05 | Resolved: 2025-06-18

## 2025-06-18 PROBLEM — F10.930 ALCOHOL WITHDRAWAL SEIZURE WITHOUT COMPLICATION (HCC): Status: RESOLVED | Noted: 2025-06-18 | Resolved: 2025-06-18

## 2025-06-18 PROBLEM — F10.931 ALCOHOL WITHDRAWAL SYNDROME, WITH DELIRIUM (HCC): Status: RESOLVED | Noted: 2025-02-04 | Resolved: 2025-06-18

## 2025-06-18 PROBLEM — R56.9 ALCOHOL WITHDRAWAL SEIZURE WITHOUT COMPLICATION (HCC): Status: ACTIVE | Noted: 2025-06-18

## 2025-06-18 PROCEDURE — 99496 TRANSJ CARE MGMT HIGH F2F 7D: CPT | Performed by: STUDENT IN AN ORGANIZED HEALTH CARE EDUCATION/TRAINING PROGRAM

## 2025-06-18 RX ORDER — PAROXETINE 20 MG/1
20 TABLET, FILM COATED ORAL DAILY
Qty: 90 TABLET | Refills: 3 | Status: SHIPPED | OUTPATIENT
Start: 2025-06-18 | End: 2025-07-18

## 2025-06-18 RX ORDER — ALPRAZOLAM 0.5 MG
0.5 TABLET ORAL 3 TIMES DAILY PRN
COMMUNITY
Start: 2025-06-09 | End: 2025-06-18 | Stop reason: ALTCHOICE

## 2025-06-18 NOTE — ASSESSMENT & PLAN NOTE
Lab Results   Component Value Date    HGBA1C 7.0 (H) 03/26/2025     Patient with history of type 2 diabetes.  Most recent A1c from March 2025 was controlled at 7.0%.    Continue metformin at 1000 mg daily.  Continue to trend A1c.  May need to increase metformin to twice daily if A1c remains borderline or elevated.

## 2025-06-18 NOTE — TELEPHONE ENCOUNTER
Phone call was probably to let the pt know his echo was approved. We did not call the pt and are unsure why [employer] did as well

## 2025-06-18 NOTE — TELEPHONE ENCOUNTER
Writer attempted to contact pt regarding referral for [facility] to verify services needed to place him on [facility] wait list. Lvm to call writer back.

## 2025-06-18 NOTE — ASSESSMENT & PLAN NOTE
He presents to clinic today for TCM visit following hospitalization at [facility] from 06/13/2025 through 06/15/2025 for alcohol withdrawal with complication with inpatient treatment.    [facility].  Cardiac enzymes were negative and patient was cleared for ACS.    He reported drinking whiskey a pint with 8-10 beers for the past 2 weeks.  He denied any history of alcohol drawl seizures.    He did have previous admissions for detox in February 2025.  He believes he was sober for about 3 months.  He did not cite any particular reason that he started drinking again.    He has a psychiatrist that he follows for depression anxiety.  He would like to establish with a therapist and referral was placed for him today.    Patient was ultimately admitted to [place] for alcohol detox.  He was under toxicologies care.  He clinically improved and was cleared by toxicology for discharge.  He declined rehabilitation facilities at time of discharge.  He presents today compliant with naltrexone 50 mg daily.  He also remains compliant with gabapentin 300 mg every 8 hours as needed.  He states that he has not had an alcoholic beverage since his discharge.  He has not had any cravings either.  He is serious about his sobriety and wishes to maintain sobriety.  Orders:    Ambulatory referral to Psych Services; Future

## 2025-06-18 NOTE — ASSESSMENT & PLAN NOTE
Blood pressure acceptable in clinic today at 126/78.  Continue amlodipine 10 mg daily and hydrochlorothiazide 12.5 mg daily.

## 2025-06-19 ENCOUNTER — PATIENT OUTREACH (OUTPATIENT)
Dept: CASE MANAGEMENT | Facility: HOSPITAL | Age: 70
End: 2025-06-19

## 2025-06-19 NOTE — PROGRESS NOTES
Complex Care management note:    Chart reviewed. Last CM outreach was on 6/17.  Pt presented to [facility] on 6/13/25 for chest pain and alcohol use disorder. Following work up for chest pain, patient interested in medical detox. Patient was transferred to [facility]. Hospitalized at [facility] 6/13//25 for alcohol withdrawal.     Pt had PCP [facility] apt on 6/18. A referral placed to ambulatory Psych services.    PMH: atherosclerosis, type 2 diabetes, hypertension, hyperlipidemia, and alcohol use disorder, anxiety and depression    Call placed to patient. Introduced self and explained the role of the RNCM. Patient says he is doing well. He says he has all of his medications. I offered to review them and he declined. He denies any questions or concerns regarding. Pt says he has had any alcohol since discharge and he adamant that he is done with it. He says he is not going to AA. He says he has done it the past and it doesn't work for him. He says he is seeing his psychiatrist Dr Barboza and is working on getting into talk therapy. Dr Barboza will be seeing him more frequently until he has something set up. He says he has a CM through his insurance company [employer] and she is helping him with getting a therapist. He says he has a phone apt today between 1:30-2:30 to fill out application over the phone.     Patient was appreciative of call but declined CCM services.    CCM referral closed.

## 2025-07-01 DIAGNOSIS — F10.20 ALCOHOL USE DISORDER, SEVERE, DEPENDENCE (HCC): ICD-10-CM

## 2025-07-01 RX ORDER — GABAPENTIN 300 MG/1
300 CAPSULE ORAL EVERY 8 HOURS PRN
Qty: 30 CAPSULE | Refills: 1 | Status: SHIPPED | OUTPATIENT
Start: 2025-07-01 | End: 2025-07-02 | Stop reason: SDUPTHER

## 2025-07-02 ENCOUNTER — TELEPHONE (OUTPATIENT)
Age: 70
End: 2025-07-02

## 2025-07-02 DIAGNOSIS — F10.20 ALCOHOL USE DISORDER, SEVERE, DEPENDENCE (HCC): ICD-10-CM

## 2025-07-02 RX ORDER — GABAPENTIN 300 MG/1
300 CAPSULE ORAL EVERY 8 HOURS PRN
Qty: 30 CAPSULE | Refills: 1 | Status: SHIPPED | OUTPATIENT
Start: 2025-07-02

## 2025-07-02 NOTE — TELEPHONE ENCOUNTER
PA for gabapentin (NEURONTIN) 300 mg capsule SUBMITTED to [employer]    via    [x]CMM-KEY: [id number]  []Surescripts-Case ID #   []Availity-Auth ID # NDC #   []Faxed to plan   []Other website   []Phone call Case ID #     [x]PA sent as URGENT    All office notes, labs and other pertaining documents and studies sent. Clinical questions answered. Awaiting determination from insurance company.     Turnaround time for your insurance to make a decision on your Prior Authorization can take 7-21 business days.

## 2025-07-02 NOTE — TELEPHONE ENCOUNTER
The gabapentin order was sent to [facility] yesterday but he has nothing left.  He asked for it to go [facility] on [address].  Can you send a new order to [facility]?

## 2025-07-03 NOTE — TELEPHONE ENCOUNTER
PA for gabapentin (NEURONTIN) 300 mg capsule APPROVED     Date(s) approved 05/02/25-07/01/26    Case # [id number]    Patient advised by          [][employer] Message  [x]Phone call   [x]LMOM  []L/M to call office as no active Communication consent on file  []Unable to leave detailed message as VM not approved on Communication consent       Pharmacy advised by    [x]Fax  []Phone call  []Secure Chat       Approval letter scanned into Media Yes

## 2025-08-04 ENCOUNTER — HOSPITAL ENCOUNTER (OUTPATIENT)
Dept: NON INVASIVE DIAGNOSTICS | Facility: CLINIC | Age: 70
Discharge: HOME | End: 2025-08-04
Payer: COMMERCIAL

## 2025-08-04 ENCOUNTER — VBI (OUTPATIENT)
Dept: ADMINISTRATIVE | Facility: OTHER | Age: 70
End: 2025-08-04

## 2025-08-04 DIAGNOSIS — I65.23 BILATERAL CAROTID ARTERY STENOSIS: ICD-10-CM

## 2025-08-04 PROCEDURE — 93880 EXTRACRANIAL BILAT STUDY: CPT | Performed by: SURGERY

## 2025-08-04 PROCEDURE — 93880 EXTRACRANIAL BILAT STUDY: CPT

## 2025-08-12 ENCOUNTER — VBI (OUTPATIENT)
Dept: ADMINISTRATIVE | Facility: OTHER | Age: 70
End: 2025-08-12

## 2025-08-15 ENCOUNTER — APPOINTMENT (OUTPATIENT)
Dept: LAB | Facility: HOSPITAL | Age: 70
End: 2025-08-15

## (undated) DEVICE — STERILE POLYISOPRENE POWDER-FREE SURGICAL GLOVES: Brand: PROTEXIS

## (undated) DEVICE — 2.5MM DRILL BIT/QC/GOLD/110MM

## (undated) DEVICE — ASTOUND STANDARD SURGICAL GOWN, XL: Brand: CONVERTORS

## (undated) DEVICE — STANDARD SURGICAL GOWN, L: Brand: CONVERTORS

## (undated) DEVICE — SCD SEQUENTIAL COMPRESSION COMFORT SLEEVE MEDIUM KNEE LENGTH: Brand: KENDALL SCD

## (undated) DEVICE — SPONGE LAP 18 X 18 IN STRL RFD

## (undated) DEVICE — STERILE POLYISOPRENE POWDER-FREE SURGICAL GLOVES WITH EMOLLIENT COATING: Brand: PROTEXIS

## (undated) DEVICE — PACK PBDS SHOULDER ARTHROSCOPY RF

## (undated) DEVICE — UNDERGLOVE PROTEXIS  BLUE SZ 7

## (undated) DEVICE — ARM SLING: Brand: DEROYAL

## (undated) DEVICE — 2.5MM DRILL BIT/QC/GOLD/180MM

## (undated) DEVICE — DRESSING MEPILEX AG BORDER 4 X 12 IN

## (undated) DEVICE — NEEDLE SPINAL18G X 3.5 IN QUINCKE

## (undated) DEVICE — PENCIL SMOKE EVAC TELESCOPING W/TUBING

## (undated) DEVICE — PROXIMATE SKIN STAPLERS (35 WIDE) CONTAINS 35 STAINLESS STEEL STAPLES (FIXED HEAD): Brand: PROXIMATE